# Patient Record
Sex: MALE | Race: BLACK OR AFRICAN AMERICAN | NOT HISPANIC OR LATINO
[De-identification: names, ages, dates, MRNs, and addresses within clinical notes are randomized per-mention and may not be internally consistent; named-entity substitution may affect disease eponyms.]

---

## 2017-03-16 ENCOUNTER — APPOINTMENT (OUTPATIENT)
Dept: ELECTROPHYSIOLOGY | Facility: CLINIC | Age: 67
End: 2017-03-16

## 2017-03-16 ENCOUNTER — NON-APPOINTMENT (OUTPATIENT)
Age: 67
End: 2017-03-16

## 2017-03-16 VITALS
BODY MASS INDEX: 28.93 KG/M2 | WEIGHT: 180 LBS | SYSTOLIC BLOOD PRESSURE: 136 MMHG | HEIGHT: 66 IN | DIASTOLIC BLOOD PRESSURE: 78 MMHG | HEART RATE: 77 BPM | OXYGEN SATURATION: 99 %

## 2017-03-21 ENCOUNTER — APPOINTMENT (OUTPATIENT)
Dept: GASTROENTEROLOGY | Facility: CLINIC | Age: 67
End: 2017-03-21

## 2017-03-21 VITALS
SYSTOLIC BLOOD PRESSURE: 124 MMHG | WEIGHT: 185 LBS | BODY MASS INDEX: 29.73 KG/M2 | RESPIRATION RATE: 16 BRPM | TEMPERATURE: 98.7 F | HEIGHT: 66 IN | HEART RATE: 76 BPM | DIASTOLIC BLOOD PRESSURE: 66 MMHG

## 2017-03-21 DIAGNOSIS — D55.8: ICD-10-CM

## 2017-03-21 RX ORDER — MULTIVITAMIN
TABLET ORAL
Refills: 0 | Status: ACTIVE | COMMUNITY

## 2017-03-24 LAB
ALBUMIN SERPL ELPH-MCNC: 4.3 G/DL
ALP BLD-CCNC: 97 U/L
ALT SERPL-CCNC: 16 U/L
ANION GAP SERPL CALC-SCNC: 16 MMOL/L
AST SERPL-CCNC: 22 U/L
BASOPHILS # BLD AUTO: 0.03 K/UL
BASOPHILS NFR BLD AUTO: 0.6 %
BILIRUB SERPL-MCNC: 0.5 MG/DL
BUN SERPL-MCNC: 11 MG/DL
CALCIUM SERPL-MCNC: 9.6 MG/DL
CHLORIDE SERPL-SCNC: 98 MMOL/L
CO2 SERPL-SCNC: 25 MMOL/L
CREAT SERPL-MCNC: 0.93 MG/DL
EOSINOPHIL # BLD AUTO: 0.1 K/UL
EOSINOPHIL NFR BLD AUTO: 2 %
GLUCOSE SERPL-MCNC: 74 MG/DL
HCT VFR BLD CALC: 42 %
HGB BLD-MCNC: 14.2 G/DL
IMM GRANULOCYTES NFR BLD AUTO: 0.2 %
LYMPHOCYTES # BLD AUTO: 1.86 K/UL
LYMPHOCYTES NFR BLD AUTO: 36.9 %
MAN DIFF?: NORMAL
MCHC RBC-ENTMCNC: 25.8 PG
MCHC RBC-ENTMCNC: 33.8 GM/DL
MCV RBC AUTO: 76.2 FL
MONOCYTES # BLD AUTO: 0.41 K/UL
MONOCYTES NFR BLD AUTO: 8.1 %
NEUTROPHILS # BLD AUTO: 2.63 K/UL
NEUTROPHILS NFR BLD AUTO: 52.2 %
PLATELET # BLD AUTO: 242 K/UL
POTASSIUM SERPL-SCNC: 4.9 MMOL/L
PROT SERPL-MCNC: 7.9 G/DL
RBC # BLD: 5.51 M/UL
RBC # FLD: 15.5 %
SODIUM SERPL-SCNC: 139 MMOL/L
WBC # FLD AUTO: 5.04 K/UL

## 2017-04-23 ENCOUNTER — RESULT REVIEW (OUTPATIENT)
Age: 67
End: 2017-04-23

## 2017-04-24 ENCOUNTER — OUTPATIENT (OUTPATIENT)
Dept: OUTPATIENT SERVICES | Facility: HOSPITAL | Age: 67
LOS: 1 days | Discharge: HOME | End: 2017-04-24
Payer: MEDICARE

## 2017-04-24 DIAGNOSIS — Z12.11 ENCOUNTER FOR SCREENING FOR MALIGNANT NEOPLASM OF COLON: ICD-10-CM

## 2017-04-24 DIAGNOSIS — Z93.0 TRACHEOSTOMY STATUS: Chronic | ICD-10-CM

## 2017-04-24 DIAGNOSIS — Z95.810 PRESENCE OF AUTOMATIC (IMPLANTABLE) CARDIAC DEFIBRILLATOR: Chronic | ICD-10-CM

## 2017-04-24 DIAGNOSIS — D64.9 ANEMIA, UNSPECIFIED: ICD-10-CM

## 2017-04-24 DIAGNOSIS — Z98.89 OTHER SPECIFIED POSTPROCEDURAL STATES: Chronic | ICD-10-CM

## 2017-04-24 DIAGNOSIS — Z85.46 PERSONAL HISTORY OF MALIGNANT NEOPLASM OF PROSTATE: Chronic | ICD-10-CM

## 2017-04-24 PROCEDURE — 45378 DIAGNOSTIC COLONOSCOPY: CPT

## 2017-04-24 PROCEDURE — 88312 SPECIAL STAINS GROUP 1: CPT

## 2017-04-24 PROCEDURE — 43239 EGD BIOPSY SINGLE/MULTIPLE: CPT

## 2017-04-24 PROCEDURE — 88312 SPECIAL STAINS GROUP 1: CPT | Mod: 26

## 2017-04-24 PROCEDURE — 88305 TISSUE EXAM BY PATHOLOGIST: CPT

## 2017-04-24 PROCEDURE — 88305 TISSUE EXAM BY PATHOLOGIST: CPT | Mod: 26

## 2017-04-26 ENCOUNTER — OTHER (OUTPATIENT)
Age: 67
End: 2017-04-26

## 2017-04-26 DIAGNOSIS — K27.9 PEPTIC ULCER, SITE UNSPECIFIED, UNSPECIFIED AS ACUTE OR CHRONIC, W/OUT HEMORRHAGE OR PERFORATION: ICD-10-CM

## 2017-05-02 DIAGNOSIS — G93.40 ENCEPHALOPATHY, UNSPECIFIED: ICD-10-CM

## 2017-05-02 DIAGNOSIS — K29.50 UNSPECIFIED CHRONIC GASTRITIS WITHOUT BLEEDING: ICD-10-CM

## 2017-05-02 DIAGNOSIS — Z95.810 PRESENCE OF AUTOMATIC (IMPLANTABLE) CARDIAC DEFIBRILLATOR: ICD-10-CM

## 2017-05-02 DIAGNOSIS — Z88.0 ALLERGY STATUS TO PENICILLIN: ICD-10-CM

## 2017-05-02 DIAGNOSIS — K57.30 DIVERTICULOSIS OF LARGE INTESTINE WITHOUT PERFORATION OR ABSCESS WITHOUT BLEEDING: ICD-10-CM

## 2017-05-02 DIAGNOSIS — I10 ESSENTIAL (PRIMARY) HYPERTENSION: ICD-10-CM

## 2017-05-02 DIAGNOSIS — I49.01 VENTRICULAR FIBRILLATION: ICD-10-CM

## 2017-05-02 DIAGNOSIS — Z85.46 PERSONAL HISTORY OF MALIGNANT NEOPLASM OF PROSTATE: ICD-10-CM

## 2017-05-02 DIAGNOSIS — Z79.82 LONG TERM (CURRENT) USE OF ASPIRIN: ICD-10-CM

## 2017-05-02 DIAGNOSIS — D64.9 ANEMIA, UNSPECIFIED: ICD-10-CM

## 2017-05-02 DIAGNOSIS — K64.8 OTHER HEMORRHOIDS: ICD-10-CM

## 2017-05-02 DIAGNOSIS — K25.9 GASTRIC ULCER, UNSPECIFIED AS ACUTE OR CHRONIC, WITHOUT HEMORRHAGE OR PERFORATION: ICD-10-CM

## 2017-05-02 DIAGNOSIS — E66.9 OBESITY, UNSPECIFIED: ICD-10-CM

## 2017-07-20 ENCOUNTER — APPOINTMENT (OUTPATIENT)
Dept: ELECTROPHYSIOLOGY | Facility: CLINIC | Age: 67
End: 2017-07-20

## 2017-08-24 ENCOUNTER — APPOINTMENT (OUTPATIENT)
Dept: ELECTROPHYSIOLOGY | Facility: CLINIC | Age: 67
End: 2017-08-24

## 2017-09-15 ENCOUNTER — APPOINTMENT (OUTPATIENT)
Dept: ELECTROPHYSIOLOGY | Facility: CLINIC | Age: 67
End: 2017-09-15
Payer: MEDICARE

## 2017-09-15 ENCOUNTER — NON-APPOINTMENT (OUTPATIENT)
Age: 67
End: 2017-09-15

## 2017-09-15 VITALS
SYSTOLIC BLOOD PRESSURE: 143 MMHG | WEIGHT: 190 LBS | DIASTOLIC BLOOD PRESSURE: 82 MMHG | OXYGEN SATURATION: 99 % | HEIGHT: 66 IN | BODY MASS INDEX: 30.53 KG/M2 | HEART RATE: 71 BPM

## 2017-09-15 PROCEDURE — 93283 PRGRMG EVAL IMPLANTABLE DFB: CPT

## 2018-01-19 ENCOUNTER — NON-APPOINTMENT (OUTPATIENT)
Age: 68
End: 2018-01-19

## 2018-01-19 ENCOUNTER — APPOINTMENT (OUTPATIENT)
Dept: ELECTROPHYSIOLOGY | Facility: CLINIC | Age: 68
End: 2018-01-19
Payer: MEDICARE

## 2018-01-19 VITALS
WEIGHT: 190 LBS | HEIGHT: 66 IN | SYSTOLIC BLOOD PRESSURE: 156 MMHG | HEART RATE: 70 BPM | BODY MASS INDEX: 30.53 KG/M2 | DIASTOLIC BLOOD PRESSURE: 81 MMHG | OXYGEN SATURATION: 99 %

## 2018-01-19 PROCEDURE — 93282 PRGRMG EVAL IMPLANTABLE DFB: CPT

## 2018-05-18 ENCOUNTER — APPOINTMENT (OUTPATIENT)
Dept: ELECTROPHYSIOLOGY | Facility: CLINIC | Age: 68
End: 2018-05-18
Payer: MEDICARE

## 2018-05-18 VITALS — HEART RATE: 67 BPM | DIASTOLIC BLOOD PRESSURE: 81 MMHG | SYSTOLIC BLOOD PRESSURE: 164 MMHG | OXYGEN SATURATION: 99 %

## 2018-05-18 PROCEDURE — 93282 PRGRMG EVAL IMPLANTABLE DFB: CPT

## 2018-06-13 ENCOUNTER — APPOINTMENT (OUTPATIENT)
Dept: UROLOGY | Facility: CLINIC | Age: 68
End: 2018-06-13
Payer: MEDICARE

## 2018-06-13 VITALS
RESPIRATION RATE: 17 BRPM | WEIGHT: 190 LBS | SYSTOLIC BLOOD PRESSURE: 128 MMHG | HEIGHT: 66 IN | TEMPERATURE: 98.2 F | OXYGEN SATURATION: 94 % | BODY MASS INDEX: 30.53 KG/M2 | DIASTOLIC BLOOD PRESSURE: 80 MMHG | HEART RATE: 74 BPM

## 2018-06-13 DIAGNOSIS — Z87.19 PERSONAL HISTORY OF OTHER DISEASES OF THE DIGESTIVE SYSTEM: ICD-10-CM

## 2018-06-13 DIAGNOSIS — Z87.448 PERSONAL HISTORY OF OTHER DISEASES OF URINARY SYSTEM: ICD-10-CM

## 2018-06-13 DIAGNOSIS — Z95.0 PRESENCE OF CARDIAC PACEMAKER: ICD-10-CM

## 2018-06-13 DIAGNOSIS — Z87.898 PERSONAL HISTORY OF OTHER SPECIFIED CONDITIONS: ICD-10-CM

## 2018-06-13 DIAGNOSIS — Z82.49 FAMILY HISTORY OF ISCHEMIC HEART DISEASE AND OTHER DISEASES OF THE CIRCULATORY SYSTEM: ICD-10-CM

## 2018-06-13 DIAGNOSIS — Z87.438 PERSONAL HISTORY OF OTHER DISEASES OF MALE GENITAL ORGANS: ICD-10-CM

## 2018-06-13 PROCEDURE — 99213 OFFICE O/P EST LOW 20 MIN: CPT

## 2018-06-14 LAB — PSA SERPL-MCNC: 19.12 NG/ML

## 2018-06-21 ENCOUNTER — RESULT REVIEW (OUTPATIENT)
Age: 68
End: 2018-06-21

## 2018-06-24 PROBLEM — Z87.898 HISTORY OF URINARY INCONTINENCE: Status: RESOLVED | Noted: 2017-03-21 | Resolved: 2018-06-24

## 2018-06-24 PROBLEM — Z87.438 HISTORY OF PROSTATE DISORDER: Status: RESOLVED | Noted: 2017-03-21 | Resolved: 2018-06-24

## 2018-06-24 PROBLEM — Z82.49 FAMILY HISTORY OF HYPERTENSION: Status: ACTIVE | Noted: 2017-03-21

## 2018-06-24 PROBLEM — Z87.19 HISTORY OF CONSTIPATION: Status: RESOLVED | Noted: 2017-03-21 | Resolved: 2018-06-24

## 2018-06-24 PROBLEM — Z87.448 HISTORY OF KIDNEY DISEASE: Status: RESOLVED | Noted: 2017-03-21 | Resolved: 2018-06-24

## 2018-06-24 PROBLEM — Z95.0 HISTORY OF CARDIAC PACEMAKER: Status: RESOLVED | Noted: 2017-03-21 | Resolved: 2018-06-24

## 2018-10-03 ENCOUNTER — APPOINTMENT (OUTPATIENT)
Dept: UROLOGY | Facility: CLINIC | Age: 68
End: 2018-10-03

## 2018-10-10 ENCOUNTER — APPOINTMENT (OUTPATIENT)
Dept: UROLOGY | Facility: CLINIC | Age: 68
End: 2018-10-10

## 2018-10-17 ENCOUNTER — APPOINTMENT (OUTPATIENT)
Dept: ELECTROPHYSIOLOGY | Facility: CLINIC | Age: 68
End: 2018-10-17
Payer: MEDICARE

## 2018-10-17 PROCEDURE — 93296 REM INTERROG EVL PM/IDS: CPT

## 2018-10-17 PROCEDURE — 93295 DEV INTERROG REMOTE 1/2/MLT: CPT

## 2018-10-24 ENCOUNTER — APPOINTMENT (OUTPATIENT)
Dept: UROLOGY | Facility: CLINIC | Age: 68
End: 2018-10-24
Payer: MEDICARE

## 2018-10-24 DIAGNOSIS — C61 MALIGNANT NEOPLASM OF PROSTATE: ICD-10-CM

## 2018-10-24 DIAGNOSIS — R31.29 OTHER MICROSCOPIC HEMATURIA: ICD-10-CM

## 2018-10-24 PROCEDURE — 99214 OFFICE O/P EST MOD 30 MIN: CPT | Mod: 25

## 2018-10-24 PROCEDURE — 51701 INSERT BLADDER CATHETER: CPT

## 2018-10-25 LAB
APPEARANCE: CLEAR
BACTERIA: NEGATIVE
BILIRUBIN URINE: NEGATIVE
BLOOD URINE: ABNORMAL
COLOR: YELLOW
GLUCOSE QUALITATIVE U: NEGATIVE MG/DL
HYALINE CASTS: 0 /LPF
KETONES URINE: NEGATIVE
LEUKOCYTE ESTERASE URINE: NEGATIVE
MICROSCOPIC-UA: NORMAL
NITRITE URINE: NEGATIVE
PH URINE: 7.5
PROTEIN URINE: NEGATIVE MG/DL
RED BLOOD CELLS URINE: 1 /HPF
SPECIFIC GRAVITY URINE: 1.01
SQUAMOUS EPITHELIAL CELLS: 8 /HPF
UROBILINOGEN URINE: NEGATIVE MG/DL
WHITE BLOOD CELLS URINE: 1 /HPF

## 2018-10-29 LAB — BACTERIA UR CULT: NORMAL

## 2018-11-17 ENCOUNTER — TRANSCRIPTION ENCOUNTER (OUTPATIENT)
Age: 68
End: 2018-11-17

## 2018-11-18 ENCOUNTER — RESULT REVIEW (OUTPATIENT)
Age: 68
End: 2018-11-18

## 2018-11-18 ENCOUNTER — INPATIENT (INPATIENT)
Facility: HOSPITAL | Age: 68
LOS: 11 days | Discharge: NURSING, REHAB OR LONG-TERM CARE | End: 2018-11-30
Attending: SURGERY | Admitting: SURGERY
Payer: MEDICARE

## 2018-11-18 VITALS
TEMPERATURE: 97 F | WEIGHT: 179.9 LBS | DIASTOLIC BLOOD PRESSURE: 87 MMHG | HEIGHT: 65 IN | OXYGEN SATURATION: 96 % | RESPIRATION RATE: 20 BRPM | SYSTOLIC BLOOD PRESSURE: 155 MMHG | HEART RATE: 112 BPM

## 2018-11-18 DIAGNOSIS — Z98.89 OTHER SPECIFIED POSTPROCEDURAL STATES: Chronic | ICD-10-CM

## 2018-11-18 DIAGNOSIS — Z93.0 TRACHEOSTOMY STATUS: Chronic | ICD-10-CM

## 2018-11-18 DIAGNOSIS — Z85.46 PERSONAL HISTORY OF MALIGNANT NEOPLASM OF PROSTATE: Chronic | ICD-10-CM

## 2018-11-18 DIAGNOSIS — Z95.810 PRESENCE OF AUTOMATIC (IMPLANTABLE) CARDIAC DEFIBRILLATOR: ICD-10-CM

## 2018-11-18 DIAGNOSIS — K56.609 UNSPECIFIED INTESTINAL OBSTRUCTION, UNSPECIFIED AS TO PARTIAL VERSUS COMPLETE OBSTRUCTION: ICD-10-CM

## 2018-11-18 DIAGNOSIS — K40.30 UNILATERAL INGUINAL HERNIA, WITH OBSTRUCTION, WITHOUT GANGRENE, NOT SPECIFIED AS RECURRENT: ICD-10-CM

## 2018-11-18 DIAGNOSIS — Z95.810 PRESENCE OF AUTOMATIC (IMPLANTABLE) CARDIAC DEFIBRILLATOR: Chronic | ICD-10-CM

## 2018-11-18 LAB
ALBUMIN SERPL ELPH-MCNC: 3 G/DL — LOW (ref 3.3–5)
ALBUMIN SERPL ELPH-MCNC: 3.1 G/DL — LOW (ref 3.3–5)
ALP SERPL-CCNC: 73 U/L — SIGNIFICANT CHANGE UP (ref 40–120)
ALP SERPL-CCNC: 77 U/L — SIGNIFICANT CHANGE UP (ref 40–120)
ALT FLD-CCNC: 19 U/L — SIGNIFICANT CHANGE UP (ref 12–78)
ALT FLD-CCNC: 22 U/L — SIGNIFICANT CHANGE UP (ref 12–78)
ANION GAP SERPL CALC-SCNC: 12 MMOL/L — SIGNIFICANT CHANGE UP (ref 5–17)
ANION GAP SERPL CALC-SCNC: 9 MMOL/L — SIGNIFICANT CHANGE UP (ref 5–17)
APPEARANCE UR: CLEAR — SIGNIFICANT CHANGE UP
APTT BLD: 24.9 SEC — LOW (ref 28.5–37)
AST SERPL-CCNC: 19 U/L — SIGNIFICANT CHANGE UP (ref 15–37)
AST SERPL-CCNC: 22 U/L — SIGNIFICANT CHANGE UP (ref 15–37)
BACTERIA # UR AUTO: ABNORMAL
BASOPHILS # BLD AUTO: 0 K/UL — SIGNIFICANT CHANGE UP (ref 0–0.2)
BASOPHILS # BLD AUTO: 0.02 K/UL — SIGNIFICANT CHANGE UP (ref 0–0.2)
BASOPHILS # BLD AUTO: 0.02 K/UL — SIGNIFICANT CHANGE UP (ref 0–0.2)
BASOPHILS NFR BLD AUTO: 0 % — SIGNIFICANT CHANGE UP (ref 0–2)
BASOPHILS NFR BLD AUTO: 0.2 % — SIGNIFICANT CHANGE UP (ref 0–2)
BASOPHILS NFR BLD AUTO: 0.2 % — SIGNIFICANT CHANGE UP (ref 0–2)
BILIRUB SERPL-MCNC: 0.4 MG/DL — SIGNIFICANT CHANGE UP (ref 0.2–1.2)
BILIRUB SERPL-MCNC: 0.6 MG/DL — SIGNIFICANT CHANGE UP (ref 0.2–1.2)
BILIRUB UR-MCNC: NEGATIVE — SIGNIFICANT CHANGE UP
BLD GP AB SCN SERPL QL: SIGNIFICANT CHANGE UP
BUN SERPL-MCNC: 22 MG/DL — SIGNIFICANT CHANGE UP (ref 7–23)
BUN SERPL-MCNC: 24 MG/DL — HIGH (ref 7–23)
CALCIUM SERPL-MCNC: 7.6 MG/DL — LOW (ref 8.5–10.1)
CALCIUM SERPL-MCNC: 8.3 MG/DL — LOW (ref 8.5–10.1)
CHLORIDE SERPL-SCNC: 100 MMOL/L — SIGNIFICANT CHANGE UP (ref 96–108)
CHLORIDE SERPL-SCNC: 98 MMOL/L — SIGNIFICANT CHANGE UP (ref 96–108)
CK MB CFR SERPL CALC: 2.7 NG/ML — SIGNIFICANT CHANGE UP (ref 0.5–3.6)
CO2 SERPL-SCNC: 24 MMOL/L — SIGNIFICANT CHANGE UP (ref 22–31)
CO2 SERPL-SCNC: 26 MMOL/L — SIGNIFICANT CHANGE UP (ref 22–31)
COLOR SPEC: YELLOW — SIGNIFICANT CHANGE UP
CREAT SERPL-MCNC: 0.94 MG/DL — SIGNIFICANT CHANGE UP (ref 0.5–1.3)
CREAT SERPL-MCNC: 1.16 MG/DL — SIGNIFICANT CHANGE UP (ref 0.5–1.3)
DIFF PNL FLD: ABNORMAL
EOSINOPHIL # BLD AUTO: 0 K/UL — SIGNIFICANT CHANGE UP (ref 0–0.5)
EOSINOPHIL # BLD AUTO: 0.03 K/UL — SIGNIFICANT CHANGE UP (ref 0–0.5)
EOSINOPHIL # BLD AUTO: 0.04 K/UL — SIGNIFICANT CHANGE UP (ref 0–0.5)
EOSINOPHIL NFR BLD AUTO: 0 % — SIGNIFICANT CHANGE UP (ref 0–6)
EOSINOPHIL NFR BLD AUTO: 0.3 % — SIGNIFICANT CHANGE UP (ref 0–6)
EOSINOPHIL NFR BLD AUTO: 0.5 % — SIGNIFICANT CHANGE UP (ref 0–6)
EPI CELLS # UR: SIGNIFICANT CHANGE UP
GLUCOSE SERPL-MCNC: 143 MG/DL — HIGH (ref 70–99)
GLUCOSE SERPL-MCNC: 144 MG/DL — HIGH (ref 70–99)
GLUCOSE UR QL: NEGATIVE MG/DL — SIGNIFICANT CHANGE UP
HCT VFR BLD CALC: 34.3 % — LOW (ref 39–50)
HCT VFR BLD CALC: 36.6 % — LOW (ref 39–50)
HCT VFR BLD CALC: 37.7 % — LOW (ref 39–50)
HGB BLD-MCNC: 11.7 G/DL — LOW (ref 13–17)
HGB BLD-MCNC: 12.5 G/DL — LOW (ref 13–17)
HGB BLD-MCNC: 13.1 G/DL — SIGNIFICANT CHANGE UP (ref 13–17)
IMM GRANULOCYTES NFR BLD AUTO: 0.2 % — SIGNIFICANT CHANGE UP (ref 0–1.5)
IMM GRANULOCYTES NFR BLD AUTO: 0.2 % — SIGNIFICANT CHANGE UP (ref 0–1.5)
INR BLD: 1.27 RATIO — HIGH (ref 0.88–1.16)
KETONES UR-MCNC: NEGATIVE — SIGNIFICANT CHANGE UP
LACTATE SERPL-SCNC: 0.9 MMOL/L — SIGNIFICANT CHANGE UP (ref 0.7–2)
LACTATE SERPL-SCNC: 1.8 MMOL/L — SIGNIFICANT CHANGE UP (ref 0.7–2)
LACTATE SERPL-SCNC: 3.3 MMOL/L — HIGH (ref 0.7–2)
LEUKOCYTE ESTERASE UR-ACNC: ABNORMAL
LIDOCAIN IGE QN: 132 U/L — SIGNIFICANT CHANGE UP (ref 73–393)
LYMPHOCYTES # BLD AUTO: 0.69 K/UL — LOW (ref 1–3.3)
LYMPHOCYTES # BLD AUTO: 0.94 K/UL — LOW (ref 1–3.3)
LYMPHOCYTES # BLD AUTO: 1.02 K/UL — SIGNIFICANT CHANGE UP (ref 1–3.3)
LYMPHOCYTES # BLD AUTO: 10.5 % — LOW (ref 13–44)
LYMPHOCYTES # BLD AUTO: 12.5 % — LOW (ref 13–44)
LYMPHOCYTES # BLD AUTO: 7 % — LOW (ref 13–44)
MACROCYTES BLD QL: SLIGHT — SIGNIFICANT CHANGE UP
MANUAL SMEAR VERIFICATION: SIGNIFICANT CHANGE UP
MCHC RBC-ENTMCNC: 25.9 PG — LOW (ref 27–34)
MCHC RBC-ENTMCNC: 25.9 PG — LOW (ref 27–34)
MCHC RBC-ENTMCNC: 26.4 PG — LOW (ref 27–34)
MCHC RBC-ENTMCNC: 34.1 GM/DL — SIGNIFICANT CHANGE UP (ref 32–36)
MCHC RBC-ENTMCNC: 34.2 GM/DL — SIGNIFICANT CHANGE UP (ref 32–36)
MCHC RBC-ENTMCNC: 34.7 GM/DL — SIGNIFICANT CHANGE UP (ref 32–36)
MCV RBC AUTO: 75.9 FL — LOW (ref 80–100)
MONOCYTES # BLD AUTO: 0.29 K/UL — SIGNIFICANT CHANGE UP (ref 0–0.9)
MONOCYTES # BLD AUTO: 0.65 K/UL — SIGNIFICANT CHANGE UP (ref 0–0.9)
MONOCYTES # BLD AUTO: 0.69 K/UL — SIGNIFICANT CHANGE UP (ref 0–0.9)
MONOCYTES NFR BLD AUTO: 3 % — SIGNIFICANT CHANGE UP (ref 2–14)
MONOCYTES NFR BLD AUTO: 7.7 % — SIGNIFICANT CHANGE UP (ref 2–14)
MONOCYTES NFR BLD AUTO: 8 % — SIGNIFICANT CHANGE UP (ref 2–14)
NEUTROPHILS # BLD AUTO: 6.38 K/UL — SIGNIFICANT CHANGE UP (ref 1.8–7.4)
NEUTROPHILS # BLD AUTO: 7.25 K/UL — SIGNIFICANT CHANGE UP (ref 1.8–7.4)
NEUTROPHILS # BLD AUTO: 8.83 K/UL — HIGH (ref 1.8–7.4)
NEUTROPHILS NFR BLD AUTO: 78.6 % — HIGH (ref 43–77)
NEUTROPHILS NFR BLD AUTO: 81.1 % — HIGH (ref 43–77)
NEUTROPHILS NFR BLD AUTO: 90 % — HIGH (ref 43–77)
NITRITE UR-MCNC: NEGATIVE — SIGNIFICANT CHANGE UP
NRBC # BLD: 0 /100 WBCS — SIGNIFICANT CHANGE UP (ref 0–0)
NRBC # BLD: 0 /100 WBCS — SIGNIFICANT CHANGE UP (ref 0–0)
NRBC # BLD: 0 /100 — SIGNIFICANT CHANGE UP (ref 0–0)
NRBC # BLD: SIGNIFICANT CHANGE UP /100 WBCS (ref 0–0)
NT-PROBNP SERPL-SCNC: 1425 PG/ML — HIGH (ref 0–125)
PH UR: 6.5 — SIGNIFICANT CHANGE UP (ref 5–8)
PLAT MORPH BLD: NORMAL — SIGNIFICANT CHANGE UP
PLATELET # BLD AUTO: 289 K/UL — SIGNIFICANT CHANGE UP (ref 150–400)
PLATELET # BLD AUTO: 311 K/UL — SIGNIFICANT CHANGE UP (ref 150–400)
PLATELET # BLD AUTO: 314 K/UL — SIGNIFICANT CHANGE UP (ref 150–400)
POTASSIUM SERPL-MCNC: 4.5 MMOL/L — SIGNIFICANT CHANGE UP (ref 3.5–5.3)
POTASSIUM SERPL-MCNC: 4.5 MMOL/L — SIGNIFICANT CHANGE UP (ref 3.5–5.3)
POTASSIUM SERPL-SCNC: 4.5 MMOL/L — SIGNIFICANT CHANGE UP (ref 3.5–5.3)
POTASSIUM SERPL-SCNC: 4.5 MMOL/L — SIGNIFICANT CHANGE UP (ref 3.5–5.3)
PROT SERPL-MCNC: 7.4 GM/DL — SIGNIFICANT CHANGE UP (ref 6–8.3)
PROT SERPL-MCNC: 7.9 GM/DL — SIGNIFICANT CHANGE UP (ref 6–8.3)
PROT UR-MCNC: 30 MG/DL
PROTHROM AB SERPL-ACNC: 14.3 SEC — HIGH (ref 10–12.9)
RBC # BLD: 4.52 M/UL — SIGNIFICANT CHANGE UP (ref 4.2–5.8)
RBC # BLD: 4.82 M/UL — SIGNIFICANT CHANGE UP (ref 4.2–5.8)
RBC # BLD: 4.97 M/UL — SIGNIFICANT CHANGE UP (ref 4.2–5.8)
RBC # FLD: 13.5 % — SIGNIFICANT CHANGE UP (ref 10.3–14.5)
RBC # FLD: 13.6 % — SIGNIFICANT CHANGE UP (ref 10.3–14.5)
RBC # FLD: 13.6 % — SIGNIFICANT CHANGE UP (ref 10.3–14.5)
RBC BLD AUTO: NORMAL — SIGNIFICANT CHANGE UP
RBC CASTS # UR COMP ASSIST: ABNORMAL /HPF (ref 0–4)
SODIUM SERPL-SCNC: 134 MMOL/L — LOW (ref 135–145)
SODIUM SERPL-SCNC: 135 MMOL/L — SIGNIFICANT CHANGE UP (ref 135–145)
SP GR SPEC: 1 — LOW (ref 1.01–1.02)
TROPONIN I SERPL-MCNC: <.015 NG/ML — SIGNIFICANT CHANGE UP (ref 0.01–0.04)
UROBILINOGEN FLD QL: 1 MG/DL
WBC # BLD: 8.13 K/UL — SIGNIFICANT CHANGE UP (ref 3.8–10.5)
WBC # BLD: 8.95 K/UL — SIGNIFICANT CHANGE UP (ref 3.8–10.5)
WBC # BLD: 9.81 K/UL — SIGNIFICANT CHANGE UP (ref 3.8–10.5)
WBC # FLD AUTO: 8.13 K/UL — SIGNIFICANT CHANGE UP (ref 3.8–10.5)
WBC # FLD AUTO: 8.95 K/UL — SIGNIFICANT CHANGE UP (ref 3.8–10.5)
WBC # FLD AUTO: 9.81 K/UL — SIGNIFICANT CHANGE UP (ref 3.8–10.5)
WBC UR QL: SIGNIFICANT CHANGE UP

## 2018-11-18 PROCEDURE — 88302 TISSUE EXAM BY PATHOLOGIST: CPT | Mod: 26

## 2018-11-18 PROCEDURE — 74177 CT ABD & PELVIS W/CONTRAST: CPT | Mod: 26

## 2018-11-18 PROCEDURE — 71045 X-RAY EXAM CHEST 1 VIEW: CPT | Mod: 26

## 2018-11-18 PROCEDURE — 88305 TISSUE EXAM BY PATHOLOGIST: CPT | Mod: 26

## 2018-11-18 PROCEDURE — 74018 RADEX ABDOMEN 1 VIEW: CPT | Mod: 26

## 2018-11-18 PROCEDURE — 93010 ELECTROCARDIOGRAM REPORT: CPT

## 2018-11-18 PROCEDURE — 99285 EMERGENCY DEPT VISIT HI MDM: CPT

## 2018-11-18 RX ORDER — HEPARIN SODIUM 5000 [USP'U]/ML
5000 INJECTION INTRAVENOUS; SUBCUTANEOUS EVERY 12 HOURS
Qty: 0 | Refills: 0 | Status: DISCONTINUED | OUTPATIENT
Start: 2018-11-18 | End: 2018-11-18

## 2018-11-18 RX ORDER — ASPIRIN/CALCIUM CARB/MAGNESIUM 324 MG
81 TABLET ORAL DAILY
Qty: 0 | Refills: 0 | Status: DISCONTINUED | OUTPATIENT
Start: 2018-11-18 | End: 2018-11-18

## 2018-11-18 RX ORDER — MORPHINE SULFATE 50 MG/1
4 CAPSULE, EXTENDED RELEASE ORAL ONCE
Qty: 0 | Refills: 0 | Status: DISCONTINUED | OUTPATIENT
Start: 2018-11-18 | End: 2018-11-18

## 2018-11-18 RX ORDER — METOPROLOL TARTRATE 50 MG
5 TABLET ORAL EVERY 6 HOURS
Qty: 0 | Refills: 0 | Status: DISCONTINUED | OUTPATIENT
Start: 2018-11-18 | End: 2018-11-18

## 2018-11-18 RX ORDER — IOHEXOL 300 MG/ML
30 INJECTION, SOLUTION INTRAVENOUS ONCE
Qty: 0 | Refills: 0 | Status: COMPLETED | OUTPATIENT
Start: 2018-11-18 | End: 2018-11-18

## 2018-11-18 RX ORDER — HYDROMORPHONE HYDROCHLORIDE 2 MG/ML
1 INJECTION INTRAMUSCULAR; INTRAVENOUS; SUBCUTANEOUS ONCE
Qty: 0 | Refills: 0 | Status: DISCONTINUED | OUTPATIENT
Start: 2018-11-18 | End: 2018-11-18

## 2018-11-18 RX ORDER — MORPHINE SULFATE 50 MG/1
2 CAPSULE, EXTENDED RELEASE ORAL EVERY 4 HOURS
Qty: 0 | Refills: 0 | Status: DISCONTINUED | OUTPATIENT
Start: 2018-11-18 | End: 2018-11-18

## 2018-11-18 RX ORDER — MORPHINE SULFATE 50 MG/1
4 CAPSULE, EXTENDED RELEASE ORAL EVERY 4 HOURS
Qty: 0 | Refills: 0 | Status: DISCONTINUED | OUTPATIENT
Start: 2018-11-18 | End: 2018-11-18

## 2018-11-18 RX ORDER — SODIUM CHLORIDE 9 MG/ML
1000 INJECTION INTRAMUSCULAR; INTRAVENOUS; SUBCUTANEOUS ONCE
Qty: 0 | Refills: 0 | Status: COMPLETED | OUTPATIENT
Start: 2018-11-18 | End: 2018-11-18

## 2018-11-18 RX ORDER — SODIUM CHLORIDE 9 MG/ML
1000 INJECTION, SOLUTION INTRAVENOUS
Qty: 0 | Refills: 0 | Status: DISCONTINUED | OUTPATIENT
Start: 2018-11-18 | End: 2018-11-18

## 2018-11-18 RX ORDER — ONDANSETRON 8 MG/1
4 TABLET, FILM COATED ORAL ONCE
Qty: 0 | Refills: 0 | Status: COMPLETED | OUTPATIENT
Start: 2018-11-18 | End: 2018-11-18

## 2018-11-18 RX ADMIN — ONDANSETRON 4 MILLIGRAM(S): 8 TABLET, FILM COATED ORAL at 06:30

## 2018-11-18 RX ADMIN — HYDROMORPHONE HYDROCHLORIDE 1 MILLIGRAM(S): 2 INJECTION INTRAMUSCULAR; INTRAVENOUS; SUBCUTANEOUS at 15:00

## 2018-11-18 RX ADMIN — MORPHINE SULFATE 4 MILLIGRAM(S): 50 CAPSULE, EXTENDED RELEASE ORAL at 09:38

## 2018-11-18 RX ADMIN — SODIUM CHLORIDE 1000 MILLILITER(S): 9 INJECTION INTRAMUSCULAR; INTRAVENOUS; SUBCUTANEOUS at 10:00

## 2018-11-18 RX ADMIN — Medication 100 MILLIGRAM(S): at 07:31

## 2018-11-18 RX ADMIN — HYDROMORPHONE HYDROCHLORIDE 1 MILLIGRAM(S): 2 INJECTION INTRAMUSCULAR; INTRAVENOUS; SUBCUTANEOUS at 13:10

## 2018-11-18 RX ADMIN — IOHEXOL 30 MILLILITER(S): 300 INJECTION, SOLUTION INTRAVENOUS at 06:25

## 2018-11-18 RX ADMIN — MORPHINE SULFATE 4 MILLIGRAM(S): 50 CAPSULE, EXTENDED RELEASE ORAL at 06:30

## 2018-11-18 RX ADMIN — SODIUM CHLORIDE 1000 MILLILITER(S): 9 INJECTION INTRAMUSCULAR; INTRAVENOUS; SUBCUTANEOUS at 07:00

## 2018-11-18 NOTE — CONSULT NOTE ADULT - PROBLEM SELECTOR RECOMMENDATION 9
surgical management as per Dr Tinsley   patient is medically optimized, recommend cardiology evaluation

## 2018-11-18 NOTE — ED PROVIDER NOTE - OBJECTIVE STATEMENT
Pt is a 69 yo gentleman with a pmhx of HTN, prostate ca, AICD sp cardiac arrest who presents to the ED with abdominal pain for 1 week. Has had nausea, no vomiting. Has had dysuria as well. Has had increased distension of his abdomen. Last bowel movement was this morning. No fevers, no chest pain, no sob. Has no hx of SBO in the past.

## 2018-11-18 NOTE — ED ADULT NURSE NOTE - OBJECTIVE STATEMENT
Pt is A&Ox4. Pt points to pain in the pelvic and rates it ten.  Pt has B/L lower extremity non-pitting edema.  Pt is unable to move his legs.    As per wife, pt has been c/o difficulty breathing, easily exerted with activities, abd pain and groin pain x1 week.  Also, wife states that when she changes his diaper, he hasn't been providing a lot of urine x2days.

## 2018-11-18 NOTE — ED PROVIDER NOTE - MEDICAL DECISION MAKING DETAILS
Ddx: ro SBO/ Incarcerated hernia  Plan: Cbc, cmp, lactate, CT abd, pain control, reassesss Ddx: ro SBO/ Incarcerated hernia  Plan: Cbc, cmp, lactate, CT abd, pain control, reassesss    High grade SBO with R inguinal hernia noted - to admit for further care with Dr. Tinsley

## 2018-11-18 NOTE — H&P ADULT - HISTORY OF PRESENT ILLNESS
68M with PMH HTN, prostate ca (s/p resection and radiation), AICD placement s/p cardiac arrest and coma (due to brain injury) about 10yrs ago, and known 2 year history of reducible right inguinal hernia now presents to the ER c/o diffuse abdominal pain and right scrotal pain. Wife at bedside who speaks on behalf of patient. States right groin pain has been persistent x1 week, worsening since 2 days ago associated with increased right scrotal swelling. Associated with nausea and NBNB emesis.  Denies flatus or BM, states his last BM was yesterday. Wife states patient has the "sweats" at night, but denies fever/chills. Denies active chest pain, palpitations, shortness of breath, dizziness or urinary complaints. 68M with PMH HTN, prostate ca (s/p resection and radiation), AICD placement s/p cardiac arrest and coma (due to brain injury) about 10yrs ago, and known 2 year history of reducible right inguinal hernia now presents to the ER c/o diffuse abdominal pain and right scrotal pain. Wife at bedside who speaks on behalf of patient. States right groin pain has been persistent x1 week, worsening since 2 days ago associated with increased right scrotal swelling. Associated with nausea and NBNB emesis.  Denies flatus or BM, states his last BM was yesterday. Wife states patient has the "sweats" at night, but denies fever/chills. Denies active chest pain, palpitations, shortness of breath, dizziness or urinary complaints.   ER and Surgeon tried to reduce Hernia, not reducible.

## 2018-11-18 NOTE — ED ADULT NURSE NOTE - PSH
H/O prostate cancer  resection and radiation therapy  H/O tracheostomy    S/P ICD (internal cardiac defibrillator) procedure  implanted on 1/6/2009  Status post cryoablation  of left renal mass

## 2018-11-18 NOTE — ED ADULT NURSE REASSESSMENT NOTE - NS ED NURSE REASSESS COMMENT FT1
1130 n/g tube attempted unable surg resident Mary Ellen aware 2nd Rn to attempt 2nd line 1st line left hand sluggish Dr Gregorio and Mary Ellen surg resident aware 1200 Mary Ellen in ER family and pt refusing NG tube. 1300 Dr Gaytan in ER shea obtained  by elma scrotal hernia reduced labs drawn and sent 1330 n/g attmpted by Dr Gaytan surg resident at bedside 1335 lab called labs hemolyzed lab called to send tech stat for redraw
Dr Gaytan unable to pass n/g tube lab at bedside to draw hemolyzed labs
abd remains distended no urinary output Dr Gregorio aware unable to pass shea Fluids hung Dr cruz lactate earlier elevated.
pt sent to CT
pt sleeping family at bedside heparin SQ cleared by surg resident to give pre op
surgical resident at bedside aware unable to pass shea and lactate
unable to straight cath resistance met Dr Hernandez aware, [facility] states last time urologist had to pass
pt awaiting transfer to OR as per surgical resident. labs drawn and sent as ordered. 2nd IV placed to R hand.
pt received bed 5 A&C awaiting CT pts family states pt doesn't know when has to void pt to be straight cath.
pt seen by surg resident aware unable  to get 2nd line, and Iv infusing slowly Chris to try 2nd line N/g attempted by 2 RNs surgical resident called and aware came back to ER pt and wife refused, pt not actively vomiting will be done in OR

## 2018-11-18 NOTE — H&P ADULT - ASSESSMENT
68M admitted with SBO 2/2 incarcerated right inguinal hernia, with concern for strangulation. Lactate 3.3.

## 2018-11-18 NOTE — CONSULT NOTE ADULT - SUBJECTIVE AND OBJECTIVE BOX
NO CV CONTRAINDICATION FOR SURGERY WITH ANESTHESIA AS INDICATED. PATIENT'S CARDIAC STATUS APPEARS STABLE/OPTIMAL.     THIS IS A 68 YEAR OLD MALE WITH SBO AND HERNIA; STATUS  POST CARDIAC ARREST 10 YEARS AGO STATUS  POST AICD WITH RECENT NORMAL INTERROGATION; NO ASHD OR CONGESTIVE HEART FAILURE     SEDATE AFTER MORPHINE  UNABLE TO ASSESS JV CONTOURS  SOFT S1 & S2; NO APPRECIABLE M/R/G/H  PROTRUBERANT ABDOMEN, TYMPANITIC  MILD EDEMA PERFUSED    ALL LABS/RADIOLOGY/MEDICATIONS REVIEWED;   ECG PENDING    WOULD PROCEED TO OR IF NECCESSARY  . STABLE CARDIOVASCULAR STATUS;     WILL FOLLOW  FAMILY PRESENT AT BEDSIDE     JEZ LEE MD, FACC

## 2018-11-18 NOTE — H&P ADULT - ATTENDING COMMENTS
I examined patient in Emergency room. R Incarcerated Inguinal Hernia with SBO, Tried to reduce it, no success.  Consent obtain for emergency Hernia repair.  Dx, Plan discussed with patient and family at  bed site.

## 2018-11-18 NOTE — CONSULT NOTE ADULT - PROBLEM SELECTOR RECOMMENDATION 2
has hx of cardaic arrest in remote past, reduced ejection fraction as per notes from 2015.  Recommend Cardiology Evaluation as part of  pre op evaluation. has hx of cardaic arrest in remote past, reduced ejection fraction as per notes from 2015.. Continue  B Blockers  and aspirin .  Recommend Cardiology Evaluation as part of  pre op evaluation.

## 2018-11-18 NOTE — H&P ADULT - PROBLEM SELECTOR PLAN 1
Admit to Dr. Tinsley   IVF resuscitation, repeat lactate  NPO, NGT decompression  Jones   Patient booked for exploratory laparotomy   preop labs, type and screen, coags ordered  2 uPRBC on hold for OR  Discussed with Dr. Tinsley who is coming now to evaluate patient

## 2018-11-18 NOTE — H&P ADULT - REASON FOR ADMISSION
Abdominal pain and scrotal pain Abdominal pain and scrotal pain, Incarcerated R Inguino-scrotal Hernia.

## 2018-11-18 NOTE — H&P ADULT - NSHPPHYSICALEXAM_GEN_ALL_CORE
General: Laying on stretcher, moderate distress, confused and agitated (wife stating hes not confused)  Head: NC/AT  EENT: PERRLA. EOMI. Conjunctiva and sclera clear. Pharynx clear.  Neck: Supple.  Lungs: CTA B/l. Nonlabored Respirations  CV: +S1S2, RRR  Abdomen: Moderate distention, Rigid abdomen, tenderness to palpation   Groin: Right groin tenderness, scrotal tenderness, unable to reduce right inguinal/scrotal hernia, ++Swollen, tense scrotum   Extremities: Warm and well perfused. 2+ peripheral pulses b/l. Calf soft, nontender b/l. No pedal edema.  Neuro: Alert and awake but confused and agitated General: Laying on stretcher, moderate distress, confused and agitated (wife stating hes not confused)  Head: NC/AT  EENT: PERRLA. EOMI. Conjunctiva and sclera clear. Pharynx clear.  Neck: Supple.  Lungs: CTA B/l. Nonlabored Respirations  CV: +S1S2, RRR  Abdomen: Moderate distention, Rigid abdomen, tenderness to palpation   Groin: Right groin tenderness, scrotal tenderness, unable to reduce large right inguinal/scrotal hernia, ++Swollen, tense scrotum   Extremities: Warm and well perfused. 2+ peripheral pulses b/l. Calf soft, nontender b/l. No pedal edema.  Neuro: Alert and awake but confused and agitated

## 2018-11-18 NOTE — CONSULT NOTE ADULT - SUBJECTIVE AND OBJECTIVE BOX
HPI:  68M with PMH HTN, prostate ca (s/p resection and radiation), AICD placement s/p cardiac arrest and coma (due to brain injury) about 10yrs ago, and known 2 year history of reducible right inguinal hernia now presents to the ER c/o diffuse abdominal pain and right scrotal pain. Wife at bedside who speaks on behalf of patient. States right groin pain has been persistent x1 week, worsening since 2 days ago associated with increased right scrotal swelling. Associated with nausea and NBNB emesis.  Denies flatus or BM, states his last BM was yesterday. Wife states patient has the "sweats" at night, but denies fever/chills. Denies active chest pain, palpitations, shortness of breath, dizziness or urinary complaints. (2018 11:32)      PAST MEDICAL & SURGICAL HISTORY:  Sudden cardiac death  Gait abnormality  Leg pain, right  Anoxic encephalopathy  Brain injury with coma: x 2 weeks in   Prostate cancer: radiation therapy  Hypertension  S/P ICD (internal cardiac defibrillator) procedure: implanted on 2009  H/O tracheostomy  H/O prostate cancer: resection and radiation therapy  Status post cryoablation: of left renal mass      REVIEW OF SYSTEMS:    CONSTITUTIONAL: No fever, weight loss, or fatigue  EYES: No eye pain, visual disturbances, or discharge  ENMT:  No difficulty hearing, tinnitus, vertigo; No sinus or throat pain  NECK: No pain or stiffness  BREASTS: No pain, masses, or nipple discharge  RESPIRATORY: No cough, wheezing, chills or hemoptysis; No shortness of breath  CARDIOVASCULAR: No chest pain, palpitations, dizziness, or leg swelling Tacho AICD ., reduced LVEF  GASTROINTESTINAL: No abdominal or epigastric pain. No nausea, vomiting, or hematemesis; No diarrhea or constipation. No melena or hematochezia. Rt inguinal hernia X more than 3 years  GENITOURINARY: No dysuria, frequency, hematuria, or incontinence  NEUROLOGICAL: No headaches,  has memory loss, loss of strength, numbness, or tremors. gait abnormality  SKIN: No itching, burning, rashes, or lesions   LYMPH NODES: No enlarged glands  ENDOCRINE: No heat or cold intolerance; No hair loss  MUSCULOSKELETAL: No joint pain or swelling; No muscle, back, or extremity pain  PSYCHIATRIC: No depression, anxiety, mood swings, or difficulty sleeping  HEME/LYMPH: No easy bruising, or bleeding gums  ALLERY AND IMMUNOLOGIC: No hives or eczema      MEDICATIONS  (STANDING):  aspirin enteric coated 81 milliGRAM(s) Oral daily  heparin  Injectable 5000 Unit(s) SubCutaneous every 12 hours  lactated ringers. 1000 milliLiter(s) (125 mL/Hr) IV Continuous <Continuous>    MEDICATIONS  (PRN):  metoprolol tartrate Injectable 5 milliGRAM(s) IV Push every 6 hours PRN SBP>160 or HR >100  morphine  - Injectable 4 milliGRAM(s) IV Push every 4 hours PRN Severe Pain (7 - 10)  morphine  - Injectable 2 milliGRAM(s) IV Push every 4 hours PRN Moderate Pain (4 - 6)      Allergies    lisinopril (Unknown)  penicillins (Unknown)    Intolerances        SOCIAL HISTORY:    FAMILY HISTORY:  No pertinent family history in first degree relatives      Vital Signs Last 24 Hrs  T(C): 37.1 (2018 10:33), Max: 37.1 (2018 10:33)  T(F): 98.8 (2018 10:33), Max: 98.8 (2018 10:33)  HR: 97 (2018 14:21) (96 - 112)  BP: 161/101 (2018 14:21) (148/89 - 161/101)  BP(mean): --  RR: 20 (2018 11:27) (18 - 20)  SpO2: 95% (2018 14:21) (95% - 97%)    PHYSICAL EXAM:    GENERAL: NAD, well-groomed, well-developed  HEAD:  Atraumatic, Normocephalic  EYES: EOMI, PERRLA, conjunctiva and sclera clear.Poor vision  ENMT: No tonsillar erythema, exudates, or enlargement; Moist mucous membranes, Good dentition, No lesions  NECK: Supple, No JVD, Normal thyroid  NERVOUS SYSTEM:  Alert & Oriented X3, Good concentration; Motor Strength 5/5 B/L upper and lower extremities; DTRs 2+ intact and symmetric. gait abnormality. some cognitive impairmant  CHEST/LUNG: Clear to percussion bilaterally; No rales, rhonchi, wheezing, or rubs  HEART: Regular rate and rhythm; No murmurs, rubs, or gallops  ABDOMEN: Soft, Nontender, Nondistended; Bowel sounds present. Rt inguinaoscrotal hernia , tender.BS+  EXTREMITIES:  2+ Peripheral Pulses, No clubbing, cyanosis, or edema  LYMPH: No lymphadenopathy noted   SKIN: No rashes or lesions      LABS:                        11.7   8.13  )-----------( 289      ( 2018 14:15 )             34.3         135  |  100  |  22  ----------------------------<  144<H>  4.5   |  26  |  0.94    Ca    7.6<L>      2018 14:15    TPro  x   /  Alb  3.0<L>  /  TBili  x   /  DBili  x   /  AST  19  /  ALT  22  /  AlkPhos  x       PT/INR - ( 2018 14:15 )   PT: 14.3 sec;   INR: 1.27 ratio         PTT - ( 2018 14:15 )  PTT:24.9 sec  Urinalysis Basic - ( 2018 14:16 )    Color: Yellow / Appearance: Clear / S.005 / pH: x  Gluc: x / Ketone: Negative  / Bili: Negative / Urobili: 1 mg/dL   Blood: x / Protein: 30 mg/dL / Nitrite: Negative   Leuk Esterase: Trace / RBC: 3-5 /HPF / WBC 3-5   Sq Epi: x / Non Sq Epi: Occasional / Bacteria: Few    EKG Sinus tach 112. NAC    CARDIAC MARKERS ( 2018 05:40 )  <.015 ng/mL / x     / x     / x     / 2.7 ng/mL      RADIOLOGY & ADDITIONAL STUDIES:  < from: CT Abdomen and Pelvis w/ Oral Cont and w/ IV Cont (18 @ 08:40) >  EXAM:  CT ABDOMEN AND PELVIS OC IC                            PROCEDURE DATE:  2018          INTERPRETATION:  CT of the abdomen and pelvis with contrast dated   2018 8:40 AM    INDICATION: abd distension and pain . Personal history of prostate cancer.    TECHNIQUE: CT of the abdomen and pelvis was performed using oral and   intravenous contrast.  Axial and coronal images were produced and   reviewed.    PRIOR STUDIES: None.    FINDINGS: Images of the lower chest demonstrate cardiomegaly. Partially   imaged pacemaker wire with its tip in the right ventricle. Dilated distal   esophagus containing oral contrast with a slight wall thickening.   Bibasilar linear atelectasis, right greater than left. Mamillation of the   right hemidiaphragm.    The liver is normal in appearance. Markedly attenuated in diameter   superior mesenteric vein. 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 The right and left portal veins are   patent. Thrombosed splenic vein at and near the confluence with the   superior mesenteric vein with reconstitution of the splenic vein via   collaterals and the tail of the pancreas level. No radiopaque stones are   seen in the gallbladder.  The pancreas is normal in appearance.  No   splenic abnormalities are seen.    The adrenal glands are unremarkable. 3.0 x 1.6 cm intermediate density   lesion in the left upper pole kidney with an area of irregular   enhancement. Left upper pole renal scar. Bilateral renal cysts and   additional subcentimeter low-density renal lesions too small to   characterize.      No abdominal aortic aneurysm is seen. Flattened IVC suggesting   dehydration. No lymphadenopathy is seen.     Evaluation of the bowel demonstrates dilated loops of small bowel of a   right inguinal hernia that extends into the scrotum. The hernia contains   the distal ileum as well as the cecum and portions of the ascending   colon. Stranding of the fat and vessels and the narrowest point of the   right inguinal hernia is seen. There is fluid within the right hernial   sac at the level of the scrotum. The findings are consistent with an   incarcerated right inguinal hernia. No free intraperitoneal air. No   pneumatosis intestinalis or portal venous gas is seen.Mild diastases   recti and a small fat-containing umbilical hernia. Trace ascites. Colonic   diverticulosis.    Images of the pelvis demonstrate status post prostatectomy. Collapsed   urinary bladder with no filling defects.    Evaluation of the osseous structures demonstrates degenerative changes.    IMPRESSION:  High-grade small bowel obstruction within a large right inguinal/scrotal   hernia.    3 cm left renal cyst containing a focal area of enhancement suspicious   for cystic renal neoplasm.Recommend MRI with and without IV contrast.    Flattened IVC suggesting dehydration.    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 Thrombosed portions of the splenic vein with reconstitution near   the spleen.    Discussed with Dr. Gregorio from the emergency department.    < end of copied text >  < from: Xray Chest 1 View AP/PA. (18 @ 06:20) >    EXAM:  XR CHEST AP OR PA 1V                            PROCEDURE DATE:  2018          INTERPRETATION:  AP supine chest on 2018 at 5:55 AM. Patient   has abdominal pain. Patient has history of prostate cancer.    Heart may be borderline enlarged. Left-sided pacer device again noted.    Linear scarring at the right base medially again noted.    Chest is similar to November 15, 2015.    IMPRESSION: Unchanged chest showing right base scar and left-sided pacer   device.    < end of copied text >

## 2018-11-18 NOTE — ED ADULT NURSE NOTE - PMH
Anoxic encephalopathy    Brain injury with coma  x 2 weeks in 2008  Gait abnormality    Hypertension    Leg pain, right    Prostate cancer  radiation therapy  Sudden cardiac death

## 2018-11-18 NOTE — ED ADULT NURSE NOTE - NSIMPLEMENTINTERV_GEN_ALL_ED
Implemented All Fall with Harm Risk Interventions:  [place] to call system. Call bell, personal items and telephone within reach. Instruct patient to call for assistance. Room bathroom lighting operational. Non-slip footwear when patient is off stretcher. Physically safe environment: no spills, clutter or unnecessary equipment. Stretcher in lowest position, wheels locked, appropriate side rails in place. Provide visual cue, wrist band, yellow gown, etc. Monitor gait and stability. Monitor for mental status changes and reorient to person, place, and time. Review medications for side effects contributing to fall risk. Reinforce activity limits and safety measures with patient and family. Provide visual clues: red socks.

## 2018-11-19 LAB
-  COAGULASE NEGATIVE STAPHYLOCOCCUS: SIGNIFICANT CHANGE UP
ALBUMIN SERPL ELPH-MCNC: 2.2 G/DL — LOW (ref 3.3–5)
ALP SERPL-CCNC: 54 U/L — SIGNIFICANT CHANGE UP (ref 40–120)
ALT FLD-CCNC: 20 U/L — SIGNIFICANT CHANGE UP (ref 12–78)
ANION GAP SERPL CALC-SCNC: 8 MMOL/L — SIGNIFICANT CHANGE UP (ref 5–17)
AST SERPL-CCNC: 44 U/L — HIGH (ref 15–37)
BASE EXCESS BLDA CALC-SCNC: 0 MMOL/L — SIGNIFICANT CHANGE UP (ref -2–2)
BILIRUB SERPL-MCNC: 1.6 MG/DL — HIGH (ref 0.2–1.2)
BLOOD GAS COMMENTS: SIGNIFICANT CHANGE UP
BLOOD GAS COMMENTS: SIGNIFICANT CHANGE UP
BLOOD GAS SOURCE: SIGNIFICANT CHANGE UP
BUN SERPL-MCNC: 24 MG/DL — HIGH (ref 7–23)
CALCIUM SERPL-MCNC: 6.9 MG/DL — LOW (ref 8.5–10.1)
CHLORIDE SERPL-SCNC: 100 MMOL/L — SIGNIFICANT CHANGE UP (ref 96–108)
CO2 SERPL-SCNC: 27 MMOL/L — SIGNIFICANT CHANGE UP (ref 22–31)
CREAT SERPL-MCNC: 1.26 MG/DL — SIGNIFICANT CHANGE UP (ref 0.5–1.3)
GLUCOSE SERPL-MCNC: 182 MG/DL — HIGH (ref 70–99)
GRAM STN FLD: SIGNIFICANT CHANGE UP
HCO3 BLDA-SCNC: 23 MMOL/L — SIGNIFICANT CHANGE UP (ref 21–29)
HCT VFR BLD CALC: 34.3 % — LOW (ref 39–50)
HGB BLD-MCNC: 11.6 G/DL — LOW (ref 13–17)
HOROWITZ INDEX BLDA+IHG-RTO: 60 — SIGNIFICANT CHANGE UP
MAGNESIUM SERPL-MCNC: 2.4 MG/DL — SIGNIFICANT CHANGE UP (ref 1.6–2.6)
MCHC RBC-ENTMCNC: 26.2 PG — LOW (ref 27–34)
MCHC RBC-ENTMCNC: 33.8 GM/DL — SIGNIFICANT CHANGE UP (ref 32–36)
MCV RBC AUTO: 77.4 FL — LOW (ref 80–100)
METHOD TYPE: SIGNIFICANT CHANGE UP
NRBC # BLD: 0 /100 WBCS — SIGNIFICANT CHANGE UP (ref 0–0)
PCO2 BLDA: 33 MMHG — SIGNIFICANT CHANGE UP (ref 32–46)
PH BLD: 7.46 — HIGH (ref 7.35–7.45)
PHOSPHATE SERPL-MCNC: 2.8 MG/DL — SIGNIFICANT CHANGE UP (ref 2.5–4.5)
PLATELET # BLD AUTO: 319 K/UL — SIGNIFICANT CHANGE UP (ref 150–400)
PO2 BLDA: 148 MMHG — HIGH (ref 74–108)
POTASSIUM SERPL-MCNC: 5.2 MMOL/L — SIGNIFICANT CHANGE UP (ref 3.5–5.3)
POTASSIUM SERPL-SCNC: 5.2 MMOL/L — SIGNIFICANT CHANGE UP (ref 3.5–5.3)
PROT SERPL-MCNC: 5.4 GM/DL — LOW (ref 6–8.3)
RBC # BLD: 4.43 M/UL — SIGNIFICANT CHANGE UP (ref 4.2–5.8)
RBC # FLD: 13.6 % — SIGNIFICANT CHANGE UP (ref 10.3–14.5)
SAO2 % BLDA: 98 % — HIGH (ref 92–96)
SODIUM SERPL-SCNC: 135 MMOL/L — SIGNIFICANT CHANGE UP (ref 135–145)
SPECIMEN SOURCE: SIGNIFICANT CHANGE UP
WBC # BLD: 8.75 K/UL — SIGNIFICANT CHANGE UP (ref 3.8–10.5)
WBC # FLD AUTO: 8.75 K/UL — SIGNIFICANT CHANGE UP (ref 3.8–10.5)

## 2018-11-19 PROCEDURE — 44144 PARTIAL REMOVAL OF COLON: CPT | Mod: AS

## 2018-11-19 PROCEDURE — 99291 CRITICAL CARE FIRST HOUR: CPT

## 2018-11-19 PROCEDURE — 71045 X-RAY EXAM CHEST 1 VIEW: CPT | Mod: 26

## 2018-11-19 PROCEDURE — 49525 REPAIR ING HERNIA SLIDING: CPT | Mod: AS

## 2018-11-19 RX ORDER — OXYMETAZOLINE HYDROCHLORIDE 0.5 MG/ML
2 SPRAY NASAL
Qty: 0 | Refills: 0 | Status: COMPLETED | OUTPATIENT
Start: 2018-11-19 | End: 2018-11-21

## 2018-11-19 RX ORDER — HEPARIN SODIUM 5000 [USP'U]/ML
5000 INJECTION INTRAVENOUS; SUBCUTANEOUS EVERY 8 HOURS
Qty: 0 | Refills: 0 | Status: DISCONTINUED | OUTPATIENT
Start: 2018-11-19 | End: 2018-11-21

## 2018-11-19 RX ORDER — FENTANYL CITRATE 50 UG/ML
0.5 INJECTION INTRAVENOUS
Qty: 2500 | Refills: 0 | Status: DISCONTINUED | OUTPATIENT
Start: 2018-11-19 | End: 2018-11-19

## 2018-11-19 RX ORDER — METRONIDAZOLE 500 MG
500 TABLET ORAL EVERY 8 HOURS
Qty: 0 | Refills: 0 | Status: DISCONTINUED | OUTPATIENT
Start: 2018-11-19 | End: 2018-11-23

## 2018-11-19 RX ORDER — SODIUM CHLORIDE 9 MG/ML
1000 INJECTION INTRAMUSCULAR; INTRAVENOUS; SUBCUTANEOUS
Qty: 0 | Refills: 0 | Status: DISCONTINUED | OUTPATIENT
Start: 2018-11-19 | End: 2018-11-22

## 2018-11-19 RX ORDER — IPRATROPIUM/ALBUTEROL SULFATE 18-103MCG
3 AEROSOL WITH ADAPTER (GRAM) INHALATION EVERY 6 HOURS
Qty: 0 | Refills: 0 | Status: DISCONTINUED | OUTPATIENT
Start: 2018-11-19 | End: 2018-11-30

## 2018-11-19 RX ORDER — ACETAMINOPHEN 500 MG
1000 TABLET ORAL ONCE
Qty: 0 | Refills: 0 | Status: COMPLETED | OUTPATIENT
Start: 2018-11-19 | End: 2018-11-19

## 2018-11-19 RX ORDER — SODIUM CHLORIDE 9 MG/ML
1000 INJECTION, SOLUTION INTRAVENOUS
Qty: 0 | Refills: 0 | Status: DISCONTINUED | OUTPATIENT
Start: 2018-11-19 | End: 2018-11-19

## 2018-11-19 RX ORDER — CIPROFLOXACIN LACTATE 400MG/40ML
400 VIAL (ML) INTRAVENOUS EVERY 12 HOURS
Qty: 0 | Refills: 0 | Status: DISCONTINUED | OUTPATIENT
Start: 2018-11-19 | End: 2018-11-23

## 2018-11-19 RX ORDER — ACETAMINOPHEN 500 MG
1000 TABLET ORAL ONCE
Qty: 0 | Refills: 0 | Status: DISCONTINUED | OUTPATIENT
Start: 2018-11-19 | End: 2018-11-19

## 2018-11-19 RX ORDER — SODIUM CHLORIDE 9 MG/ML
1000 INJECTION INTRAMUSCULAR; INTRAVENOUS; SUBCUTANEOUS ONCE
Qty: 0 | Refills: 0 | Status: COMPLETED | OUTPATIENT
Start: 2018-11-19 | End: 2018-11-19

## 2018-11-19 RX ORDER — CHLORHEXIDINE GLUCONATE 213 G/1000ML
15 SOLUTION TOPICAL
Qty: 0 | Refills: 0 | Status: DISCONTINUED | OUTPATIENT
Start: 2018-11-19 | End: 2018-11-20

## 2018-11-19 RX ORDER — SODIUM CHLORIDE 9 MG/ML
1000 INJECTION, SOLUTION INTRAVENOUS ONCE
Qty: 0 | Refills: 0 | Status: COMPLETED | OUTPATIENT
Start: 2018-11-19 | End: 2018-11-19

## 2018-11-19 RX ORDER — PANTOPRAZOLE SODIUM 20 MG/1
40 TABLET, DELAYED RELEASE ORAL DAILY
Qty: 0 | Refills: 0 | Status: DISCONTINUED | OUTPATIENT
Start: 2018-11-19 | End: 2018-11-22

## 2018-11-19 RX ORDER — IPRATROPIUM/ALBUTEROL SULFATE 18-103MCG
3 AEROSOL WITH ADAPTER (GRAM) INHALATION ONCE
Qty: 0 | Refills: 0 | Status: COMPLETED | OUTPATIENT
Start: 2018-11-19 | End: 2018-11-19

## 2018-11-19 RX ADMIN — OXYMETAZOLINE HYDROCHLORIDE 2 SPRAY(S): 0.5 SPRAY NASAL at 17:17

## 2018-11-19 RX ADMIN — Medication 400 MILLIGRAM(S): at 09:38

## 2018-11-19 RX ADMIN — SODIUM CHLORIDE 1000 MILLILITER(S): 9 INJECTION INTRAMUSCULAR; INTRAVENOUS; SUBCUTANEOUS at 06:30

## 2018-11-19 RX ADMIN — Medication 100 MILLIGRAM(S): at 14:54

## 2018-11-19 RX ADMIN — Medication 100 MILLIGRAM(S): at 05:08

## 2018-11-19 RX ADMIN — SODIUM CHLORIDE 1000 MILLILITER(S): 9 INJECTION, SOLUTION INTRAVENOUS at 12:05

## 2018-11-19 RX ADMIN — FENTANYL CITRATE 4.61 MICROGRAM(S)/KG/HR: 50 INJECTION INTRAVENOUS at 03:30

## 2018-11-19 RX ADMIN — Medication 200 MILLIGRAM(S): at 17:16

## 2018-11-19 RX ADMIN — Medication 1000 MILLIGRAM(S): at 10:00

## 2018-11-19 RX ADMIN — HEPARIN SODIUM 5000 UNIT(S): 5000 INJECTION INTRAVENOUS; SUBCUTANEOUS at 22:02

## 2018-11-19 RX ADMIN — CHLORHEXIDINE GLUCONATE 15 MILLILITER(S): 213 SOLUTION TOPICAL at 17:16

## 2018-11-19 RX ADMIN — Medication 3 MILLILITER(S): at 11:45

## 2018-11-19 RX ADMIN — SODIUM CHLORIDE 100 MILLILITER(S): 9 INJECTION, SOLUTION INTRAVENOUS at 04:14

## 2018-11-19 RX ADMIN — SODIUM CHLORIDE 2000 MILLILITER(S): 9 INJECTION, SOLUTION INTRAVENOUS at 03:00

## 2018-11-19 RX ADMIN — SODIUM CHLORIDE 100 MILLILITER(S): 9 INJECTION INTRAMUSCULAR; INTRAVENOUS; SUBCUTANEOUS at 06:14

## 2018-11-19 RX ADMIN — PANTOPRAZOLE SODIUM 40 MILLIGRAM(S): 20 TABLET, DELAYED RELEASE ORAL at 11:33

## 2018-11-19 RX ADMIN — SODIUM CHLORIDE 100 MILLILITER(S): 9 INJECTION INTRAMUSCULAR; INTRAVENOUS; SUBCUTANEOUS at 18:48

## 2018-11-19 RX ADMIN — Medication 200 MILLIGRAM(S): at 06:59

## 2018-11-19 RX ADMIN — Medication 100 MILLIGRAM(S): at 22:02

## 2018-11-19 RX ADMIN — Medication 3 MILLILITER(S): at 09:40

## 2018-11-19 RX ADMIN — Medication 3 MILLILITER(S): at 18:11

## 2018-11-19 RX ADMIN — CHLORHEXIDINE GLUCONATE 15 MILLILITER(S): 213 SOLUTION TOPICAL at 05:08

## 2018-11-19 RX ADMIN — SODIUM CHLORIDE 100 MILLILITER(S): 9 INJECTION INTRAMUSCULAR; INTRAVENOUS; SUBCUTANEOUS at 22:03

## 2018-11-19 RX ADMIN — HEPARIN SODIUM 5000 UNIT(S): 5000 INJECTION INTRAVENOUS; SUBCUTANEOUS at 14:55

## 2018-11-19 NOTE — DIETITIAN INITIAL EVALUATION ADULT. - PERTINENT MEDS FT
MEDICATIONS  (STANDING):  ALBUTerol/ipratropium for Nebulization 3 milliLiter(s) Nebulizer every 6 hours  chlorhexidine 0.12% Liquid 15 milliLiter(s) Oral Mucosa two times a day  ciprofloxacin   IVPB 400 milliGRAM(s) IV Intermittent every 12 hours  heparin  Injectable 5000 Unit(s) SubCutaneous every 8 hours  metroNIDAZOLE  IVPB 500 milliGRAM(s) IV Intermittent every 8 hours  oxymetazoline 0.05% Nasal Spray 2 Spray(s) Both Nostrils two times a day  pantoprazole  Injectable 40 milliGRAM(s) IV Push daily  sodium chloride 0.9%. 1000 milliLiter(s) (100 mL/Hr) IV Continuous <Continuous>    MEDICATIONS  (PRN):

## 2018-11-19 NOTE — DIETITIAN INITIAL EVALUATION ADULT. - OTHER INFO
Pt seen for CCU admit. Pt Pt seen for CCU admit. Wife reports decrease in appetite x 1 week PTA due to abdomin pain. Pt seen for CCU admit. Wife reports decrease in appetite x 1 week PTA due to abdomin pain. S/p exploratory laparotomy, cecectomy, reduction and repair of right inguinal hernia with Vicryl mesh (11/19). Pt lives at home with wife and kids. Wife does the grocery shopping/cooking. Pt NPO x 1 day.

## 2018-11-19 NOTE — CHART NOTE - NSCHARTNOTEFT_GEN_A_CORE
68y old  Male who presents with a chief complaint of Abdominal pain and scrotal pain, transferred to CCU s/p ex lap, SPENCER, cecectomy, repair of incarcerated right inguinal hernia. Pt remained intubated post operatively.     HPI:  68M with PMH HTN, prostate ca (s/p resection and radiation), AICD placement s/p cardiac arrest and coma (due to brain injury) about 10yrs ago, and known 2 year history of reducible right inguinal hernia now presents to the ER c/o diffuse abdominal pain and right scrotal pain. Wife at bedside who speaks on behalf of patient. States right groin pain has been persistent x1 week, worsening since 2 days ago associated with increased right scrotal swelling. Associated with nausea and NBNB emesis.  Denies flatus or BM, states his last BM was yesterday. Wife states patient has the "sweats" at night, but denies fever/chills. Denies active chest pain, palpitations, shortness of breath, dizziness or urinary complaints.   ER and Surgeon tried to reduce Hernia, not reducible. (2018 11:32)      Allergies    lisinopril (Unknown)  penicillins (Unknown)    Intolerances      MEDICATIONS  (STANDING):  chlorhexidine 0.12% Liquid 15 milliLiter(s) Oral Mucosa two times a day  ciprofloxacin   IVPB 400 milliGRAM(s) IV Intermittent every 12 hours  fentaNYL   Infusion. 0.5 MICROgram(s)/kG/Hr (4.61 mL/Hr) IV Continuous <Continuous>  metroNIDAZOLE  IVPB 500 milliGRAM(s) IV Intermittent every 8 hours  pantoprazole  Injectable 40 milliGRAM(s) IV Push daily  sodium chloride 0.9%. 1000 milliLiter(s) (100 mL/Hr) IV Continuous <Continuous>    MEDICATIONS  (PRN):      Daily Height in cm: 165.1 (2018 18:38)    Daily Weight in k.2 (2018 18:16)    Drug Dosing Weight  Height (cm): 165.1 (2018 18:38)  Weight (kg): 91 (2018 01:18)  BMI (kg/m2): 33.4 (2018 01:18)  BSA (m2): 1.98 (2018 01:18)    PAST MEDICAL & SURGICAL HISTORY:  Sudden cardiac death  Gait abnormality  Leg pain, right  Anoxic encephalopathy  Brain injury with coma: x 2 weeks in   Prostate cancer: radiation therapy  Hypertension  S/P ICD (internal cardiac defibrillator) procedure: implanted on 2009  H/O tracheostomy  H/O prostate cancer: resection and radiation therapy  Status post cryoablation: of left renal mass      FAMILY HISTORY:  No pertinent family history in first degree relatives      SOCIAL HISTORY:    ADVANCE DIRECTIVES: [ x] Full Code [ ] DNR    REVIEW OF SYSTEMS:  Pt unable to provide 2/2 intubation and sedation      Mode: AC/ CMV (Assist Control/ Continuous Mandatory Ventilation)  RR (machine): 14  TV (machine): 500  FiO2: 40  PEEP: 5  ITime: 1  MAP: 10  PIP: 29      ICU Vital Signs Last 24 Hrs  T(C): 36.8 (2018 04:05), Max: 37.6 (2018 01:18)  T(F): 98.3 (2018 04:05), Max: 99.6 (2018 01:18)  HR: 103 (2018 06:00) (96 - 108)  BP: 96/69 (2018 06:00) (96/69 - 186/99)  BP(mean): 79 (2018 06:00) (77 - 83)  ABP: --  ABP(mean): --  RR: 14 (2018 06:00) (12 - 20)  SpO2: 100% (2018 06:00) (95% - 100%)      ABG - ( 2018 02:41 )  pH, Arterial: x     pH, Blood: 7.46  /  pCO2: 33    /  pO2: 148   / HCO3: 23    / Base Excess: 0.0   /  SaO2: 98          I&O's Detail    2018 07:01  -  2018 06:47  --------------------------------------------------------  IN:    fentaNYL Infusion.: 72.8 mL    lactated ringers.: 1200 mL  Total IN: 1272.8 mL    OUT:    Indwelling Catheter - Urethral: 30 mL  Total OUT: 30 mL    Total NET: 1242.8 mL      PHYSICAL EXAM:    GENERAL: NAD, well-groomed, well-developed  HEAD:  Atraumatic, Normocephalic  EYES: EOMI, PERRLA, conjunctiva and sclera clear  NECK: Supple, No JVD, Normal thyroid  NERVOUS SYSTEM:  sedated  CHEST/LUNG: +breath sounds bilaterally; No rales, rhonchi, wheezing  HEART: Regular rate and rhythm; No murmurs, rubs  ABDOMEN: firm, distended; Bowel sounds absent, +TENZIN x 1  EXTREMITIES:  2+ Peripheral Pulses, No clubbing, cyanosis, or edema  LYMPH: No lymphadenopathy noted  SKIN: No rashes or lesions    LABS:  CBC Full  -  ( 2018 03:44 )  WBC Count : 8.75 K/uL  Hemoglobin : 11.6 g/dL  Hematocrit : 34.3 %  Platelet Count - Automated : 319 K/uL  Mean Cell Volume : 77.4 fl  Mean Cell Hemoglobin : 26.2 pg  Mean Cell Hemoglobin Concentration : 33.8 gm/dL  Auto Neutrophil # : x  Auto Lymphocyte # : x  Auto Monocyte # : x  Auto Eosinophil # : x  Auto Basophil # : x  Auto Neutrophil % : x  Auto Lymphocyte % : x  Auto Monocyte % : x  Auto Eosinophil % : x  Auto Basophil % : x        135  |  100  |  24<H>  ----------------------------<  182<H>  5.2   |  27  |  1.26    Ca    6.9<L>      2018 03:44  Phos  2.8       Mg     2.4         TPro  5.4<L>  /  Alb  2.2<L>  /  TBili  1.6<H>  /  DBili  x   /  AST  44<H>  /  ALT  20  /  AlkPhos  54      CAPILLARY BLOOD GLUCOSE      PT/INR - ( 2018 14:15 )   PT: 14.3 sec;   INR: 1.27 ratio         PTT - ( 2018 14:15 )  PTT:24.9 sec  Urinalysis Basic - ( 2018 14:16 )    Color: Yellow / Appearance: Clear / S.005 / pH: x  Gluc: x / Ketone: Negative  / Bili: Negative / Urobili: 1 mg/dL   Blood: x / Protein: 30 mg/dL / Nitrite: Negative   Leuk Esterase: Trace / RBC: 3-5 /HPF / WBC 3-5   Sq Epi: x / Non Sq Epi: Occasional / Bacteria: Few      CARDIAC MARKERS ( 2018 05:40 )  <.015 ng/mL / x     / x     / x     / 2.7 ng/mL      CRITICAL CARE TIME SPENT: 60 min          68y old  Male who presents with a chief complaint of Abdominal pain and scrotal pain, transferred to CCU s/p ex lap, SPENCER, cecectomy, repair of incarcerated right inguinal hernia. Pt remained intubated post operatively.     1. IVF replacement as needed  2. Daily sedation vacation, wean to extubate  3. repeat labs, replace electrolytes as needed  4. IV abx  5. Pain control  6. Wound management as per surgery  7. Care as per ICU team

## 2018-11-19 NOTE — PROGRESS NOTE ADULT - ASSESSMENT
improved.  s/p repair of rt Incarcerated inguinoscrotal hernia.   Still intubated.   Hemodynamically stable. sinus rhythm

## 2018-11-19 NOTE — BRIEF OPERATIVE NOTE - POST-OP DX
Incarcerated inguinal hernia, unilateral  11/19/2018  Right incarcerated inguinal hernia with Large bowel obstruction  Active  EnglishBrayden Incarcerated inguinal hernia, unilateral  11/19/2018  Right incarcerated inguinal hernia with Large bowel obstruction  Active  Brayden English  Large bowel obstruction  11/20/2018    Active  Mario Tinsley

## 2018-11-19 NOTE — PROGRESS NOTE ADULT - SUBJECTIVE AND OBJECTIVE BOX
Dr. Tinsley's progress note:  POST_DAY # 1    Patient has been extubated earlier, alert denies any acute pain,   NGT: low amount of fluid.  As per wife patient denies pain     MEDICATIONS  (STANDING):  ALBUTerol/ipratropium for Nebulization 3 milliLiter(s) Nebulizer every 6 hours  chlorhexidine 0.12% Liquid 15 milliLiter(s) Oral Mucosa two times a day  ciprofloxacin   IVPB 400 milliGRAM(s) IV Intermittent every 12 hours  heparin  Injectable 5000 Unit(s) SubCutaneous every 8 hours  metroNIDAZOLE  IVPB 500 milliGRAM(s) IV Intermittent every 8 hours  oxymetazoline 0.05% Nasal Spray 2 Spray(s) Both Nostrils two times a day  pantoprazole  Injectable 40 milliGRAM(s) IV Push daily  sodium chloride 0.9%. 1000 milliLiter(s) (100 mL/Hr) IV Continuous <Continuous>    MEDICATIONS  (PRN):    Vital Signs Last 24 Hrs  T(C): 38 (2018 15:56), Max: 38 (2018 15:56)  T(F): 100.4 (2018 15:56), Max: 100.4 (2018 15:56)  HR: 109 (2018 17:00) (100 - 115)  BP: 140/70 (2018 17:00) (96/69 - 157/79)  BP(mean): 90 (2018 17:00) (76 - 108)  RR: 20 (2018 17:00) (13 - 24)  SpO2: 100% (2018 17:00) (96% - 100%)  I&O's Summary    2018 07:01  -  2018 07:00  --------------------------------------------------------  IN: 1541 mL / OUT: 130 mL / NET: 1411 mL    2018 07:01  -  2018 18:11  --------------------------------------------------------  IN: 2418.2 mL / OUT: 210 mL / NET: 2208.2 mL      PHYSICAL EXAM:  Constitutional: Patient well nourish. well developed.  Eyes:  PERRL, EOMI, Conjunctiva clear.  ENMT:  WNL, NGT   Neck:  Supple.  Respiratory:  Lungs CTA, B/L, no rales , no wheezing, no rhonchi.  Cardiovascular:  S1, S2, RRR  Gastrointestinal: Abdomen mild distended, soft,  ,neg BS, moderated  tenderness, no guarding,                          Wound  dressing intact, clean.  Genitourinary:  Jones catheter in place  Extremities:  No edema, no calf tenderness,  Neurological: AxAxOx3    LABS:                        11.6   8.75  )-----------( 319      ( 2018 03:44 )             34.3         135  |  100  |  24<H>  ----------------------------<  182<H>  5.2   |  27  |  1.26    Ca    6.9<L>      2018 03:44  Phos  2.8       Mg     2.4         TPro  5.4<L>  /  Alb  2.2<L>  /  TBili  1.6<H>  /  DBili  x   /  AST  44<H>  /  ALT  20  /  AlkPhos  54      PT/INR - ( 2018 14:15 )   PT: 14.3 sec;   INR: 1.27 ratio         PTT - ( 2018 14:15 )  PTT:24.9 sec  LIVER FUNCTIONS - ( 2018 03:44 )  Alb: 2.2 g/dL / Pro: 5.4 gm/dL / ALK PHOS: 54 U/L / ALT: 20 U/L / AST: 44 U/L / GGT: x           Urinalysis Basic - ( 2018 14:16 )    Color: Yellow / Appearance: Clear / S.005 / pH: x  Gluc: x / Ketone: Negative  / Bili: Negative / Urobili: 1 mg/dL   Blood: x / Protein: 30 mg/dL / Nitrite: Negative   Leuk Esterase: Trace / RBC: 3-5 /HPF / WBC 3-5   Sq Epi: x / Non Sq Epi: Occasional / Bacteria: Few        RADIOLOGY & ADDITIONAL STUDIES:    HEALTH ISSUES - PROBLEM Dx:  Inguinal-scrotal  hernia, incarcerated.  AICD (automatic cardioverter/defibrillator) present: AICD (automatic cardioverter/defibrillator) present  SBO (small bowel obstruction): SBO (small bowel obstruction)

## 2018-11-19 NOTE — DIETITIAN INITIAL EVALUATION ADULT. - ENERGY NEEDS
Ht: 65"  Wt: 91.2kg (11/18)  IBW: 61.8kg +/-10%;   134% IBW     BMI: 33.4   UBW: 81.8kg;     111%UBW

## 2018-11-19 NOTE — PROGRESS NOTE ADULT - ASSESSMENT
POD #1 Stable, patient extubated.  Incarcerated large Right Inguino-scrotal Hernia with Large bowel incarceration,  S/P Laparotomy, Cecectomy, Repair female Hernia    Plan:  Continue current care.  Continue Antibiotics.  DVT, GI prophylaxis.  NPO, IVF.  PT evaluation  Incentive spirometry.

## 2018-11-19 NOTE — DIETITIAN INITIAL EVALUATION ADULT. - PERTINENT LABORATORY DATA
11-19 Na 135 mmol/L Glu 182 mg/dL<H> K+ 5.2 mmol/L Cr  1.26 mg/dL BUN 24 mg/dL<H> Phos 2.8 mg/dL Alb 2.2 g/dL<L> PAB n/a   Hgb 11.6 g/dL<L> Hct 34.3 %<L>

## 2018-11-19 NOTE — PROGRESS NOTE ADULT - SUBJECTIVE AND OBJECTIVE BOX
Patient seen and examined at bedside, sedated, intubated. Low urine output since surgery. Pt recieved 1L bolus       T(F): 98.3 (11-19-18 @ 04:05), Max: 99.6 (11-19-18 @ 01:18)  HR: 103 (11-19-18 @ 06:00) (96 - 108)  BP: 96/69 (11-19-18 @ 06:00) (96/69 - 186/99)  RR: 14 (11-19-18 @ 06:00) (12 - 20)  SpO2: 100% (11-19-18 @ 06:00) (95% - 100%)  Wt(kg): --  CAPILLARY BLOOD GLUCOSE      PHYSICAL EXAM:  General: Intubated, sedated, responds to stimulus  CV: +S1+S2 regular rate and rhythm  Lung: good air entry, b/l rales  Abdomen: obese, hypoactive BS, NGT in place with minimal output, Aquacel dressing to midline incision c/d/I  R groin: incision with Aquacel dressing c/d/i no signs of hematoma, TENZIN drain in place with serosanguinous output  Extremities: no pedal edema or calf tenderness noted     LABS:                        11.6   8.75  )-----------( 319      ( 19 Nov 2018 03:44 )             34.3     11-19    135  |  100  |  24<H>  ----------------------------<  182<H>  5.2   |  27  |  1.26    Ca    6.9<L>      19 Nov 2018 03:44  Phos  2.8     11-19  Mg     2.4     11-19    TPro  5.4<L>  /  Alb  2.2<L>  /  TBili  1.6<H>  /  DBili  x   /  AST  44<H>  /  ALT  20  /  AlkPhos  54  11-19    PT/INR - ( 18 Nov 2018 14:15 )   PT: 14.3 sec;   INR: 1.27 ratio         PTT - ( 18 Nov 2018 14:15 )  PTT:24.9 sec  I&O's Detail    18 Nov 2018 07:01  -  19 Nov 2018 07:00  --------------------------------------------------------  IN:    fentaNYL Infusion.: 91 mL    lactated ringers.: 1350 mL    sodium chloride 0.9%.: 100 mL  Total IN: 1541 mL    OUT:    Indwelling Catheter - Urethral: 30 mL  Total OUT: 30 mL    Total NET: 1511 mL            Impression: 68y Male s/p exploratory laparotomy, cecectomy, reduction and repair of right inguinal hernia with Vicryl mesh- POD#0    PMH   Sudden cardiac death  Gait abnormality  Leg pain, right  Anoxic encephalopathy  Brain injury with coma  Prostate cancer  Hypertension    - NPO/NGT/IVF  - cont ABX  - cont vent per ICU  - wound care  - discuss with attending Patient seen and examined at bedside, sedated, intubated. Low urine output since surgery. Pt recieved 1L bolus       T(F): 98.3 (11-19-18 @ 04:05), Max: 99.6 (11-19-18 @ 01:18)  HR: 103 (11-19-18 @ 06:00) (96 - 108)  BP: 96/69 (11-19-18 @ 06:00) (96/69 - 186/99)  RR: 14 (11-19-18 @ 06:00) (12 - 20)  SpO2: 100% (11-19-18 @ 06:00) (95% - 100%)  Wt(kg): --  CAPILLARY BLOOD GLUCOSE      PHYSICAL EXAM:  General: Intubated, sedated, responds to stimulus  CV: +S1+S2 regular rate and rhythm  Lung: good air entry, b/l rales  Abdomen: obese, hypoactive BS, NGT in place with minimal output, Aquacel dressing to midline incision c/d/I  R groin: incision with Aquacel dressing c/d/i no signs of hematoma, TENZIN drain in place with serosanguinous output  Extremities: no pedal edema or calf tenderness noted     LABS:                        11.6   8.75  )-----------( 319      ( 19 Nov 2018 03:44 )             34.3     11-19    135  |  100  |  24<H>  ----------------------------<  182<H>  5.2   |  27  |  1.26    Ca    6.9<L>      19 Nov 2018 03:44  Phos  2.8     11-19  Mg     2.4     11-19    TPro  5.4<L>  /  Alb  2.2<L>  /  TBili  1.6<H>  /  DBili  x   /  AST  44<H>  /  ALT  20  /  AlkPhos  54  11-19    PT/INR - ( 18 Nov 2018 14:15 )   PT: 14.3 sec;   INR: 1.27 ratio         PTT - ( 18 Nov 2018 14:15 )  PTT:24.9 sec  I&O's Detail    18 Nov 2018 07:01  -  19 Nov 2018 07:00  --------------------------------------------------------  IN:    fentaNYL Infusion.: 91 mL    lactated ringers.: 1350 mL    sodium chloride 0.9%.: 100 mL  Total IN: 1541 mL    OUT:    Indwelling Catheter - Urethral: 30 mL  Total OUT: 30 mL    Total NET: 1511 mL            Impression: 68y Male s/p exploratory laparotomy, Colon resection (cecectomy), reduction and repair of right inguinal hernia with Vicryl mesh- POD#0, stable.    PMH   Sudden cardiac death  Gait abnormality  Leg pain, right  Anoxic encephalopathy  Brain injury with coma  Prostate cancer  Hypertension    - NPO/NGT/IVF  - cont ABX  - cont vent per ICU  - wound care  - discuss with attending

## 2018-11-19 NOTE — PROGRESS NOTE ADULT - PROBLEM SELECTOR PLAN 1
doing well post op, D/C fent Iv Tylenol for pain  Cipro /flagyl is awake following commands  likely wean today and extubate

## 2018-11-19 NOTE — BRIEF OPERATIVE NOTE - PROCEDURE
<<-----Click on this checkbox to enter Procedure Repair of right inguinal hernia with mesh  11/19/2018  reduction and repair of right incarcerated inguinal hernia with vicryl mesh  Active  [email or web address]  Cecectomy, open  11/19/2018    Active  MICAELAACE Laparotomy  11/19/2018    Active  VTARANTO Repair of right inguinal hernia with mesh  11/19/2018  reduction and repair of right incarcerated inguinal hernia with vicryl mesh  Active  Brayden English  Cecectomy, open  11/19/2018    Active  Brayden English

## 2018-11-19 NOTE — BRIEF OPERATIVE NOTE - PRE-OP DX
Incarcerated inguinal hernia, unilateral  11/19/2018  Right incarcerated inguinal hernia with SBO  Active  English, Brayden CORREA Incarcerated inguinal hernia, unilateral  11/19/2018  Right incarcerated inguinal hernia with SBO  Active  Brayden English  Small bowel obstruction  11/20/2018    Active  Mario Tinsley

## 2018-11-19 NOTE — PROGRESS NOTE ADULT - SUBJECTIVE AND OBJECTIVE BOX
INTERVAL HPI/OVERNIGHT EVENTS:   HPI:  68M with PMH HTN, prostate ca (s/p resection and radiation), AICD placement s/p cardiac arrest and coma (due to brain injury) about 10yrs ago, and known 2 year history of reducible right inguinal hernia now presents to the ER c/o diffuse abdominal pain and right scrotal pain. Wife at bedside who speaks on behalf of patient. States right groin pain has been persistent x1 week, worsening since 2 days ago associated with increased right scrotal swelling. Associated with nausea and NBNB emesis.  Denies flatus or BM, states his last BM was yesterday. Wife states patient has the "sweats" at night, but denies fever/chills. Denies active chest pain, palpitations, shortness of breath, dizziness or urinary complaints.   ER and Surgeon tried to reduce Hernia, not reducible. (2018 11:32)    POD 0 mesh repair with cecctomy for incarcerated inguinal hernia  on Cpap weaning , never needed pressors Awake         PAST MEDICAL & SURGICAL HISTORY:  Sudden cardiac death  Gait abnormality  Leg pain, right  Anoxic encephalopathy  Brain injury with coma: x 2 weeks in   Prostate cancer: radiation therapy  Hypertension  S/P ICD (internal cardiac defibrillator) procedure: implanted on 2009  H/O tracheostomy  H/O prostate cancer: resection and radiation therapy  Status post cryoablation: of left renal mass      REVIEW OF SYSTEMS:    CONSTITUTIONAL: No fever, weight loss, or fatigue  EYES: No eye pain, visual disturbances, or discharge  ENMT:  No difficulty hearing, tinnitus, vertigo; No sinus or throat pain  NECK: No pain or stiffness  BREASTS: No pain, masses, or nipple discharge  RESPIRATORY: No cough, wheezing, chills or hemoptysis; No shortness of breath  CARDIOVASCULAR: No chest pain, palpitations, dizziness, or leg swelling  GASTROINTESTINAL: No abdominal or epigastric pain. No nausea, vomiting, or hematemesis; No diarrhea or constipation. No melena or hematochezia.  GENITOURINARY: No dysuria, frequency, hematuria, or incontinence  NEUROLOGICAL: No headaches, memory loss, loss of strength, numbness, or tremors  SKIN: No itching, burning, rashes, or lesions   LYMPH NODES: No enlarged glands  ENDOCRINE: No heat or cold intolerance; No hair loss  MUSCULOSKELETAL: No joint pain or swelling; No muscle, back, or extremity pain  PSYCHIATRIC: No depression, anxiety, mood swings, or difficulty sleeping  HEME/LYMPH: No easy bruising, or bleeding gums  ALLERGY AND IMMUNOLOGIC: No hives or eczema      ICU Vital Signs Last 24 Hrs  T(C): 37.6 (2018 08:20), Max: 37.6 (2018 01:18)  T(F): 99.7 (2018 08:20), Max: 99.7 (2018 08:20)  HR: 108 (2018 09:00) (96 - 108)  BP: 141/95 (2018 09:00) (96/69 - 186/99)  BP(mean): 108 (:) (76 - 108)  ABP: --  ABP(mean): --  RR: 16 (2018 09:00) (12 - 20)  SpO2: 100% (:00) (95% - 100%)      ABG - ( 2018 02:41 )  pH, Arterial: x     pH, Blood: 7.46  /  pCO2: 33    /  pO2: 148   / HCO3: 23    / Base Excess: 0.0   /  SaO2: 98                  I&O's Detail    2018 07:01  -  2018 07:00  --------------------------------------------------------  IN:    fentaNYL Infusion.: 91 mL    lactated ringers.: 1350 mL    sodium chloride 0.9%.: 100 mL  Total IN: 1541 mL    OUT:    Bulb: 50 mL    Indwelling Catheter - Urethral: 30 mL    Voided: 50 mL  Total OUT: 130 mL    Total NET: 1411 mL      2018 07:01  -  2018 09:15  --------------------------------------------------------  IN:    fentaNYL Infusion.: 9.1 mL    sodium chloride 0.9%.: 100 mL  Total IN: 109.1 mL    OUT:    Indwelling Catheter - Urethral: 30 mL  Total OUT: 30 mL    Total NET: 79.1 mL          Mode: CPAP with PS  FiO2: 40  PEEP: 5  ITime: 0.9  MAP: 7  PIP: 11    CAPILLARY BLOOD GLUCOSE        PHYSICAL EXAM:    GENERAL: NAD, well-groomed, well-developed  HEAD:  Atraumatic, Normocephalic  EYES: EOMI, PERRLA, conjunctiva and sclera clear  ENMT: No tonsillar erythema, exudates, or enlargement; Moist mucous membranes, Good dentition, No lesions  NECK: Supple, No JVD, Normal thyroid  NERVOUS SYSTEM:  Alert & Oriented X3, Good concentration; Motor Strength 5/5 B/L upper and lower extremities; DTRs 2+ intact and symmetric  CHEST/LUNG: Clear to percussion bilaterally; No rales, rhonchi, wheezing, or rubs  HEART: Regular rate and rhythm; No murmurs, rubs, or gallops  ABDOMEN: Soft, Nontender, Nondistended; Bowel sounds present  EXTREMITIES:  2+ Peripheral Pulses, No clubbing, cyanosis, or edema  LYMPH: No lymphadenopathy noted  SKIN: No rashes or lesions      LABS:                        11.6   8.75  )-----------( 319      ( 2018 03:44 )             34.3          135  |  100  |  24<H>  ----------------------------<  182<H>  5.2   |  27  |  1.26    Ca    6.9<L>      2018 03:44  Phos  2.8       Mg     2.4         TPro  5.4<L>  /  Alb  2.2<L>  /  TBili  1.6<H>  /  DBili  x   /  AST  44<H>  /  ALT  20  /  AlkPhos  54      PT/INR - ( 2018 14:15 )   PT: 14.3 sec;   INR: 1.27 ratio         PTT - ( 2018 14:15 )  PTT:24.9 sec  Urinalysis Basic - ( 2018 14:16 )    Color: Yellow / Appearance: Clear / S.005 / pH: x  Gluc: x / Ketone: Negative  / Bili: Negative / Urobili: 1 mg/dL   Blood: x / Protein: 30 mg/dL / Nitrite: Negative   Leuk Esterase: Trace / RBC: 3-5 /HPF / WBC 3-5   Sq Epi: x / Non Sq Epi: Occasional / Bacteria: Few          RADIOLOGY & ADDITIONAL STUDIES:      Assessment and Plan:    CRITICAL CARE TIME SPENT: INTERVAL HPI/OVERNIGHT EVENTS:   HPI:  68M with PMH HTN, prostate ca (s/p resection and radiation), AICD placement s/p cardiac arrest and coma (due to brain injury) about 10yrs ago, and known 2 year history of reducible right inguinal hernia now presents to the ER c/o diffuse abdominal pain and right scrotal pain. Wife at bedside who speaks on behalf of patient. States right groin pain has been persistent x1 week, worsening since 2 days ago associated with increased right scrotal swelling. Associated with nausea and NBNB emesis.  Denies flatus or BM, states his last BM was yesterday. Wife states patient has the "sweats" at night, but denies fever/chills. Denies active chest pain, palpitations, shortness of breath, dizziness or urinary complaints.   ER and Surgeon tried to reduce Hernia, not reducible. (2018 11:32)    POD 0 mesh repair with cecctomy for incarcerated inguinal hernia  on Cpap weaning , never needed pressors Awake         PAST MEDICAL & SURGICAL HISTORY:  Sudden cardiac death  Gait abnormality  Leg pain, right  Anoxic encephalopathy  Brain injury with coma: x 2 weeks in   Prostate cancer: radiation therapy  Hypertension  S/P ICD (internal cardiac defibrillator) procedure: implanted on 2009  H/O tracheostomy  H/O prostate cancer: resection and radiation therapy  Status post cryoablation: of left renal mass           ICU Vital Signs Last 24 Hrs  T(C): 37.6 (2018 08:20), Max: 37.6 (2018 01:18)  T(F): 99.7 (2018 08:20), Max: 99.7 (2018 08:20)  HR: 108 (2018 09:00) (96 - 108)  BP: 141/95 (2018 09:00) (96/69 - 186/99)  BP(mean): 108 (2018 09:00) (76 - 108)  ABP: --  ABP(mean): --  RR: 16 (2018 09:00) (12 - 20)  SpO2: 100% (2018 09:00) (95% - 100%)      ABG - ( 2018 02:41 )  pH, Arterial: x     pH, Blood: 7.46  /  pCO2: 33    /  pO2: 148   / HCO3: 23    / Base Excess: 0.0   /  SaO2: 98                  I&O's Detail    2018 07:01  -  2018 07:00  --------------------------------------------------------  IN:    fentaNYL Infusion.: 91 mL    lactated ringers.: 1350 mL    sodium chloride 0.9%.: 100 mL  Total IN: 1541 mL    OUT:    Bulb: 50 mL    Indwelling Catheter - Urethral: 30 mL    Voided: 50 mL  Total OUT: 130 mL    Total NET: 1411 mL      2018 07:01  -  2018 09:15  --------------------------------------------------------  IN:    fentaNYL Infusion.: 9.1 mL    sodium chloride 0.9%.: 100 mL  Total IN: 109.1 mL    OUT:    Indwelling Catheter - Urethral: 30 mL  Total OUT: 30 mL    Total NET: 79.1 mL          Mode: CPAP with PS  FiO2: 40  PEEP: 5  ITime: 0.9  MAP: 7  PIP: 11    CAPILLARY BLOOD GLUCOSE        PHYSICAL EXAM:    GENERAL:  intuabted on fent gtt  HEENT No JVD  Lungs B/L air entry  CVS S1 S2 RRR  Abd NT Nd  Ext No edema      LABS:                        11.6   8.75  )-----------( 319      ( 2018 03:44 )             34.3          135  |  100  |  24<H>  ----------------------------<  182<H>  5.2   |  27  |  1.26    Ca    6.9<L>      2018 03:44  Phos  2.8       Mg     2.4         TPro  5.4<L>  /  Alb  2.2<L>  /  TBili  1.6<H>  /  DBili  x   /  AST  44<H>  /  ALT  20  /  AlkPhos  54      PT/INR - ( 2018 14:15 )   PT: 14.3 sec;   INR: 1.27 ratio         PTT - ( 2018 14:15 )  PTT:24.9 sec  Urinalysis Basic - ( 2018 14:16 )    Color: Yellow / Appearance: Clear / S.005 / pH: x  Gluc: x / Ketone: Negative  / Bili: Negative / Urobili: 1 mg/dL   Blood: x / Protein: 30 mg/dL / Nitrite: Negative   Leuk Esterase: Trace / RBC: 3-5 /HPF / WBC 3-5   Sq Epi: x / Non Sq Epi: Occasional / Bacteria: Few          RADIOLOGY & ADDITIONAL STUDIES:      Assessment and Plan:    CRITICAL CARE TIME SPENT:

## 2018-11-19 NOTE — DIETITIAN INITIAL EVALUATION ADULT. - ADHERENCE
low sodium diet; pt/fair Regular; pt with good compliance with Low Na diet; Wife reports no cooking with salt; Breakfast- oatmeal; Lunch- traditional African food, chicken, fish, corn meal; Dinner-  or tuna fish & salad/good

## 2018-11-19 NOTE — PROGRESS NOTE ADULT - SUBJECTIVE AND OBJECTIVE BOX
POD  # 1 INCARCERATED HERNIA REPAIR/ SMALL BOWEL OBSTRUCTION    HEMODYNAMICALLY STABLE  ON VENT  AROUSABLE  COARSE BREATH SOUNDS  DISTANT HEART SOUNDS  PROTRUBERANT ABDOMEN; TYMPANITIC WOUNDS: DRESSED  NO CLUBBING CYANOSIS OR EDEMA PERFUSED    STABLE CARDIOVASCULAR STATUS; CONTINUING WITH PRESENT MANAGEMENT.   WEAN AS TOLERATED   FAMILY PRESENT AT BEDSIDE

## 2018-11-20 LAB
ALBUMIN SERPL ELPH-MCNC: 2 G/DL — LOW (ref 3.3–5)
ALP SERPL-CCNC: 53 U/L — SIGNIFICANT CHANGE UP (ref 40–120)
ALT FLD-CCNC: 19 U/L — SIGNIFICANT CHANGE UP (ref 12–78)
ANION GAP SERPL CALC-SCNC: 10 MMOL/L — SIGNIFICANT CHANGE UP (ref 5–17)
AST SERPL-CCNC: 41 U/L — HIGH (ref 15–37)
BASOPHILS # BLD AUTO: 0.01 K/UL — SIGNIFICANT CHANGE UP (ref 0–0.2)
BASOPHILS NFR BLD AUTO: 0.1 % — SIGNIFICANT CHANGE UP (ref 0–2)
BILIRUB SERPL-MCNC: 0.8 MG/DL — SIGNIFICANT CHANGE UP (ref 0.2–1.2)
BUN SERPL-MCNC: 23 MG/DL — SIGNIFICANT CHANGE UP (ref 7–23)
CALCIUM SERPL-MCNC: 6.2 MG/DL — CRITICAL LOW (ref 8.5–10.1)
CHLORIDE SERPL-SCNC: 104 MMOL/L — SIGNIFICANT CHANGE UP (ref 96–108)
CO2 SERPL-SCNC: 23 MMOL/L — SIGNIFICANT CHANGE UP (ref 22–31)
CREAT SERPL-MCNC: 1.15 MG/DL — SIGNIFICANT CHANGE UP (ref 0.5–1.3)
CULTURE RESULTS: SIGNIFICANT CHANGE UP
EOSINOPHIL # BLD AUTO: 0 K/UL — SIGNIFICANT CHANGE UP (ref 0–0.5)
EOSINOPHIL NFR BLD AUTO: 0 % — SIGNIFICANT CHANGE UP (ref 0–6)
GLUCOSE SERPL-MCNC: 156 MG/DL — HIGH (ref 70–99)
GRAM STN FLD: SIGNIFICANT CHANGE UP
HCT VFR BLD CALC: 23.6 % — LOW (ref 39–50)
HCT VFR BLD CALC: 24.5 % — LOW (ref 39–50)
HGB BLD-MCNC: 8.1 G/DL — LOW (ref 13–17)
HGB BLD-MCNC: 8.5 G/DL — LOW (ref 13–17)
IMM GRANULOCYTES NFR BLD AUTO: 0.5 % — SIGNIFICANT CHANGE UP (ref 0–1.5)
LYMPHOCYTES # BLD AUTO: 0.97 K/UL — LOW (ref 1–3.3)
LYMPHOCYTES # BLD AUTO: 8.7 % — LOW (ref 13–44)
MAGNESIUM SERPL-MCNC: 2.3 MG/DL — SIGNIFICANT CHANGE UP (ref 1.6–2.6)
MANUAL SMEAR VERIFICATION: SIGNIFICANT CHANGE UP
MCHC RBC-ENTMCNC: 26.2 PG — LOW (ref 27–34)
MCHC RBC-ENTMCNC: 26.7 PG — LOW (ref 27–34)
MCHC RBC-ENTMCNC: 34.3 GM/DL — SIGNIFICANT CHANGE UP (ref 32–36)
MCHC RBC-ENTMCNC: 34.7 GM/DL — SIGNIFICANT CHANGE UP (ref 32–36)
MCV RBC AUTO: 75.4 FL — LOW (ref 80–100)
MCV RBC AUTO: 77.9 FL — LOW (ref 80–100)
MONOCYTES # BLD AUTO: 0.73 K/UL — SIGNIFICANT CHANGE UP (ref 0–0.9)
MONOCYTES NFR BLD AUTO: 6.5 % — SIGNIFICANT CHANGE UP (ref 2–14)
NEUTROPHILS # BLD AUTO: 9.4 K/UL — HIGH (ref 1.8–7.4)
NEUTROPHILS NFR BLD AUTO: 84.2 % — HIGH (ref 43–77)
NRBC # BLD: 0 /100 WBCS — SIGNIFICANT CHANGE UP (ref 0–0)
NRBC # BLD: 0 /100 WBCS — SIGNIFICANT CHANGE UP (ref 0–0)
ORGANISM # SPEC MICROSCOPIC CNT: SIGNIFICANT CHANGE UP
ORGANISM # SPEC MICROSCOPIC CNT: SIGNIFICANT CHANGE UP
PHOSPHATE SERPL-MCNC: 2.1 MG/DL — LOW (ref 2.5–4.5)
PLAT MORPH BLD: SIGNIFICANT CHANGE UP
PLATELET # BLD AUTO: 166 K/UL — SIGNIFICANT CHANGE UP (ref 150–400)
PLATELET # BLD AUTO: 243 K/UL — SIGNIFICANT CHANGE UP (ref 150–400)
POTASSIUM SERPL-MCNC: 4.5 MMOL/L — SIGNIFICANT CHANGE UP (ref 3.5–5.3)
POTASSIUM SERPL-SCNC: 4.5 MMOL/L — SIGNIFICANT CHANGE UP (ref 3.5–5.3)
PROT SERPL-MCNC: 5.2 GM/DL — LOW (ref 6–8.3)
RBC # BLD: 3.03 M/UL — LOW (ref 4.2–5.8)
RBC # BLD: 3.25 M/UL — LOW (ref 4.2–5.8)
RBC # FLD: 13.3 % — SIGNIFICANT CHANGE UP (ref 10.3–14.5)
RBC # FLD: 13.8 % — SIGNIFICANT CHANGE UP (ref 10.3–14.5)
RBC BLD AUTO: SIGNIFICANT CHANGE UP
SODIUM SERPL-SCNC: 137 MMOL/L — SIGNIFICANT CHANGE UP (ref 135–145)
SPECIMEN SOURCE: SIGNIFICANT CHANGE UP
SURGICAL PATHOLOGY STUDY: SIGNIFICANT CHANGE UP
WBC # BLD: 10.61 K/UL — HIGH (ref 3.8–10.5)
WBC # BLD: 11.17 K/UL — HIGH (ref 3.8–10.5)
WBC # FLD AUTO: 10.61 K/UL — HIGH (ref 3.8–10.5)
WBC # FLD AUTO: 11.17 K/UL — HIGH (ref 3.8–10.5)

## 2018-11-20 PROCEDURE — 99291 CRITICAL CARE FIRST HOUR: CPT

## 2018-11-20 PROCEDURE — 74176 CT ABD & PELVIS W/O CONTRAST: CPT | Mod: 26

## 2018-11-20 RX ORDER — FUROSEMIDE 40 MG
40 TABLET ORAL ONCE
Qty: 0 | Refills: 0 | Status: COMPLETED | OUTPATIENT
Start: 2018-11-20 | End: 2018-11-20

## 2018-11-20 RX ORDER — HYDROMORPHONE HYDROCHLORIDE 2 MG/ML
0.5 INJECTION INTRAMUSCULAR; INTRAVENOUS; SUBCUTANEOUS EVERY 6 HOURS
Qty: 0 | Refills: 0 | Status: DISCONTINUED | OUTPATIENT
Start: 2018-11-20 | End: 2018-11-21

## 2018-11-20 RX ORDER — ACETAMINOPHEN 500 MG
1000 TABLET ORAL ONCE
Qty: 0 | Refills: 0 | Status: COMPLETED | OUTPATIENT
Start: 2018-11-20 | End: 2018-11-20

## 2018-11-20 RX ORDER — CALCIUM GLUCONATE 100 MG/ML
1 VIAL (ML) INTRAVENOUS ONCE
Qty: 0 | Refills: 0 | Status: COMPLETED | OUTPATIENT
Start: 2018-11-20 | End: 2018-11-20

## 2018-11-20 RX ORDER — METOPROLOL TARTRATE 50 MG
5 TABLET ORAL EVERY 6 HOURS
Qty: 0 | Refills: 0 | Status: DISCONTINUED | OUTPATIENT
Start: 2018-11-20 | End: 2018-11-22

## 2018-11-20 RX ORDER — POTASSIUM PHOSPHATE, MONOBASIC POTASSIUM PHOSPHATE, DIBASIC 236; 224 MG/ML; MG/ML
15 INJECTION, SOLUTION INTRAVENOUS ONCE
Qty: 0 | Refills: 0 | Status: DISCONTINUED | OUTPATIENT
Start: 2018-11-20 | End: 2018-11-20

## 2018-11-20 RX ADMIN — Medication 3 MILLILITER(S): at 00:00

## 2018-11-20 RX ADMIN — HYDROMORPHONE HYDROCHLORIDE 0.5 MILLIGRAM(S): 2 INJECTION INTRAMUSCULAR; INTRAVENOUS; SUBCUTANEOUS at 16:05

## 2018-11-20 RX ADMIN — Medication 40 MILLIGRAM(S): at 20:21

## 2018-11-20 RX ADMIN — HEPARIN SODIUM 5000 UNIT(S): 5000 INJECTION INTRAVENOUS; SUBCUTANEOUS at 22:49

## 2018-11-20 RX ADMIN — Medication 5 MILLIGRAM(S): at 12:42

## 2018-11-20 RX ADMIN — Medication 100 MILLIGRAM(S): at 22:49

## 2018-11-20 RX ADMIN — HEPARIN SODIUM 5000 UNIT(S): 5000 INJECTION INTRAVENOUS; SUBCUTANEOUS at 05:53

## 2018-11-20 RX ADMIN — PANTOPRAZOLE SODIUM 40 MILLIGRAM(S): 20 TABLET, DELAYED RELEASE ORAL at 12:09

## 2018-11-20 RX ADMIN — SODIUM CHLORIDE 100 MILLILITER(S): 9 INJECTION INTRAMUSCULAR; INTRAVENOUS; SUBCUTANEOUS at 23:00

## 2018-11-20 RX ADMIN — OXYMETAZOLINE HYDROCHLORIDE 2 SPRAY(S): 0.5 SPRAY NASAL at 17:32

## 2018-11-20 RX ADMIN — Medication 3 MILLILITER(S): at 13:01

## 2018-11-20 RX ADMIN — HYDROMORPHONE HYDROCHLORIDE 0.5 MILLIGRAM(S): 2 INJECTION INTRAMUSCULAR; INTRAVENOUS; SUBCUTANEOUS at 16:17

## 2018-11-20 RX ADMIN — Medication 62.5 MILLIMOLE(S): at 14:15

## 2018-11-20 RX ADMIN — Medication 100 MILLIGRAM(S): at 14:15

## 2018-11-20 RX ADMIN — Medication 200 MILLIGRAM(S): at 06:07

## 2018-11-20 RX ADMIN — Medication 1000 MILLIGRAM(S): at 14:45

## 2018-11-20 RX ADMIN — Medication 200 GRAM(S): at 08:14

## 2018-11-20 RX ADMIN — Medication 400 MILLIGRAM(S): at 14:30

## 2018-11-20 RX ADMIN — Medication 3 MILLILITER(S): at 05:16

## 2018-11-20 RX ADMIN — Medication 5 MILLIGRAM(S): at 17:32

## 2018-11-20 RX ADMIN — Medication 3 MILLILITER(S): at 17:18

## 2018-11-20 RX ADMIN — Medication 40 MILLIGRAM(S): at 12:30

## 2018-11-20 RX ADMIN — SODIUM CHLORIDE 100 MILLILITER(S): 9 INJECTION INTRAMUSCULAR; INTRAVENOUS; SUBCUTANEOUS at 12:48

## 2018-11-20 RX ADMIN — Medication 200 MILLIGRAM(S): at 17:32

## 2018-11-20 RX ADMIN — Medication 5 MILLIGRAM(S): at 23:00

## 2018-11-20 RX ADMIN — Medication 100 MILLIGRAM(S): at 05:53

## 2018-11-20 NOTE — PROGRESS NOTE ADULT - REASON FOR ADMISSION
Abdominal pain and scrotal pain, Incarcerated R Inguino-scrotal Hernia. Abdominal pain and scrotal pain, Incarcerated large  R Inguino-scrotal Hernia.

## 2018-11-20 NOTE — PROGRESS NOTE ADULT - PROBLEM SELECTOR PLAN 1
doing well extubated  off pressors  C/W cipro flagyl, repeat CBC now. Likely dilutional component to anemia. can c/w hep SQ Q 8 H for DVT PPX  IF H/H stable can likely go to floors later today

## 2018-11-20 NOTE — PROGRESS NOTE ADULT - SUBJECTIVE AND OBJECTIVE BOX
POD 2  EXTUBATED  SINUS TACHYCARDIA  HEMODYNAMICALLY STABLE  SAT > 95%  LAYS FLAT  NGT  COARSE BREATH SOUNDS  DISTANT HEART SOUNDS  WOUNDS SURGICAL WOUND/SCAR: CLEAN DRY & INTACT  NO CLUBBING CYANOSIS OR EDEMA    ? REACTIVE SINUS TACHYCARDIA  CONTINUE TO MONITORY TELEMETRY  FAMILY PRESENT AT BEDSIDE

## 2018-11-20 NOTE — PROGRESS NOTE ADULT - SUBJECTIVE AND OBJECTIVE BOX
INTERVAL HPI/OVERNIGHT EVENTS:   HPI:  68M with PMH HTN, prostate ca (s/p resection and radiation), AICD placement s/p cardiac arrest and coma (due to brain injury) about 10yrs ago, and known 2 year history of reducible right inguinal hernia now presents to the ER c/o diffuse abdominal pain and right scrotal pain. Wife at bedside who speaks on behalf of patient. States right groin pain has been persistent x1 week, worsening since 2 days ago associated with increased right scrotal swelling. Associated with nausea and NBNB emesis.  Denies flatus or BM, states his last BM was yesterday. Wife states patient has the "sweats" at night, but denies fever/chills. Denies active chest pain, palpitations, shortness of breath, dizziness or urinary complaints.   ER and Surgeon tried to reduce Hernia, not reducible. (2018 11:32)    Did well overnight no major event   Hgb 8.5 did get 4 L IVF  yesterday mildly tachy to 122 is on beta blocker at home         PAST MEDICAL & SURGICAL HISTORY:  Sudden cardiac death  Gait abnormality  Leg pain, right  Anoxic encephalopathy  Brain injury with coma: x 2 weeks in   Prostate cancer: radiation therapy  Hypertension  S/P ICD (internal cardiac defibrillator) procedure: implanted on 2009  H/O tracheostomy  H/O prostate cancer: resection and radiation therapy  Status post cryoablation: of left renal mass        ICU Vital Signs Last 24 Hrs  T(C): 38 (2018 08:00), Max: 38 (2018 15:56)  T(F): 100.4 (2018 08:00), Max: 100.4 (2018 15:56)  HR: 121 (2018 10:00) (105 - 124)  BP: 139/72 (2018 10:00) (124/75 - 157/79)  BP(mean): 88 (2018 10:00) (84 - 101)  ABP: --  ABP(mean): --  RR: 25 (2018 10:00) (20 - 26)  SpO2: 98% (2018 10:00) (94% - 100%)      ABG - ( 2018 02:41 )  pH, Arterial: x     pH, Blood: 7.46  /  pCO2: 33    /  pO2: 148   / HCO3: 23    / Base Excess: 0.0   /  SaO2: 98                  I&O's Detail    2018 07:01  -  2018 07:00  --------------------------------------------------------  IN:    fentaNYL Infusion.: 18.2 mL    IV PiggyBack: 800 mL    lactated ringers.: 1000 mL    sodium chloride 0.9%.: 2300 mL  Total IN: 4118.2 mL    OUT:    Bulb: 75 mL    Indwelling Catheter - Urethral: 720 mL    Nasoenteral Tube: 100 mL  Total OUT: 895 mL    Total NET: 3223.2 mL      2018 07:  -  2018 11:40  --------------------------------------------------------  IN:    IV PiggyBack: 100 mL    sodium chloride 0.9%.: 300 mL  Total IN: 400 mL    OUT:    Indwelling Catheter - Urethral: 150 mL  Total OUT: 150 mL    Total NET: 250 mL            CAPILLARY BLOOD GLUCOSE        PHYSICAL EXAM:    GENERAL:  AAO 3 NAD NGT in place  HEENt NO JVD  Lungs CTA B/L  Abd distended no BS    Ext no edema    LABS:                        8.5    11.17 )-----------( 243      ( 2018 03:27 )             24.5      -20    137  |  104  |  23  ----------------------------<  156<H>  4.5   |  23  |  1.15    Ca    6.2<LL>      2018 03:27  Phos  2.1       Mg     2.3         TPro  5.2<L>  /  Alb  2.0<L>  /  TBili  0.8  /  DBili  x   /  AST  41<H>  /  ALT  19  /  AlkPhos  53  -20    PT/INR - ( 2018 14:15 )   PT: 14.3 sec;   INR: 1.27 ratio         PTT - ( 2018 14:15 )  PTT:24.9 sec  Urinalysis Basic - ( 2018 14:16 )    Color: Yellow / Appearance: Clear / S.005 / pH: x  Gluc: x / Ketone: Negative  / Bili: Negative / Urobili: 1 mg/dL   Blood: x / Protein: 30 mg/dL / Nitrite: Negative   Leuk Esterase: Trace / RBC: 3-5 /HPF / WBC 3-5   Sq Epi: x / Non Sq Epi: Occasional / Bacteria: Few      Culture Results:   No growth ( @ 18:13)  Culture Results:   Growth in aerobic bottle: Coag Negative Staphylococcus  Single set isolate, possible contaminant. Contact  Microbiology if susceptibility testing clinically  indicated.  "Due to technical problems, Proteus sp. will Not be reported as part of  the BCID panel until further notice"  ***Blood Panel PCR results on this specimen are available  approximately 3 hours after the Gram stain result.***  Gram stain, PCR, and/or culture results may not always  correspond due to difference in methodologies.  ************************************************************  This PCR assay was performed using [employer].  The following targets are tested for: Enterococcus,  vancomycin resistant enterococci, Listeria monocytogenes,  coagulase negative staphylococci, S. aureus,  methicillin resistant S. aureus, Streptococcus agalactiae  (Group B), S. pneumoniae, S. pyogenes (Group A),  Acinetobacter baumannii, Enterobacter cloacae, E. coli,  Klebsiella oxytoca, K. pneumoniae, Proteus sp.,  Serratia marcescens, Haemophilus influenzae,  Neisseria meningitidis, Pseudomonas aeruginosa, Candida  albicans, C. glabrata, C krusei, C parapsilosis,  C. tropicalis and the KPC resistance gene. ( @ 11:41)  Culture Results:   No growth to date. ( @ 11:20)      RADIOLOGY & ADDITIONAL STUDIES:      Assessment and Plan:    CRITICAL CARE TIME SPENT:

## 2018-11-20 NOTE — PROGRESS NOTE ADULT - SUBJECTIVE AND OBJECTIVE BOX
Postoperative Day #: 1  Patient seen and examined bedside resting comfortably. Extubated yesterday, UOP much improved, off pressors  No complaints offered. No flatus/BM.   Abdominal pain is well controlled.  Denies nausea and vomiting.   Denies chest pain, dyspnea, cough.    T(F): 98.6 (11-20-18 @ 04:19), Max: 100.4 (11-19-18 @ 15:56)  HR: 120 (11-20-18 @ 06:00) (101 - 122)  BP: 146/67 (11-20-18 @ 06:00) (111/74 - 157/79)  RR: 24 (11-20-18 @ 06:00) (14 - 26)  SpO2: 97% (11-20-18 @ 06:00) (94% - 100%)  Wt(kg): --  CAPILLARY BLOOD GLUCOSE          PHYSICAL EXAM:  General: NAD, WDWN  Neuro:  Alert & oriented x 3  HEENT: NCAT, EOMI, conjunctiva clear, NGT in place with   CV: +S1+S2 regular rate and rhythm  Lung: clear to ausculation bilaterally, respirations nonlabored, good inspiratory effort  Abdomen: obese, hypoactive BS, NGT in place with minimal output, Aquacel dressing to midline incision c/d/I  R groin: incision with Aquacel dressing c/d/i no signs of hematoma, TENZIN drain in place with serosanguinous output  : +scrotal swelling, shea in place 500cc/12hrs  Extremities: no pedal edema or calf tenderness noted     LABS:                        8.5    11.17 )-----------( 243      ( 20 Nov 2018 03:27 )             24.5     11-20    137  |  104  |  23  ----------------------------<  156<H>  4.5   |  23  |  1.15    Ca    6.2<LL>      20 Nov 2018 03:27  Phos  2.1     11-20  Mg     2.3     11-20    TPro  5.2<L>  /  Alb  2.0<L>  /  TBili  0.8  /  DBili  x   /  AST  41<H>  /  ALT  19  /  AlkPhos  53  11-20    PT/INR - ( 18 Nov 2018 14:15 )   PT: 14.3 sec;   INR: 1.27 ratio         PTT - ( 18 Nov 2018 14:15 )  PTT:24.9 sec  I&O's Detail    19 Nov 2018 07:01  -  20 Nov 2018 07:00  --------------------------------------------------------  IN:    fentaNYL Infusion.: 18.2 mL    IV PiggyBack: 800 mL    lactated ringers.: 1000 mL    sodium chloride 0.9%.: 2300 mL  Total IN: 4118.2 mL    OUT:    Bulb: 75 mL    Indwelling Catheter - Urethral: 720 mL    Nasoenteral Tube: 100 mL  Total OUT: 895 mL    Total NET: 3223.2 mL        Culture Results:   No growth (11-18 @ 18:13)  Culture Results:   Growth in aerobic bottle: Gram Positive Cocci in Clusters  "Due to technical problems, Proteus sp. will Not be reported as part of  the BCID panel until further notice"  ***Blood Panel PCR results on this specimen are available  approximately 3 hours after the Gram stain result.***  Gram stain, PCR, and/or culture results may not always  correspond due to difference in methodologies.  ************************************************************  This PCR assay was performed using [employer].  The following targets are tested for: Enterococcus,  vancomycin resistant enterococci, Listeria monocytogenes,  coagulase negative staphylococci, S. aureus,  methicillin resistant S. aureus, Streptococcus agalactiae  (Group B), S. pneumoniae, S.pyogenes (Group A),  Acinetobacter baumannii, Enterobacter cloacae, E. coli,  Klebsiella oxytoca, K. pneumoniae, Proteus sp.,  Serratia marcescens, Haemophilus influenzae,  Neisseria meningitidis, Pseudomonas aeruginosa, Candida  albicans, C. glabrata, C krusei, C parapsilosis,  C. tropicalis and the KPC resistance gene. (11-18 @ 11:41)  Culture Results:   No growth to date. (11-18 @ 11:20)      Impression: 68y Male s/p exploratory laparotomy, Colon resection (cecectomy), reduction and repair of right inguinal hernia with Vicryl mesh- POD#1, stable.    PMH   Sudden cardiac death  Gait abnormality  Leg pain, right  Anoxic encephalopathy  Brain injury with coma  Prostate cancer  Hypertension    - NPO/NGT/IVF  - cont ABX  - cont care per ICU  - continue local wound care  - f/u AM labs  - will discuss with surgical attending Postoperative Day #: 1  Patient seen and examined bedside resting comfortably. Extubated yesterday, UOP much improved, off pressors  No complaints offered. No flatus/BM.   Abdominal pain is well controlled.  Denies nausea and vomiting.   Denies chest pain, dyspnea, cough.    T(F): 98.6 (11-20-18 @ 04:19), Max: 100.4 (11-19-18 @ 15:56)  HR: 120 (11-20-18 @ 06:00) (101 - 122)  BP: 146/67 (11-20-18 @ 06:00) (111/74 - 157/79)  RR: 24 (11-20-18 @ 06:00) (14 - 26)  SpO2: 97% (11-20-18 @ 06:00) (94% - 100%)  Wt(kg): --  CAPILLARY BLOOD GLUCOSE          PHYSICAL EXAM:  General: NAD, WDWN  Neuro:  Alert & oriented x 3  HEENT: NCAT, EOMI, conjunctiva clear, NGT in place with   CV: +S1+S2 regular rate and rhythm  Lung: clear to ausculation bilaterally, respirations nonlabored, good inspiratory effort  Abdomen: obese, hypoactive BS, NGT in place with minimal output, Aquacel dressing to midline incision c/d/I  R groin: incision with Aquacel dressing c/d/i no signs of hematoma, TENZIN drain in place with serosanguinous output  : +scrotal swelling, shea in place 500cc/12hrs  Extremities: no pedal edema or calf tenderness noted     LABS:                        8.5    11.17 )-----------( 243      ( 20 Nov 2018 03:27 )             24.5     11-20    137  |  104  |  23  ----------------------------<  156<H>  4.5   |  23  |  1.15    Ca    6.2<LL>      20 Nov 2018 03:27  Phos  2.1     11-20  Mg     2.3     11-20    TPro  5.2<L>  /  Alb  2.0<L>  /  TBili  0.8  /  DBili  x   /  AST  41<H>  /  ALT  19  /  AlkPhos  53  11-20    PT/INR - ( 18 Nov 2018 14:15 )   PT: 14.3 sec;   INR: 1.27 ratio         PTT - ( 18 Nov 2018 14:15 )  PTT:24.9 sec  I&O's Detail    19 Nov 2018 07:01  -  20 Nov 2018 07:00  --------------------------------------------------------  IN:    fentaNYL Infusion.: 18.2 mL    IV PiggyBack: 800 mL    lactated ringers.: 1000 mL    sodium chloride 0.9%.: 2300 mL  Total IN: 4118.2 mL    OUT:    Bulb: 75 mL    Indwelling Catheter - Urethral: 720 mL    Nasoenteral Tube: 100 mL  Total OUT: 895 mL    Total NET: 3223.2 mL        Culture Results:   No growth (11-18 @ 18:13)  Culture Results:   Growth in aerobic bottle: Gram Positive Cocci in Clusters  "Due to technical problems, Proteus sp. will Not be reported as part of  the BCID panel until further notice"  ***Blood Panel PCR results on this specimen are available  approximately 3 hours after the Gram stain result.***  Gram stain, PCR, and/or culture results may not always  correspond due to difference in methodologies.  ************************************************************  This PCR assay was performed using [employer].  The following targets are tested for: Enterococcus,  vancomycin resistant enterococci, Listeria monocytogenes,  coagulase negative staphylococci, S. aureus,  methicillin resistant S. aureus, Streptococcus agalactiae  (Group B), S. pneumoniae, S.pyogenes (Group A),  Acinetobacter baumannii, Enterobacter cloacae, E. coli,  Klebsiella oxytoca, K. pneumoniae, Proteus sp.,  Serratia marcescens, Haemophilus influenzae,  Neisseria meningitidis, Pseudomonas aeruginosa, Candida  albicans, C. glabrata, C krusei, C parapsilosis,  C. tropicalis and the KPC resistance gene. (11-18 @ 11:41)  Culture Results:   No growth to date. (11-18 @ 11:20)      Impression:     68y Male s/p exploratory laparotomy, Colon resection (cecectomy), reduction and repair of Incarcerated right inguinal hernia with Vicryl mesh- POD#1, stable.    PMH   Cardiac arrest  Gait abnormality  Leg pain, right  Anoxic encephalopathy  Brain injury with coma  Prostate cancer  Hypertension     PLAN:  - NPO/NGT/IVF  - cont ABX  - cont care per ICU  - continue local wound care  - f/u AM labs  - will discuss with surgical attending

## 2018-11-20 NOTE — PROGRESS NOTE ADULT - SUBJECTIVE AND OBJECTIVE BOX
Patient is a 68y old  Male who presents with a chief complaint of Abdominal pain and scrotal pain, Incarcerated R Inguino-scrotal Hernia. (2018 08:39)    s/p rt inginal hernioraphy, cecotomy.  INTERVAL HPI/OVERNIGHT EVENTS:has some bllod in NG tube drainage  PAST MEDICAL & SURGICAL HISTORY:  Sudden cardiac death  Gait abnormality  Leg pain, right  Anoxic encephalopathy  Brain injury with coma: x 2 weeks in   Prostate cancer: radiation therapy  Hypertension  S/P ICD (internal cardiac defibrillator) procedure: implanted on 2009  H/O tracheostomy  H/O prostate cancer: resection and radiation therapy  Status post cryoablation: of left renal mass      MEDICATIONS  (STANDING):  ALBUTerol/ipratropium for Nebulization 3 milliLiter(s) Nebulizer every 6 hours  chlorhexidine 0.12% Liquid 15 milliLiter(s) Oral Mucosa two times a day  ciprofloxacin   IVPB 400 milliGRAM(s) IV Intermittent every 12 hours  heparin  Injectable 5000 Unit(s) SubCutaneous every 8 hours  metroNIDAZOLE  IVPB 500 milliGRAM(s) IV Intermittent every 8 hours  oxymetazoline 0.05% Nasal Spray 2 Spray(s) Both Nostrils two times a day  pantoprazole  Injectable 40 milliGRAM(s) IV Push daily  sodium chloride 0.9%. 1000 milliLiter(s) (100 mL/Hr) IV Continuous <Continuous>    MEDICATIONS  (PRN):      Allergies    lisinopril (Unknown)  penicillins (Unknown)    Intolerances        REVIEW OF SYSTEMS:  CONSTITUTIONAL: No fever, weight loss, or fatigue  EYES: No eye pain, visual disturbances, or discharge  ENMT:  No difficulty hearing, tinnitus, vertigo; No sinus or throat pain  NECK: No pain or stiffness  BREASTS: No pain, masses, or nipple discharge  RESPIRATORY: No cough, wheezing, chills or hemoptysis; No shortness of breath  CARDIOVASCULAR: No chest pain, palpitations, dizziness, or leg swelling  GASTROINTESTINAL: No abdominal or epigastric pain. No nausea, vomiting, or hematemesis; No diarrhea or constipation. No melena or hematochezia.  GENITOURINARY: No dysuria, frequency, hematuria, or incontinence  NEUROLOGICAL: No headaches, memory loss, loss of strength, numbness, or tremors  SKIN: No itching, burning, rashes, or lesions   LYMPH NODES: No enlarged glands  ENDOCRINE: No heat or cold intolerance; No hair loss  MUSCULOSKELETAL: No joint pain or swelling; No muscle, back, or extremity pain  PSYCHIATRIC: No depression, anxiety, mood swings, or difficulty sleeping  HEME/LYMPH: No easy bruising, or bleeding gums  ALLERY AND IMMUNOLOGIC: No hives or eczema    Vital Signs Last 24 Hrs  T(C): 38 (2018 08:00), Max: 38 (2018 15:56)  T(F): 100.4 (2018 08:00), Max: 100.4 (2018 15:56)  HR: 121 (2018 10:00) (105 - 124)  BP: 139/72 (2018 10:00) (124/75 - 157/79)  BP(mean): 88 (2018 10:00) (84 - 101)  RR: 25 (2018 10:00) (20 - 26)  SpO2: 98% (2018 10:00) (94% - 100%)    PHYSICAL EXAM:  GENERAL: NAD, well-groomed, well-developed  HEAD:  Atraumatic, Normocephalic  EYES: EOMI, PERRLA, conjunctiva and sclera clear  ENMT: No tonsillar erythema, exudates, or enlargement; Moist mucous membranes, Good dentition, No lesions  NECK: Supple, No JVD, Normal thyroid  NERVOUS SYSTEM:  Alert & Oriented X3, Good concentration; Motor Strength 5/5 B/L upper and lower extremities; DTRs 2+ intact and symmetric  CHEST/LUNG: Clear to percussion bilaterally; No rales, rhonchi, wheezing, or rubs  HEART: Regular rate and rhythm; No murmurs, rubs, or gallops  ABDOMEN: Soft, Nontender, Nondistended; Bowel sounds present  EXTREMITIES:  2+ Peripheral Pulses, No clubbing, cyanosis, or edema  LYMPH: No lymphadenopathy noted  SKIN: No rashes or lesions    LABS:                        8.5    11.17 )-----------( 243      ( 2018 03:27 )             24.5     11-20    137  |  104  |  23  ----------------------------<  156<H>  4.5   |  23  |  1.15    Ca    6.2<LL>      2018 03:27  Phos  2.1     11-20  Mg     2.3     -20    TPro  5.2<L>  /  Alb  2.0<L>  /  TBili  0.8  /  DBili  x   /  AST  41<H>  /  ALT  19  /  AlkPhos  53  11-20      PT/INR - ( 2018 14:15 )   PT: 14.3 sec;   INR: 1.27 ratio         PTT - ( 2018 14:15 )  PTT:24.9 sec  Urinalysis Basic - ( 2018 14:16 )    Color: Yellow / Appearance: Clear / S.005 / pH: x  Gluc: x / Ketone: Negative  / Bili: Negative / Urobili: 1 mg/dL   Blood: x / Protein: 30 mg/dL / Nitrite: Negative   Leuk Esterase: Trace / RBC: 3-5 /HPF / WBC 3-5   Sq Epi: x / Non Sq Epi: Occasional / Bacteria: Few      CAPILLARY BLOOD GLUCOSE        ABG - ( 2018 02:41 )  pH, Arterial: x     pH, Blood: 7.46  /  pCO2: 33    /  pO2: 148   / HCO3: 23    / Base Excess: 0.0   /  SaO2: 98                          RADIOLOGY & ADDITIONAL TESTS:    Imaging Personally Reviewed:  [ ] YES  [ ] NO    Consultant(s) Notes Reviewed:  [ ] YES  [ ] NO    Care Discussed with Consultants/Other Providers [ ] YES  [ ] NO    Care discussed with family,         [  ]   yes  [  ]  No    imp:    stable[ ]    unstable[  ]     improving [  x ]       unchanged  [  ]                Plans:  Continue present plans  [x  ] as per critical care/ surgery               New consult [  ]   specialty  .......               order test[  ]    test name.                  Discharge Planning  [  ]

## 2018-11-20 NOTE — PROGRESS NOTE ADULT - ATTENDING COMMENTS
I examined patient at bed site, tachycardic, complaints of Surgical wounds, pain.  Abdomen soft, no distended, no guarding,+ Incisional tenderness, dressings clean, intact. TENZIN: sanguinous fluid 75 ml/24h.  Labs:  Low H/H  8.1/23.6.  New Abdomen CT ordered, no sign of Intraabdominal bleeding.    Assessment:    POD  #1  Tachycardic, possible related to surgical wounds pain,  Post-op anemia,   Abdomen soft, no sign of Internal bleeding.    Plan:    Discussed with Dr. Park about plan, pain medication will be adjust, B-Blockers will be adjust.  Will monitor H/H , if continue low, PRBC will be indicated.  Continue ICU care.   Local Wound care.

## 2018-11-21 LAB
ALBUMIN SERPL ELPH-MCNC: 1.8 G/DL — LOW (ref 3.3–5)
ALP SERPL-CCNC: 55 U/L — SIGNIFICANT CHANGE UP (ref 40–120)
ALT FLD-CCNC: 20 U/L — SIGNIFICANT CHANGE UP (ref 12–78)
ANION GAP SERPL CALC-SCNC: 10 MMOL/L — SIGNIFICANT CHANGE UP (ref 5–17)
ANION GAP SERPL CALC-SCNC: 12 MMOL/L — SIGNIFICANT CHANGE UP (ref 5–17)
AST SERPL-CCNC: 44 U/L — HIGH (ref 15–37)
BASOPHILS # BLD AUTO: 0.01 K/UL — SIGNIFICANT CHANGE UP (ref 0–0.2)
BASOPHILS NFR BLD AUTO: 0.1 % — SIGNIFICANT CHANGE UP (ref 0–2)
BILIRUB SERPL-MCNC: 0.4 MG/DL — SIGNIFICANT CHANGE UP (ref 0.2–1.2)
BUN SERPL-MCNC: 14 MG/DL — SIGNIFICANT CHANGE UP (ref 7–23)
BUN SERPL-MCNC: 17 MG/DL — SIGNIFICANT CHANGE UP (ref 7–23)
CALCIUM SERPL-MCNC: 5.7 MG/DL — CRITICAL LOW (ref 8.5–10.1)
CALCIUM SERPL-MCNC: 6.2 MG/DL — CRITICAL LOW (ref 8.5–10.1)
CHLORIDE SERPL-SCNC: 104 MMOL/L — SIGNIFICANT CHANGE UP (ref 96–108)
CHLORIDE SERPL-SCNC: 107 MMOL/L — SIGNIFICANT CHANGE UP (ref 96–108)
CO2 SERPL-SCNC: 22 MMOL/L — SIGNIFICANT CHANGE UP (ref 22–31)
CO2 SERPL-SCNC: 26 MMOL/L — SIGNIFICANT CHANGE UP (ref 22–31)
CREAT SERPL-MCNC: 0.88 MG/DL — SIGNIFICANT CHANGE UP (ref 0.5–1.3)
CREAT SERPL-MCNC: 1.08 MG/DL — SIGNIFICANT CHANGE UP (ref 0.5–1.3)
CULTURE RESULTS: NO GROWTH — SIGNIFICANT CHANGE UP
EOSINOPHIL # BLD AUTO: 0.02 K/UL — SIGNIFICANT CHANGE UP (ref 0–0.5)
EOSINOPHIL NFR BLD AUTO: 0.2 % — SIGNIFICANT CHANGE UP (ref 0–6)
GLUCOSE SERPL-MCNC: 110 MG/DL — HIGH (ref 70–99)
GLUCOSE SERPL-MCNC: 91 MG/DL — SIGNIFICANT CHANGE UP (ref 70–99)
HCT VFR BLD CALC: 19.7 % — CRITICAL LOW (ref 39–50)
HCT VFR BLD CALC: 25.6 % — LOW (ref 39–50)
HGB BLD-MCNC: 6.8 G/DL — CRITICAL LOW (ref 13–17)
HGB BLD-MCNC: 8.9 G/DL — LOW (ref 13–17)
IMM GRANULOCYTES NFR BLD AUTO: 0.4 % — SIGNIFICANT CHANGE UP (ref 0–1.5)
INR BLD: 1.42 RATIO — HIGH (ref 0.88–1.16)
LYMPHOCYTES # BLD AUTO: 0.86 K/UL — LOW (ref 1–3.3)
LYMPHOCYTES # BLD AUTO: 9.1 % — LOW (ref 13–44)
MAGNESIUM SERPL-MCNC: 2.4 MG/DL — SIGNIFICANT CHANGE UP (ref 1.6–2.6)
MCHC RBC-ENTMCNC: 26.1 PG — LOW (ref 27–34)
MCHC RBC-ENTMCNC: 26.4 PG — LOW (ref 27–34)
MCHC RBC-ENTMCNC: 34.5 GM/DL — SIGNIFICANT CHANGE UP (ref 32–36)
MCHC RBC-ENTMCNC: 34.8 GM/DL — SIGNIFICANT CHANGE UP (ref 32–36)
MCV RBC AUTO: 75.5 FL — LOW (ref 80–100)
MCV RBC AUTO: 76 FL — LOW (ref 80–100)
MONOCYTES # BLD AUTO: 0.48 K/UL — SIGNIFICANT CHANGE UP (ref 0–0.9)
MONOCYTES NFR BLD AUTO: 5.1 % — SIGNIFICANT CHANGE UP (ref 2–14)
NEUTROPHILS # BLD AUTO: 8.09 K/UL — HIGH (ref 1.8–7.4)
NEUTROPHILS NFR BLD AUTO: 85.1 % — HIGH (ref 43–77)
NRBC # BLD: 0 /100 WBCS — SIGNIFICANT CHANGE UP (ref 0–0)
NRBC # BLD: 0 /100 WBCS — SIGNIFICANT CHANGE UP (ref 0–0)
PHOSPHATE SERPL-MCNC: 2.3 MG/DL — LOW (ref 2.5–4.5)
PLATELET # BLD AUTO: 191 K/UL — SIGNIFICANT CHANGE UP (ref 150–400)
PLATELET # BLD AUTO: 200 K/UL — SIGNIFICANT CHANGE UP (ref 150–400)
POTASSIUM SERPL-MCNC: 3.9 MMOL/L — SIGNIFICANT CHANGE UP (ref 3.5–5.3)
POTASSIUM SERPL-MCNC: 4.2 MMOL/L — SIGNIFICANT CHANGE UP (ref 3.5–5.3)
POTASSIUM SERPL-SCNC: 3.9 MMOL/L — SIGNIFICANT CHANGE UP (ref 3.5–5.3)
POTASSIUM SERPL-SCNC: 4.2 MMOL/L — SIGNIFICANT CHANGE UP (ref 3.5–5.3)
PROT SERPL-MCNC: 5.4 GM/DL — LOW (ref 6–8.3)
PROTHROM AB SERPL-ACNC: 16.1 SEC — HIGH (ref 10–12.9)
RBC # BLD: 2.61 M/UL — LOW (ref 4.2–5.8)
RBC # BLD: 3.37 M/UL — LOW (ref 4.2–5.8)
RBC # FLD: 13.4 % — SIGNIFICANT CHANGE UP (ref 10.3–14.5)
RBC # FLD: 14.2 % — SIGNIFICANT CHANGE UP (ref 10.3–14.5)
SODIUM SERPL-SCNC: 140 MMOL/L — SIGNIFICANT CHANGE UP (ref 135–145)
SODIUM SERPL-SCNC: 141 MMOL/L — SIGNIFICANT CHANGE UP (ref 135–145)
SPECIMEN SOURCE: SIGNIFICANT CHANGE UP
WBC # BLD: 9.5 K/UL — SIGNIFICANT CHANGE UP (ref 3.8–10.5)
WBC # BLD: 9.7 K/UL — SIGNIFICANT CHANGE UP (ref 3.8–10.5)
WBC # FLD AUTO: 9.5 K/UL — SIGNIFICANT CHANGE UP (ref 3.8–10.5)
WBC # FLD AUTO: 9.7 K/UL — SIGNIFICANT CHANGE UP (ref 3.8–10.5)

## 2018-11-21 PROCEDURE — 71045 X-RAY EXAM CHEST 1 VIEW: CPT | Mod: 26

## 2018-11-21 PROCEDURE — 74176 CT ABD & PELVIS W/O CONTRAST: CPT | Mod: 26

## 2018-11-21 PROCEDURE — 99291 CRITICAL CARE FIRST HOUR: CPT

## 2018-11-21 RX ORDER — ACETAMINOPHEN 500 MG
650 TABLET ORAL EVERY 6 HOURS
Qty: 0 | Refills: 0 | Status: DISCONTINUED | OUTPATIENT
Start: 2018-11-21 | End: 2018-11-30

## 2018-11-21 RX ORDER — ALBUMIN HUMAN 25 %
100 VIAL (ML) INTRAVENOUS ONCE
Qty: 0 | Refills: 0 | Status: COMPLETED | OUTPATIENT
Start: 2018-11-21 | End: 2018-11-21

## 2018-11-21 RX ORDER — CALCIUM GLUCONATE 100 MG/ML
2 VIAL (ML) INTRAVENOUS ONCE
Qty: 0 | Refills: 0 | Status: COMPLETED | OUTPATIENT
Start: 2018-11-21 | End: 2018-11-21

## 2018-11-21 RX ORDER — POTASSIUM PHOSPHATE, MONOBASIC POTASSIUM PHOSPHATE, DIBASIC 236; 224 MG/ML; MG/ML
15 INJECTION, SOLUTION INTRAVENOUS ONCE
Qty: 0 | Refills: 0 | Status: COMPLETED | OUTPATIENT
Start: 2018-11-21 | End: 2018-11-21

## 2018-11-21 RX ADMIN — HYDROMORPHONE HYDROCHLORIDE 0.5 MILLIGRAM(S): 2 INJECTION INTRAMUSCULAR; INTRAVENOUS; SUBCUTANEOUS at 01:20

## 2018-11-21 RX ADMIN — Medication 5 MILLIGRAM(S): at 23:43

## 2018-11-21 RX ADMIN — Medication 3 MILLILITER(S): at 05:56

## 2018-11-21 RX ADMIN — Medication 5 MILLIGRAM(S): at 05:27

## 2018-11-21 RX ADMIN — Medication 3 MILLILITER(S): at 00:02

## 2018-11-21 RX ADMIN — HYDROMORPHONE HYDROCHLORIDE 0.5 MILLIGRAM(S): 2 INJECTION INTRAMUSCULAR; INTRAVENOUS; SUBCUTANEOUS at 06:36

## 2018-11-21 RX ADMIN — POTASSIUM PHOSPHATE, MONOBASIC POTASSIUM PHOSPHATE, DIBASIC 63.75 MILLIMOLE(S): 236; 224 INJECTION, SOLUTION INTRAVENOUS at 06:36

## 2018-11-21 RX ADMIN — Medication 200 MILLIGRAM(S): at 05:27

## 2018-11-21 RX ADMIN — HYDROMORPHONE HYDROCHLORIDE 0.5 MILLIGRAM(S): 2 INJECTION INTRAMUSCULAR; INTRAVENOUS; SUBCUTANEOUS at 00:51

## 2018-11-21 RX ADMIN — Medication 100 GRAM(S): at 19:32

## 2018-11-21 RX ADMIN — Medication 100 MILLIGRAM(S): at 05:26

## 2018-11-21 RX ADMIN — Medication 3 MILLILITER(S): at 23:33

## 2018-11-21 RX ADMIN — Medication 100 MILLIGRAM(S): at 21:59

## 2018-11-21 RX ADMIN — HYDROMORPHONE HYDROCHLORIDE 0.5 MILLIGRAM(S): 2 INJECTION INTRAMUSCULAR; INTRAVENOUS; SUBCUTANEOUS at 06:41

## 2018-11-21 RX ADMIN — Medication 200 MILLIGRAM(S): at 17:07

## 2018-11-21 RX ADMIN — HYDROMORPHONE HYDROCHLORIDE 0.5 MILLIGRAM(S): 2 INJECTION INTRAMUSCULAR; INTRAVENOUS; SUBCUTANEOUS at 23:44

## 2018-11-21 RX ADMIN — OXYMETAZOLINE HYDROCHLORIDE 2 SPRAY(S): 0.5 SPRAY NASAL at 05:26

## 2018-11-21 RX ADMIN — Medication 650 MILLIGRAM(S): at 17:08

## 2018-11-21 RX ADMIN — Medication 3 MILLILITER(S): at 11:35

## 2018-11-21 RX ADMIN — Medication 100 MILLIGRAM(S): at 13:11

## 2018-11-21 RX ADMIN — HEPARIN SODIUM 5000 UNIT(S): 5000 INJECTION INTRAVENOUS; SUBCUTANEOUS at 13:12

## 2018-11-21 RX ADMIN — PANTOPRAZOLE SODIUM 40 MILLIGRAM(S): 20 TABLET, DELAYED RELEASE ORAL at 13:11

## 2018-11-21 RX ADMIN — SODIUM CHLORIDE 100 MILLILITER(S): 9 INJECTION INTRAMUSCULAR; INTRAVENOUS; SUBCUTANEOUS at 19:32

## 2018-11-21 RX ADMIN — Medication 5 MILLIGRAM(S): at 17:09

## 2018-11-21 RX ADMIN — Medication 50 MILLILITER(S): at 06:20

## 2018-11-21 RX ADMIN — SODIUM CHLORIDE 100 MILLILITER(S): 9 INJECTION INTRAMUSCULAR; INTRAVENOUS; SUBCUTANEOUS at 13:12

## 2018-11-21 RX ADMIN — Medication 650 MILLIGRAM(S): at 17:30

## 2018-11-21 RX ADMIN — Medication 5 MILLIGRAM(S): at 13:12

## 2018-11-21 RX ADMIN — Medication 3 MILLILITER(S): at 17:03

## 2018-11-21 NOTE — PROGRESS NOTE ADULT - SUBJECTIVE AND OBJECTIVE BOX
Patient is a 68y old  Male who presents with a chief complaint of Abdominal pain and scrotal pain, Incarcerated R Inguino-scrotal Hernia. (21 Nov 2018 06:31)  HB 6.8- some blood in NG tube- but not significant.   No fever.  one set of blood culture  grew  coag neg staph- possibly contaminant    INTERVAL HPI/OVERNIGHT EVENTS: no fever. no distress  PAST MEDICAL & SURGICAL HISTORY:  Sudden cardiac death  Gait abnormality  Leg pain, right  Anoxic encephalopathy  Brain injury with coma: x 2 weeks in 2008  Prostate cancer: radiation therapy  Hypertension  S/P ICD (internal cardiac defibrillator) procedure: implanted on 1/6/2009  H/O tracheostomy  H/O prostate cancer: resection and radiation therapy  Status post cryoablation: of left renal mass      MEDICATIONS  (STANDING):  ALBUTerol/ipratropium for Nebulization 3 milliLiter(s) Nebulizer every 6 hours  ciprofloxacin   IVPB 400 milliGRAM(s) IV Intermittent every 12 hours  heparin  Injectable 5000 Unit(s) SubCutaneous every 8 hours  metoprolol tartrate Injectable 5 milliGRAM(s) IV Push every 6 hours  metroNIDAZOLE  IVPB 500 milliGRAM(s) IV Intermittent every 8 hours  pantoprazole  Injectable 40 milliGRAM(s) IV Push daily  sodium chloride 0.9%. 1000 milliLiter(s) (100 mL/Hr) IV Continuous <Continuous>    MEDICATIONS  (PRN):  HYDROmorphone  Injectable 0.5 milliGRAM(s) IV Push every 6 hours PRN Severe Pain (7 - 10)      Allergies    lisinopril (Unknown)  penicillins (Unknown)    Intolerances        REVIEW OF SYSTEMS:  CONSTITUTIONAL: No fever, weight loss, or fatigue  EYES: No eye pain, visual disturbances, or discharge  ENMT:  No difficulty hearing, tinnitus, vertigo; No sinus or throat pain  NECK: No pain or stiffness  BREASTS: No pain, masses, or nipple discharge  RESPIRATORY: No cough, wheezing, chills or hemoptysis; No shortness of breath  CARDIOVASCULAR: No chest pain, palpitations, dizziness, or leg swelling  GASTROINTESTINAL: No abdominal or epigastric pain. No nausea, vomiting, or hematemesis; No diarrhea or constipation. No melena or hematochezia.  GENITOURINARY: No dysuria, frequency, hematuria, or incontinence  NEUROLOGICAL: No headaches, memory loss, loss of strength, numbness, or tremors  SKIN: No itching, burning, rashes, or lesions   LYMPH NODES: No enlarged glands  ENDOCRINE: No heat or cold intolerance; No hair loss  MUSCULOSKELETAL: No joint pain or swelling; No muscle, back, or extremity pain  PSYCHIATRIC: No depression, anxiety, mood swings, or difficulty sleeping  HEME/LYMPH: No easy bruising, or bleeding gums  ALLERY AND IMMUNOLOGIC: No hives or eczema    Vital Signs Last 24 Hrs  T(C): 36.7 (21 Nov 2018 08:49), Max: 38.4 (20 Nov 2018 15:50)  T(F): 98 (21 Nov 2018 08:49), Max: 101.2 (20 Nov 2018 15:50)  HR: 116 (21 Nov 2018 10:00) (96 - 125)  BP: 148/60 (21 Nov 2018 10:00) (98/62 - 148/60)  BP(mean): 84 (21 Nov 2018 10:00) (75 - 101)  RR: 18 (21 Nov 2018 10:00) (13 - 28)  SpO2: 93% (21 Nov 2018 10:00) (93% - 100%)    PHYSICAL EXAM:  GENERAL: NAD, well-groomed, well-developed. cognitively impaired  HEAD:  Atraumatic, Normocephalic  EYES: EOMI, PERRLA, conjunctiva and sclera clear  ENMT: No tonsillar erythema, exudates, or enlargement; Moist mucous membranes, Good dentition, No lesions  NECK: Supple, No JVD, Normal thyroid  NERVOUS SYSTEM:  Alert & Oriented X3, Good concentration; Motor Strength 5/5 B/L upper and lower extremities; DTRs 2+ intact and symmetric  CHEST/LUNG: Clear to percussion bilaterally; No rales, rhonchi, wheezing, or rubs  HEART: Regular rate and rhythm; No murmurs, rubs, or gallops  ABDOMEN: Soft, Nontender, Nondistended; Bowel sounds present. Fuentes drain from RLQ  EXTREMITIES:  2+ Peripheral Pulses, No clubbing, cyanosis, or edema  LYMPH: No lymphadenopathy noted  SKIN: No rashes or lesions    LABS:                        6.8    9.50  )-----------( 200      ( 21 Nov 2018 03:17 )             19.7     11-21    140  |  104  |  17  ----------------------------<  110<H>  3.9   |  26  |  1.08    Ca    6.2<LL>      21 Nov 2018 03:17  Phos  2.3     11-21  Mg     2.4     11-21    TPro  5.4<L>  /  Alb  1.8<L>  /  TBili  0.4  /  DBili  x   /  AST  44<H>  /  ALT  20  /  AlkPhos  55  11-21        Imaging Personally Reviewed:  [ ] YES  [ ] NO    Consultant(s) Notes Reviewed:  [ ] YES  [ ] NO    Care Discussed with Consultants/Other Providers [ ] YES  [ ] NO    Care discussed with family,         [  ]   yes  [  ]  No    imp:    stable[ ]    unstable[  ]     improving [  x ]       unchanged  [  ]                Plans:  Continue present plans  [x  ] PRBC for anemia, management by critical care team               New consult [  ]   specialty                 order test[  ]    test name.                  Discharge Planning  [  ]

## 2018-11-21 NOTE — PROGRESS NOTE ADULT - PROBLEM SELECTOR PLAN 1
still requiring blood transfusions. repeat CBC stat. As still persistently tachy, will repeat non-con CT to eval the area previously seen on CT yesterday, Coags are ok.  C/W Cipro/flagyl.  NPO per surgery.  Hep SQ for DVT PPX. still requiring blood transfusions. repeat CBC stat. As still persistently tachy, will repeat non-con CT to eval the area previously seen on CT yesterday, Coags are ok.  C/W Cipro/flagyl.  NPO per surgery.  D/C Hep SQ given ongoing need for transfusion

## 2018-11-21 NOTE — PROGRESS NOTE ADULT - ASSESSMENT
s/t inguinoscrotal henia repair for incarcerated bowel. s/p cecotomy  s/p anoxic encephalopathy with cognitive defects.  HTN

## 2018-11-21 NOTE — PROGRESS NOTE ADULT - ASSESSMENT
Impression:     68y Male s/p exploratory laparotomy, Colon resection (cecectomy), reduction and repair of Incarcerated right inguinal hernia with Vicryl mesh- POD#2, stable.    PMH   Cardiac arrest  Gait abnormality  Leg pain, right  Anoxic encephalopathy  Brain injury with coma  Prostate cancer  Hypertension     PLAN:  - NPO/NGT/IVF  - cont ABX  - cont care per ICU  - continue local wound care  - trend h/h, transfuse PRN  - will discuss with surgical attending Impression:     68y Male s/p exploratory laparotomy, Colon resection (cecectomy), reduction and repair of Incarcerated right inguinal hernia with Vicryl mesh- POD#2, stable.  Anemia, etiology ?      PMH   Cardiac arrest  Gait abnormality  Leg pain, right  Anoxic encephalopathy  Brain injury with coma  Prostate cancer  Hypertension     PLAN:  - NPO/NGT/IVF  - cont ABX  - cont care per ICU  - continue local wound care  - trend h/h, transfuse PRN  - will discuss with surgical attending

## 2018-11-21 NOTE — PROGRESS NOTE ADULT - SUBJECTIVE AND OBJECTIVE BOX
Postoperative Day #:2  Patient seen and examined bedside resting comfortably, c/o pain at surgical site. No flatus/BM.   Denies nausea and vomiting.   Denies chest pain, dyspnea, cough.    T(F): 99.5 (11-21-18 @ 05:02), Max: 101.2 (11-20-18 @ 15:50)  HR: 102 (11-21-18 @ 07:00) (96 - 125)  BP: 122/65 (11-21-18 @ 07:00) (102/70 - 145/69)  RR: 15 (11-21-18 @ 07:00) (13 - 28)  SpO2: 95% (11-21-18 @ 07:00) (93% - 100%)  Wt(kg): --  CAPILLARY BLOOD GLUCOSE    General: NAD, WDWN  Neuro:  Alert & oriented x 3  HEENT: NCAT, EOMI, conjunctiva clear  CV: +S1+S2 regular rate and rhythm  Lung: clear to ausculation bilaterally, respirations nonlabored, good inspiratory effort  Abdomen: obese, hypoactive BS, NGT in place with minimal output, Aquacel dressing removed, staples intact, no drainage from incision site  R groin: incision with Aquacel dressing c/d/i no signs of hematoma, TENZIN drain in place with serosanguinous output 110cc/24hrs  : +scrotal swelling, shea in place with clear urine output  Extremities: no pedal edema or calf tenderness noted     LABS:                        6.8    9.50  )-----------( 200      ( 21 Nov 2018 03:17 )             19.7     11-21    140  |  104  |  17  ----------------------------<  110<H>  3.9   |  26  |  1.08    Ca    6.2<LL>      21 Nov 2018 03:17  Phos  2.3     11-21  Mg     2.4     11-21    TPro  5.4<L>  /  Alb  1.8<L>  /  TBili  0.4  /  DBili  x   /  AST  44<H>  /  ALT  20  /  AlkPhos  55  11-21      I&O's Detail    20 Nov 2018 07:01  -  21 Nov 2018 07:00  --------------------------------------------------------  IN:    IV PiggyBack: 1555 mL    sodium chloride 0.9%.: 2300 mL  Total IN: 3855 mL    OUT:    Bulb: 110 mL    Indwelling Catheter - Urethral: 3200 mL    Nasoenteral Tube: 50 mL  Total OUT: 3360 mL    Total NET: 495 mL        Culture Results:   No growth (11-18 @ 18:13)  Culture Results:   Growth in aerobic bottle: Coag Negative Staphylococcus  Single set isolate, possible contaminant. Contact  Microbiology if susceptibility testing clinically  indicated.  "Due to technical problems, Proteus sp. will Not be reported as part of  the BCID panel until further notice"  ***Blood Panel PCR results on this specimen are available  approximately 3 hours after the Gram stain result.***  Gram stain, PCR, and/or culture results may not always  correspond due to difference in methodologies.  ************************************************************  This PCR assay was performed using [employer].  The following targets are tested for: Enterococcus,  vancomycin resistant enterococci, Listeria monocytogenes,  coagulase negative staphylococci, S. aureus,  methicillin resistant S. aureus, Streptococcus agalactiae  (Group B), S. pneumoniae, S. pyogenes (Group A),  Acinetobacter baumannii, Enterobacter cloacae, E. coli,  Klebsiella oxytoca, K. pneumoniae, Proteus sp.,  Serratia marcescens, Haemophilus influenzae,  Neisseria meningitidis, Pseudomonas aeruginosa, Candida  albicans, C. glabrata, C krusei, C parapsilosis,  C. tropicalis and the KPC resistance gene. (11-18 @ 11:41)  Culture Results:   No growth to date. (11-18 @ 11:20) Postoperative Day #:2  Patient seen and examined bedside resting comfortably, c/o pain at surgical site. No flatus/BM.   Denies nausea and vomiting.   Denies chest pain, dyspnea, cough.    T(F): 99.5 (11-21-18 @ 05:02), Max: 101.2 (11-20-18 @ 15:50)  HR: 102 (11-21-18 @ 07:00) (96 - 125)  BP: 122/65 (11-21-18 @ 07:00) (102/70 - 145/69)  RR: 15 (11-21-18 @ 07:00) (13 - 28)  SpO2: 95% (11-21-18 @ 07:00) (93% - 100%)  Wt(kg): --  CAPILLARY BLOOD GLUCOSE    General: NAD, WDWN  Neuro:  Alert & oriented x 3  HEENT: NCAT, EOMI, conjunctiva clear  CV: +S1+S2 regular rate and rhythm  Lung: clear to ausculation bilaterally, respirations nonlabored, good inspiratory effort  Abdomen: obese, hypoactive BS, NGT in place with minimal output, Aquacel dressing removed, staples intact, no drainage from incision site  Right  groin: incision with Aquacel dressing c/d/i no signs of hematoma, TENZIN drain in place with serosanguinous output 110cc/24hrs  : +scrotal edema , shea in place with clear urine output  Extremities: no pedal edema or calf tenderness noted     LABS:                        6.8    9.50  )-----------( 200      ( 21 Nov 2018 03:17 )             19.7     11-21    140  |  104  |  17  ----------------------------<  110<H>  3.9   |  26  |  1.08    Ca    6.2<LL>      21 Nov 2018 03:17  Phos  2.3     11-21  Mg     2.4     11-21    TPro  5.4<L>  /  Alb  1.8<L>  /  TBili  0.4  /  DBili  x   /  AST  44<H>  /  ALT  20  /  AlkPhos  55  11-21      I&O's Detail    20 Nov 2018 07:01  -  21 Nov 2018 07:00  --------------------------------------------------------  IN:    IV PiggyBack: 1555 mL    sodium chloride 0.9%.: 2300 mL  Total IN: 3855 mL    OUT:    Bulb: 110 mL  Sero sanguinous     Indwelling Catheter - Urethral: 3200 mL    Nasoenteral Tube: 50 mL  Total OUT: 3360 mL    Total NET: 495 mL

## 2018-11-21 NOTE — PROGRESS NOTE ADULT - ATTENDING COMMENTS
I examined patient in ICU, comfortable, denies pain, no n/v.  Patient H/H dropped last night, PRBC given, new CT scan of abdomen performed, same results as yesterday, no signs of intra-abdominal bleeding, stable small fluid collection in the scrotum.  New H/H :  8.9/25.6.   Abdomen soft, + hypoactive BS, no guarding, dressing clean.  Scrotum: + Edema,  TENZIN with serosanguinous fluid.    Assessment :    POD #2,    Anemia, etiology ?PRBC transfused.  Abdomen soft, new CT scan negative for intra abdomen bleeding.  Scrotal edema.  New findings of Right Pleural effusion, atelectasis. R/O Pneumonia.    Plan:    Continue ICU care.  Pulmonary toilet, Incentive spirometry.  Daily CXR,   Continue IV antibiotics.  OOB, PT consult.  GI, DVT prophylaxis.  May have Ice chips, trial of clear liquids.  Continue monitoring H/H.  Case discussed with ICU NP Mine, and Dr. Park.

## 2018-11-21 NOTE — PROGRESS NOTE ADULT - SUBJECTIVE AND OBJECTIVE BOX
INTERVAL HPI/OVERNIGHT EVENTS:   HPI:  68M with PMH HTN, prostate ca (s/p resection and radiation), AICD placement s/p cardiac arrest and coma (due to brain injury) about 10yrs ago, and known 2 year history of reducible right inguinal hernia now presents to the ER c/o diffuse abdominal pain and right scrotal pain. Wife at bedside who speaks on behalf of patient. States right groin pain has been persistent x1 week, worsening since 2 days ago associated with increased right scrotal swelling. Associated with nausea and NBNB emesis.  Denies flatus or BM, states his last BM was yesterday. Wife states patient has the "sweats" at night, but denies fever/chills. Denies active chest pain, palpitations, shortness of breath, dizziness or urinary complaints.   ER and Surgeon tried to reduce Hernia, not reducible. (18 Nov 2018 11:32)  I discussed CT findings yesterday with Dr Tinsley he feels that "area of hemorrhage"  that radiology commented on around anastamotic site is a patch of peritoneum that he tied around the anastamosis.  However, did require 2 U PRBC overnight for HGB 6.8. Did not require pressors overnight thou is persistently tachycardic.         PAST MEDICAL & SURGICAL HISTORY:  Sudden cardiac death  Gait abnormality  Leg pain, right  Anoxic encephalopathy  Brain injury with coma: x 2 weeks in 2008  Prostate cancer: radiation therapy  Hypertension  S/P ICD (internal cardiac defibrillator) procedure: implanted on 1/6/2009  H/O tracheostomy  H/O prostate cancer: resection and radiation therapy  Status post cryoablation: of left renal mass             ICU Vital Signs Last 24 Hrs  T(C): 37.6 (21 Nov 2018 11:15), Max: 38.4 (20 Nov 2018 15:50)  T(F): 99.6 (21 Nov 2018 11:15), Max: 101.2 (20 Nov 2018 15:50)  HR: 116 (21 Nov 2018 12:00) (96 - 125)  BP: 142/85 (21 Nov 2018 12:00) (98/62 - 148/60)  BP(mean): 100 (21 Nov 2018 12:00) (75 - 101)  ABP: --  ABP(mean): --  RR: 22 (21 Nov 2018 12:00) (13 - 28)  SpO2: 86% (21 Nov 2018 12:00) (86% - 100%)          I&O's Detail    20 Nov 2018 07:01  -  21 Nov 2018 07:00  --------------------------------------------------------  IN:    IV PiggyBack: 1555 mL    sodium chloride 0.9%.: 2300 mL  Total IN: 3855 mL    OUT:    Bulb: 110 mL    Indwelling Catheter - Urethral: 3200 mL    Nasoenteral Tube: 50 mL  Total OUT: 3360 mL    Total NET: 495 mL      21 Nov 2018 07:01  -  21 Nov 2018 12:36  --------------------------------------------------------  IN:    Packed Red Blood Cells: 663 mL    sodium chloride 0.9%.: 500 mL  Total IN: 1163 mL    OUT:    Indwelling Catheter - Urethral: 380 mL  Total OUT: 380 mL    Total NET: 783 mL            CAPILLARY BLOOD GLUCOSE        PHYSICAL EXAM:    GENERAL:  awake alert NAD  HEENT: NO JVD  Lungs CTA B/L  CVS S1 S2 RRR  ABD: distended decreased Bs.  ext No edema    LABS:                        6.8    9.50  )-----------( 200      ( 21 Nov 2018 03:17 )             19.7      11-21    140  |  104  |  17  ----------------------------<  110<H>  3.9   |  26  |  1.08    Ca    6.2<LL>      21 Nov 2018 03:17  Phos  2.3     11-21  Mg     2.4     11-21    TPro  5.4<L>  /  Alb  1.8<L>  /  TBili  0.4  /  DBili  x   /  AST  44<H>  /  ALT  20  /  AlkPhos  55  11-21        Culture Results:   No growth (11-18 @ 18:13)      RADIOLOGY & ADDITIONAL STUDIES:< from: CT Abdomen and Pelvis No Cont (11.20.18 @ 15:31) >  IMPRESSION:     Bibasilar lung opacities, which may represent pneumonia and/or   atelectasis.  Status post ileocolic anastomosis.  Focal wall thickening of colon/small bowel and small amount of fluid and   hemorrhage adjacent to anastomotic site.  Otherwise, no retroperitoneal hemorrhage.    < end of copied text >        Assessment and Plan:    CRITICAL CARE TIME SPENT:

## 2018-11-22 LAB
ALBUMIN SERPL ELPH-MCNC: 2.1 G/DL — LOW (ref 3.3–5)
ALP SERPL-CCNC: 62 U/L — SIGNIFICANT CHANGE UP (ref 40–120)
ALT FLD-CCNC: 23 U/L — SIGNIFICANT CHANGE UP (ref 12–78)
ANION GAP SERPL CALC-SCNC: 10 MMOL/L — SIGNIFICANT CHANGE UP (ref 5–17)
AST SERPL-CCNC: 43 U/L — HIGH (ref 15–37)
BILIRUB SERPL-MCNC: 0.9 MG/DL — SIGNIFICANT CHANGE UP (ref 0.2–1.2)
BUN SERPL-MCNC: 15 MG/DL — SIGNIFICANT CHANGE UP (ref 7–23)
CALCIUM SERPL-MCNC: 6.4 MG/DL — CRITICAL LOW (ref 8.5–10.1)
CHLORIDE SERPL-SCNC: 106 MMOL/L — SIGNIFICANT CHANGE UP (ref 96–108)
CO2 SERPL-SCNC: 24 MMOL/L — SIGNIFICANT CHANGE UP (ref 22–31)
CREAT SERPL-MCNC: 0.86 MG/DL — SIGNIFICANT CHANGE UP (ref 0.5–1.3)
GLUCOSE SERPL-MCNC: 98 MG/DL — SIGNIFICANT CHANGE UP (ref 70–99)
HCT VFR BLD CALC: 25.3 % — LOW (ref 39–50)
HGB BLD-MCNC: 8.8 G/DL — LOW (ref 13–17)
MAGNESIUM SERPL-MCNC: 2.5 MG/DL — SIGNIFICANT CHANGE UP (ref 1.6–2.6)
MCHC RBC-ENTMCNC: 26.6 PG — LOW (ref 27–34)
MCHC RBC-ENTMCNC: 34.8 GM/DL — SIGNIFICANT CHANGE UP (ref 32–36)
MCV RBC AUTO: 76.4 FL — LOW (ref 80–100)
NRBC # BLD: 0 /100 WBCS — SIGNIFICANT CHANGE UP (ref 0–0)
PHOSPHATE SERPL-MCNC: 1.9 MG/DL — LOW (ref 2.5–4.5)
PLATELET # BLD AUTO: 209 K/UL — SIGNIFICANT CHANGE UP (ref 150–400)
POTASSIUM SERPL-MCNC: 3.8 MMOL/L — SIGNIFICANT CHANGE UP (ref 3.5–5.3)
POTASSIUM SERPL-SCNC: 3.8 MMOL/L — SIGNIFICANT CHANGE UP (ref 3.5–5.3)
PROT SERPL-MCNC: 5.9 GM/DL — LOW (ref 6–8.3)
RBC # BLD: 3.31 M/UL — LOW (ref 4.2–5.8)
RBC # FLD: 14.1 % — SIGNIFICANT CHANGE UP (ref 10.3–14.5)
SODIUM SERPL-SCNC: 140 MMOL/L — SIGNIFICANT CHANGE UP (ref 135–145)
WBC # BLD: 8.83 K/UL — SIGNIFICANT CHANGE UP (ref 3.8–10.5)
WBC # FLD AUTO: 8.83 K/UL — SIGNIFICANT CHANGE UP (ref 3.8–10.5)

## 2018-11-22 PROCEDURE — 99233 SBSQ HOSP IP/OBS HIGH 50: CPT

## 2018-11-22 PROCEDURE — 71045 X-RAY EXAM CHEST 1 VIEW: CPT | Mod: 26

## 2018-11-22 RX ORDER — POTASSIUM PHOSPHATE, MONOBASIC POTASSIUM PHOSPHATE, DIBASIC 236; 224 MG/ML; MG/ML
15 INJECTION, SOLUTION INTRAVENOUS ONCE
Qty: 0 | Refills: 0 | Status: COMPLETED | OUTPATIENT
Start: 2018-11-22 | End: 2018-11-22

## 2018-11-22 RX ORDER — CARVEDILOL PHOSPHATE 80 MG/1
3.12 CAPSULE, EXTENDED RELEASE ORAL EVERY 12 HOURS
Qty: 0 | Refills: 0 | Status: DISCONTINUED | OUTPATIENT
Start: 2018-11-22 | End: 2018-11-30

## 2018-11-22 RX ORDER — METOCLOPRAMIDE HCL 10 MG
10 TABLET ORAL EVERY 8 HOURS
Qty: 0 | Refills: 0 | Status: COMPLETED | OUTPATIENT
Start: 2018-11-22 | End: 2018-11-23

## 2018-11-22 RX ORDER — CALCIUM GLUCONATE 100 MG/ML
1 VIAL (ML) INTRAVENOUS ONCE
Qty: 0 | Refills: 0 | Status: COMPLETED | OUTPATIENT
Start: 2018-11-22 | End: 2018-11-22

## 2018-11-22 RX ADMIN — Medication 3 MILLILITER(S): at 12:03

## 2018-11-22 RX ADMIN — Medication 3 MILLILITER(S): at 17:13

## 2018-11-22 RX ADMIN — Medication 650 MILLIGRAM(S): at 17:30

## 2018-11-22 RX ADMIN — Medication 200 GRAM(S): at 06:23

## 2018-11-22 RX ADMIN — Medication 100 MILLIGRAM(S): at 22:29

## 2018-11-22 RX ADMIN — Medication 100 MILLIGRAM(S): at 05:35

## 2018-11-22 RX ADMIN — POTASSIUM PHOSPHATE, MONOBASIC POTASSIUM PHOSPHATE, DIBASIC 63.75 MILLIMOLE(S): 236; 224 INJECTION, SOLUTION INTRAVENOUS at 06:22

## 2018-11-22 RX ADMIN — Medication 5 MILLIGRAM(S): at 05:35

## 2018-11-22 RX ADMIN — Medication 650 MILLIGRAM(S): at 10:00

## 2018-11-22 RX ADMIN — Medication 650 MILLIGRAM(S): at 11:00

## 2018-11-22 RX ADMIN — SODIUM CHLORIDE 100 MILLILITER(S): 9 INJECTION INTRAMUSCULAR; INTRAVENOUS; SUBCUTANEOUS at 05:47

## 2018-11-22 RX ADMIN — Medication 3 MILLILITER(S): at 23:56

## 2018-11-22 RX ADMIN — Medication 200 MILLIGRAM(S): at 18:25

## 2018-11-22 RX ADMIN — PANTOPRAZOLE SODIUM 40 MILLIGRAM(S): 20 TABLET, DELAYED RELEASE ORAL at 12:35

## 2018-11-22 RX ADMIN — HYDROMORPHONE HYDROCHLORIDE 0.5 MILLIGRAM(S): 2 INJECTION INTRAMUSCULAR; INTRAVENOUS; SUBCUTANEOUS at 00:00

## 2018-11-22 RX ADMIN — Medication 100 MILLIGRAM(S): at 15:01

## 2018-11-22 RX ADMIN — Medication 10 MILLIGRAM(S): at 22:28

## 2018-11-22 RX ADMIN — Medication 5 MILLIGRAM(S): at 12:36

## 2018-11-22 RX ADMIN — CARVEDILOL PHOSPHATE 3.12 MILLIGRAM(S): 80 CAPSULE, EXTENDED RELEASE ORAL at 18:26

## 2018-11-22 RX ADMIN — Medication 3 MILLILITER(S): at 05:54

## 2018-11-22 RX ADMIN — Medication 200 MILLIGRAM(S): at 05:35

## 2018-11-22 NOTE — PROGRESS NOTE ADULT - SUBJECTIVE AND OBJECTIVE BOX
Patient is a 68y old  Male who presents with a chief complaint of Abdominal pain and scrotal pain, Incarcerated R Inguino-scrotal Hernia. (22 Nov 2018 09:51)  hypophspatemic,   chest x-ray - congestive changes   ct scan - abd - consolidation in RLL,  small hematoma in rt groin, trace ascites. ileocolic anastamosis    INTERVAL HPI/OVERNIGHT EVENTS: received 1 unit of PRBC yesterday. has mild cough, fever.  PAST MEDICAL & SURGICAL HISTORY:  Sudden cardiac death  Gait abnormality  Leg pain, right  Anoxic encephalopathy  Brain injury with coma: x 2 weeks in 2008  Prostate cancer: radiation therapy  Hypertension  S/P ICD (internal cardiac defibrillator) procedure: implanted on 1/6/2009  H/O tracheostomy  H/O prostate cancer: resection and radiation therapy  Status post cryoablation: of left renal mass      MEDICATIONS  (STANDING):  ALBUTerol/ipratropium for Nebulization 3 milliLiter(s) Nebulizer every 6 hours  ciprofloxacin   IVPB 400 milliGRAM(s) IV Intermittent every 12 hours  metoprolol tartrate Injectable 5 milliGRAM(s) IV Push every 6 hours  metroNIDAZOLE  IVPB 500 milliGRAM(s) IV Intermittent every 8 hours  pantoprazole  Injectable 40 milliGRAM(s) IV Push daily  sodium chloride 0.9%. 1000 milliLiter(s) (100 mL/Hr) IV Continuous <Continuous>    MEDICATIONS  (PRN):  acetaminophen  Suppository .. 650 milliGRAM(s) Rectal every 6 hours PRN Temp greater or equal to 38C (100.4F), Mild Pain (1 - 3)  HYDROmorphone  Injectable 0.5 milliGRAM(s) IV Push every 6 hours PRN Severe Pain (7 - 10)      Allergies    lisinopril (Unknown)  penicillins (Unknown)    Intolerances        REVIEW OF SYSTEMS:  CONSTITUTIONAL: No fever, weight loss, or fatigue  EYES: No eye pain, visual disturbances, or discharge  ENMT:  No difficulty hearing, tinnitus, vertigo; No sinus or throat pain  NECK: No pain or stiffness  BREASTS: No pain, masses, or nipple discharge  RESPIRATORY: No cough, wheezing, chills or hemoptysis; No shortness of breath  CARDIOVASCULAR: No chest pain, palpitations, dizziness, or leg swelling  GASTROINTESTINAL: No abdominal or epigastric pain. No nausea, vomiting, or hematemesis; No diarrhea or constipation. No melena or hematochezia.  GENITOURINARY: No dysuria, frequency, hematuria, or incontinence  NEUROLOGICAL: No headaches, memory loss, loss of strength, numbness, or tremors  SKIN: No itching, burning, rashes, or lesions   LYMPH NODES: No enlarged glands  ENDOCRINE: No heat or cold intolerance; No hair loss  MUSCULOSKELETAL: No joint pain or swelling; No muscle, back, or extremity pain  PSYCHIATRIC: No depression, anxiety, mood swings, or difficulty sleeping  HEME/LYMPH: No easy bruising, or bleeding gums  ALLERY AND IMMUNOLOGIC: No hives or eczema    Vital Signs Last 24 Hrs  T(C): 38.3 (22 Nov 2018 08:00), Max: 38.4 (21 Nov 2018 15:29)  T(F): 101 (22 Nov 2018 08:00), Max: 101.2 (21 Nov 2018 15:29)  HR: 108 (22 Nov 2018 08:00) (94 - 118)  BP: 148/71 (22 Nov 2018 08:00) (111/60 - 154/67)  BP(mean): 89 (22 Nov 2018 08:00) (77 - 107)  RR: 24 (22 Nov 2018 08:00) (15 - 29)  SpO2: 99% (22 Nov 2018 08:00) (83% - 100%)    PHYSICAL EXAM:  GENERAL: NAD, well-groomed, well-developed  HEAD:  Atraumatic, Normocephalic  EYES: EOMI, PERRLA, conjunctiva and sclera clear  ENMT: No tonsillar erythema, exudates, or enlargement; Moist mucous membranes, Good dentition, No lesions  NECK: Supple, No JVD, Normal thyroid  NERVOUS SYSTEM:  Alert & Oriented X3, Good concentration; functional weakness  CHEST/LUNG: Clear to percussion bilaterally; No rales, rhonchi, wheezing, or rubs  HEART: Regular rate and rhythm; No murmurs, rubs, or gallops  ABDOMEN: Soft, Nontender, Nondistended; Bowel sounds present. RT TENZIN drain present  EXTREMITIES:  2+ Peripheral Pulses, No clubbing, cyanosis, or edema  LYMPH: No lymphadenopathy noted  SKIN: No rashes or lesions    LABS:                        8.8    8.83  )-----------( 209      ( 22 Nov 2018 03:35 )             25.3     11-22    140  |  106  |  15  ----------------------------<  98  3.8   |  24  |  0.86    Ca    6.4<LL>      22 Nov 2018 03:35  Phos  1.9     11-22  Mg     2.5     11-22    TPro  5.9<L>  /  Alb  2.1<L>  /  TBili  0.9  /  DBili  x   /  AST  43<H>  /  ALT  23  /  AlkPhos  62  11-22      PT/INR - ( 21 Nov 2018 14:07 )   PT: 16.1 sec;   INR: 1.42 ratio             CAPILLARY BLOOD GLUCOSE                    RADIOLOGY & ADDITIONAL TESTS:    Imaging Personally Reviewed:  [ ] YES  [ ] NO    Consultant(s) Notes Reviewed:  [ ] YES  [ ] NO    Care Discussed with Consultants/Other Providers [ ] YES  [ ] NO    Care discussed with family,         [  ]   yes  [  ]  No    imp:    stable[ ]    unstable[  ]     improving [   ]       unchanged  [ x ]                Plans:  Continue present plans  [ x ] abx for PNA? replete Phosphate. . management as per Critical care team.               New consult [  ]   specialty  .......               order test[  ]    test name.                  Discharge Planning  [  ]

## 2018-11-22 NOTE — PROGRESS NOTE ADULT - SUBJECTIVE AND OBJECTIVE BOX
HEMODYNAMICALLY STABLE  FEVER  ALERT  NO COMPLAINTS  NGT  NO JVD  COARSE BREATH SOUNDS  DISTANT HEART SOUNDS  WOUNDS SURGICAL WOUND/SCAR: CLEAN DRY & INTACT  NO CLUBBING CYANOSIS OR EDEMA    STABLE CARDIOVASCULAR STATUS; CONTINUING WITH PRESENT MANAGEMENT.   FAMILY PRESENT AT BEDSIDE

## 2018-11-22 NOTE — CHART NOTE - NSCHARTNOTEFT_GEN_A_CORE
Pt medically stable for transfer to medical floor.     68M with PMH HTN, prostate ca (s/p resection and radiation), AICD placement s/p cardiac arrest and coma (due to brain injury) about 10yrs ago, and known 2 year history of reducible right inguinal hernia now presents to the ER c/o diffuse abdominal pain and right scrotal pain. Wife at bedside who speaks on behalf of patient. States right groin pain has been persistent x1 week, worsening since 2 days ago associated with increased right scrotal swelling. Associated with nausea and NBNB emesis.  Pt noted to have incarcerated right inguinal hernia which was not reducible.  Pt underwent x-lap for colon resection (cecectomy) and right inguinal hernia reduction/repair and transferred to ICU for post op management.  Pt noted to be acutely anemic post op and transfused 4s units pRBCs.  H/H now stable.  CT scans noted a stable soft tissue hematoma in groin but unlikely to account for such acute drop in Hgb.   Pt doing well overall and will start on clear liquid diet today.  Surgery to follow on medical floor. Pt medically stable for transfer to medical floor.  Call out made to Dr. Tinsley.  Surgical PA aware    68M with PMH HTN, prostate ca (s/p resection and radiation), AICD placement s/p cardiac arrest and coma (due to brain injury) about 10yrs ago, and known 2 year history of reducible right inguinal hernia now presents to the ER c/o diffuse abdominal pain and right scrotal pain. Wife at bedside who speaks on behalf of patient. States right groin pain has been persistent x1 week, worsening since 2 days ago associated with increased right scrotal swelling. Associated with nausea and NBNB emesis.  Pt noted to have incarcerated right inguinal hernia which was not reducible.  Pt underwent x-lap for colon resection (cecectomy) and right inguinal hernia reduction/repair and transferred to ICU for post op management.  Pt noted to be acutely anemic post op and transfused 4s units pRBCs.  H/H now stable.  CT scans noted a stable soft tissue hematoma in groin but unlikely to account for such acute drop in Hgb.   Pt doing well overall and will start on clear liquid diet today.  Surgery to follow on medical floor.

## 2018-11-22 NOTE — PHYSICAL THERAPY INITIAL EVALUATION ADULT - PLANNED THERAPY INTERVENTIONS, PT EVAL
strengthening/balance training/bed mobility training/motor coordination training/gait training/transfer training

## 2018-11-22 NOTE — PROGRESS NOTE ADULT - ASSESSMENT
/ RLL Pneumonia ( Ct scan)   s/p rt inguinal scrotal herniography/ s/p cecotomy  s/p anoxic encephalopathy with cognitive impairmant

## 2018-11-22 NOTE — PROGRESS NOTE ADULT - SUBJECTIVE AND OBJECTIVE BOX
INTERVAL HPI/OVERNIGHT EVENTS:   HPI:  68M with PMH HTN, prostate ca (s/p resection and radiation), AICD placement s/p cardiac arrest and coma (due to brain injury) about 10yrs ago, and known 2 year history of reducible right inguinal hernia now presents to the ER c/o diffuse abdominal pain and right scrotal pain. Wife at bedside who speaks on behalf of patient. States right groin pain has been persistent x1 week, worsening since 2 days ago associated with increased right scrotal swelling. Associated with nausea and NBNB emesis.  Denies flatus or BM, states his last BM was yesterday. Wife states patient has the "sweats" at night, but denies fever/chills. Denies active chest pain, palpitations, shortness of breath, dizziness or urinary complaints.   ER and Surgeon tried to reduce Hernia, not reducible. (18 Nov 2018 11:32)  H/H stable O/N Ct neg x 2 or bleeding ater req'd 4 units PRBCs post op.          PAST MEDICAL & SURGICAL HISTORY:  Sudden cardiac death  Gait abnormality  Leg pain, right  Anoxic encephalopathy  Brain injury with coma: x 2 weeks in 2008  Prostate cancer: radiation therapy  Hypertension  S/P ICD (internal cardiac defibrillator) procedure: implanted on 1/6/2009  H/O tracheostomy  H/O prostate cancer: resection and radiation therapy  Status post cryoablation: of left renal mass           ICU Vital Signs Last 24 Hrs  T(C): 37.9 (22 Nov 2018 12:00), Max: 38.4 (21 Nov 2018 15:29)  T(F): 100.2 (22 Nov 2018 12:00), Max: 101.2 (21 Nov 2018 15:29)  HR: 112 (22 Nov 2018 12:00) (94 - 116)  BP: 141/69 (22 Nov 2018 12:00) (111/60 - 154/67)  BP(mean): 91 (22 Nov 2018 12:00) (77 - 107)  ABP: --  ABP(mean): --  RR: 29 (22 Nov 2018 12:00) (15 - 29)  SpO2: 100% (22 Nov 2018 12:00) (83% - 100%)          I&O's Detail    21 Nov 2018 07:01  -  22 Nov 2018 07:00  --------------------------------------------------------  IN:    IV PiggyBack: 950 mL    Packed Red Blood Cells: 663 mL    sodium chloride 0.9%.: 2400 mL  Total IN: 4013 mL    OUT:    Bulb: 310 mL    Indwelling Catheter - Urethral: 1785 mL    Nasoenteral Tube: 100 mL  Total OUT: 2195 mL    Total NET: 1818 mL      22 Nov 2018 07:01  -  22 Nov 2018 12:47  --------------------------------------------------------  IN:    sodium chloride 0.9%.: 400 mL  Total IN: 400 mL    OUT:    Bulb: 50 mL    Indwelling Catheter - Urethral: 525 mL  Total OUT: 575 mL    Total NET: -175 mL            CAPILLARY BLOOD GLUCOSE        PHYSICAL EXAM:    GENERAL:  Awake alert NGT  HEENT No JVD  Lungs CTA B/L  CVS S1 S2 RRR  ABD Nt/ND  Ext No edema    LABS:                        8.8    8.83  )-----------( 209      ( 22 Nov 2018 03:35 )             25.3      11-22    140  |  106  |  15  ----------------------------<  98  3.8   |  24  |  0.86    Ca    6.4<LL>      22 Nov 2018 03:35  Phos  1.9     11-22  Mg     2.5     11-22    TPro  5.9<L>  /  Alb  2.1<L>  /  TBili  0.9  /  DBili  x   /  AST  43<H>  /  ALT  23  /  AlkPhos  62  11-22    PT/INR - ( 21 Nov 2018 14:07 )   PT: 16.1 sec;   INR: 1.42 ratio                 RADIOLOGY & ADDITIONAL STUDIES:< from: CT Abdomen and Pelvis No Cont (11.21.18 @ 15:07) >  Impression: Mild right pleural effusion.    Combination of atelectasis and pulmonary consolidation in the right lower   lobe. New small nodular densities in the right middle lobe, likely   representing infectious or inflammatory process.    Ileocolic anastomosis is again noted with stable mural thickening at the   anastomosis, probably due to postoperative edema. Grossly stable small   amount of fluid the in the right lower quadrant abdomen adjacent to the   anastomosis.     In the right groin, again noted is a grossly stable 4.1 x 2.3 cm   collection with a Hounsfield unit of 74. This may represent a focal soft   tissue hematoma.    Trace pelvic free fluid.    < end of copied text >        Assessment and Plan:    CRITICAL CARE TIME SPENT:

## 2018-11-22 NOTE — PHYSICAL THERAPY INITIAL EVALUATION ADULT - PERTINENT HX OF CURRENT PROBLEM, REHAB EVAL
Per chart: 68M with PMH HTN, prostate ca (s/p resection and radiation), AICD placement s/p cardiac arrest and coma (due to brain injury) about 10yrs ago, and known 2 year history of reducible right inguinal hernia now presents to the ER c/o diffuse abdominal pain and right scrotal pain. s/p exploratory laparotomy, Colon resection (cecectomy), reduction and repair of Incarcerated right inguinal hernia with Vicryl mesh- POD#2,

## 2018-11-22 NOTE — PROGRESS NOTE ADULT - SUBJECTIVE AND OBJECTIVE BOX
Patient seen and examined in chair in no distress.   Wife bedside.  Reports mild lower abdominal pain, well controlled with medication.  Febrile.  Denies chills, chest pain, sob.  Voiding without difficulty.     Vital Signs Last 24 Hrs  T(F): 101 (11-22-18 @ 15:18), Max: 101 (11-22-18 @ 08:00)  HR: 112 (11-22-18 @ 15:00)  BP: 157/69 (11-22-18 @ 15:00)  RR: 20 (11-22-18 @ 15:00)  SpO2: 98% (11-22-18 @ 15:00)    GENERAL: Alert, oriented, NAD  HEENT: NGT coiled in mouth. Removed. Pt tolerated well   CHEST/LUNG: Clear to auscultation bilaterally, respirations nonlabored  HEART: S1S2, tachycardic  ABDOMEN: + Bowel sounds, midline staples c/d/i, right groin staples c/d/i. TENZIN drain intact with serous output  : Scrotum edematous, nontender  EXTREMITIES: No calf tenderness b/l, mild pitting edema all 4 extremities. +radial pulses, +DP pulses      LABS:                        8.8    8.83  )-----------( 209      ( 22 Nov 2018 03:35 )             25.3     11-22    140  |  106  |  15  ----------------------------<  98  3.8   |  24  |  0.86    Ca    6.4<LL>      22 Nov 2018 03:35  Phos  1.9     11-22  Mg     2.5     11-22    TPro  5.9<L>  /  Alb  2.1<L>  /  TBili  0.9  /  DBili  x   /  AST  43<H>  /  ALT  23  /  AlkPhos  62  11-22    PT/INR - ( 21 Nov 2018 14:07 )   PT: 16.1 sec;   INR: 1.42 ratio      A/P: 68 year old male POD#3 s/p ex lap, colon resection, repair of incarcerated right inguinal hernia with mesh, with post op anemia s/p 2uPRBC 11/21, now febrile  Discussed with Dr. Tinsley.   STAT blood/urine/sputum cultures, dopplers, CXR  OK to start clears slowly, as tolerated  Reglan x 3 doses  Antipyretics PRN   Incentive spirometer  Pulmonary toilet, resp treatments PRN  Increase activity with PT  Monitor H/H, transfuse PRN  Pulmonary consult called; Dr. Wilkins   Continue critical care management Patient seen and examined in chair in no distress.   Wife bedside.  Reports mild lower abdominal pain, well controlled with medication.  Febrile.  Denies chills, chest pain, sob.  Voiding without difficulty.     Vital Signs Last 24 Hrs  T(F): 101 (11-22-18 @ 15:18), Max: 101 (11-22-18 @ 08:00)  HR: 112 (11-22-18 @ 15:00)  BP: 157/69 (11-22-18 @ 15:00)  RR: 20 (11-22-18 @ 15:00)  SpO2: 98% (11-22-18 @ 15:00)    GENERAL: Alert, oriented, NAD  HEENT: NGT coiled in mouth. Removed. Pt tolerated well   CHEST/LUNG: Clear to auscultation bilaterally, respirations nonlabored  HEART: S1S2, tachycardic  ABDOMEN: + Bowel sounds, midline staples c/d/i, right groin staples c/d/i. TENZIN drain intact with serous output  : Scrotum edematous, nontender  EXTREMITIES: No calf tenderness b/l, mild pitting edema all 4 extremities. +radial pulses, +DP pulses      LABS:                        8.8    8.83  )-----------( 209      ( 22 Nov 2018 03:35 )             25.3     11-22    140  |  106  |  15  ----------------------------<  98  3.8   |  24  |  0.86    Ca    6.4<LL>      22 Nov 2018 03:35  Phos  1.9     11-22  Mg     2.5     11-22    TPro  5.9<L>  /  Alb  2.1<L>  /  TBili  0.9  /  DBili  x   /  AST  43<H>  /  ALT  23  /  AlkPhos  62  11-22    PT/INR - ( 21 Nov 2018 14:07 )   PT: 16.1 sec;   INR: 1.42 ratio      A/P: 68 year old male POD#3 s/p ex lap, colon resection, repair of incarcerated right inguinal hernia with mesh, with post op anemia s/p 2uPRBC 11/21, now febrile. Hypocalcemia, hypophosphatemia  Discussed with Dr. Tinsley.   STAT blood/urine/sputum cultures, dopplers, CXR  OK to start clears slowly, as tolerated  Reglan x 3 doses  Antipyretics PRN   Incentive spirometer  Pulmonary toilet, resp treatments PRN  Increase activity with PT  Monitor H/H, transfuse PRN  Replete electrolytes, f/u labs  Pulmonary consult called; Dr. Wilkins   Continue critical care management

## 2018-11-22 NOTE — PHYSICAL THERAPY INITIAL EVALUATION ADULT - SENSORY TESTS
Barthel Index: Feeding Score _0__, Bathing Score _0__, Grooming Score _0__, Dressing Score __0_, Bowels Score _0__, Bladder Score _0__, Toilet Score _0__, Transfers Score ___0, Mobility Score 0___, Stairs Score ___0,     Total Score ___ 0

## 2018-11-22 NOTE — PROGRESS NOTE ADULT - PROBLEM SELECTOR PLAN 1
Doing well post op, Cipro/Flagyl  H/H stable. Appreciate surgical F/u;  will start clears If passes bedside dysphagia.D/C sulma, is OOB, SCDS for DVT PPX  Would restart heparin SQ in AM if H/H remains stable.

## 2018-11-22 NOTE — CONSULT NOTE ADULT - SUBJECTIVE AND OBJECTIVE BOX
Patient is a 68y old  Male who presents with a chief complaint of Abdominal pain and scrotal pain, Incarcerated R Inguino-scrotal Hernia. (22 Nov 2018 15:46)    HPI:  68M with HTN, Prostate Ca (s/p resection and radiation), AICD placement s/p cardiac arrest and coma (due to brain injury) about 10yrs ago, and known 2 year history of reducible right inguinal hernia.   Presented  to the ER c/o diffuse abdominal pain and right scrotal pain for 1 week which got worst over the course of 2 days.. This was also associated with nausea and non bilious emesis.  Found to have incarcerated Right Inguinal hernia, requiring Ex Lap, Cecal resection and repair of hernia with mesh. Post op observed in ICU,  with significant drop in H & H requiring PRBC transfusion.   11/22/18: transferred to medical floow.  Reported to have significant upper airway secretion which ar difficult to clear at times. Non smoker per wife.    PAST MEDICAL & SURGICAL HISTORY:  Sudden cardiac death  Gait abnormality  Leg pain, right  Anoxic encephalopathy  Brain injury with coma: x 2 weeks in 2008  Prostate cancer: radiation therapy  Hypertension  S/P ICD (internal cardiac defibrillator) procedure: implanted on 1/6/2009  H/O tracheostomy  H/O prostate cancer: resection and radiation therapy  Status post cryoablation: of left renal mass    FAMILY HISTORY:  No pertinent family history in first degree relatives    SOCIAL HISTORY: non smoker.    Allergies  lisinopril (Unknown)  penicillins (Unknown)    MEDICATIONS  (STANDING):  ALBUTerol/ipratropium for Nebulization 3 milliLiter(s) Nebulizer every 6 hours  carvedilol 3.125 milliGRAM(s) Oral every 12 hours  ciprofloxacin   IVPB 400 milliGRAM(s) IV Intermittent every 12 hours  metoclopramide Injectable 10 milliGRAM(s) IV Push every 8 hours  metroNIDAZOLE  IVPB 500 milliGRAM(s) IV Intermittent every 8 hours    MEDICATIONS  (PRN):  acetaminophen  Suppository .. 650 milliGRAM(s) Rectal every 6 hours PRN Temp greater or equal to 38C (100.4F), Mild Pain (1 - 3)  HYDROmorphone  Injectable 0.5 milliGRAM(s) IV Push every 6 hours PRN Severe Pain (7 - 10)    REVIEW OF SYSTEMS:   not able to provide.    MACRA & MIPS : per wife.  Vaccines - Influenza: yes  and Pneumovax: no  Tobacco: no  Blood Pressure Screening / Control of: 157/69  Current Medications Reviewed:  yes    Vital Signs Last 24 Hrs  T(C): 36.9 (22 Nov 2018 18:00), Max: 38.3 (22 Nov 2018 08:00)  T(F): 98.5 (22 Nov 2018 18:00), Max: 101 (22 Nov 2018 08:00)  HR: 82 (22 Nov 2018 18:03) (82 - 115)  BP: 155/70 (22 Nov 2018 18:00) (111/60 - 157/69)  BP(mean): 95 (22 Nov 2018 16:00) (77 - 107)  RR: 20 (22 Nov 2018 18:00) (15 - 29)  SpO2: 98% (22 Nov 2018 18:03) (83% - 100%)    PHYSICAL EXAM:  GEN:         Awake, responsive , observed with upper airway congestion.  HEENT:    Normal.    RESP:        crackles.  CVS:          Regular rate and rhythm.   ABD:         post op dressing.  SKIN:         Warm and dry.  EXTR:         No clubbing, cyanosis or edema  CNS:         not able to cooperate.  PSYCH:      not able to cooperate.    LABS:                        8.8    8.83  )-----------( 209      ( 22 Nov 2018 03:35 )             25.3     11-22    140  |  106  |  15  ----------------------------<  98  3.8   |  24  |  0.86    Ca    6.4<LL>      22 Nov 2018 03:35  Phos  1.9     11-22  Mg     2.5     11-22    TPro  5.9<L>  /  Alb  2.1<L>  /  TBili  0.9  /  DBili  x   /  AST  43<H>  /  ALT  23  /  AlkPhos  62  11-22    PT/INR - ( 21 Nov 2018 14:07 )   PT: 16.1 sec;   INR: 1.42 ratio      11-19 @ 02:41  pH: 7.46  pCO2: 33  pO2: 148  SaO2: 98    Culture - Urine (collected 11-18-18 @ 18:13)  Source: .Urine Clean Catch (Midstream)  Final Report (11-21-18 @ 22:59):    No growth    Culture - Blood (collected 11-18-18 @ 11:41)  Source: .Blood Blood  Gram Stain (11-20-18 @ 11:35):    Growth in aerobic bottle: Gram Positive Cocci in Clusters  Final Report (11-20-18 @ 11:35):    Growth in aerobic bottle: Coag Negative Staphylococcus    Single set isolate, possible contaminant. Contact    Microbiology if susceptibility testing clinically    indicated.    "Due to technical problems, Proteus sp. will Not be reported as part of    the BCID panel until further notice"    ***Blood Panel PCR results on this specimen are available    approximately 3 hours after the Gram stain result.***    Gram stain, PCR, and/or culture results may not always    correspond due to difference in methodologies.    ************************************************************    This PCR assay was performed using [employer].    The following targets are tested for: Enterococcus,    vancomycin resistant enterococci, Listeria monocytogenes,    coagulase negative staphylococci, S. aureus,    methicillin resistant S. aureus, Streptococcus agalactiae    (Group B), S. pneumoniae, S. pyogenes (Group A),    Acinetobacter baumannii, Enterobacter cloacae, E. coli,    Klebsiella oxytoca, K. pneumoniae, Proteus sp.,    Serratia marcescens, Haemophilus influenzae,    Neisseria meningitidis, Pseudomonas aeruginosa, Candida    albicans, C. glabrata, C krusei, C parapsilosis,    C. tropicalis and the KPC resistance gene.  Organism: Blood Culture PCR (11-20-18 @ 11:35)  Organism: Blood Culture PCR (11-20-18 @ 11:35)      -  Coagulase negative Staphylococcus: Detec      Method Type: PCR    Culture - Blood (collected 11-18-18 @ 11:20)  Source: .Blood Blood  Preliminary Report (11-19-18 @ 12:02):    No growth to date.    EKG: sinus rhythm    RADIOLOGY & ADDITIONAL STUDIES:  < from: Xray Chest 1 View-PORTABLE IMMEDIATE (11.21.18 @ 17:44) >    EXAM:  XR CHEST PORTABLE IMMED 1V                          PROCEDURE DATE:  11/21/2018      INTERPRETATION:  History: Dyspnea. NG tube placement    Technique:  Semierect AP view    Comparisons:  Chest x-ray dated 11/19/2018    Findings:     Increased alveolar infiltrates and congestion or in the mid   and lower lung fields bilaterally. Bilateral pleural effusions.   Nasogastric tube in stomach. No change in pacemaker wire position in   right ventricle.  .    The pulmonary vasculature and aorta are normal for   age. Heart size is unremarkable.     The thorax is normal for age.    Impression: Nasogastric tube in stomach. Pacemaker.    Increased bilateral alveolar infiltrates and pulmonic congestion.   Increased pleural effusions at lungbases obscuring the diaphragms.    JOSÉ MIGUEL PETERS M.D., ATTENDING RADIOLOGIST  This document has been electronically signed. Nov 21 2018  5:48PM      ASSESSMENT AND PLAN:  ·	Bilateral infiltrates, Atelectasis vs Pneumonia.  ·	Bilateral pleural effusion.  ·	S/P Ex laparotomy with cecal resection.  ·	Right Inguinal hernia repair.  ·	Anemia.  ·	HTN.  ·	S/P Hypoxic Encephalopathy.    Continue supplemental O2, suction PRN(spoke with RN).  Encourage Incentive Spirometry.  Continue antibiotics.  Discussed with wife and daughter at bed side.

## 2018-11-22 NOTE — PHYSICAL THERAPY INITIAL EVALUATION ADULT - GENERAL OBSERVATIONS, REHAB EVAL
Patients was seen rest in recliner, NAD, +IV, +NG tube, +shea, +NC O2, wife is at bedside. RN cleared for physical therapy.

## 2018-11-23 ENCOUNTER — APPOINTMENT (OUTPATIENT)
Dept: ELECTROPHYSIOLOGY | Facility: CLINIC | Age: 68
End: 2018-11-23

## 2018-11-23 DIAGNOSIS — R10.84 GENERALIZED ABDOMINAL PAIN: ICD-10-CM

## 2018-11-23 LAB
ANION GAP SERPL CALC-SCNC: 11 MMOL/L — SIGNIFICANT CHANGE UP (ref 5–17)
ANION GAP SERPL CALC-SCNC: 8 MMOL/L — SIGNIFICANT CHANGE UP (ref 5–17)
BUN SERPL-MCNC: 13 MG/DL — SIGNIFICANT CHANGE UP (ref 7–23)
BUN SERPL-MCNC: 14 MG/DL — SIGNIFICANT CHANGE UP (ref 7–23)
CALCIUM SERPL-MCNC: 6.5 MG/DL — CRITICAL LOW (ref 8.5–10.1)
CALCIUM SERPL-MCNC: 6.6 MG/DL — LOW (ref 8.5–10.1)
CHLORIDE SERPL-SCNC: 105 MMOL/L — SIGNIFICANT CHANGE UP (ref 96–108)
CHLORIDE SERPL-SCNC: 108 MMOL/L — SIGNIFICANT CHANGE UP (ref 96–108)
CO2 SERPL-SCNC: 22 MMOL/L — SIGNIFICANT CHANGE UP (ref 22–31)
CO2 SERPL-SCNC: 29 MMOL/L — SIGNIFICANT CHANGE UP (ref 22–31)
CREAT SERPL-MCNC: 0.73 MG/DL — SIGNIFICANT CHANGE UP (ref 0.5–1.3)
CREAT SERPL-MCNC: 0.74 MG/DL — SIGNIFICANT CHANGE UP (ref 0.5–1.3)
CULTURE RESULTS: SIGNIFICANT CHANGE UP
GLUCOSE SERPL-MCNC: 131 MG/DL — HIGH (ref 70–99)
GLUCOSE SERPL-MCNC: 99 MG/DL — SIGNIFICANT CHANGE UP (ref 70–99)
GRAM STN FLD: SIGNIFICANT CHANGE UP
HCT VFR BLD CALC: 26.5 % — LOW (ref 39–50)
HGB BLD-MCNC: 9.2 G/DL — LOW (ref 13–17)
MAGNESIUM SERPL-MCNC: 2.6 MG/DL — SIGNIFICANT CHANGE UP (ref 1.6–2.6)
MCHC RBC-ENTMCNC: 26.4 PG — LOW (ref 27–34)
MCHC RBC-ENTMCNC: 34.7 GM/DL — SIGNIFICANT CHANGE UP (ref 32–36)
MCV RBC AUTO: 76.1 FL — LOW (ref 80–100)
NRBC # BLD: 0 /100 WBCS — SIGNIFICANT CHANGE UP (ref 0–0)
PHOSPHATE SERPL-MCNC: 1.4 MG/DL — LOW (ref 2.5–4.5)
PLATELET # BLD AUTO: 264 K/UL — SIGNIFICANT CHANGE UP (ref 150–400)
POTASSIUM SERPL-MCNC: 3.5 MMOL/L — SIGNIFICANT CHANGE UP (ref 3.5–5.3)
POTASSIUM SERPL-MCNC: 3.6 MMOL/L — SIGNIFICANT CHANGE UP (ref 3.5–5.3)
POTASSIUM SERPL-SCNC: 3.5 MMOL/L — SIGNIFICANT CHANGE UP (ref 3.5–5.3)
POTASSIUM SERPL-SCNC: 3.6 MMOL/L — SIGNIFICANT CHANGE UP (ref 3.5–5.3)
RBC # BLD: 3.48 M/UL — LOW (ref 4.2–5.8)
RBC # FLD: 14.5 % — SIGNIFICANT CHANGE UP (ref 10.3–14.5)
SODIUM SERPL-SCNC: 141 MMOL/L — SIGNIFICANT CHANGE UP (ref 135–145)
SODIUM SERPL-SCNC: 142 MMOL/L — SIGNIFICANT CHANGE UP (ref 135–145)
SPECIMEN SOURCE: SIGNIFICANT CHANGE UP
SPECIMEN SOURCE: SIGNIFICANT CHANGE UP
WBC # BLD: 9.42 K/UL — SIGNIFICANT CHANGE UP (ref 3.8–10.5)
WBC # FLD AUTO: 9.42 K/UL — SIGNIFICANT CHANGE UP (ref 3.8–10.5)

## 2018-11-23 PROCEDURE — 93970 EXTREMITY STUDY: CPT | Mod: 26

## 2018-11-23 RX ORDER — VANCOMYCIN HCL 1 G
1000 VIAL (EA) INTRAVENOUS EVERY 12 HOURS
Qty: 0 | Refills: 0 | Status: DISCONTINUED | OUTPATIENT
Start: 2018-11-24 | End: 2018-11-28

## 2018-11-23 RX ORDER — VANCOMYCIN HCL 1 G
1000 VIAL (EA) INTRAVENOUS ONCE
Qty: 0 | Refills: 0 | Status: COMPLETED | OUTPATIENT
Start: 2018-11-23 | End: 2018-11-23

## 2018-11-23 RX ORDER — HEPARIN SODIUM 5000 [USP'U]/ML
5000 INJECTION INTRAVENOUS; SUBCUTANEOUS EVERY 8 HOURS
Qty: 0 | Refills: 0 | Status: DISCONTINUED | OUTPATIENT
Start: 2018-11-23 | End: 2018-11-30

## 2018-11-23 RX ORDER — FUROSEMIDE 40 MG
20 TABLET ORAL ONCE
Qty: 0 | Refills: 0 | Status: COMPLETED | OUTPATIENT
Start: 2018-11-23 | End: 2018-11-23

## 2018-11-23 RX ORDER — POTASSIUM PHOSPHATE, MONOBASIC POTASSIUM PHOSPHATE, DIBASIC 236; 224 MG/ML; MG/ML
15 INJECTION, SOLUTION INTRAVENOUS ONCE
Qty: 0 | Refills: 0 | Status: COMPLETED | OUTPATIENT
Start: 2018-11-23 | End: 2018-11-23

## 2018-11-23 RX ORDER — MEROPENEM 1 G/30ML
1000 INJECTION INTRAVENOUS EVERY 8 HOURS
Qty: 0 | Refills: 0 | Status: DISCONTINUED | OUTPATIENT
Start: 2018-11-23 | End: 2018-11-29

## 2018-11-23 RX ADMIN — Medication 20 MILLIGRAM(S): at 18:47

## 2018-11-23 RX ADMIN — HEPARIN SODIUM 5000 UNIT(S): 5000 INJECTION INTRAVENOUS; SUBCUTANEOUS at 16:04

## 2018-11-23 RX ADMIN — CARVEDILOL PHOSPHATE 3.12 MILLIGRAM(S): 80 CAPSULE, EXTENDED RELEASE ORAL at 05:48

## 2018-11-23 RX ADMIN — Medication 200 MILLIGRAM(S): at 05:48

## 2018-11-23 RX ADMIN — Medication 10 MILLIGRAM(S): at 05:48

## 2018-11-23 RX ADMIN — Medication 250 MILLIGRAM(S): at 22:58

## 2018-11-23 RX ADMIN — Medication 200 MILLIGRAM(S): at 18:47

## 2018-11-23 RX ADMIN — HEPARIN SODIUM 5000 UNIT(S): 5000 INJECTION INTRAVENOUS; SUBCUTANEOUS at 21:51

## 2018-11-23 RX ADMIN — Medication 3 MILLILITER(S): at 11:10

## 2018-11-23 RX ADMIN — Medication 20 MILLIGRAM(S): at 12:30

## 2018-11-23 RX ADMIN — CARVEDILOL PHOSPHATE 3.12 MILLIGRAM(S): 80 CAPSULE, EXTENDED RELEASE ORAL at 18:47

## 2018-11-23 RX ADMIN — Medication 100 MILLIGRAM(S): at 21:50

## 2018-11-23 RX ADMIN — Medication 100 MILLIGRAM(S): at 05:49

## 2018-11-23 RX ADMIN — Medication 3 MILLILITER(S): at 05:22

## 2018-11-23 RX ADMIN — Medication 3 MILLILITER(S): at 17:29

## 2018-11-23 RX ADMIN — POTASSIUM PHOSPHATE, MONOBASIC POTASSIUM PHOSPHATE, DIBASIC 63.75 MILLIMOLE(S): 236; 224 INJECTION, SOLUTION INTRAVENOUS at 12:13

## 2018-11-23 NOTE — PROGRESS NOTE ADULT - SUBJECTIVE AND OBJECTIVE BOX
Patient is a 68y old  Male who presents with a chief complaint of Abdominal pain and scrotal pain, Incarcerated R Inguino-scrotal Hernia. (23 Nov 2018 11:10)  hemodynamically stable    INTERVAL HPI/OVERNIGHT EVENTS: un evenful.  has [place] catheter in place. has clear urine  PAST MEDICAL & SURGICAL HISTORY:  Sudden cardiac death  Gait abnormality  Leg pain, right  Anoxic encephalopathy  Brain injury with coma: x 2 weeks in 2008  Prostate cancer: radiation therapy  Hypertension  S/P ICD (internal cardiac defibrillator) procedure: implanted on 1/6/2009  H/O tracheostomy  H/O prostate cancer: resection and radiation therapy  Status post cryoablation: of left renal mass      MEDICATIONS  (STANDING):  ALBUTerol/ipratropium for Nebulization 3 milliLiter(s) Nebulizer every 6 hours  carvedilol 3.125 milliGRAM(s) Oral every 12 hours  ciprofloxacin   IVPB 400 milliGRAM(s) IV Intermittent every 12 hours  heparin  Injectable 5000 Unit(s) SubCutaneous every 8 hours  metroNIDAZOLE  IVPB 500 milliGRAM(s) IV Intermittent every 8 hours    MEDICATIONS  (PRN):  acetaminophen  Suppository .. 650 milliGRAM(s) Rectal every 6 hours PRN Temp greater or equal to 38C (100.4F), Mild Pain (1 - 3)  HYDROmorphone  Injectable 0.5 milliGRAM(s) IV Push every 6 hours PRN Severe Pain (7 - 10)      Allergies    lisinopril (Unknown)  penicillins (Unknown)    Intolerances        REVIEW OF SYSTEMS:  CONSTITUTIONAL: No fever, weight loss, or fatigue  EYES: No eye pain, visual disturbances, or discharge  ENMT:  No difficulty hearing, tinnitus, vertigo; No sinus or throat pain  NECK: No pain or stiffness  BREASTS: No pain, masses, or nipple discharge  RESPIRATORY: No cough, wheezing, chills or hemoptysis; No shortness of breath  CARDIOVASCULAR: No chest pain, palpitations, dizziness, or leg swelling  GASTROINTESTINAL: No abdominal or epigastric pain. No nausea, vomiting, or hematemesis; No diarrhea or constipation. No melena or hematochezia.  GENITOURINARY: No dysuria, frequency, hematuria, or incontinence  NEUROLOGICAL: No headaches, memory loss, loss of strength, numbness, or tremors  SKIN: No itching, burning, rashes, or lesions   LYMPH NODES: No enlarged glands  ENDOCRINE: No heat or cold intolerance; No hair loss  MUSCULOSKELETAL: No joint pain or swelling; No muscle, back, or extremity pain  PSYCHIATRIC: No depression, anxiety, mood swings, or difficulty sleeping  HEME/LYMPH: No easy bruising, or bleeding gums  ALLERY AND IMMUNOLOGIC: No hives or eczema    Vital Signs Last 24 Hrs  T(C): 37.6 (23 Nov 2018 11:48), Max: 37.6 (23 Nov 2018 11:48)  T(F): 99.6 (23 Nov 2018 11:48), Max: 99.6 (23 Nov 2018 11:48)  HR: 119 (23 Nov 2018 14:10) (82 - 119)  BP: 148/80 (23 Nov 2018 14:10) (136/72 - 155/70)  BP(mean): --  RR: 18 (23 Nov 2018 14:10) (17 - 20)  SpO2: 98% (23 Nov 2018 14:10) (94% - 99%)    PHYSICAL EXAM:  GENERAL: NAD, well-groomed, well-developed  HEAD:  Atraumatic, Normocephalic  EYES: EOMI, PERRLA, conjunctiva and sclera clear  ENMT: No tonsillar erythema, exudates, or enlargement; Moist mucous membranes, Good dentition, No lesions  NECK: Supple, No JVD, Normal thyroid  NERVOUS SYSTEM:  Alert & Oriented X3, Good concentration; Motor Strength 5/5 B/L upper and lower extremities; DTRs 2+ intact and symmetric  CHEST/LUNG: Clear to percussion bilaterally; No rales, rhonchi, wheezing, or rubs  HEART: Regular rate and rhythm; No murmurs, rubs, or gallops  ABDOMEN: Soft, Nontender, Nondistended; Bowel sounds present. has scrotal edema  EXTREMITIES:  2+ Peripheral Pulses, No clubbing, cyanosis, or edema  LYMPH: No lymphadenopathy noted  SKIN: No rashes or lesions    LABS:                        9.2    9.42  )-----------( 264      ( 23 Nov 2018 04:46 )             26.5     11-23    141  |  108  |  13  ----------------------------<  99  3.5   |  22  |  0.74    Ca    6.5<LL>      23 Nov 2018 04:45  Phos  1.4     11-23  Mg     2.6     11-23    TPro  5.9<L>  /  Alb  2.1<L>  /  TBili  0.9  /  DBili  x   /  AST  43<H>  /  ALT  23  /  AlkPhos  62  11-22          CAPILLARY BLOOD GLUCOSE                    RADIOLOGY & ADDITIONAL TESTS:    Imaging Personally Reviewed:  [ ] YES  [ ] NO    Consultant(s) Notes Reviewed:  [ ] YES  [ ] NO    Care Discussed with Consultants/Other Providers [ ] YES  [ ] NO    Care discussed with family,         [  ]   yes  [  ]  No    imp:    stable[ ]    unstable[  ]     improving [  x ]       unchanged  [  ]                Plans:  Continue present plans  [ x ] as per surgery               New consult [  ]   specialty  .......               order test[  ]    test name.                  Discharge Planning  [  ]

## 2018-11-23 NOTE — CONSULT NOTE ADULT - ASSESSMENT
68 year old male POD#4 s/p ex lap, colon resection, repair of incarcerated right inguinal hernia with mesh, with post op anemia s/p 2uPRBC 11/21  i am called as yesterday febrile   sepsis work up done   ? post op pneumonia  pulm eval noted  adm blood culture pos for cns  usu contaminant   but patient has a defibrillaor  will repeat more  antibiotics extended   will follow with you thanks 68 year old male POD#4 s/p ex lap, colon resection, repair of incarcerated right inguinal hernia with mesh, with post op anemia s/p 2uPRBC 11/21  i am called as yesterday febrile ;; but had got 2 units of bllood less than 24 hours before   sepsis work up done   ? post op pneumonia  is congested  pulm eval noted  adm blood culture pos for cns  usu contaminant   but patient has a defibrillaor  will repeat more blood cultures  antibiotics extended   has very poor access and has gen edema   try oral antibiotics asap or DC   procalcitonin am   will follow with you thanks

## 2018-11-23 NOTE — PROGRESS NOTE ADULT - SUBJECTIVE AND OBJECTIVE BOX
INTERVAL HPI:  68M with HTN, Prostate Ca (s/p resection and radiation), AICD placement s/p cardiac arrest and coma (due to brain injury) about 10yrs ago, and known 2 year history of reducible right inguinal hernia.   Presented  to the ER c/o diffuse abdominal pain and right scrotal pain for 1 week which got worst over the course of 2 days.. This was also associated with nausea and non bilious emesis.  Found to have incarcerated Right Inguinal hernia, requiring Ex Lap, Cecal resection and repair of hernia with mesh. Post op observed in ICU,  with significant drop in H & H requiring PRBC transfusion.   11/22/18: transferred to medical floow.  Reported to have significant upper airway secretion which ar difficult to clear at times. Non smoker per wife.    OVERNIGHT EVENTS:  Fever spike to 101 noted. Tachycardic and tachypneic.    Vital Signs Last 24 Hrs  T(C): 37.6 (23 Nov 2018 11:48), Max: 37.6 (23 Nov 2018 11:48)  T(F): 99.6 (23 Nov 2018 11:48), Max: 99.6 (23 Nov 2018 11:48)  HR: 119 (23 Nov 2018 14:10) (82 - 119)  BP: 148/80 (23 Nov 2018 14:10) (136/72 - 155/70)  BP(mean): --  RR: 18 (23 Nov 2018 14:10) (17 - 20)  SpO2: 98% (23 Nov 2018 14:10) (94% - 99%)    PHYSICAL EXAM:  GEN:         Awake, dysarthric. .  HEENT:    Normal.    RESP:        crackles.   CVS:         Regular rate and rhythm.   ABD:         Soft, non-tender, non-distended;     MEDICATIONS  (STANDING):  ALBUTerol/ipratropium for Nebulization 3 milliLiter(s) Nebulizer every 6 hours  carvedilol 3.125 milliGRAM(s) Oral every 12 hours  ciprofloxacin   IVPB 400 milliGRAM(s) IV Intermittent every 12 hours  heparin  Injectable 5000 Unit(s) SubCutaneous every 8 hours  metroNIDAZOLE  IVPB 500 milliGRAM(s) IV Intermittent every 8 hours    MEDICATIONS  (PRN):  acetaminophen  Suppository .. 650 milliGRAM(s) Rectal every 6 hours PRN Temp greater or equal to 38C (100.4F), Mild Pain (1 - 3)  HYDROmorphone  Injectable 0.5 milliGRAM(s) IV Push every 6 hours PRN Severe Pain (7 - 10)    LABS:                        9.2    9.42  )-----------( 264      ( 23 Nov 2018 04:46 )             26.5     11-23    141  |  108  |  13  ----------------------------<  99  3.5   |  22  |  0.74    Ca    6.5<LL>      23 Nov 2018 04:45  Phos  1.4     11-23  Mg     2.6     11-23    TPro  5.9<L>  /  Alb  2.1<L>  /  TBili  0.9  /  DBili  x   /  AST  43<H>  /  ALT  23  /  AlkPhos  62  11-22 11-19 @ 02:41  pH: 7.46  pCO2: 33  pO2: 148  SaO2: 98    ASSESSMENT AND PLAN:  ·	Bilateral infiltrates, Atelectasis vs Pneumonia.  ·	Bilateral pleural effusion.  ·	S/P Ex laparotomy with cecal resection.  ·	Right Inguinal hernia repair.  ·	Anemia.  ·	HTN.  ·	S/P Hypoxic Encephalopathy.    Will add Vancomycin as has spiked fever and has gram +ve cocci in sputum  Continue Cipro and Metronidazole, consider ID evaluation.  Continue nebulizer and suction PRN.  Being transferred to monitor bed.  Discussed with wife at bedside.

## 2018-11-23 NOTE — CHART NOTE - NSCHARTNOTEFT_GEN_A_CORE
wound evaluated with Dr. Tinsley -- 1 staples removed on the midline wound -- seromucus discharge noted.  culture sent -- Patient tolerated the procedure well.

## 2018-11-23 NOTE — PROGRESS NOTE ADULT - ATTENDING COMMENTS
I examined patient at bed site, agree with note which has been edited accordingly.  Patient with erythema periumbilical,  one staple removed small amount of purulent fluid collected, sample for C&S.  Patient Has been transferred from ICU to floor last night, no authorization given, pulmonary issues still present, Possible pneumonia.  Dr. Wilkins, Pulmonologist consulted requested.  ID consult requested, to change Antibiotics [place].  PT evaluation, Pulmonary toilet, bronchodilators, OOB.  Local wound care.

## 2018-11-23 NOTE — CONSULT NOTE ADULT - PROBLEM SELECTOR PROBLEM 2
AICD (automatic cardioverter/defibrillator) present
AICD (automatic cardioverter/defibrillator) present

## 2018-11-23 NOTE — CONSULT NOTE ADULT - CONSULT REASON
Altelectasis
antibiotics
medical evaluation of patient admitted for Incarcerated rt inguino-scrotal hernia. hx of anoxic encephalopathy following cardiac arrest 10 years ago., AICD, Prostrate cancer, gait abnormality, cognitive impairment.
PREOPERATIVE CARDIOVASCULAR RISK ASSESSMENT

## 2018-11-23 NOTE — CONSULT NOTE ADULT - SUBJECTIVE AND OBJECTIVE BOX
68M with PMH HTN, prostate ca (s/p resection and radiation), AICD placement s/p cardiac arrest and coma (due to brain injury) about 10yrs ago, and known 2 year history of reducible right inguinal hernia now presents to the ER c/o diffuse abdominal pain and right scrotal pain. Wife at bedside who speaks on behalf of patient. States right groin pain has been persistent x1 week, worsening since 2 days ago associated with increased right scrotal swelling. Associated with nausea and NBNB emesis.  Denies flatus or BM, states his last BM was yesterday. Wife states patient has the "sweats" at night, but denies fever/chills. Denies active chest pain, palpitations, shortness of breath, dizziness or urinary complaints.   ER and Surgeon tried to reduce Hernia, not reducible. (18 Nov 2018 11:32)      PAST MEDICAL & SURGICAL HISTORY:  Sudden cardiac death  Gait abnormality  Leg pain, right  Anoxic encephalopathy  Brain injury with coma: x 2 weeks in 2008  Prostate cancer: radiation therapy  Hypertension  S/P ICD (internal cardiac defibrillator) procedure: implanted on 1/6/2009  H/O tracheostomy  H/O prostate cancer: resection and radiation therapy  Status post cryoablation: of left renal mass      SOCHX:  no tobacco,  -no  alcohol    FMHX: FA/MO  -non contributory       Recent Travel:    Immunizations:    Allergies    lisinopril (Unknown)  penicillins (Unknown)    Intolerances        MEDICATIONS  (STANDING):  ALBUTerol/ipratropium for Nebulization 3 milliLiter(s) Nebulizer every 6 hours  carvedilol 3.125 milliGRAM(s) Oral every 12 hours  ciprofloxacin   IVPB 400 milliGRAM(s) IV Intermittent every 12 hours  heparin  Injectable 5000 Unit(s) SubCutaneous every 8 hours  metroNIDAZOLE  IVPB 500 milliGRAM(s) IV Intermittent every 8 hours  vancomycin  IVPB 1000 milliGRAM(s) IV Intermittent once  vancomycin  IVPB 1000 milliGRAM(s) IV Intermittent every 12 hours    MEDICATIONS  (PRN):  acetaminophen  Suppository .. 650 milliGRAM(s) Rectal every 6 hours PRN Temp greater or equal to 38C (100.4F), Mild Pain (1 - 3)  HYDROmorphone  Injectable 0.5 milliGRAM(s) IV Push every 6 hours PRN Severe Pain (7 - 10)      REVIEW OF SYSTEMS:  CONSTITUTIONAL: No fever, weight loss, or fatigue  EYES: No eye pain, visual disturbances, or discharge  ENMT:  No difficulty hearing, tinnitus, vertigo; No sinus or throat pain  NECK: No pain or stiffness  BREASTS: No pain, masses, or nipple discharge  RESPIRATORY: No cough, wheezing, chills or hemoptysis; No shortness of breath  CARDIOVASCULAR: No chest pain, palpitations, dizziness, or leg swelling  GASTROINTESTINAL: No abdominal or epigastric pain. No nausea, vomiting, or hematemesis; No diarrhea or constipation. No melena or hematochezia.  GENITOURINARY: No dysuria, frequency, hematuria, or incontinence  NEUROLOGICAL: No headaches, memory loss, loss of strength, numbness, or tremors  SKIN: No itching, burning, rashes, or lesions   LYMPH NODES: No enlarged glands  ENDOCRINE: No heat or cold intolerance; No hair loss  MUSCULOSKELETAL: No joint pain or swelling; No muscle, back, or extremity pain  PSYCHIATRIC: No depression, anxiety, mood swings, or difficulty sleeping  HEME/LYMPH: No easy bruising, or bleeding gums  ALLERGY AND IMMUNOLOGIC: No hives or eczema    VITAL SIGNS:    T(C): 37.8 (11-23-18 @ 17:26), Max: 37.8 (11-23-18 @ 17:26)  T(F): 100.1 (11-23-18 @ 17:26), Max: 100.1 (11-23-18 @ 17:26)  HR: 103 (11-23-18 @ 17:30) (99 - 119)  BP: 147/72 (11-23-18 @ 17:26) (136/72 - 150/78)  RR: 18 (11-23-18 @ 17:26) (17 - 18)  SpO2: 98% (11-23-18 @ 17:30) (94% - 100%)    PHYSICAL EXAM:    GENERAL: NAD, well-groomed, well-developed  HEAD:  Atraumatic, Normocephalic  EYES: EOMI, PERRLA, conjunctiva and sclera clear  ENMT: No tonsillar erythema, exudates, or enlargement; Moist mucous membranes, Good dentition, No lesions  NECK: Supple, No JVD, Normal thyroid  NERVOUS SYSTEM:  Alert & Oriented X3, Good concentration; Motor Strength 5/5 B/L upper and lower extremities; DTRs 2+ intact and symmetric  CHEST/LUNG: Clear to auscultation bilaterally; No rales, rhonchi, wheezing bilaterally  HEART: Regular rate and rhythm; No murmurs, rubs, or gallops  ABDOMEN: Soft, Nontender, Nondistended; Bowel sounds present  EXTREMITIES:  2+ Peripheral Pulses, No clubbing, cyanosis, or edema  LYMPH: No lymphadenopathy noted  SKIN: No rashes or lesions  BACK: no pressor sore     LABS:                         9.2    9.42  )-----------( 264      ( 23 Nov 2018 04:46 )             26.5     11-23    141  |  108  |  13  ----------------------------<  99  3.5   |  22  |  0.74    Ca    6.5<LL>      23 Nov 2018 04:45  Phos  1.4     11-23  Mg     2.6     11-23    TPro  5.9<L>  /  Alb  2.1<L>  /  TBili  0.9  /  DBili  x   /  AST  43<H>  /  ALT  23  /  AlkPhos  62  11-22    LIVER FUNCTIONS - ( 22 Nov 2018 03:35 )  Alb: 2.1 g/dL / Pro: 5.9 gm/dL / ALK PHOS: 62 U/L / ALT: 23 U/L / AST: 43 U/L / GGT: x                                   Culture Results:   Normal Respiratory Ana present (11-23 @ 01:28)  Culture Results:   No growth (11-18 @ 18:13)  Culture Results:   Growth in aerobic bottle: Coag Negative Staphylococcus  Single set isolate, possible contaminant. Contact  Microbiology if susceptibility testing clinically  indicated.  "Due to technical problems, Proteus sp. will Not be reported as part of  the BCID panel until further notice"  ***Blood Panel PCR results on this specimen are available  approximately 3 hours after the Gram stain result.***  Gram stain, PCR, and/or culture results may not always  correspond due to difference in methodologies.  ************************************************************  This PCR assay was performed using [employer].  The following targets are tested for: Enterococcus,  vancomycin resistant enterococci, Listeria monocytogenes,  coagulase negative staphylococci, S. aureus,  methicillin resistant S. aureus, Streptococcus agalactiae  (Group B), S. pneumoniae, S. pyogenes (Group A),  Acinetobacter baumannii, Enterobacter cloacae, E. coli,  Klebsiella oxytoca, K. pneumoniae, Proteus sp.,  Serratia marcescens, Haemophilus influenzae,  Neisseria meningitidis, Pseudomonas aeruginosa, Candida  albicans, C. glabrata, C krusei, C parapsilosis,  C. tropicalis and the KPC resistance gene. (11-18 @ 11:41)  Culture Results:   No growth at 5 days. (11-18 @ 11:20)                Radiology:      < from: CT Abdomen and Pelvis No Cont (11.21.18 @ 15:07) >    Combination of atelectasis and pulmonary consolidation in the right lower   lobe. New small nodular densities in the right middle lobe, likely   representing infectious or inflammatory process.    Ileocolic anastomosis is again noted with stable mural thickening at the   anastomosis, probably due to postoperative edema. Grossly stable small   amount of fluid the in the right lower quadrant abdomen adjacent to the   anastomosis.     In the right groin, again noted is a grossly stable 4.1 x 2.3 cm   collection with a Hounsfield unit of 74. This may represent a focal soft   tissue hematoma.    Trace pelvic free fluid.    Other findings as above.                REZA SOLITARIO M.D., ATTENDING RADIOLOGIST  This document has been electronically signed. Nov 21 2018  3:42PM                < end of copied text > 68M with PMH HTN, prostate ca (s/p resection and radiation), AICD placement s/p cardiac arrest and coma (due to brain injury) about 10yrs ago, and known 2 year history of reducible right inguinal hernia now presents to the ER c/o diffuse abdominal pain and right scrotal pain. Wife at bedside who speaks on behalf of patient. States right groin pain has been persistent x1 week, worsening since 2 days ago associated with increased right scrotal swelling. Associated with nausea and NBNB emesis.  Denies flatus or BM, states his last BM was yesterday. Wife states patient has the "sweats" at night, but denies fever/chills. Denies active chest pain, palpitations, shortness of breath, dizziness or urinary complaints.   ER and Surgeon tried to reduce Hernia, not reducible. (18 Nov 2018 11:32)      PAST MEDICAL & SURGICAL HISTORY:  Sudden cardiac death  Gait abnormality  Leg pain, right  Anoxic encephalopathy  Brain injury with coma: x 2 weeks in 2008  Prostate cancer: radiation therapy  Hypertension  S/P ICD (internal cardiac defibrillator) procedure: implanted on 1/6/2009  H/O tracheostomy  H/O prostate cancer: resection and radiation therapy  Status post cryoablation: of left renal mass      SOCHX:  no tobacco,  -no  alcohol    FMHX: FA/MO  -non contributory       Recent Travel:    Immunizations:    Allergies    lisinopril (Unknown)  penicillins (Unknown)    Intolerances        MEDICATIONS  (STANDING):  ALBUTerol/ipratropium for Nebulization 3 milliLiter(s) Nebulizer every 6 hours  carvedilol 3.125 milliGRAM(s) Oral every 12 hours  ciprofloxacin   IVPB 400 milliGRAM(s) IV Intermittent every 12 hours  heparin  Injectable 5000 Unit(s) SubCutaneous every 8 hours  metroNIDAZOLE  IVPB 500 milliGRAM(s) IV Intermittent every 8 hours  vancomycin  IVPB 1000 milliGRAM(s) IV Intermittent once  vancomycin  IVPB 1000 milliGRAM(s) IV Intermittent every 12 hours    MEDICATIONS  (PRN):  acetaminophen  Suppository .. 650 milliGRAM(s) Rectal every 6 hours PRN Temp greater or equal to 38C (100.4F), Mild Pain (1 - 3)  HYDROmorphone  Injectable 0.5 milliGRAM(s) IV Push every 6 hours PRN Severe Pain (7 - 10)      REVIEW OF SYSTEMS:  unable to obtain from patient   wife by bedside   has some myuscle stiffness   eating   scrotal swelling is better than admission   but gen body swelling all over is much worse   NEUROLOGICAL: No headaches, memory loss, loss of strength, numbness, or tremors  SKIN: No itching, burning, rashes, or lesions   LYMPH NODES: No enlarged glands  ENDOCRINE: No heat or cold intolerance; No hair loss  MUSCULOSKELETAL: has been walking at home   stiff   contracture mainley left hand   PSYCHIATRIC: No depression, anxiety, mood swings, or difficulty sleeping  HEME/LYMPH: No easy bruising, or bleeding gums  ALLERGY AND IMMUNOLOGIC: No hives or eczema    VITAL SIGNS:    T(C): 37.8 (11-23-18 @ 17:26), Max: 37.8 (11-23-18 @ 17:26)  T(F): 100.1 (11-23-18 @ 17:26), Max: 100.1 (11-23-18 @ 17:26)  HR: 103 (11-23-18 @ 17:30) (99 - 119)  BP: 147/72 (11-23-18 @ 17:26) (136/72 - 150/78)  RR: 18 (11-23-18 @ 17:26) (17 - 18)  SpO2: 98% (11-23-18 @ 17:30) (94% - 100%)    PHYSICAL EXAM:    GENERAL: NAD, well-groomed, well-developed  HEAD:  Atraumatic, Normocephalic  EYES: EOMI, PERRLA, conjunctiva and sclera clear  ENMT:   gurgles throat  Moist mucous membranes,   NECK: Supple, No JVD, Normal thyroid  NERVOUS SYSTEM:  Alert & Oriented X2;; left hand contracture  CHEST/LUNG: has , rhonchi, wheezing bilaterally  HEART: Regular rate and rhythm; No murmurs, rubs, or gallops  ABDOMEN: Soft, Nontender, Nondistended; Bowel sounds present  dressing clostridium difficille infection   [place] is leaking all over   EXTREMITIES:  2+ Peripheral Pulses, No clubbing, cyanosis, or edema  left hand contractures   gen edema   LYMPH: No lymphadenopathy noted  SKIN: No rashes or lesions  BACK: no pressor sore   iv in right lower extremity   no infection  LABS:                         9.2    9.42  )-----------( 264      ( 23 Nov 2018 04:46 )             26.5     11-23    141  |  108  |  13  ----------------------------<  99  3.5   |  22  |  0.74    Ca    6.5<LL>      23 Nov 2018 04:45  Phos  1.4     11-23  Mg     2.6     11-23    TPro  5.9<L>  /  Alb  2.1<L>  /  TBili  0.9  /  DBili  x   /  AST  43<H>  /  ALT  23  /  AlkPhos  62  11-22    LIVER FUNCTIONS - ( 22 Nov 2018 03:35 )  Alb: 2.1 g/dL / Pro: 5.9 gm/dL / ALK PHOS: 62 U/L / ALT: 23 U/L / AST: 43 U/L / GGT: x                                   Culture Results:   Normal Respiratory Ana present (11-23 @ 01:28)  Culture Results:   No growth (11-18 @ 18:13)  Culture Results:   Growth in aerobic bottle: Coag Negative Staphylococcus  Single set isolate, possible contaminant. Contact  Microbiology if susceptibility testing clinically  indicated.  "Due to technical problems, Proteus sp. will Not be reported as part of  the BCID panel until further notice"  ***Blood Panel PCR results on this specimen are available  approximately 3 hours after the Gram stain result.***  Gram stain, PCR, and/or culture results may not always  correspond due to difference in methodologies.  ************************************************************  This PCR assay was performed using [employer].  The following targets are tested for: Enterococcus,  vancomycin resistant enterococci, Listeria monocytogenes,  coagulase negative staphylococci, S. aureus,  methicillin resistant S. aureus, Streptococcus agalactiae  (Group B), S. pneumoniae, S. pyogenes (Group A),  Acinetobacter baumannii, Enterobacter cloacae, E. coli,  Klebsiella oxytoca, K. pneumoniae, Proteus sp.,  Serratia marcescens, Haemophilus influenzae,  Neisseria meningitidis, Pseudomonas aeruginosa, Candida  albicans, C. glabrata, C krusei, C parapsilosis,  C. tropicalis and the KPC resistance gene. (11-18 @ 11:41)  Culture Results:   No growth at 5 days. (11-18 @ 11:20)                Radiology:      < from: CT Abdomen and Pelvis No Cont (11.21.18 @ 15:07) >    Combination of atelectasis and pulmonary consolidation in the right lower   lobe. New small nodular densities in the right middle lobe, likely   representing infectious or inflammatory process.    Ileocolic anastomosis is again noted with stable mural thickening at the   anastomosis, probably due to postoperative edema. Grossly stable small   amount of fluid the in the right lower quadrant abdomen adjacent to the   anastomosis.     In the right groin, again noted is a grossly stable 4.1 x 2.3 cm   collection with a Hounsfield unit of 74. This may represent a focal soft   tissue hematoma.    Trace pelvic free fluid.    Other findings as above.                REZA SOLITARIO M.D., ATTENDING RADIOLOGIST  This document has been electronically signed. Nov 21 2018  3:42PM                < end of copied text >

## 2018-11-23 NOTE — CONSULT NOTE ADULT - REASON FOR ADMISSION
Abdominal pain and scrotal pain
Abdominal pain and scrotal pain, Incarcerated R Inguino-scrotal Hernia.
Abdominal pain and scrotal pain
Abdominal pain and scrotal pain, Incarcerated R Inguino-scrotal Hernia.

## 2018-11-23 NOTE — PROGRESS NOTE ADULT - SUBJECTIVE AND OBJECTIVE BOX
INTERVAL HPI/OVERNIGHT EVENTS:    Patient lying comfortably.  Noisy breathing requiring suctioning.  Patient tolerating clear liquid diet with no nausea/vomiting.  No flatus/BM per daughter.   Tmax 101    Vital Signs Last 24 Hrs  T(C): 36.9 (23 Nov 2018 04:00), Max: 38.3 (22 Nov 2018 15:18)  T(F): 98.4 (23 Nov 2018 04:00), Max: 101 (22 Nov 2018 15:18)  HR: 112 (23 Nov 2018 04:00) (82 - 115)  BP: 136/72 (23 Nov 2018 04:00) (136/72 - 157/69)  BP(mean): 95 (22 Nov 2018 16:00) (91 - 97)  RR: 17 (23 Nov 2018 04:00) (17 - 29)  SpO2: 99% (23 Nov 2018 04:00) (94% - 100%)    MEDICATIONS  (STANDING):  ALBUTerol/ipratropium for Nebulization 3 milliLiter(s) Nebulizer every 6 hours  carvedilol 3.125 milliGRAM(s) Oral every 12 hours  ciprofloxacin   IVPB 400 milliGRAM(s) IV Intermittent every 12 hours  furosemide   Injectable 20 milliGRAM(s) IV Push once  heparin  Injectable 5000 Unit(s) SubCutaneous every 8 hours  metoclopramide Injectable 10 milliGRAM(s) IV Push every 8 hours  metroNIDAZOLE  IVPB 500 milliGRAM(s) IV Intermittent every 8 hours  potassium phosphate IVPB 15 milliMole(s) IV Intermittent once    MEDICATIONS  (PRN):  acetaminophen  Suppository .. 650 milliGRAM(s) Rectal every 6 hours PRN Temp greater or equal to 38C (100.4F), Mild Pain (1 - 3)  HYDROmorphone  Injectable 0.5 milliGRAM(s) IV Push every 6 hours PRN Severe Pain (7 - 10)      PHYSICAL EXAM:  GENERAL: Alert, oriented, NAD  HEENT: NGT coiled in mouth. Removed. Pt tolerated well   CHEST/LUNG: Clear to auscultation bilaterally, respirations nonlabored  HEART: S1S2, tachycardic  ABDOMEN: + Bowel sounds, midline staples c/d/i, right groin staples c/d/i. TENZIN drain intact with serous output  : Scrotum edematous, nontender  EXTREMITIES: No calf tenderness b/l, mild pitting edema all 4 extremities. +radial pulses, +DP pulses    I&O's Detail    22 Nov 2018 07:01  -  23 Nov 2018 07:00  --------------------------------------------------------  IN:    sodium chloride 0.9%: 500 mL    Solution: 200 mL    Solution: 300 mL  Total IN: 1000 mL    OUT:    Bulb: 280 mL    Indwelling Catheter - Urethral: 525 mL    Voided: 100 mL  Total OUT: 905 mL    Total NET: 95 mL      23 Nov 2018 07:01  -  23 Nov 2018 10:55  --------------------------------------------------------  IN:    Oral Fluid: 220 mL  Total IN: 220 mL    OUT:  Total OUT: 0 mL    Total NET: 220 mL          LABS:                        9.2    9.42  )-----------( 264      ( 23 Nov 2018 04:46 )             26.5     11-23    141  |  108  |  13  ----------------------------<  99  3.5   |  22  |  0.74    Ca    6.5<LL>      23 Nov 2018 04:45  Phos  1.4     11-23  Mg     2.6     11-23    TPro  5.9<L>  /  Alb  2.1<L>  /  TBili  0.9  /  DBili  x   /  AST  43<H>  /  ALT  23  /  AlkPhos  62  11-22    PT/INR - ( 21 Nov 2018 14:07 )   PT: 16.1 sec;   INR: 1.42 ratio         RADIOLOGY & ADDITIONAL STUDIES:    < from: US Duplex Venous Lower Ext Complete, Bilateral (11.23.18 @ 08:39) >    There is normal compressibility of the bilateral common femoral, femoral   and popliteal veins. No calf vein thrombosis is detected.    Doppler examination shows normal spontaneous and phasic flow.    IMPRESSION:     No evidence of bilateral lower extremity deep venous thrombosis.    < end of copied text >    < from: Xray Chest 1 View- PORTABLE-Urgent (11.22.18 @ 20:06) >    IMPRESSION: There is rounded patchy opacity within theright midlung.   There are trace bilateral pleural effusions. Evaluation is limited by   suboptimal inspiration. There is a single lead left-sided cardiac pacing   device in place. Heart size cannot be accurately evaluated in this   projection.      < end of copied text >    < from: Xray Chest 1 View-PORTABLE IMMEDIATE (11.21.18 @ 17:44) >  ncreased alveolar infiltrates and congestion or in the mid   and lower lung fields bilaterally. Bilateral pleural effusions.   Nasogastric tube in stomach. No change in pacemaker wire position in   right ventricle.  .    The pulmonary vasculature and aorta are normal for   age. Heart size is unremarkable.     < end of copied text >          Impression:    68 year old male POD#3 s/p ex lap, colon resection, repair of incarcerated right inguinal hernia with mesh, with post op anemia s/p 2uPRBC 11/21, Febrile,  Hypocalcemia, hypophosphatemia    will keep npo for now except meds   discussed case with RN/Respiratoy -- patient requiring suctioning prn, pulmonary toilet, OOB to chair   Discuss case with Dr. Jacome -- will call ID for antibiotic coverage -- called Dr. Baldwin, will give lasix prn, recheck lytes prn   continue antibiotic   Pulmo follow up  - appreciate consult   continue medical management/supportive care   restart medical DVT prophylaxis -- cont to trend h/h   discuss with daughter -- encourage IS   Discuss with Dr. Tinsley as above. INTERVAL HPI/OVERNIGHT EVENTS:    Patient lying comfortably.  Noisy breathing requiring suctioning.  Patient tolerating clear liquid diet with no nausea/vomiting.  No flatus/BM per daughter.   Tmax 101    Vital Signs Last 24 Hrs  T(C): 36.9 (23 Nov 2018 04:00), Max: 38.3 (22 Nov 2018 15:18)  T(F): 98.4 (23 Nov 2018 04:00), Max: 101 (22 Nov 2018 15:18)  HR: 112 (23 Nov 2018 04:00) (82 - 115)  BP: 136/72 (23 Nov 2018 04:00) (136/72 - 157/69)  BP(mean): 95 (22 Nov 2018 16:00) (91 - 97)  RR: 17 (23 Nov 2018 04:00) (17 - 29)  SpO2: 99% (23 Nov 2018 04:00) (94% - 100%)    MEDICATIONS  (STANDING):  ALBUTerol/ipratropium for Nebulization 3 milliLiter(s) Nebulizer every 6 hours  carvedilol 3.125 milliGRAM(s) Oral every 12 hours  ciprofloxacin   IVPB 400 milliGRAM(s) IV Intermittent every 12 hours  furosemide   Injectable 20 milliGRAM(s) IV Push once  heparin  Injectable 5000 Unit(s) SubCutaneous every 8 hours  metoclopramide Injectable 10 milliGRAM(s) IV Push every 8 hours  metroNIDAZOLE  IVPB 500 milliGRAM(s) IV Intermittent every 8 hours  potassium phosphate IVPB 15 milliMole(s) IV Intermittent once    MEDICATIONS  (PRN):  acetaminophen  Suppository .. 650 milliGRAM(s) Rectal every 6 hours PRN Temp greater or equal to 38C (100.4F), Mild Pain (1 - 3)  HYDROmorphone  Injectable 0.5 milliGRAM(s) IV Push every 6 hours PRN Severe Pain (7 - 10)      PHYSICAL EXAM:  GENERAL: Alert, oriented, NAD  HEENT: NGT coiled in mouth. Removed. Pt tolerated well   CHEST/LUNG: Clear to auscultation bilaterally, respirations nonlabored  HEART: S1S2, tachycardic  ABDOMEN: + Bowel sounds, midline wound -- +erythema, no gross signs of induration,  staples c/d/i, right groin staples c/d/i. TENZIN drain intact with serous output  : Scrotum edematous, nontender  EXTREMITIES: No calf tenderness b/l, mild pitting edema all 4 extremities. +radial pulses, +DP pulses    I&O's Detail    22 Nov 2018 07:01  -  23 Nov 2018 07:00  --------------------------------------------------------  IN:    sodium chloride 0.9%: 500 mL    Solution: 200 mL    Solution: 300 mL  Total IN: 1000 mL    OUT:    Bulb: 280 mL    Indwelling Catheter - Urethral: 525 mL    Voided: 100 mL  Total OUT: 905 mL    Total NET: 95 mL      23 Nov 2018 07:01  -  23 Nov 2018 10:55  --------------------------------------------------------  IN:    Oral Fluid: 220 mL  Total IN: 220 mL    OUT:  Total OUT: 0 mL    Total NET: 220 mL          LABS:                        9.2    9.42  )-----------( 264      ( 23 Nov 2018 04:46 )             26.5     11-23    141  |  108  |  13  ----------------------------<  99  3.5   |  22  |  0.74    Ca    6.5<LL>      23 Nov 2018 04:45  Phos  1.4     11-23  Mg     2.6     11-23    TPro  5.9<L>  /  Alb  2.1<L>  /  TBili  0.9  /  DBili  x   /  AST  43<H>  /  ALT  23  /  AlkPhos  62  11-22    PT/INR - ( 21 Nov 2018 14:07 )   PT: 16.1 sec;   INR: 1.42 ratio         RADIOLOGY & ADDITIONAL STUDIES:    < from: US Duplex Venous Lower Ext Complete, Bilateral (11.23.18 @ 08:39) >    There is normal compressibility of the bilateral common femoral, femoral   and popliteal veins. No calf vein thrombosis is detected.    Doppler examination shows normal spontaneous and phasic flow.    IMPRESSION:     No evidence of bilateral lower extremity deep venous thrombosis.    < end of copied text >    < from: Xray Chest 1 View- PORTABLE-Urgent (11.22.18 @ 20:06) >    IMPRESSION: There is rounded patchy opacity within theright midlung.   There are trace bilateral pleural effusions. Evaluation is limited by   suboptimal inspiration. There is a single lead left-sided cardiac pacing   device in place. Heart size cannot be accurately evaluated in this   projection.      < end of copied text >    < from: Xray Chest 1 View-PORTABLE IMMEDIATE (11.21.18 @ 17:44) >  ncreased alveolar infiltrates and congestion or in the mid   and lower lung fields bilaterally. Bilateral pleural effusions.   Nasogastric tube in stomach. No change in pacemaker wire position in   right ventricle.  .    The pulmonary vasculature and aorta are normal for   age. Heart size is unremarkable.     < end of copied text >          Impression:    68 year old male POD#3 s/p ex lap, colon resection, repair of incarcerated right inguinal hernia with mesh, with post op anemia s/p 2uPRBC 11/21, Febrile,  Hypocalcemia, hypophosphatemia    will keep npo for now except meds   discussed case with RN/Respiratoy -- patient requiring suctioning prn, pulmonary toilet, OOB to chair   Discuss case with Dr. Jacome -- will call ID for antibiotic coverage -- called Dr. Baldwin, will give lasix prn, recheck lytes prn   continue antibiotic -- better coverage with gm positive await Dr. Baldwin input   Wound care -- will monitor wound -- may need to open midline wound for drainage  Pulmo follow up  - appreciate consult   continue medical management/supportive care   restart medical DVT prophylaxis -- cont to trend h/h   discuss with daughter -- encourage IS   Discuss with Dr. Tinsley as above. INTERVAL HPI/OVERNIGHT EVENTS:    Patient lying comfortably.  Noisy breathing requiring suctioning.  Patient tolerating clear liquid diet with no nausea/vomiting.  No flatus/BM per daughter.   Tmax 101    Vital Signs Last 24 Hrs  T(C): 36.9 (23 Nov 2018 04:00), Max: 38.3 (22 Nov 2018 15:18)  T(F): 98.4 (23 Nov 2018 04:00), Max: 101 (22 Nov 2018 15:18)  HR: 112 (23 Nov 2018 04:00) (82 - 115)  BP: 136/72 (23 Nov 2018 04:00) (136/72 - 157/69)  BP(mean): 95 (22 Nov 2018 16:00) (91 - 97)  RR: 17 (23 Nov 2018 04:00) (17 - 29)  SpO2: 99% (23 Nov 2018 04:00) (94% - 100%)    MEDICATIONS  (STANDING):  ALBUTerol/ipratropium for Nebulization 3 milliLiter(s) Nebulizer every 6 hours  carvedilol 3.125 milliGRAM(s) Oral every 12 hours  ciprofloxacin   IVPB 400 milliGRAM(s) IV Intermittent every 12 hours  furosemide   Injectable 20 milliGRAM(s) IV Push once  heparin  Injectable 5000 Unit(s) SubCutaneous every 8 hours  metoclopramide Injectable 10 milliGRAM(s) IV Push every 8 hours  metroNIDAZOLE  IVPB 500 milliGRAM(s) IV Intermittent every 8 hours  potassium phosphate IVPB 15 milliMole(s) IV Intermittent once    MEDICATIONS  (PRN):  acetaminophen  Suppository .. 650 milliGRAM(s) Rectal every 6 hours PRN Temp greater or equal to 38C (100.4F), Mild Pain (1 - 3)  HYDROmorphone  Injectable 0.5 milliGRAM(s) IV Push every 6 hours PRN Severe Pain (7 - 10)      PHYSICAL EXAM:  GENERAL: Alert, oriented, NAD  HEENT: NGT coiled in mouth. Removed. Pt tolerated well   CHEST/LUNG: Clear to auscultation bilaterally, respirations nonlabored  HEART: S1S2, tachycardic  ABDOMEN: + Bowel sounds, midline wound -- +erythema, no gross signs of induration,  staples c/d/i, right groin staples c/d/i. TENZIN drain intact with serous output  : Scrotum edematous, nontender  EXTREMITIES: No calf tenderness b/l, mild pitting edema all 4 extremities. +radial pulses, +DP pulses    I&O's Detail    22 Nov 2018 07:01  -  23 Nov 2018 07:00  --------------------------------------------------------  IN:    sodium chloride 0.9%: 500 mL    Solution: 200 mL    Solution: 300 mL  Total IN: 1000 mL    OUT:    Bulb: 280 mL    Indwelling Catheter - Urethral: 525 mL    Voided: 100 mL  Total OUT: 905 mL    Total NET: 95 mL      23 Nov 2018 07:01  -  23 Nov 2018 10:55  --------------------------------------------------------  IN:    Oral Fluid: 220 mL  Total IN: 220 mL    OUT:  Total OUT: 0 mL    Total NET: 220 mL          LABS:                        9.2    9.42  )-----------( 264      ( 23 Nov 2018 04:46 )             26.5     11-23    141  |  108  |  13  ----------------------------<  99  3.5   |  22  |  0.74    Ca    6.5<LL>      23 Nov 2018 04:45  Phos  1.4     11-23  Mg     2.6     11-23    TPro  5.9<L>  /  Alb  2.1<L>  /  TBili  0.9  /  DBili  x   /  AST  43<H>  /  ALT  23  /  AlkPhos  62  11-22    PT/INR - ( 21 Nov 2018 14:07 )   PT: 16.1 sec;   INR: 1.42 ratio         RADIOLOGY & ADDITIONAL STUDIES:    < from: US Duplex Venous Lower Ext Complete, Bilateral (11.23.18 @ 08:39) >    There is normal compressibility of the bilateral common femoral, femoral   and popliteal veins. No calf vein thrombosis is detected.    Doppler examination shows normal spontaneous and phasic flow.    IMPRESSION:     No evidence of bilateral lower extremity deep venous thrombosis.    < end of copied text >    < from: Xray Chest 1 View- PORTABLE-Urgent (11.22.18 @ 20:06) >    IMPRESSION: There is rounded patchy opacity within theright midlung.   There are trace bilateral pleural effusions. Evaluation is limited by   suboptimal inspiration. There is a single lead left-sided cardiac pacing   device in place. Heart size cannot be accurately evaluated in this   projection.      < end of copied text >    < from: Xray Chest 1 View-PORTABLE IMMEDIATE (11.21.18 @ 17:44) >  ncreased alveolar infiltrates and congestion or in the mid   and lower lung fields bilaterally. Bilateral pleural effusions.   Nasogastric tube in stomach. No change in pacemaker wire position in   right ventricle.  .    The pulmonary vasculature and aorta are normal for   age. Heart size is unremarkable.     < end of copied text >          Impression:    68 year old male POD#3 s/p ex lap, colon resection, repair of incarcerated right inguinal hernia with mesh, with post op anemia s/p 2uPRBC 11/21, Febrile,  Hypocalcemia, hypophosphatemia.  Pleural effusion , Atelectasis, possible Pneumonia    will keep npo for now except meds   discussed case with RN/Respiratoy -- patient requiring suctioning prn, pulmonary toilet, OOB to chair   Discuss case with Dr. Jacome -- will call ID for antibiotic coverage -- called Dr. Baldwin, will give lasix prn, recheck lytes prn   continue antibiotic -- better coverage with gm positive await Dr. Baldwin input   Wound care -- will monitor wound -- may need to open midline wound for drainage  Pulmo follow up  - appreciate consult   continue medical management/supportive care   restart medical DVT prophylaxis -- cont to trend h/h   discuss with daughter -- encourage IS   Discuss with Dr. Tinsley as above.

## 2018-11-24 LAB
ANION GAP SERPL CALC-SCNC: 8 MMOL/L — SIGNIFICANT CHANGE UP (ref 5–17)
BASOPHILS # BLD AUTO: 0.03 K/UL — SIGNIFICANT CHANGE UP (ref 0–0.2)
BASOPHILS NFR BLD AUTO: 0.3 % — SIGNIFICANT CHANGE UP (ref 0–2)
BUN SERPL-MCNC: 14 MG/DL — SIGNIFICANT CHANGE UP (ref 7–23)
CALCIUM SERPL-MCNC: 6.9 MG/DL — LOW (ref 8.5–10.1)
CHLORIDE SERPL-SCNC: 105 MMOL/L — SIGNIFICANT CHANGE UP (ref 96–108)
CO2 SERPL-SCNC: 30 MMOL/L — SIGNIFICANT CHANGE UP (ref 22–31)
CREAT SERPL-MCNC: 0.93 MG/DL — SIGNIFICANT CHANGE UP (ref 0.5–1.3)
CULTURE RESULTS: SIGNIFICANT CHANGE UP
EOSINOPHIL # BLD AUTO: 0.19 K/UL — SIGNIFICANT CHANGE UP (ref 0–0.5)
EOSINOPHIL NFR BLD AUTO: 1.8 % — SIGNIFICANT CHANGE UP (ref 0–6)
GLUCOSE SERPL-MCNC: 114 MG/DL — HIGH (ref 70–99)
GRAM STN FLD: SIGNIFICANT CHANGE UP
HCT VFR BLD CALC: 28.3 % — LOW (ref 39–50)
HGB BLD-MCNC: 9.7 G/DL — LOW (ref 13–17)
IMM GRANULOCYTES NFR BLD AUTO: 1.2 % — SIGNIFICANT CHANGE UP (ref 0–1.5)
LYMPHOCYTES # BLD AUTO: 1.3 K/UL — SIGNIFICANT CHANGE UP (ref 1–3.3)
LYMPHOCYTES # BLD AUTO: 12.4 % — LOW (ref 13–44)
MAGNESIUM SERPL-MCNC: 2.5 MG/DL — SIGNIFICANT CHANGE UP (ref 1.6–2.6)
MCHC RBC-ENTMCNC: 26.3 PG — LOW (ref 27–34)
MCHC RBC-ENTMCNC: 34.3 GM/DL — SIGNIFICANT CHANGE UP (ref 32–36)
MCV RBC AUTO: 76.7 FL — LOW (ref 80–100)
MONOCYTES # BLD AUTO: 0.58 K/UL — SIGNIFICANT CHANGE UP (ref 0–0.9)
MONOCYTES NFR BLD AUTO: 5.5 % — SIGNIFICANT CHANGE UP (ref 2–14)
NEUTROPHILS # BLD AUTO: 8.25 K/UL — HIGH (ref 1.8–7.4)
NEUTROPHILS NFR BLD AUTO: 78.8 % — HIGH (ref 43–77)
NRBC # BLD: 0 /100 WBCS — SIGNIFICANT CHANGE UP (ref 0–0)
PHOSPHATE SERPL-MCNC: 1.3 MG/DL — LOW (ref 2.5–4.5)
PLATELET # BLD AUTO: 301 K/UL — SIGNIFICANT CHANGE UP (ref 150–400)
POTASSIUM SERPL-MCNC: 3.5 MMOL/L — SIGNIFICANT CHANGE UP (ref 3.5–5.3)
POTASSIUM SERPL-SCNC: 3.5 MMOL/L — SIGNIFICANT CHANGE UP (ref 3.5–5.3)
PROCALCITONIN SERPL-MCNC: 0.75 NG/ML — HIGH (ref 0.02–0.1)
RBC # BLD: 3.69 M/UL — LOW (ref 4.2–5.8)
RBC # FLD: 14.4 % — SIGNIFICANT CHANGE UP (ref 10.3–14.5)
SODIUM SERPL-SCNC: 143 MMOL/L — SIGNIFICANT CHANGE UP (ref 135–145)
SPECIMEN SOURCE: SIGNIFICANT CHANGE UP
WBC # BLD: 10.48 K/UL — SIGNIFICANT CHANGE UP (ref 3.8–10.5)
WBC # FLD AUTO: 10.48 K/UL — SIGNIFICANT CHANGE UP (ref 3.8–10.5)

## 2018-11-24 RX ORDER — PANTOPRAZOLE SODIUM 20 MG/1
40 TABLET, DELAYED RELEASE ORAL DAILY
Qty: 0 | Refills: 0 | Status: DISCONTINUED | OUTPATIENT
Start: 2018-11-24 | End: 2018-11-29

## 2018-11-24 RX ORDER — POTASSIUM PHOSPHATE, MONOBASIC POTASSIUM PHOSPHATE, DIBASIC 236; 224 MG/ML; MG/ML
15 INJECTION, SOLUTION INTRAVENOUS ONCE
Qty: 0 | Refills: 0 | Status: COMPLETED | OUTPATIENT
Start: 2018-11-24 | End: 2018-11-24

## 2018-11-24 RX ORDER — SODIUM CHLORIDE 9 MG/ML
1000 INJECTION, SOLUTION INTRAVENOUS
Qty: 0 | Refills: 0 | Status: DISCONTINUED | OUTPATIENT
Start: 2018-11-24 | End: 2018-11-26

## 2018-11-24 RX ADMIN — CARVEDILOL PHOSPHATE 3.12 MILLIGRAM(S): 80 CAPSULE, EXTENDED RELEASE ORAL at 06:25

## 2018-11-24 RX ADMIN — Medication 250 MILLIGRAM(S): at 12:06

## 2018-11-24 RX ADMIN — MEROPENEM 100 MILLIGRAM(S): 1 INJECTION INTRAVENOUS at 13:33

## 2018-11-24 RX ADMIN — Medication 250 MILLIGRAM(S): at 18:07

## 2018-11-24 RX ADMIN — MEROPENEM 100 MILLIGRAM(S): 1 INJECTION INTRAVENOUS at 09:16

## 2018-11-24 RX ADMIN — POTASSIUM PHOSPHATE, MONOBASIC POTASSIUM PHOSPHATE, DIBASIC 63.75 MILLIMOLE(S): 236; 224 INJECTION, SOLUTION INTRAVENOUS at 14:15

## 2018-11-24 RX ADMIN — MEROPENEM 100 MILLIGRAM(S): 1 INJECTION INTRAVENOUS at 00:21

## 2018-11-24 RX ADMIN — HEPARIN SODIUM 5000 UNIT(S): 5000 INJECTION INTRAVENOUS; SUBCUTANEOUS at 06:25

## 2018-11-24 RX ADMIN — HEPARIN SODIUM 5000 UNIT(S): 5000 INJECTION INTRAVENOUS; SUBCUTANEOUS at 13:33

## 2018-11-24 RX ADMIN — Medication 3 MILLILITER(S): at 05:28

## 2018-11-24 RX ADMIN — Medication 3 MILLILITER(S): at 11:22

## 2018-11-24 RX ADMIN — Medication 3 MILLILITER(S): at 17:05

## 2018-11-24 RX ADMIN — CARVEDILOL PHOSPHATE 3.12 MILLIGRAM(S): 80 CAPSULE, EXTENDED RELEASE ORAL at 18:07

## 2018-11-24 RX ADMIN — Medication 3 MILLILITER(S): at 23:27

## 2018-11-24 RX ADMIN — MEROPENEM 100 MILLIGRAM(S): 1 INJECTION INTRAVENOUS at 22:24

## 2018-11-24 RX ADMIN — SODIUM CHLORIDE 50 MILLILITER(S): 9 INJECTION, SOLUTION INTRAVENOUS at 22:24

## 2018-11-24 RX ADMIN — HEPARIN SODIUM 5000 UNIT(S): 5000 INJECTION INTRAVENOUS; SUBCUTANEOUS at 22:24

## 2018-11-24 RX ADMIN — Medication 3 MILLILITER(S): at 00:13

## 2018-11-24 NOTE — PROGRESS NOTE ADULT - SUBJECTIVE AND OBJECTIVE BOX
INTERVAL HPI:    68M with HTN, Prostate Ca (s/p resection and radiation), AICD placement s/p cardiac arrest and coma (due to brain injury) about 10yrs ago, and known 2 year history of reducible right inguinal hernia.   Presented  to the ER c/o diffuse abdominal pain and right scrotal pain for 1 week which got worst over the course of 2 days.. This was also associated with nausea and non bilious emesis.  Found to have incarcerated Right Inguinal hernia, requiring Ex Lap, Cecal resection and repair of hernia with mesh. Post op observed in ICU,  with significant drop in H & H requiring PRBC transfusion.   11/22/18: transferred to medical flo.  Reported to have significant upper airway secretion which ar difficult to clear at times. Non smoker per wife.    OVERNIGHT EVENTS:  Now on monitor bed. seen by ID, antibiotic coverage broadened.    Vital Signs Last 24 Hrs  T(C): 36.9 (24 Nov 2018 16:02), Max: 37.9 (23 Nov 2018 21:30)  T(F): 98.4 (24 Nov 2018 16:02), Max: 100.2 (23 Nov 2018 21:30)  HR: 106 (24 Nov 2018 16:02) (99 - 110)  BP: 127/71 (24 Nov 2018 16:02) (120/62 - 147/72)  BP(mean): --  RR: 18 (24 Nov 2018 16:02) (18 - 18)  SpO2: 99% (24 Nov 2018 16:02) (98% - 100%)    PHYSICAL EXAM:  GEN:         Awake, responsive and comfortable.  HEENT:    Normal.    RESP:        no wheezing.  CVS:          Regular rate and rhythm.   ABD:         Soft, non-tender, non-distended;     MEDICATIONS  (STANDING):  ALBUTerol/ipratropium for Nebulization 3 milliLiter(s) Nebulizer every 6 hours  carvedilol 3.125 milliGRAM(s) Oral every 12 hours  dextrose 5% + sodium chloride 0.45%. 1000 milliLiter(s) (50 mL/Hr) IV Continuous <Continuous>  heparin  Injectable 5000 Unit(s) SubCutaneous every 8 hours  meropenem  IVPB 1000 milliGRAM(s) IV Intermittent every 8 hours  vancomycin  IVPB 1000 milliGRAM(s) IV Intermittent every 12 hours    MEDICATIONS  (PRN):  acetaminophen  Suppository .. 650 milliGRAM(s) Rectal every 6 hours PRN Temp greater or equal to 38C (100.4F), Mild Pain (1 - 3)  HYDROmorphone  Injectable 0.5 milliGRAM(s) IV Push every 6 hours PRN Severe Pain (7 - 10)    LABS:                        9.7    10.48 )-----------( 301      ( 24 Nov 2018 07:48 )             28.3     11-24    143  |  105  |  14  ----------------------------<  114<H>  3.5   |  30  |  0.93    Ca    6.9<L>      24 Nov 2018 07:48  Phos  1.3     11-24  Mg     2.5     11-24 11-19 @ 02:41  pH: 7.46  pCO2: 33  pO2: 148  SaO2: 98    ASSESSMENT AND PLAN:  ·	Bilateral infiltrates, Atelectasis vs Pneumonia.  ·	Bilateral pleural effusion.  ·	S/P Ex laparotomy with cecal resection.  ·	Right Inguinal hernia repair.  ·	Anemia.  ·	HTN.  ·	S/P Hypoxic Encephalopathy.    Continue antibiotics and nebulizer.  Discussed with wife at bedside.

## 2018-11-24 NOTE — PROGRESS NOTE ADULT - SUBJECTIVE AND OBJECTIVE BOX
Postoperative Day #: 6  Patient seen and examined bedside with daughter, Marcia, present.  Pt denies n/v and chest pain. States he is "breathing correctly".  No acute events.     T(F): 99.1 (11-24-18 @ 11:49), Max: 100.2 (11-23-18 @ 21:30)  HR: 105 (11-24-18 @ 11:49) (99 - 119)  BP: 121/62 (11-24-18 @ 11:49) (120/62 - 148/80)  RR: 18 (11-24-18 @ 11:49) (18 - 18)  SpO2: 99% (11-24-18 @ 11:49) (98% - 100%)    PHYSICAL EXAM:  General: NAD, alert awake and responsive  HEENT: NCAT, EOMI, conjunctiva clear  CV: +S1+S2 regular rate and rhythm  Lung: clear to auscultation bilaterally, respirations nonlabored, good inspiratory effort  Abdomen: soft, NTND. Normoactive BS  Extremities: no pedal edema or calf tenderness noted     LABS:                        9.7    10.48 )-----------( 301      ( 24 Nov 2018 07:48 )             28.3     11-24    143  |  105  |  14  ----------------------------<  114<H>  3.5   |  30  |  0.93    Ca    6.9<L>      24 Nov 2018 07:48  Phos  1.3     11-24  Mg     2.5     11-24      Culture Results:   No growth to date. (11-23 @ 08:32)  Culture Results:   No growth to date. (11-23 @ 08:32)  Culture Results:   Normal Respiratory Ana present (11-23 @ 01:28)  Culture Results:   No growth (11-18 @ 18:13)  Culture Results:   Growth in aerobic bottle: Coag Negative Staphylococcus  Single set isolate, possible contaminant. Contact  Microbiology if susceptibility testing clinically  indicated.  "Due to technical problems, Proteus sp. will Not be reported as part of  the BCID panel until further notice"  ***Blood Panel PCR results on this specimen are available  approximately 3 hours after the Gram stain result.***  Gram stain, PCR, and/or culture results may not always  correspond due to difference in methodologies.  ************************************************************  This PCR assay was performed using [employer].  The following targets are tested for: Enterococcus,  vancomycin resistant enterococci, Listeria monocytogenes,  coagulase negative staphylococci, S. aureus,  methicillin resistant S. aureus, Streptococcus agalactiae  (Group B), S. pneumoniae, S. pyogenes (Group A),  Acinetobacter baumannii, Enterobacter cloacae, E. coli,  Klebsiella oxytoca, K. pneumoniae, Proteus sp.,  Serratia marcescens, Haemophilus influenzae,  Neisseria meningitidis, Pseudomonas aeruginosa, Candida  albicans, C. glabrata, C krusei, C parapsilosis,  C. tropicalis and the KPC resistance gene. (11-18 @ 11:41)  Culture Results:   No growth at 5 days. (11-18 @ 11:20)    PMH   Sudden cardiac death  Gait abnormality  Leg pain, right  Anoxic encephalopathy  Brain injury with coma  Prostate cancer  Hypertension Postoperative Day #: 6  Patient seen and examined bedside with daughter, Marcia, present.  Pt denies n/v and chest pain. States he is "breathing correctly".  No acute events.   Pt noted to have productive cough: sputum is green colored    T(F): 99.1 (11-24-18 @ 11:49), Max: 100.2 (11-23-18 @ 21:30)  HR: 105 (11-24-18 @ 11:49) (99 - 119)  BP: 121/62 (11-24-18 @ 11:49) (120/62 - 148/80)  RR: 18 (11-24-18 @ 11:49) (18 - 18)  SpO2: 99% (11-24-18 @ 11:49) (98% - 100%)    PHYSICAL EXAM:  General: NAD, alert awake and responsive  HEENT: NCAT, EOMI, conjunctiva clear  CV: +S1+S2  Lung: nonlabored respirations, CTAB. upper airway secretions appreciated  Abdomen:   Extremities:   : +scrotal swelling, dependent edema. condom catheter in situ, draining clear yellow urine    LABS:                        9.7    10.48 )-----------( 301      ( 24 Nov 2018 07:48 )             28.3     11-24    143  |  105  |  14  ----------------------------<  114<H>  3.5   |  30  |  0.93    Ca    6.9<L>      24 Nov 2018 07:48  Phos  1.3     11-24  Mg     2.5     11-24      Culture Results:   No growth to date. (11-23 @ 08:32)  Culture Results:   No growth to date. (11-23 @ 08:32)  Culture Results:   Normal Respiratory Ana present (11-23 @ 01:28)  Culture Results:   No growth (11-18 @ 18:13)  Culture Results:   Growth in aerobic bottle: Coag Negative Staphylococcus  Single set isolate, possible contaminant. Contact  Microbiology if susceptibility testing clinically  indicated.  "Due to technical problems, Proteus sp. will Not be reported as part of  the BCID panel until further notice"  ***Blood Panel PCR results on this specimen are available  approximately 3 hours after the Gram stain result.***  Gram stain, PCR, and/or culture results may not always  correspond due to difference in methodologies.  ************************************************************  This PCR assay was performed using [employer].  The following targets are tested for: Enterococcus,  vancomycin resistant enterococci, Listeria monocytogenes,  coagulase negative staphylococci, S. aureus,  methicillin resistant S. aureus, Streptococcus agalactiae  (Group B), S. pneumoniae, S. pyogenes (Group A),  Acinetobacter baumannii, Enterobacter cloacae, E. coli,  Klebsiella oxytoca, K. pneumoniae, Proteus sp.,  Serratia marcescens, Haemophilus influenzae,  Neisseria meningitidis, Pseudomonas aeruginosa, Candida  albicans, C. glabrata, C krusei, C parapsilosis,  C. tropicalis and the KPC resistance gene. (11-18 @ 11:41)  Culture Results:   No growth at 5 days. (11-18 @ 11:20)    PMH   Sudden cardiac death  Gait abnormality  Leg pain, right  Anoxic encephalopathy  Brain injury with coma  Prostate cancer  Hypertension Postoperative Day #: 6  Patient seen and examined bedside with daughter, Marcia, present.  Pt denies n/v and chest pain. States he is "breathing correctly".  No acute events overnight.   Pt noted to have productive cough: sputum is green colored  +Small BM this am per RN.    T(F): 99.1 (11-24-18 @ 11:49), Max: 100.2 (11-23-18 @ 21:30)  HR: 105 (11-24-18 @ 11:49) (99 - 119)  BP: 121/62 (11-24-18 @ 11:49) (120/62 - 148/80)  RR: 18 (11-24-18 @ 11:49) (18 - 18)  SpO2: 99% (11-24-18 @ 11:49) (98% - 100%)    PHYSICAL EXAM:  General: NAD, alert awake and responsive  HEENT: NCAT, EOMI, conjunctiva clear  CV: +S1+S2  Lung: nonlabored respirations, CTAB. audible upper airway secretions appreciated  Abdomen: softly distended. Hypoactive bowel sounds. Right groin incision with staples, well approximated with no surrounding erythema or induration. Midline incision with staples; iodoform packing removed from opening, noted with purulent/serous staining, irrigated with NS and new 1/4" packing placed, patient tolerated well. Dry dressing applied to midline incision and right groin incision. Right groin TENZIN drain with serous output, 30cc/24hrs  Extremities: trace edema x 4. PIV right lower leg.  : +scrotal swelling, dependent edema. Condom catheter in situ, draining clear yellow urine    LABS:                        9.7    10.48 )-----------( 301      ( 24 Nov 2018 07:48 )             28.3     11-24    143  |  105  |  14  ----------------------------<  114<H>  3.5   |  30  |  0.93    Ca    6.9<L>      24 Nov 2018 07:48  Phos  1.3     11-24  Mg     2.5     11-24      Culture Results:   No growth to date. (11-23 @ 08:32)  Culture Results:   No growth to date. (11-23 @ 08:32)  Culture Results:   Normal Respiratory Ana present (11-23 @ 01:28)  Culture Results:   No growth (11-18 @ 18:13)  Culture Results:   Growth in aerobic bottle: Coag Negative Staphylococcus  Single set isolate, possible contaminant. Contact  Microbiology if susceptibility testing clinically  indicated.  "Due to technical problems, Proteus sp. will Not be reported as part of  the BCID panel until further notice"  ***Blood Panel PCR results on this specimen are available  approximately 3 hours after the Gram stain result.***  Gram stain, PCR, and/or culture results may not always  correspond due to difference in methodologies.  ************************************************************  This PCR assay was performed using [employer].  The following targets are tested for: Enterococcus,  vancomycin resistant enterococci, Listeria monocytogenes,  coagulase negative staphylococci, S. aureus,  methicillin resistant S. aureus, Streptococcus agalactiae  (Group B), S. pneumoniae, S. pyogenes (Group A),  Acinetobacter baumannii, Enterobacter cloacae, E. coli,  Klebsiella oxytoca, K. pneumoniae, Proteus sp.,  Serratia marcescens, Haemophilus influenzae,  Neisseria meningitidis, Pseudomonas aeruginosa, Candida  albicans, C. glabrata, C krusei, C parapsilosis,  C. tropicalis and the KPC resistance gene. (11-18 @ 11:41)  Culture Results:   No growth at 5 days. (11-18 @ 11:20)    PMH   Sudden cardiac death  Gait abnormality  Leg pain, right  Anoxic encephalopathy  Brain injury with coma  Prostate cancer  Hypertension    BCx 11/23 NGTD  Culture - Sputum . (11.23.18 @ 01:28)    Gram Stain:   Moderate polymorphonuclear leukocytes per low power field  Moderate Squamous epithelial cells per low power field  Numerous Gram Negative Rods per oil power field  Moderate Gram Positive Cocci in Clusters per oil power field  Few Gram Positive Rodsper oil power field    Specimen Source: .Sputum Sputum    Culture Results:   Normal Respiratory Ana present    < from: Xray Chest 1 View- PORTABLE-Urgent (11.22.18 @ 20:06) >  IMPRESSION: There is rounded patchy opacity within theright midlung.   There are trace bilateral pleural effusions. Evaluation is limited by   suboptimal inspiration. There is a single lead left-sided cardiac pacing   device in place. Heart size cannot be accurately evaluated in this   projection.      < end of copied text >      Impression: 68 year old male POD#6 s/p ex lap, colon resection, repair of incarcerated right inguinal hernia with mesh,   course c/b post op anemia s/p 2uPRBC 11/21 (h/h now stable), with productive cough and fevers, b/l pleural effusions, likely pneumonia  with hypophosphatemia     - electrolyte repletion IV  - continue NPO except meds. IVF maintenance. Will get speech and swallow evaluation  - as discussed with Dr Tinsley, pulmonary toilet/chest PT, OOB to chair, increase activity, PT consult ordered. Continue aspiration precautions and duoneb respiratory treatments   - ID Follow up appreciated, continue abx Vanco/merrem. Procalcitonin result pending.   - medical, cardio and pulm Follow up appreciated. Continue tele monitoring  - continue local wound care by surgical team, iodoform packing Postoperative Day #: 6  Patient seen and examined bedside with daughter, Marcia, present.  Pt denies n/v and chest pain. States he is "breathing correctly".  No acute events overnight.   Pt noted to have productive cough: sputum is green colored  +Small BM this am per RN.    T(F): 99.1 (11-24-18 @ 11:49), Max: 100.2 (11-23-18 @ 21:30)  HR: 105 (11-24-18 @ 11:49) (99 - 119)  BP: 121/62 (11-24-18 @ 11:49) (120/62 - 148/80)  RR: 18 (11-24-18 @ 11:49) (18 - 18)  SpO2: 99% (11-24-18 @ 11:49) (98% - 100%)    PHYSICAL EXAM:  General: NAD, alert awake and responsive  HEENT: NCAT, EOMI, conjunctiva clear  CV: +S1+S2  Lung: nonlabored respirations, CTAB. audible upper airway secretions appreciated  Abdomen: softly distended. Hypoactive bowel sounds. Right groin incision with staples, well approximated with no surrounding erythema or induration. Midline incision with staples; iodoform packing removed from opening, noted with purulent/serous staining, irrigated with NS and new 1/4" packing placed, patient tolerated well. Dry dressing applied to midline incision and right groin incision. Right groin TENZIN drain with serous output, 30cc/24hrs  Extremities: trace edema x 4. PIV right lower leg.  : +scrotal swelling, dependent edema. Condom catheter in situ, draining clear yellow urine    LABS:                        9.7    10.48 )-----------( 301      ( 24 Nov 2018 07:48 )             28.3     11-24    143  |  105  |  14  ----------------------------<  114<H>  3.5   |  30  |  0.93    Ca    6.9<L>      24 Nov 2018 07:48  Phos  1.3     11-24  Mg     2.5     11-24      I/Os 220/1280cc    Culture Results:   No growth to date. (11-23 @ 08:32)  Culture Results:   No growth to date. (11-23 @ 08:32)  Culture Results:   Normal Respiratory Ana present (11-23 @ 01:28)  Culture Results:   No growth (11-18 @ 18:13)  Culture Results:   Growth in aerobic bottle: Coag Negative Staphylococcus  Single set isolate, possible contaminant. Contact  Microbiology if susceptibility testing clinically  indicated.  "Due to technical problems, Proteus sp. will Not be reported as part of  the BCID panel until further notice"  ***Blood Panel PCR results on this specimen are available  approximately 3 hours after the Gram stain result.***  Gram stain, PCR, and/or culture results may not always  correspond due to difference in methodologies.  ************************************************************  This PCR assay was performed using [employer].  The following targets are tested for: Enterococcus,  vancomycin resistant enterococci, Listeria monocytogenes,  coagulase negative staphylococci, S. aureus,  methicillin resistant S. aureus, Streptococcus agalactiae  (Group B), S. pneumoniae, S. pyogenes (Group A),  Acinetobacter baumannii, Enterobacter cloacae, E. coli,  Klebsiella oxytoca, K. pneumoniae, Proteus sp.,  Serratia marcescens, Haemophilus influenzae,  Neisseria meningitidis, Pseudomonas aeruginosa, Candida  albicans, C. glabrata, C krusei, C parapsilosis,  C. tropicalis and the KPC resistance gene. (11-18 @ 11:41)  Culture Results:   No growth at 5 days. (11-18 @ 11:20)    PMH   Sudden cardiac death  Gait abnormality  Leg pain, right  Anoxic encephalopathy  Brain injury with coma  Prostate cancer  Hypertension    BCx 11/23 NGTD  Culture - Sputum . (11.23.18 @ 01:28)    Gram Stain:   Moderate polymorphonuclear leukocytes per low power field  Moderate Squamous epithelial cells per low power field  Numerous Gram Negative Rods per oil power field  Moderate Gram Positive Cocci in Clusters per oil power field  Few Gram Positive Rodsper oil power field    Specimen Source: .Sputum Sputum    Culture Results:   Normal Respiratory Ana present    < from: Xray Chest 1 View- PORTABLE-Urgent (11.22.18 @ 20:06) >  IMPRESSION: There is rounded patchy opacity within theright midlung.   There are trace bilateral pleural effusions. Evaluation is limited by   suboptimal inspiration. There is a single lead left-sided cardiac pacing   device in place. Heart size cannot be accurately evaluated in this   projection.      < end of copied text >      Impression: 68 year old male POD#6 s/p ex lap, colon resection, repair of incarcerated right inguinal hernia with mesh,   course c/b post op anemia s/p 2uPRBC 11/21 (h/h now stable), with productive cough and fevers, b/l pleural effusions, likely pneumonia  with hypophosphatemia     - electrolyte repletion IV  - continue NPO except meds. IVF maintenance. Will get speech and swallow evaluation  - as discussed with Dr Tinsley, pulmonary toilet/chest PT, OOB to chair, increase activity, PT consult ordered. Continue aspiration precautions and duoneb respiratory treatments   - ID Follow up appreciated, continue abx Vanco/merrem. Procalcitonin result pending. F/u wound culture results  - medical, cardio and pulm Follow up appreciated. Continue tele monitoring  - continue local wound care by surgical team, iodoform packing Postoperative Day #: 6  Patient seen and examined bedside with daughter, Marcia, present.  Pt denies n/v and chest pain. States he is "breathing correctly".  No acute events overnight.   Pt noted to have productive cough: sputum is green colored  +Small BM this am per RN.    T(F): 99.1 (11-24-18 @ 11:49), Max: 100.2 (11-23-18 @ 21:30)  HR: 105 (11-24-18 @ 11:49) (99 - 119)  BP: 121/62 (11-24-18 @ 11:49) (120/62 - 148/80)  RR: 18 (11-24-18 @ 11:49) (18 - 18)  SpO2: 99% (11-24-18 @ 11:49) (98% - 100%)    PHYSICAL EXAM:  General: NAD, alert awake and responsive  HEENT: NCAT, EOMI, conjunctiva clear  CV: +S1+S2  Lung: nonlabored respirations, CTAB. audible upper airway secretions appreciated  Abdomen: softly distended. Hypoactive bowel sounds. Right groin incision with staples, well approximated with no surrounding erythema or induration. Midline incision with staples; iodoform packing removed from opening, noted with purulent/serous staining, irrigated with NS and new 1/4" packing placed, patient tolerated well. Dry dressing applied to midline incision and right groin incision. Right groin TENZIN drain with serous output, 30cc/24hrs  Extremities: trace edema x 4. PIV right lower leg.  : +scrotal swelling, dependent edema. Condom catheter in situ, draining clear yellow urine    LABS:                        9.7    10.48 )-----------( 301      ( 24 Nov 2018 07:48 )             28.3     11-24    143  |  105  |  14  ----------------------------<  114<H>  3.5   |  30  |  0.93    Ca    6.9<L>      24 Nov 2018 07:48  Phos  1.3     11-24  Mg     2.5     11-24      I/Os 220/1280cc    Culture Results:   No growth to date. (11-23 @ 08:32)  Culture Results:   No growth to date. (11-23 @ 08:32)  Culture Results:   Normal Respiratory Ana present (11-23 @ 01:28)  Culture Results:   No growth (11-18 @ 18:13)  Culture Results:   Growth in aerobic bottle: Coag Negative Staphylococcus  Single set isolate, possible contaminant. Contact  Microbiology if susceptibility testing clinically  indicated.  "Due to technical problems, Proteus sp. will Not be reported as part of  the BCID panel until further notice"  ***Blood Panel PCR results on this specimen are available  approximately 3 hours after the Gram stain result.***  Gram stain, PCR, and/or culture results may not always  correspond due to difference in methodologies.  ************************************************************  This PCR assay was performed using [employer].  The following targets are tested for: Enterococcus,  vancomycin resistant enterococci, Listeria monocytogenes,  coagulase negative staphylococci, S. aureus,  methicillin resistant S. aureus, Streptococcus agalactiae  (Group B), S. pneumoniae, S. pyogenes (Group A),  Acinetobacter baumannii, Enterobacter cloacae, E. coli,  Klebsiella oxytoca, K. pneumoniae, Proteus sp.,  Serratia marcescens, Haemophilus influenzae,  Neisseria meningitidis, Pseudomonas aeruginosa, Candida  albicans, C. glabrata, C krusei, C parapsilosis,  C. tropicalis and the KPC resistance gene. (11-18 @ 11:41)  Culture Results:   No growth at 5 days. (11-18 @ 11:20)    PMH   Sudden cardiac death  Gait abnormality  Leg pain, right  Anoxic encephalopathy  Brain injury with coma  Prostate cancer  Hypertension    BCx 11/23 NGTD  Culture - Sputum . (11.23.18 @ 01:28)    Gram Stain:   Moderate polymorphonuclear leukocytes per low power field  Moderate Squamous epithelial cells per low power field  Numerous Gram Negative Rods per oil power field  Moderate Gram Positive Cocci in Clusters per oil power field  Few Gram Positive Rodsper oil power field    Specimen Source: .Sputum Sputum    Culture Results:   Normal Respiratory Ana present    < from: Xray Chest 1 View- PORTABLE-Urgent (11.22.18 @ 20:06) >  IMPRESSION: There is rounded patchy opacity within theright midlung.   There are trace bilateral pleural effusions. Evaluation is limited by   suboptimal inspiration. There is a single lead left-sided cardiac pacing   device in place. Heart size cannot be accurately evaluated in this   projection.      < end of copied text >      Impression: 68 year old male POD#6 s/p ex lap, colon resection, repair of incarcerated right inguinal hernia with mesh,   course c/b post op anemia s/p 2uPRBC 11/21 (h/h now stable), with productive cough and fevers, b/l pleural effusions, likely pneumonia  with hypophosphatemia     - electrolyte repletion IV  - continue NPO except meds. IVF maintenance. Will get speech and swallow evaluation  - as discussed with Dr Tinsley, pulmonary toilet/chest PT, OOB to chair, increase activity, PT consult ordered. Continue aspiration precautions and duoneb respiratory treatments   - ID Follow up appreciated, continue abx Vanco/merrem. Procalcitonin result pending. F/u wound culture results  - medical, cardio and pulm Follow up appreciated. Continue tele monitoring  - continue local wound care by surgical team, iodoform packing

## 2018-11-24 NOTE — PROGRESS NOTE ADULT - SUBJECTIVE AND OBJECTIVE BOX
BREATHING MORE COMFORTABLY  HEMODYNAMICALLY STABLE  TELEMETRY: NORMAL SINUS RHYTHM VPC  NO JVD  COARSE BREATH SOUNDS  DISTANT HEART SOUNDS  SURGICAL WOUND/SCAR: CLEAN DRY & INTACT PROTRUBERANT HYPOACTIVE BOWEL SOUNDS  NO CLUBBING CYANOSIS OR EDEMA    STATUS  POST REPAIR OF INCARCERATED INGUINAL , SCROTAL HERNIA  REMOTE HYPOXIC ENCEPHALOPATHY  STATUS  POST AICD    HEMODYNAMICALLY STABLE   ON ANTIBIOTICS  STABLE CARDIOVASCULAR STATUS; CONTINUING WITH PRESENT MANAGEMENT.     FAMILY PRESENT AT BEDSIDE     JEZ LEE MD, FACC

## 2018-11-24 NOTE — PROGRESS NOTE ADULT - ATTENDING COMMENTS
I examined patient with surgical PA, agree with note.  Pulmonary and ID consult appreciated.  IV antibiotics, Vanco/Meropenem . IVF  Replete electrolytes   Local wound care provided.  Needs Swallow test evaluation

## 2018-11-24 NOTE — PROGRESS NOTE ADULT - SUBJECTIVE AND OBJECTIVE BOX
Patient is a 68y old  Male who presents with a chief complaint of Abdominal pain and scrotal pain, Incarcerated R Inguino-scrotal Hernia. (23 Nov 2018 20:05)  improving. Hb stable    INTERVAL HPI/OVERNIGHT EVENTS:un eventful  PAST MEDICAL & SURGICAL HISTORY:  Sudden cardiac death  Gait abnormality  Leg pain, right  Anoxic encephalopathy  Brain injury with coma: x 2 weeks in 2008  Prostate cancer: radiation therapy  Hypertension  S/P ICD (internal cardiac defibrillator) procedure: implanted on 1/6/2009  H/O tracheostomy  H/O prostate cancer: resection and radiation therapy  Status post cryoablation: of left renal mass      MEDICATIONS  (STANDING):  ALBUTerol/ipratropium for Nebulization 3 milliLiter(s) Nebulizer every 6 hours  carvedilol 3.125 milliGRAM(s) Oral every 12 hours  heparin  Injectable 5000 Unit(s) SubCutaneous every 8 hours  meropenem  IVPB 1000 milliGRAM(s) IV Intermittent every 8 hours  potassium phosphate IVPB 15 milliMole(s) IV Intermittent once  vancomycin  IVPB 1000 milliGRAM(s) IV Intermittent every 12 hours    MEDICATIONS  (PRN):  acetaminophen  Suppository .. 650 milliGRAM(s) Rectal every 6 hours PRN Temp greater or equal to 38C (100.4F), Mild Pain (1 - 3)  HYDROmorphone  Injectable 0.5 milliGRAM(s) IV Push every 6 hours PRN Severe Pain (7 - 10)      Allergies    lisinopril (Unknown)  penicillins (Unknown)    Intolerances        REVIEW OF SYSTEMS:  CONSTITUTIONAL: No fever, weight loss, or fatigue  EYES: No eye pain, visual disturbances, or discharge  ENMT:  No difficulty hearing, tinnitus, vertigo; No sinus or throat pain  NECK: No pain or stiffness  BREASTS: No pain, masses, or nipple discharge  RESPIRATORY: No cough, wheezing, chills or hemoptysis; No shortness of breath  CARDIOVASCULAR: No chest pain, palpitations, dizziness, or leg swelling  GASTROINTESTINAL: No abdominal or epigastric pain. No nausea, vomiting, or hematemesis; No diarrhea or constipation. No melena or hematochezia.  GENITOURINARY: No dysuria, frequency, hematuria, or incontinence  NEUROLOGICAL: No headaches, memory loss, loss of strength, numbness, or tremors  SKIN: No itching, burning, rashes, or lesions   LYMPH NODES: No enlarged glands  ENDOCRINE: No heat or cold intolerance; No hair loss  MUSCULOSKELETAL: No joint pain or swelling; No muscle, back, or extremity pain  PSYCHIATRIC: No depression, anxiety, mood swings, or difficulty sleeping  HEME/LYMPH: No easy bruising, or bleeding gums  ALLERY AND IMMUNOLOGIC: No hives or eczema    Vital Signs Last 24 Hrs  T(C): 37.1 (24 Nov 2018 05:24), Max: 37.9 (23 Nov 2018 21:30)  T(F): 98.7 (24 Nov 2018 05:24), Max: 100.2 (23 Nov 2018 21:30)  HR: 105 (24 Nov 2018 05:29) (99 - 119)  BP: 136/72 (24 Nov 2018 05:24) (120/62 - 148/80)  BP(mean): --  RR: 18 (24 Nov 2018 05:24) (18 - 18)  SpO2: 99% (24 Nov 2018 05:29) (97% - 100%)    PHYSICAL EXAM:  GENERAL: NAD, well-groomed, well-developed  HEAD:  Atraumatic, Normocephalic  EYES: EOMI, PERRLA, conjunctiva and sclera clear  ENMT: No tonsillar erythema, exudates, or enlargement; Moist mucous membranes, Good dentition, No lesions  NECK: Supple, No JVD, Normal thyroid  NERVOUS SYSTEM:  Alert & Oriented X3, Good concentration; Motor Strength 5/5 B/L upper and lower extremities; DTRs 2+ intact and symmetric  CHEST/LUNG: Clear to percussion bilaterally; No rales, rhonchi, wheezing, or rubs  HEART: Regular rate and rhythm; No murmurs, rubs, or gallops  ABDOMEN: Soft, Nontender, Nondistended; Bowel sounds present. has scrotal swelling.  EXTREMITIES:  2+ Peripheral Pulses, No clubbing, cyanosis, or edema  LYMPH: No lymphadenopathy noted  SKIN: No rashes or lesions    LABS:                        9.7    10.48 )-----------( 301      ( 24 Nov 2018 07:48 )             28.3     11-24    143  |  105  |  14  ----------------------------<  114<H>  3.5   |  30  |  0.93    Ca    6.9<L>      24 Nov 2018 07:48  Phos  1.3     11-24  Mg     2.5     11-24        Imaging Personally Reviewed:  [ ] YES  [ ] NO    Consultant(s) Notes Reviewed:  [ ] YES  [ ] NO    Care Discussed with Consultants/Other Providers [ ] YES  [ ] NO    Care discussed with family,         [  ]   yes  [  ]  No    imp:    stable[ ]    unstable[  ]     improving [  x ]       unchanged  [  ]                Plans:  Continue present plans  [x ] as per surgery.               New consult [  ]   specialty  .......               order test[  ]    test name.                  Discharge Planning  [  ]

## 2018-11-25 LAB
ANION GAP SERPL CALC-SCNC: 9 MMOL/L — SIGNIFICANT CHANGE UP (ref 5–17)
BASOPHILS # BLD AUTO: 0.02 K/UL — SIGNIFICANT CHANGE UP (ref 0–0.2)
BASOPHILS NFR BLD AUTO: 0.2 % — SIGNIFICANT CHANGE UP (ref 0–2)
BUN SERPL-MCNC: 15 MG/DL — SIGNIFICANT CHANGE UP (ref 7–23)
CALCIUM SERPL-MCNC: 6.7 MG/DL — LOW (ref 8.5–10.1)
CHLORIDE SERPL-SCNC: 105 MMOL/L — SIGNIFICANT CHANGE UP (ref 96–108)
CO2 SERPL-SCNC: 28 MMOL/L — SIGNIFICANT CHANGE UP (ref 22–31)
CREAT SERPL-MCNC: 0.78 MG/DL — SIGNIFICANT CHANGE UP (ref 0.5–1.3)
EOSINOPHIL # BLD AUTO: 0.03 K/UL — SIGNIFICANT CHANGE UP (ref 0–0.5)
EOSINOPHIL NFR BLD AUTO: 0.3 % — SIGNIFICANT CHANGE UP (ref 0–6)
GLUCOSE SERPL-MCNC: 154 MG/DL — HIGH (ref 70–99)
HCT VFR BLD CALC: 28.5 % — LOW (ref 39–50)
HGB BLD-MCNC: 9.7 G/DL — LOW (ref 13–17)
IMM GRANULOCYTES NFR BLD AUTO: 1.2 % — SIGNIFICANT CHANGE UP (ref 0–1.5)
LYMPHOCYTES # BLD AUTO: 1.22 K/UL — SIGNIFICANT CHANGE UP (ref 1–3.3)
LYMPHOCYTES # BLD AUTO: 10.4 % — LOW (ref 13–44)
MAGNESIUM SERPL-MCNC: 2.6 MG/DL — SIGNIFICANT CHANGE UP (ref 1.6–2.6)
MCHC RBC-ENTMCNC: 26.1 PG — LOW (ref 27–34)
MCHC RBC-ENTMCNC: 34 GM/DL — SIGNIFICANT CHANGE UP (ref 32–36)
MCV RBC AUTO: 76.8 FL — LOW (ref 80–100)
MONOCYTES # BLD AUTO: 0.65 K/UL — SIGNIFICANT CHANGE UP (ref 0–0.9)
MONOCYTES NFR BLD AUTO: 5.5 % — SIGNIFICANT CHANGE UP (ref 2–14)
NEUTROPHILS # BLD AUTO: 9.72 K/UL — HIGH (ref 1.8–7.4)
NEUTROPHILS NFR BLD AUTO: 82.4 % — HIGH (ref 43–77)
NRBC # BLD: 0 /100 WBCS — SIGNIFICANT CHANGE UP (ref 0–0)
PHOSPHATE SERPL-MCNC: 1.8 MG/DL — LOW (ref 2.5–4.5)
PLATELET # BLD AUTO: 378 K/UL — SIGNIFICANT CHANGE UP (ref 150–400)
POTASSIUM SERPL-MCNC: 3.6 MMOL/L — SIGNIFICANT CHANGE UP (ref 3.5–5.3)
POTASSIUM SERPL-SCNC: 3.6 MMOL/L — SIGNIFICANT CHANGE UP (ref 3.5–5.3)
RBC # BLD: 3.71 M/UL — LOW (ref 4.2–5.8)
RBC # FLD: 14.7 % — HIGH (ref 10.3–14.5)
SODIUM SERPL-SCNC: 142 MMOL/L — SIGNIFICANT CHANGE UP (ref 135–145)
WBC # BLD: 11.78 K/UL — HIGH (ref 3.8–10.5)
WBC # FLD AUTO: 11.78 K/UL — HIGH (ref 3.8–10.5)

## 2018-11-25 RX ADMIN — MEROPENEM 100 MILLIGRAM(S): 1 INJECTION INTRAVENOUS at 22:08

## 2018-11-25 RX ADMIN — HEPARIN SODIUM 5000 UNIT(S): 5000 INJECTION INTRAVENOUS; SUBCUTANEOUS at 22:08

## 2018-11-25 RX ADMIN — Medication 3 MILLILITER(S): at 05:10

## 2018-11-25 RX ADMIN — Medication 250 MILLIGRAM(S): at 06:19

## 2018-11-25 RX ADMIN — PANTOPRAZOLE SODIUM 40 MILLIGRAM(S): 20 TABLET, DELAYED RELEASE ORAL at 12:29

## 2018-11-25 RX ADMIN — Medication 3 MILLILITER(S): at 17:27

## 2018-11-25 RX ADMIN — MEROPENEM 100 MILLIGRAM(S): 1 INJECTION INTRAVENOUS at 06:19

## 2018-11-25 RX ADMIN — HEPARIN SODIUM 5000 UNIT(S): 5000 INJECTION INTRAVENOUS; SUBCUTANEOUS at 06:19

## 2018-11-25 RX ADMIN — MEROPENEM 100 MILLIGRAM(S): 1 INJECTION INTRAVENOUS at 14:50

## 2018-11-25 RX ADMIN — HEPARIN SODIUM 5000 UNIT(S): 5000 INJECTION INTRAVENOUS; SUBCUTANEOUS at 14:50

## 2018-11-25 RX ADMIN — Medication 250 MILLIGRAM(S): at 17:27

## 2018-11-25 RX ADMIN — CARVEDILOL PHOSPHATE 3.12 MILLIGRAM(S): 80 CAPSULE, EXTENDED RELEASE ORAL at 17:27

## 2018-11-25 RX ADMIN — Medication 3 MILLILITER(S): at 11:35

## 2018-11-25 NOTE — PROGRESS NOTE ADULT - SUBJECTIVE AND OBJECTIVE BOX
Patient is a 68y old  Male who presents with a chief complaint of Abdominal pain and scrotal pain, Incarcerated R Inguino-scrotal Hernia. (24 Nov 2018 17:16)  resuperating after hernia surgery and cecotomy. still NPO.    On IV abx as per Id. Merem, flagyl and diflucan  Pt able to swallow      INTERVAL HPI/OVERNIGHT EVENTS:No fever. no distress. some edema of the extremities.  PAST MEDICAL & SURGICAL HISTORY:  Sudden cardiac death  Gait abnormality  Leg pain, right  Anoxic encephalopathy  Brain injury with coma: x 2 weeks in 2008  Prostate cancer: radiation therapy  Hypertension  S/P ICD (internal cardiac defibrillator) procedure: implanted on 1/6/2009  H/O tracheostomy  H/O prostate cancer: resection and radiation therapy  Status post cryoablation: of left renal mass      MEDICATIONS  (STANDING):  ALBUTerol/ipratropium for Nebulization 3 milliLiter(s) Nebulizer every 6 hours  carvedilol 3.125 milliGRAM(s) Oral every 12 hours  dextrose 5% + sodium chloride 0.45%. 1000 milliLiter(s) (50 mL/Hr) IV Continuous <Continuous>  heparin  Injectable 5000 Unit(s) SubCutaneous every 8 hours  meropenem  IVPB 1000 milliGRAM(s) IV Intermittent every 8 hours  pantoprazole  Injectable 40 milliGRAM(s) IV Push daily  vancomycin  IVPB 1000 milliGRAM(s) IV Intermittent every 12 hours    MEDICATIONS  (PRN):  acetaminophen  Suppository .. 650 milliGRAM(s) Rectal every 6 hours PRN Temp greater or equal to 38C (100.4F), Mild Pain (1 - 3)  HYDROmorphone  Injectable 0.5 milliGRAM(s) IV Push every 6 hours PRN Severe Pain (7 - 10)      Allergies    lisinopril (Unknown)  penicillins (Unknown)    Intolerances        REVIEW OF SYSTEMS:  CONSTITUTIONAL: No fever, weight loss, or fatigue  EYES: No eye pain, visual disturbances, or discharge  ENMT:  No difficulty hearing, tinnitus, vertigo; No sinus or throat pain  NECK: No pain or stiffness  BREASTS: No pain, masses, or nipple discharge  RESPIRATORY: No cough, wheezing, chills or hemoptysis; No shortness of breath  CARDIOVASCULAR: No chest pain, palpitations, dizziness, or leg swelling  GASTROINTESTINAL: No abdominal or epigastric pain. No nausea, vomiting, or hematemesis; No diarrhea or constipation. No melena or hematochezia.  GENITOURINARY: No dysuria, frequency, hematuria, or incontinence  NEUROLOGICAL: No headaches, memory loss, loss of strength, numbness, or tremors  SKIN: No itching, burning, rashes, or lesions   LYMPH NODES: No enlarged glands  ENDOCRINE: No heat or cold intolerance; No hair loss  MUSCULOSKELETAL: No joint pain or swelling; No muscle, back, or extremity pain  PSYCHIATRIC: No depression, anxiety, mood swings, or difficulty sleeping  HEME/LYMPH: No easy bruising, or bleeding gums  ALLERY AND IMMUNOLOGIC: No hives or eczema    Vital Signs Last 24 Hrs  T(C): 36.8 (25 Nov 2018 05:24), Max: 37.6 (25 Nov 2018 00:25)  T(F): 98.3 (25 Nov 2018 05:24), Max: 99.7 (25 Nov 2018 00:25)  HR: 96 (25 Nov 2018 06:50) (96 - 106)  BP: 143/77 (25 Nov 2018 05:24) (121/62 - 143/77)  BP(mean): --  RR: 18 (25 Nov 2018 05:24) (17 - 18)  SpO2: 97% (25 Nov 2018 06:50) (97% - 99%)    PHYSICAL EXAM:  GENERAL: NAD, well-groomed, well-developed  HEAD:  Atraumatic, Normocephalic  EYES: EOMI, PERRLA, conjunctiva and sclera clear  ENMT: No tonsillar erythema, exudates, or enlargement; Moist mucous membranes, Good dentition, No lesions  NECK: Supple, No JVD, Normal thyroid  NERVOUS SYSTEM:  Alert & Oriented X3, Good concentration; Motor Strength 5/5 B/L upper and lower extremities; DTRs 2+ intact and symmetric  CHEST/LUNG: Clear to percussion bilaterally; No rales, rhonchi, wheezing, or rubs  HEART: Regular rate and rhythm; No murmurs, rubs, or gallops  ABDOMEN: Soft, Nontender, Nondistended; Bowel sounds present  EXTREMITIES:  2+ Peripheral Pulses, No clubbing, cyanosis, or edema  LYMPH: No lymphadenopathy noted  SKIN: No rashes or lesions    LABS:                        9.7    11.78 )-----------( 378      ( 25 Nov 2018 09:16 )             28.5     11-25    142  |  105  |  15  ----------------------------<  154<H>  3.6   |  28  |  0.78    Ca    6.7<L>      25 Nov 2018 09:16  Phos  1.8     11-25  Mg     2.6     11-25            CAPILLARY BLOOD GLUCOSE                    RADIOLOGY & ADDITIONAL TESTS:    Imaging Personally Reviewed:  [ ] YES  [ ] NO    Consultant(s) Notes Reviewed:  [x ] YES  [ ] NO    Care Discussed with Consultants/Other Providers [x ] YES  [ ] NO    Care discussed with family,         [  ]   yes  [  ]  No    imp:    stable[ ]    unstable[  ]     improving [  x ]       unchanged  [  ]                Plans:  Continue present plans  [ x ] Id, cardio surgery follow up.                New consult [  ]   specialty  .......               order test[  ]    test name.                  Discharge Planning  [  ]

## 2018-11-25 NOTE — PROGRESS NOTE ADULT - SUBJECTIVE AND OBJECTIVE BOX
INTERVAL HPI:     68M with HTN, Prostate Ca (s/p resection and radiation), AICD placement s/p cardiac arrest and coma (due to brain injury) about 10yrs ago, and known 2 year history of reducible right inguinal hernia.   Presented  to the ER c/o diffuse abdominal pain and right scrotal pain for 1 week which got worst over the course of 2 days.. This was also associated with nausea and non bilious emesis.  Found to have incarcerated Right Inguinal hernia, requiring Ex Lap, Cecal resection and repair of hernia with mesh. Post op observed in ICU,  with significant drop in H & H requiring PRBC transfusion.   11/22/18: transferred to medical floow.  Reported to have significant upper airway secretion which ar difficult to clear at times. Non smoker per wife.    OVERNIGHT EVENTS:  Awake, comfortable.    Vital Signs Last 24 Hrs  T(C): 37.8 (25 Nov 2018 16:49), Max: 37.8 (25 Nov 2018 16:49)  T(F): 100 (25 Nov 2018 16:49), Max: 100 (25 Nov 2018 16:49)  HR: 100 (25 Nov 2018 17:27) (80 - 111)  BP: 130/64 (25 Nov 2018 16:49) (130/64 - 145/70)  BP(mean): --  RR: 18 (25 Nov 2018 16:49) (17 - 18)  SpO2: 96% (25 Nov 2018 17:27) (96% - 98%)    PHYSICAL EXAM:  GEN:         Awake, responsive and comfortable.  HEENT:    Normal.    RESP:        no wheezing.  CVS:             Regular rate and rhythm.   ABD:         Soft, non-tender, non-distended;     MEDICATIONS  (STANDING):  ALBUTerol/ipratropium for Nebulization 3 milliLiter(s) Nebulizer every 6 hours  carvedilol 3.125 milliGRAM(s) Oral every 12 hours  dextrose 5% + sodium chloride 0.45%. 1000 milliLiter(s) (50 mL/Hr) IV Continuous <Continuous>  heparin  Injectable 5000 Unit(s) SubCutaneous every 8 hours  meropenem  IVPB 1000 milliGRAM(s) IV Intermittent every 8 hours  pantoprazole  Injectable 40 milliGRAM(s) IV Push daily  vancomycin  IVPB 1000 milliGRAM(s) IV Intermittent every 12 hours    MEDICATIONS  (PRN):  acetaminophen  Suppository .. 650 milliGRAM(s) Rectal every 6 hours PRN Temp greater or equal to 38C (100.4F), Mild Pain (1 - 3)  HYDROmorphone  Injectable 0.5 milliGRAM(s) IV Push every 6 hours PRN Severe Pain (7 - 10)    LABS:                        9.7    11.78 )-----------( 378      ( 25 Nov 2018 09:16 )             28.5     11-25    142  |  105  |  15  ----------------------------<  154<H>  3.6   |  28  |  0.78    Ca    6.7<L>      25 Nov 2018 09:16  Phos  1.8     11-25  Mg     2.6     11-25 11-19 @ 02:41  pH: 7.46  pCO2: 33  pO2: 148  SaO2: 98    ASSESSMENT AND PLAN:  ·	Bilateral infiltrates, Atelectasis vs Pneumonia.  ·	Bilateral pleural effusion.  ·	S/P Ex laparotomy with cecal resection.  ·	Right Inguinal hernia repair.  ·	Anemia.  ·	HTN.  ·	S/P Hypoxic Encephalopathy.    Clinically improving.  continue antibiotics and nebulizer.  Discussed with wife.

## 2018-11-25 NOTE — PROGRESS NOTE ADULT - SUBJECTIVE AND OBJECTIVE BOX
Surgery NP note  Patient seen and examined bedside   Confused   Abdominal pain is well controlled.  Denies nausea and vomiting. NPO  No BM     T(F): 100 (11-25-18 @ 16:49), Max: 100 (11-25-18 @ 16:49)  HR: 111 (11-25-18 @ 16:49) (80 - 111)  BP: 130/64 (11-25-18 @ 16:49) (130/64 - 145/70)  RR: 18 (11-25-18 @ 16:49) (17 - 18)  SpO2: 97% (11-25-18 @ 16:49) (96% - 98%)  Wt(kg): --  CAPILLARY BLOOD GLUCOSE          PHYSICAL EXAM:  General: NAD, WDWN.   Neuro:  Alert & responsive  HEENT: NCAT, EOMI, conjunctiva clear  CV: +S1+S2 regular rate and rhythm  Lung: clear to ausculation bilaterally, upper airway secretions noted  Abdomen: softly distended. Hypoactive bowel sounds. Right groin incision with staple intact, no surrounding erythema or induration. Midline incision with staples intact, open portion of mid wound repacked with1/4" packing placed,  Extremities: no pedal edema or calf tenderness noted   : +scrotal swelling, dependent edema. Condom catheter in situ, draining clear yellow urine    LABS:                        9.7    11.78 )-----------( 378      ( 25 Nov 2018 09:16 )             28.5     11-25    142  |  105  |  15  ----------------------------<  154<H>  3.6   |  28  |  0.78    Ca    6.7<L>      25 Nov 2018 09:16  Phos  1.8     11-25  Mg     2.6     11-25          I&O's Detail    24 Nov 2018 07:01  -  25 Nov 2018 07:00  --------------------------------------------------------  IN:    dextrose 5% + sodium chloride 0.45%.: 600 mL    Solution: 100 mL    Solution: 250 mL  Total IN: 950 mL    OUT:    Voided: 550 mL  Total OUT: 550 mL    Total NET: 400 mL      Impression: 68 year old male POD#7 s/p ex lap, colon resection, repair of incarcerated right inguinal hernia with mesh,   course c/b post op anemia s/p 2uPRBC 11/21 (h/h now stable), with productive cough and fevers, b/l pleural effusions,  WBC increasing    - continue NPO except meds. IVF maintenance.   - speech and swallow evaluation pending  - CXR and abdominal xray for increasing WBC  - continue local wound care by surgical team with iodoform packing  - ID Follow up, continue abx Vanco/merrem.  F/u wound culture results  - medical, cardio and pulm Follow up appreciated. Continue tele monitoring  - pulmonary toilet/chest PT, OOB to chair with PT, increase activity  - Continue aspiration precautions and duoneb respiratory treatments   - Discussed with Dr Tinsley, , Surgery NP note  Patient seen and examined bedside   Confused   Abdominal pain is well controlled.  Denies nausea and vomiting. NPO  No BM     T(F): 100 (11-25-18 @ 16:49), Max: 100 (11-25-18 @ 16:49)  HR: 111 (11-25-18 @ 16:49) (80 - 111)  BP: 130/64 (11-25-18 @ 16:49) (130/64 - 145/70)  RR: 18 (11-25-18 @ 16:49) (17 - 18)  SpO2: 97% (11-25-18 @ 16:49) (96% - 98%)  Wt(kg): --  CAPILLARY BLOOD GLUCOSE          PHYSICAL EXAM:  General: NAD, WDWN.   Neuro:  Alert & responsive  HEENT: NCAT, EOMI, conjunctiva clear  CV: +S1+S2 regular rate and rhythm  Lung: clear to ausculation bilaterally, upper airway secretions noted  Abdomen: softly distended. Hypoactive bowel sounds. Right groin incision with staple intact, no surrounding erythema or induration. Midline incision with staples intact, open portion of mid wound repacked with1/4" packing placed,  Extremities: no pedal edema or calf tenderness noted   : +scrotal swelling, dependent edema. Condom catheter in situ, draining clear yellow urine    LABS:                        9.7    11.78 )-----------( 378      ( 25 Nov 2018 09:16 )             28.5     11-25    142  |  105  |  15  ----------------------------<  154<H>  3.6   |  28  |  0.78    Ca    6.7<L>      25 Nov 2018 09:16  Phos  1.8     11-25  Mg     2.6     11-25          I&O's Detail    24 Nov 2018 07:01  -  25 Nov 2018 07:00  --------------------------------------------------------  IN:    dextrose 5% + sodium chloride 0.45%.: 600 mL    Solution: 100 mL    Solution: 250 mL  Total IN: 950 mL    OUT:    Voided: 550 mL  Total OUT: 550 mL    Total NET: 400 mL      Impression: 68 year old male POD#7 s/p ex lap, colon resection, repair of incarcerated right inguinal hernia with Vicryl mesh,   course c/b post op anemia s/p 2uPRBC 11/21 (h/h now stable), with productive cough and fevers, b/l pleural effusions, Possible Pneumonia  WBC increasing    - continue NPO except meds. IVF maintenance.   - speech and swallow evaluation pending.  - PT, OOB   - CXR and abdominal xray for increasing WBC  - continue local wound care by surgical team with iodoform packing  - ID Follow up, continue abx Vanco/merrem.  F/u wound culture results  - medical, cardio and pulm Follow up appreciated. Continue tele monitoring  - pulmonary toilet/chest PT, OOB to chair with PT, increase activity  - Continue aspiration precautions and duoneb respiratory treatments   - Discussed with Dr Tinsley, ,

## 2018-11-26 LAB
ANION GAP SERPL CALC-SCNC: 8 MMOL/L — SIGNIFICANT CHANGE UP (ref 5–17)
BUN SERPL-MCNC: 13 MG/DL — SIGNIFICANT CHANGE UP (ref 7–23)
CALCIUM SERPL-MCNC: 6.6 MG/DL — LOW (ref 8.5–10.1)
CHLORIDE SERPL-SCNC: 106 MMOL/L — SIGNIFICANT CHANGE UP (ref 96–108)
CO2 SERPL-SCNC: 28 MMOL/L — SIGNIFICANT CHANGE UP (ref 22–31)
CREAT SERPL-MCNC: 0.71 MG/DL — SIGNIFICANT CHANGE UP (ref 0.5–1.3)
GLUCOSE SERPL-MCNC: 125 MG/DL — HIGH (ref 70–99)
HCT VFR BLD CALC: 27 % — LOW (ref 39–50)
HGB BLD-MCNC: 9.3 G/DL — LOW (ref 13–17)
MAGNESIUM SERPL-MCNC: 2.3 MG/DL — SIGNIFICANT CHANGE UP (ref 1.6–2.6)
MCHC RBC-ENTMCNC: 26.4 PG — LOW (ref 27–34)
MCHC RBC-ENTMCNC: 34.4 GM/DL — SIGNIFICANT CHANGE UP (ref 32–36)
MCV RBC AUTO: 76.7 FL — LOW (ref 80–100)
NRBC # BLD: 0 /100 WBCS — SIGNIFICANT CHANGE UP (ref 0–0)
PHOSPHATE SERPL-MCNC: 1.4 MG/DL — LOW (ref 2.5–4.5)
PLATELET # BLD AUTO: 397 K/UL — SIGNIFICANT CHANGE UP (ref 150–400)
POTASSIUM SERPL-MCNC: 3.5 MMOL/L — SIGNIFICANT CHANGE UP (ref 3.5–5.3)
POTASSIUM SERPL-SCNC: 3.5 MMOL/L — SIGNIFICANT CHANGE UP (ref 3.5–5.3)
RBC # BLD: 3.52 M/UL — LOW (ref 4.2–5.8)
RBC # FLD: 14.8 % — HIGH (ref 10.3–14.5)
SODIUM SERPL-SCNC: 142 MMOL/L — SIGNIFICANT CHANGE UP (ref 135–145)
VANCOMYCIN TROUGH SERPL-MCNC: 9.2 UG/ML — LOW (ref 10–20)
WBC # BLD: 10.57 K/UL — HIGH (ref 3.8–10.5)
WBC # FLD AUTO: 10.57 K/UL — HIGH (ref 3.8–10.5)

## 2018-11-26 PROCEDURE — 74019 RADEX ABDOMEN 2 VIEWS: CPT | Mod: 26

## 2018-11-26 PROCEDURE — 71045 X-RAY EXAM CHEST 1 VIEW: CPT | Mod: 26

## 2018-11-26 PROCEDURE — 36410 VNPNXR 3YR/> PHY/QHP DX/THER: CPT

## 2018-11-26 PROCEDURE — 76937 US GUIDE VASCULAR ACCESS: CPT | Mod: 26

## 2018-11-26 RX ORDER — ONDANSETRON 8 MG/1
4 TABLET, FILM COATED ORAL EVERY 6 HOURS
Qty: 0 | Refills: 0 | Status: DISCONTINUED | OUTPATIENT
Start: 2018-11-26 | End: 2018-11-30

## 2018-11-26 RX ORDER — POTASSIUM PHOSPHATE, MONOBASIC POTASSIUM PHOSPHATE, DIBASIC 236; 224 MG/ML; MG/ML
15 INJECTION, SOLUTION INTRAVENOUS ONCE
Qty: 0 | Refills: 0 | Status: COMPLETED | OUTPATIENT
Start: 2018-11-26 | End: 2018-11-26

## 2018-11-26 RX ORDER — METOCLOPRAMIDE HCL 10 MG
10 TABLET ORAL EVERY 8 HOURS
Qty: 0 | Refills: 0 | Status: DISCONTINUED | OUTPATIENT
Start: 2018-11-26 | End: 2018-11-30

## 2018-11-26 RX ORDER — METOCLOPRAMIDE HCL 10 MG
10 TABLET ORAL EVERY 8 HOURS
Qty: 0 | Refills: 0 | Status: COMPLETED | OUTPATIENT
Start: 2018-11-26 | End: 2018-11-28

## 2018-11-26 RX ORDER — DEXTROSE MONOHYDRATE, SODIUM CHLORIDE, AND POTASSIUM CHLORIDE 50; .745; 4.5 G/1000ML; G/1000ML; G/1000ML
1000 INJECTION, SOLUTION INTRAVENOUS
Qty: 0 | Refills: 0 | Status: DISCONTINUED | OUTPATIENT
Start: 2018-11-26 | End: 2018-11-29

## 2018-11-26 RX ADMIN — HEPARIN SODIUM 5000 UNIT(S): 5000 INJECTION INTRAVENOUS; SUBCUTANEOUS at 05:04

## 2018-11-26 RX ADMIN — Medication 3 MILLILITER(S): at 00:15

## 2018-11-26 RX ADMIN — SODIUM CHLORIDE 50 MILLILITER(S): 9 INJECTION, SOLUTION INTRAVENOUS at 13:12

## 2018-11-26 RX ADMIN — HEPARIN SODIUM 5000 UNIT(S): 5000 INJECTION INTRAVENOUS; SUBCUTANEOUS at 21:18

## 2018-11-26 RX ADMIN — Medication 10 MILLIGRAM(S): at 12:53

## 2018-11-26 RX ADMIN — Medication 250 MILLIGRAM(S): at 07:55

## 2018-11-26 RX ADMIN — Medication 250 MILLIGRAM(S): at 17:46

## 2018-11-26 RX ADMIN — POTASSIUM PHOSPHATE, MONOBASIC POTASSIUM PHOSPHATE, DIBASIC 63.75 MILLIMOLE(S): 236; 224 INJECTION, SOLUTION INTRAVENOUS at 09:18

## 2018-11-26 RX ADMIN — CARVEDILOL PHOSPHATE 3.12 MILLIGRAM(S): 80 CAPSULE, EXTENDED RELEASE ORAL at 17:46

## 2018-11-26 RX ADMIN — Medication 3 MILLILITER(S): at 17:45

## 2018-11-26 RX ADMIN — POTASSIUM PHOSPHATE, MONOBASIC POTASSIUM PHOSPHATE, DIBASIC 63.75 MILLIMOLE(S): 236; 224 INJECTION, SOLUTION INTRAVENOUS at 14:45

## 2018-11-26 RX ADMIN — DEXTROSE MONOHYDRATE, SODIUM CHLORIDE, AND POTASSIUM CHLORIDE 50 MILLILITER(S): 50; .745; 4.5 INJECTION, SOLUTION INTRAVENOUS at 22:28

## 2018-11-26 RX ADMIN — MEROPENEM 100 MILLIGRAM(S): 1 INJECTION INTRAVENOUS at 22:08

## 2018-11-26 RX ADMIN — Medication 3 MILLILITER(S): at 11:57

## 2018-11-26 RX ADMIN — MEROPENEM 100 MILLIGRAM(S): 1 INJECTION INTRAVENOUS at 05:04

## 2018-11-26 RX ADMIN — POTASSIUM PHOSPHATE, MONOBASIC POTASSIUM PHOSPHATE, DIBASIC 63.75 MILLIMOLE(S): 236; 224 INJECTION, SOLUTION INTRAVENOUS at 22:10

## 2018-11-26 RX ADMIN — MEROPENEM 100 MILLIGRAM(S): 1 INJECTION INTRAVENOUS at 13:12

## 2018-11-26 RX ADMIN — PANTOPRAZOLE SODIUM 40 MILLIGRAM(S): 20 TABLET, DELAYED RELEASE ORAL at 12:48

## 2018-11-26 RX ADMIN — HEPARIN SODIUM 5000 UNIT(S): 5000 INJECTION INTRAVENOUS; SUBCUTANEOUS at 13:12

## 2018-11-26 RX ADMIN — Medication 3 MILLILITER(S): at 23:30

## 2018-11-26 RX ADMIN — Medication 10 MILLIGRAM(S): at 21:18

## 2018-11-26 RX ADMIN — Medication 3 MILLILITER(S): at 06:28

## 2018-11-26 NOTE — PROCEDURE NOTE - ADDITIONAL PROCEDURE DETAILS
pt s/p midline placement inserted via left cephalic vein.  20g x 10cm.  Pt tolerated procedure well  -midline can be accessed

## 2018-11-26 NOTE — PROGRESS NOTE ADULT - ASSESSMENT
POD#4 s/p ex lap, colon resection, repair of incarcerated right inguinal hernia with mesh, with post op anemia s/p 2uPRBC 11/21  i am called as yesterday febrile ;; but had got 2 units of bllood less than 24 hours before   sepsis work up done   ? post op pneumonia  CXR with new opacity yesterday   on antibiotics extended   is congested  pulm eval noted  adm blood culture pos for cns  usu contaminant   but patient has a defibrillaor  fu repeat blood cultures  has very poor access and has gen edema   try oral antibiotics asap or DC   procalcitonin am   NOTE TO CONTINUE   EVALUATION IN Progress  PT TO BE SEEN POD#4 s/p ex lap, colon resection, repair of incarcerated right inguinal hernia with mesh, with post op anemia s/p 2uPRBC 11/21  i am called as yesterday febrile ;; but had got 2 units of bllood less than 24 hours before   sepsis work up done   ? post op pneumonia  CXR with new opacity yesterday   on antibiotics extended   is congested  pulm eval noted  adm blood culture pos for cns  usu contaminant   but patient has a defibrillaor  fu repeat blood cultures  has very poor access and has gen edema   try oral antibiotics asap or DC   procalcitonin am   jorge a kate  thanks

## 2018-11-26 NOTE — PROGRESS NOTE ADULT - ASSESSMENT
hemodynamically stable.   s/p repair of rt Inguinal hernia and cecotomy   AICD   anoxic encephalopathy   hx of prostrate cancer.

## 2018-11-26 NOTE — CHART NOTE - NSCHARTNOTEFT_GEN_A_CORE
Upon Nutritional Assessment by the Registered Dietitian your patient was determined to meet criteria / has evidence of the following diagnosis/diagnoses:          [ ]  Mild Protein Calorie Malnutrition        [ ]  Moderate Protein Calorie Malnutrition        [x] Severe, Acute Protein Calorie Malnutrition        [ ] Unspecified Protein Calorie Malnutrition        [ ] Underweight / BMI <19        [ ] Morbid Obesity / BMI > 40      Findings as based on:  •  Comprehensive nutrition assessment and consultation  •  Calorie counts (nutrient intake analysis)  •  Food acceptance and intake status from observations by staff  •  Follow up  •  Patient education  •  Intervention secondary to interdisciplinary rounds  •   concerns      Treatment:    The following diet has been recommended:  Recommend advance diet as tolerated to Clear Liquids to Full Liquids to Low Sodium, Low Fiber/Residue diet + food consistency as per SLP's recommendations + nutrition supplement in accordance with fluid consistency as per SLP's recommendations, when medically feasible    PROVIDER Section:     By signing this assessment you are acknowledging and agree with the diagnosis/diagnoses assigned by the Registered Dietitian    Comments:

## 2018-11-26 NOTE — PROGRESS NOTE ADULT - SUBJECTIVE AND OBJECTIVE BOX
Dr. Tinsley's progress note:    STATUS POST:    POST OPERATIVE DAY #: 8  Patient on NAD, coming from midline placement, Wife states yesterday had a very small hard BM, minor flatus.  Patient with nausea, after pain meds.  Fever improved, pulmonary congestion improving.  Awaiting swallow study.      MEDICATIONS  (STANDING):  ALBUTerol/ipratropium for Nebulization 3 milliLiter(s) Nebulizer every 6 hours  carvedilol 3.125 milliGRAM(s) Oral every 12 hours  dextrose 5% + sodium chloride 0.45%. 1000 milliLiter(s) (50 mL/Hr) IV Continuous <Continuous>  heparin  Injectable 5000 Unit(s) SubCutaneous every 8 hours  meropenem  IVPB 1000 milliGRAM(s) IV Intermittent every 8 hours  metoclopramide Injectable 10 milliGRAM(s) IV Push every 8 hours  pantoprazole  Injectable 40 milliGRAM(s) IV Push daily  potassium phosphate IVPB 15 milliMole(s) IV Intermittent once  vancomycin  IVPB 1000 milliGRAM(s) IV Intermittent every 12 hours    MEDICATIONS  (PRN):  acetaminophen  Suppository .. 650 milliGRAM(s) Rectal every 6 hours PRN Temp greater or equal to 38C (100.4F), Mild Pain (1 - 3)  HYDROmorphone  Injectable 0.5 milliGRAM(s) IV Push every 6 hours PRN Severe Pain (7 - 10)    Vital Signs Last 24 Hrs  T(C): 37.3 (26 Nov 2018 11:59), Max: 37.8 (25 Nov 2018 16:49)  T(F): 99.2 (26 Nov 2018 11:59), Max: 100 (25 Nov 2018 16:49)  HR: 103 (26 Nov 2018 12:01) (97 - 111)  BP: 124/69 (26 Nov 2018 11:59) (122/66 - 136/73)  BP(mean): --  RR: 18 (26 Nov 2018 11:59) (18 - 18)  SpO2: 100% (26 Nov 2018 12:01) (96% - 100%)  I&O's Summary    25 Nov 2018 07:01  -  26 Nov 2018 07:00  --------------------------------------------------------  IN: 700 mL / OUT: 400 mL / NET: 300 mL    26 Nov 2018 07:01  -  26 Nov 2018 13:59  --------------------------------------------------------  IN: 500 mL / OUT: 0 mL / NET: 500 mL      PHYSICAL EXAM:  Constitutional: Patient obese . well developed.  Eyes:  PERRL, EOMI, Conjunctiva clear, no jaundice.  ENMT:  WNL  Neck:  Supple.  Respiratory:  Lungs CTA, B/L, mild base rales , no wheezing, no rhonchi.  Cardiovascular:  S1, S2, RRR  Gastrointestinal: Abdomen soft, moderate distended, + hypoactive  BS, non tenderness, wound clean, care provided.                          R Groin wound clean , TENZIN with serous fluid  Genitourinary:  [place] catheter has been removed.  Extremities:  No edema, no calf tenderrness,  Neurological: AxAxOx2    LABS:                        9.3    10.57 )-----------( 397      ( 26 Nov 2018 08:09 )             27.0     11-26    142  |  106  |  13  ----------------------------<  125<H>  3.5   |  28  |  0.71    Ca    6.6<L>      26 Nov 2018 08:09  Phos  1.4     11-26  Mg     2.3     11-26              RADIOLOGY & ADDITIONAL STUDIES:  < from: Xray Abdomen 2 Views (11.26.18 @ 10:21) >  XAM:  XR ABDOMEN 2V                            PROCEDURE DATE:  11/26/2018          INTERPRETATION:  Abdominal x-rays    Indication: Small bowel obstruction.    Supine and erect AP views of the abdomen are compared to a previous   examination dated 11/18/2018.    Impression: No evidence for free air.    New surgical clips projecting over the lower abdomen/pelvis and the right   inguinal region.    Gaseous dilatation of the stomach.     Oral contrast is noted in the colon and rectum.    Dilatation of the small bowel is again noted. Finding may represent   postoperative ileus or partial small bowel obstruction. Clinical   correlation is recommended.    Mild left pleural effusion.      REZA SOLITARIO M.D., ATTENDING RADIOLOGIST  This document has been electronically signed. Nov 26 2018 11:05AM        < end of copied text >      HEALTH ISSUES - PROBLEM Dx:    Inguinal hernia, incarcerated: Inguinal hernia, incarcerated  AICD (automatic cardioverter/defibrillator) present: AICD (automatic cardioverter/defibrillator) present  SBO (small bowel obstruction): SBO (small bowel obstruction)  S/P Cecectomy, Hernia repair

## 2018-11-26 NOTE — PROGRESS NOTE ADULT - SUBJECTIVE AND OBJECTIVE BOX
HPI:  68M with PMH HTN, prostate ca (s/p resection and radiation), AICD placement s/p cardiac arrest and coma (due to brain injury) about 10yrs ago, and known 2 year history of reducible right inguinal hernia now presents to the ER c/o diffuse abdominal pain and right scrotal pain. Wife at bedside who speaks on behalf of patient. States right groin pain has been persistent x1 week, worsening since 2 days ago associated with increased right scrotal swelling. Associated with nausea and NBNB emesis.  Denies flatus or BM, states his last BM was yesterday. Wife states patient has the "sweats" at night, but denies fever/chills. Denies active chest pain, palpitations, shortness of breath, dizziness or urinary complaints.   ER and Surgeon tried to reduce Hernia, not reducible. (18 Nov 2018 11:32)      Allergies    lisinopril (Unknown)  penicillins (Unknown)    Intolerances        MEDICATIONS  (STANDING):  ALBUTerol/ipratropium for Nebulization 3 milliLiter(s) Nebulizer every 6 hours  carvedilol 3.125 milliGRAM(s) Oral every 12 hours  dextrose 5% + sodium chloride 0.45%. 1000 milliLiter(s) (50 mL/Hr) IV Continuous <Continuous>  heparin  Injectable 5000 Unit(s) SubCutaneous every 8 hours  meropenem  IVPB 1000 milliGRAM(s) IV Intermittent every 8 hours  pantoprazole  Injectable 40 milliGRAM(s) IV Push daily  potassium phosphate IVPB 15 milliMole(s) IV Intermittent once  vancomycin  IVPB 1000 milliGRAM(s) IV Intermittent every 12 hours    MEDICATIONS  (PRN):  acetaminophen  Suppository .. 650 milliGRAM(s) Rectal every 6 hours PRN Temp greater or equal to 38C (100.4F), Mild Pain (1 - 3)  HYDROmorphone  Injectable 0.5 milliGRAM(s) IV Push every 6 hours PRN Severe Pain (7 - 10)      REVIEW OF SYSTEMS:    CONSTITUTIONAL: No fever, chills, weight loss, or fatigue  HEENT: No sore throat, runny nose, ear ache  RESPIRATORY: No cough, wheezing, No shortness of breath  CARDIOVASCULAR: No chest pain, palpitations, dizziness  GASTROINTESTINAL: No abdominal pain. No nausea, vomiting, diarrhea  GENITOURINARY: No dysuria, increase frequency, hematuria, or incontinence  NEUROLOGICAL: No headaches, memory loss, loss of strength, numbness, or tremors, no weakness  EXTREMITY: No pedal edema BLE  SKIN: No itching, burning, rashes, or lesions     VITAL SIGNS:  T(C): 37.6 (11-26-18 @ 05:07), Max: 37.8 (11-25-18 @ 16:49)  T(F): 99.6 (11-26-18 @ 05:07), Max: 100 (11-25-18 @ 16:49)  HR: 101 (11-26-18 @ 05:07) (80 - 111)  BP: 136/73 (11-26-18 @ 05:07) (122/66 - 145/70)  RR: 18 (11-26-18 @ 05:07) (18 - 18)  SpO2: 97% (11-26-18 @ 05:07) (96% - 99%)  Wt(kg): --    PHYSICAL EXAM:    GENERAL: not in any distress  HEENT: Neck is supple, normocephalic, atraumatic   CHEST/LUNG: Clear to percussion bilaterally; No rales, rhonchi, wheezing  HEART: Regular rate and rhythm; No murmurs, rubs, or gallops  ABDOMEN: Soft, Nontender, Nondistended; Bowel sounds present, no rebound   EXTREMITIES:  2+ Peripheral Pulses, No clubbing, cyanosis, or edema  GENITOURINARY:   SKIN: No rashes or lesions  BACK: no pressor sore   NERVOUS SYSTEM:  Alert & Oriented X3, Good concentration  PSYCH: normal affect     LABS:                         9.3    10.57 )-----------( 397      ( 26 Nov 2018 08:09 )             27.0     11-26    142  |  106  |  13  ----------------------------<  125<H>  3.5   |  28  |  0.71    Ca    6.6<L>      26 Nov 2018 08:09  Phos  1.4     11-26  Mg     2.3     11-26                          Vancomycin Level, Trough: 9.2 ug/mL (11-26 @ 08:09)        Culture Results:   No growth to date. (11-23 @ 08:32)  Culture Results:   No growth to date. (11-23 @ 08:32)  Culture Results:   Normal Respiratory Ana present (11-23 @ 01:28)                Radiology: 68M with PMH HTN, prostate ca (s/p resection and radiation), AICD placement s/p cardiac arrest and coma (due to brain injury) about 10yrs ago, and known 2 year history of reducible right inguinal hernia now presents to the ER c/o diffuse abdominal pain and right scrotal pain. Wife at bedside who speaks on behalf of patient. States right groin pain has been persistent x1 week, worsening since 2 days ago associated with increased right scrotal swelling. Associated with nausea and NBNB emesis.  Denies flatus or BM, states his last BM was yesterday. Wife states patient has the "sweats" at night, but denies fever/chills. Denies active chest pain, palpitations, shortness of breath, dizziness or urinary complaints.   ER and Surgeon tried to reduce Hernia, not reducible. (18 Nov 2018 11:32)      Allergies    lisinopril (Unknown)  penicillins (Unknown)    Intolerances        MEDICATIONS  (STANDING):  ALBUTerol/ipratropium for Nebulization 3 milliLiter(s) Nebulizer every 6 hours  carvedilol 3.125 milliGRAM(s) Oral every 12 hours  dextrose 5% + sodium chloride 0.45%. 1000 milliLiter(s) (50 mL/Hr) IV Continuous <Continuous>  heparin  Injectable 5000 Unit(s) SubCutaneous every 8 hours  meropenem  IVPB 1000 milliGRAM(s) IV Intermittent every 8 hours  pantoprazole  Injectable 40 milliGRAM(s) IV Push daily  potassium phosphate IVPB 15 milliMole(s) IV Intermittent once  vancomycin  IVPB 1000 milliGRAM(s) IV Intermittent every 12 hours      REVIEW OF SYSTEMS:    CONSTITUTIONAL: No fever, chills, weight loss, or fatigue  HEENT: No sore throat, runny nose, ear ache  RESPIRATORY: No cough, wheezing, No shortness of breath  CARDIOVASCULAR: No chest pain, palpitations, dizziness  GASTROINTESTINAL: No abdominal pain. No nausea, vomiting, diarrhea  GENITOURINARY: No dysuria, increase frequency, hematuria, or incontinence  NEUROLOGICAL: No headaches, memory loss, loss of strength, numbness, or tremors, no weakness  EXTREMITY: No pedal edema BLE  SKIN: No itching, burning, rashes, or lesions     VITAL SIGNS:  T(C): 37.6 (11-26-18 @ 05:07), Max: 37.8 (11-25-18 @ 16:49)  T(F): 99.6 (11-26-18 @ 05:07), Max: 100 (11-25-18 @ 16:49)  HR: 101 (11-26-18 @ 05:07) (80 - 111)  BP: 136/73 (11-26-18 @ 05:07) (122/66 - 145/70)  RR: 18 (11-26-18 @ 05:07) (18 - 18)  SpO2: 97% (11-26-18 @ 05:07) (96% - 99%)  Wt(kg): --    PHYSICAL EXAM:    GENERAL: not in any distress  HEENT: Neck is supple, normocephalic, atraumatic   CHEST/LUNG: Clear to percussion bilaterally; No rales, rhonchi, wheezing  HEART: Regular rate and rhythm; No murmurs, rubs, or gallops  ABDOMEN: Soft, Nontender, Nondistended; Bowel sounds present, no rebound   EXTREMITIES:  2+ Peripheral Pulses, No clubbing, cyanosis, or edema  GENITOURINARY:   SKIN: No rashes or lesions  BACK: no pressor sore   NERVOUS SYSTEM:  Alert & Oriented X3    LABS:                         9.3    10.57 )-----------( 397      ( 26 Nov 2018 08:09 )             27.0     11-26    142  |  106  |  13  ----------------------------<  125<H>  3.5   |  28  |  0.71    Ca    6.6<L>      26 Nov 2018 08:09  Phos  1.4     11-26  Mg     2.3     11-26                          Vancomycin Level, Trough: 9.2 ug/mL (11-26 @ 08:09)        Culture Results:   No growth to date. (11-23 @ 08:32)  Culture Results:   No growth to date. (11-23 @ 08:32)  Culture Results:   Normal Respiratory Ana present (11-23 @ 01:28)                Radiology:

## 2018-11-26 NOTE — PROGRESS NOTE ADULT - SUBJECTIVE AND OBJECTIVE BOX
INTERVAL HPI:     68M with HTN, Prostate Ca (s/p resection and radiation), AICD placement s/p cardiac arrest and coma (due to brain injury) about 10yrs ago, and known 2 year history of reducible right inguinal hernia.   Presented  to the ER c/o diffuse abdominal pain and right scrotal pain for 1 week which got worst over the course of 2 days.. This was also associated with nausea and non bilious emesis.  Found to have incarcerated Right Inguinal hernia, requiring Ex Lap, Cecal resection and repair of hernia with mesh. Post op observed in ICU,  with significant drop in H & H requiring PRBC transfusion.   11/22/18: transferred to medical floow.  Reported to have significant upper airway secretion which ar difficult to clear at times. Non smoker per wife.  11/26/18:  Midline placement.    OVERNIGHT EVENTS:  Feels better, low grade fever noted.    Vital Signs Last 24 Hrs  T(C): 36.7 (26 Nov 2018 16:18), Max: 37.7 (25 Nov 2018 23:55)  T(F): 98 (26 Nov 2018 16:18), Max: 99.8 (25 Nov 2018 23:55)  HR: 96 (26 Nov 2018 18:06) (96 - 109)  BP: 121/62 (26 Nov 2018 16:18) (121/62 - 136/73)  BP(mean): --  RR: 19 (26 Nov 2018 16:18) (18 - 19)  SpO2: 99% (26 Nov 2018 18:06) (96% - 100%)    PHYSICAL EXAM:  GEN:         Awake, responsive and comfortable.  HEENT:    Normal.    RESP:        no wheezing.  CVS:          Regular rate and rhythm.   ABD:         Soft, non-tender, non-distended;     MEDICATIONS  (STANDING):  ALBUTerol/ipratropium for Nebulization 3 milliLiter(s) Nebulizer every 6 hours  carvedilol 3.125 milliGRAM(s) Oral every 12 hours  dextrose 5% + sodium chloride 0.45% with potassium chloride 20 mEq/L 1000 milliLiter(s) (50 mL/Hr) IV Continuous <Continuous>  heparin  Injectable 5000 Unit(s) SubCutaneous every 8 hours  meropenem  IVPB 1000 milliGRAM(s) IV Intermittent every 8 hours  metoclopramide 10 milliGRAM(s) Oral every 8 hours  metoclopramide Injectable 10 milliGRAM(s) IV Push every 8 hours  pantoprazole  Injectable 40 milliGRAM(s) IV Push daily  potassium phosphate IVPB 15 milliMole(s) IV Intermittent once  vancomycin  IVPB 1000 milliGRAM(s) IV Intermittent every 12 hours    MEDICATIONS  (PRN):  acetaminophen  Suppository .. 650 milliGRAM(s) Rectal every 6 hours PRN Temp greater or equal to 38C (100.4F), Mild Pain (1 - 3)  HYDROmorphone  Injectable 0.5 milliGRAM(s) IV Push every 6 hours PRN Severe Pain (7 - 10)  ondansetron    Tablet 4 milliGRAM(s) Oral every 6 hours PRN Nausea and/or Vomiting    LABS:                        9.3    10.57 )-----------( 397      ( 26 Nov 2018 08:09 )             27.0     11-26    142  |  106  |  13  ----------------------------<  125<H>  3.5   |  28  |  0.71    Ca    6.6<L>      26 Nov 2018 08:09  Phos  1.4     11-26  Mg     2.3     11-26    ASSESSMENT AND PLAN:  ·	Bilateral infiltrates, Atelectasis vs Pneumonia.  ·	Bilateral pleural effusion.  ·	S/P Ex laparotomy with cecal resection.  ·	Right Inguinal hernia repair.  ·	Anemia.  ·	HTN.  ·	S/P Hypoxic Encephalopathy.    Continue treatment with antibiotics.  Discussed with wife at bedside.

## 2018-11-26 NOTE — PHARMACY COMMUNICATION NOTE - COMMENTS
s/w DAMIAN morales - aware pt received x2 doses of kphos ivpb and ok to give additional third dose and will do a repeat level

## 2018-11-26 NOTE — PROGRESS NOTE ADULT - SUBJECTIVE AND OBJECTIVE BOX
POD # 8  TELEMETRY: NORMAL SINUS RHYTHM SINUS TACHYCARDIA, VPC , COUPLETS  HEMODYNAMICALLY STABLE  ALERT   NO JVD  SOFT S1 & S2; NO APPRECIABLE M/R/G/H  CLEAR LUNGS  HYPOACTIVE BOWEL SOUNDS / DISTENSION/ NON TENDER  NO CLUBBING CYANOSIS OR EDEMA    STABLE CARDIOVASCULAR STATUS; CONTINUING WITH PRESENT MANAGEMENT.  REACTIVE SINUS TACHYCARDIA SUSPECTED  FAMILY PRESENT AT BEDSIDE

## 2018-11-26 NOTE — PROGRESS NOTE ADULT - SUBJECTIVE AND OBJECTIVE BOX
Patient is a 68y old  Male who presents with a chief complaint of Abdominal pain and scrotal pain, Incarcerated R Inguino-scrotal Hernia. (25 Nov 2018 18:51)  improving. Phosphate repletion in progress  Still NPO  INTERVAL HPI/OVERNIGHT EVENTS: un eventful. no distress  PAST MEDICAL & SURGICAL HISTORY:  Sudden cardiac death  Gait abnormality  Leg pain, right  Anoxic encephalopathy  Brain injury with coma: x 2 weeks in 2008  Prostate cancer: radiation therapy  Hypertension  S/P ICD (internal cardiac defibrillator) procedure: implanted on 1/6/2009  H/O tracheostomy  H/O prostate cancer: resection and radiation therapy  Status post cryoablation: of left renal mass      MEDICATIONS  (STANDING):  ALBUTerol/ipratropium for Nebulization 3 milliLiter(s) Nebulizer every 6 hours  carvedilol 3.125 milliGRAM(s) Oral every 12 hours  dextrose 5% + sodium chloride 0.45%. 1000 milliLiter(s) (50 mL/Hr) IV Continuous <Continuous>  heparin  Injectable 5000 Unit(s) SubCutaneous every 8 hours  meropenem  IVPB 1000 milliGRAM(s) IV Intermittent every 8 hours  pantoprazole  Injectable 40 milliGRAM(s) IV Push daily  potassium phosphate IVPB 15 milliMole(s) IV Intermittent once  vancomycin  IVPB 1000 milliGRAM(s) IV Intermittent every 12 hours    MEDICATIONS  (PRN):  acetaminophen  Suppository .. 650 milliGRAM(s) Rectal every 6 hours PRN Temp greater or equal to 38C (100.4F), Mild Pain (1 - 3)  HYDROmorphone  Injectable 0.5 milliGRAM(s) IV Push every 6 hours PRN Severe Pain (7 - 10)      Allergies    lisinopril (Unknown)  penicillins (Unknown)    Intolerances        REVIEW OF SYSTEMS:  CONSTITUTIONAL: No fever, weight loss, or fatigue  EYES: No eye pain, visual disturbances, or discharge  ENMT:  No difficulty hearing, tinnitus, vertigo; No sinus or throat pain  NECK: No pain or stiffness  BREASTS: No pain, masses, or nipple discharge  RESPIRATORY: No cough, wheezing, chills or hemoptysis; No shortness of breath  CARDIOVASCULAR: No chest pain, palpitations, dizziness, or leg swelling  GASTROINTESTINAL: No abdominal or epigastric pain. No nausea, vomiting, or hematemesis; No diarrhea or constipation. No melena or hematochezia.  GENITOURINARY: No dysuria, frequency, hematuria, or incontinence  NEUROLOGICAL: No headaches, memory loss, loss of strength, numbness, or tremors  SKIN: No itching, burning, rashes, or lesions   LYMPH NODES: No enlarged glands  ENDOCRINE: No heat or cold intolerance; No hair loss  MUSCULOSKELETAL: No joint pain or swelling; No muscle, back, or extremity pain  PSYCHIATRIC: No depression, anxiety, mood swings, or difficulty sleeping  HEME/LYMPH: No easy bruising, or bleeding gums  ALLERY AND IMMUNOLOGIC: No hives or eczema    Vital Signs Last 24 Hrs  T(C): 37.6 (26 Nov 2018 05:07), Max: 37.8 (25 Nov 2018 16:49)  T(F): 99.6 (26 Nov 2018 05:07), Max: 100 (25 Nov 2018 16:49)  HR: 101 (26 Nov 2018 05:07) (80 - 111)  BP: 136/73 (26 Nov 2018 05:07) (122/66 - 145/70)  BP(mean): --  RR: 18 (26 Nov 2018 05:07) (18 - 18)  SpO2: 97% (26 Nov 2018 05:07) (96% - 99%)    PHYSICAL EXAM:  GENERAL: NAD, well-groomed, well-developed  HEAD:  Atraumatic, Normocephalic  EYES: EOMI, PERRLA, conjunctiva and sclera clear  ENMT: No tonsillar erythema, exudates, or enlargement; Moist mucous membranes, Good dentition, No lesions  NECK: Supple, No JVD, Normal thyroid  NERVOUS SYSTEM:  Alert & Oriented X3, Good concentration; Motor Strength 5/5 B/L upper and lower extremities; DTRs 2+ intact and symmetric  CHEST/LUNG: Clear to percussion bilaterally; No rales, rhonchi, wheezing, or rubs  HEART: Regular rate and rhythm; No murmurs, rubs, or gallops  ABDOMEN: Soft, Nontender, Nondistended; Bowel sounds present. peg tube in place.   EXTREMITIES:  2+ Peripheral Pulses, No clubbing, cyanosis, or edema  LYMPH: No lymphadenopathy noted  SKIN: No rashes or lesions    LABS:                        9.3    10.57 )-----------( 397      ( 26 Nov 2018 08:09 )             27.0     11-26    142  |  106  |  13  ----------------------------<  125<H>  3.5   |  28  |  0.71    Ca    6.6<L>      26 Nov 2018 08:09  Phos  1.4     11-26  Mg     2.3     11-26            CAPILLARY BLOOD GLUCOSE                    RADIOLOGY & ADDITIONAL TESTS:    Imaging Personally Reviewed:  [ ] YES  [ ] NO    Consultant(s) Notes Reviewed:  [ ] YES  [ ] NO    Care Discussed with Consultants/Other Providers [ ] YES  [ ] NO    Care discussed with family,         [  ]   yes  [  ]  No    imp:    stable[ ]    unstable[  ]     improving [  x ]       unchanged  [  ]                Plans:  Continue present plans  [x  ] as per surgery and ID.               New consult [  ]   specialty  .......               order test[  ]    test name.                  Discharge Planning  [  ]

## 2018-11-26 NOTE — PROGRESS NOTE ADULT - ASSESSMENT
POD # 8 POD # 8    Patient with prolonged Post-op Ileus.  Abdomen soft, wound clean.  Right Groin wound intact, no sign of infection.  Electrolytes imbalance.    Plan:    Replete electrolytes, K+, Mg++, Ca++  Reglan IV.  Continue DVT, GI prophylaxis.  OOB, PT Follow up.  Awaiting Swallow test report.

## 2018-11-26 NOTE — CHART NOTE - NSCHARTNOTEFT_GEN_A_CORE
Assessment: S/p repair of R inguinal hernia and cecotomy, AICD, anoxic encephalopathy, hx of prostate cancer. Swallow evaluation pending.    Factors impacting intake: [ ] none [ ] nausea  [ ] vomiting [ ] diarrhea [x] constipation [ ] chewing problems [ ] swallowing issues  [x] other: Pt NPO/Clears x 8 days; Abdominal pain and scrotal pain (well controlled)  Diet Prescription: Diet, NPO:   Except Medications (18 @ 10:41)    Intake: 0% as pt is NPO except medications    Current Weight: 94.6 kg (), 91.2 kg ()  % Weight Change: 3.7% (3.4 kg) wt gain x 8 days during hospitalization; 2+ edema generalized & 4+ edema scrotum    Nutrition focused physical exam conducted; Subcutaneous fat loss: [moderate] Orbital fat pads region, [mild]Buccal fat region, [mild]Triceps region, [unable]Ribs region.  Muscle wasting: [mild]Temples region, [mild]Clavicle region, [WNL]Shoulder region, [WNL]Scapula region, [unable-edema]Interosseous region, [WDL]thigh region, [unable-compression devices]Calf region    Pertinent Medications: MEDICATIONS  (STANDING):  ALBUTerol/ipratropium for Nebulization 3 milliLiter(s) Nebulizer every 6 hours  carvedilol 3.125 milliGRAM(s) Oral every 12 hours  dextrose 5% + sodium chloride 0.45%. 1000 milliLiter(s) (50 mL/Hr) IV Continuous <Continuous>  heparin  Injectable 5000 Unit(s) SubCutaneous every 8 hours  meropenem  IVPB 1000 milliGRAM(s) IV Intermittent every 8 hours  pantoprazole  Injectable 40 milliGRAM(s) IV Push daily  potassium phosphate IVPB 15 milliMole(s) IV Intermittent once  vancomycin  IVPB 1000 milliGRAM(s) IV Intermittent every 12 hours    MEDICATIONS  (PRN):  acetaminophen  Suppository .. 650 milliGRAM(s) Rectal every 6 hours PRN Temp greater or equal to 38C (100.4F), Mild Pain (1 - 3)  HYDROmorphone  Injectable 0.5 milliGRAM(s) IV Push every 6 hours PRN Severe Pain (7 - 10)    Pertinent Labs:  Na142 mmol/L Glu 125 mg/dL<H> K+ 3.5 mmol/L Cr  0.71 mg/dL BUN 13 mg/dL 11-26 Phos 1.4 mg/dL<L> 11-22 Alb 2.1 g/dL<L>    CAPILLARY BLOOD GLUCOSE    Skin: Pt is without pressure ulcers    Estimated Needs:   [ ] no change since previous assessment  [x] recalculated energy and fluid needs (using IBW 61.8 kg)  25-30 kcal/k5303-3594 kcals per day  30-35 ml/k6729-9635 ml fluid per day    Previous Nutrition Diagnosis:   [x] Inadequate Energy Intake [ ]Inadequate Oral Intake [ ] Excessive Energy Intake   [ ] Underweight [ ] Increased Nutrient Needs [ ] Overweight/Obesity   [ ] Altered GI Function [ ] Unintended Weight Loss [ ] Food & Nutrition Related Knowledge Deficit [ ] Malnutrition     Nutrition Diagnosis is [x] ongoing  [ ] resolved [ ] not applicable     New Nutrition Diagnosis: [ ] not applicable [ ] Inadequate Energy Intake [ ]Inadequate Oral Intake [ ] Excessive Energy Intake   [ ] Underweight [ ] Increased Nutrient Needs [ ] Overweight/Obesity   [ ] Altered GI Function [ ] Unintended Weight Loss [ ] Food & Nutrition Related Knowledge Deficit [x] Severe malnutrition in context of acute illness    Related to: Inadequate energy/protein intake related to incarcerated R inguino-scotal hernia    As evidenced by: < 50% of nutrition needs > 5 days & Moderate (2+) generalized edema & physical findings of mild/moderate muscle wasting and fat loss as noted above      Interventions:   Recommend  [ ] Change Diet To: Recommend advance diet as tolerated to Clear Liquids to Full Liquids to Low Sodium, Low Fiber/Residue diet + food consistency as per SLP's recommendations + nutrition supplement in accordance with fluid consistency as per SLP's recommendations, when medically feasible  [ ] Nutrition Supplement  [ ] Nutrition Support  [ ] Other:     Monitoring and Evaluation:   [ ] PO intake [ x ] Tolerance to diet prescription [ x ] weights [ x ] labs[ x ] follow up per protocol  [ ] other: Assessment: S/p repair of R inguinal hernia and cecotomy, AICD, anoxic encephalopathy, hx of prostate cancer. Swallow evaluation pending.    Factors impacting intake: [ ] none [ ] nausea  [ ] vomiting [ ] diarrhea [x] constipation [ ] chewing problems [ ] swallowing issues  [x] other: Pt NPO/Clears x 8 days; Abdominal pain and scrotal pain (well controlled)  Diet Prescription: Diet, NPO:   Except Medications (18 @ 10:41)    Intake: 0% as pt is NPO except medications    Current Weight: 94.6 kg (), 91.2 kg ()  % Weight Change: 3.7% (3.4 kg) wt gain x 8 days during hospitalization; 2+ edema generalized & 4+ edema scrotum    Nutrition focused physical exam conducted; Subcutaneous fat loss: [moderate] Orbital fat pads region, [mild]Buccal fat region, [mild]Triceps region, [unable]Ribs region.  Muscle wasting: [mild]Temples region, [mild]Clavicle region, [WNL]Shoulder region, [WNL]Scapula region, [unable-edema]Interosseous region, [WDL]thigh region, [unable-compression devices]Calf region    Pertinent Medications: MEDICATIONS  (STANDING):  ALBUTerol/ipratropium for Nebulization 3 milliLiter(s) Nebulizer every 6 hours  carvedilol 3.125 milliGRAM(s) Oral every 12 hours  dextrose 5% + sodium chloride 0.45%. 1000 milliLiter(s) (50 mL/Hr) IV Continuous <Continuous>  heparin  Injectable 5000 Unit(s) SubCutaneous every 8 hours  meropenem  IVPB 1000 milliGRAM(s) IV Intermittent every 8 hours  pantoprazole  Injectable 40 milliGRAM(s) IV Push daily  potassium phosphate IVPB 15 milliMole(s) IV Intermittent once  vancomycin  IVPB 1000 milliGRAM(s) IV Intermittent every 12 hours    MEDICATIONS  (PRN):  acetaminophen  Suppository .. 650 milliGRAM(s) Rectal every 6 hours PRN Temp greater or equal to 38C (100.4F), Mild Pain (1 - 3)  HYDROmorphone  Injectable 0.5 milliGRAM(s) IV Push every 6 hours PRN Severe Pain (7 - 10)    Pertinent Labs:  Na142 mmol/L Glu 125 mg/dL<H> K+ 3.5 mmol/L Cr  0.71 mg/dL BUN 13 mg/dL 11-26 Phos 1.4 mg/dL<L> 11-22 Alb 2.1 g/dL<L>    CAPILLARY BLOOD GLUCOSE    Skin: Pt is without pressure ulcers    Estimated Needs:   [ ] no change since previous assessment  [x] recalculated energy and fluid needs (using IBW 61.8 kg)  25-30 kcal/k3799-3236 kcals per day  30-35 ml/k6106-0035 ml fluid per day    Previous Nutrition Diagnosis:   [x] Inadequate Energy Intake [ ]Inadequate Oral Intake [ ] Excessive Energy Intake   [ ] Underweight [ ] Increased Nutrient Needs [ ] Overweight/Obesity   [ ] Altered GI Function [ ] Unintended Weight Loss [ ] Food & Nutrition Related Knowledge Deficit [ ] Malnutrition     Nutrition Diagnosis is [x] ongoing  [ ] resolved [ ] not applicable     New Nutrition Diagnosis: [ ] not applicable [ ] Inadequate Energy Intake [ ]Inadequate Oral Intake [ ] Excessive Energy Intake   [ ] Underweight [ ] Increased Nutrient Needs [ ] Overweight/Obesity   [ ] Altered GI Function [ ] Unintended Weight Loss [ ] Food & Nutrition Related Knowledge Deficit [x] Severe malnutrition in context of acute illness    Related to: Inadequate energy/protein intake related to incarcerated R inguino-scotal hernia    As evidenced by: < 50% of nutrition needs > 5 days & Moderate (2+) generalized edema & physical findings of mild/moderate muscle wasting and fat loss as noted above      Interventions:   Recommend  [x] Change Diet To: Recommend advance diet as tolerated to Clear Liquids to Full Liquids to Low Sodium, Low Fiber/Residue diet + food consistency as per SLP's recommendations + nutrition supplement in accordance with fluid consistency as per SLP's recommendations, when medically feasible  [ ] Nutrition Supplement  [ ] Nutrition Support  [ ] Other:     Monitoring and Evaluation:   [ ] PO intake [ x ] Tolerance to diet prescription [ x ] weights [ x ] labs[ x ] follow up per protocol  [ ] other: Assessment: Pt seen for follow-up & NPO/Clears x 8 days. S/p repair of R inguinal hernia and cecotomy, AICD, anoxic encephalopathy, hx of prostate cancer. Swallow evaluation pending.    Factors impacting intake: [ ] none [ ] nausea  [ ] vomiting [ ] diarrhea [x] constipation [ ] chewing problems [ ] swallowing issues  [x] other: Pt NPO/Clears x 8 days; Abdominal pain and scrotal pain (well controlled)  Diet Prescription: Diet, NPO:   Except Medications (18 @ 10:41)    Intake: 0% as pt is NPO except medications    Current Weight: 94.6 kg (), 91.2 kg ()  % Weight Change: 3.7% (3.4 kg) wt gain x 8 days during hospitalization; 2+ edema generalized & 4+ edema scrotum    Nutrition focused physical exam conducted; Subcutaneous fat loss: [moderate] Orbital fat pads region, [mild]Buccal fat region, [mild]Triceps region, [unable]Ribs region.  Muscle wasting: [mild]Temples region, [mild]Clavicle region, [WNL]Shoulder region, [WNL]Scapula region, [unable-edema]Interosseous region, [WDL]thigh region, [unable-compression devices]Calf region    Pertinent Medications: MEDICATIONS  (STANDING):  ALBUTerol/ipratropium for Nebulization 3 milliLiter(s) Nebulizer every 6 hours  carvedilol 3.125 milliGRAM(s) Oral every 12 hours  dextrose 5% + sodium chloride 0.45%. 1000 milliLiter(s) (50 mL/Hr) IV Continuous <Continuous>  heparin  Injectable 5000 Unit(s) SubCutaneous every 8 hours  meropenem  IVPB 1000 milliGRAM(s) IV Intermittent every 8 hours  pantoprazole  Injectable 40 milliGRAM(s) IV Push daily  potassium phosphate IVPB 15 milliMole(s) IV Intermittent once  vancomycin  IVPB 1000 milliGRAM(s) IV Intermittent every 12 hours    MEDICATIONS  (PRN):  acetaminophen  Suppository .. 650 milliGRAM(s) Rectal every 6 hours PRN Temp greater or equal to 38C (100.4F), Mild Pain (1 - 3)  HYDROmorphone  Injectable 0.5 milliGRAM(s) IV Push every 6 hours PRN Severe Pain (7 - 10)    Pertinent Labs:  Na142 mmol/L Glu 125 mg/dL<H> K+ 3.5 mmol/L Cr  0.71 mg/dL BUN 13 mg/dL  Phos 1.4 mg/dL<L>  Alb 2.1 g/dL<L>    CAPILLARY BLOOD GLUCOSE    Skin: Pt is without pressure ulcers    Estimated Needs:   [ ] no change since previous assessment  [x] recalculated energy and fluid needs (using IBW 61.8 kg)  25-30 kcal/k5173-0174 kcals per day  30-35 ml/k0825-3947 ml fluid per day    Previous Nutrition Diagnosis:   [x] Inadequate Energy Intake [ ]Inadequate Oral Intake [ ] Excessive Energy Intake   [ ] Underweight [ ] Increased Nutrient Needs [ ] Overweight/Obesity   [ ] Altered GI Function [ ] Unintended Weight Loss [ ] Food & Nutrition Related Knowledge Deficit [ ] Malnutrition     Nutrition Diagnosis is [x] ongoing  [ ] resolved [ ] not applicable     New Nutrition Diagnosis: [ ] not applicable [ ] Inadequate Energy Intake [ ]Inadequate Oral Intake [ ] Excessive Energy Intake   [ ] Underweight [ ] Increased Nutrient Needs [ ] Overweight/Obesity   [ ] Altered GI Function [ ] Unintended Weight Loss [ ] Food & Nutrition Related Knowledge Deficit [x] Severe malnutrition in context of acute illness    Related to: Inadequate energy/protein intake related to incarcerated R inguino-scotal hernia    As evidenced by: < 50% of nutrition needs > 5 days & Moderate (2+) generalized edema & physical findings of mild/moderate muscle wasting and fat loss as noted above      Interventions:   Recommend  [x] Change Diet To: Recommend advance diet as tolerated to Clear Liquids to Full Liquids to Low Sodium, Low Fiber/Residue diet + food consistency as per SLP's recommendations + nutrition supplement in accordance with fluid consistency as per SLP's recommendations, when medically feasible  [ ] Nutrition Supplement  [ ] Nutrition Support  [ ] Other:     Monitoring and Evaluation:   [ ] PO intake [ x ] Tolerance to diet prescription [ x ] weights [ x ] labs[ x ] follow up per protocol  [ ] other: Assessment: Pt seen for follow-up & NPO/Clears x 8 days. S/p repair of R inguinal hernia and cecotomy, AICD, anoxic encephalopathy, hx of prostate cancer. Swallow evaluation pending.    Factors impacting intake: [ ] none [ ] nausea  [ ] vomiting [ ] diarrhea [x] constipation [ ] chewing problems [ ] swallowing issues  [x] other: Pt NPO/Clears x 8 days; Abdominal pain and scrotal pain (well controlled)  Diet Prescription: Diet, NPO:   Except Medications (18 @ 10:41)    Intake: 0% as pt is NPO except medications    Current Weight: 94.6 kg (), 91.2 kg ()  % Weight Change: 3.7% (3.4 kg) wt gain x 8 days during hospitalization; 2+ edema generalized & 4+ edema scrotum    Nutrition focused physical exam conducted; Subcutaneous fat loss: [moderate] Orbital fat pads region, [mild]Buccal fat region, [mild]Triceps region, [unable]Ribs region.  Muscle wasting: [mild]Temples region, [mild]Clavicle region, [WNL]Shoulder region, [WNL]Scapula region, [unable-edema]Interosseous region, [WDL]thigh region, [unable-compression devices]Calf region    Pertinent Medications: MEDICATIONS  (STANDING):  ALBUTerol/ipratropium for Nebulization 3 milliLiter(s) Nebulizer every 6 hours  carvedilol 3.125 milliGRAM(s) Oral every 12 hours  dextrose 5% + sodium chloride 0.45%. 1000 milliLiter(s) (50 mL/Hr) IV Continuous <Continuous>  heparin  Injectable 5000 Unit(s) SubCutaneous every 8 hours  meropenem  IVPB 1000 milliGRAM(s) IV Intermittent every 8 hours  pantoprazole  Injectable 40 milliGRAM(s) IV Push daily  potassium phosphate IVPB 15 milliMole(s) IV Intermittent once  vancomycin  IVPB 1000 milliGRAM(s) IV Intermittent every 12 hours    MEDICATIONS  (PRN):  acetaminophen  Suppository .. 650 milliGRAM(s) Rectal every 6 hours PRN Temp greater or equal to 38C (100.4F), Mild Pain (1 - 3)  HYDROmorphone  Injectable 0.5 milliGRAM(s) IV Push every 6 hours PRN Severe Pain (7 - 10)    Pertinent Labs:  Na142 mmol/L Glu 125 mg/dL<H> K+ 3.5 mmol/L Cr  0.71 mg/dL BUN 13 mg/dL  Phos 1.4 mg/dL<L>  Alb 2.1 g/dL<L>    CAPILLARY BLOOD GLUCOSE    Skin: Pt is without pressure ulcers    Estimated Needs:   [ ] no change since previous assessment  [x] recalculated energy and fluid needs (using IBW 61.8 kg)  25-30 kcal/k3622-4303 kcals per day  30-35 ml/k0782-2713 ml fluid per day    Previous Nutrition Diagnosis:   [x] Inadequate Energy Intake [ ]Inadequate Oral Intake [ ] Excessive Energy Intake   [ ] Underweight [ ] Increased Nutrient Needs [ ] Overweight/Obesity   [ ] Altered GI Function [ ] Unintended Weight Loss [ ] Food & Nutrition Related Knowledge Deficit [ ] Malnutrition     Nutrition Diagnosis is [x] ongoing  [ ] resolved [ ] not applicable    Previous Goal: Pt to consume > 75% of meals (goal not met); Deter unintentional wt loss during hospitalization (likely not met - pt's wt gain likely not true wt gain, likely fluid related)    New Nutrition Diagnosis: [ ] not applicable [ ] Inadequate Energy Intake [ ]Inadequate Oral Intake [ ] Excessive Energy Intake   [ ] Underweight [ ] Increased Nutrient Needs [ ] Overweight/Obesity   [ ] Altered GI Function [ ] Unintended Weight Loss [ ] Food & Nutrition Related Knowledge Deficit [x] Severe malnutrition in context of acute illness    Related to: Inadequate energy/protein intake related to incarcerated R inguino-scotal hernia    As evidenced by: < 50% of nutrition needs > 5 days & Moderate (2+) generalized edema & physical findings of mild/moderate muscle wasting and fat loss as noted above      Interventions:   Recommend  [x] Change Diet To: Recommend advance diet as tolerated to Clear Liquids to Full Liquids to Low Sodium, Low Fiber/Residue diet + food consistency as per SLP's recommendations + nutrition supplement in accordance with fluid consistency as per SLP's recommendations, when medically feasible  [ ] Nutrition Supplement  [ ] Nutrition Support  [ ] Other:     Monitoring and Evaluation:   [ ] PO intake [ x ] Tolerance to diet prescription [ x ] weights [ x ] labs[ x ] follow up per protocol  [ ] other:

## 2018-11-27 LAB
ANION GAP SERPL CALC-SCNC: 8 MMOL/L — SIGNIFICANT CHANGE UP (ref 5–17)
BUN SERPL-MCNC: 9 MG/DL — SIGNIFICANT CHANGE UP (ref 7–23)
CALCIUM SERPL-MCNC: 6.1 MG/DL — CRITICAL LOW (ref 8.5–10.1)
CHLORIDE SERPL-SCNC: 107 MMOL/L — SIGNIFICANT CHANGE UP (ref 96–108)
CO2 SERPL-SCNC: 26 MMOL/L — SIGNIFICANT CHANGE UP (ref 22–31)
CREAT SERPL-MCNC: 0.72 MG/DL — SIGNIFICANT CHANGE UP (ref 0.5–1.3)
GLUCOSE SERPL-MCNC: 108 MG/DL — HIGH (ref 70–99)
HCT VFR BLD CALC: 26.9 % — LOW (ref 39–50)
HGB BLD-MCNC: 9.2 G/DL — LOW (ref 13–17)
MAGNESIUM SERPL-MCNC: 2.3 MG/DL — SIGNIFICANT CHANGE UP (ref 1.6–2.6)
MCHC RBC-ENTMCNC: 26.2 PG — LOW (ref 27–34)
MCHC RBC-ENTMCNC: 34.2 GM/DL — SIGNIFICANT CHANGE UP (ref 32–36)
MCV RBC AUTO: 76.6 FL — LOW (ref 80–100)
NRBC # BLD: 0 /100 WBCS — SIGNIFICANT CHANGE UP (ref 0–0)
PHOSPHATE SERPL-MCNC: 1.9 MG/DL — LOW (ref 2.5–4.5)
PLATELET # BLD AUTO: 423 K/UL — HIGH (ref 150–400)
POTASSIUM SERPL-MCNC: 3.9 MMOL/L — SIGNIFICANT CHANGE UP (ref 3.5–5.3)
POTASSIUM SERPL-SCNC: 3.9 MMOL/L — SIGNIFICANT CHANGE UP (ref 3.5–5.3)
RBC # BLD: 3.51 M/UL — LOW (ref 4.2–5.8)
RBC # FLD: 14.9 % — HIGH (ref 10.3–14.5)
SODIUM SERPL-SCNC: 141 MMOL/L — SIGNIFICANT CHANGE UP (ref 135–145)
WBC # BLD: 10.6 K/UL — HIGH (ref 3.8–10.5)
WBC # FLD AUTO: 10.6 K/UL — HIGH (ref 3.8–10.5)

## 2018-11-27 RX ORDER — POTASSIUM PHOSPHATE, MONOBASIC POTASSIUM PHOSPHATE, DIBASIC 236; 224 MG/ML; MG/ML
15 INJECTION, SOLUTION INTRAVENOUS EVERY 6 HOURS
Qty: 0 | Refills: 0 | Status: DISCONTINUED | OUTPATIENT
Start: 2018-11-27 | End: 2018-11-27

## 2018-11-27 RX ORDER — CALCIUM GLUCONATE 100 MG/ML
1 VIAL (ML) INTRAVENOUS ONCE
Qty: 0 | Refills: 0 | Status: COMPLETED | OUTPATIENT
Start: 2018-11-27 | End: 2018-11-27

## 2018-11-27 RX ADMIN — Medication 10 MILLIGRAM(S): at 13:03

## 2018-11-27 RX ADMIN — Medication 3 MILLILITER(S): at 05:57

## 2018-11-27 RX ADMIN — HEPARIN SODIUM 5000 UNIT(S): 5000 INJECTION INTRAVENOUS; SUBCUTANEOUS at 21:03

## 2018-11-27 RX ADMIN — Medication 3 MILLILITER(S): at 11:05

## 2018-11-27 RX ADMIN — Medication 250 MILLIGRAM(S): at 05:42

## 2018-11-27 RX ADMIN — PANTOPRAZOLE SODIUM 40 MILLIGRAM(S): 20 TABLET, DELAYED RELEASE ORAL at 11:09

## 2018-11-27 RX ADMIN — MEROPENEM 100 MILLIGRAM(S): 1 INJECTION INTRAVENOUS at 13:02

## 2018-11-27 RX ADMIN — CARVEDILOL PHOSPHATE 3.12 MILLIGRAM(S): 80 CAPSULE, EXTENDED RELEASE ORAL at 17:03

## 2018-11-27 RX ADMIN — Medication 10 MILLIGRAM(S): at 21:03

## 2018-11-27 RX ADMIN — HEPARIN SODIUM 5000 UNIT(S): 5000 INJECTION INTRAVENOUS; SUBCUTANEOUS at 05:39

## 2018-11-27 RX ADMIN — ONDANSETRON 4 MILLIGRAM(S): 8 TABLET, FILM COATED ORAL at 13:03

## 2018-11-27 RX ADMIN — Medication 100 GRAM(S): at 19:53

## 2018-11-27 RX ADMIN — HEPARIN SODIUM 5000 UNIT(S): 5000 INJECTION INTRAVENOUS; SUBCUTANEOUS at 13:02

## 2018-11-27 RX ADMIN — Medication 3 MILLILITER(S): at 23:39

## 2018-11-27 RX ADMIN — Medication 3 MILLILITER(S): at 17:13

## 2018-11-27 RX ADMIN — Medication 10 MILLIGRAM(S): at 05:40

## 2018-11-27 RX ADMIN — CARVEDILOL PHOSPHATE 3.12 MILLIGRAM(S): 80 CAPSULE, EXTENDED RELEASE ORAL at 07:19

## 2018-11-27 RX ADMIN — MEROPENEM 100 MILLIGRAM(S): 1 INJECTION INTRAVENOUS at 05:40

## 2018-11-27 RX ADMIN — Medication 85 MILLIMOLE(S): at 13:09

## 2018-11-27 RX ADMIN — Medication 250 MILLIGRAM(S): at 17:04

## 2018-11-27 RX ADMIN — MEROPENEM 100 MILLIGRAM(S): 1 INJECTION INTRAVENOUS at 21:03

## 2018-11-27 NOTE — SWALLOW BEDSIDE ASSESSMENT ADULT - ORAL PREPARATORY PHASE
Bolus falls into right lateral sulci/Reduced oral grading/Bolus falls into anterior sulcus/Anterior loss of bolus/Bolus falls into left lateral sulci Reduced oral grading

## 2018-11-27 NOTE — PROGRESS NOTE ADULT - SUBJECTIVE AND OBJECTIVE BOX
Patient is a 68y old  Male who presents with a chief complaint of Abdominal pain and scrotal pain, Incarcerated R Inguino-scrotal Hernia. (26 Nov 2018 19:19)  serum calcium is 6.6 , but corrected calcium is 8.12.  medically stable    INTERVAL HPI/OVERNIGHT EVENTS: no complaint  PAST MEDICAL & SURGICAL HISTORY:  Sudden cardiac death  Gait abnormality  Leg pain, right  Anoxic encephalopathy  Brain injury with coma: x 2 weeks in 2008  Prostate cancer: radiation therapy  Hypertension  S/P ICD (internal cardiac defibrillator) procedure: implanted on 1/6/2009  H/O tracheostomy  H/O prostate cancer: resection and radiation therapy  Status post cryoablation: of left renal mass      MEDICATIONS  (STANDING):  ALBUTerol/ipratropium for Nebulization 3 milliLiter(s) Nebulizer every 6 hours  carvedilol 3.125 milliGRAM(s) Oral every 12 hours  dextrose 5% + sodium chloride 0.45% with potassium chloride 20 mEq/L 1000 milliLiter(s) (50 mL/Hr) IV Continuous <Continuous>  heparin  Injectable 5000 Unit(s) SubCutaneous every 8 hours  meropenem  IVPB 1000 milliGRAM(s) IV Intermittent every 8 hours  metoclopramide 10 milliGRAM(s) Oral every 8 hours  metoclopramide Injectable 10 milliGRAM(s) IV Push every 8 hours  pantoprazole  Injectable 40 milliGRAM(s) IV Push daily  vancomycin  IVPB 1000 milliGRAM(s) IV Intermittent every 12 hours    MEDICATIONS  (PRN):  acetaminophen  Suppository .. 650 milliGRAM(s) Rectal every 6 hours PRN Temp greater or equal to 38C (100.4F), Mild Pain (1 - 3)  HYDROmorphone  Injectable 0.5 milliGRAM(s) IV Push every 6 hours PRN Severe Pain (7 - 10)  ondansetron    Tablet 4 milliGRAM(s) Oral every 6 hours PRN Nausea and/or Vomiting      Allergies    lisinopril (Unknown)  penicillins (Unknown)    Intolerances        REVIEW OF SYSTEMS:  CONSTITUTIONAL: No fever, weight loss, or fatigue  EYES: No eye pain, visual disturbances, or discharge  ENMT:  No difficulty hearing, tinnitus, vertigo; No sinus or throat pain  NECK: No pain or stiffness  BREASTS: No pain, masses, or nipple discharge  RESPIRATORY: No cough, wheezing, chills or hemoptysis; No shortness of breath  CARDIOVASCULAR: No chest pain, palpitations, dizziness, or leg swelling  GASTROINTESTINAL: No abdominal or epigastric pain. No nausea, vomiting, or hematemesis; No diarrhea or constipation. No melena or hematochezia.  GENITOURINARY: No dysuria, frequency, hematuria, or incontinence  NEUROLOGICAL: No headaches, memory loss, loss of strength, numbness, or tremors  SKIN: No itching, burning, rashes, or lesions   LYMPH NODES: No enlarged glands  ENDOCRINE: No heat or cold intolerance; No hair loss  MUSCULOSKELETAL: No joint pain or swelling; No muscle, back, or extremity pain  PSYCHIATRIC: No depression, anxiety, mood swings, or difficulty sleeping  HEME/LYMPH: No easy bruising, or bleeding gums  ALLERY AND IMMUNOLOGIC: No hives or eczema    Vital Signs Last 24 Hrs  T(C): 37.7 (27 Nov 2018 05:26), Max: 37.7 (27 Nov 2018 05:26)  T(F): 99.9 (27 Nov 2018 05:26), Max: 99.9 (27 Nov 2018 05:26)  HR: 101 (27 Nov 2018 06:56) (96 - 109)  BP: 147/74 (27 Nov 2018 05:26) (121/62 - 148/72)  BP(mean): --  RR: 18 (27 Nov 2018 05:26) (18 - 19)  SpO2: 98% (27 Nov 2018 06:56) (96% - 100%)    PHYSICAL EXAM:  GENERAL: NAD, well-groomed, well-developed  HEAD:  Atraumatic, Normocephalic  EYES: EOMI, PERRLA, conjunctiva and sclera clear  ENMT: No tonsillar erythema, exudates, or enlargement; Moist mucous membranes, Good dentition, No lesions  NECK: Supple, No JVD, Normal thyroid  NERVOUS SYSTEM:  Alert & Oriented X3, Good concentration; Motor Strength 5/5 B/L upper and lower extremities; DTRs 2+ intact and symmetric  CHEST/LUNG: Clear to percussion bilaterally; No rales, rhonchi, wheezing, or rubs  HEART: Regular rate and rhythm; No murmurs, rubs, or gallops  ABDOMEN: Soft, Nontender, Nondistended; Bowel sounds present  EXTREMITIES:  2+ Peripheral Pulses, No clubbing, cyanosis, or edema  LYMPH: No lymphadenopathy noted  SKIN: No rashes or lesions    LABS:                        9.3    10.57 )-----------( 397      ( 26 Nov 2018 08:09 )             27.0     11-26    142  |  106  |  13  ----------------------------<  125<H>  3.5   |  28  |  0.71    Ca    6.6<L>      26 Nov 2018 08:09  Phos  1.4     11-26  Mg     2.3     11-26            CAPILLARY BLOOD GLUCOSE                    RADIOLOGY & ADDITIONAL TESTS:    Imaging Personally Reviewed:  [ ] YES  [ ] NO    Consultant(s) Notes Reviewed:  [ ] YES  [ ] NO    Care Discussed with Consultants/Other Providers [ ] YES  [ ] NO    Care discussed with family,         [  ]   yes  [  ]  No    imp:    stable[ ]    unstable[  ]     improving [ x  ]       unchanged  [  ]                Plans:  Continue present plans  [ x ] as per surgery. will give one dose of Iv calcium  ( corrected calcium 8.12)               New consult [  ]   specialty  .......               order test[  ]    test name.                  Discharge Planning  [  ]

## 2018-11-27 NOTE — SWALLOW BEDSIDE ASSESSMENT ADULT - SLP GENERAL OBSERVATIONS
Pt was seen at bedside alert, verbal and with behaviors consistent with cognitive deficits; Pt was cooperative for exam and oriented to name; responses to questions and following directions was inconsistent and required max assist from his wife. Pt was seen at bedside alert, verbal and with behaviors consistent with cognitive deficits; Pt was cooperative for exam and oriented to name; Pt responses to questions and following directions was inconsistent and required prompting from his wife to complete.

## 2018-11-27 NOTE — SWALLOW BEDSIDE ASSESSMENT ADULT - COMMENTS
Per Pulmonology note: bilateral infiltrates Atelectasis vs Pna; Bilateral pleural effusion; Anemia; S/P Hypoxic Encephalopathy;   Per internist note: cognitively impaired from brain injury 10 yrs ago. Pt at times with reduced mouth opening behaviors to accept bolus Pt with prolonged chewing but able to clear effectively

## 2018-11-27 NOTE — SWALLOW BEDSIDE ASSESSMENT ADULT - PHARYNGEAL PHASE
Cough post oral intake/Wet vocal quality post oral intake/Throat clear post oral intake/Multiple swallows Multiple swallows Within functional limits

## 2018-11-27 NOTE — PROGRESS NOTE ADULT - SUBJECTIVE AND OBJECTIVE BOX
HPI: 68Male who presented with a chief complaint of Abdominal pain and scrotal pain, Incarcerated R Inguino-scrotal Hernia. and was admitted with GENERALIZED ABDOMINAL PAIN    Interval History: Patient seen and examined at bedside. Remains afebrile with mild tachycardia. Yesterday patient complained about abdominal distention, nausea and 1 episode of small amount of green bilious vomit. Patient remains NPO, had 1 loose BM this morning. RLQ TENZIN secure. AXR from yesterday significant for Ileus but with contrast passing into colon.     Vital Signs Last 24 Hrs  T(F): 99.9 (11-27-18 @ 05:26), Max: 99.9 (11-27-18 @ 05:26)  HR: 101 (11-27-18 @ 06:56)  BP: 147/74 (11-27-18 @ 05:26)  RR: 18 (11-27-18 @ 05:26)  SpO2: 98% (11-27-18 @ 06:56)    PHYSICAL EXAM:  GENERAL: Alert, NAD  CHEST/LUNG: Clear to auscultation bilaterally, respirations nonlabored  HEART: Regular rhythm, tachycardic to 100's  ABDOMEN: Distended abdomen, soft to palpation without TTP. Midline incision site with serous saturated dressing, changed.  RLQ TENZIN with 20mL serous output/24H.   EXTREMITIES:  no calf tenderness, No edema    I&O's Detail  26 Nov 2018 07:01  -  27 Nov 2018 07:00  --------------------------------------------------------  IN:    dextrose 5% + sodium chloride 0.45% with potassium chloride 20 mEq/L: 600 mL    dextrose 5% + sodium chloride 0.45%.: 500 mL    Solution: 150 mL    Solution: 750 mL    Solution: 750 mL  Total IN: 2750 mL  OUT:    Bulb: 20 mL    Incontinent per Condom Catheter: 300 mL  Total OUT: 320 mL  Total NET: 2430 mL    LABS:             9.2    10.60 )-----------( 423      ( 27 Nov 2018 10:17 )             26.9   141  |  107  |  9   ----------------------------<  108<H>  3.9   |  26  |  0.72    Ca    6.1<LL>      27 Nov 2018 10:17  Phos  1.9     11-27  Mg     2.3     11-27      RADIOLOGY & ADDITIONAL STUDIES:  11/26/2018 KUB:   Impression: No evidence for free air.   New surgical clips projecting over the lower abdomen/pelvis and the right   inguinal region.   Gaseous dilatation of the stomach.   Oral contrast is noted in the colon and rectum.   Dilatation of the small bowel is again noted. Finding may represent   postoperative ileus or partial small bowel obstruction. Clinical correlation   is recommended.   Mild left pleural effusion.     ASSESSMENT & PLAN:    68M who presented with SBO 2/2 incarcerated Left inguinal hernia now POD 9 s/p Ex Lap, SPENCER, cecectomy and repair of RIH. Post op course complicated by pulmonary congestion and post op ileus.     - Remain NPO  - Serial abdominal exams  - Nausea/vomiting managed with standing Reglan and Zofran PRN  - continue local wound care with Iodoform packing and dry dressing  - Continue antibiotics (Merrem and Vanco- trough 9.5)  - f/u AM labs leukocytosis of 10.6)  - Severe hypophosphatemia and hypocalcemia replaced  - DVT prophylaxis, Incentive Spirometer, OOBTC with PT, pain control  - appreciate continued recs & management per medicine  - Monitor Midline site  - will discuss with surgical attending, Dr. Tinsley HPI: 68Male who presented with a chief complaint of Abdominal pain and scrotal pain, Incarcerated R Inguino-scrotal Hernia, Large bowel obstruction.    Interval History: Patient seen and examined at bedside. Remains afebrile with mild tachycardia. Yesterday patient complained about abdominal distention, nausea and 1 episode of small amount of green bilious vomit. Patient remains NPO, had large loose BM this morning. RLQ TENZIN secure. AXR from yesterday significant for Ileus but with contrast passing into colon.     Vital Signs Last 24 Hrs  T(F): 99.9 (11-27-18 @ 05:26), Max: 99.9 (11-27-18 @ 05:26)  HR: 101 (11-27-18 @ 06:56)  BP: 147/74 (11-27-18 @ 05:26)  RR: 18 (11-27-18 @ 05:26)  SpO2: 98% (11-27-18 @ 06:56)    PHYSICAL EXAM:  GENERAL: Alert, NAD  CHEST/LUNG: Clear to auscultation bilaterally, respirations nonlabored  HEART: Regular rhythm, tachycardic to 100's  ABDOMEN: Mild Distended abdomen, soft to palpation without TTP. Midline incision site with serous fluid, dressing changed.  RLQ TENZIN with 20mL serous output/24H.   EXTREMITIES:  no calf tenderness, No edema    I&O's Detail  26 Nov 2018 07:01  -  27 Nov 2018 07:00  --------------------------------------------------------  IN:    dextrose 5% + sodium chloride 0.45% with potassium chloride 20 mEq/L: 600 mL    dextrose 5% + sodium chloride 0.45%.: 500 mL    Solution: 150 mL    Solution: 750 mL    Solution: 750 mL  Total IN: 2750 mL  OUT:    Bulb: 20 mL    Incontinent per Condom Catheter: 300 mL  Total OUT: 320 mL  Total NET: 2430 mL    LABS:             9.2    10.60 )-----------( 423      ( 27 Nov 2018 10:17 )             26.9   141  |  107  |  9   ----------------------------<  108<H>  3.9   |  26  |  0.72    Ca    6.1<LL>      27 Nov 2018 10:17  Phos  1.9     11-27  Mg     2.3     11-27      RADIOLOGY & ADDITIONAL STUDIES:  11/26/2018 KUB:   Impression: No evidence for free air.   New surgical clips projecting over the lower abdomen/pelvis and the right   inguinal region.   Gaseous dilatation of the stomach.   Oral contrast is noted in the colon and rectum.   Dilatation of the small bowel is again noted. Finding may represent   postoperative ileus or partial small bowel obstruction. Clinical correlation   is recommended.   Mild left pleural effusion.     ASSESSMENT & PLAN:    68M who presented with SBO 2/2 incarcerated Left inguinal hernia now POD 9 s/p Ex Lap, SPENCER, cecectomy and repair of RIH. Post op course complicated by pulmonary congestion.  Prolonged Post-op Ileus improving. Swallow evaluation report appreciated.    - Start pure type diet.  - Serial abdominal exams  - Nausea/vomiting managed with standing Reglan and Zofran PRN  - continue local wound care with Iodoform packing and dry dressing  - Continue antibiotics (Merrem and Vanco- trough 9.5)  - f/u AM labs leukocytosis of 10.6)  - Severe hypophosphatemia and hypocalcemia replaced  - DVT prophylaxis, Incentive Spirometer, OOBTC with PT, pain control  - appreciate continued recs & management per medicine  - Monitor Midline site  - will discuss with surgical attending, Dr. Tinsley

## 2018-11-27 NOTE — PROGRESS NOTE ADULT - SUBJECTIVE AND OBJECTIVE BOX
INTERVAL HPI:   68M with HTN, Prostate Ca (s/p resection and radiation), AICD placement s/p cardiac arrest and coma (due to brain injury) about 10yrs ago, and known 2 year history of reducible right inguinal hernia.   Presented  to the ER c/o diffuse abdominal pain and right scrotal pain for 1 week which got worst over the course of 2 days.. This was also associated with nausea and non bilious emesis.  Found to have incarcerated Right Inguinal hernia, requiring Ex Lap, Cecal resection and repair of hernia with mesh. Post op observed in ICU,  with significant drop in H & H requiring PRBC transfusion.   11/22/18: transferred to medical flo.  Reported to have significant upper airway secretion which ar difficult to clear at times. Non smoker per wife.  11/26/18:  Midline placement.    OVERNIGHT EVENTS:  Awake, comfortable, no distress.    Vital Signs Last 24 Hrs  T(C): 37.1 (27 Nov 2018 14:41), Max: 37.7 (27 Nov 2018 05:26)  T(F): 98.7 (27 Nov 2018 14:41), Max: 99.9 (27 Nov 2018 05:26)  HR: 105 (27 Nov 2018 14:41) (96 - 109)  BP: 135/68 (27 Nov 2018 14:41) (121/62 - 148/72)  BP(mean): --  RR: 16 (27 Nov 2018 14:41) (16 - 19)  SpO2: 100% (27 Nov 2018 14:41) (96% - 100%)    PHYSICAL EXAM:  GEN:         Awake, responsive and comfortable.  HEENT:    Normal.    RESP:        no wheezing.  CVS:          Regular rate and rhythm.   ABD:         Soft, non-tender, non-distended;     MEDICATIONS  (STANDING):  ALBUTerol/ipratropium for Nebulization 3 milliLiter(s) Nebulizer every 6 hours  calcium gluconate IVPB 1 Gram(s) IV Intermittent once  carvedilol 3.125 milliGRAM(s) Oral every 12 hours  dextrose 5% + sodium chloride 0.45% with potassium chloride 20 mEq/L 1000 milliLiter(s) (50 mL/Hr) IV Continuous <Continuous>  heparin  Injectable 5000 Unit(s) SubCutaneous every 8 hours  meropenem  IVPB 1000 milliGRAM(s) IV Intermittent every 8 hours  metoclopramide 10 milliGRAM(s) Oral every 8 hours  metoclopramide Injectable 10 milliGRAM(s) IV Push every 8 hours  pantoprazole  Injectable 40 milliGRAM(s) IV Push daily  vancomycin  IVPB 1000 milliGRAM(s) IV Intermittent every 12 hours    MEDICATIONS  (PRN):  acetaminophen  Suppository .. 650 milliGRAM(s) Rectal every 6 hours PRN Temp greater or equal to 38C (100.4F), Mild Pain (1 - 3)  HYDROmorphone  Injectable 0.5 milliGRAM(s) IV Push every 6 hours PRN Severe Pain (7 - 10)  ondansetron    Tablet 4 milliGRAM(s) Oral every 6 hours PRN Nausea and/or Vomiting    LABS:                        9.2    10.60 )-----------( 423      ( 27 Nov 2018 10:17 )             26.9     11-27    141  |  107  |  9   ----------------------------<  108<H>  3.9   |  26  |  0.72    Ca    6.1<LL>      27 Nov 2018 10:17  Phos  1.9     11-27  Mg     2.3     11-27  ASSESSMENT AND PLAN:  ·	Bilateral infiltrates, Atelectasis vs Pneumonia.  ·	Bilateral pleural effusion.  ·	S/P Ex laparotomy with cecal resection.  ·	Right Inguinal hernia repair.  ·	Anemia.  ·	HTN.  ·	S/P Hypoxic Encephalopathy.    Clinically improving.  Continue antibiotics and nebulizer.  Discussed with wife.

## 2018-11-27 NOTE — PROGRESS NOTE ADULT - PROBLEM SELECTOR PROBLEM 1
Inguinal hernia, incarcerated
Generalized abdominal pain

## 2018-11-27 NOTE — PROGRESS NOTE ADULT - ASSESSMENT
POD#4 s/p ex lap, colon resection, repair of incarcerated right inguinal hernia with mesh, with post op anemia s/p 2uPRBC 11/21  i am called as yesterday febrile ;; but had got 2 units of bllood less than 24 hours before   sepsis work up done   ? post op pneumonia  CXR with new opacity yesterday   on antibiotics extended   is congested  pulm eval noted  adm blood culture pos for cns  usu contaminant   but patient has a defibrillaor  fu repeat blood cultures  has very poor access and has gen edema   try oral antibiotics asap or DC   procalcitonin am   jorge a kate  thanks POD#4 s/p ex lap, colon resection, repair of incarcerated right inguinal hernia with mesh, with post op anemia s/p 2uPRBC 11/21  i am called as yesterday febrile ;; but had got 2 units of bllood less than 24 hours before   sepsis work up done   ? post op pneumonia  CXR with new opacity yesterday   all  antibiotics extended merrem day 6 today vanco day 5  adm blood culture pos for cns  usu contaminant   but patient has a defibrillaor  fu repeat blood cultures so far NEGATIVE   has very poor access and has gen edema   discussed with dr torres   likely another 3-5 days of antibiotics

## 2018-11-27 NOTE — SWALLOW BEDSIDE ASSESSMENT ADULT - SLP PERTINENT HISTORY OF CURRENT PROBLEM
admitted with gen abdominal pain; Pt presented with SBO 2/2 incarcerated Left inguinal hernia; now POD 9 s/p Ex Lap, SPENCER, cecectomy and repair of RIH. Post op course complicated by pulmonary congestion and post op ileus.

## 2018-11-27 NOTE — PROGRESS NOTE ADULT - SUBJECTIVE AND OBJECTIVE BOX
HPI:  68M with PMH HTN, prostate ca (s/p resection and radiation), AICD placement s/p cardiac arrest and coma (due to brain injury) about 10yrs ago, and known 2 year history of reducible right inguinal hernia now presents to the ER c/o diffuse abdominal pain and right scrotal pain. Wife at bedside who speaks on behalf of patient. States right groin pain has been persistent x1 week, worsening since 2 days ago associated with increased right scrotal swelling. Associated with nausea and NBNB emesis.  Denies flatus or BM, states his last BM was yesterday. Wife states patient has the "sweats" at night, but denies fever/chills. Denies active chest pain, palpitations, shortness of breath, dizziness or urinary complaints.   ER and Surgeon tried to reduce Hernia, not reducible. (18 Nov 2018 11:32)      Allergies    lisinopril (Unknown)  penicillins (Unknown)    Intolerances        MEDICATIONS  (STANDING):  ALBUTerol/ipratropium for Nebulization 3 milliLiter(s) Nebulizer every 6 hours  calcium gluconate IVPB 1 Gram(s) IV Intermittent once  carvedilol 3.125 milliGRAM(s) Oral every 12 hours  dextrose 5% + sodium chloride 0.45% with potassium chloride 20 mEq/L 1000 milliLiter(s) (50 mL/Hr) IV Continuous <Continuous>  heparin  Injectable 5000 Unit(s) SubCutaneous every 8 hours  meropenem  IVPB 1000 milliGRAM(s) IV Intermittent every 8 hours  metoclopramide 10 milliGRAM(s) Oral every 8 hours  metoclopramide Injectable 10 milliGRAM(s) IV Push every 8 hours  pantoprazole  Injectable 40 milliGRAM(s) IV Push daily  vancomycin  IVPB 1000 milliGRAM(s) IV Intermittent every 12 hours    MEDICATIONS  (PRN):  acetaminophen  Suppository .. 650 milliGRAM(s) Rectal every 6 hours PRN Temp greater or equal to 38C (100.4F), Mild Pain (1 - 3)  HYDROmorphone  Injectable 0.5 milliGRAM(s) IV Push every 6 hours PRN Severe Pain (7 - 10)  ondansetron    Tablet 4 milliGRAM(s) Oral every 6 hours PRN Nausea and/or Vomiting      REVIEW OF SYSTEMS:    CONSTITUTIONAL: No fever, chills, weight loss, or fatigue  HEENT: No sore throat, runny nose, ear ache  RESPIRATORY: No cough, wheezing, No shortness of breath  CARDIOVASCULAR: No chest pain, palpitations, dizziness  GASTROINTESTINAL: No abdominal pain. No nausea, vomiting, diarrhea  GENITOURINARY: No dysuria, increase frequency, hematuria, or incontinence  NEUROLOGICAL: No headaches, memory loss, loss of strength, numbness, or tremors, no weakness  EXTREMITY: No pedal edema BLE  SKIN: No itching, burning, rashes, or lesions     VITAL SIGNS:  T(C): 37.5 (11-27-18 @ 12:57), Max: 37.7 (11-27-18 @ 05:26)  T(F): 99.5 (11-27-18 @ 12:57), Max: 99.9 (11-27-18 @ 05:26)  HR: 103 (11-27-18 @ 12:57) (96 - 109)  BP: 147/91 (11-27-18 @ 12:57) (121/62 - 148/72)  RR: 17 (11-27-18 @ 12:57) (17 - 19)  SpO2: 98% (11-27-18 @ 12:57) (96% - 99%)  Wt(kg): --    PHYSICAL EXAM:    GENERAL: not in any distress  HEENT: Neck is supple, normocephalic, atraumatic   CHEST/LUNG: Clear to auscultation bilaterally; No rales, rhonchi, wheezing  HEART: Regular rate and rhythm; No murmurs, rubs, or gallops  ABDOMEN: Soft, Nontender, Nondistended; Bowel sounds present, no rebound   EXTREMITIES:  2+ Peripheral Pulses, No clubbing, cyanosis, or edema  GENITOURINARY:   SKIN: No rashes or lesions  BACK: no pressor sore   NERVOUS SYSTEM:  Alert & Oriented X3, Good concentration  PSYCH: normal affect     LABS:                         9.2    10.60 )-----------( 423      ( 27 Nov 2018 10:17 )             26.9     11-27    141  |  107  |  9   ----------------------------<  108<H>  3.9   |  26  |  0.72    Ca    6.1<LL>      27 Nov 2018 10:17  Phos  1.9     11-27  Mg     2.3     11-27                          Vancomycin Level, Trough: 9.2 ug/mL (11-26 @ 08:09)        Culture Results:   No growth to date. (11-23 @ 08:32)  Culture Results:   No growth to date. (11-23 @ 08:32)  Culture Results:   Normal Respiratory Ana present (11-23 @ 01:28)                Radiology: HPI:  68M with PMH HTN, prostate ca (s/p resection and radiation), AICD placement s/p cardiac arrest and coma (due to brain injury) about 10yrs ago, and known 2 year history of reducible right inguinal hernia now presents to the ER c/o diffuse abdominal pain and right scrotal pain.   ER and Surgeon tried to reduce Hernia, not reducible. (18 Nov 2018 11:32)  underwent sx   post sx blood culture pos cns has a defibrillator ; repeat NEGATIVE   also superficial wound drainage infection to umbilicus   no signs and symptoms of infection at meshj   discussed with sx dr torres today     Allergies    lisinopril (Unknown)  penicillins (Unknown)    Intolerances        MEDICATIONS  (STANDING):  ALBUTerol/ipratropium for Nebulization 3 milliLiter(s) Nebulizer every 6 hours  calcium gluconate IVPB 1 Gram(s) IV Intermittent once  carvedilol 3.125 milliGRAM(s) Oral every 12 hours  dextrose 5% + sodium chloride 0.45% with potassium chloride 20 mEq/L 1000 milliLiter(s) (50 mL/Hr) IV Continuous <Continuous>  heparin  Injectable 5000 Unit(s) SubCutaneous every 8 hours  meropenem  IVPB 1000 milliGRAM(s) IV Intermittent every 8 hours  metoclopramide 10 milliGRAM(s) Oral every 8 hours  metoclopramide Injectable 10 milliGRAM(s) IV Push every 8 hours  pantoprazole  Injectable 40 milliGRAM(s) IV Push daily  vancomycin  IVPB 1000 milliGRAM(s) IV Intermittent every 12 hours    MEDICATIONS  (PRN):  acetaminophen  Suppository .. 650 milliGRAM(s) Rectal every 6 hours PRN Temp greater or equal to 38C (100.4F), Mild Pain (1 - 3)  HYDROmorphone  Injectable 0.5 milliGRAM(s) IV Push every 6 hours PRN Severe Pain (7 - 10)  ondansetron    Tablet 4 milliGRAM(s) Oral every 6 hours PRN Nausea and/or Vomiting      REVIEW OF SYSTEMS:    tolerating diet   feels better   no pain     VITAL SIGNS:  T(C): 37.5 (11-27-18 @ 12:57), Max: 37.7 (11-27-18 @ 05:26)  T(F): 99.5 (11-27-18 @ 12:57), Max: 99.9 (11-27-18 @ 05:26)  HR: 103 (11-27-18 @ 12:57) (96 - 109)  BP: 147/91 (11-27-18 @ 12:57) (121/62 - 148/72)  RR: 17 (11-27-18 @ 12:57) (17 - 19)  SpO2: 98% (11-27-18 @ 12:57) (96% - 99%)  Wt(kg): --    PHYSICAL EXAM:    GENERAL: not in any distress  HEENT: Neck is supple, normocephalic, atraumatic   CHEST/LUNG: Clear to auscultation bilaterally; No rales, rhonchi, wheezing  HEART: Regular rate and rhythm; No murmurs, rubs, or gallops  ABDOMEN: Soft, Nontender, Nondistended; Bowel sounds present, no rebound   bloody drainage infection to umb   re plus   EXTREMITIES:  2+ Peripheral Pulses, No clubbing, cyanosis,   gen edema is much better   GENITOURINARY:   SKIN: No rashes or lesions  BACK: no pressor sore   NERVOUS SYSTEM:  Alert & responsive   LABS:                         9.2    10.60 )-----------( 423      ( 27 Nov 2018 10:17 )             26.9     11-27    141  |  107  |  9   ----------------------------<  108<H>  3.9   |  26  |  0.72    Ca    6.1<LL>      27 Nov 2018 10:17  Phos  1.9     11-27  Mg     2.3     11-27                          Vancomycin Level, Trough: 9.2 ug/mL (11-26 @ 08:09)        Culture Results:   No growth to date. (11-23 @ 08:32)  Culture Results:   No growth to date. (11-23 @ 08:32)  Culture Results:   Normal Respiratory Ana present (11-23 @ 01:28)                Radiology:    < from: CT Abdomen and Pelvis No Cont (11.21.18 @ 15:07) >  Impression: Mild right pleural effusion.    Combination of atelectasis and pulmonary consolidation in the right lower   lobe. New small nodular densities in the right middle lobe, likely   representing infectious or inflammatory process.    Ileocolic anastomosis is again noted with stable mural thickening at the   anastomosis, probably due to postoperative edema. Grossly stable small   amount of fluid the in the right lower quadrant abdomen adjacent to the   anastomosis.     In the right groin, again noted is a grossly stable 4.1 x 2.3 cm   collection with a Hounsfield unit of 74. This may represent a focal soft   tissue hematoma.    Trace pelvic free fluid.    Other findings as above.    < end of copied text >

## 2018-11-27 NOTE — SWALLOW BEDSIDE ASSESSMENT ADULT - SWALLOW EVAL: RECOMMENDED FEEDING/EATING TECHNIQUES
allow for swallow between intakes/check mouth frequently for oral residue/pocketing/no straws/position upright (90 degrees)/small sips/bites/maintain upright posture during/after eating for 30 mins/oral hygiene/alternate food with liquid

## 2018-11-27 NOTE — PROGRESS NOTE ADULT - SUBJECTIVE AND OBJECTIVE BOX
HEMODYNAMICALLY STABLE   TELEMETRY: NORMAL SINUS RHYTHM   NO COMPLAINTS;NO JVD;CLEAR LUNGS; REGULAR RATE & RHYTHM, NORMAL S1& S2, NO SIGNIFICANT MURMURS; ABDOMEN: BOUND; NO EDEMA     POSTOP INCARCERATED RIGHT ING MARI  HO ANOXIC ENCEPOLOPOTHY  STATUS  POST AICD  STABLE CARDIOVASCULAR STATUS; CONTINUING WITH PRESENT MANAGEMENT.   FAMILY PRESENT AT BEDSIDE

## 2018-11-27 NOTE — PROGRESS NOTE ADULT - ASSESSMENT
s/pcecotomy and inguinal hernia repair.hypoalbuminimia   anoxic encephalopathy. . cognitively impaired .   medically stable.. On Iv abx  for sepsis.    stage 4 decubitus in sacral area.

## 2018-11-27 NOTE — SWALLOW BEDSIDE ASSESSMENT ADULT - SWALLOW EVAL: DIAGNOSIS
Pt is a 68 yr old male with long hx cognitive impairment and complicated surgical course; new atelectasis vs pna; on this exam Pt presents with intermittent airway protection deficits and reduced awareness of swallow safety; however swallow mechanism does appear functional for modified consistencies..

## 2018-11-28 ENCOUNTER — TRANSCRIPTION ENCOUNTER (OUTPATIENT)
Age: 68
End: 2018-11-28

## 2018-11-28 LAB
ANION GAP SERPL CALC-SCNC: 9 MMOL/L — SIGNIFICANT CHANGE UP (ref 5–17)
BASOPHILS # BLD AUTO: 0.03 K/UL — SIGNIFICANT CHANGE UP (ref 0–0.2)
BASOPHILS NFR BLD AUTO: 0.3 % — SIGNIFICANT CHANGE UP (ref 0–2)
BUN SERPL-MCNC: 8 MG/DL — SIGNIFICANT CHANGE UP (ref 7–23)
CALCIUM SERPL-MCNC: 5.8 MG/DL — CRITICAL LOW (ref 8.5–10.1)
CHLORIDE SERPL-SCNC: 105 MMOL/L — SIGNIFICANT CHANGE UP (ref 96–108)
CO2 SERPL-SCNC: 26 MMOL/L — SIGNIFICANT CHANGE UP (ref 22–31)
CREAT SERPL-MCNC: 0.82 MG/DL — SIGNIFICANT CHANGE UP (ref 0.5–1.3)
CULTURE RESULTS: SIGNIFICANT CHANGE UP
CULTURE RESULTS: SIGNIFICANT CHANGE UP
EOSINOPHIL # BLD AUTO: 0.16 K/UL — SIGNIFICANT CHANGE UP (ref 0–0.5)
EOSINOPHIL NFR BLD AUTO: 1.6 % — SIGNIFICANT CHANGE UP (ref 0–6)
GLUCOSE SERPL-MCNC: 123 MG/DL — HIGH (ref 70–99)
HCT VFR BLD CALC: 28 % — LOW (ref 39–50)
HGB BLD-MCNC: 9.6 G/DL — LOW (ref 13–17)
IMM GRANULOCYTES NFR BLD AUTO: 0.8 % — SIGNIFICANT CHANGE UP (ref 0–1.5)
LYMPHOCYTES # BLD AUTO: 1.16 K/UL — SIGNIFICANT CHANGE UP (ref 1–3.3)
LYMPHOCYTES # BLD AUTO: 11.9 % — LOW (ref 13–44)
MAGNESIUM SERPL-MCNC: 2.2 MG/DL — SIGNIFICANT CHANGE UP (ref 1.6–2.6)
MCHC RBC-ENTMCNC: 26.4 PG — LOW (ref 27–34)
MCHC RBC-ENTMCNC: 34.3 GM/DL — SIGNIFICANT CHANGE UP (ref 32–36)
MCV RBC AUTO: 76.9 FL — LOW (ref 80–100)
MONOCYTES # BLD AUTO: 0.39 K/UL — SIGNIFICANT CHANGE UP (ref 0–0.9)
MONOCYTES NFR BLD AUTO: 4 % — SIGNIFICANT CHANGE UP (ref 2–14)
NEUTROPHILS # BLD AUTO: 7.92 K/UL — HIGH (ref 1.8–7.4)
NEUTROPHILS NFR BLD AUTO: 81.4 % — HIGH (ref 43–77)
PHOSPHATE SERPL-MCNC: 1.5 MG/DL — LOW (ref 2.5–4.5)
PLATELET # BLD AUTO: 495 K/UL — HIGH (ref 150–400)
POTASSIUM SERPL-MCNC: 4.1 MMOL/L — SIGNIFICANT CHANGE UP (ref 3.5–5.3)
POTASSIUM SERPL-SCNC: 4.1 MMOL/L — SIGNIFICANT CHANGE UP (ref 3.5–5.3)
PROCALCITONIN SERPL-MCNC: 0.12 NG/ML — HIGH (ref 0.02–0.1)
RBC # BLD: 3.64 M/UL — LOW (ref 4.2–5.8)
RBC # FLD: 15.1 % — HIGH (ref 10.3–14.5)
SODIUM SERPL-SCNC: 140 MMOL/L — SIGNIFICANT CHANGE UP (ref 135–145)
SPECIMEN SOURCE: SIGNIFICANT CHANGE UP
SPECIMEN SOURCE: SIGNIFICANT CHANGE UP
WBC # BLD: 9.74 K/UL — SIGNIFICANT CHANGE UP (ref 3.8–10.5)
WBC # FLD AUTO: 9.74 K/UL — SIGNIFICANT CHANGE UP (ref 3.8–10.5)

## 2018-11-28 PROCEDURE — 71046 X-RAY EXAM CHEST 2 VIEWS: CPT | Mod: 26

## 2018-11-28 RX ORDER — MAGNESIUM SULFATE 500 MG/ML
1 VIAL (ML) INJECTION ONCE
Qty: 0 | Refills: 0 | Status: DISCONTINUED | OUTPATIENT
Start: 2018-11-28 | End: 2018-11-28

## 2018-11-28 RX ORDER — CALCIUM GLUCONATE 100 MG/ML
1 VIAL (ML) INTRAVENOUS ONCE
Qty: 0 | Refills: 0 | Status: COMPLETED | OUTPATIENT
Start: 2018-11-28 | End: 2018-11-28

## 2018-11-28 RX ORDER — POTASSIUM PHOSPHATE, MONOBASIC POTASSIUM PHOSPHATE, DIBASIC 236; 224 MG/ML; MG/ML
15 INJECTION, SOLUTION INTRAVENOUS ONCE
Qty: 0 | Refills: 0 | Status: COMPLETED | OUTPATIENT
Start: 2018-11-28 | End: 2018-11-28

## 2018-11-28 RX ORDER — VANCOMYCIN HCL 1 G
1250 VIAL (EA) INTRAVENOUS EVERY 12 HOURS
Qty: 0 | Refills: 0 | Status: DISCONTINUED | OUTPATIENT
Start: 2018-11-28 | End: 2018-11-29

## 2018-11-28 RX ADMIN — Medication 166.67 MILLIGRAM(S): at 18:01

## 2018-11-28 RX ADMIN — Medication 10 MILLIGRAM(S): at 21:19

## 2018-11-28 RX ADMIN — MEROPENEM 100 MILLIGRAM(S): 1 INJECTION INTRAVENOUS at 21:18

## 2018-11-28 RX ADMIN — Medication 200 GRAM(S): at 17:20

## 2018-11-28 RX ADMIN — Medication 10 MILLIGRAM(S): at 05:03

## 2018-11-28 RX ADMIN — HEPARIN SODIUM 5000 UNIT(S): 5000 INJECTION INTRAVENOUS; SUBCUTANEOUS at 14:13

## 2018-11-28 RX ADMIN — Medication 10 MILLIGRAM(S): at 14:13

## 2018-11-28 RX ADMIN — Medication 10 MILLIGRAM(S): at 05:04

## 2018-11-28 RX ADMIN — MEROPENEM 100 MILLIGRAM(S): 1 INJECTION INTRAVENOUS at 14:13

## 2018-11-28 RX ADMIN — DEXTROSE MONOHYDRATE, SODIUM CHLORIDE, AND POTASSIUM CHLORIDE 50 MILLILITER(S): 50; .745; 4.5 INJECTION, SOLUTION INTRAVENOUS at 05:09

## 2018-11-28 RX ADMIN — CARVEDILOL PHOSPHATE 3.12 MILLIGRAM(S): 80 CAPSULE, EXTENDED RELEASE ORAL at 18:00

## 2018-11-28 RX ADMIN — POTASSIUM PHOSPHATE, MONOBASIC POTASSIUM PHOSPHATE, DIBASIC 63.75 MILLIMOLE(S): 236; 224 INJECTION, SOLUTION INTRAVENOUS at 23:07

## 2018-11-28 RX ADMIN — PANTOPRAZOLE SODIUM 40 MILLIGRAM(S): 20 TABLET, DELAYED RELEASE ORAL at 14:12

## 2018-11-28 RX ADMIN — CARVEDILOL PHOSPHATE 3.12 MILLIGRAM(S): 80 CAPSULE, EXTENDED RELEASE ORAL at 05:03

## 2018-11-28 RX ADMIN — Medication 250 MILLIGRAM(S): at 05:04

## 2018-11-28 RX ADMIN — Medication 3 MILLILITER(S): at 05:41

## 2018-11-28 RX ADMIN — MEROPENEM 100 MILLIGRAM(S): 1 INJECTION INTRAVENOUS at 05:04

## 2018-11-28 RX ADMIN — DEXTROSE MONOHYDRATE, SODIUM CHLORIDE, AND POTASSIUM CHLORIDE 50 MILLILITER(S): 50; .745; 4.5 INJECTION, SOLUTION INTRAVENOUS at 21:22

## 2018-11-28 RX ADMIN — HEPARIN SODIUM 5000 UNIT(S): 5000 INJECTION INTRAVENOUS; SUBCUTANEOUS at 21:19

## 2018-11-28 RX ADMIN — Medication 3 MILLILITER(S): at 17:19

## 2018-11-28 RX ADMIN — HEPARIN SODIUM 5000 UNIT(S): 5000 INJECTION INTRAVENOUS; SUBCUTANEOUS at 05:04

## 2018-11-28 RX ADMIN — Medication 3 MILLILITER(S): at 11:35

## 2018-11-28 NOTE — PROGRESS NOTE ADULT - ASSESSMENT
s/p ex lap, colon resection, repair of incarcerated right inguinal hernia with mesh, with post op anemia s/p 2uPRBC 11/21  i am called as yesterday febrile ;; but had got 2 units of bllood less than 24 hours before   sepsis work up done , bed to chair today , no cough , looking better   ? post op pneumonia  CXR with new opacity yesterday   all  antibiotics extended merrem day 6 today vanco day 5  adm blood culture pos for cns  usu contaminant   but patient has a defibrillaor  fu repeat blood cultures so far NEGATIVE   has very poor access and has gen edema   Dr Baldwin discussed with dr torres   likely another 3-5 days of antibiotics

## 2018-11-28 NOTE — PROGRESS NOTE ADULT - SUBJECTIVE AND OBJECTIVE BOX
INTERVAL HPI/OVERNIGHT EVENTS:    Patient sitting comfortably with wife bedside.  No complaint of abdominal pain.  Tolerating diet with no complaint of nausea/vomiting.  +Flatus/BM.        Vital Signs Last 24 Hrs  T(C): 36.7 (28 Nov 2018 11:27), Max: 37.2 (27 Nov 2018 23:53)  T(F): 98 (28 Nov 2018 11:27), Max: 98.9 (27 Nov 2018 23:53)  HR: 104 (28 Nov 2018 11:27) (91 - 111)  BP: 123/73 (28 Nov 2018 11:27) (115/62 - 143/68)  BP(mean): --  RR: 18 (28 Nov 2018 11:27) (16 - 18)  SpO2: 97% (28 Nov 2018 11:27) (92% - 100%)    MEDICATIONS  (STANDING):  ALBUTerol/ipratropium for Nebulization 3 milliLiter(s) Nebulizer every 6 hours  calcium gluconate IVPB 1 Gram(s) IV Intermittent once  carvedilol 3.125 milliGRAM(s) Oral every 12 hours  dextrose 5% + sodium chloride 0.45% with potassium chloride 20 mEq/L 1000 milliLiter(s) (50 mL/Hr) IV Continuous <Continuous>  heparin  Injectable 5000 Unit(s) SubCutaneous every 8 hours  meropenem  IVPB 1000 milliGRAM(s) IV Intermittent every 8 hours  metoclopramide 10 milliGRAM(s) Oral every 8 hours  pantoprazole  Injectable 40 milliGRAM(s) IV Push daily  potassium phosphate IVPB 15 milliMole(s) IV Intermittent once  vancomycin  IVPB 1250 milliGRAM(s) IV Intermittent every 12 hours    MEDICATIONS  (PRN):  acetaminophen  Suppository .. 650 milliGRAM(s) Rectal every 6 hours PRN Temp greater or equal to 38C (100.4F), Mild Pain (1 - 3)  ondansetron    Tablet 4 milliGRAM(s) Oral every 6 hours PRN Nausea and/or Vomiting      PHYSICAL EXAM:    GENERAL: Alert, NAD  CHEST/LUNG: Clear to auscultation bilaterally, respirations nonlabored  HEART: Regular rhythm, tachycardic to 100's  ABDOMEN: Mild Distended abdomen, soft to palpation without TTP. Midline incision site with serous fluid, dressing changed.    EXTREMITIES:  no calf tenderness, No edema    I&O's Detail    27 Nov 2018 07:01  -  28 Nov 2018 07:00  --------------------------------------------------------  IN:    dextrose 5% + sodium chloride 0.45% with potassium chloride 20 mEq/L: 1100 mL    Oral Fluid: 250 mL    Solution: 500 mL    Solution: 100 mL    Solution: 150 mL    Solution: 500 mL  Total IN: 2600 mL    OUT:    Incontinent per Condom Catheter: 800 mL  Total OUT: 800 mL    Total NET: 1800 mL          LABS:                        9.6    9.74  )-----------( 495      ( 28 Nov 2018 11:44 )             28.0     11-28    140  |  105  |  8   ----------------------------<  123<H>  4.1   |  26  |  0.82    Ca    5.8<LL>      28 Nov 2018 11:44  Phos  1.5     11-28  Mg     2.2     11-28          Impression:    68M who presented with SBO 2/2 incarcerated Left inguinal hernia now POD 10 s/p Ex Lap, SPENCER, cecectomy and repair of RIH. Post op course complicated by pulmonary congestion.  Prolonged Post-op Ileus improving.     continue diet as tolerated  - Serial abdominal exams  - Nausea/vomiting managed with standing Reglan and Zofran PRN  - continue local wound care with Iodoform packing and dry dressing  - Continue antibiotics (Merrem and Vanco- trough 9.5)  - Severe hypophosphatemia and hypocalcemia replaced  - DVT prophylaxis, Incentive Spirometer, OOBTC with PT, pain control  - appreciate continued recs & management per medicine  - will discuss with surgical attending, Dr. Tinsley INTERVAL HPI/OVERNIGHT EVENTS:    Patient sitting comfortably with wife bedside.  No complaint of abdominal pain.  Tolerating diet with no complaint of nausea/vomiting.  +Flatus/+ BM.        Vital Signs Last 24 Hrs  T(C): 36.7 (28 Nov 2018 11:27), Max: 37.2 (27 Nov 2018 23:53)  T(F): 98 (28 Nov 2018 11:27), Max: 98.9 (27 Nov 2018 23:53)  HR: 104 (28 Nov 2018 11:27) (91 - 111)  BP: 123/73 (28 Nov 2018 11:27) (115/62 - 143/68)  BP(mean): --  RR: 18 (28 Nov 2018 11:27) (16 - 18)  SpO2: 97% (28 Nov 2018 11:27) (92% - 100%)    MEDICATIONS  (STANDING):  ALBUTerol/ipratropium for Nebulization 3 milliLiter(s) Nebulizer every 6 hours  calcium gluconate IVPB 1 Gram(s) IV Intermittent once  carvedilol 3.125 milliGRAM(s) Oral every 12 hours  dextrose 5% + sodium chloride 0.45% with potassium chloride 20 mEq/L 1000 milliLiter(s) (50 mL/Hr) IV Continuous <Continuous>  heparin  Injectable 5000 Unit(s) SubCutaneous every 8 hours  meropenem  IVPB 1000 milliGRAM(s) IV Intermittent every 8 hours  metoclopramide 10 milliGRAM(s) Oral every 8 hours  pantoprazole  Injectable 40 milliGRAM(s) IV Push daily  potassium phosphate IVPB 15 milliMole(s) IV Intermittent once  vancomycin  IVPB 1250 milliGRAM(s) IV Intermittent every 12 hours    MEDICATIONS  (PRN):  acetaminophen  Suppository .. 650 milliGRAM(s) Rectal every 6 hours PRN Temp greater or equal to 38C (100.4F), Mild Pain (1 - 3)  ondansetron    Tablet 4 milliGRAM(s) Oral every 6 hours PRN Nausea and/or Vomiting      PHYSICAL EXAM:    GENERAL: Alert, NAD  CHEST/LUNG: Clear to auscultation bilaterally, respirations nonlabored  HEART: Regular rhythm, tachycardic to 100's  ABDOMEN: Mild Distended abdomen, soft to palpation without TTP. Midline incision site with serous fluid, dressing changed.                      R Inguinal wound clean, no infection.   EXTREMITIES:  no calf tenderness, No edema    I&O's Detail    27 Nov 2018 07:01  -  28 Nov 2018 07:00  --------------------------------------------------------  IN:    dextrose 5% + sodium chloride 0.45% with potassium chloride 20 mEq/L: 1100 mL    Oral Fluid: 250 mL    Solution: 500 mL    Solution: 100 mL    Solution: 150 mL    Solution: 500 mL  Total IN: 2600 mL    OUT:    Incontinent per Condom Catheter: 800 mL  Total OUT: 800 mL    Total NET: 1800 mL          LABS:                        9.6    9.74  )-----------( 495      ( 28 Nov 2018 11:44 )             28.0     11-28    140  |  105  |  8   ----------------------------<  123<H>  4.1   |  26  |  0.82    Ca    5.8<LL>      28 Nov 2018 11:44  Phos  1.5     11-28  Mg     2.2     11-28          Impression:    68M who presented with Sanford Bowel Obstruction  2/2 incarcerated Right  inguinal hernia now POD 10 s/p Ex Lap, SPENCER, cecectomy and repair of RIH. Post op course complicated by pulmonary congestion, Pneumonia.  Prolonged Post-op Ileus improving.     continue diet as tolerated  - Continue  Reglan and Zofran PRN  - continue local wound care with Iodoform packing and dry dressing  - Continue antibiotics (Merrem and Vanco- trough 9.5) as per ID and Pulmonary.  - Severe hypophosphatemia and hypocalcemia replaced  - DVT prophylaxis, Incentive Spirometer, OOBTC with PT, pain control  - appreciate continued recs & management per medicine  - will discuss with surgical attending, Dr. Tinsley

## 2018-11-28 NOTE — DISCHARGE NOTE ADULT - CARE PROVIDER_API CALL
Mario Tinsley (DO), Surgery  [address] [place] [address]  Phone: [phone]  Fax: [phone] Mario Tinsley (DO), Surgery  [address] [place] [address]  Phone: [phone]  Fax: [phone]    Bhanu Wilkins), Medicine  [address] [facility] [address]  [place] [address]  Phone: [phone]  Fax: [phone]    Kavita Baldwin), Infectious Disease; Internal Medicine  [address] [place] [address]  Phone: [phone]  Fax: [phone]    Malik Jacome), Emergency Medicine; Internal Medicine  [address] [place] [address]  Phone: [phone]  Fax: [phone]

## 2018-11-28 NOTE — DISCHARGE NOTE ADULT - CARE PLAN
Principal Discharge DX:	SBO (small bowel obstruction)  Goal:	Post-op care  Assessment and plan of treatment:	Follow up in 7-10 days. Please call the office to make an appointment. Activity as tolerated. Rest as needed. Please continue a dysphagia 2 mechanical soft, nectar consistency fluid diet. Do not lift anything heavier than 10 pounds. You may take motrin or advil for mild pain as needed, in addition to prescribed narcotic medication. Do not drive while taking narcotic pain medication. You may take over the counter stool softeners as needed. Call for any fever over 101, nausea, vomiting, severe pain, no passing of gas or bowel movement. You may shower and pat dry abdomen.  Secondary Diagnosis:	Essential hypertension  Assessment and plan of treatment:	Follow up with your primary care physician.  Secondary Diagnosis:	S/P ICD (internal cardiac defibrillator) procedure  Assessment and plan of treatment:	Follow up with your primary care physician.  Secondary Diagnosis:	H/O prostate cancer  Assessment and plan of treatment:	Follow up with your primary care physician. Principal Discharge DX:	SBO (small bowel obstruction)  Goal:	Post-op care  Assessment and plan of treatment:	Follow up in 7-10 days. Please call the office to make an appointment. Activity as tolerated. Rest as needed. Please continue a dysphagia 2 mechanical soft, nectar consistency fluid diet. Do not lift anything heavier than 10 pounds. You may take motrin or advil for mild pain as needed, in addition to prescribed narcotic medication. Do not drive while taking narcotic pain medication. You may take over the counter stool softeners as needed. Call for any fever over 101, nausea, vomiting, severe pain, no passing of gas or bowel movement. You may shower and pat dry abdomen.  Secondary Diagnosis:	Essential hypertension  Assessment and plan of treatment:	Follow up with your primary care physician.  Secondary Diagnosis:	S/P ICD (internal cardiac defibrillator) procedure  Assessment and plan of treatment:	Follow up with your primary care physician.  Secondary Diagnosis:	H/O prostate cancer  Assessment and plan of treatment:	Follow up with your primary care physician.  Secondary Diagnosis:	Pneumonia  Goal:	Complete antibiotics, F/U with ALEJANDRINA AVILA

## 2018-11-28 NOTE — PROGRESS NOTE ADULT - ATTENDING COMMENTS
I examined patient at bed site and plan discussed with him and  wife.  Patient denies any pain and is tolerating solid diet, Has flatus and a new large BM as per wife.  Lungs CTA, decreased BS at bases.  Abdomen mild distended, soft, no tenderness, midline wound clean, minor serous drainage.  R Inguinal wound clean, no sign of infection.  Extremities, mild LE edema, no calf tenderness.    Continue Antibiotics as per ID and Pulmonary.  Continue Rehab, OOB, DVT and GI prophylaxis.  D/C planning   Local wound care I examined patient at bed site and plan discussed with him and  wife.  Patient denies any pain and is tolerating solid diet, Has flatus and a new large BM as per wife.  Lungs CTA, decreased BS at bases.  Abdomen mild distended, soft, no tenderness, midline wound clean, minor serous drainage.  R Inguinal wound clean, no sign of infection.  Extremities, mild LE edema, no calf tenderness.    Assessment:    Prolonged post-op Ileus improved.  Pneumonia. Improving.    Plan:    Continue Antibiotics as per ID and Pulmonary.  Continue Rehab, OOB, DVT and GI prophylaxis.  D/C planning   Local wound care

## 2018-11-28 NOTE — PROGRESS NOTE ADULT - SUBJECTIVE AND OBJECTIVE BOX
Patient is a 68y old  Male who presents with a chief complaint of Abdominal pain and scrotal pain, Incarcerated R Inguino-scrotal Hernia. (27 Nov 2018 15:35)  improved. has Midline left arm.     INTERVAL HPI/OVERNIGHT EVENTS: uneventful  PAST MEDICAL & SURGICAL HISTORY:  Sudden cardiac death  Gait abnormality  Leg pain, right  Anoxic encephalopathy  Brain injury with coma: x 2 weeks in 2008  Prostate cancer: radiation therapy  Hypertension  S/P ICD (internal cardiac defibrillator) procedure: implanted on 1/6/2009  H/O tracheostomy  H/O prostate cancer: resection and radiation therapy  Status post cryoablation: of left renal mass      MEDICATIONS  (STANDING):  ALBUTerol/ipratropium for Nebulization 3 milliLiter(s) Nebulizer every 6 hours  carvedilol 3.125 milliGRAM(s) Oral every 12 hours  dextrose 5% + sodium chloride 0.45% with potassium chloride 20 mEq/L 1000 milliLiter(s) (50 mL/Hr) IV Continuous <Continuous>  heparin  Injectable 5000 Unit(s) SubCutaneous every 8 hours  meropenem  IVPB 1000 milliGRAM(s) IV Intermittent every 8 hours  metoclopramide 10 milliGRAM(s) Oral every 8 hours  pantoprazole  Injectable 40 milliGRAM(s) IV Push daily  vancomycin  IVPB 1000 milliGRAM(s) IV Intermittent every 12 hours    MEDICATIONS  (PRN):  acetaminophen  Suppository .. 650 milliGRAM(s) Rectal every 6 hours PRN Temp greater or equal to 38C (100.4F), Mild Pain (1 - 3)  ondansetron    Tablet 4 milliGRAM(s) Oral every 6 hours PRN Nausea and/or Vomiting      Allergies    lisinopril (Unknown)  penicillins (Unknown)    Intolerances        REVIEW OF SYSTEMS:  CONSTITUTIONAL: No fever, weight loss, or fatigue  EYES: No eye pain, visual disturbances, or discharge  ENMT:  No difficulty hearing, tinnitus, vertigo; No sinus or throat pain  NECK: No pain or stiffness  BREASTS: No pain, masses, or nipple discharge  RESPIRATORY: No cough, wheezing, chills or hemoptysis; No shortness of breath  CARDIOVASCULAR: No chest pain, palpitations, dizziness, or leg swelling  GASTROINTESTINAL: No abdominal or epigastric pain. No nausea, vomiting, or hematemesis; No diarrhea or constipation. No melena or hematochezia.  GENITOURINARY: No dysuria, frequency, hematuria, or incontinence  NEUROLOGICAL: No headaches, memory loss, loss of strength, numbness, or tremors  SKIN: No itching, burning, rashes, or lesions   LYMPH NODES: No enlarged glands  ENDOCRINE: No heat or cold intolerance; No hair loss  MUSCULOSKELETAL: No joint pain or swelling; No muscle, back, or extremity pain  PSYCHIATRIC: No depression, anxiety, mood swings, or difficulty sleeping  HEME/LYMPH: No easy bruising, or bleeding gums  ALLERY AND IMMUNOLOGIC: No hives or eczema    Vital Signs Last 24 Hrs  T(C): 36.7 (28 Nov 2018 05:00), Max: 37.2 (27 Nov 2018 23:53)  T(F): 98.1 (28 Nov 2018 05:00), Max: 98.9 (27 Nov 2018 23:53)  HR: 102 (28 Nov 2018 07:07) (100 - 111)  BP: 124/63 (28 Nov 2018 05:00) (115/62 - 143/68)  BP(mean): --  RR: 18 (28 Nov 2018 05:00) (16 - 18)  SpO2: 96% (28 Nov 2018 05:43) (92% - 100%)    PHYSICAL EXAM:  GENERAL: NAD, well-groomed, well-developed  HEAD:  Atraumatic, Normocephalic  EYES: EOMI, PERRLA, conjunctiva and sclera clear  ENMT: No tonsillar erythema, exudates, or enlargement; Moist mucous membranes, Good dentition, No lesions  NECK: Supple, No JVD, Normal thyroid  NERVOUS SYSTEM:  Alert & Oriented X3, Good concentration; Motor Strength 5/5 B/L upper and lower extremities; DTRs 2+ intact and symmetric  CHEST/LUNG: Clear to percussion bilaterally; No rales, rhonchi, wheezing, or rubs  HEART: Regular rate and rhythm; No murmurs, rubs, or gallops  ABDOMEN: Soft, Nontender, Nondistended; Bowel sounds present. colostomy present.  EXTREMITIES:  2+ Peripheral Pulses, No clubbing, cyanosis, or edema  LYMPH: No lymphadenopathy noted  SKIN: No rashes or lesions    LABS:                        9.2    10.60 )-----------( 423      ( 27 Nov 2018 10:17 )             26.9     11-27    141  |  107  |  9   ----------------------------<  108<H>  3.9   |  26  |  0.72    Ca    6.1<LL>      27 Nov 2018 10:17  Phos  1.9     11-27  Mg     2.3     11-27          Imaging Personally Reviewed:  [ ] YES  [ ] NO    Consultant(s) Notes Reviewed:  [ ] YES  [ ] NO    Care Discussed with Consultants/Other Providers [ ] YES  [ ] NO    Care discussed with family,         [  ]   yes  [  ]  No    imp:    stable[ x]    unstable[  ]     improving [   ]       unchanged  [  ]                Plans:  Continue present plans  [ x ] as per ID and surgery               New consult [  ]   specialty  .......               order test[  ]    test name.                  Discharge Planning  [  ]

## 2018-11-28 NOTE — DISCHARGE NOTE ADULT - HOSPITAL COURSE
68M who presented with [facility] Bowel Obstruction  2/2 incarcerated Right  inguinal hernia now s/p Ex Lap, SPENCER, cecectomy and repair of RIH. Post op course complicated by pulmonary congestion, Pneumonia, and prolonged Post-op Ileus which improved. Patient was seen by medicine, cardiology, pulmonology, and ID. Patient tolerated advancement of diet and had a BM.    At the time of discharge, the patient was hemodynamically stable, was tolerating PO diet, was voiding urine, was ambulating, and was comfortable with adequate pain control. No nausea, vomiting, fever, chills. Patient instructed to follow up and to call the office to make an appointment. Patient instructed to call for any fever over 101, nausea, vomiting, severe pain, no passing of gas or bowel movement. Patient understood.

## 2018-11-28 NOTE — PROVIDER CONTACT NOTE (CRITICAL VALUE NOTIFICATION) - ACTION/TREATMENT ORDERED:
Calcium replacement ordered
PA notified and aware. Will continue to monitor.
Provider notified and aware, will continue to monitor.
Calcium 2gm ivpb ordered.
will follow up

## 2018-11-28 NOTE — DISCHARGE NOTE ADULT - PATIENT PORTAL LINK FT
You can access the [facility] Portal, offered by [facility], by registering with the following website: [email or web address]

## 2018-11-28 NOTE — PROGRESS NOTE ADULT - SUBJECTIVE AND OBJECTIVE BOX
INTERVAL HPI:   68M with HTN, Prostate Ca (s/p resection and radiation), AICD placement s/p cardiac arrest and coma (due to brain injury) about 10yrs ago, and known 2 year history of reducible right inguinal hernia.   Presented  to the ER c/o diffuse abdominal pain and right scrotal pain for 1 week which got worst over the course of 2 days.. This was also associated with nausea and non bilious emesis.  Found to have incarcerated Right Inguinal hernia, requiring Ex Lap, Cecal resection and repair of hernia with mesh. Post op observed in ICU,  with significant drop in H & H requiring PRBC transfusion.   11/22/18: transferred to medical floow.  Reported to have significant upper airway secretion which ar difficult to clear at times. Non smoker per wife.  11/26/18:  Midline placement.    OVERNIGHT EVENTS:  Out of bed to chair, no distress.    Vital Signs Last 24 Hrs  T(C): 36.7 (28 Nov 2018 11:27), Max: 37.2 (27 Nov 2018 23:53)  T(F): 98 (28 Nov 2018 11:27), Max: 98.9 (27 Nov 2018 23:53)  HR: 104 (28 Nov 2018 11:27) (91 - 111)  BP: 123/73 (28 Nov 2018 11:27) (115/62 - 143/68)  BP(mean): --  RR: 18 (28 Nov 2018 11:27) (16 - 18)  SpO2: 97% (28 Nov 2018 11:27) (92% - 100%)    PHYSICAL EXAM:  GEN:         Awake, responsive and comfortable.  HEENT:    Normal.    RESP:        no wheezing.  CVS:          Regular rate and rhythm.   ABD:         Soft, non-tender, non-distended;     MEDICATIONS  (STANDING):  ALBUTerol/ipratropium for Nebulization 3 milliLiter(s) Nebulizer every 6 hours  calcium gluconate IVPB 1 Gram(s) IV Intermittent once  carvedilol 3.125 milliGRAM(s) Oral every 12 hours  dextrose 5% + sodium chloride 0.45% with potassium chloride 20 mEq/L 1000 milliLiter(s) (50 mL/Hr) IV Continuous <Continuous>  heparin  Injectable 5000 Unit(s) SubCutaneous every 8 hours  meropenem  IVPB 1000 milliGRAM(s) IV Intermittent every 8 hours  metoclopramide 10 milliGRAM(s) Oral every 8 hours  pantoprazole  Injectable 40 milliGRAM(s) IV Push daily  potassium phosphate IVPB 15 milliMole(s) IV Intermittent once  vancomycin  IVPB 1250 milliGRAM(s) IV Intermittent every 12 hours    MEDICATIONS  (PRN):  acetaminophen  Suppository .. 650 milliGRAM(s) Rectal every 6 hours PRN Temp greater or equal to 38C (100.4F), Mild Pain (1 - 3)  ondansetron    Tablet 4 milliGRAM(s) Oral every 6 hours PRN Nausea and/or Vomiting    LABS:                        9.6    9.74  )-----------( 495      ( 28 Nov 2018 11:44 )             28.0     11-28    140  |  105  |  8   ----------------------------<  123<H>  4.1   |  26  |  0.82    Ca    5.8<LL>      28 Nov 2018 11:44  Phos  1.5     11-28  Mg     2.2     11-28    ASSESSMENT AND PLAN:  ·	Bilateral infiltrates, Atelectasis vs Pneumonia.  ·	Bilateral pleural effusion.  ·	S/P Ex laparotomy with cecal resection.  ·	Right Inguinal hernia repair.  ·	Anemia.  ·	HTN.  ·	S/P Hypoxic Encephalopathy.  ·	Coagulase Staph Bacteremia, likely contaminant.    Continue antibiotics and nebulizer.

## 2018-11-28 NOTE — DISCHARGE NOTE ADULT - PROVIDER TOKENS
TOKEN:'[id number]' TOKEN:'[id number]:'[id number]:'[id number]:'[id number]'

## 2018-11-28 NOTE — DISCHARGE NOTE ADULT - MEDICATION SUMMARY - MEDICATIONS TO TAKE
I will START or STAY ON the medications listed below when I get home from the hospital:    Aspirin Enteric Coated 81 mg oral delayed release tablet  -- 1 tab(s) by mouth once a day  -- Indication: For prophylaxis    heparin  -- 5000 unit(s) subcutaneous every 8 hours  -- Indication: For prophylaxis    metoclopramide 10 mg oral tablet  -- 1 tab(s) by mouth every 8 hours  -- Indication: For bowel motility    carvedilol 3.125 mg oral tablet  -- 1 tab(s) by mouth once a day  -- Indication: For Essential hypertension    ipratropium-albuterol 0.5 mg-2.5 mg/3 mLinhalation solution  -- 3 milliliter(s) inhaled every 6 hours  -- Indication: For Sob    cefpodoxime 200 mg oral tablet  -- 1 tab(s) by mouth every 12 hours for 7 days  -- Indication: For pneumonia    potassium phosphate-sodium phosphate 305 mg-700 mg oral tablet  -- 1 tab(s) by mouth 3 times a day (with meals)  -- Indication: For Supplement    pantoprazole 40 mg oral delayed release tablet  -- 1 tab(s) by mouth once a day (before a meal)  -- Indication: For GI prophylaxis    multivitamin  -- 1 tab(s) by mouth once a day  -- Indication: For Supplement    Vitamin D3 2000 intl units oral capsule  -- 1 cap(s) by mouth once a day  -- Indication: For Supplement

## 2018-11-28 NOTE — PROGRESS NOTE ADULT - SUBJECTIVE AND OBJECTIVE BOX
HPI:  68M with PMH HTN, prostate ca (s/p resection and radiation), AICD placement s/p cardiac arrest and coma (due to brain injury) about 10yrs ago, and known 2 year history of reducible right inguinal hernia now presents to the ER c/o diffuse abdominal pain and right scrotal pain. Wife at bedside who speaks on behalf of patient. States right groin pain has been persistent x1 week, worsening since 2 days ago associated with increased right scrotal swelling. Associated with nausea and NBNB emesis.  Denies flatus or BM, states his last BM was yesterday. Wife states patient has the "sweats" at night, but denies fever/chills. Denies active chest pain, palpitations, shortness of breath, dizziness or urinary complaints.   ER and Surgeon tried to reduce Hernia, not reducible. (18 Nov 2018 11:32)      Allergies    lisinopril (Unknown)  penicillins (Unknown)    Intolerances        MEDICATIONS  (STANDING):  ALBUTerol/ipratropium for Nebulization 3 milliLiter(s) Nebulizer every 6 hours  carvedilol 3.125 milliGRAM(s) Oral every 12 hours  dextrose 5% + sodium chloride 0.45% with potassium chloride 20 mEq/L 1000 milliLiter(s) (50 mL/Hr) IV Continuous <Continuous>  heparin  Injectable 5000 Unit(s) SubCutaneous every 8 hours  meropenem  IVPB 1000 milliGRAM(s) IV Intermittent every 8 hours  metoclopramide 10 milliGRAM(s) Oral every 8 hours  pantoprazole  Injectable 40 milliGRAM(s) IV Push daily  vancomycin  IVPB 1250 milliGRAM(s) IV Intermittent every 12 hours    MEDICATIONS  (PRN):  acetaminophen  Suppository .. 650 milliGRAM(s) Rectal every 6 hours PRN Temp greater or equal to 38C (100.4F), Mild Pain (1 - 3)  ondansetron    Tablet 4 milliGRAM(s) Oral every 6 hours PRN Nausea and/or Vomiting      REVIEW OF SYSTEMS:    CONSTITUTIONAL: No fever, chills, weight loss, or fatigue  HEENT: No sore throat, runny nose, ear ache  RESPIRATORY: No cough, wheezing, No shortness of breath  CARDIOVASCULAR: No chest pain, palpitations, dizziness  GASTROINTESTINAL: No abdominal pain. No nausea, vomiting, diarrhea  GENITOURINARY: No dysuria, increase frequency, hematuria, or incontinence  NEUROLOGICAL: No headaches, memory loss, loss of strength, numbness, or tremors, no weakness  EXTREMITY: No pedal edema BLE  SKIN: No itching, burning, rashes, or lesions     VITAL SIGNS:  T(C): 36.7 (11-28-18 @ 11:27), Max: 37.2 (11-27-18 @ 23:53)  T(F): 98 (11-28-18 @ 11:27), Max: 98.9 (11-27-18 @ 23:53)  HR: 104 (11-28-18 @ 11:27) (101 - 111)  BP: 123/73 (11-28-18 @ 11:27) (115/62 - 143/68)  RR: 18 (11-28-18 @ 11:27) (16 - 18)  SpO2: 97% (11-28-18 @ 11:27) (92% - 100%)  Wt(kg): --    PHYSICAL EXAM:    GENERAL: not in any distress  HEENT: Neck is supple, normocephalic, atraumatic   CHEST/LUNG: Clear to percussion bilaterally; No rales, rhonchi, wheezing  HEART: Regular rate and rhythm; No murmurs, rubs, or gallops  ABDOMEN: Soft, Nontender, Nondistended; Bowel sounds present, no rebound   EXTREMITIES:  2+ Peripheral Pulses, No clubbing, cyanosis, or edema  GENITOURINARY:   SKIN: No rashes or lesions  BACK: no pressor sore   NERVOUS SYSTEM:  Alert & Oriented X3      LABS:                         9.6    9.74  )-----------( 495      ( 28 Nov 2018 11:44 )             28.0     11-28    140  |  105  |  8   ----------------------------<  123<H>  4.1   |  26  |  0.82    Ca    5.8<LL>      28 Nov 2018 11:44  Phos  1.5     11-28  Mg     2.2     11-28          Vancomycin Level, Trough: 9.2 ug/mL (11-26 @ 08:09)        Culture Results:   No growth at 5 days. (11-23 @ 08:32)  Culture Results:   No growth at 5 days. (11-23 @ 08:32)  Culture Results:   Normal Respiratory Ana present (11-23 @ 01:28)                Radiology:

## 2018-11-28 NOTE — DISCHARGE NOTE ADULT - PLAN OF CARE
Post-op care Follow up in 7-10 days. Please call the office to make an appointment. Activity as tolerated. Rest as needed. Please continue a dysphagia 2 mechanical soft, nectar consistency fluid diet. Do not lift anything heavier than 10 pounds. You may take motrin or advil for mild pain as needed, in addition to prescribed narcotic medication. Do not drive while taking narcotic pain medication. You may take over the counter stool softeners as needed. Call for any fever over 101, nausea, vomiting, severe pain, no passing of gas or bowel movement. You may shower and pat dry abdomen. Follow up with your primary care physician. Complete antibiotics, F/U with ID MD

## 2018-11-28 NOTE — DISCHARGE NOTE ADULT - SECONDARY DIAGNOSIS.
Essential hypertension S/P ICD (internal cardiac defibrillator) procedure H/O prostate cancer Pneumonia

## 2018-11-29 ENCOUNTER — APPOINTMENT (OUTPATIENT)
Dept: GASTROENTEROLOGY | Facility: CLINIC | Age: 68
End: 2018-11-29

## 2018-11-29 LAB
ALBUMIN SERPL ELPH-MCNC: 1.9 G/DL — LOW (ref 3.3–5)
ALP SERPL-CCNC: 90 U/L — SIGNIFICANT CHANGE UP (ref 40–120)
ALT FLD-CCNC: 37 U/L — SIGNIFICANT CHANGE UP (ref 12–78)
ANION GAP SERPL CALC-SCNC: 10 MMOL/L — SIGNIFICANT CHANGE UP (ref 5–17)
AST SERPL-CCNC: 44 U/L — HIGH (ref 15–37)
BILIRUB SERPL-MCNC: 0.4 MG/DL — SIGNIFICANT CHANGE UP (ref 0.2–1.2)
BUN SERPL-MCNC: 8 MG/DL — SIGNIFICANT CHANGE UP (ref 7–23)
CA-I BLD-SCNC: 0.97 MMOL/L — LOW (ref 1.12–1.3)
CALCIUM SERPL-MCNC: 6.4 MG/DL — CRITICAL LOW (ref 8.5–10.1)
CHLORIDE SERPL-SCNC: 106 MMOL/L — SIGNIFICANT CHANGE UP (ref 96–108)
CO2 SERPL-SCNC: 25 MMOL/L — SIGNIFICANT CHANGE UP (ref 22–31)
CREAT SERPL-MCNC: 0.7 MG/DL — SIGNIFICANT CHANGE UP (ref 0.5–1.3)
GLUCOSE SERPL-MCNC: 114 MG/DL — HIGH (ref 70–99)
HCT VFR BLD CALC: 27.6 % — LOW (ref 39–50)
HGB BLD-MCNC: 9.5 G/DL — LOW (ref 13–17)
MAGNESIUM SERPL-MCNC: 2.2 MG/DL — SIGNIFICANT CHANGE UP (ref 1.6–2.6)
MCHC RBC-ENTMCNC: 26.5 PG — LOW (ref 27–34)
MCHC RBC-ENTMCNC: 34.4 GM/DL — SIGNIFICANT CHANGE UP (ref 32–36)
MCV RBC AUTO: 76.9 FL — LOW (ref 80–100)
NRBC # BLD: 0 /100 WBCS — SIGNIFICANT CHANGE UP (ref 0–0)
PHOSPHATE SERPL-MCNC: 2.1 MG/DL — LOW (ref 2.5–4.5)
PLATELET # BLD AUTO: 519 K/UL — HIGH (ref 150–400)
POTASSIUM SERPL-MCNC: 4.2 MMOL/L — SIGNIFICANT CHANGE UP (ref 3.5–5.3)
POTASSIUM SERPL-SCNC: 4.2 MMOL/L — SIGNIFICANT CHANGE UP (ref 3.5–5.3)
PROT SERPL-MCNC: 6.2 GM/DL — SIGNIFICANT CHANGE UP (ref 6–8.3)
RBC # BLD: 3.59 M/UL — LOW (ref 4.2–5.8)
RBC # FLD: 15.1 % — HIGH (ref 10.3–14.5)
SODIUM SERPL-SCNC: 141 MMOL/L — SIGNIFICANT CHANGE UP (ref 135–145)
VANCOMYCIN TROUGH SERPL-MCNC: 13.2 UG/ML — SIGNIFICANT CHANGE UP (ref 10–20)
WBC # BLD: 9.34 K/UL — SIGNIFICANT CHANGE UP (ref 3.8–10.5)
WBC # FLD AUTO: 9.34 K/UL — SIGNIFICANT CHANGE UP (ref 3.8–10.5)

## 2018-11-29 RX ORDER — PANTOPRAZOLE SODIUM 20 MG/1
40 TABLET, DELAYED RELEASE ORAL
Qty: 0 | Refills: 0 | Status: DISCONTINUED | OUTPATIENT
Start: 2018-11-29 | End: 2018-11-30

## 2018-11-29 RX ORDER — SODIUM,POTASSIUM PHOSPHATES 278-250MG
1 POWDER IN PACKET (EA) ORAL
Qty: 0 | Refills: 0 | Status: DISCONTINUED | OUTPATIENT
Start: 2018-11-29 | End: 2018-11-30

## 2018-11-29 RX ORDER — CEFPODOXIME PROXETIL 100 MG
200 TABLET ORAL EVERY 12 HOURS
Qty: 0 | Refills: 0 | Status: DISCONTINUED | OUTPATIENT
Start: 2018-11-29 | End: 2018-11-30

## 2018-11-29 RX ADMIN — HEPARIN SODIUM 5000 UNIT(S): 5000 INJECTION INTRAVENOUS; SUBCUTANEOUS at 21:19

## 2018-11-29 RX ADMIN — PANTOPRAZOLE SODIUM 40 MILLIGRAM(S): 20 TABLET, DELAYED RELEASE ORAL at 12:22

## 2018-11-29 RX ADMIN — Medication 10 MILLIGRAM(S): at 21:20

## 2018-11-29 RX ADMIN — HEPARIN SODIUM 5000 UNIT(S): 5000 INJECTION INTRAVENOUS; SUBCUTANEOUS at 05:28

## 2018-11-29 RX ADMIN — Medication 3 MILLILITER(S): at 23:32

## 2018-11-29 RX ADMIN — Medication 1 TABLET(S): at 13:03

## 2018-11-29 RX ADMIN — HEPARIN SODIUM 5000 UNIT(S): 5000 INJECTION INTRAVENOUS; SUBCUTANEOUS at 13:05

## 2018-11-29 RX ADMIN — Medication 166.67 MILLIGRAM(S): at 10:11

## 2018-11-29 RX ADMIN — Medication 200 MILLIGRAM(S): at 17:24

## 2018-11-29 RX ADMIN — Medication 10 MILLIGRAM(S): at 13:05

## 2018-11-29 RX ADMIN — MEROPENEM 100 MILLIGRAM(S): 1 INJECTION INTRAVENOUS at 05:28

## 2018-11-29 RX ADMIN — Medication 3 MILLILITER(S): at 17:01

## 2018-11-29 RX ADMIN — Medication 10 MILLIGRAM(S): at 05:27

## 2018-11-29 RX ADMIN — CARVEDILOL PHOSPHATE 3.12 MILLIGRAM(S): 80 CAPSULE, EXTENDED RELEASE ORAL at 17:24

## 2018-11-29 RX ADMIN — CARVEDILOL PHOSPHATE 3.12 MILLIGRAM(S): 80 CAPSULE, EXTENDED RELEASE ORAL at 05:28

## 2018-11-29 RX ADMIN — Medication 1 TABLET(S): at 17:24

## 2018-11-29 NOTE — PROGRESS NOTE ADULT - SUBJECTIVE AND OBJECTIVE BOX
INTERVAL HPI/OVERNIGHT EVENTS  Patient sitting comfortably with wife bedside.  No complaint of abdominal pain. No acute events overnight. Tolerating diet with no complaint of nausea/vomiting.  +Flatus/+ BM.      Vital Signs Last 24 Hrs  T(C): 36.9 (29 Nov 2018 04:43), Max: 37.1 (28 Nov 2018 18:06)  T(F): 98.4 (29 Nov 2018 04:43), Max: 98.8 (28 Nov 2018 18:06)  HR: 108 (29 Nov 2018 04:43) (91 - 111)  BP: 125/62 (29 Nov 2018 04:43) (122/69 - 139/73)  RR: 16 (29 Nov 2018 04:43) (16 - 18)  SpO2: 98% (29 Nov 2018 04:43) (94% - 98%)    MEDICATIONS  (STANDING):  ALBUTerol/ipratropium for Nebulization 3 milliLiter(s) Nebulizer every 6 hours  carvedilol 3.125 milliGRAM(s) Oral every 12 hours  dextrose 5% + sodium chloride 0.45% with potassium chloride 20 mEq/L 1000 milliLiter(s) (50 mL/Hr) IV Continuous <Continuous>  heparin  Injectable 5000 Unit(s) SubCutaneous every 8 hours  meropenem  IVPB 1000 milliGRAM(s) IV Intermittent every 8 hours  metoclopramide 10 milliGRAM(s) Oral every 8 hours  pantoprazole  Injectable 40 milliGRAM(s) IV Push daily  potassium acid phosphate/sodium acid phosphate tablet (K-PHOS No. 2) 1 Tablet(s) Oral three times a day with meals  vancomycin  IVPB 1250 milliGRAM(s) IV Intermittent every 12 hours    MEDICATIONS  (PRN):  acetaminophen  Suppository .. 650 milliGRAM(s) Rectal every 6 hours PRN Temp greater or equal to 38C (100.4F), Mild Pain (1 - 3)  ondansetron    Tablet 4 milliGRAM(s) Oral every 6 hours PRN Nausea and/or Vomiting      PHYSICAL EXAM:    GENERAL: Alert, NAD  CHEST/LUNG: Clear to ausculation, bilaterally   HEART: Tachycardia with regular rhythm  ABDOMEN: Mild Distended abdomen, soft to palpation without TTP. Midline incision site with serous fluid, dressing changed.  EXTREMITIES:  calf soft, non tender b/l    I&O's Detail    28 Nov 2018 07:01  -  29 Nov 2018 07:00  --------------------------------------------------------  IN:  Total IN: 0 mL    OUT:    Incontinent per Condom Catheter: 1200 mL  Total OUT: 1200 mL    Total NET: -1200 mL          LABS:                        9.5    9.34  )-----------( 519      ( 29 Nov 2018 08:27 )             27.6     11-29    141  |  106  |  8   ----------------------------<  114<H>  4.2   |  25  |  0.70    Ca    6.4<LL>      29 Nov 2018 08:27  Phos  2.1     11-29  Mg     2.2     11-29    TPro  6.2  /  Alb  1.9<L>  /  TBili  0.4  /  DBili  x   /  AST  44<H>  /  ALT  37  /  AlkPhos  90  11-29        Impression:  69 YO M presented to ED with Large Bowel Obstruction 2/2 incarcerated Right inguinal hernia now POD 11 s/p Ex Lap, SPENCER, cecectomy and repair of RIH. Post op course complicated by pulmonary congestion, Pneumonia, and prolonged Ileus.    - continue diet as tolerated  - Continue  Reglan and Zofran PRN  - continue local wound care with Iodoform packing and dry dressing  - Continue antibiotics (Merrem and Vanco- trough 13.2 on 11/29), Vancomycin increased on 11/28 to 1250mg BID. Pending ID clearance.  - Severe hypophosphatemia and hypocalcemia replaced and decreased albumin, pending serum Ionized calcium  - DVT prophylaxis, Incentive Spirometer, OOBTC with PT, pain control  - Family requesting discharge to rehab for continued therapy  - appreciate continued recs & management per medicine  - will discuss with surgical attending, Dr. Tinsley

## 2018-11-29 NOTE — PROGRESS NOTE ADULT - SUBJECTIVE AND OBJECTIVE BOX
INTERVAL HPI:  68M with HTN, Prostate Ca (s/p resection and radiation), AICD placement s/p cardiac arrest and coma (due to brain injury) about 10yrs ago, and known 2 year history of reducible right inguinal hernia.   Presented  to the ER c/o diffuse abdominal pain and right scrotal pain for 1 week which got worst over the course of 2 days.. This was also associated with nausea and non bilious emesis.  Found to have incarcerated Right Inguinal hernia, requiring Ex Lap, Cecal resection and repair of hernia with mesh. Post op observed in ICU,  with significant drop in H & H requiring PRBC transfusion.   11/22/18: transferred to medical floow.  Reported to have significant upper airway secretion which ar difficult to clear at times. Non smoker per wife.  11/26/18:  Midline placement.    OVERNIGHT EVENTS:  Comfortable but looks edematous.    Vital Signs Last 24 Hrs  T(C): 36.9 (29 Nov 2018 04:43), Max: 37.1 (28 Nov 2018 18:06)  T(F): 98.4 (29 Nov 2018 04:43), Max: 98.8 (28 Nov 2018 18:06)  HR: 108 (29 Nov 2018 04:43) (101 - 111)  BP: 125/62 (29 Nov 2018 04:43) (122/69 - 139/73)  BP(mean): --  RR: 16 (29 Nov 2018 04:43) (16 - 18)  SpO2: 98% (29 Nov 2018 04:43) (94% - 98%)    PHYSICAL EXAM:  GEN:         Awake, responsive and comfortable.  HEENT:    Normal.    RESP:       no wheezing.  CVS:          Regular rate and rhythm.   EXTR:          edema    MEDICATIONS  (STANDING):  ALBUTerol/ipratropium for Nebulization 3 milliLiter(s) Nebulizer every 6 hours  carvedilol 3.125 milliGRAM(s) Oral every 12 hours  cefpodoxime 200 milliGRAM(s) Oral every 12 hours  dextrose 5% + sodium chloride 0.45% with potassium chloride 20 mEq/L 1000 milliLiter(s) (50 mL/Hr) IV Continuous <Continuous>  heparin  Injectable 5000 Unit(s) SubCutaneous every 8 hours  metoclopramide 10 milliGRAM(s) Oral every 8 hours  pantoprazole  Injectable 40 milliGRAM(s) IV Push daily  potassium acid phosphate/sodium acid phosphate tablet (K-PHOS No. 2) 1 Tablet(s) Oral three times a day with meals    MEDICATIONS  (PRN):  acetaminophen  Suppository .. 650 milliGRAM(s) Rectal every 6 hours PRN Temp greater or equal to 38C (100.4F), Mild Pain (1 - 3)  ondansetron    Tablet 4 milliGRAM(s) Oral every 6 hours PRN Nausea and/or Vomiting    LABS:                        9.5    9.34  )-----------( 519      ( 29 Nov 2018 08:27 )             27.6     11-29    141  |  106  |  8   ----------------------------<  114<H>  4.2   |  25  |  0.70    Ca    6.4<LL>      29 Nov 2018 08:27  Phos  2.1     11-29  Mg     2.2     11-29    TPro  6.2  /  Alb  1.9<L>  /  TBili  0.4  /  DBili  x   /  AST  44<H>  /  ALT  37  /  AlkPhos  90  11-29    ASSESSMENT AND PLAN:  ·	Bilateral infiltrates, Atelectasis vs Pneumonia.  ·	Bilateral pleural effusion.  ·	S/P Ex laparotomy with cecal resection.  ·	Right Inguinal hernia repair.  ·	Anemia.  ·	HTN.  ·	S/P Hypoxic Encephalopathy.  ·	Coagulase Staph Bacteremia, likely contaminant.    DC IV fluids as appears edematous.  Being changed to PO antibiotics.

## 2018-11-29 NOTE — CHART NOTE - NSCHARTNOTEFT_GEN_A_CORE
Called by RN because midline is not working. Patient seen at bedside. L midline assessed. Area at and around midline site is warm. Unable to flush midline or receive blood return. Dressing removed, area cleaned with alcohol swab. Entire length of catheter removed, tip intact. Pressure applied. Hemostasis achieved. New dry, sterile dressing applied. Patient tolerated well. RN aware.

## 2018-11-29 NOTE — PROGRESS NOTE ADULT - ASSESSMENT
s/p ex lap, colon resection, repair of incarcerated right inguinal hernia with mesh, with post op anemia s/p 2uPRBC 11/21  i am called as yesterday febrile ;; but had got 2 units of bllood less than 24 hours before   sepsis work up done , bed to chair today , no cough , looking better   ? post op pneumonia , clinically looking better   repeated CXR with no sig change, hwoever he is clincially and labwise ok   on extended merrem and vanco almost 7 days   i will switch to PO vantin to continue 7 more days .   pt is refusing nebs , no cough, no SOB   adm blood culture pos for cns likely contaminant   fu repeat blood cultures so far NEGATIVE

## 2018-11-29 NOTE — PROGRESS NOTE ADULT - SUBJECTIVE AND OBJECTIVE BOX
Patient is a 68y old  Male who presents with a chief complaint of Abdominal pain and scrotal pain, Incarcerated R Inguino-scrotal Hernia. (28 Nov 2018 21:35)  improved.   Phosphatemia  Hemodynamically stable   On IV vanco and merem    INTERVAL HPI/OVERNIGHT EVENTS:  PAST MEDICAL & SURGICAL HISTORY:  Sudden cardiac death  Gait abnormality  Leg pain, right  Anoxic encephalopathy  Brain injury with coma: x 2 weeks in 2008  Prostate cancer: radiation therapy  Hypertension  S/P ICD (internal cardiac defibrillator) procedure: implanted on 1/6/2009  H/O tracheostomy  H/O prostate cancer: resection and radiation therapy  Status post cryoablation: of left renal mass      MEDICATIONS  (STANDING):  ALBUTerol/ipratropium for Nebulization 3 milliLiter(s) Nebulizer every 6 hours  carvedilol 3.125 milliGRAM(s) Oral every 12 hours  dextrose 5% + sodium chloride 0.45% with potassium chloride 20 mEq/L 1000 milliLiter(s) (50 mL/Hr) IV Continuous <Continuous>  heparin  Injectable 5000 Unit(s) SubCutaneous every 8 hours  meropenem  IVPB 1000 milliGRAM(s) IV Intermittent every 8 hours  metoclopramide 10 milliGRAM(s) Oral every 8 hours  pantoprazole  Injectable 40 milliGRAM(s) IV Push daily  potassium acid phosphate/sodium acid phosphate tablet (K-PHOS No. 2) 1 Tablet(s) Oral three times a day with meals  vancomycin  IVPB 1250 milliGRAM(s) IV Intermittent every 12 hours    MEDICATIONS  (PRN):  acetaminophen  Suppository .. 650 milliGRAM(s) Rectal every 6 hours PRN Temp greater or equal to 38C (100.4F), Mild Pain (1 - 3)  ondansetron    Tablet 4 milliGRAM(s) Oral every 6 hours PRN Nausea and/or Vomiting      Allergies    lisinopril (Unknown)  penicillins (Unknown)    Intolerances        REVIEW OF SYSTEMS:  CONSTITUTIONAL: No fever, weight loss, or fatigue  EYES: No eye pain, visual disturbances, or discharge  ENMT:  No difficulty hearing, tinnitus, vertigo; No sinus or throat pain  NECK: No pain or stiffness  BREASTS: No pain, masses, or nipple discharge  RESPIRATORY: No cough, wheezing, chills or hemoptysis; No shortness of breath  CARDIOVASCULAR: No chest pain, palpitations, dizziness, or leg swelling  GASTROINTESTINAL: No abdominal or epigastric pain. No nausea, vomiting, or hematemesis; No diarrhea or constipation. No melena or hematochezia.  GENITOURINARY: No dysuria, frequency, hematuria, or incontinence  NEUROLOGICAL: No headaches, memory loss, loss of strength, numbness, or tremors  SKIN: No itching, burning, rashes, or lesions   LYMPH NODES: No enlarged glands  ENDOCRINE: No heat or cold intolerance; No hair loss  MUSCULOSKELETAL: No joint pain or swelling; No muscle, back, or extremity pain  PSYCHIATRIC: No depression, anxiety, mood swings, or difficulty sleeping  HEME/LYMPH: No easy bruising, or bleeding gums  ALLERY AND IMMUNOLOGIC: No hives or eczema    Vital Signs Last 24 Hrs  T(C): 36.9 (29 Nov 2018 04:43), Max: 37.1 (28 Nov 2018 18:06)  T(F): 98.4 (29 Nov 2018 04:43), Max: 98.8 (28 Nov 2018 18:06)  HR: 108 (29 Nov 2018 04:43) (91 - 111)  BP: 125/62 (29 Nov 2018 04:43) (122/69 - 139/73)  BP(mean): --  RR: 16 (29 Nov 2018 04:43) (16 - 18)  SpO2: 98% (29 Nov 2018 04:43) (94% - 98%)    PHYSICAL EXAM:  GENERAL: NAD, well-groomed, well-developed  HEAD:  Atraumatic, Normocephalic  EYES: EOMI, PERRLA, conjunctiva and sclera clear  ENMT: No tonsillar erythema, exudates, or enlargement; Moist mucous membranes, Good dentition, No lesions  NECK: Supple, No JVD, Normal thyroid  NERVOUS SYSTEM:  Alert & Oriented X3, Good concentration; Motor Strength 5/5 B/L upper and lower extremities; DTRs 2+ intact and symmetric  CHEST/LUNG: Clear to percussion bilaterally; No rales, rhonchi, wheezing, or rubs  HEART: Regular rate and rhythm; No murmurs, rubs, or gallops  ABDOMEN: Soft, Nontender, Nondistended; Bowel sounds present. peg tube and colostomy present.  EXTREMITIES:  2+ Peripheral Pulses, No clubbing, cyanosis, or edema  LYMPH: No lymphadenopathy noted  SKIN: No rashes or lesions    LABS:                        9.5    9.34  )-----------( 519      ( 29 Nov 2018 08:27 )             27.6     11-29    141  |  106  |  8   ----------------------------<  114<H>  4.2   |  25  |  0.70    Ca    6.4<LL>      29 Nov 2018 08:27  Phos  2.1     11-29  Mg     2.2     11-29    TPro  6.2  /  Alb  1.9<L>  /  TBili  0.4  /  DBili  x   /  AST  44<H>  /  ALT  37  /  AlkPhos  90  11-29          CAPILLARY BLOOD GLUCOSE                    RADIOLOGY & ADDITIONAL TESTS:    Imaging Personally Reviewed:  [ ] YES  [ ] NO    Consultant(s) Notes Reviewed:  [ ] YES  [ ] NO    Care Discussed with Consultants/Other Providers [ ] YES  [ ] NO    Care discussed with family,         [  ]   yes  [  ]  No    imp:    stable[x ]    unstable[  ]     improving [   ]       unchanged  [  ]                Plans:  Continue present plans  [x  ] dischrge on phosphate supplement for 2 days and Abx as per Id               New consult [  ]   specialty  .......               order test[  ]    test name.                  Discharge Planning  [  ]

## 2018-11-29 NOTE — PROGRESS NOTE ADULT - SUBJECTIVE AND OBJECTIVE BOX
HPI:  68M with PMH HTN, prostate ca (s/p resection and radiation), AICD placement s/p cardiac arrest and coma (due to brain injury) about 10yrs ago, and known 2 year history of reducible right inguinal hernia now presents to the ER c/o diffuse abdominal pain and right scrotal pain. Wife at bedside who speaks on behalf of patient. States right groin pain has been persistent x1 week, worsening since 2 days ago associated with increased right scrotal swelling. Associated with nausea and NBNB emesis.  Denies flatus or BM, states his last BM was yesterday. Wife states patient has the "sweats" at night, but denies fever/chills. Denies active chest pain, palpitations, shortness of breath, dizziness or urinary complaints.   ER and Surgeon tried to reduce Hernia, not reducible. (18 Nov 2018 11:32)      Allergies    lisinopril (Unknown)  penicillins (Unknown)    Intolerances        MEDICATIONS  (STANDING):  ALBUTerol/ipratropium for Nebulization 3 milliLiter(s) Nebulizer every 6 hours  carvedilol 3.125 milliGRAM(s) Oral every 12 hours  dextrose 5% + sodium chloride 0.45% with potassium chloride 20 mEq/L 1000 milliLiter(s) (50 mL/Hr) IV Continuous <Continuous>  heparin  Injectable 5000 Unit(s) SubCutaneous every 8 hours  meropenem  IVPB 1000 milliGRAM(s) IV Intermittent every 8 hours  metoclopramide 10 milliGRAM(s) Oral every 8 hours  pantoprazole  Injectable 40 milliGRAM(s) IV Push daily  potassium acid phosphate/sodium acid phosphate tablet (K-PHOS No. 2) 1 Tablet(s) Oral three times a day with meals  vancomycin  IVPB 1250 milliGRAM(s) IV Intermittent every 12 hours    MEDICATIONS  (PRN):  acetaminophen  Suppository .. 650 milliGRAM(s) Rectal every 6 hours PRN Temp greater or equal to 38C (100.4F), Mild Pain (1 - 3)  ondansetron    Tablet 4 milliGRAM(s) Oral every 6 hours PRN Nausea and/or Vomiting      REVIEW OF SYSTEMS:    CONSTITUTIONAL: No fever, chills, weight loss, or fatigue  HEENT: No sore throat, runny nose, ear ache  RESPIRATORY: No cough, wheezing, No shortness of breath  CARDIOVASCULAR: No chest pain, palpitations, dizziness  GASTROINTESTINAL: No abdominal pain. No nausea, vomiting, diarrhea  GENITOURINARY: No dysuria, increase frequency, hematuria, or incontinence  NEUROLOGICAL: No headaches, memory loss, loss of strength, numbness, or tremors, no weakness  EXTREMITY: No pedal edema BLE  SKIN: No itching, burning, rashes, or lesions     VITAL SIGNS:  T(C): 36.9 (11-29-18 @ 04:43), Max: 37.1 (11-28-18 @ 18:06)  T(F): 98.4 (11-29-18 @ 04:43), Max: 98.8 (11-28-18 @ 18:06)  HR: 108 (11-29-18 @ 04:43) (101 - 111)  BP: 125/62 (11-29-18 @ 04:43) (122/69 - 139/73)  RR: 16 (11-29-18 @ 04:43) (16 - 18)  SpO2: 98% (11-29-18 @ 04:43) (94% - 98%)  Wt(kg): --    PHYSICAL EXAM:    GENERAL: not in any distress  HEENT: Neck is supple, normocephalic, atraumatic   CHEST/LUNG: Clear to percussion bilaterally; No rales, rhonchi, wheezing  HEART: Regular rate and rhythm; No murmurs, rubs, or gallops  ABDOMEN: Soft, Nontender, Nondistended; Bowel sounds present, no rebound   EXTREMITIES:  2+ Peripheral Pulses, No clubbing, cyanosis, or edema  GENITOURINARY:   SKIN: No rashes or lesions  BACK: no pressor sore   NERVOUS SYSTEM:  Alert & Oriented X3, Good concentration  PSYCH: normal affect     LABS:                         9.5    9.34  )-----------( 519      ( 29 Nov 2018 08:27 )             27.6     11-29    141  |  106  |  8   ----------------------------<  114<H>  4.2   |  25  |  0.70    Ca    6.4<LL>      29 Nov 2018 08:27  Phos  2.1     11-29  Mg     2.2     11-29    TPro  6.2  /  Alb  1.9<L>  /  TBili  0.4  /  DBili  x   /  AST  44<H>  /  ALT  37  /  AlkPhos  90  11-29    LIVER FUNCTIONS - ( 29 Nov 2018 08:27 )  Alb: 1.9 g/dL / Pro: 6.2 gm/dL / ALK PHOS: 90 U/L / ALT: 37 U/L / AST: 44 U/L / GGT: x                             Vancomycin Level, Trough: 13.2 ug/mL (11-29 @ 08:27)        Culture Results:   No growth at 5 days. (11-23 @ 08:32)  Culture Results:   No growth at 5 days. (11-23 @ 08:32)  Culture Results:   Normal Respiratory Ana present (11-23 @ 01:28)                Radiology:

## 2018-11-30 VITALS
DIASTOLIC BLOOD PRESSURE: 65 MMHG | TEMPERATURE: 99 F | SYSTOLIC BLOOD PRESSURE: 129 MMHG | OXYGEN SATURATION: 95 % | HEART RATE: 103 BPM | RESPIRATION RATE: 20 BRPM

## 2018-11-30 LAB
ANION GAP SERPL CALC-SCNC: 7 MMOL/L — SIGNIFICANT CHANGE UP (ref 5–17)
BUN SERPL-MCNC: 8 MG/DL — SIGNIFICANT CHANGE UP (ref 7–23)
CALCIUM SERPL-MCNC: 6.6 MG/DL — LOW (ref 8.5–10.1)
CHLORIDE SERPL-SCNC: 108 MMOL/L — SIGNIFICANT CHANGE UP (ref 96–108)
CO2 SERPL-SCNC: 28 MMOL/L — SIGNIFICANT CHANGE UP (ref 22–31)
CREAT SERPL-MCNC: 0.62 MG/DL — SIGNIFICANT CHANGE UP (ref 0.5–1.3)
GLUCOSE SERPL-MCNC: 102 MG/DL — HIGH (ref 70–99)
HCT VFR BLD CALC: 26.1 % — LOW (ref 39–50)
HGB BLD-MCNC: 8.7 G/DL — LOW (ref 13–17)
MAGNESIUM SERPL-MCNC: 2.1 MG/DL — SIGNIFICANT CHANGE UP (ref 1.6–2.6)
MCHC RBC-ENTMCNC: 25.6 PG — LOW (ref 27–34)
MCHC RBC-ENTMCNC: 33.3 GM/DL — SIGNIFICANT CHANGE UP (ref 32–36)
MCV RBC AUTO: 76.8 FL — LOW (ref 80–100)
NRBC # BLD: 0 /100 WBCS — SIGNIFICANT CHANGE UP (ref 0–0)
PHOSPHATE SERPL-MCNC: 2.2 MG/DL — LOW (ref 2.5–4.5)
PLATELET # BLD AUTO: 515 K/UL — HIGH (ref 150–400)
POTASSIUM SERPL-MCNC: 4.5 MMOL/L — SIGNIFICANT CHANGE UP (ref 3.5–5.3)
POTASSIUM SERPL-SCNC: 4.5 MMOL/L — SIGNIFICANT CHANGE UP (ref 3.5–5.3)
RBC # BLD: 3.4 M/UL — LOW (ref 4.2–5.8)
RBC # FLD: 15.4 % — HIGH (ref 10.3–14.5)
SODIUM SERPL-SCNC: 143 MMOL/L — SIGNIFICANT CHANGE UP (ref 135–145)
WBC # BLD: 8.23 K/UL — SIGNIFICANT CHANGE UP (ref 3.8–10.5)
WBC # FLD AUTO: 8.23 K/UL — SIGNIFICANT CHANGE UP (ref 3.8–10.5)

## 2018-11-30 RX ORDER — CEFPODOXIME PROXETIL 100 MG
1 TABLET ORAL
Qty: 0 | Refills: 0 | COMMUNITY
Start: 2018-11-30

## 2018-11-30 RX ORDER — HEPARIN SODIUM 5000 [USP'U]/ML
5000 INJECTION INTRAVENOUS; SUBCUTANEOUS
Qty: 0 | Refills: 0 | COMMUNITY
Start: 2018-11-30

## 2018-11-30 RX ORDER — METOCLOPRAMIDE HCL 10 MG
1 TABLET ORAL
Qty: 0 | Refills: 0 | COMMUNITY
Start: 2018-11-30

## 2018-11-30 RX ORDER — SODIUM,POTASSIUM PHOSPHATES 278-250MG
1 POWDER IN PACKET (EA) ORAL
Qty: 0 | Refills: 0 | COMMUNITY
Start: 2018-11-30

## 2018-11-30 RX ORDER — PANTOPRAZOLE SODIUM 20 MG/1
1 TABLET, DELAYED RELEASE ORAL
Qty: 0 | Refills: 0 | COMMUNITY
Start: 2018-11-30

## 2018-11-30 RX ORDER — IPRATROPIUM/ALBUTEROL SULFATE 18-103MCG
3 AEROSOL WITH ADAPTER (GRAM) INHALATION
Qty: 0 | Refills: 0 | DISCHARGE
Start: 2018-11-30

## 2018-11-30 RX ADMIN — Medication 1 TABLET(S): at 11:56

## 2018-11-30 RX ADMIN — Medication 3 MILLILITER(S): at 11:13

## 2018-11-30 RX ADMIN — Medication 10 MILLIGRAM(S): at 06:27

## 2018-11-30 RX ADMIN — Medication 1 TABLET(S): at 09:06

## 2018-11-30 RX ADMIN — CARVEDILOL PHOSPHATE 3.12 MILLIGRAM(S): 80 CAPSULE, EXTENDED RELEASE ORAL at 06:27

## 2018-11-30 RX ADMIN — PANTOPRAZOLE SODIUM 40 MILLIGRAM(S): 20 TABLET, DELAYED RELEASE ORAL at 06:27

## 2018-11-30 RX ADMIN — HEPARIN SODIUM 5000 UNIT(S): 5000 INJECTION INTRAVENOUS; SUBCUTANEOUS at 06:27

## 2018-11-30 RX ADMIN — Medication 10 MILLIGRAM(S): at 13:51

## 2018-11-30 RX ADMIN — Medication 200 MILLIGRAM(S): at 06:27

## 2018-11-30 RX ADMIN — Medication 200 MILLIGRAM(S): at 17:20

## 2018-11-30 RX ADMIN — Medication 1 TABLET(S): at 17:20

## 2018-11-30 RX ADMIN — HEPARIN SODIUM 5000 UNIT(S): 5000 INJECTION INTRAVENOUS; SUBCUTANEOUS at 13:50

## 2018-11-30 RX ADMIN — CARVEDILOL PHOSPHATE 3.12 MILLIGRAM(S): 80 CAPSULE, EXTENDED RELEASE ORAL at 17:20

## 2018-11-30 RX ADMIN — Medication 3 MILLILITER(S): at 05:25

## 2018-11-30 NOTE — PROGRESS NOTE ADULT - PROVIDER SPECIALTY LIST ADULT
Cardiology
Critical Care
Infectious Disease
Internal Medicine
Pulmonology
Surgery
Pulmonology
Critical Care
Internal Medicine

## 2018-11-30 NOTE — PROGRESS NOTE ADULT - SUBJECTIVE AND OBJECTIVE BOX
Patient is a 68y old  Male who presents with a chief complaint of Abdominal pain and scrotal pain, Incarcerated R Inguino-scrotal Hernia. (29 Nov 2018 12:34)  stable for discharge    INTERVAL HPI/OVERNIGHT EVENTS:  PAST MEDICAL & SURGICAL HISTORY:  Sudden cardiac death  Gait abnormality  Leg pain, right  Anoxic encephalopathy  Brain injury with coma: x 2 weeks in 2008  Prostate cancer: radiation therapy  Hypertension  S/P ICD (internal cardiac defibrillator) procedure: implanted on 1/6/2009  H/O tracheostomy  H/O prostate cancer: resection and radiation therapy  Status post cryoablation: of left renal mass      MEDICATIONS  (STANDING):  ALBUTerol/ipratropium for Nebulization 3 milliLiter(s) Nebulizer every 6 hours  carvedilol 3.125 milliGRAM(s) Oral every 12 hours  cefpodoxime 200 milliGRAM(s) Oral every 12 hours  heparin  Injectable 5000 Unit(s) SubCutaneous every 8 hours  metoclopramide 10 milliGRAM(s) Oral every 8 hours  pantoprazole    Tablet 40 milliGRAM(s) Oral before breakfast  potassium acid phosphate/sodium acid phosphate tablet (K-PHOS No. 2) 1 Tablet(s) Oral three times a day with meals    MEDICATIONS  (PRN):  acetaminophen  Suppository .. 650 milliGRAM(s) Rectal every 6 hours PRN Temp greater or equal to 38C (100.4F), Mild Pain (1 - 3)  ondansetron    Tablet 4 milliGRAM(s) Oral every 6 hours PRN Nausea and/or Vomiting      Allergies    lisinopril (Unknown)  penicillins (Unknown)    Intolerances        REVIEW OF SYSTEMS:  CONSTITUTIONAL: No fever, weight loss, or fatigue  EYES: No eye pain, visual disturbances, or discharge  ENMT:  No difficulty hearing, tinnitus, vertigo; No sinus or throat pain  NECK: No pain or stiffness  BREASTS: No pain, masses, or nipple discharge  RESPIRATORY: No cough, wheezing, chills or hemoptysis; No shortness of breath  CARDIOVASCULAR: No chest pain, palpitations, dizziness, or leg swelling  GASTROINTESTINAL: No abdominal or epigastric pain. No nausea, vomiting, or hematemesis; No diarrhea or constipation. No melena or hematochezia.  GENITOURINARY: No dysuria, frequency, hematuria, or incontinence  NEUROLOGICAL: No headaches, memory loss, loss of strength, numbness, or tremors  SKIN: No itching, burning, rashes, or lesions   LYMPH NODES: No enlarged glands  ENDOCRINE: No heat or cold intolerance; No hair loss  MUSCULOSKELETAL: No joint pain or swelling; No muscle, back, or extremity pain  PSYCHIATRIC: No depression, anxiety, mood swings, or difficulty sleeping  HEME/LYMPH: No easy bruising, or bleeding gums  ALLERY AND IMMUNOLOGIC: No hives or eczema    Vital Signs Last 24 Hrs  T(C): 36.9 (30 Nov 2018 10:55), Max: 37.6 (29 Nov 2018 23:46)  T(F): 98.4 (30 Nov 2018 10:55), Max: 99.6 (29 Nov 2018 23:46)  HR: 91 (30 Nov 2018 12:03) (91 - 106)  BP: 143/77 (30 Nov 2018 10:55) (125/59 - 160/72)  BP(mean): --  RR: 18 (30 Nov 2018 10:55) (18 - 18)  SpO2: 98% (30 Nov 2018 12:03) (97% - 100%)    PHYSICAL EXAM:  GENERAL: NAD, well-groomed, well-developed  HEAD:  Atraumatic, Normocephalic  EYES: EOMI, PERRLA, conjunctiva and sclera clear  ENMT: No tonsillar erythema, exudates, or enlargement; Moist mucous membranes, Good dentition, No lesions  NECK: Supple, No JVD, Normal thyroid  NERVOUS SYSTEM:  Alert & Oriented X3, Good concentration; Motor Strength 5/5 B/L upper and lower extremities; DTRs 2+ intact and symmetric  CHEST/LUNG: Clear to percussion bilaterally; No rales, rhonchi, wheezing, or rubs  HEART: Regular rate and rhythm; No murmurs, rubs, or gallops  ABDOMEN: Soft, Nontender, Nondistended; Bowel sounds present  EXTREMITIES:  2+ Peripheral Pulses, No clubbing, cyanosis, or edema  LYMPH: No lymphadenopathy noted  SKIN: No rashes or lesions    LABS:                        8.7    8.23  )-----------( 515      ( 30 Nov 2018 08:13 )             26.1     11-30    143  |  108  |  8   ----------------------------<  102<H>  4.5   |  28  |  0.62    Ca    6.6<L>      30 Nov 2018 08:13  Phos  2.2     11-30  Mg     2.1     11-30    TPro  6.2  /  Alb  1.9<L>  /  TBili  0.4  /  DBili  x   /  AST  44<H>  /  ALT  37  /  AlkPhos  90  11-29          CAPILLARY BLOOD GLUCOSE                    RADIOLOGY & ADDITIONAL TESTS:    Imaging Personally Reviewed:  [ ] YES  [ ] NO    Consultant(s) Notes Reviewed:  [ ] YES  [ ] NO    Care Discussed with Consultants/Other Providers [ ] YES  [ ] NO    Care discussed with family,         [  ]   yes  [  ]  No    imp:    stable[x ]    unstable[  ]     improving [   ]       unchanged  [  ]                Plans:  Continue present plans  [x  ] dischrge on oral abx as per ID recommendation               New consult [  ]   specialty  .......               order test[  ]    test name.                  Discharge Planning  [  ]

## 2018-11-30 NOTE — PROGRESS NOTE ADULT - SUBJECTIVE AND OBJECTIVE BOX
INTERVAL HPI:  68M with HTN, Prostate Ca (s/p resection and radiation), AICD placement s/p cardiac arrest and coma (due to brain injury) about 10yrs ago, and known 2 year history of reducible right inguinal hernia.   Presented  to the ER c/o diffuse abdominal pain and right scrotal pain for 1 week which got worst over the course of 2 days.. This was also associated with nausea and non bilious emesis.  Found to have incarcerated Right Inguinal hernia, requiring Ex Lap, Cecal resection and repair of hernia with mesh. Post op observed in ICU,  with significant drop in H & H requiring PRBC transfusion.   11/22/18: transferred to medical floow.  Reported to have significant upper airway secretion which ar difficult to clear at times. Non smoker per wife.  11/26/18:  Midline placement.    OVERNIGHT EVENTS:  Awake and comfortable    Vital Signs Last 24 Hrs  T(C): 36.9 (30 Nov 2018 10:55), Max: 37.6 (29 Nov 2018 23:46)  T(F): 98.4 (30 Nov 2018 10:55), Max: 99.6 (29 Nov 2018 23:46)  HR: 91 (30 Nov 2018 12:03) (91 - 106)  BP: 143/77 (30 Nov 2018 10:55) (125/59 - 160/72)  BP(mean): --  RR: 18 (30 Nov 2018 10:55) (18 - 18)  SpO2: 98% (30 Nov 2018 12:03) (97% - 100%)    PHYSICAL EXAM:  GEN:         Awake, responsive and comfortable.  HEENT:    Normal.    RESP:       no wheezing.  CVS:          Regular rate and rhythm.   ABD:         Soft, non-tender, non-distended;     MEDICATIONS  (STANDING):  ALBUTerol/ipratropium for Nebulization 3 milliLiter(s) Nebulizer every 6 hours  carvedilol 3.125 milliGRAM(s) Oral every 12 hours  cefpodoxime 200 milliGRAM(s) Oral every 12 hours  heparin  Injectable 5000 Unit(s) SubCutaneous every 8 hours  metoclopramide 10 milliGRAM(s) Oral every 8 hours  pantoprazole    Tablet 40 milliGRAM(s) Oral before breakfast  potassium acid phosphate/sodium acid phosphate tablet (K-PHOS No. 2) 1 Tablet(s) Oral three times a day with meals    MEDICATIONS  (PRN):  acetaminophen  Suppository .. 650 milliGRAM(s) Rectal every 6 hours PRN Temp greater or equal to 38C (100.4F), Mild Pain (1 - 3)  ondansetron    Tablet 4 milliGRAM(s) Oral every 6 hours PRN Nausea and/or Vomiting    LABS:                        8.7    8.23  )-----------( 515      ( 30 Nov 2018 08:13 )             26.1     11-30    143  |  108  |  8   ----------------------------<  102<H>  4.5   |  28  |  0.62    Ca    6.6<L>      30 Nov 2018 08:13  Phos  2.2     11-30  Mg     2.1     11-30    TPro  6.2  /  Alb  1.9<L>  /  TBili  0.4  /  DBili  x   /  AST  44<H>  /  ALT  37  /  AlkPhos  90  11-29    ASSESSMENT AND PLAN:  ·	Bilateral infiltrates, Atelectasis vs Pneumonia.  ·	Bilateral pleural effusion.  ·	S/P Ex laparotomy with cecal resection.  ·	Right Inguinal hernia repair.  ·	Anemia.  ·	HTN.  ·	S/P Hypoxic Encephalopathy.  ·	Coagulase Staph Bacteremia, likely contaminant.    Pulmonary status has improved and stable.  Continue nebulizer.  DC planning.

## 2018-11-30 NOTE — CHART NOTE - NSCHARTNOTEFT_GEN_A_CORE
Assessment: POD #12 s/p exploratory laparotomy, cecectomy reduction & repair of Rt inguinal hernia c mesh. S/p swallow eval SLP recommended dysphagia 2 mechanical soft nectar thick liquids (11/27). Pt's wife denies N/V/D/C or chewing/swallowing issues. Last BM x 2 yesterday (11/29).     Factors impacting intake: [x ] none [ ] nausea  [ ] vomiting [ ] diarrhea [ ] constipation  [ ]chewing problems [ ] swallowing issues  [ ] other:     Diet Prescription: Diet, Dysphagia 2 Mechanical Soft-Nectar Consistency Fluid (11-27-18 @ 12:30)    Intake: Observed pt consume 100% of breakfast today; consumed % of most meals x 2 days     Current Weight: 97.4kg (11/30); 96kg (11/20); 1.4kg wt gain  1.5% Weight Change x 10 days   Edema: +3 R/L arm & leg     Pertinent Medications: MEDICATIONS  (STANDING):  ALBUTerol/ipratropium for Nebulization 3 milliLiter(s) Nebulizer every 6 hours  carvedilol 3.125 milliGRAM(s) Oral every 12 hours  cefpodoxime 200 milliGRAM(s) Oral every 12 hours  heparin  Injectable 5000 Unit(s) SubCutaneous every 8 hours  metoclopramide 10 milliGRAM(s) Oral every 8 hours  pantoprazole    Tablet 40 milliGRAM(s) Oral before breakfast  potassium acid phosphate/sodium acid phosphate tablet (K-PHOS No. 2) 1 Tablet(s) Oral three times a day with meals    MEDICATIONS  (PRN):  acetaminophen  Suppository .. 650 milliGRAM(s) Rectal every 6 hours PRN Temp greater or equal to 38C (100.4F), Mild Pain (1 - 3)  ondansetron    Tablet 4 milliGRAM(s) Oral every 6 hours PRN Nausea and/or Vomiting    Pertinent Labs: 11-30 Na143 mmol/L Glu 102 mg/dL<H> K+ 4.5 mmol/L Cr  0.62 mg/dL BUN 8 mg/dL 11-30 Phos 2.2 mg/dL<L> 11-29 Alb 1.9 g/dL<L>     CAPILLARY BLOOD GLUCOSE        Skin: WDL except surgical incision     Estimated Needs:   [x ] no change since previous assessment (11/26)  [ ] recalculated:     Previous Nutrition Diagnosis:   [ ] Inadequate Energy Intake [ ]Inadequate Oral Intake [ ] Excessive Energy Intake   [ ] Underweight [ ] Increased Nutrient Needs [ ] Overweight/Obesity   [ ] Altered GI Function [ ] Unintended Weight Loss [ ] Food & Nutrition Related Knowledge Deficit [x] acute severe Malnutrition [ ] moderate malnutrition    Nutrition Diagnosis is [x ] ongoing  [ ] resolved  [ ] improved  [ ] not applicable   Previous Goal: pt to consume >75% of meals (met)    New Nutrition Diagnosis: [x ] not applicable       Interventions:   Recommend  [ x] Continue: current diet regimen   [ ] Change Diet To:  [ ] Nutrition Supplement:  [ ] Nutrition Support:  [ ] Other:     Monitoring and Evaluation:   [ x] PO intake [ x ] Tolerance to diet prescription [ x ] weights [ x ] labs[ x ] follow up per protocol  [ ] other: Assessment: POD #12 s/p exploratory laparotomy, cecectomy reduction & repair of Rt inguinal hernia c mesh. S/p swallow eval SLP recommended dysphagia 2 mechanical soft nectar thick liquids (11/27). Pt's wife denies N/V/D/C or chewing/swallowing issues. Last BM x 2 yesterday (11/29). Provided pt's wife c alternative menu.     Factors impacting intake: [x ] none [ ] nausea  [ ] vomiting [ ] diarrhea [ ] constipation  [ ]chewing problems [ ] swallowing issues  [ ] other:     Diet Prescription: Diet, Dysphagia 2 Mechanical Soft-Nectar Consistency Fluid (11-27-18 @ 12:30)    Intake: full feed; Observed pt consume 100% of breakfast today; consumed % of most meals x 2 days     Current Weight: 97.4kg (11/30); 96kg (11/20); 1.4kg wt gain  1.5% Weight Change x 10 days   Edema: +3 R/L arm & leg     Pertinent Medications: MEDICATIONS  (STANDING):  ALBUTerol/ipratropium for Nebulization 3 milliLiter(s) Nebulizer every 6 hours  carvedilol 3.125 milliGRAM(s) Oral every 12 hours  cefpodoxime 200 milliGRAM(s) Oral every 12 hours  heparin  Injectable 5000 Unit(s) SubCutaneous every 8 hours  metoclopramide 10 milliGRAM(s) Oral every 8 hours  pantoprazole    Tablet 40 milliGRAM(s) Oral before breakfast  potassium acid phosphate/sodium acid phosphate tablet (K-PHOS No. 2) 1 Tablet(s) Oral three times a day with meals    MEDICATIONS  (PRN):  acetaminophen  Suppository .. 650 milliGRAM(s) Rectal every 6 hours PRN Temp greater or equal to 38C (100.4F), Mild Pain (1 - 3)  ondansetron    Tablet 4 milliGRAM(s) Oral every 6 hours PRN Nausea and/or Vomiting    Pertinent Labs: 11-30 Na143 mmol/L Glu 102 mg/dL<H> K+ 4.5 mmol/L Cr  0.62 mg/dL BUN 8 mg/dL 11-30 Phos 2.2 mg/dL<L> 11-29 Alb 1.9 g/dL<L>     CAPILLARY BLOOD GLUCOSE        Skin: WDL except surgical incision     Estimated Needs:   [x ] no change since previous assessment (11/26)  [ ] recalculated:     Previous Nutrition Diagnosis:   [ ] Inadequate Energy Intake [ ]Inadequate Oral Intake [ ] Excessive Energy Intake   [ ] Underweight [ ] Increased Nutrient Needs [ ] Overweight/Obesity   [ ] Altered GI Function [ ] Unintended Weight Loss [ ] Food & Nutrition Related Knowledge Deficit [x] acute severe Malnutrition [ ] moderate malnutrition    Nutrition Diagnosis is [x ] ongoing  [ ] resolved  [ ] improved  [ ] not applicable   Previous Goal: pt to consume >75% of meals (met)    New Nutrition Diagnosis: [x ] not applicable       Interventions:   Recommend  [ x] Continue: current diet regimen   [ ] Change Diet To:  [ ] Nutrition Supplement:  [ ] Nutrition Support:  [ ] Other:     Monitoring and Evaluation:   [ x] PO intake [ x ] Tolerance to diet prescription [ x ] weights [ x ] labs[ x ] follow up per protocol  [ ] other: Assessment: POD #12 s/p exploratory laparotomy, cecectomy reduction & repair of Rt inguinal hernia c mesh. Note possible post op PNA. S/p swallow eval SLP recommended dysphagia 2 mechanical soft nectar thick liquids (11/27). Pt's wife denies N/V/D/C or chewing/swallowing issues. Last BM x 2 yesterday (11/29). Provided pt's wife c alternative menu.     Factors impacting intake: [x ] none [ ] nausea  [ ] vomiting [ ] diarrhea [ ] constipation  [ ]chewing problems [ ] swallowing issues  [ ] other:     Diet Prescription: Diet, Dysphagia 2 Mechanical Soft-Nectar Consistency Fluid (11-27-18 @ 12:30)    Intake: full feed; Observed pt consume 100% of breakfast today; consumed % of most meals x 2 days     Current Weight: 97.4kg (11/30); 96kg (11/20); 1.4kg wt gain  1.5% Weight Change x 10 days   Edema: +3 R/L arm & leg     Pertinent Medications: MEDICATIONS  (STANDING):  ALBUTerol/ipratropium for Nebulization 3 milliLiter(s) Nebulizer every 6 hours  carvedilol 3.125 milliGRAM(s) Oral every 12 hours  cefpodoxime 200 milliGRAM(s) Oral every 12 hours  heparin  Injectable 5000 Unit(s) SubCutaneous every 8 hours  metoclopramide 10 milliGRAM(s) Oral every 8 hours  pantoprazole    Tablet 40 milliGRAM(s) Oral before breakfast  potassium acid phosphate/sodium acid phosphate tablet (K-PHOS No. 2) 1 Tablet(s) Oral three times a day with meals    MEDICATIONS  (PRN):  acetaminophen  Suppository .. 650 milliGRAM(s) Rectal every 6 hours PRN Temp greater or equal to 38C (100.4F), Mild Pain (1 - 3)  ondansetron    Tablet 4 milliGRAM(s) Oral every 6 hours PRN Nausea and/or Vomiting    Pertinent Labs: 11-30 Na143 mmol/L Glu 102 mg/dL<H> K+ 4.5 mmol/L Cr  0.62 mg/dL BUN 8 mg/dL 11-30 Phos 2.2 mg/dL<L> 11-29 Alb 1.9 g/dL<L>     CAPILLARY BLOOD GLUCOSE        Skin: WDL except surgical incision     Estimated Needs:   [x ] no change since previous assessment (11/26)  [ ] recalculated:     Previous Nutrition Diagnosis:   [ ] Inadequate Energy Intake [ ]Inadequate Oral Intake [ ] Excessive Energy Intake   [ ] Underweight [ ] Increased Nutrient Needs [ ] Overweight/Obesity   [ ] Altered GI Function [ ] Unintended Weight Loss [ ] Food & Nutrition Related Knowledge Deficit [x] acute severe Malnutrition [ ] moderate malnutrition    Nutrition Diagnosis is [x ] ongoing  [ ] resolved  [ ] improved  [ ] not applicable   Previous Goal: pt to consume >75% of meals (met)    New Nutrition Diagnosis: [x ] not applicable       Interventions:   Recommend  [ x] Continue: current diet regimen   [ ] Change Diet To:  [ ] Nutrition Supplement:  [x ] Nutrition Support: recommend Ensure Enlive 8 oz Daily (350 calories, 20 gm protein)   [ ] Other:     Monitoring and Evaluation:   [ x] PO intake [ x ] Tolerance to diet prescription [ x ] weights [ x ] labs[ x ] follow up per protocol  [ ] other: Assessment: POD #12 s/p exploratory laparotomy, cecectomy reduction & repair of Rt inguinal hernia c mesh. Note possible post op PNA. S/p swallow eval SLP recommended dysphagia 2 mechanical soft nectar thick liquids (11/27). Pt's wife denies N/V/D/C or chewing/swallowing issues. Last BM x 2 yesterday (11/29). Provided pt's wife c alternative menu.     Factors impacting intake: [x ] none [ ] nausea  [ ] vomiting [ ] diarrhea [ ] constipation  [ ]chewing problems [ ] swallowing issues  [ ] other:     Diet Prescription: Diet, Dysphagia 2 Mechanical Soft-Nectar Consistency Fluid (11-27-18 @ 12:30)    Intake: full feed; Observed pt consume 100% of breakfast today; consumed % of most meals x 2 days     Current Weight: 97.4kg (11/30); 96kg (11/20); 1.4kg wt gain  1.5% Weight Change x 10 days   Edema: +3 R/L arm & leg     Pertinent Medications: MEDICATIONS  (STANDING):  ALBUTerol/ipratropium for Nebulization 3 milliLiter(s) Nebulizer every 6 hours  carvedilol 3.125 milliGRAM(s) Oral every 12 hours  cefpodoxime 200 milliGRAM(s) Oral every 12 hours  heparin  Injectable 5000 Unit(s) SubCutaneous every 8 hours  metoclopramide 10 milliGRAM(s) Oral every 8 hours  pantoprazole    Tablet 40 milliGRAM(s) Oral before breakfast  potassium acid phosphate/sodium acid phosphate tablet (K-PHOS No. 2) 1 Tablet(s) Oral three times a day with meals    MEDICATIONS  (PRN):  acetaminophen  Suppository .. 650 milliGRAM(s) Rectal every 6 hours PRN Temp greater or equal to 38C (100.4F), Mild Pain (1 - 3)  ondansetron    Tablet 4 milliGRAM(s) Oral every 6 hours PRN Nausea and/or Vomiting    Pertinent Labs: 11-30 Na143 mmol/L Glu 102 mg/dL<H> K+ 4.5 mmol/L Cr  0.62 mg/dL BUN 8 mg/dL 11-30 Phos 2.2 mg/dL<L> 11-29 Alb 1.9 g/dL<L>     CAPILLARY BLOOD GLUCOSE        Skin: WDL except surgical incision     Estimated Needs:   [x ] no change since previous assessment (11/26)  [ ] recalculated:     Previous Nutrition Diagnosis:   [ ] Inadequate Energy Intake [ ]Inadequate Oral Intake [ ] Excessive Energy Intake   [ ] Underweight [ ] Increased Nutrient Needs [ ] Overweight/Obesity   [ ] Altered GI Function [ ] Unintended Weight Loss [ ] Food & Nutrition Related Knowledge Deficit [x] acute severe Malnutrition [ ] moderate malnutrition    Nutrition Diagnosis is [x ] ongoing  [ ] resolved  [ ] improved  [ ] not applicable   Previous Goal: pt to consume >75% of meals (met)    New Nutrition Diagnosis: [x ] not applicable       Interventions:   Recommend  [ ] Continue:   [x ] Change Diet To: low sodium dysphagia 2 mechanical soft nectar thick liquids   [ ] Nutrition Supplement:  [x ] Nutrition Support: recommend Ensure Enlive 8 oz Daily (350 calories, 20 gm protein)   [ ] Other:     Monitoring and Evaluation:   [ x] PO intake [ x ] Tolerance to diet prescription [ x ] weights [ x ] labs[ x ] follow up per protocol  [ ] other: Assessment: POD #12 s/p exploratory laparotomy, cecectomy reduction & repair of Rt inguinal hernia c mesh. Note possible post op PNA. S/p swallow eval SLP recommended dysphagia 2 mechanical soft nectar thick liquids (11/27). Pt's wife denies N/V/D/C or chewing/swallowing issues. Last BM x 2 yesterday (11/29). Provided pt's wife c alternative menu.     Factors impacting intake: [x ] none [ ] nausea  [ ] vomiting [ ] diarrhea [ ] constipation  [ ]chewing problems [ ] swallowing issues  [ ] other:     Diet Prescription: Diet, Dysphagia 2 Mechanical Soft-Nectar Consistency Fluid (11-27-18 @ 12:30)    Intake: full feed; Observed pt consume 100% of breakfast today; consumed % of most meals x 2 days     Current Weight: 97.4kg (11/30); 96kg (11/20); 1.4kg wt gain  1.5% Weight Change x 10 days   Edema: +3 R/L arm & leg     Pertinent Medications: MEDICATIONS  (STANDING):  ALBUTerol/ipratropium for Nebulization 3 milliLiter(s) Nebulizer every 6 hours  carvedilol 3.125 milliGRAM(s) Oral every 12 hours  cefpodoxime 200 milliGRAM(s) Oral every 12 hours  heparin  Injectable 5000 Unit(s) SubCutaneous every 8 hours  metoclopramide 10 milliGRAM(s) Oral every 8 hours  pantoprazole    Tablet 40 milliGRAM(s) Oral before breakfast  potassium acid phosphate/sodium acid phosphate tablet (K-PHOS No. 2) 1 Tablet(s) Oral three times a day with meals    MEDICATIONS  (PRN):  acetaminophen  Suppository .. 650 milliGRAM(s) Rectal every 6 hours PRN Temp greater or equal to 38C (100.4F), Mild Pain (1 - 3)  ondansetron    Tablet 4 milliGRAM(s) Oral every 6 hours PRN Nausea and/or Vomiting    Pertinent Labs: 11-30 Na143 mmol/L Glu 102 mg/dL<H> K+ 4.5 mmol/L Cr  0.62 mg/dL BUN 8 mg/dL 11-30 Phos 2.2 mg/dL<L> 11-29 Alb 1.9 g/dL<L>     CAPILLARY BLOOD GLUCOSE        Skin: WDL except surgical incision     Estimated Needs:   [x ] no change since previous assessment (11/26)  [ ] recalculated:     Previous Nutrition Diagnosis:   [ ] Inadequate Energy Intake [ ]Inadequate Oral Intake [ ] Excessive Energy Intake   [ ] Underweight [ ] Increased Nutrient Needs [ ] Overweight/Obesity   [ ] Altered GI Function [ ] Unintended Weight Loss [ ] Food & Nutrition Related Knowledge Deficit [x] acute severe Malnutrition [ ] moderate malnutrition    Nutrition Diagnosis is [x ] ongoing  [ ] resolved  [ ] improved  [ ] not applicable   Previous Goal: pt to consume >75% of meals (met)    New Nutrition Diagnosis: [x ] not applicable       Interventions:   Recommend  [ ] Continue:   [x ] Change Diet To: low sodium dysphagia 2 mechanical soft nectar thick liquids   [ ] Nutrition Supplement:  [x ] Nutrition Support: recommend Mighty shake 1x daily (300kcals + 8gm protein)  [ ] Other:     Monitoring and Evaluation:   [ x] PO intake [ x ] Tolerance to diet prescription [ x ] weights [ x ] labs[ x ] follow up per protocol  [ ] other: Assessment: POD #12 s/p exploratory laparotomy, cecectomy reduction & repair of Rt inguinal hernia c mesh. Note possible post op PNA. S/p swallow eval SLP recommended dysphagia 2 mechanical soft nectar thick liquids (11/27). Pt's wife denies N/V/D/C or chewing/swallowing issues. Last BM x 2 yesterday (11/29). Provided pt's wife c alternative menu.     Factors impacting intake: [] none [ ] nausea  [ ] vomiting [ ] diarrhea [ ] constipation  [ ]chewing problems [ x] swallowing issues  [ ] other:     Diet Prescription: Diet, Dysphagia 2 Mechanical Soft-Nectar Consistency Fluid (11-27-18 @ 12:30)    Intake: full feed; Observed pt consume 100% of breakfast today; consumed % of most meals x 2 days     Current Weight: 97.4kg (11/30); 96kg (11/20); 1.4kg wt gain  1.5% Weight Change x 10 days   Edema: +3 R/L arm & leg     Pertinent Medications: MEDICATIONS  (STANDING):  ALBUTerol/ipratropium for Nebulization 3 milliLiter(s) Nebulizer every 6 hours  carvedilol 3.125 milliGRAM(s) Oral every 12 hours  cefpodoxime 200 milliGRAM(s) Oral every 12 hours  heparin  Injectable 5000 Unit(s) SubCutaneous every 8 hours  metoclopramide 10 milliGRAM(s) Oral every 8 hours  pantoprazole    Tablet 40 milliGRAM(s) Oral before breakfast  potassium acid phosphate/sodium acid phosphate tablet (K-PHOS No. 2) 1 Tablet(s) Oral three times a day with meals    MEDICATIONS  (PRN):  acetaminophen  Suppository .. 650 milliGRAM(s) Rectal every 6 hours PRN Temp greater or equal to 38C (100.4F), Mild Pain (1 - 3)  ondansetron    Tablet 4 milliGRAM(s) Oral every 6 hours PRN Nausea and/or Vomiting    Pertinent Labs: 11-30 Na143 mmol/L Glu 102 mg/dL<H> K+ 4.5 mmol/L Cr  0.62 mg/dL BUN 8 mg/dL 11-30 Phos 2.2 mg/dL<L> 11-29 Alb 1.9 g/dL<L>     CAPILLARY BLOOD GLUCOSE        Skin: WDL except surgical incision     Estimated Needs:   [x ] no change since previous assessment (11/26)  [ ] recalculated:     Previous Nutrition Diagnosis:   [ ] Inadequate Energy Intake [ ]Inadequate Oral Intake [ ] Excessive Energy Intake   [ ] Underweight [ ] Increased Nutrient Needs [ ] Overweight/Obesity   [ ] Altered GI Function [ ] Unintended Weight Loss [ ] Food & Nutrition Related Knowledge Deficit [x] acute severe Malnutrition [ ] moderate malnutrition    Nutrition Diagnosis is [x ] ongoing  [ ] resolved  [ ] improved  [ ] not applicable   Previous Goal: pt to consume >75% of meals (met)    New Nutrition Diagnosis: [x ] not applicable       Interventions:   Recommend  [ ] Continue:   [x ] Change Diet To: low sodium dysphagia 2 mechanical soft nectar thick liquids   [ ] Nutrition Supplement:  [x ] Nutrition Support: recommend Mighty shake 1x daily (300kcals + 8gm protein)  [ ] Other:     Monitoring and Evaluation:   [ x] PO intake [ x ] Tolerance to diet prescription [ x ] weights [ x ] labs[ x ] follow up per protocol  [ ] other:

## 2018-11-30 NOTE — PROGRESS NOTE ADULT - NSHPATTENDINGPLANDISCUSS_GEN_ALL_CORE
team
family at bed side.
george.
patient
patient
patient.
patient/ wife
patient/ wife.
team/ Dr torres
wife at bed side./ Rn
wife at bedside.
wife.
Patient, wife, PA, ICU attending
Patient, wife, PA, RN
Patient's wife, PA
Patient's wife, PA. FORTINO
Patient, Wife, PA, RN
Patient, wife, ICU attending, Surgical PA.
PA, Patient's family
Patient, family, PA, RN
Patient, wife, PA, RN, PMD.
Karla, wife, PA, NP, ICU attending
Patient, PA RN

## 2018-12-01 ENCOUNTER — EMERGENCY (EMERGENCY)
Facility: HOSPITAL | Age: 68
LOS: 0 days | Discharge: HOME | End: 2018-12-01
Attending: EMERGENCY MEDICINE
Payer: MEDICARE

## 2018-12-01 VITALS
RESPIRATION RATE: 20 BRPM | HEART RATE: 98 BPM | HEIGHT: 65 IN | WEIGHT: 179.9 LBS | OXYGEN SATURATION: 100 % | DIASTOLIC BLOOD PRESSURE: 74 MMHG | SYSTOLIC BLOOD PRESSURE: 132 MMHG

## 2018-12-01 VITALS
RESPIRATION RATE: 19 BRPM | HEART RATE: 98 BPM | OXYGEN SATURATION: 96 % | DIASTOLIC BLOOD PRESSURE: 80 MMHG | TEMPERATURE: 97 F | SYSTOLIC BLOOD PRESSURE: 145 MMHG

## 2018-12-01 DIAGNOSIS — I10 ESSENTIAL (PRIMARY) HYPERTENSION: ICD-10-CM

## 2018-12-01 DIAGNOSIS — Z98.89 OTHER SPECIFIED POSTPROCEDURAL STATES: Chronic | ICD-10-CM

## 2018-12-01 DIAGNOSIS — Z88.0 ALLERGY STATUS TO PENICILLIN: ICD-10-CM

## 2018-12-01 DIAGNOSIS — K62.5 HEMORRHAGE OF ANUS AND RECTUM: ICD-10-CM

## 2018-12-01 DIAGNOSIS — Z79.899 OTHER LONG TERM (CURRENT) DRUG THERAPY: ICD-10-CM

## 2018-12-01 DIAGNOSIS — Z79.82 LONG TERM (CURRENT) USE OF ASPIRIN: ICD-10-CM

## 2018-12-01 DIAGNOSIS — Z95.810 PRESENCE OF AUTOMATIC (IMPLANTABLE) CARDIAC DEFIBRILLATOR: ICD-10-CM

## 2018-12-01 DIAGNOSIS — Z95.810 PRESENCE OF AUTOMATIC (IMPLANTABLE) CARDIAC DEFIBRILLATOR: Chronic | ICD-10-CM

## 2018-12-01 DIAGNOSIS — Z85.46 PERSONAL HISTORY OF MALIGNANT NEOPLASM OF PROSTATE: ICD-10-CM

## 2018-12-01 DIAGNOSIS — Z93.0 TRACHEOSTOMY STATUS: Chronic | ICD-10-CM

## 2018-12-01 DIAGNOSIS — Z85.46 PERSONAL HISTORY OF MALIGNANT NEOPLASM OF PROSTATE: Chronic | ICD-10-CM

## 2018-12-01 LAB
ALBUMIN SERPL ELPH-MCNC: 1.9 G/DL — LOW (ref 3.3–5)
ALP SERPL-CCNC: 90 U/L — SIGNIFICANT CHANGE UP (ref 40–120)
ALT FLD-CCNC: 43 U/L — SIGNIFICANT CHANGE UP (ref 12–78)
ANION GAP SERPL CALC-SCNC: 7 MMOL/L — SIGNIFICANT CHANGE UP (ref 5–17)
APPEARANCE UR: CLEAR — SIGNIFICANT CHANGE UP
APTT BLD: 28.3 SEC — SIGNIFICANT CHANGE UP (ref 27.5–36.3)
AST SERPL-CCNC: 64 U/L — HIGH (ref 15–37)
BASOPHILS # BLD AUTO: 0.03 K/UL — SIGNIFICANT CHANGE UP (ref 0–0.2)
BASOPHILS NFR BLD AUTO: 0.4 % — SIGNIFICANT CHANGE UP (ref 0–2)
BILIRUB SERPL-MCNC: 0.4 MG/DL — SIGNIFICANT CHANGE UP (ref 0.2–1.2)
BILIRUB UR-MCNC: NEGATIVE — SIGNIFICANT CHANGE UP
BLD GP AB SCN SERPL QL: SIGNIFICANT CHANGE UP
BUN SERPL-MCNC: 10 MG/DL — SIGNIFICANT CHANGE UP (ref 7–23)
CALCIUM SERPL-MCNC: 6.8 MG/DL — LOW (ref 8.5–10.1)
CHLORIDE SERPL-SCNC: 107 MMOL/L — SIGNIFICANT CHANGE UP (ref 96–108)
CO2 SERPL-SCNC: 26 MMOL/L — SIGNIFICANT CHANGE UP (ref 22–31)
COLOR SPEC: YELLOW — SIGNIFICANT CHANGE UP
CREAT SERPL-MCNC: 0.66 MG/DL — SIGNIFICANT CHANGE UP (ref 0.5–1.3)
DIFF PNL FLD: NEGATIVE — SIGNIFICANT CHANGE UP
EOSINOPHIL # BLD AUTO: 0.17 K/UL — SIGNIFICANT CHANGE UP (ref 0–0.5)
EOSINOPHIL NFR BLD AUTO: 2.2 % — SIGNIFICANT CHANGE UP (ref 0–6)
GLUCOSE SERPL-MCNC: 112 MG/DL — HIGH (ref 70–99)
GLUCOSE UR QL: NEGATIVE MG/DL — SIGNIFICANT CHANGE UP
HCT VFR BLD CALC: 25 % — LOW (ref 39–50)
HCT VFR BLD CALC: 26.4 % — LOW (ref 39–50)
HGB BLD-MCNC: 8.5 G/DL — LOW (ref 13–17)
HGB BLD-MCNC: 8.9 G/DL — LOW (ref 13–17)
IMM GRANULOCYTES NFR BLD AUTO: 0.9 % — SIGNIFICANT CHANGE UP (ref 0–1.5)
INR BLD: 1.26 RATIO — HIGH (ref 0.88–1.16)
KETONES UR-MCNC: NEGATIVE — SIGNIFICANT CHANGE UP
LEUKOCYTE ESTERASE UR-ACNC: NEGATIVE — SIGNIFICANT CHANGE UP
LIDOCAIN IGE QN: 1420 U/L — HIGH (ref 73–393)
LYMPHOCYTES # BLD AUTO: 1.45 K/UL — SIGNIFICANT CHANGE UP (ref 1–3.3)
LYMPHOCYTES # BLD AUTO: 19.1 % — SIGNIFICANT CHANGE UP (ref 13–44)
MCHC RBC-ENTMCNC: 26.2 PG — LOW (ref 27–34)
MCHC RBC-ENTMCNC: 26.3 PG — LOW (ref 27–34)
MCHC RBC-ENTMCNC: 33.7 GM/DL — SIGNIFICANT CHANGE UP (ref 32–36)
MCHC RBC-ENTMCNC: 34 GM/DL — SIGNIFICANT CHANGE UP (ref 32–36)
MCV RBC AUTO: 76.9 FL — LOW (ref 80–100)
MCV RBC AUTO: 77.9 FL — LOW (ref 80–100)
MONOCYTES # BLD AUTO: 0.5 K/UL — SIGNIFICANT CHANGE UP (ref 0–0.9)
MONOCYTES NFR BLD AUTO: 6.6 % — SIGNIFICANT CHANGE UP (ref 2–14)
NEUTROPHILS # BLD AUTO: 5.36 K/UL — SIGNIFICANT CHANGE UP (ref 1.8–7.4)
NEUTROPHILS NFR BLD AUTO: 70.8 % — SIGNIFICANT CHANGE UP (ref 43–77)
NITRITE UR-MCNC: NEGATIVE — SIGNIFICANT CHANGE UP
NRBC # BLD: 0 /100 WBCS — SIGNIFICANT CHANGE UP (ref 0–0)
NRBC # BLD: 0 /100 WBCS — SIGNIFICANT CHANGE UP (ref 0–0)
NT-PROBNP SERPL-SCNC: 1874 PG/ML — HIGH (ref 0–125)
OB PNL STL: POSITIVE
PH UR: 8 — SIGNIFICANT CHANGE UP (ref 5–8)
PLATELET # BLD AUTO: 544 K/UL — HIGH (ref 150–400)
PLATELET # BLD AUTO: 573 K/UL — HIGH (ref 150–400)
POTASSIUM SERPL-MCNC: 4.7 MMOL/L — SIGNIFICANT CHANGE UP (ref 3.5–5.3)
POTASSIUM SERPL-SCNC: 4.7 MMOL/L — SIGNIFICANT CHANGE UP (ref 3.5–5.3)
PROT SERPL-MCNC: 6 GM/DL — SIGNIFICANT CHANGE UP (ref 6–8.3)
PROT UR-MCNC: NEGATIVE MG/DL — SIGNIFICANT CHANGE UP
PROTHROM AB SERPL-ACNC: 14.2 SEC — HIGH (ref 10–12.9)
RBC # BLD: 3.25 M/UL — LOW (ref 4.2–5.8)
RBC # BLD: 3.39 M/UL — LOW (ref 4.2–5.8)
RBC # FLD: 15.6 % — HIGH (ref 10.3–14.5)
RBC # FLD: 15.6 % — HIGH (ref 10.3–14.5)
SODIUM SERPL-SCNC: 140 MMOL/L — SIGNIFICANT CHANGE UP (ref 135–145)
SP GR SPEC: 1.01 — SIGNIFICANT CHANGE UP (ref 1.01–1.02)
UROBILINOGEN FLD QL: NEGATIVE MG/DL — SIGNIFICANT CHANGE UP
WBC # BLD: 7.42 K/UL — SIGNIFICANT CHANGE UP (ref 3.8–10.5)
WBC # BLD: 7.58 K/UL — SIGNIFICANT CHANGE UP (ref 3.8–10.5)
WBC # FLD AUTO: 7.42 K/UL — SIGNIFICANT CHANGE UP (ref 3.8–10.5)
WBC # FLD AUTO: 7.58 K/UL — SIGNIFICANT CHANGE UP (ref 3.8–10.5)

## 2018-12-01 PROCEDURE — 99284 EMERGENCY DEPT VISIT MOD MDM: CPT

## 2018-12-01 PROCEDURE — 74177 CT ABD & PELVIS W/CONTRAST: CPT | Mod: 26

## 2018-12-01 RX ORDER — IOHEXOL 300 MG/ML
30 INJECTION, SOLUTION INTRAVENOUS ONCE
Qty: 0 | Refills: 0 | Status: COMPLETED | OUTPATIENT
Start: 2018-12-01 | End: 2018-12-01

## 2018-12-01 RX ADMIN — IOHEXOL 30 MILLILITER(S): 300 INJECTION, SOLUTION INTRAVENOUS at 09:02

## 2018-12-01 NOTE — ED PROVIDER NOTE - ABDOMINAL EXAM
DIstended abd, surgical incision along R. groin, staples intact, wound clean, with no discharge. Brown stool, ivette blood seen. No hemorrhoids noted.

## 2018-12-01 NOTE — ED PROVIDER NOTE - OBJECTIVE STATEMENT
Pt is a 69 yo gentleman with a pmhx of HTN, prostate ca sp resection, AICD sp cardiac arrest, anoxic brain injury, recent SBO sp surgery who presents to the ED with blood in stool. Just discharged from hospital yesterday, at rehab noted to have blood in stool. Has not been having before. Had colonoscopy 2 yrs ago, normal, no hx of GI Bleed. No abdominal pain now, no dysuria, no fevers, no chest pain, no n/v/d. No lightheadedness.

## 2018-12-01 NOTE — ED PROVIDER NOTE - MEDICAL DECISION MAKING DETAILS
Ddx: GI bleed/ Surgical complication  Plan: Cbc, cmp, lipase, type and screen, CT abd, surgery consult, reassess

## 2018-12-01 NOTE — ED PROVIDER NOTE - PROGRESS NOTE DETAILS
Pt asymptomatic during ED, brown stool, no further bleeding. Pt surgeon Dr. Tinsley consulted, feels this is due to the recent anastomosis, pt has stable hgb, recommends d/c to rehab and outpatient f/u. Pt feeling well, ok for d/c.

## 2018-12-02 LAB
CULTURE RESULTS: NO GROWTH — SIGNIFICANT CHANGE UP
SPECIMEN SOURCE: SIGNIFICANT CHANGE UP

## 2018-12-03 DIAGNOSIS — Z86.74 PERSONAL HISTORY OF SUDDEN CARDIAC ARREST: ICD-10-CM

## 2018-12-03 DIAGNOSIS — K40.30 UNILATERAL INGUINAL HERNIA, WITH OBSTRUCTION, WITHOUT GANGRENE, NOT SPECIFIED AS RECURRENT: ICD-10-CM

## 2018-12-03 DIAGNOSIS — Z88.0 ALLERGY STATUS TO PENICILLIN: ICD-10-CM

## 2018-12-03 DIAGNOSIS — D72.829 ELEVATED WHITE BLOOD CELL COUNT, UNSPECIFIED: ICD-10-CM

## 2018-12-03 DIAGNOSIS — K56.7 ILEUS, UNSPECIFIED: ICD-10-CM

## 2018-12-03 DIAGNOSIS — Z92.3 PERSONAL HISTORY OF IRRADIATION: ICD-10-CM

## 2018-12-03 DIAGNOSIS — Z79.82 LONG TERM (CURRENT) USE OF ASPIRIN: ICD-10-CM

## 2018-12-03 DIAGNOSIS — J18.9 PNEUMONIA, UNSPECIFIED ORGANISM: ICD-10-CM

## 2018-12-03 DIAGNOSIS — G31.84 MILD COGNITIVE IMPAIRMENT OF UNCERTAIN OR UNKNOWN ETIOLOGY: ICD-10-CM

## 2018-12-03 DIAGNOSIS — Z95.810 PRESENCE OF AUTOMATIC (IMPLANTABLE) CARDIAC DEFIBRILLATOR: ICD-10-CM

## 2018-12-03 DIAGNOSIS — E66.9 OBESITY, UNSPECIFIED: ICD-10-CM

## 2018-12-03 DIAGNOSIS — Z92.21 PERSONAL HISTORY OF ANTINEOPLASTIC CHEMOTHERAPY: ICD-10-CM

## 2018-12-03 DIAGNOSIS — I10 ESSENTIAL (PRIMARY) HYPERTENSION: ICD-10-CM

## 2018-12-03 DIAGNOSIS — E83.39 OTHER DISORDERS OF PHOSPHORUS METABOLISM: ICD-10-CM

## 2018-12-03 DIAGNOSIS — Z85.46 PERSONAL HISTORY OF MALIGNANT NEOPLASM OF PROSTATE: ICD-10-CM

## 2018-12-03 DIAGNOSIS — R26.9 UNSPECIFIED ABNORMALITIES OF GAIT AND MOBILITY: ICD-10-CM

## 2018-12-03 DIAGNOSIS — R00.0 TACHYCARDIA, UNSPECIFIED: ICD-10-CM

## 2018-12-03 DIAGNOSIS — D62 ACUTE POSTHEMORRHAGIC ANEMIA: ICD-10-CM

## 2018-12-03 DIAGNOSIS — E83.51 HYPOCALCEMIA: ICD-10-CM

## 2018-12-09 ENCOUNTER — INPATIENT (INPATIENT)
Facility: HOSPITAL | Age: 68
LOS: 5 days | Discharge: NURSING, REHAB OR LONG-TERM CARE | End: 2018-12-15
Attending: INTERNAL MEDICINE | Admitting: INTERNAL MEDICINE
Payer: MEDICARE

## 2018-12-09 VITALS
DIASTOLIC BLOOD PRESSURE: 66 MMHG | WEIGHT: 196.21 LBS | SYSTOLIC BLOOD PRESSURE: 127 MMHG | HEART RATE: 108 BPM | HEIGHT: 65 IN | OXYGEN SATURATION: 96 % | RESPIRATION RATE: 19 BRPM

## 2018-12-09 DIAGNOSIS — Z95.810 PRESENCE OF AUTOMATIC (IMPLANTABLE) CARDIAC DEFIBRILLATOR: Chronic | ICD-10-CM

## 2018-12-09 DIAGNOSIS — I10 ESSENTIAL (PRIMARY) HYPERTENSION: ICD-10-CM

## 2018-12-09 DIAGNOSIS — C61 MALIGNANT NEOPLASM OF PROSTATE: ICD-10-CM

## 2018-12-09 DIAGNOSIS — Z98.890 OTHER SPECIFIED POSTPROCEDURAL STATES: ICD-10-CM

## 2018-12-09 DIAGNOSIS — Z95.810 PRESENCE OF AUTOMATIC (IMPLANTABLE) CARDIAC DEFIBRILLATOR: ICD-10-CM

## 2018-12-09 DIAGNOSIS — Z98.89 OTHER SPECIFIED POSTPROCEDURAL STATES: Chronic | ICD-10-CM

## 2018-12-09 DIAGNOSIS — K92.2 GASTROINTESTINAL HEMORRHAGE, UNSPECIFIED: ICD-10-CM

## 2018-12-09 DIAGNOSIS — Z93.0 TRACHEOSTOMY STATUS: Chronic | ICD-10-CM

## 2018-12-09 DIAGNOSIS — G93.1 ANOXIC BRAIN DAMAGE, NOT ELSEWHERE CLASSIFIED: ICD-10-CM

## 2018-12-09 DIAGNOSIS — Z85.46 PERSONAL HISTORY OF MALIGNANT NEOPLASM OF PROSTATE: Chronic | ICD-10-CM

## 2018-12-09 LAB
ALBUMIN SERPL ELPH-MCNC: 2.2 G/DL — LOW (ref 3.3–5)
ALP SERPL-CCNC: 71 U/L — SIGNIFICANT CHANGE UP (ref 40–120)
ALT FLD-CCNC: 26 U/L — SIGNIFICANT CHANGE UP (ref 12–78)
ANION GAP SERPL CALC-SCNC: 8 MMOL/L — SIGNIFICANT CHANGE UP (ref 5–17)
ANISOCYTOSIS BLD QL: SLIGHT — SIGNIFICANT CHANGE UP
APTT BLD: 21 SEC — LOW (ref 27.5–36.3)
AST SERPL-CCNC: 23 U/L — SIGNIFICANT CHANGE UP (ref 15–37)
BASOPHILS # BLD AUTO: 0 K/UL — SIGNIFICANT CHANGE UP (ref 0–0.2)
BASOPHILS NFR BLD AUTO: 0 % — SIGNIFICANT CHANGE UP (ref 0–2)
BILIRUB SERPL-MCNC: 0.2 MG/DL — SIGNIFICANT CHANGE UP (ref 0.2–1.2)
BLD GP AB SCN SERPL QL: SIGNIFICANT CHANGE UP
BUN SERPL-MCNC: 17 MG/DL — SIGNIFICANT CHANGE UP (ref 7–23)
CALCIUM SERPL-MCNC: 7.4 MG/DL — LOW (ref 8.5–10.1)
CHLORIDE SERPL-SCNC: 106 MMOL/L — SIGNIFICANT CHANGE UP (ref 96–108)
CO2 SERPL-SCNC: 24 MMOL/L — SIGNIFICANT CHANGE UP (ref 22–31)
CREAT SERPL-MCNC: 0.63 MG/DL — SIGNIFICANT CHANGE UP (ref 0.5–1.3)
EOSINOPHIL # BLD AUTO: 0.15 K/UL — SIGNIFICANT CHANGE UP (ref 0–0.5)
EOSINOPHIL NFR BLD AUTO: 2 % — SIGNIFICANT CHANGE UP (ref 0–6)
GLUCOSE SERPL-MCNC: 103 MG/DL — HIGH (ref 70–99)
HCT VFR BLD CALC: 17.5 % — CRITICAL LOW (ref 39–50)
HGB BLD-MCNC: 5.8 G/DL — CRITICAL LOW (ref 13–17)
HYPOCHROMIA BLD QL: SLIGHT — SIGNIFICANT CHANGE UP
INR BLD: 1.2 RATIO — HIGH (ref 0.88–1.16)
LYMPHOCYTES # BLD AUTO: 1.62 K/UL — SIGNIFICANT CHANGE UP (ref 1–3.3)
LYMPHOCYTES # BLD AUTO: 22 % — SIGNIFICANT CHANGE UP (ref 13–44)
MACROCYTES BLD QL: SLIGHT — SIGNIFICANT CHANGE UP
MANUAL SMEAR VERIFICATION: SIGNIFICANT CHANGE UP
MCHC RBC-ENTMCNC: 25.4 PG — LOW (ref 27–34)
MCHC RBC-ENTMCNC: 33.1 GM/DL — SIGNIFICANT CHANGE UP (ref 32–36)
MCV RBC AUTO: 76.8 FL — LOW (ref 80–100)
MICROCYTES BLD QL: SIGNIFICANT CHANGE UP
MONOCYTES # BLD AUTO: 0.44 K/UL — SIGNIFICANT CHANGE UP (ref 0–0.9)
MONOCYTES NFR BLD AUTO: 6 % — SIGNIFICANT CHANGE UP (ref 2–14)
NEUTROPHILS # BLD AUTO: 5.15 K/UL — SIGNIFICANT CHANGE UP (ref 1.8–7.4)
NEUTROPHILS NFR BLD AUTO: 70 % — SIGNIFICANT CHANGE UP (ref 43–77)
NRBC # BLD: 0 /100 — SIGNIFICANT CHANGE UP (ref 0–0)
NRBC # BLD: SIGNIFICANT CHANGE UP /100 WBCS (ref 0–0)
OVALOCYTES BLD QL SMEAR: SLIGHT — SIGNIFICANT CHANGE UP
PLAT MORPH BLD: NORMAL — SIGNIFICANT CHANGE UP
PLATELET # BLD AUTO: 333 K/UL — SIGNIFICANT CHANGE UP (ref 150–400)
PLATELET COUNT - ESTIMATE: NORMAL — SIGNIFICANT CHANGE UP
POIKILOCYTOSIS BLD QL AUTO: SLIGHT — SIGNIFICANT CHANGE UP
POLYCHROMASIA BLD QL SMEAR: SLIGHT — SIGNIFICANT CHANGE UP
POTASSIUM SERPL-MCNC: 4.5 MMOL/L — SIGNIFICANT CHANGE UP (ref 3.5–5.3)
POTASSIUM SERPL-SCNC: 4.5 MMOL/L — SIGNIFICANT CHANGE UP (ref 3.5–5.3)
PROT SERPL-MCNC: 5.8 GM/DL — LOW (ref 6–8.3)
PROTHROM AB SERPL-ACNC: 13.5 SEC — HIGH (ref 10–12.9)
RBC # BLD: 2.28 M/UL — LOW (ref 4.2–5.8)
RBC # FLD: 16.2 % — HIGH (ref 10.3–14.5)
RBC BLD AUTO: ABNORMAL
SCHISTOCYTES BLD QL AUTO: SLIGHT — SIGNIFICANT CHANGE UP
SODIUM SERPL-SCNC: 138 MMOL/L — SIGNIFICANT CHANGE UP (ref 135–145)
SPHEROCYTES BLD QL SMEAR: SLIGHT — SIGNIFICANT CHANGE UP
TARGETS BLD QL SMEAR: SLIGHT — SIGNIFICANT CHANGE UP
WBC # BLD: 7.35 K/UL — SIGNIFICANT CHANGE UP (ref 3.8–10.5)
WBC # FLD AUTO: 7.35 K/UL — SIGNIFICANT CHANGE UP (ref 3.8–10.5)

## 2018-12-09 PROCEDURE — 74177 CT ABD & PELVIS W/CONTRAST: CPT | Mod: 26

## 2018-12-09 PROCEDURE — 71045 X-RAY EXAM CHEST 1 VIEW: CPT | Mod: 26

## 2018-12-09 PROCEDURE — 99291 CRITICAL CARE FIRST HOUR: CPT

## 2018-12-09 PROCEDURE — 93010 ELECTROCARDIOGRAM REPORT: CPT

## 2018-12-09 RX ORDER — METOCLOPRAMIDE HCL 10 MG
10 TABLET ORAL EVERY 8 HOURS
Qty: 0 | Refills: 0 | Status: DISCONTINUED | OUTPATIENT
Start: 2018-12-09 | End: 2018-12-13

## 2018-12-09 RX ORDER — PANTOPRAZOLE SODIUM 20 MG/1
40 TABLET, DELAYED RELEASE ORAL
Qty: 0 | Refills: 0 | Status: DISCONTINUED | OUTPATIENT
Start: 2018-12-09 | End: 2018-12-10

## 2018-12-09 RX ORDER — SODIUM CHLORIDE 9 MG/ML
1000 INJECTION INTRAMUSCULAR; INTRAVENOUS; SUBCUTANEOUS ONCE
Qty: 0 | Refills: 0 | Status: COMPLETED | OUTPATIENT
Start: 2018-12-09 | End: 2018-12-09

## 2018-12-09 RX ORDER — CARVEDILOL PHOSPHATE 80 MG/1
3.12 CAPSULE, EXTENDED RELEASE ORAL EVERY 12 HOURS
Qty: 0 | Refills: 0 | Status: DISCONTINUED | OUTPATIENT
Start: 2018-12-09 | End: 2018-12-15

## 2018-12-09 RX ORDER — IPRATROPIUM/ALBUTEROL SULFATE 18-103MCG
3 AEROSOL WITH ADAPTER (GRAM) INHALATION ONCE
Qty: 0 | Refills: 0 | Status: DISCONTINUED | OUTPATIENT
Start: 2018-12-09 | End: 2018-12-15

## 2018-12-09 RX ADMIN — SODIUM CHLORIDE 1000 MILLILITER(S): 9 INJECTION INTRAMUSCULAR; INTRAVENOUS; SUBCUTANEOUS at 14:44

## 2018-12-09 RX ADMIN — Medication 10 MILLIGRAM(S): at 21:27

## 2018-12-09 NOTE — CONSULT NOTE ADULT - ASSESSMENT
68 year old male with recent right inguinal hernia repair and cecectomy 11/18/2018 due to [facility] incarceration, now a/w acute blood loss anemia due to LGIB, likely diverticular 68 year old male with recent Incarcerated  right inguinal hernia repair and cecectomy 11/18/2018 due to Large Bowel incarceration, now a/w acute blood loss anemia due to GI bleeding,  likely diverticular bleeding.

## 2018-12-09 NOTE — ED PROVIDER NOTE - MEDICAL DECISION MAKING DETAILS
pt with Hgb 6 then 5.8 in ED - otherwise vital stable - will admit for further care - 2 units PRBCs to be given in ED - will admit for further care with Dr. Murrell as Dr. Artis states may admit to oncall physician, Dr. Clarke/Ammon consulted for GI. Dr Tinsley aware of pt in ED as well.

## 2018-12-09 NOTE — CONSULT NOTE ADULT - PROBLEM SELECTOR RECOMMENDATION 9
Admit to medical team  Management/colonoscopy as per GI  Recommend blood transfusions, monitor H/H, monitor for clinical signs and symptoms of bleeding  No acute surgical intervention at this time, as discussed with Dr. Tinsley, will follow patient  Hold chemical DVT ppx for now, continue with SCDs   Likely remove surgical staples on this admission

## 2018-12-09 NOTE — ED ADULT TRIAGE NOTE - CHIEF COMPLAINT QUOTE
patient here from [facility] as per nurse Voss patient has rectal bleeding and Hg was 6.3 , denied chest pain no abdominal pain denied difficulty breathing , patient on Heparin

## 2018-12-09 NOTE — CONSULT NOTE ADULT - SUBJECTIVE AND OBJECTIVE BOX
HPI:      PAST MEDICAL & SURGICAL HISTORY:  Sudden cardiac death  Gait abnormality  Leg pain, right  Anoxic encephalopathy  Brain injury with coma: x 2 weeks in 2008  Prostate cancer: radiation therapy  Hypertension  S/P ICD (internal cardiac defibrillator) procedure: implanted on 1/6/2009  H/O tracheostomy  H/O prostate cancer: resection and radiation therapy  Status post cryoablation: of left renal mass      MEDICATIONS  (STANDING):    MEDICATIONS  (PRN):      Allergies    lisinopril (Unknown)  penicillins (Unknown)    Intolerances        FAMILY HISTORY:  No pertinent family history in first degree relatives      REVIEW OF SYSTEMS:    CONSTITUTIONAL: No fever, weight loss, or fatigue  EYES: No eye pain, visual disturbances, or discharge  ENMT:  No difficulty hearing, tinnitus, vertigo; No sinus or throat pain  NECK: No pain or stiffness  BREASTS: No pain, masses, or nipple discharge  RESPIRATORY: No cough, wheezing, chills or hemoptysis; No shortness of breath  CARDIOVASCULAR: No chest pain, palpitations, dizziness, or leg swelling  GASTROINTESTINAL: No abdominal or epigastric pain. No nausea, vomiting, or hematemesis; No diarrhea or constipation. No melena or hematochezia.  GENITOURINARY: No dysuria, frequency, hematuria, or incontinence  NEUROLOGICAL: No headaches, memory loss, loss of strength, numbness, or tremors  SKIN: No itching, burning, rashes, or lesions   LYMPH NODES: No enlarged glands  ENDOCRINE: No heat or cold intolerance; No hair loss  MUSCULOSKELETAL: No joint pain or swelling; No muscle, back, or extremity pain  PSYCHIATRIC: No depression, anxiety, mood swings, or difficulty sleeping  HEME/LYMPH: No easy bruising, or bleeding gums  ALLERGY AND IMMUNOLOGIC: No hives or eczema          SOCIAL HISTORY:    FAMILY HISTORY:  No pertinent family history in first degree relatives      Vital Signs Last 24 Hrs  T(C): 36.8 (09 Dec 2018 16:35), Max: 36.8 (09 Dec 2018 16:35)  T(F): 98.3 (09 Dec 2018 16:35), Max: 98.3 (09 Dec 2018 16:35)  HR: 110 (09 Dec 2018 16:35) (102 - 110)  BP: 132/69 (09 Dec 2018 16:35) (127/66 - 132/69)  BP(mean): --  RR: 15 (09 Dec 2018 16:35) (15 - 19)  SpO2: 100% (09 Dec 2018 16:35) (96% - 100%)    PHYSICAL EXAM:    GENERAL: NAD, well-groomed, well-developed  HEAD:  Atraumatic, Normocephalic  EYES: EOMI, PERRLA, conjunctiva and sclera clear  ENMT: No tonsillar erythema, exudates, or enlargement; Moist mucous membranes, Good dentition, No lesions  NECK: Supple, No JVD, Normal thyroid  NERVOUS SYSTEM:  Alert & Oriented X3, Good concentration; Motor Strength 5/5 B/L upper and lower extremities; DTRs 2+ intact and symmetric  CHEST/LUNG: Clear to percussion bilaterally; No rales, rhonchi, wheezing, or rubs  HEART: Regular rate and rhythm; No murmurs, rubs, or gallops  ABDOMEN: Soft, Nontender, Nondistended; Bowel sounds present  EXTREMITIES:  2+ Peripheral Pulses, No clubbing, cyanosis, or edema  LYMPH: No lymphadenopathy noted   RECTAL: No masses, prostate normal size and smooth, Guaiaci negative   BREAST: No palpable masses, skin no lesions no retractions, no discharges. adnexal no palpable masses noted   GYN: uterus normal size, adnexal, no palpable masses, no CMT, no uterine discharge   SKIN: No rashes or lesions    LABS:                        5.8    7.35  )-----------( 333      ( 09 Dec 2018 13:35 )             17.5       CBC:  12-09 @ 13:35  WBC  7.35  HGB 5.8  HCT 17.5 Plate 333  MCV 76.8           09 Dec 2018 13:35    138    |  106    |  17     ----------------------------<  103    4.5     |  24     |  0.63     Ca    7.4        09 Dec 2018 13:35    TPro  5.8    /  Alb  2.2    /  TBili  0.2    /  DBili  x      /  AST  23     /  ALT  26     /  AlkPhos  71     09 Dec 2018 13:35    PT/INR - ( 09 Dec 2018 13:35 )   PT: 13.5 sec;   INR: 1.20 ratio         PTT - ( 09 Dec 2018 13:35 )  PTT:21.0 sec        RADIOLOGY & ADDITIONAL STUDIES: HPI:  · Chief Complaint: The patient is a 68y Male complaining of rectal bleeding.	  · HPI Objective Statement: 68 year old male with PMH of HTN, Anoxic brain injury from 2008, recent admission for bowel obstruction and resection and anastamosis 3 weeks prior, currently on Heparin  for DVT prophylaxis presenting to ED due to bright red blood per rectum and Hgb noted to be 6 at [facility], pt otherwise denies any new abd pain.	  ------------------ As Above ---------------------------------------------  Patient can not give any history. History obtained from wife. Patient has been experiencing bloody BMs for at least once a day. Patient recently had large bowel obstruction s/p resection. ( staples still intact ) Painless. No signs of active bleeding by CT scan. ( Diverticulum)   Wife states that patient had a normal colonoscopy ~ 3 years ago. HGB fell from 8.5 to 5.8 . Last BM was in ER  No NSAIDs, anti coagulants    PAST MEDICAL & SURGICAL HISTORY:  Sudden cardiac death  Gait abnormality  Leg pain, right  Anoxic encephalopathy  Brain injury with coma: x 2 weeks in 2008  Prostate cancer: radiation therapy  Hypertension  S/P ICD (internal cardiac defibrillator) procedure: implanted on 1/6/2009  H/O tracheostomy  H/O prostate cancer: resection and radiation therapy  Status post cryoablation: of left renal mass      MEDICATIONS  (STANDING):    MEDICATIONS  (PRN):      Allergies    lisinopril (Unknown)  penicillins (Unknown)    Intolerances        FAMILY HISTORY:  No pertinent family history in first degree relatives      REVIEW OF SYSTEMS:    CONSTITUTIONAL: No fever, weight loss, or fatigue  EYES: No eye pain, visual disturbances, or discharge  ENMT:  No difficulty hearing, tinnitus, vertigo; No sinus or throat pain  NECK: No pain or stiffness  BREASTS: No pain, masses, or nipple discharge  RESPIRATORY: No cough, wheezing, chills or hemoptysis; No shortness of breath  CARDIOVASCULAR: No chest pain, palpitations, dizziness, or leg swelling  GASTROINTESTINAL: See above  GENITOURINARY: No dysuria, frequency, hematuria, or incontinence  NEUROLOGICAL: No headaches,  numbness, or tremors  SKIN: No itching, burning, rashes, or lesions   LYMPH NODES: No enlarged glands  ENDOCRINE: No heat or cold intolerance; No hair loss  MUSCULOSKELETAL: No joint pain or swelling; No muscle, back, or extremity pain  PSYCHIATRIC: No depression, anxiety, mood swings, or difficulty sleeping  HEME/LYMPH: No easy bruising, or bleeding gums  ALLERGY AND IMMUNOLOGIC: No hives or eczema          SOCIAL HISTORY:    FAMILY HISTORY:  No pertinent family history in first degree relatives      Vital Signs Last 24 Hrs  T(C): 36.8 (09 Dec 2018 16:35), Max: 36.8 (09 Dec 2018 16:35)  T(F): 98.3 (09 Dec 2018 16:35), Max: 98.3 (09 Dec 2018 16:35)  HR: 110 (09 Dec 2018 16:35) (102 - 110)  BP: 132/69 (09 Dec 2018 16:35) (127/66 - 132/69)  BP(mean): --  RR: 15 (09 Dec 2018 16:35) (15 - 19)  SpO2: 100% (09 Dec 2018 16:35) (96% - 100%)    PHYSICAL EXAM:    GENERAL: NAD, well-groomed, well-developed  HEAD:  Atraumatic, Normocephalic  EYES: EOMI, PERRLA, conjunctiva and sclera clear  NECK: Supple, No JVD, Normal thyroid  NERVOUS SYSTEM:  Alert but confused  CHEST/LUNG: Clear to percussion bilaterally; No rales, rhonchi, wheezing, or rubs  HEART: Regular rate and rhythm; No murmurs, rubs, or gallops  ABDOMEN: Soft, Nontender, Nondistended; Bowel sounds present  EXTREMITIES:  2+ Peripheral Pulses, No clubbing, cyanosis, or edema  LYMPH: No lymphadenopathy noted   RECTAL: Bloody clots ( dark red )   SKIN: No rashes or lesions    LABS:                        5.8    7.35  )-----------( 333      ( 09 Dec 2018 13:35 )             17.5       CBC:  12-09 @ 13:35  WBC  7.35  HGB 5.8  HCT 17.5 Plate 333  MCV 76.8           09 Dec 2018 13:35    138    |  106    |  17     ----------------------------<  103    4.5     |  24     |  0.63     Ca    7.4        09 Dec 2018 13:35    TPro  5.8    /  Alb  2.2    /  TBili  0.2    /  DBili  x      /  AST  23     /  ALT  26     /  AlkPhos  71     09 Dec 2018 13:35    PT/INR - ( 09 Dec 2018 13:35 )   PT: 13.5 sec;   INR: 1.20 ratio         PTT - ( 09 Dec 2018 13:35 )  PTT:21.0 sec        RADIOLOGY & ADDITIONAL STUDIES:

## 2018-12-09 NOTE — PATIENT PROFILE ADULT - FUNCTIONAL SCREEN CURRENT LEVEL: COMMUNICATION, MLM
blood type/group B strep/rubella/HBsAG/HIV 2 = difficulty speaking (not related to language barrier)

## 2018-12-09 NOTE — H&P ADULT - HISTORY OF PRESENT ILLNESS
patient  reported  with  BRBPR  worsening  anemia  sent  to ER  admitted  for  further  w/u  and  treatment

## 2018-12-09 NOTE — H&P ADULT - NSHPPHYSICALEXAM_GEN_ALL_CORE
PHYSICAL EXAM:    GENERAL: NAD, well-groomed, well-developed  HEAD:  Atraumatic, Normocephalic  EYES: EOMI, PERRLA, conjunctiva and sclera clear  ENMT: No tonsillar erythema, exudates, or enlargement; Moist mucous membranes, , No lesions  NECK: Supple, No JVD, Normal thyroid  NERVOUS SYSTEM:  Alert & Oriented x1, ; no  focal  deficits  CHEST/LUNG: Clear  bilaterally; No rales, rhonchi, wheezing, or rubs  HEART: Regular rate and rhythm; No murmurs, rubs, or gallops  ABDOMEN: Soft, Nontender, Nondistended; no  masses Bowel sounds present  EXTREMITIES:  + Peripheral Pulses, No clubbing, cyanosis, or edema  LYMPH: No lymphadenopathy noted   RECTAL: deferred  SKIN: No rashes or lesions

## 2018-12-09 NOTE — CONSULT NOTE ADULT - ASSESSMENT
HPI:  · Chief Complaint: The patient is a 68y Male complaining of rectal bleeding.	  · HPI Objective Statement: 68 year old male with PMH of HTN, Anoxic brain injury from 2008, recent admission for bowel obstruction and resection and anastamosis 3 weeks prior, currently on Heparin  for DVT prophylaxis presenting to ED due to bright red blood per rectum and Hgb noted to be 6 at [facility], pt otherwise denies any new abd pain.	  ------------------ As Above ---------------------------------------------  Patient can not give any history. History obtained from wife. Patient has been experiencing bloody BMs for at least once a day. Patient recently had large bowel obstruction s/p resection. ( staples still intact ) Painless. No signs of active bleeding by CT scan. ( Diverticulum)   Wife states that patient had a normal colonoscopy ~ 3 years ago. HGB fell from 8.5 to 5.8 . Last BM was in ER  No NSAIDs, anti coagulants   GI Bleed - probably lower 1) NPO 2) blood transfusions 3)f/u labs 4) EGD / colonoscopy 5) will speak with Dr nelson

## 2018-12-09 NOTE — H&P ADULT - NSHPLABSRESULTS_GEN_ALL_CORE
LABS:                        5.8    7.35  )-----------( 333      ( 09 Dec 2018 13:35 )             17.5     12-09    138  |  106  |  17  ----------------------------<  103<H>  4.5   |  24  |  0.63    Ca    7.4<L>      09 Dec 2018 13:35    TPro  5.8<L>  /  Alb  2.2<L>  /  TBili  0.2  /  DBili  x   /  AST  23  /  ALT  26  /  AlkPhos  71  12-09    PT/INR - ( 09 Dec 2018 13:35 )   PT: 13.5 sec;   INR: 1.20 ratio         PTT - ( 09 Dec 2018 13:35 )  PTT:21.0 sec

## 2018-12-09 NOTE — CONSULT NOTE ADULT - SUBJECTIVE AND OBJECTIVE BOX
Called by Dr. Tinsley to evaluate patient.  Patient seen and examined at bedside in ER, wife bedside.  Per wife, patient has been having bloody BMs for one week. Wife states that today the BMs were more frequent (about 3 episodes today). States that patient has been getting subcutaneous heparin BID at rehab.   Patient denies any abdominal pain. Has been tolerating a regular diet. Denies nausea/vomiting.  Denies fever, chills, chest pain, sob, lightheadedness, dizziness, palpitations.     PAST MEDICAL & SURGICAL HISTORY:  Anoxic encephalopathy  Brain injury with coma: x 2 weeks in 2008  Prostate cancer: radiation therapy  Hypertension  S/P ICD (internal cardiac defibrillator) procedure: implanted on 1/6/2009  H/O tracheostomy  H/O prostate cancer: resection and radiation therapy  Status post cryoablation: of left renal mass    Review of Systems:  Contained within HPI    Allergies  lisinopril (Unknown)  penicillins (Unknown)    FAMILY HISTORY:  No pertinent family history in first degree relatives      Vital Signs Last 24 Hrs  T(C): 36.8 (09 Dec 2018 16:35), Max: 36.8 (09 Dec 2018 16:35)  T(F): 98.3 (09 Dec 2018 16:35), Max: 98.3 (09 Dec 2018 16:35)  HR: 110 (09 Dec 2018 16:35) (102 - 110)  BP: 132/69 (09 Dec 2018 16:35) (127/66 - 132/69)  RR: 15 (09 Dec 2018 16:35) (15 - 19)  SpO2: 100% (09 Dec 2018 16:35) (96% - 100%)    Physical Exam:  General:  Appears stated age, well-groomed, well-nourished, no distress  Eyes : LA  HENT:  WNL, no JVD  Chest: CTA b/l, no w/r/r, respirations nonlabored  Cardiovascular: S1S2 tachycardic to 110 on bedside monitor  Abdomen: + Normoactive bowel sounds throughout. Midline surgical site with intermittent staples, clean and dry. Right inguinal staple line clean and dry. Softly distended. Per patient, "this is the normal size of my stomach". Nontender throughout.  Extremities:  Gait & station: No pedal edema, no calf tenderness b/l   Skin: No rash    LABS:                        5.8    7.35  )-----------( 333      ( 09 Dec 2018 13:35 )             17.5     12-09    138  |  106  |  17  ----------------------------<  103<H>  4.5   |  24  |  0.63    Ca    7.4<L>      09 Dec 2018 13:35    TPro  5.8<L>  /  Alb  2.2<L>  /  TBili  0.2  /  DBili  x   /  AST  23  /  ALT  26  /  AlkPhos  71  12-09    PT/INR - ( 09 Dec 2018 13:35 )   PT: 13.5 sec;   INR: 1.20 ratio      PTT - ( 09 Dec 2018 13:35 )  PTT:21.0 sec      RADIOLOGY & ADDITIONAL STUDIES:  CT Abdomen and Pelvis w/ IV Cont (12.09.18 @ 15:23)   FINDINGS:  LOWER CHEST: Trace right pleural effusion. Basilar atelectasis. Partially   imaged cardiac conduction device lead.  LIVER: Within normal limits. Cavernous transformation of the dave   hepatis.  BILE DUCTS: Normal caliber.  GALLBLADDER: Within normal limits.  SPLEEN: Within normal limits.  PANCREAS: Mild atrophic.  ADRENALS: Mild thickening.  KIDNEYS/URETERS: Renal cysts. Additional subcentimeter hypodense lesions   are too small to further characterize. No hydronephrosis.  BLADDER: Within normal limits.  REPRODUCTIVE ORGANS: Status post prostatectomy.  BOWEL: Ileocolic anastomosis. Further improvement of inflammatory changes   in the region of the anastomosis. No bowel obstruction or   intraperitoneally fluid collection. Colonic diverticulosis.  PERITONEUM: Trace ascites and mesenteric edema.  VESSELS:  Atherosclerotic changes.  RETROPERITONEUM: No lymphadenopathy.    ABDOMINAL WALL: Anterior abdominal wall skin staples. Right groin skin   staples. Well-circumscribed right groin hyperdense structure measuring   4.1 x 2.3 cm, stable. Complex fluid and fat in the right inguinal region,   similar to previous study.  BONES: Hip and spine degenerative changes.  IMPRESSION:   No bowel obstruction.  Further improvement of the inflammatory changes in the region of the   ileocolic anastomosis.  No intraperitoneal fluid collection or evidence of anastomotic leak.  Colonic diverticulosis without CT evidence of acute diverticulitis. Called by Dr. Tinsley to evaluate patient.  Patient seen and examined at bedside in ER, wife bedside.  Per wife, patient has been having bloody BMs for one week. Wife states that today the BMs were more frequent (about 3 episodes today). States that patient has been getting subcutaneous heparin BID at rehab.   Patient denies any abdominal pain. Has been tolerating a regular diet. Denies nausea/vomiting.  Denies fever, chills, chest pain, sob, lightheadedness, dizziness, palpitations.     PAST MEDICAL & SURGICAL HISTORY:  Anoxic encephalopathy  Brain injury with coma: x 2 weeks in 2008  Prostate cancer: radiation therapy  Hypertension  S/P ICD (internal cardiac defibrillator) procedure: implanted on 1/6/2009  H/O tracheostomy  H/O prostate cancer: resection and radiation therapy  Status post cryoablation: of left renal mass.  Recent Laparotomy, Cecectomy, Repair of Incarcerated RIH.  11/18/18    Review of Systems:  Contained within HPI    Allergies  lisinopril (Unknown)  penicillins (Unknown)    FAMILY HISTORY:  No pertinent family history in first degree relatives      Vital Signs Last 24 Hrs  T(C): 36.8 (09 Dec 2018 16:35), Max: 36.8 (09 Dec 2018 16:35)  T(F): 98.3 (09 Dec 2018 16:35), Max: 98.3 (09 Dec 2018 16:35)  HR: 110 (09 Dec 2018 16:35) (102 - 110)  BP: 132/69 (09 Dec 2018 16:35) (127/66 - 132/69)  RR: 15 (09 Dec 2018 16:35) (15 - 19)  SpO2: 100% (09 Dec 2018 16:35) (96% - 100%)    Physical Exam:  General:  Appears stated age, well-groomed, well-nourished, no distress  Eyes : LA  HENT:  WNL, no JVD  Chest: CTA b/l, no w/r/r, respirations nonlabored  Cardiovascular: S1S2 tachycardic to 110 on bedside monitor  Abdomen: + Normoactive bowel sounds throughout. Midline surgical site with intermittent staples, clean and dry. Right inguinal staple line clean and dry. Softly distended. Per patient, "this is the normal size of my stomach". Nontender throughout.  Extremities:  Gait & station: No pedal edema, no calf tenderness b/l   Skin: No rash    LABS:                        5.8    7.35  )-----------( 333      ( 09 Dec 2018 13:35 )             17.5     12-09    138  |  106  |  17  ----------------------------<  103<H>  4.5   |  24  |  0.63    Ca    7.4<L>      09 Dec 2018 13:35    TPro  5.8<L>  /  Alb  2.2<L>  /  TBili  0.2  /  DBili  x   /  AST  23  /  ALT  26  /  AlkPhos  71  12-09    PT/INR - ( 09 Dec 2018 13:35 )   PT: 13.5 sec;   INR: 1.20 ratio      PTT - ( 09 Dec 2018 13:35 )  PTT:21.0 sec      RADIOLOGY & ADDITIONAL STUDIES:  CT Abdomen and Pelvis w/ IV Cont (12.09.18 @ 15:23)   FINDINGS:  LOWER CHEST: Trace right pleural effusion. Basilar atelectasis. Partially   imaged cardiac conduction device lead.  LIVER: Within normal limits. Cavernous transformation of the dave   hepatis.  BILE DUCTS: Normal caliber.  GALLBLADDER: Within normal limits.  SPLEEN: Within normal limits.  PANCREAS: Mild atrophic.  ADRENALS: Mild thickening.  KIDNEYS/URETERS: Renal cysts. Additional subcentimeter hypodense lesions   are too small to further characterize. No hydronephrosis.  BLADDER: Within normal limits.  REPRODUCTIVE ORGANS: Status post prostatectomy.  BOWEL: Ileocolic anastomosis. Further improvement of inflammatory changes   in the region of the anastomosis. No bowel obstruction or   intraperitoneally fluid collection. Colonic diverticulosis.  PERITONEUM: Trace ascites and mesenteric edema.  VESSELS:  Atherosclerotic changes.  RETROPERITONEUM: No lymphadenopathy.    ABDOMINAL WALL: Anterior abdominal wall skin staples. Right groin skin   staples. Well-circumscribed right groin hyperdense structure measuring   4.1 x 2.3 cm, stable. Complex fluid and fat in the right inguinal region,   similar to previous study.  BONES: Hip and spine degenerative changes.  IMPRESSION:   No bowel obstruction.  Further improvement of the inflammatory changes in the region of the   ileocolic anastomosis.  No intraperitoneal fluid collection or evidence of anastomotic leak.  Colonic diverticulosis without CT evidence of acute diverticulitis.

## 2018-12-09 NOTE — H&P ADULT - ASSESSMENT
IMPROVE VTE Individual Risk Assessment    RISK                                                                Points    [  ] Previous VTE                                                  3    [  ] Thrombophilia                                               2    [  ] Lower limb paralysis                                      2        (unable to hold up >15 seconds)      [  ] Current Cancer                                              2         (within 6 months)    [  ] Immobilization > 24 hrs                                1    [  ] ICU/CCU stay > 24 hours                              1    [  ] Age > 60                                                      1    IMPROVE VTE Score _____not  given  secondary  to  gi  bleed____

## 2018-12-10 DIAGNOSIS — D50.0 IRON DEFICIENCY ANEMIA SECONDARY TO BLOOD LOSS (CHRONIC): ICD-10-CM

## 2018-12-10 LAB
ALBUMIN SERPL ELPH-MCNC: 2.1 G/DL — LOW (ref 3.3–5)
ALP SERPL-CCNC: 61 U/L — SIGNIFICANT CHANGE UP (ref 40–120)
ALT FLD-CCNC: 23 U/L — SIGNIFICANT CHANGE UP (ref 12–78)
ANION GAP SERPL CALC-SCNC: 8 MMOL/L — SIGNIFICANT CHANGE UP (ref 5–17)
AST SERPL-CCNC: 19 U/L — SIGNIFICANT CHANGE UP (ref 15–37)
BILIRUB SERPL-MCNC: 1.1 MG/DL — SIGNIFICANT CHANGE UP (ref 0.2–1.2)
BUN SERPL-MCNC: 20 MG/DL — SIGNIFICANT CHANGE UP (ref 7–23)
CALCIUM SERPL-MCNC: 7.1 MG/DL — LOW (ref 8.5–10.1)
CHLORIDE SERPL-SCNC: 106 MMOL/L — SIGNIFICANT CHANGE UP (ref 96–108)
CO2 SERPL-SCNC: 24 MMOL/L — SIGNIFICANT CHANGE UP (ref 22–31)
CREAT SERPL-MCNC: 0.68 MG/DL — SIGNIFICANT CHANGE UP (ref 0.5–1.3)
GLUCOSE SERPL-MCNC: 109 MG/DL — HIGH (ref 70–99)
HCT VFR BLD CALC: 20.8 % — CRITICAL LOW (ref 39–50)
HCT VFR BLD CALC: 25.2 % — LOW (ref 39–50)
HCT VFR BLD CALC: 26.5 % — LOW (ref 39–50)
HGB BLD-MCNC: 7.3 G/DL — LOW (ref 13–17)
HGB BLD-MCNC: 8.6 G/DL — LOW (ref 13–17)
HGB BLD-MCNC: 8.9 G/DL — LOW (ref 13–17)
MCHC RBC-ENTMCNC: 26 PG — LOW (ref 27–34)
MCHC RBC-ENTMCNC: 26.3 PG — LOW (ref 27–34)
MCHC RBC-ENTMCNC: 26.4 PG — LOW (ref 27–34)
MCHC RBC-ENTMCNC: 33.6 GM/DL — SIGNIFICANT CHANGE UP (ref 32–36)
MCHC RBC-ENTMCNC: 34.1 GM/DL — SIGNIFICANT CHANGE UP (ref 32–36)
MCHC RBC-ENTMCNC: 35.1 GM/DL — SIGNIFICANT CHANGE UP (ref 32–36)
MCV RBC AUTO: 75.4 FL — LOW (ref 80–100)
MCV RBC AUTO: 77.1 FL — LOW (ref 80–100)
MCV RBC AUTO: 77.5 FL — LOW (ref 80–100)
NRBC # BLD: 0 /100 WBCS — SIGNIFICANT CHANGE UP (ref 0–0)
PLATELET # BLD AUTO: 254 K/UL — SIGNIFICANT CHANGE UP (ref 150–400)
PLATELET # BLD AUTO: 262 K/UL — SIGNIFICANT CHANGE UP (ref 150–400)
PLATELET # BLD AUTO: 267 K/UL — SIGNIFICANT CHANGE UP (ref 150–400)
POTASSIUM SERPL-MCNC: 4.4 MMOL/L — SIGNIFICANT CHANGE UP (ref 3.5–5.3)
POTASSIUM SERPL-SCNC: 4.4 MMOL/L — SIGNIFICANT CHANGE UP (ref 3.5–5.3)
PROT SERPL-MCNC: 5.2 GM/DL — LOW (ref 6–8.3)
RBC # BLD: 2.76 M/UL — LOW (ref 4.2–5.8)
RBC # BLD: 3.27 M/UL — LOW (ref 4.2–5.8)
RBC # BLD: 3.42 M/UL — LOW (ref 4.2–5.8)
RBC # FLD: 17.7 % — HIGH (ref 10.3–14.5)
RBC # FLD: 18.1 % — HIGH (ref 10.3–14.5)
RBC # FLD: 18.8 % — HIGH (ref 10.3–14.5)
SODIUM SERPL-SCNC: 138 MMOL/L — SIGNIFICANT CHANGE UP (ref 135–145)
WBC # BLD: 10.59 K/UL — HIGH (ref 3.8–10.5)
WBC # BLD: 10.85 K/UL — HIGH (ref 3.8–10.5)
WBC # BLD: 9.36 K/UL — SIGNIFICANT CHANGE UP (ref 3.8–10.5)
WBC # FLD AUTO: 10.59 K/UL — HIGH (ref 3.8–10.5)
WBC # FLD AUTO: 10.85 K/UL — HIGH (ref 3.8–10.5)
WBC # FLD AUTO: 9.36 K/UL — SIGNIFICANT CHANGE UP (ref 3.8–10.5)

## 2018-12-10 RX ORDER — SODIUM CHLORIDE 9 MG/ML
1000 INJECTION, SOLUTION INTRAVENOUS
Qty: 0 | Refills: 0 | Status: DISCONTINUED | OUTPATIENT
Start: 2018-12-10 | End: 2018-12-13

## 2018-12-10 RX ORDER — POLYETHYLENE GLYCOL 3350 17 G/17G
17 POWDER, FOR SOLUTION ORAL DAILY
Qty: 0 | Refills: 0 | Status: DISCONTINUED | OUTPATIENT
Start: 2018-12-10 | End: 2018-12-15

## 2018-12-10 RX ORDER — DOCUSATE SODIUM 100 MG
100 CAPSULE ORAL
Qty: 0 | Refills: 0 | Status: DISCONTINUED | OUTPATIENT
Start: 2018-12-10 | End: 2018-12-15

## 2018-12-10 RX ORDER — SOD SULF/SODIUM/NAHCO3/KCL/PEG
1000 SOLUTION, RECONSTITUTED, ORAL ORAL ONCE
Qty: 0 | Refills: 0 | Status: COMPLETED | OUTPATIENT
Start: 2018-12-10 | End: 2018-12-10

## 2018-12-10 RX ORDER — PANTOPRAZOLE SODIUM 20 MG/1
8 TABLET, DELAYED RELEASE ORAL
Qty: 80 | Refills: 0 | Status: DISCONTINUED | OUTPATIENT
Start: 2018-12-10 | End: 2018-12-11

## 2018-12-10 RX ADMIN — PANTOPRAZOLE SODIUM 10 MG/HR: 20 TABLET, DELAYED RELEASE ORAL at 17:08

## 2018-12-10 RX ADMIN — SODIUM CHLORIDE 80 MILLILITER(S): 9 INJECTION, SOLUTION INTRAVENOUS at 15:41

## 2018-12-10 RX ADMIN — Medication 10 MILLIGRAM(S): at 06:35

## 2018-12-10 RX ADMIN — CARVEDILOL PHOSPHATE 3.12 MILLIGRAM(S): 80 CAPSULE, EXTENDED RELEASE ORAL at 06:35

## 2018-12-10 RX ADMIN — Medication 10 MILLIGRAM(S): at 21:59

## 2018-12-10 RX ADMIN — PANTOPRAZOLE SODIUM 10 MG/HR: 20 TABLET, DELAYED RELEASE ORAL at 15:41

## 2018-12-10 RX ADMIN — PANTOPRAZOLE SODIUM 10 MG/HR: 20 TABLET, DELAYED RELEASE ORAL at 08:23

## 2018-12-10 RX ADMIN — CARVEDILOL PHOSPHATE 3.12 MILLIGRAM(S): 80 CAPSULE, EXTENDED RELEASE ORAL at 17:08

## 2018-12-10 RX ADMIN — SODIUM CHLORIDE 80 MILLILITER(S): 9 INJECTION, SOLUTION INTRAVENOUS at 08:23

## 2018-12-10 RX ADMIN — Medication 100 MILLIGRAM(S): at 17:08

## 2018-12-10 RX ADMIN — Medication 1000 MILLILITER(S): at 09:30

## 2018-12-10 RX ADMIN — SODIUM CHLORIDE 80 MILLILITER(S): 9 INJECTION, SOLUTION INTRAVENOUS at 22:00

## 2018-12-10 NOTE — BRIEF OPERATIVE NOTE - PROCEDURE
<<-----Click on this checkbox to enter Procedure Colonoscopy  12/10/2018    Active  CHARMAINE  EGD  12/10/2018    Active  CHARMAINE

## 2018-12-10 NOTE — PROGRESS NOTE ADULT - SUBJECTIVE AND OBJECTIVE BOX
INTERVAL HPI/OVERNIGHT EVENTS:        REVIEW OF SYSTEMS:  CONSTITUTIONAL: comfortable      MEDICATION:  ALBUTerol/ipratropium for Nebulization. 3 milliLiter(s) Nebulizer once  carvedilol 3.125 milliGRAM(s) Oral every 12 hours  metoclopramide 10 milliGRAM(s) Oral every 8 hours  pantoprazole Infusion 8 mG/Hr IV Continuous <Continuous>  sodium chloride 0.45%. 1000 milliLiter(s) IV Continuous <Continuous>    Vital Signs Last 24 Hrs  T(C): 37.2 (10 Dec 2018 05:00), Max: 37.2 (10 Dec 2018 05:00)  T(F): 99 (10 Dec 2018 05:00), Max: 99 (10 Dec 2018 05:00)  HR: 105 (10 Dec 2018 05:00) (101 - 110)  BP: 137/69 (10 Dec 2018 05:00) (125/58 - 140/65)  BP(mean): --  RR: 18 (10 Dec 2018 05:00) (15 - 25)  SpO2: 100% (10 Dec 2018 05:00) (96% - 100%)    PHYSICAL EXAM:  GENERAL: NAD, well-groomed, well-developed  EYES:  conjunctiva and sclera clear  ENMT:  Moist mucous membranes,   NECK: Supple, No JVD, Normal thyroid  NERVOUS SYSTEM:  Alert confused  moves  all  extr  CHEST/LUNG: Clear    HEART: Regular rate and rhythm; No murmurs, rubs, or gallops  ABDOMEN: Soft, Nontender, Nondistended; Bowel sounds present  EXTREMITIES:  no  edema no  tenderness  SKIN: No rashes   LABS:                        5.8    7.35  )-----------( 333      ( 09 Dec 2018 13:35 )             17.5     12-09    138  |  106  |  17  ----------------------------<  103<H>  4.5   |  24  |  0.63    Ca    7.4<L>      09 Dec 2018 13:35    TPro  5.8<L>  /  Alb  2.2<L>  /  TBili  0.2  /  DBili  x   /  AST  23  /  ALT  26  /  AlkPhos  71  12-09    PT/INR - ( 09 Dec 2018 13:35 )   PT: 13.5 sec;   INR: 1.20 ratio         PTT - ( 09 Dec 2018 13:35 )  PTT:21.0 sec    CAPILLARY BLOOD GLUCOSE          RADIOLOGY & ADDITIONAL TESTS:    Imaging reports  Personally Reviewed:  [ x] YES  [ ] NO    Consultant(s) Notes Reviewed:  [ ] YES  [ ] NO    Care Discussed with Consultants/Other Providers [ x YES  [ ] NO  Problem/Plan - 1:  ·  Problem: GI bleed.  Plan: IVF  transfuse  PRBC  GI  surgery  evalaution  d/c heparin. monitor  labs  GI  Surgical  evals    Problem/Plan - 2:  ·  Problem: Hypertension.  Plan: carvediol.     Problem/Plan - 3:  ·  Problem: Anoxic encephalopathy.  Plan: observation.     Problem/Plan - 4:  ·  Problem: ICD (implantable cardioverter-defibrillator) in place.  Plan: observation.     Problem/Plan - 5:  ·  Problem: Prostate cancer.  Plan: observation.

## 2018-12-10 NOTE — CHART NOTE - NSCHARTNOTEFT_GEN_A_CORE
Patient had one BM after I left last night and one this AM. Painless  VSS, afebrile  Lungs - clear to ausultation, no RRW  Heart - S1S@ (+), wnl  Abd - BS (+), WNL    See H/H,  for blood transfusion today  EGD / colonoscopy 1:30 PM today  consent obtained. Movi prep x 1 now

## 2018-12-10 NOTE — BRIEF OPERATIVE NOTE - PRE-OP DX
Gastrointestinal hemorrhage, unspecified gastrointestinal hemorrhage type  12/10/2018    Active  Juanito Pang

## 2018-12-10 NOTE — PROGRESS NOTE ADULT - SUBJECTIVE AND OBJECTIVE BOX
Patient seen and examined bedside resting comfortably, as per nursing staff had episode GI bleed this Am. As per GI pt will have colonoscopy today.  Pt transfused 2prbc overnight,1 additional unit to be given prior to procedure.    Vital Signs Last 24 Hrs  T(C): 36.8 (10 Dec 2018 16:30), Max: 37.6 (10 Dec 2018 10:02)  T(F): 98.2 (10 Dec 2018 16:30), Max: 99.6 (10 Dec 2018 10:02)  HR: 96 (10 Dec 2018 16:30) (93 - 105)  BP: 138/66 (10 Dec 2018 16:30) (115/54 - 147/68)  BP(mean): --  RR: 17 (10 Dec 2018 16:30) (17 - 25)  SpO2: 98% (10 Dec 2018 16:30) (96% - 100%)  I&O's Summary    09 Dec 2018 07:01  -  10 Dec 2018 07:00  --------------------------------------------------------  IN: 317 mL / OUT: 0 mL / NET: 317 mL    PHYSICAL EXAM:  General:  Appears stated age, well-groomed, well-nourished, no distress  Eyes : LA  HENT:  WNL, no JVD  Chest: CTA b/l, no w/r/r, respirations nonlabored  Cardiovascular: S1S2 tachycardic to 110 on bedside monitor  Abdomen: + Normoactive bowel sounds throughout. Midline surgical site with intermittent staples, clean and dry. Right inguinal staple line clean and dry. Softly distended. Nontender throughout.  Extremities:  Gait & station: No pedal edema, no calf tenderness b/l   Skin: No rash      LABS:                        8.6    10.59 )-----------( 262      ( 10 Dec 2018 12:48 )             25.2     12-10    138  |  106  |  20  ----------------------------<  109<H>  4.4   |  24  |  0.68    Ca    7.1<L>      10 Dec 2018 06:41    TPro  5.2<L>  /  Alb  2.1<L>  /  TBili  1.1  /  DBili  x   /  AST  19  /  ALT  23  /  AlkPhos  61  12-10    PT/INR - ( 09 Dec 2018 13:35 )   PT: 13.5 sec;   INR: 1.20 ratio         PTT - ( 09 Dec 2018 13:35 )  PTT:21.0 sec          RADIOLOGY & ADDITIONAL STUDIES:    Assessment: 68y Male with LGIB, possibly diverticular bleed    Plan:  -monitor h/h  -f/u results of colonoscopy   -f/u labs  -cont medical management  -discussed pt. with surgical attending Patient seen and examined bedside resting comfortably, as per nursing staff had episode GI bleed this Am. As per GI pt will have colonoscopy today.  Pt transfused 2prbc overnight,1 additional unit to be given prior to procedure.    Vital Signs Last 24 Hrs  T(C): 36.8 (10 Dec 2018 16:30), Max: 37.6 (10 Dec 2018 10:02)  T(F): 98.2 (10 Dec 2018 16:30), Max: 99.6 (10 Dec 2018 10:02)  HR: 96 (10 Dec 2018 16:30) (93 - 105)  BP: 138/66 (10 Dec 2018 16:30) (115/54 - 147/68)  BP(mean): --  RR: 17 (10 Dec 2018 16:30) (17 - 25)  SpO2: 98% (10 Dec 2018 16:30) (96% - 100%)  I&O's Summary    09 Dec 2018 07:01  -  10 Dec 2018 07:00  --------------------------------------------------------  IN: 317 mL / OUT: 0 mL / NET: 317 mL    PHYSICAL EXAM:  General:  Appears stated age, well-groomed, well-nourished, no distress  Eyes : LA  HENT:  WNL, no JVD  Chest: CTA b/l, no w/r/r, respirations nonlabored  Cardiovascular: S1S2 tachycardic to 110 on bedside monitor  Abdomen: + Normoactive bowel sounds throughout. Midline surgical site with intermittent staples, clean and dry. Right inguinal staple line clean and dry. Softly distended. Nontender throughout.  Extremities:  Gait & station: No pedal edema, no calf tenderness b/l   Skin: No rash      LABS:                        8.6    10.59 )-----------( 262      ( 10 Dec 2018 12:48 )             25.2     12-10    138  |  106  |  20  ----------------------------<  109<H>  4.4   |  24  |  0.68    Ca    7.1<L>      10 Dec 2018 06:41    TPro  5.2<L>  /  Alb  2.1<L>  /  TBili  1.1  /  DBili  x   /  AST  19  /  ALT  23  /  AlkPhos  61  12-10    PT/INR - ( 09 Dec 2018 13:35 )   PT: 13.5 sec;   INR: 1.20 ratio         PTT - ( 09 Dec 2018 13:35 )  PTT:21.0 sec          RADIOLOGY & ADDITIONAL STUDIES:    Assessment:     68y Male with LGIB, diverticular bleed    Plan:  -monitor h/h  -f/u results of colonoscopy   -f/u labs  -cont medical management  -discussed pt. with surgical attending

## 2018-12-10 NOTE — PROGRESS NOTE ADULT - SUBJECTIVE AND OBJECTIVE BOX
Procedure:           Colonoscopy    Indications:           Monitored Anesthesia Care provided by :    ____________________________________________________________________________________________________  Procedure:           Pre-Anesthesia Assessment:                       - Prior to the procedure, a History and Physical was performed, and patient                        medications and allergies were reviewed. The patient is not  competent. The risks                        and benefits of the procedure and the sedation options and risks were                        discussed with the patient's wife. All questions were answered and informed consent                        was obtained. Patient identification and proposed procedure were verified by                        the physician, the nurse and the anesthesiologist in the procedure room.                        Mental Status Examination: alert and oriented. Airway Examination: normal                        oropharyngeal airway and neck mobility. Respiratory Examination: clear to                        auscultation. CV Examination: normal. Prophylactic Antibiotics: The patient                        does not require prophylactic antibiotics.                        Grade Assessment: . After                        reviewing the risks and benefits, the patient was deemed in satisfactory                        condition to undergo the procedure. The anesthesia plan was to use monitored                        anesthesia care (MAC). Immediately prior to administration of medications,                        the patient was re-assessed for adequacy to receive sedatives. The heart                        rate, respiratory rate, oxygen saturations, blood pressure, adequacy of                        pulmonary ventilation, and response to care were monitored throughout the                        procedure. The physical status of the patient was re-assessed after the   	 	     procedure.                       After I obtained informed consent, the scope was passed under direct vision.                        Throughout the procedure, the patient's blood pressure, pulse, and oxygen                        saturations were monitored continuously. The Colonoscope was introduced                        through the anus and advanced to the terminal ileum, with identification of                        the appendiceal orifice and IC valve. The colonoscopy was performed without                        difficulty. The patient tolerated the procedure well. The quality of the                        bowel preparation was bloody.          RECTUM:    SIGMOID:   8 MM polyp @ 35 cm   DESCENDING COLON:   TRANSVERSE COLON:   ASCENDING COLON:  Blood through out colon. No signs of active bleeding. Anastomotic area looked nml. No blood in SB    The patient tolerated the procedure well.

## 2018-12-10 NOTE — BRIEF OPERATIVE NOTE - POST-OP DX
Diverticulum of large intestine  12/10/2018    Juanito Hernández  Gastritis without bleeding, unspecified chronicity, unspecified gastritis type  12/10/2018    Juanito Hernández  Hiatal hernia  12/10/2018    Juanito Hernández  Pseudopolyp of sigmoid colon, unspecified complication status  12/10/2018    Juanito Hernández

## 2018-12-10 NOTE — PROGRESS NOTE ADULT - SUBJECTIVE AND OBJECTIVE BOX
Procedure:           Upper GI endoscopy 12-10-18 @ 15:15    Indications:                     Monitored Anesthesia Care Provided by :     ____________________________________________________________________________________________________  Procedure:           Pre-Anesthesia Assessment:                       - Prior to the procedure, a History and Physical was performed, and patient                        medications and allergies were reviewed. The patient is competent. The risks                        and benefits of the procedure and the sedation options and risks were                        discussed with the patient's wife. All questions were answered and informed consent                        was obtained. Patient identification and proposed procedure were verified by                        the physician, the nurse and the anesthesiologist in the procedure room.                        Mental Status Examination:           alert and confused Airway Examination: normal                        oropharyngeal airway and neck mobility. Respiratory Examination: clear to                        auscultation. CV Examination: normal. Prophylactic Antibiotics: The patient                        does not require prophylactic antibiotics.                        Grade Assessment: . After                        reviewing the risks and benefits, the patient was deemed in satisfactory                        condition to undergo the procedure. The anesthesia plan was to use monitored                        anesthesia care (MAC). Immediately prior to administration of medications,                        the patient was re-assessed for adequacy to receive sedatives. The heart        		     rate, respiratory rate, oxygen saturations, blood pressure, adequacy of                        pulmonary ventilation, and response to care were monitored throughout the                        procedure. The physical status of the patient was re-assessed after the                        procedure.                       After obtaining informed consent, the endoscope was passed under direct                        vision. Throughout the procedure, the patient's blood pressure, pulse, and                        oxygen saturations were monitored continuously. The Endoscope was introduced                        through the mouth, and advanced to the second part of duodenum. retroflexion was done in the stomachThe upper GI                        endoscopy was accomplished with ease. The patient tolerated the procedure                        well.    ESOPHAGUS: WNL                       Small HH    STOMACH:  mild antral gastritis    DUODENUM:   wnl      Assessment :   Negative exam    PLAN :  colonoscopy

## 2018-12-11 DIAGNOSIS — K63.5 POLYP OF COLON: ICD-10-CM

## 2018-12-11 DIAGNOSIS — K92.2 GASTROINTESTINAL HEMORRHAGE, UNSPECIFIED: ICD-10-CM

## 2018-12-11 LAB
ALBUMIN SERPL ELPH-MCNC: 2.2 G/DL — LOW (ref 3.3–5)
ALP SERPL-CCNC: 65 U/L — SIGNIFICANT CHANGE UP (ref 40–120)
ALT FLD-CCNC: 23 U/L — SIGNIFICANT CHANGE UP (ref 12–78)
ANION GAP SERPL CALC-SCNC: 8 MMOL/L — SIGNIFICANT CHANGE UP (ref 5–17)
AST SERPL-CCNC: 19 U/L — SIGNIFICANT CHANGE UP (ref 15–37)
BILIRUB SERPL-MCNC: 0.7 MG/DL — SIGNIFICANT CHANGE UP (ref 0.2–1.2)
BLD GP AB SCN SERPL QL: SIGNIFICANT CHANGE UP
BUN SERPL-MCNC: 11 MG/DL — SIGNIFICANT CHANGE UP (ref 7–23)
CALCIUM SERPL-MCNC: 6.6 MG/DL — LOW (ref 8.5–10.1)
CHLORIDE SERPL-SCNC: 106 MMOL/L — SIGNIFICANT CHANGE UP (ref 96–108)
CO2 SERPL-SCNC: 25 MMOL/L — SIGNIFICANT CHANGE UP (ref 22–31)
CREAT SERPL-MCNC: 0.77 MG/DL — SIGNIFICANT CHANGE UP (ref 0.5–1.3)
GLUCOSE SERPL-MCNC: 100 MG/DL — HIGH (ref 70–99)
HCT VFR BLD CALC: 22.5 % — LOW (ref 39–50)
HGB BLD-MCNC: 7.5 G/DL — LOW (ref 13–17)
MCHC RBC-ENTMCNC: 26.4 PG — LOW (ref 27–34)
MCHC RBC-ENTMCNC: 33.3 GM/DL — SIGNIFICANT CHANGE UP (ref 32–36)
MCV RBC AUTO: 79.2 FL — LOW (ref 80–100)
NRBC # BLD: 0 /100 WBCS — SIGNIFICANT CHANGE UP (ref 0–0)
PLATELET # BLD AUTO: 226 K/UL — SIGNIFICANT CHANGE UP (ref 150–400)
POTASSIUM SERPL-MCNC: 4.1 MMOL/L — SIGNIFICANT CHANGE UP (ref 3.5–5.3)
POTASSIUM SERPL-SCNC: 4.1 MMOL/L — SIGNIFICANT CHANGE UP (ref 3.5–5.3)
PROT SERPL-MCNC: 5.5 GM/DL — LOW (ref 6–8.3)
RBC # BLD: 2.84 M/UL — LOW (ref 4.2–5.8)
RBC # FLD: 17.7 % — HIGH (ref 10.3–14.5)
SODIUM SERPL-SCNC: 139 MMOL/L — SIGNIFICANT CHANGE UP (ref 135–145)
WBC # BLD: 9.97 K/UL — SIGNIFICANT CHANGE UP (ref 3.8–10.5)
WBC # FLD AUTO: 9.97 K/UL — SIGNIFICANT CHANGE UP (ref 3.8–10.5)

## 2018-12-11 RX ORDER — PANTOPRAZOLE SODIUM 20 MG/1
40 TABLET, DELAYED RELEASE ORAL
Qty: 0 | Refills: 0 | Status: DISCONTINUED | OUTPATIENT
Start: 2018-12-11 | End: 2018-12-15

## 2018-12-11 RX ADMIN — CARVEDILOL PHOSPHATE 3.12 MILLIGRAM(S): 80 CAPSULE, EXTENDED RELEASE ORAL at 17:00

## 2018-12-11 RX ADMIN — SODIUM CHLORIDE 80 MILLILITER(S): 9 INJECTION, SOLUTION INTRAVENOUS at 22:49

## 2018-12-11 RX ADMIN — PANTOPRAZOLE SODIUM 10 MG/HR: 20 TABLET, DELAYED RELEASE ORAL at 05:46

## 2018-12-11 RX ADMIN — SODIUM CHLORIDE 80 MILLILITER(S): 9 INJECTION, SOLUTION INTRAVENOUS at 14:34

## 2018-12-11 RX ADMIN — CARVEDILOL PHOSPHATE 3.12 MILLIGRAM(S): 80 CAPSULE, EXTENDED RELEASE ORAL at 05:46

## 2018-12-11 RX ADMIN — Medication 10 MILLIGRAM(S): at 22:02

## 2018-12-11 RX ADMIN — Medication 10 MILLIGRAM(S): at 05:46

## 2018-12-11 RX ADMIN — Medication 100 MILLIGRAM(S): at 17:00

## 2018-12-11 RX ADMIN — PANTOPRAZOLE SODIUM 10 MG/HR: 20 TABLET, DELAYED RELEASE ORAL at 14:34

## 2018-12-11 RX ADMIN — Medication 10 MILLIGRAM(S): at 14:34

## 2018-12-11 RX ADMIN — Medication 100 MILLIGRAM(S): at 05:46

## 2018-12-11 NOTE — PHYSICAL THERAPY INITIAL EVALUATION ADULT - GENERAL OBSERVATIONS, REHAB EVAL
Pt encountered supine in bed, alert and oriented x1, iv in place, dressing and staples to abdominal area c/d/i, NAD, spouse present.

## 2018-12-11 NOTE — PHYSICAL THERAPY INITIAL EVALUATION ADULT - ADDITIONAL COMMENTS
lives in house with 2 steps to enter and 12 to 2nd floor, walk with walker , HA 12 hrs x 6 days, has wheelchair and rolling walker.   Pt currently from [facility] sub acute rehab where spouse reports that pt is able to tolerate sit to stand with tilt table, ambulating has been attempted on parallel bars, pt with difficulty taking a step. Dependent lift is used for OOB to chair.

## 2018-12-11 NOTE — PHYSICAL THERAPY INITIAL EVALUATION ADULT - IMPAIRMENTS FOUND, PT EVAL
gait, locomotion, and balance/ROM/muscle strength/arousal, attention, and cognition/cognitive impairment

## 2018-12-11 NOTE — PROGRESS NOTE ADULT - SUBJECTIVE AND OBJECTIVE BOX
INTERVAL HPI/OVERNIGHT EVENTS:  s/p  colonoscopy  s/p  endoscopy      REVIEW OF SYSTEMS:  CONSTITUTIONAL:  no  complaints      MEDICATION:  ALBUTerol/ipratropium for Nebulization. 3 milliLiter(s) Nebulizer once  carvedilol 3.125 milliGRAM(s) Oral every 12 hours  docusate sodium 100 milliGRAM(s) Oral two times a day  metoclopramide 10 milliGRAM(s) Oral every 8 hours  pantoprazole Infusion 8 mG/Hr IV Continuous <Continuous>  polyethylene glycol 3350 17 Gram(s) Oral daily PRN  sodium chloride 0.45%. 1000 milliLiter(s) IV Continuous <Continuous>    Vital Signs Last 24 Hrs  T(C): 37.3 (11 Dec 2018 07:30), Max: 37.3 (11 Dec 2018 07:30)  T(F): 99.2 (11 Dec 2018 07:30), Max: 99.2 (11 Dec 2018 07:30)  HR: 97 (11 Dec 2018 07:30) (68 - 105)  BP: 129/62 (11 Dec 2018 07:30) (115/54 - 156/89)  BP(mean): --  RR: 18 (11 Dec 2018 07:30) (17 - 23)  SpO2: 100% (11 Dec 2018 07:30) (96% - 100%)    PHYSICAL EXAM:  GENERAL: NAD, well-groomed, well-developed  EYES:  conjunctiva and sclera clear  ENMT:  Moist mucous membranes,   NECK: Supple, No JVD, Normal thyroid  NERVOUS SYSTEM:  Alert confused  no  focal  deficits;   CHEST/LUNG: Clear    HEART: Regular rate and rhythm; No murmurs, rubs, or gallops  ABDOMEN: Soft, Nontender, Nondistended; Bowel sounds present  EXTREMITIES:  no  edema no  tenderness  SKIN: No rashes   LABS:                        8.9    10.85 )-----------( 267      ( 10 Dec 2018 17:51 )             26.5     12-10    138  |  106  |  20  ----------------------------<  109<H>  4.4   |  24  |  0.68    Ca    7.1<L>      10 Dec 2018 06:41    TPro  5.2<L>  /  Alb  2.1<L>  /  TBili  1.1  /  DBili  x   /  AST  19  /  ALT  23  /  AlkPhos  61  12-10    PT/INR - ( 09 Dec 2018 13:35 )   PT: 13.5 sec;   INR: 1.20 ratio         PTT - ( 09 Dec 2018 13:35 )  PTT:21.0 sec    CAPILLARY BLOOD GLUCOSE          RADIOLOGY & ADDITIONAL TESTS:    Imaging reports  Personally Reviewed:  [x ] YES  [ ] NO    Consultant(s) Notes Reviewed:  [x ] YES  [ ] NO    Care Discussed with Consultants/Other Providers [ x] YES  [ ] NO  Problem/Plan - 1:  ·  Problem: GI bleed.  Plan: IVF  observation  might  require  furhter  transfusions  etiology  of  GI  bleed  unclear    Problem/Plan - 2:  ·  Problem: Hypertension.  Plan: carvediol.     Problem/Plan - 3:  ·  Problem: Anoxic encephalopathy.  Plan: observation.     Problem/Plan - 4:  ·  Problem: ICD (implantable cardioverter-defibrillator) in place.  Plan: observation.     Problem/Plan - 5:  ·  Problem: Prostate cancer.  Plan: observation.

## 2018-12-11 NOTE — PROGRESS NOTE ADULT - SUBJECTIVE AND OBJECTIVE BOX
Patient seen and examined at bedside in no distress.   Wife bedside, states patient has not had any BMs since yesterday.    Vital Signs Last 24 Hrs  T(F): 99.2 (12-11-18 @ 07:30), Max: 99.2 (12-11-18 @ 07:30)  HR: 97 (12-11-18 @ 07:30)  BP: 129/62 (12-11-18 @ 07:30)  RR: 18 (12-11-18 @ 07:30)  SpO2: 100% (12-11-18 @ 07:30)    GENERAL: Alert, NAD  CHEST/LUNG: Clear to auscultation bilaterally, respirations nonlabored  HEART: S1S2, Regular rate and rhythm  ABDOMEN: + BS, midline staples c/d/i, right inguinal staples c/d/i. Soft, obese, nondistended, nontender  EXTREMITIES: No calf tenderness, no pedal edema b/l     LABS:                        7.5    9.97  )-----------( 226      ( 11 Dec 2018 10:04 )             22.5     12-11    139  |  106  |  11  ----------------------------<  100<H>  4.1   |  25  |  0.77    Ca    6.6<L>      11 Dec 2018 10:04    TPro  5.5<L>  /  Alb  2.2<L>  /  TBili  0.7  /  DBili  x   /  AST  19  /  ALT  23  /  AlkPhos  65  12-11    PT/INR - ( 09 Dec 2018 13:35 )   PT: 13.5 sec;   INR: 1.20 ratio      PTT - ( 09 Dec 2018 13:35 )  PTT:21.0 sec    A/P: 68M recently admitted for RIH and cecectomy 11/18/18 due to large bowel incarceration, now a/w acute blood loss anemia due to GI bleeding, likely diverticular, s/p 3uPRBC, s/p EGD/colonoscopy 12/10 with evidence diverticulum, no further episodes of rectal bleeding   - monitor H/H, transfuse PRN  - monitor for clinical signs and symptoms bleeding  - continue medical and GI management  - will d/w surgical attending

## 2018-12-11 NOTE — PROGRESS NOTE ADULT - SUBJECTIVE AND OBJECTIVE BOX
Patient is a 68y old  Male who presents with a chief complaint of gi  bleed  anemia (11 Dec 2018 14:08)      HPI:  patient  reported  with  BRBPR  worsening  anemia  sent  to ER  admitted  for  further  w/u  and  treatment (09 Dec 2018 18:17)      INTERVAL HPI/OVERNIGHT EVENTS:  No BM since colonoscopy yesterday. The patient ( wife ) denies melena, hematochezia, hematemesis, nausea, vomiting, abdominal pain, constipation, diarrhea, or change in bowel movements H/H decreased    MEDICATIONS  (STANDING):  ALBUTerol/ipratropium for Nebulization. 3 milliLiter(s) Nebulizer once  carvedilol 3.125 milliGRAM(s) Oral every 12 hours  docusate sodium 100 milliGRAM(s) Oral two times a day  metoclopramide 10 milliGRAM(s) Oral every 8 hours  pantoprazole Infusion 8 mG/Hr (10 mL/Hr) IV Continuous <Continuous>  sodium chloride 0.45%. 1000 milliLiter(s) (80 mL/Hr) IV Continuous <Continuous>    MEDICATIONS  (PRN):  polyethylene glycol 3350 17 Gram(s) Oral daily PRN Constipation      FAMILY HISTORY:  No pertinent family history in first degree relatives      Allergies    lisinopril (Unknown)  penicillins (Unknown)    Intolerances        PMH/PSH:  Sudden cardiac death  Gait abnormality  Leg pain, right  Anoxic encephalopathy  Brain injury with coma  Prostate cancer  Hypertension  S/P ICD (internal cardiac defibrillator) procedure  H/O tracheostomy  H/O prostate cancer  Status post cryoablation        REVIEW OF SYSTEMS:  CONSTITUTIONAL: No fever, weight loss, or fatigue  EYES: No eye pain, visual disturbances, or discharge  ENMT:  No difficulty hearing, tinnitus, vertigo; No sinus or throat pain  NECK: No pain or stiffness  BREASTS: No pain, masses, or nipple discharge  RESPIRATORY: No cough, wheezing, chills or hemoptysis; No shortness of breath  CARDIOVASCULAR: No chest pain, palpitations, dizziness, or leg swelling  GASTROINTESTINAL: See above  GENITOURINARY: No dysuria, frequency, hematuria, or incontinence  NEUROLOGICAL: No headaches, memory loss, loss of strength, numbness, or tremors  SKIN: No itching, burning, rashes, or lesions   LYMPH NODES: No enlarged glands  ENDOCRINE: No heat or cold intolerance; No hair loss  MUSCULOSKELETAL: No joint pain or swelling; No muscle, back, or extremity pain  PSYCHIATRIC: No depression, anxiety, mood swings, or difficulty sleeping  HEME/LYMPH: No easy bruising, or bleeding gums  ALLERGY AND IMMUNOLOGIC: No hives or eczema    Vital Signs Last 24 Hrs  T(C): 37.3 (11 Dec 2018 10:30), Max: 37.3 (11 Dec 2018 07:30)  T(F): 99.2 (11 Dec 2018 10:30), Max: 99.2 (11 Dec 2018 07:30)  HR: 102 (11 Dec 2018 11:25) (68 - 105)  BP: 154/70 (11 Dec 2018 11:25) (115/54 - 156/89)  BP(mean): --  RR: 18 (11 Dec 2018 11:25) (17 - 23)  SpO2: 99% (11 Dec 2018 11:25) (96% - 100%)    PHYSICAL EXAM:  GENERAL: NAD, well-groomed, well-developed  HEAD:  Atraumatic, Normocephalic  EYES: EOMI, PERRLA, conjunctiva and sclera clear  NECK: Supple, No JVD, Normal thyroid  NERVOUS SYSTEM:  Alert & at baseline  CHEST/LUNG: Clear to percussion bilaterally; No rales, rhonchi, wheezing, or rubs  HEART: Regular rate and rhythm; No murmurs, rubs, or gallops  ABDOMEN: Soft, Nontender, Nondistended; Bowel sounds present  EXTREMITIES:  2+ Peripheral Pulses, No clubbing, cyanosis, or edema  LYMPH: No lymphadenopathy noted  SKIN: No rashes or lesions    LAB                          7.5    9.97  )-----------( 226      ( 11 Dec 2018 10:04 )             22.5       CBC:  12-11 @ 10:04  WBC 9.97   Hgb 7.5   Hct 22.5   Plts 226  MCV 79.2  12-10 @ 17:51  WBC 10.85   Hgb 8.9   Hct 26.5   Plts 267  MCV 77.5  12-10 @ 12:48  WBC 10.59   Hgb 8.6   Hct 25.2   Plts 262  MCV 77.1  12-10 @ 06:41  WBC 9.36   Hgb 7.3   Hct 20.8   Plts 254  MCV 75.4  12-09 @ 13:35  WBC 7.35   Hgb 5.8   Hct 17.5   Plts 333  MCV 76.8      Chemistry:  12-11 @ 10:04  Na+ 139  K+ 4.1  Cl- 106  CO2 25  BUN 11  Cr 0.77     12-10 @ 06:41  Na+ 138  K+ 4.4  Cl- 106  CO2 24  BUN 20  Cr 0.68     12-09 @ 13:35  Na+ 138  K+ 4.5  Cl- 106  CO2 24  BUN 17  Cr 0.63         Glucose, Serum: 100 mg/dL (12-11 @ 10:04)  Glucose, Serum: 109 mg/dL (12-10 @ 06:41)  Glucose, Serum: 103 mg/dL (12-09 @ 13:35)      11 Dec 2018 10:04    139    |  106    |  11     ----------------------------<  100    4.1     |  25     |  0.77   10 Dec 2018 06:41    138    |  106    |  20     ----------------------------<  109    4.4     |  24     |  0.68   09 Dec 2018 13:35    138    |  106    |  17     ----------------------------<  103    4.5     |  24     |  0.63     Ca    6.6        11 Dec 2018 10:04  Ca    7.1        10 Dec 2018 06:41  Ca    7.4        09 Dec 2018 13:35    TPro  5.5    /  Alb  2.2    /  TBili  0.7    /  DBili  x      /  AST  19     /  ALT  23     /  AlkPhos  65     11 Dec 2018 10:04  TPro  5.2    /  Alb  2.1    /  TBili  1.1    /  DBili  x      /  AST  19     /  ALT  23     /  AlkPhos  61     10 Dec 2018 06:41  TPro  5.8    /  Alb  2.2    /  TBili  0.2    /  DBili  x      /  AST  23     /  ALT  26     /  AlkPhos  71     09 Dec 2018 13:35              CAPILLARY BLOOD GLUCOSE              RADIOLOGY & ADDITIONAL TESTS:    Imaging Personally Reviewed:  [ ] YES  [ ] NO    Consultant(s) Notes Reviewed:  [ ] YES  [ ] NO    Care Discussed with Consultants/Other Providers [ ] YES  [ ] NO

## 2018-12-11 NOTE — PHYSICAL THERAPY INITIAL EVALUATION ADULT - PERTINENT HX OF CURRENT PROBLEM, REHAB EVAL
68M recently admitted for RIH and cecectomy 11/18/18 due to large bowel incarceration, now a/w acute blood loss anemia due to GI bleeding, likely diverticular, s/p 3uPRBC, s/p EGD/colonoscopy 12/10 with evidence diverticulum, no further episodes of rectal bleeding. recent H & H 7.5/22.5, monitoring ongoing.

## 2018-12-11 NOTE — CONSULT NOTE ADULT - SUBJECTIVE AND OBJECTIVE BOX
Patient is a 68y old  Male who presents with a chief complaint of gi  bleed  anemia (11 Dec 2018 10:07)      HPI: patient  reported  with  BRBPR  worsening  anemia  sent  to ER  admitted  for  further  w/u  and  treatment (09 Dec 2018 18:17)      REVIEW OF SYSTEMS as above  Constitutional - No fever, No weight loss, No fatigue  HEENT - No neck pain  Respiratory - No cough, No shortness of breath  Cardiovascular - No chest pain, No palpitations  Gastrointestinal - No abdominal pain, No nausea, No vomiting  Genitourinary - No dysuria, No frequency  Neurological - No headaches, No loss of strength, No numbness, No tremors  Skin - No lesions   Endocrine - No temperature intolerance  Musculoskeletal - No joint pain  Psychiatric - No depression, No anxiety    PAST MEDICAL & SURGICAL HISTORY  High grade bowel obstruction - s/p Exp lap, SPENCER, Cecectomy, R inguinal hernia repair 11/18  Sudden cardiac death  Gait abnormality  Leg pain, right  Anoxic encephalopathy  Brain injury with coma  Prostate cancer  Hypertension  S/P ICD (internal cardiac defibrillator) procedure  H/O tracheostomy  H/O prostate cancer  Status post cryoablation      SOCHX: Lives with wife in house, several steps to enter, bedroom upstairs.  No tobacco,  no alcohol    FMHX: FA/MO  non- contributory     ALLERGIES  lisinopril (Unknown)  penicillins (Unknown)      MEDICATIONS reviewed  MEDICATIONS  (STANDING):  ALBUTerol/ipratropium for Nebulization. 3 milliLiter(s) Nebulizer once  carvedilol 3.125 milliGRAM(s) Oral every 12 hours  docusate sodium 100 milliGRAM(s) Oral two times a day  metoclopramide 10 milliGRAM(s) Oral every 8 hours  pantoprazole Infusion 8 mG/Hr (10 mL/Hr) IV Continuous <Continuous>  sodium chloride 0.45%. 1000 milliLiter(s) (80 mL/Hr) IV Continuous <Continuous>    MEDICATIONS  (PRN):  polyethylene glycol 3350 17 Gram(s) Oral daily PRN Constipation      VITALS  T(C): 37.3 (12-11-18 @ 10:30), Max: 37.3 (12-11-18 @ 07:30)  HR: 102 (12-11-18 @ 11:25) (68 - 105)  BP: 154/70 (12-11-18 @ 11:25) (115/54 - 156/89)  RR: 18 (12-11-18 @ 11:25) (17 - 23)  SpO2: 99% (12-11-18 @ 11:25) (96% - 100%)  Wt(kg): --    PHYSICAL EXAM  BMI - 31.4  Constitutional - NAD, Comfortable  HEENT - NCAT, EOMI  Neck - Supple, functional ROM  Cardiovascular - RRR  Abdomen - Soft, Not tender  Extremities - Functional range of motion   Neurologic -                    Cognitive - Awake, Alert, Oriented     Speech Intact     Language  Intact     Motor - No focal deficits     Sensory - Intact to LT     Reflexes - DTR Intact     Psychiatric - Mood       RECENT LABS reviewed  CBC Full  -  ( 11 Dec 2018 10:04 )  WBC Count : 9.97 K/uL  Hemoglobin : 7.5 g/dL  Hematocrit : 22.5 %  Platelet Count - Automated : 226 K/uL  Mean Cell Volume : 79.2 fl  Mean Cell Hemoglobin : 26.4 pg  Mean Cell Hemoglobin Concentration : 33.3 gm/dL  Auto Neutrophil # : x  12-11    139  |  106  |  11  ----------------------------<  100<H>  4.1   |  25  |  0.77    Ca    6.6<L>      11 Dec 2018 10:04    TPro  5.5<L>  /  Alb  2.2<L>  /  TBili  0.7  /  DBili  x   /  AST  19  /  ALT  23  /  AlkPhos  65  12-11    IMAGING reviewed:     FUNCTIONAL HISTORY Was ambulatory    CURRENT FUNCTIONAL STATUS max A for bed mobility    IMPRESSION: 68M recently admitted for RIH and cecectomy 11/18/18 due to large bowel incarceration, now a/w acute blood loss anemia due to GI bleeding, likely diverticular, s/p 3uPRBC, s/p EGD/colonoscopy 12/10 with evidence diverticulum, no further episodes of rectal bleeding - unable to ambulate    PLAN: Will follow - recommendations will depend upon clinical and functional course but will likely require rehab prior to returning home. Patient is a 68y old  Male who presents with a chief complaint of gi  bleed  anemia (11 Dec 2018 10:07)      HPI: patient  reported  with  BRBPR  worsening  anemia  sent  to ER  admitted  for  further  w/u  and  treatment (09 Dec 2018 18:17)      REVIEW OF SYSTEMS as above - unable to provide more ROS    PAST MEDICAL & SURGICAL HISTORY  High grade bowel obstruction - s/p Exp lap, SPENCER, Cecectomy, R inguinal hernia repair 11/18  Closed head injury 10 years ago  Sudden cardiac death -> Anoxic encephalopathy  Gait abnormality  Leg pain, right  Anoxic encephalopathy  Brain injury with coma  Prostate cancer  Hypertension  S/P ICD (internal cardiac defibrillator) procedure  H/O tracheostomy  H/O prostate cancer  Status post cryoablation    SOCHX: Lives with wife in house, several steps to enter, bedroom upstairs. Amb with walker, sup at home  No tobacco,  no alcohol    FMHX: FA/MO  non- contributory     ALLERGIES  lisinopril (Unknown)  penicillins (Unknown)      MEDICATIONS reviewed  MEDICATIONS  (STANDING):  ALBUTerol/ipratropium for Nebulization. 3 milliLiter(s) Nebulizer once  carvedilol 3.125 milliGRAM(s) Oral every 12 hours  docusate sodium 100 milliGRAM(s) Oral two times a day  metoclopramide 10 milliGRAM(s) Oral every 8 hours  pantoprazole Infusion 8 mG/Hr (10 mL/Hr) IV Continuous <Continuous>  sodium chloride 0.45%. 1000 milliLiter(s) (80 mL/Hr) IV Continuous <Continuous>    MEDICATIONS  (PRN):  polyethylene glycol 3350 17 Gram(s) Oral daily PRN Constipation      VITALS  T(C): 37.3 (12-11-18 @ 10:30), Max: 37.3 (12-11-18 @ 07:30)  HR: 102 (12-11-18 @ 11:25) (68 - 105)  BP: 154/70 (12-11-18 @ 11:25) (115/54 - 156/89)  RR: 18 (12-11-18 @ 11:25) (17 - 23)  SpO2: 99% (12-11-18 @ 11:25) (96% - 100%)  Wt(kg): -- 85.5 kg    PHYSICAL EXAM  BMI - 31.4  Constitutional - NAD, Comfortable in bed - O2 NC  HEENT - NCAT, EOMI  Neck - Supple, functional ROM  Cardiovascular - RRR  Abdomen - Soft, Not tender - Incision stapled, C/D/I  Extremities - Grossly functional range of motion but increased tone radha RUE, resists full exam.  Neurologic -                    Cognitive - Awake, Alert, NAD     Speech Intact     Language  Intact but very limited     Motor - No focal deficits     Sensory - Unreliable     Psychiatric - Mood much calmer now      RECENT LABS reviewed  CBC Full  -  ( 11 Dec 2018 10:04 )  WBC Count : 9.97 K/uL  Hemoglobin : 7.5 g/dL  Hematocrit : 22.5 %  Platelet Count - Automated : 226 K/uL  Mean Cell Volume : 79.2 fl  Mean Cell Hemoglobin : 26.4 pg  Mean Cell Hemoglobin Concentration : 33.3 gm/dL  Auto Neutrophil # : x  12-11    139  |  106  |  11  ----------------------------<  100<H>  4.1   |  25  |  0.77    Ca    6.6<L>      11 Dec 2018 10:04    TPro  5.5<L>  /  Alb  2.2<L>  /  TBili  0.7  /  DBili  x   /  AST  19  /  ALT  23  /  AlkPhos  65  12-11    IMAGING reviewed:     FUNCTIONAL HISTORY Was ambulatory with RW, sup    CURRENT FUNCTIONAL STATUS max A for bed mobility    IMPRESSION: 68M recently admitted for RIH and cecectomy 11/18/18 due to large bowel incarceration, now a/w acute blood loss anemia due to GI bleeding, likely diverticular, s/p 3uPRBC, s/p EGD/colonoscopy 12/10 with evidence diverticulum, no further episodes of rectal bleeding - unable to ambulate    PLAN: Will follow - recommendations will depend upon clinical and functional course but will likely require rehab prior to returning home.

## 2018-12-12 LAB
ALBUMIN SERPL ELPH-MCNC: 2.2 G/DL — LOW (ref 3.3–5)
ALP SERPL-CCNC: 64 U/L — SIGNIFICANT CHANGE UP (ref 40–120)
ALT FLD-CCNC: 20 U/L — SIGNIFICANT CHANGE UP (ref 12–78)
ANION GAP SERPL CALC-SCNC: 9 MMOL/L — SIGNIFICANT CHANGE UP (ref 5–17)
AST SERPL-CCNC: 21 U/L — SIGNIFICANT CHANGE UP (ref 15–37)
BILIRUB SERPL-MCNC: 0.7 MG/DL — SIGNIFICANT CHANGE UP (ref 0.2–1.2)
BUN SERPL-MCNC: 6 MG/DL — LOW (ref 7–23)
CALCIUM SERPL-MCNC: 6.4 MG/DL — CRITICAL LOW (ref 8.5–10.1)
CHLORIDE SERPL-SCNC: 107 MMOL/L — SIGNIFICANT CHANGE UP (ref 96–108)
CO2 SERPL-SCNC: 24 MMOL/L — SIGNIFICANT CHANGE UP (ref 22–31)
CREAT SERPL-MCNC: 0.62 MG/DL — SIGNIFICANT CHANGE UP (ref 0.5–1.3)
GLUCOSE SERPL-MCNC: 104 MG/DL — HIGH (ref 70–99)
HCT VFR BLD CALC: 21.7 % — LOW (ref 39–50)
HGB BLD-MCNC: 7.3 G/DL — LOW (ref 13–17)
MCHC RBC-ENTMCNC: 27.1 PG — SIGNIFICANT CHANGE UP (ref 27–34)
MCHC RBC-ENTMCNC: 33.6 GM/DL — SIGNIFICANT CHANGE UP (ref 32–36)
MCV RBC AUTO: 80.7 FL — SIGNIFICANT CHANGE UP (ref 80–100)
NRBC # BLD: 0 /100 WBCS — SIGNIFICANT CHANGE UP (ref 0–0)
PLATELET # BLD AUTO: 210 K/UL — SIGNIFICANT CHANGE UP (ref 150–400)
POTASSIUM SERPL-MCNC: 3.8 MMOL/L — SIGNIFICANT CHANGE UP (ref 3.5–5.3)
POTASSIUM SERPL-SCNC: 3.8 MMOL/L — SIGNIFICANT CHANGE UP (ref 3.5–5.3)
PROT SERPL-MCNC: 5.4 GM/DL — LOW (ref 6–8.3)
RBC # BLD: 2.69 M/UL — LOW (ref 4.2–5.8)
RBC # FLD: 18.7 % — HIGH (ref 10.3–14.5)
SODIUM SERPL-SCNC: 140 MMOL/L — SIGNIFICANT CHANGE UP (ref 135–145)
WBC # BLD: 7.53 K/UL — SIGNIFICANT CHANGE UP (ref 3.8–10.5)
WBC # FLD AUTO: 7.53 K/UL — SIGNIFICANT CHANGE UP (ref 3.8–10.5)

## 2018-12-12 RX ORDER — CALCIUM GLUCONATE 100 MG/ML
2 VIAL (ML) INTRAVENOUS ONCE
Qty: 0 | Refills: 0 | Status: COMPLETED | OUTPATIENT
Start: 2018-12-12 | End: 2018-12-12

## 2018-12-12 RX ADMIN — PANTOPRAZOLE SODIUM 40 MILLIGRAM(S): 20 TABLET, DELAYED RELEASE ORAL at 08:17

## 2018-12-12 RX ADMIN — Medication 200 GRAM(S): at 13:51

## 2018-12-12 RX ADMIN — Medication 10 MILLIGRAM(S): at 06:02

## 2018-12-12 RX ADMIN — Medication 100 MILLIGRAM(S): at 06:02

## 2018-12-12 RX ADMIN — Medication 100 MILLIGRAM(S): at 17:22

## 2018-12-12 RX ADMIN — Medication 10 MILLIGRAM(S): at 22:02

## 2018-12-12 RX ADMIN — Medication 10 MILLIGRAM(S): at 13:50

## 2018-12-12 RX ADMIN — CARVEDILOL PHOSPHATE 3.12 MILLIGRAM(S): 80 CAPSULE, EXTENDED RELEASE ORAL at 06:02

## 2018-12-12 RX ADMIN — CARVEDILOL PHOSPHATE 3.12 MILLIGRAM(S): 80 CAPSULE, EXTENDED RELEASE ORAL at 17:22

## 2018-12-12 NOTE — DIETITIAN INITIAL EVALUATION ADULT. - ADHERENCE
n/a/Regular; Breakfast; Orange, oatmeal, water, yogurt Lunch; Plantain, Cabbage stew with chicken or salmon, spinach Dinner; Salad, Chicken or Tuna

## 2018-12-12 NOTE — DIETITIAN INITIAL EVALUATION ADULT. - OTHER INFO
Pt seen for PA consult for assessment & education. Pt lives with wife; wife does cooking & food shopping. Pt with GI bleed s/p EGD/Colonoscopy 12/10.  Pt starting po diet lunch today 12/12 Pt seen for PA consult for assessment & education. Pt lives with wife; wife does cooking & food shopping. Pt with GI bleed s/p EGD/Colonoscopy 12/10.  Pt NPO/Clear liquids x 2 days hospitalization & started po diet lunch today 12/12 & presents with increased difficulty chewing soft foods. Wife requesting mechanical soft consistency; prefers salmon, mashed potatoes, cooked carrots. Pt seen for PA consult for assessment & education. Pt lives with wife; wife does cooking & food shopping. Pt with GI bleed s/p EGD/Colonoscopy 12/10.  Pt NPO/Clear liquids x 2 days hospitalization & started po diet lunch today 12/12. Wife currently feeding pt & presents with increased difficulty chewing soft foods. Wife requesting mechanical soft consistency; prefers salmon, mashed potatoes, cooked carrots.

## 2018-12-12 NOTE — DIETITIAN INITIAL EVALUATION ADULT. - PERTINENT LABORATORY DATA
12-12 Na 140 mmol/L Glu 104 mg/dL<H> K+ 3.8 mmol/L Cr 0.62 mg/dL BUN 6 mg/dL<L> Phos n/a   Alb 2.2 g/dL<L> PAB n/a   Hgb 7.3 g/dL<L> Hct 21.7 %<L> HgA1C n/a    Glucose, Serum: 104 mg/dL <H>

## 2018-12-12 NOTE — DIETITIAN INITIAL EVALUATION ADULT. - DIET TYPE
soft/fiber/residue restricted/DASH/TLC (sodium and cholesterol restricted diet)/12/11/18/Lactose restricted

## 2018-12-12 NOTE — DIETITIAN INITIAL EVALUATION ADULT. - SOURCE
Spoke to pt's wife & medical chart review/other (specify)/patient/family/significant other family/significant other/other (specify)/Spoke to pt's wife & medical chart review

## 2018-12-12 NOTE — PROGRESS NOTE ADULT - SUBJECTIVE AND OBJECTIVE BOX
Patient seen and examined at bedside in no distress. Wife bedside.  Per RN, no BMs overnight.  No complaints offered.    Vital Signs Last 24 Hrs  T(F): 97.7 (12-12-18 @ 05:34), Max: 99.2 (12-11-18 @ 10:30)  HR: 95 (12-12-18 @ 05:34)  BP: 129/65 (12-12-18 @ 05:34)  RR: 18 (12-12-18 @ 05:34)  SpO2: 97% (12-12-18 @ 05:34)    GENERAL: Alert, oriented, NAD  CHEST/LUNG: Clear to auscultation bilaterally, respirations nonlabored  HEART: S1S2, RRR  ABDOMEN: + Normoactive bowel sounds throughout, soft, nondistended, nontender. Midline staples c/d/i, right inguinal staples c/d/i  EXTREMITIES: No calf tenderness, no pedal edema b/l     LABS:                        7.3    7.53  )-----------( 210      ( 12 Dec 2018 07:57 )             21.7     12-12    140  |  107  |  6<L>  ----------------------------<  104<H>  3.8   |  24  |  0.62    Ca    6.4<LL>      12 Dec 2018 07:57    TPro  5.4<L>  /  Alb  2.2<L>  /  TBili  0.7  /  DBili  x   /  AST  21  /  ALT  20  /  AlkPhos  64  12-12    A/P: 68M recently admitted for RIH and cecectomy 11/18/18 due to large bowel incarceration, now a/w acute blood loss anemia due to GI bleeding, likely diverticular, s/p 3uPRBC, s/p EGD/colonoscopy 12/10 with evidence diverticulum, no further episodes of rectal bleeding, H/H low  - monitor H/H, transfuse PRN  - monitor for clinical signs and symptoms bleeding  - continue medical and GI management  - will d/w surgical attending, likely staple removal prior to d/c home Patient seen and examined at bedside in no distress. Wife bedside.  Per RN, no BMs overnight.  No complaints offered.    Vital Signs Last 24 Hrs  T(F): 97.7 (12-12-18 @ 05:34), Max: 99.2 (12-11-18 @ 10:30)  HR: 95 (12-12-18 @ 05:34)  BP: 129/65 (12-12-18 @ 05:34)  RR: 18 (12-12-18 @ 05:34)  SpO2: 97% (12-12-18 @ 05:34)    GENERAL: Alert, oriented, NAD  CHEST/LUNG: Clear to auscultation bilaterally, respirations nonlabored  HEART: S1S2, RRR  ABDOMEN: + Normoactive bowel sounds throughout, soft, nondistended, nontender. Midline staples c/d/i, right inguinal staples c/d/i  EXTREMITIES: No calf tenderness, no pedal edema b/l     LABS:                        7.3    7.53  )-----------( 210      ( 12 Dec 2018 07:57 )             21.7     12-12    140  |  107  |  6<L>  ----------------------------<  104<H>  3.8   |  24  |  0.62    Ca    6.4<LL>      12 Dec 2018 07:57    TPro  5.4<L>  /  Alb  2.2<L>  /  TBili  0.7  /  DBili  x   /  AST  21  /  ALT  20  /  AlkPhos  64  12-12    A/P: 68M recently admitted for RIH and cecectomy 11/18/18 due to large bowel incarceration, now a/w acute blood loss anemia due to GI bleeding, likely diverticular, s/p 3uPRBC, s/p EGD/colonoscopy 12/10 with evidence diverticulum, no further episodes of rectal bleeding, H/H low, hypocalcemia  - monitor H/H, transfuse PRN  - replete electrolytes per med  - monitor for clinical signs and symptoms bleeding  - continue medical and GI management  - will d/w surgical attending, likely staple removal prior to d/c home Patient seen and examined at bedside in no distress. Wife bedside.  Per RN, no BMs overnight.  No complaints offered.    Vital Signs Last 24 Hrs  T(F): 97.7 (12-12-18 @ 05:34), Max: 99.2 (12-11-18 @ 10:30)  HR: 95 (12-12-18 @ 05:34)  BP: 129/65 (12-12-18 @ 05:34)  RR: 18 (12-12-18 @ 05:34)  SpO2: 97% (12-12-18 @ 05:34)    GENERAL: Alert, oriented, NAD  CHEST/LUNG: Clear to auscultation bilaterally, respirations nonlabored  HEART: S1S2, RRR  ABDOMEN: + Normoactive bowel sounds throughout, soft, nondistended, nontender. Midline staples c/d/i, right inguinal staples c/d/i  EXTREMITIES: No calf tenderness, no pedal edema b/l     LABS:                        7.3    7.53  )-----------( 210      ( 12 Dec 2018 07:57 )             21.7     12-12    140  |  107  |  6<L>  ----------------------------<  104<H>  3.8   |  24  |  0.62    Ca    6.4<LL>      12 Dec 2018 07:57    TPro  5.4<L>  /  Alb  2.2<L>  /  TBili  0.7  /  DBili  x   /  AST  21  /  ALT  20  /  AlkPhos  64  12-12    A/P: 68M recently admitted for RIH and cecectomy 11/18/18 due to large bowel incarceration, now a/w acute blood loss anemia due to GI bleeding, likely diverticular, s/p 3uPRBC, s/p EGD/colonoscopy 12/10 with evidence diverticulum, no further episodes of rectal bleeding, H/H low, hypocalcemia  - monitor H/H, transfuse PRN  - replete electrolytes per med  - monitor for clinical signs and symptoms bleeding  - continue medical and GI management.  - Hematology consult  - will d/w surgical attending, likely staple removal prior to d/c home

## 2018-12-12 NOTE — DIETITIAN INITIAL EVALUATION ADULT. - PHYSICAL APPEARANCE
obese/BMI=31.2; +1 edema Lt arm Lt hand & Garret ankle BMI=31.2; +1 edema Lt arm Lt hand & Garret ankle; Nutrition focused physical exam conducted; Subcutaneous fat loss: [WNL ] Orbital fat pads region, [ WNL]Buccal fat region, [WNL ]Triceps region,  [WNL ]Ribs region.  Muscle wasting: [WNL ]Temples region, [WNL ]Clavicle region, [WNL ]Shoulder region, [unable ]Scapula region, [WNL ]Interosseous region,  [WNL]thigh region, [unable ]Calf region/obese

## 2018-12-12 NOTE — PROGRESS NOTE ADULT - SUBJECTIVE AND OBJECTIVE BOX
INTERVAL HPI/OVERNIGHT EVENTS:        REVIEW OF SYSTEMS:  CONSTITUTIONAL: feels  well no  complaints appetite  good    NECK: No pain or stiffnes  RESPIRATORY: No SOB   CARDIOVASCULAR: No chest pain, palpitations, dizziness,   GASTROINTESTINAL: No abdominal pain. No nausea, vomiting,   NEUROLOGICAL: No headaches, no  blurry  vision no  dizziness  SKIN: No itching,   MUSCULOSKELETAL: No pain    MEDICATION:  ALBUTerol/ipratropium for Nebulization. 3 milliLiter(s) Nebulizer once  carvedilol 3.125 milliGRAM(s) Oral every 12 hours  docusate sodium 100 milliGRAM(s) Oral two times a day  metoclopramide 10 milliGRAM(s) Oral every 8 hours  pantoprazole    Tablet 40 milliGRAM(s) Oral before breakfast  polyethylene glycol 3350 17 Gram(s) Oral daily PRN  sodium chloride 0.45%. 1000 milliLiter(s) IV Continuous <Continuous>    Vital Signs Last 24 Hrs  T(C): 36.9 (12 Dec 2018 17:09), Max: 36.9 (11 Dec 2018 23:44)  T(F): 98.4 (12 Dec 2018 17:09), Max: 98.4 (11 Dec 2018 23:44)  HR: 102 (12 Dec 2018 17:09) (92 - 102)  BP: 149/60 (12 Dec 2018 17:09) (97/60 - 149/60)  BP(mean): --  RR: 18 (12 Dec 2018 17:09) (18 - 18)  SpO2: 99% (12 Dec 2018 17:09) (97% - 99%)    PHYSICAL EXAM:  GENERAL: NAD, well-groomed, well-developed  EYES:  conjunctiva and sclera clear  ENMT:  Moist mucous membranes,   NECK: Supple, No JVD, Normal thyroid  NERVOUS SYSTEM:  Alert confused    no  focal  deficits;   CHEST/LUNG: Clear    HEART: Regular rate and rhythm; No murmurs, rubs, or gallops  ABDOMEN: Soft, Nontender, Nondistended; Bowel sounds present  EXTREMITIES:  no  edema no  tenderness  SKIN: No rashes   LABS:                        7.3    7.53  )-----------( 210      ( 12 Dec 2018 07:57 )             21.7     12-12    140  |  107  |  6<L>  ----------------------------<  104<H>  3.8   |  24  |  0.62    Ca    6.4<LL>      12 Dec 2018 07:57    TPro  5.4<L>  /  Alb  2.2<L>  /  TBili  0.7  /  DBili  x   /  AST  21  /  ALT  20  /  AlkPhos  64  12-12        CAPILLARY BLOOD GLUCOSE          RADIOLOGY & ADDITIONAL TESTS:    Imaging reports  Personally Reviewed:  [ x] YES  [ ] NO    Consultant(s) Notes Reviewed:  [ x] YES  [ ] NO    Care Discussed with Consultants/Other Providers [ x] YES  [ ] NO  Problem/Plan - 1:  ·  Problem: GI bleed.  Plan: IVF  observation  might  require  furhter  transfusions  etiology  of  GI  bleed  unclear might  need  further  transfusion continue  to  monitor  lbs  check reticulocyte  count    Problem/Plan - 2:  ·  Problem: Hypertension.  Plan: carvediol.     Problem/Plan - 3:  ·  Problem: Anoxic encephalopathy.  Plan: observation.     Problem/Plan - 4:  ·  Problem: ICD (implantable cardioverter-defibrillator) in place.  Plan: observation.     Problem/Plan - 5:  ·  Problem: Prostate cancer.  Plan: observation.

## 2018-12-12 NOTE — DIETITIAN INITIAL EVALUATION ADULT. - ETIOLOGY
Inadequate energy & protein intake related to s/p SBO due to incarcerated inguino-scrotal hernia & Gastrointestinal hernia

## 2018-12-12 NOTE — PROGRESS NOTE ADULT - SUBJECTIVE AND OBJECTIVE BOX
Patient is a 68y old  Male who presents with a chief complaint of gi  bleed  anemia (11 Dec 2018 10:07)      HPI: patient  reported  with  BRBPR  worsening  anemia  sent  to ER  admitted  for  further  w/u  and  treatment (09 Dec 2018 18:17)  Comfortable in bed - Wife at bedside, states he is doing better.    REVIEW OF SYSTEMS as above - unable to provide more ROS    PAST MEDICAL & SURGICAL HISTORY  High grade bowel obstruction - s/p Exp lap, SPENCER, Cecectomy, R inguinal hernia repair 11/18  Closed head injury 10 years ago  Sudden cardiac death -> Anoxic encephalopathy  Gait abnormality  Leg pain, right  Anoxic encephalopathy  Brain injury with coma  Prostate cancer  Hypertension  S/P ICD (internal cardiac defibrillator) procedure  H/O tracheostomy  H/O prostate cancer  Status post cryoablation    SOCHX: Lives with wife in house, several steps to enter, bedroom upstairs. Amb with walker, sup at home  No tobacco,  no alcohol    FMHX: FA/MO  non- contributory     ALLERGIES  lisinopril (Unknown)  penicillins (Unknown)      MEDICATIONS reviewed    T(C): 37.3 (12-11-18 @ 10:30), Max: 37.3 (12-11-18 @ 07:30)  HR: 102 (12-11-18 @ 11:25) (68 - 105)  BP: 154/70 (12-11-18 @ 11:25) (115/54 - 156/89)  RR: 18 (12-11-18 @ 11:25) (17 - 23)  SpO2: 99% (12-11-18 @ 11:25) (96% - 100%)  Wt(kg): -- 85.5 kg    PHYSICAL EXAM  BMI - 31.4  Constitutional - NAD, Comfortable in bed - O2 NCExtremities - Grossly functional range of motion but increased tone radha RUE, resists full exam.  Neurologic -                    Cognitive - Awake, Alert, NAD     Language  Intact but very limited     Motor - No focal deficits     Psychiatric - Mood a little more agitated today      IMAGING reviewed:     FUNCTIONAL HISTORY Was ambulatory with RW, sup    CURRENT FUNCTIONAL STATUS max A for bed mobility    IMPRESSION: 68M recently admitted for RIH and cecectomy 11/18/18 due to large bowel incarceration, now a/w acute blood loss anemia due to GI bleeding, likely diverticular, s/p 3uPRBC, s/p EGD/colonoscopy 12/10 with evidence diverticulum, no further episodes of rectal bleeding - unable to ambulate    PLAN: Will follow - recommendations will depend upon clinical and functional course but will likely require rehab prior to returning home. Discussed with PTx.

## 2018-12-12 NOTE — DIETITIAN INITIAL EVALUATION ADULT. - ORAL INTAKE PTA
good Wife states good appetite x 2 weeks PTA. However, noted pt hospitalized last month & poor po intake & unable to eat x 2 weeks November 2018 for s/p SBO due to incarcerated inguino-scrotal hernia/good

## 2018-12-12 NOTE — PROGRESS NOTE ADULT - SUBJECTIVE AND OBJECTIVE BOX
Patient is a 68y old  Male who presents with a chief complaint of gi  bleed  anemia (12 Dec 2018 08:52)      HPI:  patient  reported  with  BRBPR  worsening  anemia  sent  to ER  admitted  for  further  w/u  and  treatment (09 Dec 2018 18:17)      INTERVAL HPI/OVERNIGHT EVENTS:  The patient ( and wife ) denies melena, hematochezia, hematemesis, nausea, vomiting, abdominal pain, constipation, diarrhea, or change in bowel movements No BM since patient was last seen. Tolerating clear liquids. HGB 7.3 stable    MEDICATIONS  (STANDING):  ALBUTerol/ipratropium for Nebulization. 3 milliLiter(s) Nebulizer once  carvedilol 3.125 milliGRAM(s) Oral every 12 hours  docusate sodium 100 milliGRAM(s) Oral two times a day  metoclopramide 10 milliGRAM(s) Oral every 8 hours  pantoprazole    Tablet 40 milliGRAM(s) Oral before breakfast  sodium chloride 0.45%. 1000 milliLiter(s) (80 mL/Hr) IV Continuous <Continuous>    MEDICATIONS  (PRN):  polyethylene glycol 3350 17 Gram(s) Oral daily PRN Constipation      FAMILY HISTORY:  No pertinent family history in first degree relatives      Allergies    lisinopril (Unknown)  penicillins (Unknown)    Intolerances        PMH/PSH:  Sudden cardiac death  Gait abnormality  Leg pain, right  Anoxic encephalopathy  Brain injury with coma  Prostate cancer  Hypertension  S/P ICD (internal cardiac defibrillator) procedure  H/O tracheostomy  H/O prostate cancer  Status post cryoablation        REVIEW OF SYSTEMS:  CONSTITUTIONAL: No fever, weight loss, or fatigue  EYES: No eye pain, visual disturbances, or discharge  ENMT:  No difficulty hearing, tinnitus, vertigo; No sinus or throat pain  NECK: No pain or stiffness  BREASTS: No pain, masses, or nipple discharge  RESPIRATORY: No cough, wheezing, chills or hemoptysis; No shortness of breath  CARDIOVASCULAR: No chest pain, palpitations, dizziness, or leg swelling  GASTROINTESTINAL: See above  GENITOURINARY: No dysuria, frequency, hematuria, or incontinence  NEUROLOGICAL: No headaches,  numbness, or tremors  SKIN: No itching, burning, rashes, or lesions   LYMPH NODES: No enlarged glands  ENDOCRINE: No heat or cold intolerance; No hair loss  MUSCULOSKELETAL: No joint pain or swelling; No muscle, back, or extremity pain  PSYCHIATRIC: No depression, anxiety, mood swings, or difficulty sleeping  HEME/LYMPH: No easy bruising, or bleeding gums  ALLERGY AND IMMUNOLOGIC: No hives or eczema    Vital Signs Last 24 Hrs  T(C): 36.5 (12 Dec 2018 05:34), Max: 37.3 (11 Dec 2018 10:30)  T(F): 97.7 (12 Dec 2018 05:34), Max: 99.2 (11 Dec 2018 10:30)  HR: 95 (12 Dec 2018 05:34) (95 - 102)  BP: 129/65 (12 Dec 2018 05:34) (129/65 - 154/70)  BP(mean): --  RR: 18 (12 Dec 2018 05:34) (18 - 18)  SpO2: 97% (12 Dec 2018 05:34) (97% - 100%)    PHYSICAL EXAM:  GENERAL: NAD, well-groomed, well-developed  HEAD:  Atraumatic, Normocephalic  EYES: EOMI, PERRLA, conjunctiva and sclera clear  NECK: Supple, No JVD, Normal thyroid  NERVOUS SYSTEM:  Alert & Oriented X3, Good concentration; Motor Strength 5/5 B/L upper and lower extremities; DTRs 2+ intact and symmetric  CHEST/LUNG: Clear to percussion bilaterally; No rales, rhonchi, wheezing, or rubs  HEART: Regular rate and rhythm; No murmurs, rubs, or gallops  ABDOMEN: Soft, Nontender, Nondistended; Bowel sounds present  EXTREMITIES:  2+ Peripheral Pulses, No clubbing, cyanosis, or edema  LYMPH: No lymphadenopathy noted  SKIN: No rashes or lesions    LAB                          7.3    7.53  )-----------( 210      ( 12 Dec 2018 07:57 )             21.7       CBC:  12-12 @ 07:57  WBC 7.53   Hgb 7.3   Hct 21.7   Plts 210  MCV 80.7  12-11 @ 10:04  WBC 9.97   Hgb 7.5   Hct 22.5   Plts 226  MCV 79.2  12-10 @ 17:51  WBC 10.85   Hgb 8.9   Hct 26.5   Plts 267  MCV 77.5  12-10 @ 12:48  WBC 10.59   Hgb 8.6   Hct 25.2   Plts 262  MCV 77.1  12-10 @ 06:41  WBC 9.36   Hgb 7.3   Hct 20.8   Plts 254  MCV 75.4  12-09 @ 13:35  WBC 7.35   Hgb 5.8   Hct 17.5   Plts 333  MCV 76.8      Chemistry:  12-12 @ 07:57  Na+ 140  K+ 3.8  Cl- 107  CO2 24  BUN 6  Cr 0.62     12-11 @ 10:04  Na+ 139  K+ 4.1  Cl- 106  CO2 25  BUN 11  Cr 0.77     12-10 @ 06:41  Na+ 138  K+ 4.4  Cl- 106  CO2 24  BUN 20  Cr 0.68     12-09 @ 13:35  Na+ 138  K+ 4.5  Cl- 106  CO2 24  BUN 17  Cr 0.63         Glucose, Serum: 104 mg/dL (12-12 @ 07:57)  Glucose, Serum: 100 mg/dL (12-11 @ 10:04)  Glucose, Serum: 109 mg/dL (12-10 @ 06:41)  Glucose, Serum: 103 mg/dL (12-09 @ 13:35)      12 Dec 2018 07:57    140    |  107    |  6      ----------------------------<  104    3.8     |  24     |  0.62   11 Dec 2018 10:04    139    |  106    |  11     ----------------------------<  100    4.1     |  25     |  0.77   10 Dec 2018 06:41    138    |  106    |  20     ----------------------------<  109    4.4     |  24     |  0.68   09 Dec 2018 13:35    138    |  106    |  17     ----------------------------<  103    4.5     |  24     |  0.63     Ca    6.4        12 Dec 2018 07:57  Ca    6.6        11 Dec 2018 10:04  Ca    7.1        10 Dec 2018 06:41  Ca    7.4        09 Dec 2018 13:35    TPro  5.4    /  Alb  2.2    /  TBili  0.7    /  DBili  x      /  AST  21     /  ALT  20     /  AlkPhos  64     12 Dec 2018 07:57  TPro  5.5    /  Alb  2.2    /  TBili  0.7    /  DBili  x      /  AST  19     /  ALT  23     /  AlkPhos  65     11 Dec 2018 10:04  TPro  5.2    /  Alb  2.1    /  TBili  1.1    /  DBili  x      /  AST  19     /  ALT  23     /  AlkPhos  61     10 Dec 2018 06:41  TPro  5.8    /  Alb  2.2    /  TBili  0.2    /  DBili  x      /  AST  23     /  ALT  26     /  AlkPhos  71     09 Dec 2018 13:35              CAPILLARY BLOOD GLUCOSE              RADIOLOGY & ADDITIONAL TESTS:    Imaging Personally Reviewed:  [ ] YES  [ ] NO    Consultant(s) Notes Reviewed:  [ ] YES  [ ] NO    Care Discussed with Consultants/Other Providers [ ] YES  [ ] NO Patient is a 68y old  Male who presents with a chief complaint of gi  bleed  anemia (12 Dec 2018 08:52)      HPI:  patient  reported  with  BRBPR  worsening  anemia  sent  to ER  admitted  for  further  w/u  and  treatment (09 Dec 2018 18:17)      INTERVAL HPI/OVERNIGHT EVENTS:  The patient ( and wife ) denies melena, hematochezia, hematemesis, nausea, vomiting, abdominal pain, constipation, diarrhea, or change in bowel movements No BM since patient was last seen. Tolerating clear liquids. HGB 7.3 stable    MEDICATIONS  (STANDING):  ALBUTerol/ipratropium for Nebulization. 3 milliLiter(s) Nebulizer once  carvedilol 3.125 milliGRAM(s) Oral every 12 hours  docusate sodium 100 milliGRAM(s) Oral two times a day  metoclopramide 10 milliGRAM(s) Oral every 8 hours  pantoprazole    Tablet 40 milliGRAM(s) Oral before breakfast  sodium chloride 0.45%. 1000 milliLiter(s) (80 mL/Hr) IV Continuous <Continuous>    MEDICATIONS  (PRN):  polyethylene glycol 3350 17 Gram(s) Oral daily PRN Constipation      FAMILY HISTORY:  No pertinent family history in first degree relatives      Allergies    lisinopril (Unknown)  penicillins (Unknown)    Intolerances        PMH/PSH:  Sudden cardiac death  Gait abnormality  Leg pain, right  Anoxic encephalopathy  Brain injury with coma  Prostate cancer  Hypertension  S/P ICD (internal cardiac defibrillator) procedure  H/O tracheostomy  H/O prostate cancer  Status post cryoablation        REVIEW OF SYSTEMS:  CONSTITUTIONAL: No fever, weight loss, or fatigue  EYES: No eye pain, visual disturbances, or discharge  ENMT:  No difficulty hearing, tinnitus, vertigo; No sinus or throat pain  NECK: No pain or stiffness  BREASTS: No pain, masses, or nipple discharge  RESPIRATORY: No cough, wheezing, chills or hemoptysis; No shortness of breath  CARDIOVASCULAR: No chest pain, palpitations, dizziness, or leg swelling  GASTROINTESTINAL: See above  GENITOURINARY: No dysuria, frequency, hematuria, or incontinence  NEUROLOGICAL: No headaches,  numbness, or tremors  SKIN: No itching, burning, rashes, or lesions   LYMPH NODES: No enlarged glands  ENDOCRINE: No heat or cold intolerance; No hair loss  MUSCULOSKELETAL: No joint pain or swelling; No muscle, back, or extremity pain  PSYCHIATRIC: No depression, anxiety, mood swings, or difficulty sleeping  HEME/LYMPH: No easy bruising, or bleeding gums  ALLERGY AND IMMUNOLOGIC: No hives or eczema    Vital Signs Last 24 Hrs  T(C): 36.5 (12 Dec 2018 05:34), Max: 37.3 (11 Dec 2018 10:30)  T(F): 97.7 (12 Dec 2018 05:34), Max: 99.2 (11 Dec 2018 10:30)  HR: 95 (12 Dec 2018 05:34) (95 - 102)  BP: 129/65 (12 Dec 2018 05:34) (129/65 - 154/70)  BP(mean): --  RR: 18 (12 Dec 2018 05:34) (18 - 18)  SpO2: 97% (12 Dec 2018 05:34) (97% - 100%)    PHYSICAL EXAM:  GENERAL: NAD, well-groomed, well-developed  HEAD:  Atraumatic, Normocephalic  EYES: EOMI, PERRLA, conjunctiva and sclera clear  NECK: Supple, No JVD, Normal thyroid  NERVOUS SYSTEM:  Alert but confused  CHEST/LUNG: Clear to percussion bilaterally; No rales, rhonchi, wheezing, or rubs  HEART: Regular rate and rhythm; No murmurs, rubs, or gallops  ABDOMEN: Soft, Nontender, Nondistended; Bowel sounds present  EXTREMITIES:  2+ Peripheral Pulses, No clubbing, cyanosis, or edema  LYMPH: No lymphadenopathy noted  SKIN: No rashes or lesions    LAB                          7.3    7.53  )-----------( 210      ( 12 Dec 2018 07:57 )             21.7       CBC:  12-12 @ 07:57  WBC 7.53   Hgb 7.3   Hct 21.7   Plts 210  MCV 80.7  12-11 @ 10:04  WBC 9.97   Hgb 7.5   Hct 22.5   Plts 226  MCV 79.2  12-10 @ 17:51  WBC 10.85   Hgb 8.9   Hct 26.5   Plts 267  MCV 77.5  12-10 @ 12:48  WBC 10.59   Hgb 8.6   Hct 25.2   Plts 262  MCV 77.1  12-10 @ 06:41  WBC 9.36   Hgb 7.3   Hct 20.8   Plts 254  MCV 75.4  12-09 @ 13:35  WBC 7.35   Hgb 5.8   Hct 17.5   Plts 333  MCV 76.8      Chemistry:  12-12 @ 07:57  Na+ 140  K+ 3.8  Cl- 107  CO2 24  BUN 6  Cr 0.62     12-11 @ 10:04  Na+ 139  K+ 4.1  Cl- 106  CO2 25  BUN 11  Cr 0.77     12-10 @ 06:41  Na+ 138  K+ 4.4  Cl- 106  CO2 24  BUN 20  Cr 0.68     12-09 @ 13:35  Na+ 138  K+ 4.5  Cl- 106  CO2 24  BUN 17  Cr 0.63         Glucose, Serum: 104 mg/dL (12-12 @ 07:57)  Glucose, Serum: 100 mg/dL (12-11 @ 10:04)  Glucose, Serum: 109 mg/dL (12-10 @ 06:41)  Glucose, Serum: 103 mg/dL (12-09 @ 13:35)      12 Dec 2018 07:57    140    |  107    |  6      ----------------------------<  104    3.8     |  24     |  0.62   11 Dec 2018 10:04    139    |  106    |  11     ----------------------------<  100    4.1     |  25     |  0.77   10 Dec 2018 06:41    138    |  106    |  20     ----------------------------<  109    4.4     |  24     |  0.68   09 Dec 2018 13:35    138    |  106    |  17     ----------------------------<  103    4.5     |  24     |  0.63     Ca    6.4        12 Dec 2018 07:57  Ca    6.6        11 Dec 2018 10:04  Ca    7.1        10 Dec 2018 06:41  Ca    7.4        09 Dec 2018 13:35    TPro  5.4    /  Alb  2.2    /  TBili  0.7    /  DBili  x      /  AST  21     /  ALT  20     /  AlkPhos  64     12 Dec 2018 07:57  TPro  5.5    /  Alb  2.2    /  TBili  0.7    /  DBili  x      /  AST  19     /  ALT  23     /  AlkPhos  65     11 Dec 2018 10:04  TPro  5.2    /  Alb  2.1    /  TBili  1.1    /  DBili  x      /  AST  19     /  ALT  23     /  AlkPhos  61     10 Dec 2018 06:41  TPro  5.8    /  Alb  2.2    /  TBili  0.2    /  DBili  x      /  AST  23     /  ALT  26     /  AlkPhos  71     09 Dec 2018 13:35              CAPILLARY BLOOD GLUCOSE              RADIOLOGY & ADDITIONAL TESTS:    Imaging Personally Reviewed:  [ ] YES  [ ] NO    Consultant(s) Notes Reviewed:  [ ] YES  [ ] NO    Care Discussed with Consultants/Other Providers [ ] YES  [ ] NO

## 2018-12-12 NOTE — DIETITIAN INITIAL EVALUATION ADULT. - FACTORS AFF FOOD INTAKE
other (specify)/Gastrointestinal hemorrhage other (specify)/Gastrointestinal hemorrhage/difficulty chewing difficulty feeding self/Gastrointestinal hemorrhage/difficulty chewing/other (specify) pain/difficulty chewing/difficulty feeding self/other (specify)/Gastrointestinal hemorrhage & +abdominal discomfort

## 2018-12-12 NOTE — DIETITIAN INITIAL EVALUATION ADULT. - PERTINENT MEDS FT
ALBUTerol/ipratropium for Nebulization.  calcium gluconate IVPB  carvedilol  docusate sodium  metoclopramide  pantoprazole    Tablet  polyethylene glycol 3350 PRN  sodium chloride 0.45%.

## 2018-12-12 NOTE — DIETITIAN INITIAL EVALUATION ADULT. - NS AS NUTRI INTERV MEALS SNACK
Texture-modified diet/Fiber - modified diet/Recommend Low Fiber/Low Na/Mechanical soft/Lactose restricted

## 2018-12-12 NOTE — CHART NOTE - NSCHARTNOTEFT_GEN_A_CORE
Upon Nutritional Assessment by the Registered Dietitian your patient was determined to meet criteria / has evidence of the following diagnosis/diagnoses:          [ ]  Mild Protein Calorie Malnutrition        [ ]  Moderate Protein Calorie Malnutrition        [ x] Severe Protein Calorie Malnutrition        [ ] Unspecified Protein Calorie Malnutrition        [ ] Underweight / BMI <19        [ ] Morbid Obesity / BMI > 40      Findings as based on:  •  Comprehensive nutrition assessment and consultation  •  Calorie counts (nutrient intake analysis)  •  Food acceptance and intake status from observations by staff  •  Follow up  •  Patient education  •  Intervention secondary to interdisciplinary rounds  •   concerns      Treatment:    The following diet has been recommended:  Recommend Low Fiber/Low Na/Mechanical soft/Lactose restricted/Ensure Enlive 2 x day(350kcal & 20gm protein)    PROVIDER Section:     By signing this assessment you are acknowledging and agree with the diagnosis/diagnoses assigned by the Registered Dietitian    Comments:

## 2018-12-13 LAB
ALBUMIN SERPL ELPH-MCNC: 2.2 G/DL — LOW (ref 3.3–5)
ALP SERPL-CCNC: 71 U/L — SIGNIFICANT CHANGE UP (ref 40–120)
ALT FLD-CCNC: 19 U/L — SIGNIFICANT CHANGE UP (ref 12–78)
ANION GAP SERPL CALC-SCNC: 7 MMOL/L — SIGNIFICANT CHANGE UP (ref 5–17)
AST SERPL-CCNC: 23 U/L — SIGNIFICANT CHANGE UP (ref 15–37)
BILIRUB SERPL-MCNC: 0.4 MG/DL — SIGNIFICANT CHANGE UP (ref 0.2–1.2)
BUN SERPL-MCNC: 8 MG/DL — SIGNIFICANT CHANGE UP (ref 7–23)
CALCIUM SERPL-MCNC: 7.5 MG/DL — LOW (ref 8.5–10.1)
CHLORIDE SERPL-SCNC: 110 MMOL/L — HIGH (ref 96–108)
CO2 SERPL-SCNC: 26 MMOL/L — SIGNIFICANT CHANGE UP (ref 22–31)
CREAT SERPL-MCNC: 0.76 MG/DL — SIGNIFICANT CHANGE UP (ref 0.5–1.3)
GLUCOSE SERPL-MCNC: 113 MG/DL — HIGH (ref 70–99)
HCT VFR BLD CALC: 21.8 % — LOW (ref 39–50)
HGB BLD-MCNC: 7.2 G/DL — LOW (ref 13–17)
INR BLD: 1.26 RATIO — HIGH (ref 0.88–1.16)
IRON SATN MFR SERPL: 15 UG/DL — LOW (ref 45–165)
IRON SATN MFR SERPL: 7 % — LOW (ref 16–55)
MCHC RBC-ENTMCNC: 27.4 PG — SIGNIFICANT CHANGE UP (ref 27–34)
MCHC RBC-ENTMCNC: 33 GM/DL — SIGNIFICANT CHANGE UP (ref 32–36)
MCV RBC AUTO: 82.9 FL — SIGNIFICANT CHANGE UP (ref 80–100)
NRBC # BLD: 0 /100 WBCS — SIGNIFICANT CHANGE UP (ref 0–0)
PLATELET # BLD AUTO: 221 K/UL — SIGNIFICANT CHANGE UP (ref 150–400)
POTASSIUM SERPL-MCNC: 4.5 MMOL/L — SIGNIFICANT CHANGE UP (ref 3.5–5.3)
POTASSIUM SERPL-SCNC: 4.5 MMOL/L — SIGNIFICANT CHANGE UP (ref 3.5–5.3)
PROT SERPL-MCNC: 5.7 GM/DL — LOW (ref 6–8.3)
PROTHROM AB SERPL-ACNC: 14.2 SEC — HIGH (ref 10–12.9)
PSA FLD-MCNC: 0.43 NG/ML — SIGNIFICANT CHANGE UP (ref 0–4)
RBC # BLD: 2.63 M/UL — LOW (ref 4.2–5.8)
RBC # BLD: 2.63 M/UL — LOW (ref 4.2–5.8)
RBC # FLD: 19.5 % — HIGH (ref 10.3–14.5)
RETICS #: 180.2 K/UL — HIGH (ref 25–125)
RETICS/RBC NFR: 6.9 % — HIGH (ref 0.5–2.5)
SODIUM SERPL-SCNC: 143 MMOL/L — SIGNIFICANT CHANGE UP (ref 135–145)
TIBC SERPL-MCNC: 224 UG/DL — SIGNIFICANT CHANGE UP (ref 220–430)
UIBC SERPL-MCNC: 209 UG/DL — SIGNIFICANT CHANGE UP (ref 110–370)
WBC # BLD: 6.46 K/UL — SIGNIFICANT CHANGE UP (ref 3.8–10.5)
WBC # FLD AUTO: 6.46 K/UL — SIGNIFICANT CHANGE UP (ref 3.8–10.5)

## 2018-12-13 RX ORDER — METOCLOPRAMIDE HCL 10 MG
10 TABLET ORAL
Qty: 0 | Refills: 0 | Status: COMPLETED | OUTPATIENT
Start: 2018-12-13 | End: 2018-12-14

## 2018-12-13 RX ORDER — METOCLOPRAMIDE HCL 10 MG
10 TABLET ORAL DAILY
Qty: 0 | Refills: 0 | Status: COMPLETED | OUTPATIENT
Start: 2018-12-14 | End: 2018-12-14

## 2018-12-13 RX ADMIN — CARVEDILOL PHOSPHATE 3.12 MILLIGRAM(S): 80 CAPSULE, EXTENDED RELEASE ORAL at 05:35

## 2018-12-13 RX ADMIN — Medication 100 MILLIGRAM(S): at 17:50

## 2018-12-13 RX ADMIN — Medication 10 MILLIGRAM(S): at 17:50

## 2018-12-13 RX ADMIN — Medication 100 MILLIGRAM(S): at 05:35

## 2018-12-13 RX ADMIN — POLYETHYLENE GLYCOL 3350 17 GRAM(S): 17 POWDER, FOR SOLUTION ORAL at 08:06

## 2018-12-13 RX ADMIN — PANTOPRAZOLE SODIUM 40 MILLIGRAM(S): 20 TABLET, DELAYED RELEASE ORAL at 08:05

## 2018-12-13 RX ADMIN — Medication 10 MILLIGRAM(S): at 05:35

## 2018-12-13 RX ADMIN — CARVEDILOL PHOSPHATE 3.12 MILLIGRAM(S): 80 CAPSULE, EXTENDED RELEASE ORAL at 17:50

## 2018-12-13 NOTE — PROGRESS NOTE ADULT - SUBJECTIVE AND OBJECTIVE BOX
Patient seen and examined bedside resting comfortably.  Pt & wife c/o no bm in 2days.  Denies nausea and vomiting. Tolerating diet.  Denies chest pain, dyspnea, cough.    T(F): 97.5 (12-13-18 @ 05:27), Max: 98.4 (12-12-18 @ 17:09)  HR: 96 (12-13-18 @ 05:27) (92 - 102)  BP: 135/66 (12-13-18 @ 05:27) (97/60 - 149/60)  RR: 18 (12-13-18 @ 05:27) (18 - 18)  SpO2: 97% (12-13-18 @ 05:27) (97% - 99%)    PHYSICAL EXAM:  General: NAD  Neuro:  Alert & oriented x 3. difficulty. Difficulty with speech as a sequelae from anoxic encephalopathy  Abdomen: , NTND. Normactive BS. Staple  in place - suture line clean & dry [surgery was 11/19.    LABS:                        7.2    6.46  )-----------( 221      ( 13 Dec 2018 06:20 )             21.8     12-13    143  |  110<H>  |  8   ----------------------------<  113<H>  4.5   |  26  |  0.76    Ca    7.5<L>      13 Dec 2018 06:20    TPro  5.7<L>  /  Alb  2.2<L>  /  TBili  0.4  /  DBili  x   /  AST  23  /  ALT  19  /  AlkPhos  71  12-13      I&O's Detail    12 Dec 2018 07:01  -  13 Dec 2018 07:00  --------------------------------------------------------  IN:    Oral Fluid: 120 mL    Solution: 100 mL  Total IN: 220 mL    OUT:  Total OUT: 0 mL    Total NET: 220 mL    medical & GI note appreciated.      Impression  no active bleeding noted despite decreasing H/H      Plan:  Pt was discussed with Dr. Tinsley, via the phone:  -he suggest hematology evaluation  -he wants staples to be d/c'd - to be done.  - no surgical intervention planned at this time. Patient seen and examined bedside resting comfortably.  Pt & wife c/o no bm in 2days.   Denies nausea and vomiting. Tolerating diet.  Denies chest pain, dyspnea, cough.    T(F): 97.5 (12-13-18 @ 05:27), Max: 98.4 (12-12-18 @ 17:09)  HR: 96 (12-13-18 @ 05:27) (92 - 102)  BP: 135/66 (12-13-18 @ 05:27) (97/60 - 149/60)  RR: 18 (12-13-18 @ 05:27) (18 - 18)  SpO2: 97% (12-13-18 @ 05:27) (97% - 99%)    PHYSICAL EXAM:  General: NAD  Neuro:  Alert & oriented x 3. difficulty. Difficulty with speech as a sequelae from anoxic encephalopathy  Abdomen: , NTND. Normactive BS. Staple  in place - suture line clean & dry [surgery was 11/19.    LABS:                        7.2    6.46  )-----------( 221      ( 13 Dec 2018 06:20 )             21.8     12-13    143  |  110<H>  |  8   ----------------------------<  113<H>  4.5   |  26  |  0.76    Ca    7.5<L>      13 Dec 2018 06:20    TPro  5.7<L>  /  Alb  2.2<L>  /  TBili  0.4  /  DBili  x   /  AST  23  /  ALT  19  /  AlkPhos  71  12-13      I&O's Detail    12 Dec 2018 07:01  -  13 Dec 2018 07:00  --------------------------------------------------------  IN:    Oral Fluid: 120 mL    Solution: 100 mL  Total IN: 220 mL    OUT:  Total OUT: 0 mL    Total NET: 220 mL    medical & GI note appreciated.      Impression  no active bleeding noted despite decreasing H/H  Anemia, etiology ?  Possible diverticular bleeding.    Plan:  Pt was discussed with Dr. Tinsley, via the phone:  -he suggest hematology evaluation  -he wants staples to be d/c'd - to be done.  - no surgical intervention planned at this time.

## 2018-12-13 NOTE — CHART NOTE - NSCHARTNOTEFT_GEN_A_CORE
Per telephone conversation with Dr. Tinsley, all staples removed from abdomen & right groin.  Wound is well healed except for a 0.5 cm opening in the lower abdominal wall. This is the area where staples were removed right after surgery to allow seroma drainage. This area should close spontaneously. Will cover with a DSD. Pt may shower. Per telephone conversation with Dr. Tinsley, all staples removed from abdomen & right groin.  Wound is well healed except for a 0.5 cm opening in the lower abdominal wall. This is the area where staples were removed right after surgery to allow seroma drainage. This area should close spontaneously. Will cover with a DSD. Pt may shower.  Have left a message on Dr. Murrell's cell phone advising him that Dr. Tinsley suggests a hemotologic might be warranted.

## 2018-12-13 NOTE — PROGRESS NOTE ADULT - SUBJECTIVE AND OBJECTIVE BOX
Patient is a 68y old  Male who presents with a chief complaint of gi  bleed  anemia (13 Dec 2018 10:06)      HPI:  patient  reported  with  BRBPR  worsening  anemia  sent  to ER  admitted  for  further  w/u  and  treatment (09 Dec 2018 18:17)      INTERVAL HPI/OVERNIGHT EVENTS:  The patient denies melena, hematochezia, hematemesis, nausea, vomiting, abdominal pain, constipation, diarrhea, or change in bowel movements NO Bm since procedures    MEDICATIONS  (STANDING):  ALBUTerol/ipratropium for Nebulization. 3 milliLiter(s) Nebulizer once  carvedilol 3.125 milliGRAM(s) Oral every 12 hours  docusate sodium 100 milliGRAM(s) Oral two times a day  metoclopramide 10 milliGRAM(s) Oral every 8 hours  pantoprazole    Tablet 40 milliGRAM(s) Oral before breakfast    MEDICATIONS  (PRN):  polyethylene glycol 3350 17 Gram(s) Oral daily PRN Constipation      FAMILY HISTORY:  No pertinent family history in first degree relatives      Allergies    lisinopril (Unknown)  penicillins (Unknown)    Intolerances        PMH/PSH:  Sudden cardiac death  Gait abnormality  Leg pain, right  Anoxic encephalopathy  Brain injury with coma  Prostate cancer  Hypertension  S/P ICD (internal cardiac defibrillator) procedure  H/O tracheostomy  H/O prostate cancer  Status post cryoablation        REVIEW OF SYSTEMS:  CONSTITUTIONAL: No fever, weight loss, or fatigue  EYES: No eye pain, visual disturbances, or discharge  ENMT:  No difficulty hearing, tinnitus, vertigo; No sinus or throat pain  NECK: No pain or stiffness  BREASTS: No pain, masses, or nipple discharge  RESPIRATORY: No cough, wheezing, chills or hemoptysis; No shortness of breath  CARDIOVASCULAR: No chest pain, palpitations, dizziness, or leg swelling  GASTROINTESTINAL:  See above.  GENITOURINARY: No dysuria, frequency, hematuria, or incontinence  NEUROLOGICAL: No headaches, memory loss, loss of strength, numbness, or tremors  SKIN: No itching, burning, rashes, or lesions   LYMPH NODES: No enlarged glands  ENDOCRINE: No heat or cold intolerance; No hair loss  MUSCULOSKELETAL: No joint pain or swelling; No muscle, back, or extremity pain  PSYCHIATRIC: No depression, anxiety, mood swings, or difficulty sleeping  HEME/LYMPH: No easy bruising, or bleeding gums  ALLERGY AND IMMUNOLOGIC: No hives or eczema    Vital Signs Last 24 Hrs  T(C): 36.4 (13 Dec 2018 05:27), Max: 36.9 (12 Dec 2018 17:09)  T(F): 97.5 (13 Dec 2018 05:27), Max: 98.4 (12 Dec 2018 17:09)  HR: 96 (13 Dec 2018 05:27) (92 - 102)  BP: 135/66 (13 Dec 2018 05:27) (97/60 - 149/60)  BP(mean): --  RR: 18 (13 Dec 2018 05:27) (18 - 18)  SpO2: 97% (13 Dec 2018 05:27) (97% - 99%)    PHYSICAL EXAM:  GENERAL: NAD, well-groomed, well-developed  HEAD:  Atraumatic, Normocephalic  EYES: EOMI, PERRLA, conjunctiva and sclera clear  NECK: Supple, No JVD, Normal thyroid  NERVOUS SYSTEM:  Alert & Oriented , Good concentration;   CHEST/LUNG: Clear to percussion bilaterally; No rales, rhonchi, wheezing, or rubs  HEART: Regular rate and rhythm; No murmurs, rubs, or gallops  ABDOMEN: Soft, Nontender, Nondistended; Bowel sounds present  EXTREMITIES:  2+ Peripheral Pulses, No clubbing, cyanosis, or edema  LYMPH: No lymphadenopathy noted  SKIN: No rashes or lesions    LAB                          7.2    6.46  )-----------( 221      ( 13 Dec 2018 06:20 )             21.8       CBC:  12-13 @ 06:20  WBC 6.46   Hgb 7.2   Hct 21.8   Plts 221  MCV 82.9  12-12 @ 07:57  WBC 7.53   Hgb 7.3   Hct 21.7   Plts 210  MCV 80.7  12-11 @ 10:04  WBC 9.97   Hgb 7.5   Hct 22.5   Plts 226  MCV 79.2  12-10 @ 17:51  WBC 10.85   Hgb 8.9   Hct 26.5   Plts 267  MCV 77.5  12-10 @ 12:48  WBC 10.59   Hgb 8.6   Hct 25.2   Plts 262  MCV 77.1  12-10 @ 06:41  WBC 9.36   Hgb 7.3   Hct 20.8   Plts 254  MCV 75.4  12-09 @ 13:35  WBC 7.35   Hgb 5.8   Hct 17.5   Plts 333  MCV 76.8      Chemistry:  12-13 @ 06:20  Na+ 143  K+ 4.5  Cl- 110  CO2 26  BUN 8  Cr 0.76     12-12 @ 07:57  Na+ 140  K+ 3.8  Cl- 107  CO2 24  BUN 6  Cr 0.62     12-11 @ 10:04  Na+ 139  K+ 4.1  Cl- 106  CO2 25  BUN 11  Cr 0.77     12-10 @ 06:41  Na+ 138  K+ 4.4  Cl- 106  CO2 24  BUN 20  Cr 0.68     12-09 @ 13:35  Na+ 138  K+ 4.5  Cl- 106  CO2 24  BUN 17  Cr 0.63         Glucose, Serum: 113 mg/dL (12-13 @ 06:20)  Glucose, Serum: 104 mg/dL (12-12 @ 07:57)  Glucose, Serum: 100 mg/dL (12-11 @ 10:04)  Glucose, Serum: 109 mg/dL (12-10 @ 06:41)  Glucose, Serum: 103 mg/dL (12-09 @ 13:35)      13 Dec 2018 06:20    143    |  110    |  8      ----------------------------<  113    4.5     |  26     |  0.76   12 Dec 2018 07:57    140    |  107    |  6      ----------------------------<  104    3.8     |  24     |  0.62   11 Dec 2018 10:04    139    |  106    |  11     ----------------------------<  100    4.1     |  25     |  0.77   10 Dec 2018 06:41    138    |  106    |  20     ----------------------------<  109    4.4     |  24     |  0.68   09 Dec 2018 13:35    138    |  106    |  17     ----------------------------<  103    4.5     |  24     |  0.63     Ca    7.5        13 Dec 2018 06:20  Ca    6.4        12 Dec 2018 07:57  Ca    6.6        11 Dec 2018 10:04  Ca    7.1        10 Dec 2018 06:41  Ca    7.4        09 Dec 2018 13:35    TPro  5.7    /  Alb  2.2    /  TBili  0.4    /  DBili  x      /  AST  23     /  ALT  19     /  AlkPhos  71     13 Dec 2018 06:20  TPro  5.4    /  Alb  2.2    /  TBili  0.7    /  DBili  x      /  AST  21     /  ALT  20     /  AlkPhos  64     12 Dec 2018 07:57  TPro  5.5    /  Alb  2.2    /  TBili  0.7    /  DBili  x      /  AST  19     /  ALT  23     /  AlkPhos  65     11 Dec 2018 10:04  TPro  5.2    /  Alb  2.1    /  TBili  1.1    /  DBili  x      /  AST  19     /  ALT  23     /  AlkPhos  61     10 Dec 2018 06:41  TPro  5.8    /  Alb  2.2    /  TBili  0.2    /  DBili  x      /  AST  23     /  ALT  26     /  AlkPhos  71     09 Dec 2018 13:35              CAPILLARY BLOOD GLUCOSE              RADIOLOGY & ADDITIONAL TESTS:    Imaging Personally Reviewed:  [ ] YES  [ ] NO    Consultant(s) Notes Reviewed:  [ ] YES  [ ] NO    Care Discussed with Consultants/Other Providers [ ] YES  [ ] NO

## 2018-12-13 NOTE — PROGRESS NOTE ADULT - SUBJECTIVE AND OBJECTIVE BOX
Patient is a 68y old  Male who presents with a chief complaint of gi  bleed  anemia (11 Dec 2018 10:07)      HPI: patient  reported  with  BRBPR  worsening  anemia  sent  to ER  admitted  for  further  w/u  and  treatment (09 Dec 2018 18:17)  Comfortable in bed - Wife at bedside, states he is doing better. His wife notes he is doing better.    REVIEW OF SYSTEMS as above - unable to provide more ROS    PAST MEDICAL & SURGICAL HISTORY  High grade bowel obstruction - s/p Exp lap, SPENCER, Cecectomy, R inguinal hernia repair 11/18  Closed head injury 10 years ago  Sudden cardiac death -> Anoxic encephalopathy  Gait abnormality  Leg pain, right  Anoxic encephalopathy  Brain injury with coma  Prostate cancer  Hypertension  S/P ICD (internal cardiac defibrillator) procedure  H/O tracheostomy  H/O prostate cancer  Status post cryoablation    SOCHX: Lives with wife in house, several steps to enter, bedroom upstairs. Amb with walker, sup at home  No tobacco,  no alcohol    FMHX: FA/MO  non- contributory     ALLERGIES  lisinopril (Unknown)  penicillins (Unknown)      MEDICATIONS reviewed    T(C): 37.3 (12-11-18 @ 10:30), Max: 37.3 (12-11-18 @ 07:30)  HR: 102 (12-11-18 @ 11:25) (68 - 105)  BP: 154/70 (12-11-18 @ 11:25) (115/54 - 156/89)  RR: 18 (12-11-18 @ 11:25) (17 - 23)  SpO2: 99% (12-11-18 @ 11:25) (96% - 100%)  Wt(kg): -- 85.5 kg    PHYSICAL EXAM  BMI - 31.4  Constitutional - NAD, Comfortable in bed - O2 NC  Extremities - Grossly functional range of motion but increased tone radha RUE, resists full exam.  The abdomen is soft and nontender. Staples removed. Incision healing well.  Neurologic -                    Cognitive - Awake, Alert, NAD     Language  Intact but very limited     Motor - No focal deficits     Psychiatric - Mood a little Less agitated today      IMAGING reviewed:     FUNCTIONAL HISTORY Was ambulatory with RW, sup    CURRENT FUNCTIONAL STATUS max A for bed mobility    IMPRESSION: 68M recently admitted for RIH and cecectomy 11/18/18 due to large bowel incarceration, now a/w acute blood loss anemia due to GI bleeding, likely diverticular, s/p 3uPRBC, s/p EGD/colonoscopy 12/10 with evidence diverticulum, no further episodes of rectal bleeding - unable to ambulate    PLAN: Will follow - recommendations will depend upon clinical and functional course but will likely require rehab prior to returning home. Discussed with PTx.

## 2018-12-13 NOTE — PROGRESS NOTE ADULT - ATTENDING COMMENTS
I examined patient and agree with note.
I agree with note
I examined patient and plan discussed.    No evidence of another rectal bleeding, Abdomen soft, no tender, Low H/H.    Assessment:  Diverticular bleeding.  No sign of active bleeding at5 this moment   Anemia, etiology ?    Plan:    Continue monitoring H/H.  Blood transfusion as needed.  Hematology consult.  No acute surgical intervention at this time.
I examined patient and plan discussed.    No sign of another rectal bleeding, but H/H low.  Continue monitoring H/H, Iron pills oral  Hematology consult recommended.

## 2018-12-13 NOTE — CHART NOTE - NSCHARTNOTEFT_GEN_A_CORE
I have spoken with Dr. Murrell, via phone. He agrees to a hematological consult. Dr. Motley's office called.

## 2018-12-14 LAB
ALBUMIN SERPL ELPH-MCNC: 2.2 G/DL — LOW (ref 3.3–5)
ALP SERPL-CCNC: 76 U/L — SIGNIFICANT CHANGE UP (ref 40–120)
ALT FLD-CCNC: 18 U/L — SIGNIFICANT CHANGE UP (ref 12–78)
ANION GAP SERPL CALC-SCNC: 8 MMOL/L — SIGNIFICANT CHANGE UP (ref 5–17)
AST SERPL-CCNC: 21 U/L — SIGNIFICANT CHANGE UP (ref 15–37)
BILIRUB SERPL-MCNC: 0.8 MG/DL — SIGNIFICANT CHANGE UP (ref 0.2–1.2)
BUN SERPL-MCNC: 7 MG/DL — SIGNIFICANT CHANGE UP (ref 7–23)
CALCIUM SERPL-MCNC: 7.1 MG/DL — LOW (ref 8.5–10.1)
CHLORIDE SERPL-SCNC: 108 MMOL/L — SIGNIFICANT CHANGE UP (ref 96–108)
CO2 SERPL-SCNC: 25 MMOL/L — SIGNIFICANT CHANGE UP (ref 22–31)
CREAT SERPL-MCNC: 0.57 MG/DL — SIGNIFICANT CHANGE UP (ref 0.5–1.3)
GLUCOSE SERPL-MCNC: 97 MG/DL — SIGNIFICANT CHANGE UP (ref 70–99)
HCT VFR BLD CALC: 27 % — LOW (ref 39–50)
HGB BLD-MCNC: 9.5 G/DL — LOW (ref 13–17)
MCHC RBC-ENTMCNC: 28.6 PG — SIGNIFICANT CHANGE UP (ref 27–34)
MCHC RBC-ENTMCNC: 35.2 GM/DL — SIGNIFICANT CHANGE UP (ref 32–36)
MCV RBC AUTO: 81.3 FL — SIGNIFICANT CHANGE UP (ref 80–100)
NRBC # BLD: 0 /100 WBCS — SIGNIFICANT CHANGE UP (ref 0–0)
PLATELET # BLD AUTO: 199 K/UL — SIGNIFICANT CHANGE UP (ref 150–400)
POTASSIUM SERPL-MCNC: 3.8 MMOL/L — SIGNIFICANT CHANGE UP (ref 3.5–5.3)
POTASSIUM SERPL-SCNC: 3.8 MMOL/L — SIGNIFICANT CHANGE UP (ref 3.5–5.3)
PROT SERPL-MCNC: 5.7 GM/DL — LOW (ref 6–8.3)
RBC # BLD: 3.32 M/UL — LOW (ref 4.2–5.8)
RBC # FLD: 17.4 % — HIGH (ref 10.3–14.5)
SODIUM SERPL-SCNC: 141 MMOL/L — SIGNIFICANT CHANGE UP (ref 135–145)
WBC # BLD: 6.37 K/UL — SIGNIFICANT CHANGE UP (ref 3.8–10.5)
WBC # FLD AUTO: 6.37 K/UL — SIGNIFICANT CHANGE UP (ref 3.8–10.5)

## 2018-12-14 RX ORDER — IRON SUCROSE 20 MG/ML
100 INJECTION, SOLUTION INTRAVENOUS ONCE
Qty: 0 | Refills: 0 | Status: COMPLETED | OUTPATIENT
Start: 2018-12-14 | End: 2018-12-14

## 2018-12-14 RX ORDER — FERROUS SULFATE 325(65) MG
325 TABLET ORAL THREE TIMES A DAY
Qty: 0 | Refills: 0 | Status: DISCONTINUED | OUTPATIENT
Start: 2018-12-14 | End: 2018-12-14

## 2018-12-14 RX ADMIN — CARVEDILOL PHOSPHATE 3.12 MILLIGRAM(S): 80 CAPSULE, EXTENDED RELEASE ORAL at 17:33

## 2018-12-14 RX ADMIN — CARVEDILOL PHOSPHATE 3.12 MILLIGRAM(S): 80 CAPSULE, EXTENDED RELEASE ORAL at 05:46

## 2018-12-14 RX ADMIN — PANTOPRAZOLE SODIUM 40 MILLIGRAM(S): 20 TABLET, DELAYED RELEASE ORAL at 08:32

## 2018-12-14 RX ADMIN — Medication 10 MILLIGRAM(S): at 13:31

## 2018-12-14 RX ADMIN — Medication 10 MILLIGRAM(S): at 05:46

## 2018-12-14 RX ADMIN — Medication 100 MILLIGRAM(S): at 05:46

## 2018-12-14 RX ADMIN — IRON SUCROSE 210 MILLIGRAM(S): 20 INJECTION, SOLUTION INTRAVENOUS at 17:37

## 2018-12-14 RX ADMIN — Medication 100 MILLIGRAM(S): at 17:33

## 2018-12-14 NOTE — PROGRESS NOTE ADULT - PROBLEM SELECTOR PROBLEM 2
Polyp of colon, unspecified part of colon, unspecified type

## 2018-12-14 NOTE — CONSULT NOTE ADULT - SUBJECTIVE AND OBJECTIVE BOX
Reason for Consultation: anemia    HPI: Patient is a 68y Male seen on consultatioin for the evaluation and management of anemia    Patient seen and examined at bedside in room, wife bedside.  Per wife, patient has been having bloody BMs for one week. Wife states that today the BMs were more frequent (about 3 episodes today). States that patient has been getting subcutaneous heparin BID at rehab.   Patient denies any abdominal pain. Has been tolerating a regular diet. Denies nausea/vomiting.  Denies fever, chills, chest pain, sob, lightheadedness, dizziness, palpitations.     No History of bleeding in past, no family history of bleeding,       PAST MEDICAL & SURGICAL HISTORY:  Sudden cardiac death  Gait abnormality  Leg pain, right  Anoxic encephalopathy  Brain injury with coma: x 2 weeks in 2008  Prostate cancer: radiation therapy  Hypertension  S/P ICD (internal cardiac defibrillator) procedure: implanted on 1/6/2009  H/O tracheostomy  H/O prostate cancer: resection and radiation therapy  Status post cryoablation: of left renal mass        MEDICATIONS  (STANDING):  ALBUTerol/ipratropium for Nebulization. 3 milliLiter(s) Nebulizer once  carvedilol 3.125 milliGRAM(s) Oral every 12 hours  docusate sodium 100 milliGRAM(s) Oral two times a day  ferrous    sulfate 325 milliGRAM(s) Oral three times a day  metoclopramide 10 milliGRAM(s) Oral daily  pantoprazole    Tablet 40 milliGRAM(s) Oral before breakfast    MEDICATIONS  (PRN):  polyethylene glycol 3350 17 Gram(s) Oral daily PRN Constipation      Allergies    lisinopril (Unknown)  penicillins (Unknown)    Intolerances        SOCIAL HISTORY:    Smoking Status: cannot obtain  Alcohol:  cannot obtain  Marital Status: yes  Occupation: cannot obtain    FAMILY HISTORY:  No pertinent family history in first degree relatives          Vital Signs Last 24 Hrs  T(C): 36.7 (14 Dec 2018 05:25), Max: 37.2 (13 Dec 2018 13:25)  T(F): 98 (14 Dec 2018 05:25), Max: 99 (13 Dec 2018 13:25)  HR: 95 (14 Dec 2018 05:25) (91 - 98)  BP: 127/72 (14 Dec 2018 05:25) (120/56 - 146/73)  BP(mean): --  RR: 18 (14 Dec 2018 05:25) (17 - 18)  SpO2: 98% (14 Dec 2018 05:25) (98% - 100%)    PHYSICAL EXAM:    general - non responsive  HEENT - No Icterus  CVS - RRR  RS - AE B/L  Abd - soft, NT  Ext - Pulses +        LABS:                        9.5    6.37  )-----------( 199      ( 14 Dec 2018 06:17 )             27.0     12-14    141  |  108  |  7   ----------------------------<  97  3.8   |  25  |  0.57    Ca    7.1<L>      14 Dec 2018 06:17    TPro  5.7<L>  /  Alb  2.2<L>  /  TBili  0.8  /  DBili  x   /  AST  21  /  ALT  18  /  AlkPhos  76  12-14    PT/INR - ( 13 Dec 2018 11:59 )   PT: 14.2 sec;   INR: 1.26 ratio                 RADIOLOGY & ADDITIONAL STUDIES:

## 2018-12-14 NOTE — PROGRESS NOTE ADULT - SUBJECTIVE AND OBJECTIVE BOX
Patient is a 68y old  Male who presents with a chief complaint of gi  bleed  anemia (11 Dec 2018 10:07)    HPI: patient  reported  with  BRBPR  worsening  anemia  sent  to ER  admitted  for  further  w/u  and  treatment (09 Dec 2018 18:17)  Comfortable in bed - Wife at bedside, states he is doing better. His wife notes he is doing better.    REVIEW OF SYSTEMS as above - unable to provide more ROS    PAST MEDICAL & SURGICAL HISTORY  High grade bowel obstruction - s/p Exp lap, SPENCER, Cecectomy, R inguinal hernia repair 11/18  Closed head injury 10 years ago  Sudden cardiac death -> Anoxic encephalopathy  Gait abnormality  Leg pain, right  Anoxic encephalopathy  Brain injury with coma  Prostate cancer  Hypertension  S/P ICD (internal cardiac defibrillator) procedure  H/O tracheostomy  H/O prostate cancer  Status post cryoablation    SOCHX: Lives with wife in house, several steps to enter, bedroom upstairs. Amb with walker, sup at home  No tobacco,  no alcohol    FMHX: FA/MO  non- contributory     ALLERGIES  lisinopril (Unknown)  penicillins (Unknown)    MEDICATIONS reviewed    Wt(kg): -- 85.5 kg    PHYSICAL EXAM  BMI - 31.4  Constitutional - NAD, Comfortable in bedside chair - sleepy but easily arousable (wife almost always at bedside)  The abdomen is soft and nontender. Staples removed. Incision healing well.  Neurologic -                    Cognitive - Awake, Alert, NAD     Language  Intact but very limited     Motor - No focal deficits     Psychiatric - Mood a little Less agitated today    IMAGING reviewed:     FUNCTIONAL HISTORY Was ambulatory with RW, sup    CURRENT FUNCTIONAL STATUS Amb bed to chair with PTx.    H/O, GI notes appreciated - GI bleed, PO & IV iron    IMPRESSION: 68M recently admitted for RIH and cecectomy 11/18/18 due to large bowel incarceration, now a/w acute blood loss anemia due to GI bleeding, likely diverticular, s/p 3uPRBC, s/p EGD/colonoscopy 12/10 with evidence diverticulum, no further episodes of rectal bleeding - unable to ambulate.      PLAN: Will benefit from rehab prior to returning home.

## 2018-12-14 NOTE — PROGRESS NOTE ADULT - PROBLEM SELECTOR PLAN 1
probably diverticular. No signs of active bleeding. HGB decreased probably from rehydration. 1) clear liquid diet  2) change IV PPI to PO
probably diverticular. blood today probably hemorrhoidal vs old blood.  HGB 9.5, appropriate after 2 units transfused.  Tolerated regular diet. Stable from GI point of view for discharge.
probably diverticular. No signs of active bleeding. HGB stable. Tolerated regular diet. Stable from GI point of view for discharge. Unsure why patient is on Reglan. will taper and d/c
probably diverticular. No signs of active bleeding. HGB stable. Tolerated clear liquids. 1) advance to soft diet  2)  PPI PO 3) f/u CBC

## 2018-12-14 NOTE — PROGRESS NOTE ADULT - SUBJECTIVE AND OBJECTIVE BOX
INTERVAL HPI/OVERNIGHT EVENTS:    s/p  transfusion  PRBCs     REVIEW OF SYSTEMS:  CONSTITUTIONAL: feels no  complaints    NECK: No pain or stiffnes  RESPIRATORY: No SOB   CARDIOVASCULAR: No chest pain, palpitations, dizziness,   GASTROINTESTINAL: No abdominal pain. No nausea, vomiting,   NEUROLOGICAL: No headaches, no  blurry  vision no  dizziness  SKIN: No itching,   MUSCULOSKELETAL: No pain    MEDICATION:  ALBUTerol/ipratropium for Nebulization. 3 milliLiter(s) Nebulizer once  carvedilol 3.125 milliGRAM(s) Oral every 12 hours  docusate sodium 100 milliGRAM(s) Oral two times a day  metoclopramide 10 milliGRAM(s) Oral daily  pantoprazole    Tablet 40 milliGRAM(s) Oral before breakfast  polyethylene glycol 3350 17 Gram(s) Oral daily PRN    Vital Signs Last 24 Hrs  T(C): 36.7 (14 Dec 2018 05:25), Max: 37.2 (13 Dec 2018 13:25)  T(F): 98 (14 Dec 2018 05:25), Max: 99 (13 Dec 2018 13:25)  HR: 95 (14 Dec 2018 05:25) (91 - 98)  BP: 127/72 (14 Dec 2018 05:25) (120/56 - 146/73)  BP(mean): --  RR: 18 (14 Dec 2018 05:25) (17 - 18)  SpO2: 98% (14 Dec 2018 05:25) (98% - 100%)    PHYSICAL EXAM:  GENERAL: NAD, well-groomed, well-developed  EYES:  conjunctiva and sclera clear  ENMT:  Moist mucous membranes,   NECK: Supple, No JVD, Normal thyroid  NERVOUS SYSTEM:  Alert oriented   no  focal  deficits;   CHEST/LUNG: Clear    HEART: Regular rate and rhythm; No murmurs, rubs, or gallops  ABDOMEN: Soft, Nontender, Nondistended; Bowel sounds present  EXTREMITIES:  no  edema no  tenderness  SKIN: No rashes   LABS:                        9.5    6.37  )-----------( 199      ( 14 Dec 2018 06:17 )             27.0     12-14    141  |  108  |  7   ----------------------------<  97  3.8   |  25  |  0.57    Ca    7.1<L>      14 Dec 2018 06:17    TPro  5.7<L>  /  Alb  2.2<L>  /  TBili  0.8  /  DBili  x   /  AST  21  /  ALT  18  /  AlkPhos  76  12-14    PT/INR - ( 13 Dec 2018 11:59 )   PT: 14.2 sec;   INR: 1.26 ratio             CAPILLARY BLOOD GLUCOSE          RADIOLOGY & ADDITIONAL TESTS:    Imaging reports  Personally Reviewed:  [ x] YES  [ ] NO    Consultant(s) Notes Reviewed:  [x ] YES  [ ] NO    Care Discussed with Consultants/Other Providers [ x] YES  [ ] NO  Problem/Plan - 1:  ·  Problem: GI bleed.  Plan: IVF  observation      Problem/Plan - 2:  ·  Problem: Hypertension.  Plan: carvediol.     Problem/Plan - 3:  ·  Problem: Anoxic encephalopathy.  Plan: observation.     Problem/Plan - 4:  ·  Problem: ICD (implantable cardioverter-defibrillator) in place.  Plan: observation.   anemia unclear  if  totally  due  to  GI  bleed  for  hematology  evaluation

## 2018-12-14 NOTE — PROGRESS NOTE ADULT - SUBJECTIVE AND OBJECTIVE BOX
Patient is a 68y old  Male who presents with a chief complaint of gi  bleed  anemia (14 Dec 2018 09:49)      HPI:  patient  reported  with  BRBPR  worsening  anemia  sent  to ER  admitted  for  further  w/u  and  treatment (09 Dec 2018 18:17)      INTERVAL HPI/OVERNIGHT EVENTS:  Patient had no BMs until today. Today, he had one BM with a small amount of red blood but a lot of normal colored stool. HGB stable, was transfused two units last night Tolerating regular diet    MEDICATIONS  (STANDING):  ALBUTerol/ipratropium for Nebulization. 3 milliLiter(s) Nebulizer once  carvedilol 3.125 milliGRAM(s) Oral every 12 hours  docusate sodium 100 milliGRAM(s) Oral two times a day  iron sucrose IVPB 100 milliGRAM(s) IV Intermittent once  pantoprazole    Tablet 40 milliGRAM(s) Oral before breakfast    MEDICATIONS  (PRN):  polyethylene glycol 3350 17 Gram(s) Oral daily PRN Constipation      FAMILY HISTORY:  No pertinent family history in first degree relatives      Allergies    lisinopril (Unknown)  penicillins (Unknown)    Intolerances        PMH/PSH:  Sudden cardiac death  Gait abnormality  Leg pain, right  Anoxic encephalopathy  Brain injury with coma  Prostate cancer  Hypertension  S/P ICD (internal cardiac defibrillator) procedure  H/O tracheostomy  H/O prostate cancer  Status post cryoablation        REVIEW OF SYSTEMS:  CONSTITUTIONAL: No fever, weight loss, or fatigue  EYES: No eye pain, visual disturbances, or discharge  ENMT:  No difficulty hearing, tinnitus, vertigo; No sinus or throat pain  NECK: No pain or stiffness  BREASTS: No pain, masses, or nipple discharge  RESPIRATORY: No cough, wheezing, chills or hemoptysis; No shortness of breath  CARDIOVASCULAR: No chest pain, palpitations, dizziness, or leg swelling  GASTROINTESTINAL: See above  GENITOURINARY: No dysuria, frequency, hematuria, or incontinence  NEUROLOGICAL: No headaches, memory loss, loss of strength, numbness, or tremors  SKIN: No itching, burning, rashes, or lesions   LYMPH NODES: No enlarged glands  ENDOCRINE: No heat or cold intolerance; No hair loss  MUSCULOSKELETAL: No joint pain or swelling; No muscle, back, or extremity pain  PSYCHIATRIC: No depression, anxiety, mood swings, or difficulty sleeping  HEME/LYMPH: No easy bruising, or bleeding gums  ALLERGY AND IMMUNOLOGIC: No hives or eczema    Vital Signs Last 24 Hrs  T(C): 36.8 (14 Dec 2018 11:06), Max: 36.9 (13 Dec 2018 17:15)  T(F): 98.3 (14 Dec 2018 11:06), Max: 98.4 (13 Dec 2018 17:15)  HR: 93 (14 Dec 2018 11:06) (91 - 98)  BP: 145/71 (14 Dec 2018 11:06) (125/69 - 146/73)  BP(mean): --  RR: 18 (14 Dec 2018 11:06) (17 - 18)  SpO2: 100% (14 Dec 2018 11:06) (98% - 100%)    PHYSICAL EXAM:  GENERAL: NAD, well-groomed, well-developed  HEAD:  Atraumatic, Normocephalic  EYES: EOMI, PERRLA, conjunctiva and sclera clear  ENMT: No tonsillar erythema, exudates, or enlargement; Moist mucous membranes, Good dentition, No lesions  NECK: Supple, No JVD, Normal thyroid  NERVOUS SYSTEM:  Alert & Oriented X3, Good concentration; Motor Strength 5/5 B/L upper and lower extremities; DTRs 2+ intact and symmetric  CHEST/LUNG: Clear to percussion bilaterally; No rales, rhonchi, wheezing, or rubs  HEART: Regular rate and rhythm; No murmurs, rubs, or gallops  ABDOMEN: Soft, Nontender, Nondistended; Bowel sounds present  EXTREMITIES:  2+ Peripheral Pulses, No clubbing, cyanosis, or edema  LYMPH: No lymphadenopathy noted  SKIN: No rashes or lesions    LAB                          9.5    6.37  )-----------( 199      ( 14 Dec 2018 06:17 )             27.0       CBC:  12-14 @ 06:17  WBC 6.37   Hgb 9.5   Hct 27.0   Plts 199  MCV 81.3  12-13 @ 06:20  WBC 6.46   Hgb 7.2   Hct 21.8   Plts 221  MCV 82.9  12-12 @ 07:57  WBC 7.53   Hgb 7.3   Hct 21.7   Plts 210  MCV 80.7  12-11 @ 10:04  WBC 9.97   Hgb 7.5   Hct 22.5   Plts 226  MCV 79.2  12-10 @ 17:51  WBC 10.85   Hgb 8.9   Hct 26.5   Plts 267  MCV 77.5  12-10 @ 12:48  WBC 10.59   Hgb 8.6   Hct 25.2   Plts 262  MCV 77.1  12-10 @ 06:41  WBC 9.36   Hgb 7.3   Hct 20.8   Plts 254  MCV 75.4  12-09 @ 13:35  WBC 7.35   Hgb 5.8   Hct 17.5   Plts 333  MCV 76.8      Chemistry:  12-14 @ 06:17  Na+ 141  K+ 3.8  Cl- 108  CO2 25  BUN 7  Cr 0.57     12-13 @ 06:20  Na+ 143  K+ 4.5  Cl- 110  CO2 26  BUN 8  Cr 0.76     12-12 @ 07:57  Na+ 140  K+ 3.8  Cl- 107  CO2 24  BUN 6  Cr 0.62     12-11 @ 10:04  Na+ 139  K+ 4.1  Cl- 106  CO2 25  BUN 11  Cr 0.77     12-10 @ 06:41  Na+ 138  K+ 4.4  Cl- 106  CO2 24  BUN 20  Cr 0.68     12-09 @ 13:35  Na+ 138  K+ 4.5  Cl- 106  CO2 24  BUN 17  Cr 0.63         Glucose, Serum: 97 mg/dL (12-14 @ 06:17)  Glucose, Serum: 113 mg/dL (12-13 @ 06:20)  Glucose, Serum: 104 mg/dL (12-12 @ 07:57)  Glucose, Serum: 100 mg/dL (12-11 @ 10:04)  Glucose, Serum: 109 mg/dL (12-10 @ 06:41)  Glucose, Serum: 103 mg/dL (12-09 @ 13:35)      14 Dec 2018 06:17    141    |  108    |  7      ----------------------------<  97     3.8     |  25     |  0.57   13 Dec 2018 06:20    143    |  110    |  8      ----------------------------<  113    4.5     |  26     |  0.76   12 Dec 2018 07:57    140    |  107    |  6      ----------------------------<  104    3.8     |  24     |  0.62   11 Dec 2018 10:04    139    |  106    |  11     ----------------------------<  100    4.1     |  25     |  0.77   10 Dec 2018 06:41    138    |  106    |  20     ----------------------------<  109    4.4     |  24     |  0.68   09 Dec 2018 13:35    138    |  106    |  17     ----------------------------<  103    4.5     |  24     |  0.63     Ca    7.1        14 Dec 2018 06:17  Ca    7.5        13 Dec 2018 06:20  Ca    6.4        12 Dec 2018 07:57  Ca    6.6        11 Dec 2018 10:04  Ca    7.1        10 Dec 2018 06:41  Ca    7.4        09 Dec 2018 13:35    TPro  5.7    /  Alb  2.2    /  TBili  0.8    /  DBili  x      /  AST  21     /  ALT  18     /  AlkPhos  76     14 Dec 2018 06:17  TPro  5.7    /  Alb  2.2    /  TBili  0.4    /  DBili  x      /  AST  23     /  ALT  19     /  AlkPhos  71     13 Dec 2018 06:20  TPro  5.4    /  Alb  2.2    /  TBili  0.7    /  DBili  x      /  AST  21     /  ALT  20     /  AlkPhos  64     12 Dec 2018 07:57  TPro  5.5    /  Alb  2.2    /  TBili  0.7    /  DBili  x      /  AST  19     /  ALT  23     /  AlkPhos  65     11 Dec 2018 10:04  TPro  5.2    /  Alb  2.1    /  TBili  1.1    /  DBili  x      /  AST  19     /  ALT  23     /  AlkPhos  61     10 Dec 2018 06:41  TPro  5.8    /  Alb  2.2    /  TBili  0.2    /  DBili  x      /  AST  23     /  ALT  26     /  AlkPhos  71     09 Dec 2018 13:35      PT/INR - ( 13 Dec 2018 11:59 )   PT: 14.2 sec;   INR: 1.26 ratio                 CAPILLARY BLOOD GLUCOSE              RADIOLOGY & ADDITIONAL TESTS:    Imaging Personally Reviewed:  [ ] YES  [ ] NO    Consultant(s) Notes Reviewed:  [ ] YES  [ ] NO    Care Discussed with Consultants/Other Providers [ ] YES  [ ] NO

## 2018-12-14 NOTE — PROGRESS NOTE ADULT - PROBLEM SELECTOR PLAN 2
Patient will need a follow up colonoscopy in the near future. Discussed with wife . Name and number given to patient
Patient will need a follow up colonoscopy in the near future
Patient will need a follow up colonoscopy in the near future. Discussed with wife yesterday. Name and number given to patient yesterday.
Patient will need a follow up colonoscopy in the near future. Discussed with wife yesterday

## 2018-12-14 NOTE — PROGRESS NOTE ADULT - SUBJECTIVE AND OBJECTIVE BOX
Patient seen and examined bedside resting comfortably.  No complaints offered.   Abdominal pain is well controlled.  Denies nausea and vomiting. Tolerating diet.  Flatus+ no BM yet  Denies chest pain, dyspnea, cough.    T(F): 98 (12-14-18 @ 05:25), Max: 99 (12-13-18 @ 13:25)  HR: 95 (12-14-18 @ 05:25) (91 - 98)  BP: 127/72 (12-14-18 @ 05:25) (120/56 - 146/73)  RR: 18 (12-14-18 @ 05:25) (17 - 18)  SpO2: 98% (12-14-18 @ 05:25) (98% - 100%)    PHYSICAL EXAM:  General: NAD  Neuro:  Alert & oriented x 3. Speech difficulty due to anoxic injury.  Abdomen:  NTND. BS+. Distended - wife said it looks about his normal size.    LABS:                        9.5    6.37  )-----------( 199      ( 14 Dec 2018 06:17 )             27.0     12-14    141  |  108  |  7   ----------------------------<  97  3.8   |  25  |  0.57    Ca    7.1<L>      14 Dec 2018 06:17    TPro  5.7<L>  /  Alb  2.2<L>  /  TBili  0.8  /  DBili  x   /  AST  21  /  ALT  18  /  AlkPhos  76  12-14    PT/INR - ( 13 Dec 2018 11:59 )   PT: 14.2 sec;   INR: 1.26 ratio           I&O's Detail    13 Dec 2018 07:01  -  14 Dec 2018 07:00  --------------------------------------------------------  IN:    Oral Fluid: 180 mL    Packed Red Blood Cells: 605 mL  Total IN: 785 mL    OUT:  Total OUT: 0 mL    Total NET: 785 mL          Impression: 68y Male admitted with GI BLEED RECTAL BLEEDING - this appears to be resolved  constipated      Plan:  -continue present medical supportive care  -monitor for return of rectal bleeding.  -will discuss with Dr. Tinsley.

## 2018-12-14 NOTE — CONSULT NOTE ADULT - PROBLEM SELECTOR RECOMMENDATION 9
- no past history of bleeding, or no family history of bleeding  - check PT/PTT/Fibrinogen  - anemia secondary to blood loss, low iron  - iron supplements, give IV Venofer

## 2018-12-14 NOTE — PROGRESS NOTE ADULT - PROBLEM SELECTOR PROBLEM 1
Gastrointestinal hemorrhage, unspecified gastrointestinal hemorrhage type

## 2018-12-15 ENCOUNTER — TRANSCRIPTION ENCOUNTER (OUTPATIENT)
Age: 68
End: 2018-12-15

## 2018-12-15 VITALS
SYSTOLIC BLOOD PRESSURE: 145 MMHG | OXYGEN SATURATION: 98 % | HEART RATE: 89 BPM | RESPIRATION RATE: 17 BRPM | DIASTOLIC BLOOD PRESSURE: 70 MMHG | TEMPERATURE: 99 F

## 2018-12-15 LAB
ALBUMIN SERPL ELPH-MCNC: 2.3 G/DL — LOW (ref 3.3–5)
ALP SERPL-CCNC: 79 U/L — SIGNIFICANT CHANGE UP (ref 40–120)
ALT FLD-CCNC: 18 U/L — SIGNIFICANT CHANGE UP (ref 12–78)
ANION GAP SERPL CALC-SCNC: 8 MMOL/L — SIGNIFICANT CHANGE UP (ref 5–17)
APTT BLD: 27.7 SEC — SIGNIFICANT CHANGE UP (ref 27.5–36.3)
AST SERPL-CCNC: 19 U/L — SIGNIFICANT CHANGE UP (ref 15–37)
BILIRUB SERPL-MCNC: 0.4 MG/DL — SIGNIFICANT CHANGE UP (ref 0.2–1.2)
BUN SERPL-MCNC: 10 MG/DL — SIGNIFICANT CHANGE UP (ref 7–23)
CALCIUM SERPL-MCNC: 7.3 MG/DL — LOW (ref 8.5–10.1)
CHLORIDE SERPL-SCNC: 107 MMOL/L — SIGNIFICANT CHANGE UP (ref 96–108)
CO2 SERPL-SCNC: 24 MMOL/L — SIGNIFICANT CHANGE UP (ref 22–31)
CREAT SERPL-MCNC: 0.6 MG/DL — SIGNIFICANT CHANGE UP (ref 0.5–1.3)
FIBRINOGEN PPP-MCNC: 556 MG/DL — HIGH (ref 350–510)
GLUCOSE SERPL-MCNC: 93 MG/DL — SIGNIFICANT CHANGE UP (ref 70–99)
HCT VFR BLD CALC: 29.8 % — LOW (ref 39–50)
HGB BLD-MCNC: 9.7 G/DL — LOW (ref 13–17)
INR BLD: 1.26 RATIO — HIGH (ref 0.88–1.16)
MCHC RBC-ENTMCNC: 26.7 PG — LOW (ref 27–34)
MCHC RBC-ENTMCNC: 32.6 GM/DL — SIGNIFICANT CHANGE UP (ref 32–36)
MCV RBC AUTO: 82.1 FL — SIGNIFICANT CHANGE UP (ref 80–100)
NRBC # BLD: 0 /100 WBCS — SIGNIFICANT CHANGE UP (ref 0–0)
PLATELET # BLD AUTO: 199 K/UL — SIGNIFICANT CHANGE UP (ref 150–400)
POTASSIUM SERPL-MCNC: 3.8 MMOL/L — SIGNIFICANT CHANGE UP (ref 3.5–5.3)
POTASSIUM SERPL-SCNC: 3.8 MMOL/L — SIGNIFICANT CHANGE UP (ref 3.5–5.3)
PROT SERPL-MCNC: 6 GM/DL — SIGNIFICANT CHANGE UP (ref 6–8.3)
PROTHROM AB SERPL-ACNC: 14.2 SEC — HIGH (ref 10–12.9)
RBC # BLD: 3.63 M/UL — LOW (ref 4.2–5.8)
RBC # FLD: 18 % — HIGH (ref 10.3–14.5)
SODIUM SERPL-SCNC: 139 MMOL/L — SIGNIFICANT CHANGE UP (ref 135–145)
WBC # BLD: 7.01 K/UL — SIGNIFICANT CHANGE UP (ref 3.8–10.5)
WBC # FLD AUTO: 7.01 K/UL — SIGNIFICANT CHANGE UP (ref 3.8–10.5)

## 2018-12-15 RX ORDER — IRON SUCROSE 20 MG/ML
100 INJECTION, SOLUTION INTRAVENOUS ONCE
Qty: 0 | Refills: 0 | Status: COMPLETED | OUTPATIENT
Start: 2018-12-15 | End: 2018-12-15

## 2018-12-15 RX ORDER — PANTOPRAZOLE SODIUM 20 MG/1
1 TABLET, DELAYED RELEASE ORAL
Qty: 30 | Refills: 0
Start: 2018-12-15 | End: 2019-01-13

## 2018-12-15 RX ORDER — FERROUS SULFATE 325(65) MG
1 TABLET ORAL
Qty: 30 | Refills: 0 | OUTPATIENT
Start: 2018-12-15 | End: 2019-01-13

## 2018-12-15 RX ORDER — POLYETHYLENE GLYCOL 3350 17 G/17G
17 POWDER, FOR SOLUTION ORAL
Qty: 0 | Refills: 0 | DISCHARGE
Start: 2018-12-15

## 2018-12-15 RX ADMIN — IRON SUCROSE 210 MILLIGRAM(S): 20 INJECTION, SOLUTION INTRAVENOUS at 13:19

## 2018-12-15 RX ADMIN — Medication 100 MILLIGRAM(S): at 05:41

## 2018-12-15 RX ADMIN — CARVEDILOL PHOSPHATE 3.12 MILLIGRAM(S): 80 CAPSULE, EXTENDED RELEASE ORAL at 05:41

## 2018-12-15 RX ADMIN — PANTOPRAZOLE SODIUM 40 MILLIGRAM(S): 20 TABLET, DELAYED RELEASE ORAL at 08:20

## 2018-12-15 NOTE — DISCHARGE NOTE ADULT - CARE PLAN
Principal Discharge DX:	Anemia due to blood loss  Goal:	avoidance  of  recurrence  Assessment and plan of treatment:	monitor  h/h  closely  iron supplementation  Secondary Diagnosis:	Anoxic encephalopathy  Secondary Diagnosis:	Gait abnormality  Secondary Diagnosis:	H/O prostate cancer  Secondary Diagnosis:	Hypertension  Secondary Diagnosis:	ICD (implantable cardioverter-defibrillator) in place  Secondary Diagnosis:	Status post cryoablation

## 2018-12-15 NOTE — PROGRESS NOTE ADULT - SUBJECTIVE AND OBJECTIVE BOX
INTERVAL HPI/OVERNIGHT EVENTS:        REVIEW OF SYSTEMS:  CONSTITUTIONAL: feels  well  no  complaints    NECK: No pain or stiffnes  RESPIRATORY: No SOB   CARDIOVASCULAR: No chest pain, palpitations, dizziness,   GASTROINTESTINAL: No abdominal pain. No nausea, vomiting,   NEUROLOGICAL: No headaches, no  blurry  vision no  dizziness  SKIN: No itching,   MUSCULOSKELETAL: No pain    MEDICATION:  ALBUTerol/ipratropium for Nebulization. 3 milliLiter(s) Nebulizer once  carvedilol 3.125 milliGRAM(s) Oral every 12 hours  docusate sodium 100 milliGRAM(s) Oral two times a day  pantoprazole    Tablet 40 milliGRAM(s) Oral before breakfast  polyethylene glycol 3350 17 Gram(s) Oral daily PRN    Vital Signs Last 24 Hrs  T(C): 37.4 (15 Dec 2018 05:00), Max: 37.4 (15 Dec 2018 05:00)  T(F): 99.3 (15 Dec 2018 05:00), Max: 99.3 (15 Dec 2018 05:00)  HR: 99 (15 Dec 2018 05:00) (93 - 102)  BP: 140/70 (15 Dec 2018 05:00) (140/70 - 152/74)  BP(mean): --  RR: 17 (15 Dec 2018 05:00) (17 - 18)  SpO2: 96% (15 Dec 2018 05:00) (96% - 100%)    PHYSICAL EXAM:  GENERAL: NAD, well-groomed, well-developed  EYES:  conjunctiva and sclera clear  ENMT:  Moist mucous membranes,   NECK: Supple, No JVD, Normal thyroid  NERVOUS SYSTEM:  Alert oriented   no  focal  deficits;   CHEST/LUNG: Clear    HEART: Regular rate and rhythm; No murmurs, rubs, or gallops  ABDOMEN: Soft, Nontender, Nondistended; Bowel sounds present  EXTREMITIES:  no  edema no  tenderness  SKIN: No rashes   LABS:                        9.7    7.01  )-----------( 199      ( 15 Dec 2018 06:54 )             29.8     12-15    139  |  107  |  10  ----------------------------<  93  3.8   |  24  |  0.60    Ca    7.3<L>      15 Dec 2018 06:54    TPro  6.0  /  Alb  2.3<L>  /  TBili  0.4  /  DBili  x   /  AST  19  /  ALT  18  /  AlkPhos  79  12-15    PT/INR - ( 15 Dec 2018 06:54 )   PT: 14.2 sec;   INR: 1.26 ratio         PTT - ( 15 Dec 2018 06:54 )  PTT:27.7 sec    CAPILLARY BLOOD GLUCOSE          RADIOLOGY & ADDITIONAL TESTS:    Imaging reports  Personally Reviewed:  [ x] YES  [ ] NO    Consultant(s) Notes Reviewed:  [ x] YES  [ ] NO    Care Discussed with Consultants/Other Providers [ x] YES  [ ] NO  Problem/Plan - 1:  ·  Problem: GI bleed.  Plan: IVF  observation      Problem/Plan - 2:  ·  Problem: Hypertension.  Plan: carvediol.     Problem/Plan - 3:  ·  Problem: Anoxic encephalopathy.  Plan: observation.     Problem/Plan - 4:  ·  Problem: ICD (implantable cardioverter-defibrillator) in place.  Plan: observation.   anemia stable  post  transfusion  continue  iron  supplementation discharge  to  Rehab

## 2018-12-15 NOTE — PROGRESS NOTE ADULT - SUBJECTIVE AND OBJECTIVE BOX
INTERVAL HPI/OVERNIGHT EVENTS: No acute events overnight. BM yesterday "brown". Tolerating diet. No pain. No flatus.    Vital Signs Last 24 Hrs  T(C): 37.4 (15 Dec 2018 05:00), Max: 37.4 (15 Dec 2018 05:00)  T(F): 99.3 (15 Dec 2018 05:00), Max: 99.3 (15 Dec 2018 05:00)  HR: 99 (15 Dec 2018 05:00) (93 - 102)  BP: 140/70 (15 Dec 2018 05:00) (140/70 - 152/74)  BP(mean): --  RR: 17 (15 Dec 2018 05:00) (17 - 18)  SpO2: 96% (15 Dec 2018 05:00) (96% - 100%)    MEDICATIONS  (STANDING):  ALBUTerol/ipratropium for Nebulization. 3 milliLiter(s) Nebulizer once  carvedilol 3.125 milliGRAM(s) Oral every 12 hours  docusate sodium 100 milliGRAM(s) Oral two times a day  pantoprazole    Tablet 40 milliGRAM(s) Oral before breakfast    MEDICATIONS  (PRN):  polyethylene glycol 3350 17 Gram(s) Oral daily PRN Constipation      PHYSICAL EXAM    GENERAL: AAO in NAD. Slowed speech.  CHEST/LUNG: No rrespiratory distress  ABDOMEN: Soft, NT, mild distention.    I&O:  I&O's Detail    14 Dec 2018 07:01  -  15 Dec 2018 07:00  --------------------------------------------------------  IN:    Oral Fluid: 240 mL  Total IN: 240 mL    OUT:  Total OUT: 0 mL    Total NET: 240 mL          LABS:                        9.7    7.01  )-----------( 199      ( 15 Dec 2018 06:54 )             29.8     12-15    139  |  107  |  10  ----------------------------<  93  3.8   |  24  |  0.60    Ca    7.3<L>      15 Dec 2018 06:54    TPro  6.0  /  Alb  2.3<L>  /  TBili  0.4  /  DBili  x   /  AST  19  /  ALT  18  /  AlkPhos  79  12-15    PT/INR - ( 15 Dec 2018 06:54 )   PT: 14.2 sec;   INR: 1.26 ratio         PTT - ( 15 Dec 2018 06:54 )  PTT:27.7 sec          Impression: 68M with LGIB, resolved.    Plan:  No acute surgical intervention.  Outpatient repeat colonoscopy by GI  Discharge planning to [facility] per medicine.

## 2018-12-15 NOTE — DISCHARGE NOTE ADULT - HOSPITAL COURSE
patient given  IVF   transfused  4  u  PRBCs  evaluated  by  GI  underwent colonoscopy  endoscopy  +  diverticular  disease  hemorrhoid  colon  polyp was  also  evaluated  by  Hematology  received  IV  iron  supplementation.  H/H  stable  VSS  patient  discharged  back  to  Rehab  monitor  labsa  closely  continue  iron  supplementation  off  asa  heparin  for  reapeat  colonoscopy  as  out patient

## 2018-12-15 NOTE — DISCHARGE NOTE ADULT - PATIENT PORTAL LINK FT
You can access the [facility] Patient Portal, offered by [facility], by registering with the following website: [email or web address]

## 2018-12-15 NOTE — PROGRESS NOTE ADULT - PROVIDER SPECIALTY LIST ADULT
Gastroenterology
Internal Medicine
Physiatry
Physiatry
Surgery
Internal Medicine
Physiatry
Surgery
Gastroenterology
Gastroenterology

## 2018-12-15 NOTE — DISCHARGE NOTE ADULT - MEDICATION SUMMARY - MEDICATIONS TO STOP TAKING
I will STOP taking the medications listed below when I get home from the hospital:    Aspirin Enteric Coated 81 mg oral delayed release tablet  -- 1 tab(s) by mouth once a day    heparin  -- 5000 unit(s) subcutaneous every 8 hours    cefpodoxime 200 mg oral tablet  -- 1 tab(s) by mouth every 12 hours for 7 days    metoclopramide 10 mg oral tablet  -- 1 tab(s) by mouth every 8 hours    potassium phosphate-sodium phosphate 305 mg-700 mg oral tablet  -- 1 tab(s) by mouth 3 times a day (with meals)

## 2018-12-15 NOTE — DISCHARGE NOTE ADULT - SECONDARY DIAGNOSIS.
ICD (implantable cardioverter-defibrillator) in place Status post cryoablation Anoxic encephalopathy Gait abnormality H/O prostate cancer Hypertension

## 2018-12-15 NOTE — DISCHARGE NOTE ADULT - CARE PROVIDER_API CALL
Sheyla Artis), Internal Medicine  [address] [place] [address]  Phone: [phone]  Fax: [phone]

## 2018-12-15 NOTE — DISCHARGE NOTE ADULT - MEDICATION SUMMARY - MEDICATIONS TO TAKE
I will START or STAY ON the medications listed below when I get home from the hospital:    carvedilol 3.125 mg oral tablet  -- 1 tab(s) by mouth once a day  -- Indication: For Hypertension    ipratropium-albuterol 0.5 mg-2.5 mg/3 mLinhalation solution  -- 3 milliliter(s) inhaled every 6 hours  -- Indication: For copd    ferrous sulfate (as elemental iron) 45 mg oral tablet, extended release  -- 1 tab(s) by mouth once a day   -- Check with your doctor before becoming pregnant.  May discolor urine or feces.  Some non-prescription drugs may aggravate your condition.  Read all labels carefully.  If a warning appears, check with your doctor before taking.  Swallow whole.  Do not crush.    -- Indication: For Anemia due to blood loss    polyethylene glycol 3350 oral powder for reconstitution  -- 17 gram(s) by mouth once a day, As needed, Constipation  -- Indication: For constipation    Protonix 40 mg oral delayed release tablet  -- 1 tab(s) by mouth once a day   -- It is very important that you take or use this exactly as directed.  Do not skip doses or discontinue unless directed by your doctor.  Obtain medical advice before taking any non-prescription drugs as some may affect the action of this medication.  Swallow whole.  Do not crush.    -- Indication: For Gastritis    multivitamin  -- 1 tab(s) by mouth once a day  -- Indication: For supplement    Vitamin D3 2000 intl units oral capsule  -- 1 cap(s) by mouth once a day  -- Indication: For supplement

## 2018-12-15 NOTE — PROGRESS NOTE ADULT - REASON FOR ADMISSION
gi  bleed  anemia
gi  bleed, anemia
gi  bleed  anemia

## 2018-12-18 ENCOUNTER — APPOINTMENT (OUTPATIENT)
Dept: NEUROLOGY | Facility: CLINIC | Age: 68
End: 2018-12-18

## 2018-12-18 DIAGNOSIS — I25.2 OLD MYOCARDIAL INFARCTION: ICD-10-CM

## 2018-12-18 DIAGNOSIS — Z88.8 ALLERGY STATUS TO OTHER DRUGS, MEDICAMENTS AND BIOLOGICAL SUBSTANCES: ICD-10-CM

## 2018-12-18 DIAGNOSIS — K59.00 CONSTIPATION, UNSPECIFIED: ICD-10-CM

## 2018-12-18 DIAGNOSIS — Z93.0 TRACHEOSTOMY STATUS: ICD-10-CM

## 2018-12-18 DIAGNOSIS — J44.9 CHRONIC OBSTRUCTIVE PULMONARY DISEASE, UNSPECIFIED: ICD-10-CM

## 2018-12-18 DIAGNOSIS — Z92.3 PERSONAL HISTORY OF IRRADIATION: ICD-10-CM

## 2018-12-18 DIAGNOSIS — Z95.810 PRESENCE OF AUTOMATIC (IMPLANTABLE) CARDIAC DEFIBRILLATOR: ICD-10-CM

## 2018-12-18 DIAGNOSIS — E43 UNSPECIFIED SEVERE PROTEIN-CALORIE MALNUTRITION: ICD-10-CM

## 2018-12-18 DIAGNOSIS — K64.9 UNSPECIFIED HEMORRHOIDS: ICD-10-CM

## 2018-12-18 DIAGNOSIS — I10 ESSENTIAL (PRIMARY) HYPERTENSION: ICD-10-CM

## 2018-12-18 DIAGNOSIS — Z79.01 LONG TERM (CURRENT) USE OF ANTICOAGULANTS: ICD-10-CM

## 2018-12-18 DIAGNOSIS — Z86.74 PERSONAL HISTORY OF SUDDEN CARDIAC ARREST: ICD-10-CM

## 2018-12-18 DIAGNOSIS — Z88.0 ALLERGY STATUS TO PENICILLIN: ICD-10-CM

## 2018-12-18 DIAGNOSIS — Z85.46 PERSONAL HISTORY OF MALIGNANT NEOPLASM OF PROSTATE: ICD-10-CM

## 2018-12-18 DIAGNOSIS — Z79.899 OTHER LONG TERM (CURRENT) DRUG THERAPY: ICD-10-CM

## 2018-12-18 DIAGNOSIS — G93.1 ANOXIC BRAIN DAMAGE, NOT ELSEWHERE CLASSIFIED: ICD-10-CM

## 2018-12-18 DIAGNOSIS — K29.70 GASTRITIS, UNSPECIFIED, WITHOUT BLEEDING: ICD-10-CM

## 2018-12-18 DIAGNOSIS — D62 ACUTE POSTHEMORRHAGIC ANEMIA: ICD-10-CM

## 2018-12-18 DIAGNOSIS — K92.2 GASTROINTESTINAL HEMORRHAGE, UNSPECIFIED: ICD-10-CM

## 2018-12-18 DIAGNOSIS — E83.51 HYPOCALCEMIA: ICD-10-CM

## 2018-12-18 DIAGNOSIS — K63.5 POLYP OF COLON: ICD-10-CM

## 2018-12-18 DIAGNOSIS — K57.31 DIVERTICULOSIS OF LARGE INTESTINE WITHOUT PERFORATION OR ABSCESS WITH BLEEDING: ICD-10-CM

## 2018-12-18 DIAGNOSIS — Z98.890 OTHER SPECIFIED POSTPROCEDURAL STATES: ICD-10-CM

## 2019-01-19 ENCOUNTER — INPATIENT (INPATIENT)
Facility: HOSPITAL | Age: 69
LOS: 2 days | Discharge: HOME HEALTH | End: 2019-01-22
Attending: INTERNAL MEDICINE | Admitting: INTERNAL MEDICINE
Payer: MEDICARE

## 2019-01-19 VITALS
HEIGHT: 65 IN | WEIGHT: 169.98 LBS | RESPIRATION RATE: 17 BRPM | OXYGEN SATURATION: 99 % | HEART RATE: 98 BPM | SYSTOLIC BLOOD PRESSURE: 107 MMHG | DIASTOLIC BLOOD PRESSURE: 71 MMHG | TEMPERATURE: 98 F

## 2019-01-19 DIAGNOSIS — Z87.820 PERSONAL HISTORY OF TRAUMATIC BRAIN INJURY: ICD-10-CM

## 2019-01-19 DIAGNOSIS — Z85.46 PERSONAL HISTORY OF MALIGNANT NEOPLASM OF PROSTATE: Chronic | ICD-10-CM

## 2019-01-19 DIAGNOSIS — Z93.0 TRACHEOSTOMY STATUS: Chronic | ICD-10-CM

## 2019-01-19 DIAGNOSIS — Z86.74 PERSONAL HISTORY OF SUDDEN CARDIAC ARREST: ICD-10-CM

## 2019-01-19 DIAGNOSIS — Z98.89 OTHER SPECIFIED POSTPROCEDURAL STATES: Chronic | ICD-10-CM

## 2019-01-19 DIAGNOSIS — Z95.810 PRESENCE OF AUTOMATIC (IMPLANTABLE) CARDIAC DEFIBRILLATOR: Chronic | ICD-10-CM

## 2019-01-19 LAB
ALBUMIN SERPL ELPH-MCNC: 3.1 G/DL — LOW (ref 3.3–5)
ALP SERPL-CCNC: 114 U/L — SIGNIFICANT CHANGE UP (ref 40–120)
ALT FLD-CCNC: 20 U/L — SIGNIFICANT CHANGE UP (ref 12–78)
ANION GAP SERPL CALC-SCNC: 7 MMOL/L — SIGNIFICANT CHANGE UP (ref 5–17)
AST SERPL-CCNC: 26 U/L — SIGNIFICANT CHANGE UP (ref 15–37)
BASOPHILS # BLD AUTO: 0.02 K/UL — SIGNIFICANT CHANGE UP (ref 0–0.2)
BASOPHILS NFR BLD AUTO: 0.2 % — SIGNIFICANT CHANGE UP (ref 0–2)
BILIRUB SERPL-MCNC: 0.3 MG/DL — SIGNIFICANT CHANGE UP (ref 0.2–1.2)
BUN SERPL-MCNC: 14 MG/DL — SIGNIFICANT CHANGE UP (ref 7–23)
CALCIUM SERPL-MCNC: 8.8 MG/DL — SIGNIFICANT CHANGE UP (ref 8.5–10.1)
CHLORIDE SERPL-SCNC: 107 MMOL/L — SIGNIFICANT CHANGE UP (ref 96–108)
CO2 SERPL-SCNC: 27 MMOL/L — SIGNIFICANT CHANGE UP (ref 22–31)
CREAT SERPL-MCNC: 0.81 MG/DL — SIGNIFICANT CHANGE UP (ref 0.5–1.3)
EOSINOPHIL # BLD AUTO: 0.13 K/UL — SIGNIFICANT CHANGE UP (ref 0–0.5)
EOSINOPHIL NFR BLD AUTO: 1.6 % — SIGNIFICANT CHANGE UP (ref 0–6)
GLUCOSE SERPL-MCNC: 102 MG/DL — HIGH (ref 70–99)
HCT VFR BLD CALC: 36.6 % — LOW (ref 39–50)
HGB BLD-MCNC: 11.6 G/DL — LOW (ref 13–17)
IMM GRANULOCYTES NFR BLD AUTO: 0.2 % — SIGNIFICANT CHANGE UP (ref 0–1.5)
LYMPHOCYTES # BLD AUTO: 2.19 K/UL — SIGNIFICANT CHANGE UP (ref 1–3.3)
LYMPHOCYTES # BLD AUTO: 26.8 % — SIGNIFICANT CHANGE UP (ref 13–44)
MCHC RBC-ENTMCNC: 24.1 PG — LOW (ref 27–34)
MCHC RBC-ENTMCNC: 31.7 GM/DL — LOW (ref 32–36)
MCV RBC AUTO: 76.1 FL — LOW (ref 80–100)
MONOCYTES # BLD AUTO: 0.56 K/UL — SIGNIFICANT CHANGE UP (ref 0–0.9)
MONOCYTES NFR BLD AUTO: 6.9 % — SIGNIFICANT CHANGE UP (ref 2–14)
NEUTROPHILS # BLD AUTO: 5.24 K/UL — SIGNIFICANT CHANGE UP (ref 1.8–7.4)
NEUTROPHILS NFR BLD AUTO: 64.3 % — SIGNIFICANT CHANGE UP (ref 43–77)
NRBC # BLD: 0 /100 WBCS — SIGNIFICANT CHANGE UP (ref 0–0)
PLATELET # BLD AUTO: 241 K/UL — SIGNIFICANT CHANGE UP (ref 150–400)
POTASSIUM SERPL-MCNC: 4.7 MMOL/L — SIGNIFICANT CHANGE UP (ref 3.5–5.3)
POTASSIUM SERPL-SCNC: 4.7 MMOL/L — SIGNIFICANT CHANGE UP (ref 3.5–5.3)
PROT SERPL-MCNC: 8.2 GM/DL — SIGNIFICANT CHANGE UP (ref 6–8.3)
RBC # BLD: 4.81 M/UL — SIGNIFICANT CHANGE UP (ref 4.2–5.8)
RBC # FLD: 19.9 % — HIGH (ref 10.3–14.5)
SODIUM SERPL-SCNC: 141 MMOL/L — SIGNIFICANT CHANGE UP (ref 135–145)
WBC # BLD: 8.16 K/UL — SIGNIFICANT CHANGE UP (ref 3.8–10.5)
WBC # FLD AUTO: 8.16 K/UL — SIGNIFICANT CHANGE UP (ref 3.8–10.5)

## 2019-01-19 PROCEDURE — 99285 EMERGENCY DEPT VISIT HI MDM: CPT

## 2019-01-19 PROCEDURE — 71045 X-RAY EXAM CHEST 1 VIEW: CPT | Mod: 26

## 2019-01-19 PROCEDURE — 93971 EXTREMITY STUDY: CPT | Mod: 26,RT

## 2019-01-19 PROCEDURE — 99223 1ST HOSP IP/OBS HIGH 75: CPT | Mod: AI

## 2019-01-19 NOTE — H&P ADULT - HISTORY OF PRESENT ILLNESS
Pt admitted for DVT, needs IVC filter, in progress. 68 year old man with PMH HTN, prostate Ca s/p radiation, gastritis, recent diverticular bleed requiring 4 units pRBC (Dec 2018) presents to ED with complaint of right leg pain and swelling for the last 2-3 days.  He denies any injury, recent travel, tobacco, family history of blood clots.  Unclear if pt was on hormonal therapy for prostate Ca?    In the ED, duplex shows RLE DVT, rest of work up unremarkable.

## 2019-01-19 NOTE — ED PROVIDER NOTE - MEDICAL DECISION MAKING DETAILS
pt with recent GI bleed, DVT noted - from External iliac to calf - will admit for likely IVC filter placement and care. No current complaint of SOB or chest pain.

## 2019-01-19 NOTE — H&P ADULT - PROBLEM SELECTOR PLAN 1
Recent GIB, cannot be anticoagulated  Will consult surgery for possible IVC filter insertion  Will keep on heparin SC q8h as there is no evidence of active bleeding now

## 2019-01-19 NOTE — ED ADULT NURSE NOTE - NSIMPLEMENTINTERV_GEN_ALL_ED
Implemented All Fall Risk Interventions:  [place] to call system. Call bell, personal items and telephone within reach. Instruct patient to call for assistance. Room bathroom lighting operational. Non-slip footwear when patient is off stretcher. Physically safe environment: no spills, clutter or unnecessary equipment. Stretcher in lowest position, wheels locked, appropriate side rails in place. Provide visual cue, wrist band, yellow gown, etc. Monitor gait and stability. Monitor for mental status changes and reorient to person, place, and time. Review medications for side effects contributing to fall risk. Reinforce activity limits and safety measures with patient and family.

## 2019-01-19 NOTE — H&P ADULT - NSHPLABSRESULTS_GEN_ALL_CORE
Vital Signs Last 24 Hrs  T(C): 36.7 (2019 00:30), Max: 36.9 (2019 19:48)  T(F): 98 (2019 00:30), Max: 98.5 (2019 19:48)  HR: 90 (2019 00:30) (90 - 98)  BP: 162/76 (2019 00:30) (107/71 - 162/76)  BP(mean): --  RR: 18 (2019 00:30) (17 - 18)  SpO2: 98% (2019 00:30) (98% - 100%)          LABS:                        11.6   8.16  )-----------( 241      ( 2019 21:27 )             36.6     01-19    141  |  107  |  14  ----------------------------<  102<H>  4.7   |  27  |  0.81    Ca    8.8      2019 21:27    TPro  8.2  /  Alb  3.1<L>  /  TBili  0.3  /  DBili  x   /  AST  26  /  ALT  20  /  AlkPhos  114  -      Urinalysis Basic - ( 2019 01:23 )    Color: Yellow / Appearance: Clear / S.020 / pH: x  Gluc: x / Ketone: Negative  / Bili: Negative / Urobili: 1 mg/dL   Blood: x / Protein: 30 mg/dL / Nitrite: Negative   Leuk Esterase: Small / RBC: x / WBC 3-5   Sq Epi: x / Non Sq Epi: x / Bacteria: Occasional        RADIOLOGY & ADDITIONAL STUDIES:    US duplex LE:  IMPRESSION:   Extensive occlusive above the knee and below the knee DVT within the   right lower extremity from the external iliac vein to the calf.

## 2019-01-19 NOTE — ED PROVIDER NOTE - OBJECTIVE STATEMENT
68 year old male with PMH of anoxic encephalopathy, HTN, Prostate CA with radiation therapy - otherwise family states R lower leg seems more swollen than usual for past 4 days. Otherwise also making less urine than usual.

## 2019-01-19 NOTE — H&P ADULT - ASSESSMENT
68 year old man with PMH HTN, prostate Ca s/p radiation, gastritis, recent diverticular bleed requiring 4 units pRBC (Dec 2018) presents to ED with complaint of right leg pain and swelling for the last 2-3 days.  Patient will require admission for at least 2 midnights as detailed below:    IMPROVE VTE Individual Risk Assessment          RISK                                                          Points    [x  ] Previous VTE                                                3    [  ] Thrombophilia                                             2    [  ] Lower limb paralysis                                    2        (unable to hold up >15 seconds)      [  ] Current Cancer                                             2         (within 6 months)    [x  ] Immobilization > 24 hrs                              1    [  ] ICU/CCU stay > 24 hours                            1    [ x ] Age > 60                                                    1    IMPROVE VTE Score ________5_

## 2019-01-19 NOTE — H&P ADULT - NSHPPHYSICALEXAM_GEN_ALL_CORE
GENERAL: NAD, well-groomed, well-developed  HEAD:  Atraumatic, Normocephalic  EYES: EOMI, PERRLA, conjunctiva and sclera clear  ENMT: No tonsillar erythema, exudates, or enlargement; Moist mucous membranes, No lesions  NECK: Supple, No JVD, Normal thyroid  NERVOUS SYSTEM:  Alert & Oriented X3, Good concentration  CHEST/LUNG: Clear to ascultation bilaterally; No rales, rhonchi, wheezing, or rubs  HEART: Regular rate and rhythm; No murmurs, rubs, or gallops  ABDOMEN: Soft, Nontender, Nondistended; Bowel sounds present  EXTREMITIES: RLE tenderness and swelling at calf  SKIN: no rashes or lesions   PSYCH: normal affect and behavior

## 2019-01-20 DIAGNOSIS — C61 MALIGNANT NEOPLASM OF PROSTATE: ICD-10-CM

## 2019-01-20 DIAGNOSIS — I82.411 ACUTE EMBOLISM AND THROMBOSIS OF RIGHT FEMORAL VEIN: ICD-10-CM

## 2019-01-20 DIAGNOSIS — Z87.19 PERSONAL HISTORY OF OTHER DISEASES OF THE DIGESTIVE SYSTEM: ICD-10-CM

## 2019-01-20 DIAGNOSIS — I10 ESSENTIAL (PRIMARY) HYPERTENSION: ICD-10-CM

## 2019-01-20 DIAGNOSIS — Z29.9 ENCOUNTER FOR PROPHYLACTIC MEASURES, UNSPECIFIED: ICD-10-CM

## 2019-01-20 DIAGNOSIS — I80.10 PHLEBITIS AND THROMBOPHLEBITIS OF UNSPECIFIED FEMORAL VEIN: ICD-10-CM

## 2019-01-20 LAB
ANION GAP SERPL CALC-SCNC: 6 MMOL/L — SIGNIFICANT CHANGE UP (ref 5–17)
APPEARANCE UR: CLEAR — SIGNIFICANT CHANGE UP
APTT BLD: 30 SEC — SIGNIFICANT CHANGE UP (ref 28.5–37)
BACTERIA # UR AUTO: ABNORMAL
BASOPHILS # BLD AUTO: 0.03 K/UL — SIGNIFICANT CHANGE UP (ref 0–0.2)
BASOPHILS NFR BLD AUTO: 0.4 % — SIGNIFICANT CHANGE UP (ref 0–2)
BILIRUB UR-MCNC: NEGATIVE — SIGNIFICANT CHANGE UP
BUN SERPL-MCNC: 14 MG/DL — SIGNIFICANT CHANGE UP (ref 7–23)
CALCIUM SERPL-MCNC: 8.6 MG/DL — SIGNIFICANT CHANGE UP (ref 8.5–10.1)
CHLORIDE SERPL-SCNC: 106 MMOL/L — SIGNIFICANT CHANGE UP (ref 96–108)
CO2 SERPL-SCNC: 29 MMOL/L — SIGNIFICANT CHANGE UP (ref 22–31)
COLOR SPEC: YELLOW — SIGNIFICANT CHANGE UP
COMMENT - URINE: SIGNIFICANT CHANGE UP
CREAT SERPL-MCNC: 0.8 MG/DL — SIGNIFICANT CHANGE UP (ref 0.5–1.3)
DIFF PNL FLD: NEGATIVE — SIGNIFICANT CHANGE UP
EOSINOPHIL # BLD AUTO: 0.11 K/UL — SIGNIFICANT CHANGE UP (ref 0–0.5)
EOSINOPHIL NFR BLD AUTO: 1.5 % — SIGNIFICANT CHANGE UP (ref 0–6)
GLUCOSE SERPL-MCNC: 133 MG/DL — HIGH (ref 70–99)
GLUCOSE UR QL: NEGATIVE MG/DL — SIGNIFICANT CHANGE UP
HCT VFR BLD CALC: 35.1 % — LOW (ref 39–50)
HGB BLD-MCNC: 11.2 G/DL — LOW (ref 13–17)
IMM GRANULOCYTES NFR BLD AUTO: 0.1 % — SIGNIFICANT CHANGE UP (ref 0–1.5)
INR BLD: 1.28 RATIO — HIGH (ref 0.88–1.16)
KETONES UR-MCNC: NEGATIVE — SIGNIFICANT CHANGE UP
LEUKOCYTE ESTERASE UR-ACNC: ABNORMAL
LYMPHOCYTES # BLD AUTO: 1.97 K/UL — SIGNIFICANT CHANGE UP (ref 1–3.3)
LYMPHOCYTES # BLD AUTO: 26.4 % — SIGNIFICANT CHANGE UP (ref 13–44)
MCHC RBC-ENTMCNC: 24.1 PG — LOW (ref 27–34)
MCHC RBC-ENTMCNC: 31.9 GM/DL — LOW (ref 32–36)
MCV RBC AUTO: 75.6 FL — LOW (ref 80–100)
MONOCYTES # BLD AUTO: 0.41 K/UL — SIGNIFICANT CHANGE UP (ref 0–0.9)
MONOCYTES NFR BLD AUTO: 5.5 % — SIGNIFICANT CHANGE UP (ref 2–14)
NEUTROPHILS # BLD AUTO: 4.93 K/UL — SIGNIFICANT CHANGE UP (ref 1.8–7.4)
NEUTROPHILS NFR BLD AUTO: 66.1 % — SIGNIFICANT CHANGE UP (ref 43–77)
NITRITE UR-MCNC: NEGATIVE — SIGNIFICANT CHANGE UP
PH UR: 5 — SIGNIFICANT CHANGE UP (ref 5–8)
PLATELET # BLD AUTO: 217 K/UL — SIGNIFICANT CHANGE UP (ref 150–400)
POTASSIUM SERPL-MCNC: 4.6 MMOL/L — SIGNIFICANT CHANGE UP (ref 3.5–5.3)
POTASSIUM SERPL-SCNC: 4.6 MMOL/L — SIGNIFICANT CHANGE UP (ref 3.5–5.3)
PROT UR-MCNC: 30 MG/DL
PROTHROM AB SERPL-ACNC: 14.5 SEC — HIGH (ref 10–12.9)
RBC # BLD: 4.64 M/UL — SIGNIFICANT CHANGE UP (ref 4.2–5.8)
RBC # FLD: 20 % — HIGH (ref 10.3–14.5)
SODIUM SERPL-SCNC: 141 MMOL/L — SIGNIFICANT CHANGE UP (ref 135–145)
SP GR SPEC: 1.02 — SIGNIFICANT CHANGE UP (ref 1.01–1.02)
UROBILINOGEN FLD QL: 1 MG/DL
WBC # BLD: 7.46 K/UL — SIGNIFICANT CHANGE UP (ref 3.8–10.5)
WBC # FLD AUTO: 7.46 K/UL — SIGNIFICANT CHANGE UP (ref 3.8–10.5)
WBC UR QL: SIGNIFICANT CHANGE UP

## 2019-01-20 PROCEDURE — 93010 ELECTROCARDIOGRAM REPORT: CPT

## 2019-01-20 PROCEDURE — 99223 1ST HOSP IP/OBS HIGH 75: CPT

## 2019-01-20 PROCEDURE — 74018 RADEX ABDOMEN 1 VIEW: CPT | Mod: 26

## 2019-01-20 RX ORDER — CHOLECALCIFEROL (VITAMIN D3) 125 MCG
2000 CAPSULE ORAL DAILY
Qty: 0 | Refills: 0 | Status: DISCONTINUED | OUTPATIENT
Start: 2019-01-20 | End: 2019-01-22

## 2019-01-20 RX ORDER — FERROUS SULFATE 325(65) MG
325 TABLET ORAL EVERY 8 HOURS
Qty: 0 | Refills: 0 | Status: DISCONTINUED | OUTPATIENT
Start: 2019-01-20 | End: 2019-01-22

## 2019-01-20 RX ORDER — TIOTROPIUM BROMIDE 18 UG/1
1 CAPSULE ORAL; RESPIRATORY (INHALATION) DAILY
Qty: 0 | Refills: 0 | Status: DISCONTINUED | OUTPATIENT
Start: 2019-01-20 | End: 2019-01-22

## 2019-01-20 RX ORDER — HEPARIN SODIUM 5000 [USP'U]/ML
5000 INJECTION INTRAVENOUS; SUBCUTANEOUS EVERY 8 HOURS
Qty: 0 | Refills: 0 | Status: DISCONTINUED | OUTPATIENT
Start: 2019-01-20 | End: 2019-01-21

## 2019-01-20 RX ORDER — CARVEDILOL PHOSPHATE 80 MG/1
3.12 CAPSULE, EXTENDED RELEASE ORAL EVERY 12 HOURS
Qty: 0 | Refills: 0 | Status: DISCONTINUED | OUTPATIENT
Start: 2019-01-20 | End: 2019-01-21

## 2019-01-20 RX ORDER — ALBUTEROL 90 UG/1
2 AEROSOL, METERED ORAL EVERY 6 HOURS
Qty: 0 | Refills: 0 | Status: DISCONTINUED | OUTPATIENT
Start: 2019-01-20 | End: 2019-01-22

## 2019-01-20 RX ORDER — PANTOPRAZOLE SODIUM 20 MG/1
40 TABLET, DELAYED RELEASE ORAL
Qty: 0 | Refills: 0 | Status: DISCONTINUED | OUTPATIENT
Start: 2019-01-20 | End: 2019-01-22

## 2019-01-20 RX ORDER — ACETAMINOPHEN 500 MG
650 TABLET ORAL EVERY 6 HOURS
Qty: 0 | Refills: 0 | Status: DISCONTINUED | OUTPATIENT
Start: 2019-01-20 | End: 2019-01-22

## 2019-01-20 RX ADMIN — ALBUTEROL 2 PUFF(S): 90 AEROSOL, METERED ORAL at 06:40

## 2019-01-20 RX ADMIN — ALBUTEROL 2 PUFF(S): 90 AEROSOL, METERED ORAL at 23:41

## 2019-01-20 RX ADMIN — Medication 2000 UNIT(S): at 11:57

## 2019-01-20 RX ADMIN — PANTOPRAZOLE SODIUM 40 MILLIGRAM(S): 20 TABLET, DELAYED RELEASE ORAL at 08:33

## 2019-01-20 RX ADMIN — Medication 1 TABLET(S): at 11:57

## 2019-01-20 RX ADMIN — Medication 325 MILLIGRAM(S): at 06:40

## 2019-01-20 RX ADMIN — HEPARIN SODIUM 5000 UNIT(S): 5000 INJECTION INTRAVENOUS; SUBCUTANEOUS at 06:40

## 2019-01-20 RX ADMIN — TIOTROPIUM BROMIDE 1 CAPSULE(S): 18 CAPSULE ORAL; RESPIRATORY (INHALATION) at 15:13

## 2019-01-20 RX ADMIN — Medication 325 MILLIGRAM(S): at 15:12

## 2019-01-20 RX ADMIN — ALBUTEROL 2 PUFF(S): 90 AEROSOL, METERED ORAL at 11:58

## 2019-01-20 RX ADMIN — ALBUTEROL 2 PUFF(S): 90 AEROSOL, METERED ORAL at 17:44

## 2019-01-20 RX ADMIN — HEPARIN SODIUM 5000 UNIT(S): 5000 INJECTION INTRAVENOUS; SUBCUTANEOUS at 15:12

## 2019-01-20 RX ADMIN — CARVEDILOL PHOSPHATE 3.12 MILLIGRAM(S): 80 CAPSULE, EXTENDED RELEASE ORAL at 17:44

## 2019-01-20 RX ADMIN — CARVEDILOL PHOSPHATE 3.12 MILLIGRAM(S): 80 CAPSULE, EXTENDED RELEASE ORAL at 06:40

## 2019-01-20 RX ADMIN — HEPARIN SODIUM 5000 UNIT(S): 5000 INJECTION INTRAVENOUS; SUBCUTANEOUS at 21:26

## 2019-01-20 NOTE — PROGRESS NOTE ADULT - PROBLEM SELECTOR PLAN 1
Recent GIB, and possibly cannot be anticoagulated. GI to see the pt to determine if we can give AC. If not then IVC in warranted   surgery consult appreciated   Will keep on heparin SC q8h as there is no evidence of active bleeding now

## 2019-01-20 NOTE — CONSULT NOTE ADULT - SUBJECTIVE AND OBJECTIVE BOX
HPI:  68 year old man with PMH HTN, prostate Ca s/p radiation, gastritis, recent diverticular bleed requiring 4 units pRBC (Dec 2018) presents to ED with complaint of right leg pain and swelling for the last 2-3 days.  He denies any injury, recent travel, tobacco, family history of blood clots.  Unclear if pt was on hormonal therapy for prostate Ca?    In the ED, duplex shows RLE DVT, rest of work up unremarkable. (2019 23:25)  --------- As Above ---------------------------------------------------------------------------  The patient presents with a right leg DVT. Consult requested to have clearance from GI in      PAST MEDICAL & SURGICAL HISTORY:  Sudden cardiac death  Gait abnormality  Leg pain, right  Anoxic encephalopathy  Brain injury with coma: x 2 weeks in   Prostate cancer: radiation therapy  Hypertension  S/P ICD (internal cardiac defibrillator) procedure: implanted on 2009  H/O tracheostomy  H/O prostate cancer: resection and radiation therapy  Status post cryoablation: of left renal mass      MEDICATIONS  (STANDING):  ALBUTerol    90 MICROgram(s) HFA Inhaler 2 Puff(s) Inhalation every 6 hours  carvedilol 3.125 milliGRAM(s) Oral every 12 hours  cholecalciferol 2000 Unit(s) Oral daily  ferrous    sulfate 325 milliGRAM(s) Oral every 8 hours  heparin  Injectable 5000 Unit(s) SubCutaneous every 8 hours  multivitamin 1 Tablet(s) Oral daily  pantoprazole    Tablet 40 milliGRAM(s) Oral before breakfast  tiotropium 18 MICROgram(s) Capsule 1 Capsule(s) Inhalation daily    MEDICATIONS  (PRN):  acetaminophen   Tablet .. 650 milliGRAM(s) Oral every 6 hours PRN Temp greater or equal to 38C (100.4F), Mild Pain (1 - 3)      Allergies    lisinopril (Unknown)  penicillins (Unknown)    Intolerances        FAMILY HISTORY:  No pertinent family history in first degree relatives      REVIEW OF SYSTEMS:    CONSTITUTIONAL: No fever, weight loss, or fatigue  EYES: No eye pain, visual disturbances, or discharge  ENMT:  No difficulty hearing, tinnitus, vertigo; No sinus or throat pain  NECK: No pain or stiffness  BREASTS: No pain, masses, or nipple discharge  RESPIRATORY: No cough, wheezing, chills or hemoptysis; No shortness of breath  CARDIOVASCULAR: No chest pain, palpitations, dizziness, or leg swelling  GASTROINTESTINAL: No abdominal or epigastric pain. No nausea, vomiting, or hematemesis; No diarrhea or constipation. No melena or hematochezia.  GENITOURINARY: No dysuria, frequency, hematuria, or incontinence  NEUROLOGICAL: No headaches, memory loss, loss of strength, numbness, or tremors  SKIN: No itching, burning, rashes, or lesions   LYMPH NODES: No enlarged glands  ENDOCRINE: No heat or cold intolerance; No hair loss  MUSCULOSKELETAL: No joint pain or swelling; No muscle, back, or extremity pain  PSYCHIATRIC: No depression, anxiety, mood swings, or difficulty sleeping  HEME/LYMPH: No easy bruising, or bleeding gums  ALLERGY AND IMMUNOLOGIC: No hives or eczema          SOCIAL HISTORY:    FAMILY HISTORY:  No pertinent family history in first degree relatives      Vital Signs Last 24 Hrs  T(C): 36.8 (2019 12:54), Max: 36.9 (2019 19:48)  T(F): 98.3 (2019 12:54), Max: 98.5 (2019 19:48)  HR: 108 (2019 12:54) (90 - 108)  BP: 139/70 (2019 12:54) (107/71 - 162/76)  BP(mean): --  RR: 16 (2019 12:54) (16 - 18)  SpO2: 98% (2019 12:54) (98% - 100%)    PHYSICAL EXAM:    GENERAL: NAD, well-groomed, well-developed  HEAD:  Atraumatic, Normocephalic  EYES: EOMI, PERRLA, conjunctiva and sclera clear  ENMT: No tonsillar erythema, exudates, or enlargement; Moist mucous membranes, Good dentition, No lesions  NECK: Supple, No JVD, Normal thyroid  NERVOUS SYSTEM:  Alert & Oriented X3, Good concentration; Motor Strength 5/5 B/L upper and lower extremities; DTRs 2+ intact and symmetric  CHEST/LUNG: Clear to percussion bilaterally; No rales, rhonchi, wheezing, or rubs  HEART: Regular rate and rhythm; No murmurs, rubs, or gallops  ABDOMEN: Soft, Nontender, Nondistended; Bowel sounds present  EXTREMITIES:  2+ Peripheral Pulses, No clubbing, cyanosis, or edema  LYMPH: No lymphadenopathy noted   RECTAL: No masses, prostate normal size and smooth, Guaiaci negative   BREAST: No palpable masses, skin no lesions no retractions, no discharges. adnexal no palpable masses noted   GYN: uterus normal size, adnexal, no palpable masses, no CMT, no uterine discharge   SKIN: No rashes or lesions    LABS:                        11.2   7.46  )-----------( 217      ( 2019 09:42 )             35.1       CBC:   @ 09:42  WBC  7.46  HGB 11.2  HCT 35.1 Plate 217  MCV 75.6   @ 21:27  WBC  8.16  HGB 11.6  HCT 36.6 Plate 241  MCV 76.1           2019 09:42    141    |  106    |  14     ----------------------------<  133    4.6     |  29     |  0.80   2019 21:27    141    |  107    |  14     ----------------------------<  102    4.7     |  27     |  0.81     Ca    8.6        2019 09:42  Ca    8.8        2019 21:27    TPro  8.2    /  Alb  3.1    /  TBili  0.3    /  DBili  x      /  AST  26     /  ALT  20     /  AlkPhos  114    2019 21:27    PT/INR - ( 2019 09:42 )   PT: 14.5 sec;   INR: 1.28 ratio         PTT - ( 2019 09:42 )  PTT:30.0 sec  Urinalysis Basic - ( 2019 01:23 )    Color: Yellow / Appearance: Clear / S.020 / pH: x  Gluc: x / Ketone: Negative  / Bili: Negative / Urobili: 1 mg/dL   Blood: x / Protein: 30 mg/dL / Nitrite: Negative   Leuk Esterase: Small / RBC: x / WBC 3-5   Sq Epi: x / Non Sq Epi: x / Bacteria: Occasional          RADIOLOGY & ADDITIONAL STUDIES: HPI:  68 year old man with PMH HTN, prostate Ca s/p radiation, gastritis, recent diverticular bleed requiring 4 units pRBC (Dec 2018) presents to ED with complaint of right leg pain and swelling for the last 2-3 days.  He denies any injury, recent travel, tobacco, family history of blood clots.  Unclear if pt was on hormonal therapy for prostate Ca?    In the ED, duplex shows RLE DVT, rest of work up unremarkable. (2019 23:25)  --------- As Above ---------------------------------------------------------------------------  The patient presents with a right leg DVT. Consult requested to have clearance from GI for anticoagulation regarding recent GI bleed. Patient presented to [facility] 18 with GI bleed. Patient recently had a large bowel obstruction s/p repair. Three weeks later, he had the GI bleed. On 12/10/18, he had a colonoscopy ( polyp (not removed ) and diverticulum ) . EGD revealed mild antral gastritis and a small hiatal hernia. Since then, he has been doing well. The patient's wife states that he does have constipation ( hard stools ) but it is probably secondary to not drinking enough liquids. The wife denies melena, hematochezia, hematemesis, nausea, vomiting, abdominal pain, diarrhea, or change in bowel movements       PAST MEDICAL & SURGICAL HISTORY:  Sudden cardiac death  Gait abnormality  Leg pain, right  Anoxic encephalopathy  Brain injury with coma: x 2 weeks in   Prostate cancer: radiation therapy  Hypertension  S/P ICD (internal cardiac defibrillator) procedure: implanted on 2009  H/O tracheostomy  H/O prostate cancer: resection and radiation therapy  Status post cryoablation: of left renal mass      MEDICATIONS  (STANDING):  ALBUTerol    90 MICROgram(s) HFA Inhaler 2 Puff(s) Inhalation every 6 hours  carvedilol 3.125 milliGRAM(s) Oral every 12 hours  cholecalciferol 2000 Unit(s) Oral daily  ferrous    sulfate 325 milliGRAM(s) Oral every 8 hours  heparin  Injectable 5000 Unit(s) SubCutaneous every 8 hours  multivitamin 1 Tablet(s) Oral daily  pantoprazole    Tablet 40 milliGRAM(s) Oral before breakfast  tiotropium 18 MICROgram(s) Capsule 1 Capsule(s) Inhalation daily    MEDICATIONS  (PRN):  acetaminophen   Tablet .. 650 milliGRAM(s) Oral every 6 hours PRN Temp greater or equal to 38C (100.4F), Mild Pain (1 - 3)      Allergies    lisinopril (Unknown)  penicillins (Unknown)    Intolerances        FAMILY HISTORY:  No pertinent family history in first degree relatives      REVIEW OF SYSTEMS:    CONSTITUTIONAL: No fever, weight loss, or fatigue  EYES: No eye pain, visual disturbances, or discharge  ENMT:  No difficulty hearing, tinnitus, vertigo; No sinus or throat pain  NECK: No pain or stiffness  BREASTS: No pain, masses, or nipple discharge  RESPIRATORY: No cough, wheezing, chills or hemoptysis; No shortness of breath  CARDIOVASCULAR: No chest pain, palpitations, dizziness, or leg swelling  GASTROINTESTINAL: No abdominal or epigastric pain. No nausea, vomiting, or hematemesis; No diarrhea or constipation. No melena or hematochezia.  GENITOURINARY: No dysuria, frequency, hematuria, or incontinence  NEUROLOGICAL: No headaches, memory loss, loss of strength, numbness, or tremors  SKIN: No itching, burning, rashes, or lesions   LYMPH NODES: No enlarged glands  ENDOCRINE: No heat or cold intolerance; No hair loss  MUSCULOSKELETAL: No joint pain or swelling; No muscle, back, or extremity pain  PSYCHIATRIC: No depression, anxiety, mood swings, or difficulty sleeping  HEME/LYMPH: No easy bruising, or bleeding gums  ALLERGY AND IMMUNOLOGIC: No hives or eczema          SOCIAL HISTORY:    FAMILY HISTORY:  No pertinent family history in first degree relatives      Vital Signs Last 24 Hrs  T(C): 36.8 (2019 12:54), Max: 36.9 (2019 19:48)  T(F): 98.3 (2019 12:54), Max: 98.5 (2019 19:48)  HR: 108 (2019 12:54) (90 - 108)  BP: 139/70 (2019 12:54) (107/71 - 162/76)  BP(mean): --  RR: 16 (2019 12:54) (16 - 18)  SpO2: 98% (2019 12:54) (98% - 100%)    PHYSICAL EXAM:    GENERAL: NAD, well-groomed, well-developed  HEAD:  Atraumatic, Normocephalic  EYES: EOMI, PERRLA, conjunctiva and sclera clear  ENMT: No tonsillar erythema, exudates, or enlargement; Moist mucous membranes, Good dentition, No lesions  NECK: Supple, No JVD, Normal thyroid  NERVOUS SYSTEM:  Alert & Oriented X3, Good concentration; Motor Strength 5/5 B/L upper and lower extremities; DTRs 2+ intact and symmetric  CHEST/LUNG: Clear to percussion bilaterally; No rales, rhonchi, wheezing, or rubs  HEART: Regular rate and rhythm; No murmurs, rubs, or gallops  ABDOMEN: Soft, Nontender, Nondistended; Bowel sounds present  EXTREMITIES:  2+ Peripheral Pulses, No clubbing, cyanosis, or edema  LYMPH: No lymphadenopathy noted   RECTAL: No masses, prostate normal size and smooth, Guaiaci negative   BREAST: No palpable masses, skin no lesions no retractions, no discharges. adnexal no palpable masses noted   GYN: uterus normal size, adnexal, no palpable masses, no CMT, no uterine discharge   SKIN: No rashes or lesions    LABS:                        11.2   7.46  )-----------( 217      ( 2019 09:42 )             35.1       CBC:   @ 09:42  WBC  7.46  HGB 11.2  HCT 35.1 Plate 217  MCV 75.6   @ 21:27  WBC  8.16  HGB 11.6  HCT 36.6 Plate 241  MCV 76.1           2019 09:42    141    |  106    |  14     ----------------------------<  133    4.6     |  29     |  0.80   2019 21:27    141    |  107    |  14     ----------------------------<  102    4.7     |  27     |  0.81     Ca    8.6        2019 09:42  Ca    8.8        2019 21:27    TPro  8.2    /  Alb  3.1    /  TBili  0.3    /  DBili  x      /  AST  26     /  ALT  20     /  AlkPhos  114    2019 21:27    PT/INR - ( 2019 09:42 )   PT: 14.5 sec;   INR: 1.28 ratio         PTT - ( 2019 09:42 )  PTT:30.0 sec  Urinalysis Basic - ( 2019 01:23 )    Color: Yellow / Appearance: Clear / S.020 / pH: x  Gluc: x / Ketone: Negative  / Bili: Negative / Urobili: 1 mg/dL   Blood: x / Protein: 30 mg/dL / Nitrite: Negative   Leuk Esterase: Small / RBC: x / WBC 3-5   Sq Epi: x / Non Sq Epi: x / Bacteria: Occasional          RADIOLOGY & ADDITIONAL STUDIES: HPI:  68 year old man with PMH HTN, prostate Ca s/p radiation, gastritis, recent diverticular bleed requiring 4 units pRBC (Dec 2018) presents to ED with complaint of right leg pain and swelling for the last 2-3 days.  He denies any injury, recent travel, tobacco, family history of blood clots.  Unclear if pt was on hormonal therapy for prostate Ca?    In the ED, duplex shows RLE DVT, rest of work up unremarkable. (2019 23:25)  --------- As Above ---------------------------------------------------------------------------  The patient presents with a right leg DVT. Consult requested to have clearance from GI for anticoagulation regarding recent GI bleed. Patient presented to [facility] 18 with GI bleed. Patient recently had a large bowel obstruction s/p repair. Three weeks later, he had the GI bleed. On 12/10/18, he had a colonoscopy ( polyp (not removed ) and diverticulum ) . EGD revealed mild antral gastritis and a small hiatal hernia. Since then, he has been doing well. The patient's wife states that he does have constipation ( hard stools ) but it is probably secondary to not drinking enough liquids. The wife denies melena, hematochezia, hematemesis, nausea, vomiting, abdominal pain, diarrhea, or change in bowel movements       PAST MEDICAL & SURGICAL HISTORY:  Sudden cardiac death  Gait abnormality  Leg pain, right  Anoxic encephalopathy  Brain injury with coma: x 2 weeks in   Prostate cancer: radiation therapy  Hypertension  S/P ICD (internal cardiac defibrillator) procedure: implanted on 2009  H/O tracheostomy  H/O prostate cancer: resection and radiation therapy  Status post cryoablation: of left renal mass      MEDICATIONS  (STANDING):  ALBUTerol    90 MICROgram(s) HFA Inhaler 2 Puff(s) Inhalation every 6 hours  carvedilol 3.125 milliGRAM(s) Oral every 12 hours  cholecalciferol 2000 Unit(s) Oral daily  ferrous    sulfate 325 milliGRAM(s) Oral every 8 hours  heparin  Injectable 5000 Unit(s) SubCutaneous every 8 hours  multivitamin 1 Tablet(s) Oral daily  pantoprazole    Tablet 40 milliGRAM(s) Oral before breakfast  tiotropium 18 MICROgram(s) Capsule 1 Capsule(s) Inhalation daily    MEDICATIONS  (PRN):  acetaminophen   Tablet .. 650 milliGRAM(s) Oral every 6 hours PRN Temp greater or equal to 38C (100.4F), Mild Pain (1 - 3)      Allergies    lisinopril (Unknown)  penicillins (Unknown)    Intolerances        FAMILY HISTORY:  No pertinent family history in first degree relatives      REVIEW OF SYSTEMS:    CONSTITUTIONAL: No fever, weight loss, or fatigue  EYES: No eye pain, visual disturbances, or discharge  ENMT:  No difficulty hearing, tinnitus, vertigo; No sinus or throat pain  NECK: No pain or stiffness  BREASTS: No pain, masses, or nipple discharge  RESPIRATORY: No cough, wheezing, chills or hemoptysis; No shortness of breath  CARDIOVASCULAR: No chest pain, palpitations, dizziness, or leg swelling  GASTROINTESTINAL: See above  GENITOURINARY: No dysuria, frequency, hematuria, or incontinence  NEUROLOGICAL: No headaches, numbness, or tremors  SKIN: No itching, burning, rashes, or lesions   LYMPH NODES: No enlarged glands  ENDOCRINE: No heat or cold intolerance; No hair loss  MUSCULOSKELETAL: No joint pain or swelling; No muscle, back, or extremity pain  PSYCHIATRIC: No depression, anxiety, mood swings, or difficulty sleeping  HEME/LYMPH: No easy bruising, or bleeding gums  ALLERGY AND IMMUNOLOGIC: No hives or eczema          SOCIAL HISTORY:    FAMILY HISTORY:  No pertinent family history in first degree relatives      Vital Signs Last 24 Hrs  T(C): 36.8 (2019 12:54), Max: 36.9 (2019 19:48)  T(F): 98.3 (2019 12:54), Max: 98.5 (2019 19:48)  HR: 108 (2019 12:54) (90 - 108)  BP: 139/70 (2019 12:54) (107/71 - 162/76)  BP(mean): --  RR: 16 (2019 12:54) (16 - 18)  SpO2: 98% (2019 12:54) (98% - 100%)    PHYSICAL EXAM:    GENERAL: NAD, well-groomed, well-developed  HEAD:  Atraumatic, Normocephalic  EYES: EOMI, PERRLA, conjunctiva and sclera clear  NECK: Supple, No JVD, Normal thyroid  NERVOUS SYSTEM:  Alert & Oriented X3, Good concentration;   CHEST/LUNG: Clear to percussion bilaterally; No rales, rhonchi, wheezing, or rubs  HEART: Regular rate and rhythm; No murmurs, rubs, or gallops  ABDOMEN: Soft, Nontender, Nondistended; Bowel sounds present  EXTREMITIES:  2+ Peripheral Pulses, No clubbing, cyanosis, or edema  LYMPH: No lymphadenopathy noted   RECTAL: Full of hard stool ( brown )    SKIN: No rashes or lesions    LABS:                        11.2   7.46  )-----------( 217      ( 2019 09:42 )             35.1       CBC:   @ 09:42  WBC  7.46  HGB 11.2  HCT 35.1 Plate 217  MCV 75.6   @ 21:27  WBC  8.16  HGB 11.6  HCT 36.6 Plate 241  MCV 76.1           2019 09:42    141    |  106    |  14     ----------------------------<  133    4.6     |  29     |  0.80   2019 21:27    141    |  107    |  14     ----------------------------<  102    4.7     |  27     |  0.81     Ca    8.6        2019 09:42  Ca    8.8        2019 21:27    TPro  8.2    /  Alb  3.1    /  TBili  0.3    /  DBili  x      /  AST  26     /  ALT  20     /  AlkPhos  114    2019 21:27    PT/INR - ( 2019 09:42 )   PT: 14.5 sec;   INR: 1.28 ratio         PTT - ( 2019 09:42 )  PTT:30.0 sec  Urinalysis Basic - ( 2019 01:23 )    Color: Yellow / Appearance: Clear / S.020 / pH: x  Gluc: x / Ketone: Negative  / Bili: Negative / Urobili: 1 mg/dL   Blood: x / Protein: 30 mg/dL / Nitrite: Negative   Leuk Esterase: Small / RBC: x / WBC 3-5   Sq Epi: x / Non Sq Epi: x / Bacteria: Occasional          RADIOLOGY & ADDITIONAL STUDIES:

## 2019-01-20 NOTE — PROGRESS NOTE ADULT - SUBJECTIVE AND OBJECTIVE BOX
Patient is a 68y old  Male who presents with a chief complaint of DVT, recent GIB, cannot be on AC (2019 09:46)      INTERVAL HPI/OVERNIGHT EVENTS: no acute events. denies cp     MEDICATIONS  (STANDING):  ALBUTerol    90 MICROgram(s) HFA Inhaler 2 Puff(s) Inhalation every 6 hours  carvedilol 3.125 milliGRAM(s) Oral every 12 hours  cholecalciferol 2000 Unit(s) Oral daily  ferrous    sulfate 325 milliGRAM(s) Oral every 8 hours  heparin  Injectable 5000 Unit(s) SubCutaneous every 8 hours  multivitamin 1 Tablet(s) Oral daily  pantoprazole    Tablet 40 milliGRAM(s) Oral before breakfast  tiotropium 18 MICROgram(s) Capsule 1 Capsule(s) Inhalation daily    MEDICATIONS  (PRN):  acetaminophen   Tablet .. 650 milliGRAM(s) Oral every 6 hours PRN Temp greater or equal to 38C (100.4F), Mild Pain (1 - 3)      Allergies    lisinopril (Unknown)  penicillins (Unknown)    Intolerances        REVIEW OF SYSTEMS:  CONSTITUTIONAL: No fever, weight loss, or fatigue  EYES: No eye pain, visual disturbances, or discharge  ENMT:  No difficulty hearing, tinnitus, vertigo; No sinus or throat pain  RESPIRATORY: No cough, wheezing, chills or hemoptysis; No shortness of breath  CARDIOVASCULAR: No chest pain, palpitations, dizziness, or leg swelling  GASTROINTESTINAL: No abdominal or epigastric pain. No nausea, vomiting, or hematemesis; No diarrhea or constipation. No melena or hematochezia.  GENITOURINARY: No dysuria, frequency, hematuria, or incontinence    Vital Signs Last 24 Hrs  T(C): 36.6 (2019 05:18), Max: 36.9 (2019 19:48)  T(F): 97.8 (2019 05:18), Max: 98.5 (2019 19:48)  HR: 99 (2019 05:18) (90 - 99)  BP: 142/80 (2019 05:18) (107/71 - 162/76)  BP(mean): --  RR: 18 (2019 05:18) (17 - 18)  SpO2: 99% (2019 05:18) (98% - 100%)    PHYSICAL EXAM:  GENERAL: non toxic, dysarthric   HEAD:  Atraumatic, Normocephalic  EYES: EOMI, PERRLA, conjunctiva and sclera clear  ENMT: No tonsillar erythema, exudates, or enlargement; Moist mucous membranes, Good dentition, No lesions  NECK: Supple, No JVD, Normal thyroid  NERVOUS SYSTEM:  Alert & Oriented X3, Good concentration;   CHEST/LUNG: Clear to percussion bilaterally; No rales, rhonchi, wheezing, or rubs  HEART: Regular rate and rhythm; No murmurs, rubs, or gallops  ABDOMEN: Soft, Nontender, Nondistended; Bowel sounds present  EXTREMITIES:  2+ Peripheral Pulses, No clubbing, cyanosis, or edema  LYMPH: No lymphadenopathy noted  SKIN: No rashes or lesions    LABS:                        11.2   7.46  )-----------( 217      ( 2019 09:42 )             35.1     -    141  |  106  |  14  ----------------------------<  133<H>  4.6   |  29  |  0.80    Ca    8.6      2019 09:42    TPro  8.2  /  Alb  3.1<L>  /  TBili  0.3  /  DBili  x   /  AST  26  /  ALT  20  /  AlkPhos  114  -    PT/INR - ( 2019 09:42 )   PT: 14.5 sec;   INR: 1.28 ratio         PTT - ( 2019 09:42 )  PTT:30.0 sec  Urinalysis Basic - ( 2019 01:23 )    Color: Yellow / Appearance: Clear / S.020 / pH: x  Gluc: x / Ketone: Negative  / Bili: Negative / Urobili: 1 mg/dL   Blood: x / Protein: 30 mg/dL / Nitrite: Negative   Leuk Esterase: Small / RBC: x / WBC 3-5   Sq Epi: x / Non Sq Epi: x / Bacteria: Occasional      CAPILLARY BLOOD GLUCOSE          RADIOLOGY & ADDITIONAL TESTS:    Imaging Personally Reviewed:  [ ] YES  [ ] NO    Consultant(s) Notes Reviewed:  [x ] YES  [ ] NO    Care Discussed with Consultants/Other Providers [ ] YES  [ ] NO Patient is a 68y old  Male who presents with a chief complaint of DVT, recent GIB, cannot be on AC (2019 09:46)      INTERVAL HPI/OVERNIGHT EVENTS: no acute events. denies cp     MEDICATIONS  (STANDING):  ALBUTerol    90 MICROgram(s) HFA Inhaler 2 Puff(s) Inhalation every 6 hours  carvedilol 3.125 milliGRAM(s) Oral every 12 hours  cholecalciferol 2000 Unit(s) Oral daily  ferrous    sulfate 325 milliGRAM(s) Oral every 8 hours  heparin  Injectable 5000 Unit(s) SubCutaneous every 8 hours  multivitamin 1 Tablet(s) Oral daily  pantoprazole    Tablet 40 milliGRAM(s) Oral before breakfast  tiotropium 18 MICROgram(s) Capsule 1 Capsule(s) Inhalation daily    MEDICATIONS  (PRN):  acetaminophen   Tablet .. 650 milliGRAM(s) Oral every 6 hours PRN Temp greater or equal to 38C (100.4F), Mild Pain (1 - 3)      Allergies    lisinopril (Unknown)  penicillins (Unknown)    Intolerances        REVIEW OF SYSTEMS:  CONSTITUTIONAL: No fever, weight loss, or fatigue  EYES: No eye pain, visual disturbances, or discharge  ENMT:  No difficulty hearing, tinnitus, vertigo; No sinus or throat pain  RESPIRATORY: No cough, wheezing, chills or hemoptysis; No shortness of breath  CARDIOVASCULAR: No chest pain, palpitations, dizziness, or leg swelling  GASTROINTESTINAL: No abdominal or epigastric pain. No nausea, vomiting, or hematemesis; No diarrhea or constipation. No melena or hematochezia.  GENITOURINARY: No dysuria, frequency, hematuria, or incontinence    Vital Signs Last 24 Hrs  T(C): 36.6 (2019 05:18), Max: 36.9 (2019 19:48)  T(F): 97.8 (2019 05:18), Max: 98.5 (2019 19:48)  HR: 99 (2019 05:18) (90 - 99)  BP: 142/80 (2019 05:18) (107/71 - 162/76)  BP(mean): --  RR: 18 (2019 05:18) (17 - 18)  SpO2: 99% (2019 05:18) (98% - 100%)    PHYSICAL EXAM:  GENERAL: non toxic, dysarthric   HEAD:  Atraumatic, Normocephalic  EYES: EOMI, PERRLA, conjunctiva and sclera clear  ENMT: No tonsillar erythema, exudates, or enlargement; Moist mucous membranes, Good dentition, No lesions  NECK: Supple, No JVD, Normal thyroid  NERVOUS SYSTEM:  Alert & Oriented X3, Good concentration;   CHEST/LUNG: Clear to percussion bilaterally; No rales, rhonchi, wheezing, or rubs  HEART: Regular rate and rhythm; No murmurs, rubs, or gallops  ABDOMEN: Soft, Nontender, Nondistended; Bowel sounds present  EXTREMITIES:  2+ Peripheral Pulses,RLE edema and tenderness  LYMPH: No lymphadenopathy noted  SKIN: No rashes or lesions    LABS:                        11.2   7.46  )-----------( 217      ( 2019 09:42 )             35.1     -    141  |  106  |  14  ----------------------------<  133<H>  4.6   |  29  |  0.80    Ca    8.6      2019 09:42    TPro  8.2  /  Alb  3.1<L>  /  TBili  0.3  /  DBili  x   /  AST  26  /  ALT  20  /  AlkPhos  114  01-19    PT/INR - ( 2019 09:42 )   PT: 14.5 sec;   INR: 1.28 ratio         PTT - ( 2019 09:42 )  PTT:30.0 sec  Urinalysis Basic - ( 2019 01:23 )    Color: Yellow / Appearance: Clear / S.020 / pH: x  Gluc: x / Ketone: Negative  / Bili: Negative / Urobili: 1 mg/dL   Blood: x / Protein: 30 mg/dL / Nitrite: Negative   Leuk Esterase: Small / RBC: x / WBC 3-5   Sq Epi: x / Non Sq Epi: x / Bacteria: Occasional      CAPILLARY BLOOD GLUCOSE          RADIOLOGY & ADDITIONAL TESTS:    Imaging Personally Reviewed:  [ ] YES  [ ] NO    Consultant(s) Notes Reviewed:  [x ] YES  [ ] NO    Care Discussed with Consultants/Other Providers [ ] YES  [ ] NO

## 2019-01-20 NOTE — CONSULT NOTE ADULT - ASSESSMENT
HPI:  68 year old man with PMH HTN, prostate Ca s/p radiation, gastritis, recent diverticular bleed requiring 4 units pRBC (Dec 2018) presents to ED with complaint of right leg pain and swelling for the last 2-3 days.  He denies any injury, recent travel, tobacco, family history of blood clots.  Unclear if pt was on hormonal therapy for prostate Ca?    In the ED, duplex shows RLE DVT, rest of work up unremarkable. (19 Jan 2019 23:25)  --------- As Above ---------------------------------------------------------------------------  The patient presents with a right leg DVT. Consult requested to have clearance from GI for anticoagulation regarding recent GI bleed. Patient presented to [facility] 12/9/18 with GI bleed. Patient recently had a large bowel obstruction s/p repair. Three weeks later, he had the GI bleed. On 12/10/18, he had a colonoscopy ( polyp (not removed ) and diverticulum ) . EGD revealed mild antral gastritis and a small hiatal hernia. Since then, he has been doing well. The patient's wife states that he does have constipation ( hard stools ) but it is probably secondary to not drinking enough liquids. The wife denies melena, hematochezia, hematemesis, nausea, vomiting, abdominal pain, diarrhea, or change in bowel movements     DVT, recent history of GI bleed - Patient probably experienced diverticular bleed in [facility]. With the exceptiont of constipation which is probably attrubutable to poor fluid intake and iron, the patient has no GI symptoms. Rectal exam was negative. a stool specimen was obtained. The nurse knows to take a sample from inside the stool ball to check for hemoglobin. If heme (-), cleared by GI for anticoagulation. IF (+), patient should have a SBS prior to anticoagulation.

## 2019-01-20 NOTE — CONSULT NOTE ADULT - SUBJECTIVE AND OBJECTIVE BOX
Vascular Attendin-20-19 Pt is seen and examined and the history is obtained from the wife at the Bedside. Pt with 3 days H/O swelling in the right LE and has H/O GI Bleed last monthe ,s/p colon resection in . No active GI Bleed now. Hb is 11.6  No SOB and clinical suspicion of PE.    HPI:  68 year old man with PMH HTN, prostate Ca s/p radiation, gastritis, recent diverticular bleed requiring 4 units pRBC (Dec 2018) presents to ED with complaint of right leg pain and swelling for the last 2-3 days.  He denies any injury, recent travel, tobacco, family history of blood clots.  Unclear if pt was on hormonal therapy for prostate Ca?    In the ED, duplex shows RLE DVT, rest of work up unremarkable. (2019 23:25)      PAST MEDICAL & SURGICAL HISTORY:  Sudden cardiac death  Gait abnormality  Leg pain, right  Anoxic encephalopathy  Brain injury with coma: x 2 weeks in   Prostate cancer: radiation therapy  Hypertension  S/P ICD (internal cardiac defibrillator) procedure: implanted on 2009  H/O tracheostomy  H/O prostate cancer: resection and radiation therapy  Status post cryoablation: of left renal mass        MEDICATIONS  (STANDING):  ALBUTerol    90 MICROgram(s) HFA Inhaler 2 Puff(s) Inhalation every 6 hours  carvedilol 3.125 milliGRAM(s) Oral every 12 hours  cholecalciferol 2000 Unit(s) Oral daily  ferrous    sulfate 325 milliGRAM(s) Oral every 8 hours  heparin  Injectable 5000 Unit(s) SubCutaneous every 8 hours  multivitamin 1 Tablet(s) Oral daily  pantoprazole    Tablet 40 milliGRAM(s) Oral before breakfast  tiotropium 18 MICROgram(s) Capsule 1 Capsule(s) Inhalation daily    MEDICATIONS  (PRN):  acetaminophen   Tablet .. 650 milliGRAM(s) Oral every 6 hours PRN Temp greater or equal to 38C (100.4F), Mild Pain (1 - 3)      Allergies    lisinopril (Unknown)  penicillins (Unknown)    Intolerances        SOCIAL HISTORY:      Vital Signs Last 24 Hrs  T(C): 36.6 (2019 05:18), Max: 36.9 (2019 19:48)  T(F): 97.8 (2019 05:18), Max: 98.5 (2019 19:48)  HR: 99 (2019 05:18) (90 - 99)  BP: 142/80 (2019 05:18) (107/71 - 162/76)  BP(mean): --  RR: 18 (2019 05:18) (17 - 18)  SpO2: 99% (2019 05:18) (98% - 100%)    P/E:-  Pt is awake well Built and well nourished, obvious dementia consistent with Anoxic Brain damage. Non Ambulatory.  CAROTIDS:- Bilateral carotids with no Bruits. No scars of previous catheterisation.  UPPER EXTREMITIES:- Bilateral radial artery pulses are normal and no ischemia of the Hands. No edema of the arms.  ABDOMEN:- No pulsatile mass in the abdomen and no ascites. Mid line well healed surgical incision.  LOWER EXTREMITIES:- Bilateral LE with  Edema right more than left and no CVI, No varicose veins, no ulcers.The arterial pulse are examined with palpation and Dopplers and the findings are as follows,    Pulses:   Right:                                                                          Left:  FEM [ ]2+ [ ]1+ [ ]doppler                                             FEM [ ]2+ [ ]1+ [ ]doppler    POP [ ]2+ [ ]1+ [ ]doppler                                             POP [ ]2+ [ ]1+ [ ]doppler    DP [ ]2+ [ ]1+ [ ]doppler                                                DP [ ]2+ [ ]1+ [ ]doppler  PT[ ]2+ [ ]1+ [ ]doppler                                                  PT [ ]2+ [ ]1+ [ ]doppler      LABS:                        11.2   7.46  )-----------( 217      ( 2019 09:42 )             35.1         141  |  107  |  14  ----------------------------<  102<H>  4.7   |  27  |  0.81    Ca    8.8      2019 21:27    TPro  8.2  /  Alb  3.1<L>  /  TBili  0.3  /  DBili  x   /  AST  26  /  ALT  20  /  AlkPhos  114        Urinalysis Basic - ( 2019 01:23 )    Color: Yellow / Appearance: Clear / S.020 / pH: x  Gluc: x / Ketone: Negative  / Bili: Negative / Urobili: 1 mg/dL   Blood: x / Protein: 30 mg/dL / Nitrite: Negative   Leuk Esterase: Small / RBC: x / WBC 3-5   Sq Epi: x / Non Sq Epi: x / Bacteria: Occasional        RADIOLOGY & ADDITIONAL STUDIES  AM:  US DPLX LWR EXT VEINS LTD RT                            PROCEDURE DATE:  2019          INTERPRETATION:  CLINICAL INFORMATION: Swelling    COMPARISON: Ultrasound 2018    TECHNIQUE: Duplex sonography of the RIGHT LOWER extremity with color and   spectral Doppler, with and without compression.      FINDINGS:    There is extensive DVT within the right external iliac vein, common   femoral vein, femoral vein, and popliteal vein which is mostly occlusive.   Minimal color flow notedwithin the common femoral and mid femoral veins.   Greater saphenous and calf vein thrombosis also noted.    IMPRESSION:     Extensive occlusive above the knee and below the knee DVT within the   right lower extremity from the external iliac vein to the calf.    Discussed with Dr. Gregorio at 11:06 PM            Impression and Plan: Patient has extensive right LE DVT, and H/O lower GI bleed post laparotomy. No active bleeding Now.    REC--- GI eval , (I spoke with  Dr Carpenter) to eval the pt if AC can be given, even for short term. If ok then start IV Heparin and if AC cannot be given then will schedule for IVC filter . I have discussed with the pts wife .

## 2019-01-21 DIAGNOSIS — I82.4Z1 ACUTE EMBOLISM AND THROMBOSIS OF UNSPECIFIED DEEP VEINS OF RIGHT DISTAL LOWER EXTREMITY: ICD-10-CM

## 2019-01-21 LAB
CULTURE RESULTS: SIGNIFICANT CHANGE UP
HCT VFR BLD CALC: 33.9 % — LOW (ref 39–50)
HGB BLD-MCNC: 10.8 G/DL — LOW (ref 13–17)
MCHC RBC-ENTMCNC: 24.1 PG — LOW (ref 27–34)
MCHC RBC-ENTMCNC: 31.9 GM/DL — LOW (ref 32–36)
MCV RBC AUTO: 75.7 FL — LOW (ref 80–100)
NRBC # BLD: 0 /100 WBCS — SIGNIFICANT CHANGE UP (ref 0–0)
OB PNL STL: NEGATIVE — SIGNIFICANT CHANGE UP
PLATELET # BLD AUTO: 196 K/UL — SIGNIFICANT CHANGE UP (ref 150–400)
RBC # BLD: 4.48 M/UL — SIGNIFICANT CHANGE UP (ref 4.2–5.8)
RBC # FLD: 19.9 % — HIGH (ref 10.3–14.5)
SPECIMEN SOURCE: SIGNIFICANT CHANGE UP
WBC # BLD: 5.91 K/UL — SIGNIFICANT CHANGE UP (ref 3.8–10.5)
WBC # FLD AUTO: 5.91 K/UL — SIGNIFICANT CHANGE UP (ref 3.8–10.5)

## 2019-01-21 PROCEDURE — 99232 SBSQ HOSP IP/OBS MODERATE 35: CPT

## 2019-01-21 RX ORDER — HEPARIN SODIUM 5000 [USP'U]/ML
6500 INJECTION INTRAVENOUS; SUBCUTANEOUS EVERY 6 HOURS
Qty: 0 | Refills: 0 | Status: DISCONTINUED | OUTPATIENT
Start: 2019-01-21 | End: 2019-01-21

## 2019-01-21 RX ORDER — RIVAROXABAN 15 MG-20MG
15 KIT ORAL
Qty: 0 | Refills: 0 | Status: DISCONTINUED | OUTPATIENT
Start: 2019-01-21 | End: 2019-01-22

## 2019-01-21 RX ORDER — HEPARIN SODIUM 5000 [USP'U]/ML
3000 INJECTION INTRAVENOUS; SUBCUTANEOUS EVERY 6 HOURS
Qty: 0 | Refills: 0 | Status: DISCONTINUED | OUTPATIENT
Start: 2019-01-21 | End: 2019-01-21

## 2019-01-21 RX ORDER — CARVEDILOL PHOSPHATE 80 MG/1
6.25 CAPSULE, EXTENDED RELEASE ORAL EVERY 12 HOURS
Qty: 0 | Refills: 0 | Status: DISCONTINUED | OUTPATIENT
Start: 2019-01-21 | End: 2019-01-22

## 2019-01-21 RX ORDER — HEPARIN SODIUM 5000 [USP'U]/ML
INJECTION INTRAVENOUS; SUBCUTANEOUS
Qty: 25000 | Refills: 0 | Status: DISCONTINUED | OUTPATIENT
Start: 2019-01-21 | End: 2019-01-21

## 2019-01-21 RX ORDER — HEPARIN SODIUM 5000 [USP'U]/ML
6500 INJECTION INTRAVENOUS; SUBCUTANEOUS ONCE
Qty: 0 | Refills: 0 | Status: DISCONTINUED | OUTPATIENT
Start: 2019-01-21 | End: 2019-01-21

## 2019-01-21 RX ADMIN — ALBUTEROL 2 PUFF(S): 90 AEROSOL, METERED ORAL at 11:41

## 2019-01-21 RX ADMIN — RIVAROXABAN 15 MILLIGRAM(S): KIT at 11:41

## 2019-01-21 RX ADMIN — TIOTROPIUM BROMIDE 1 CAPSULE(S): 18 CAPSULE ORAL; RESPIRATORY (INHALATION) at 11:41

## 2019-01-21 RX ADMIN — CARVEDILOL PHOSPHATE 6.25 MILLIGRAM(S): 80 CAPSULE, EXTENDED RELEASE ORAL at 17:20

## 2019-01-21 RX ADMIN — PANTOPRAZOLE SODIUM 40 MILLIGRAM(S): 20 TABLET, DELAYED RELEASE ORAL at 07:31

## 2019-01-21 RX ADMIN — CARVEDILOL PHOSPHATE 3.12 MILLIGRAM(S): 80 CAPSULE, EXTENDED RELEASE ORAL at 05:50

## 2019-01-21 RX ADMIN — ALBUTEROL 2 PUFF(S): 90 AEROSOL, METERED ORAL at 17:20

## 2019-01-21 RX ADMIN — Medication 325 MILLIGRAM(S): at 05:50

## 2019-01-21 RX ADMIN — Medication 325 MILLIGRAM(S): at 22:59

## 2019-01-21 RX ADMIN — Medication 2000 UNIT(S): at 11:41

## 2019-01-21 RX ADMIN — HEPARIN SODIUM 5000 UNIT(S): 5000 INJECTION INTRAVENOUS; SUBCUTANEOUS at 05:50

## 2019-01-21 RX ADMIN — ALBUTEROL 2 PUFF(S): 90 AEROSOL, METERED ORAL at 23:16

## 2019-01-21 RX ADMIN — Medication 1 TABLET(S): at 11:41

## 2019-01-21 RX ADMIN — ALBUTEROL 2 PUFF(S): 90 AEROSOL, METERED ORAL at 05:50

## 2019-01-21 RX ADMIN — RIVAROXABAN 15 MILLIGRAM(S): KIT at 22:59

## 2019-01-21 NOTE — PROGRESS NOTE ADULT - ASSESSMENT
HPI:  68 year old man with PMH HTN, prostate Ca s/p radiation, gastritis, recent diverticular bleed requiring 4 units pRBC (Dec 2018) presents to ED with complaint of right leg pain and swelling for the last 2-3 days.  He denies any injury, recent travel, tobacco, family history of blood clots.  Unclear if pt was on hormonal therapy for prostate Ca?    In the ED, duplex shows RLE DVT, rest of work up unremarkable. (19 Jan 2019 23:25)  --------- As Above ---------------------------------------------------------------------------  The patient presents with a right leg DVT. Consult requested to have clearance from GI for anticoagulation regarding recent GI bleed. Patient presented to [facility] 12/9/18 with GI bleed. Patient recently had a large bowel obstruction s/p repair. Three weeks later, he had the GI bleed. On 12/10/18, he had a colonoscopy ( polyp (not removed ) and diverticulum ) . EGD revealed mild antral gastritis and a small hiatal hernia. Since then, he has been doing well. The patient's wife states that he does have constipation ( hard stools ) but it is probably secondary to not drinking enough liquids. The wife denies melena, hematochezia, hematemesis, nausea, vomiting, abdominal pain, diarrhea, or change in bowel movements     DVT, recent history of GI bleed - Patient probably experienced diverticular bleed in [facility]. With the exceptiont of constipation which is probably attrubutable to poor fluid intake and iron, the patient has no GI symptoms. Rectal exam was negative. a stool specimen was obtained. The nurse knows to take a sample from inside the stool ball to check for hemoglobin. If heme (-), cleared by GI for anticoagulation. IF (+), patient should have a SBS prior to anticoagulation.

## 2019-01-21 NOTE — PROGRESS NOTE ADULT - PROBLEM SELECTOR PLAN 1
Vacular and GI notes appreciated  No evidence of bleeding at this time  Will start Xarelto 15 mg BID and monitor for bleeding

## 2019-01-21 NOTE — PROGRESS NOTE ADULT - SUBJECTIVE AND OBJECTIVE BOX
Patient is a 68y old  Male who presents with a chief complaint of DVT, recent GIB, cannot be on AC (2019 10:17)      HPI:  68 year old man with PMH HTN, prostate Ca s/p radiation, gastritis, recent diverticular bleed requiring 4 units pRBC (Dec 2018) presents to ED with complaint of right leg pain and swelling for the last 2-3 days.  He denies any injury, recent travel, tobacco, family history of blood clots.  Unclear if pt was on hormonal therapy for prostate Ca?    In the ED, duplex shows RLE DVT, rest of work up unremarkable. (2019 23:25)      INTERVAL HPI/OVERNIGHT EVENTS:  The patient denies melena, hematochezia, hematemesis, nausea, vomiting, abdominal pain, constipation, diarrhea, or change in bowel movements Tolerating diet    MEDICATIONS  (STANDING):  ALBUTerol    90 MICROgram(s) HFA Inhaler 2 Puff(s) Inhalation every 6 hours  carvedilol 6.25 milliGRAM(s) Oral every 12 hours  cholecalciferol 2000 Unit(s) Oral daily  ferrous    sulfate 325 milliGRAM(s) Oral every 8 hours  multivitamin 1 Tablet(s) Oral daily  pantoprazole    Tablet 40 milliGRAM(s) Oral before breakfast  rivaroxaban 15 milliGRAM(s) Oral two times a day  tiotropium 18 MICROgram(s) Capsule 1 Capsule(s) Inhalation daily    MEDICATIONS  (PRN):  acetaminophen   Tablet .. 650 milliGRAM(s) Oral every 6 hours PRN Temp greater or equal to 38C (100.4F), Mild Pain (1 - 3)      FAMILY HISTORY:  No pertinent family history in first degree relatives      Allergies    lisinopril (Unknown)  penicillins (Unknown)    Intolerances        PMH/PSH:  Sudden cardiac death  Gait abnormality  Leg pain, right  Anoxic encephalopathy  Brain injury with coma  Prostate cancer  Hypertension  S/P ICD (internal cardiac defibrillator) procedure  H/O tracheostomy  H/O prostate cancer  Status post cryoablation        REVIEW OF SYSTEMS:  CONSTITUTIONAL: No fever, weight loss, or fatigue  EYES: No eye pain, visual disturbances, or discharge  ENMT:  No difficulty hearing, tinnitus, vertigo; No sinus or throat pain  NECK: No pain or stiffness  BREASTS: No pain, masses, or nipple discharge  RESPIRATORY: No cough, wheezing, chills or hemoptysis; No shortness of breath  CARDIOVASCULAR: No chest pain, palpitations, dizziness, or leg swelling  GASTROINTESTINAL: See above  GENITOURINARY: No dysuria, frequency, hematuria, or incontinence  NEUROLOGICAL: No headaches, , numbness, or tremors  SKIN: No itching, burning, rashes, or lesions   LYMPH NODES: No enlarged glands  ENDOCRINE: No heat or cold intolerance; No hair loss  MUSCULOSKELETAL: No joint pain or swelling; No muscle, back, or extremity pain  PSYCHIATRIC: No depression, anxiety, mood swings, or difficulty sleeping  HEME/LYMPH: No easy bruising, or bleeding gums  ALLERGY AND IMMUNOLOGIC: No hives or eczema    Vital Signs Last 24 Hrs  T(C): 36.7 (2019 11:07), Max: 37.1 (2019 05:30)  T(F): 98.1 (2019 11:07), Max: 98.7 (2019 05:30)  HR: 90 (2019 11:07) (90 - 110)  BP: 114/61 (2019 11:07) (114/61 - 145/60)  BP(mean): --  RR: 16 (2019 11:07) (16 - 17)  SpO2: 100% (2019 11:07) (99% - 100%)    PHYSICAL EXAM:  GENERAL: NAD, well-groomed, well-developed  HEAD:  Atraumatic, Normocephalic  EYES: EOMI, PERRLA, conjunctiva and sclera clear  NECK: Supple, No JVD, Normal thyroid  NERVOUS SYSTEM:  Alert & Oriented X 1, Good concentration;   CHEST/LUNG: Clear to percussion bilaterally; No rales, rhonchi, wheezing, or rubs  HEART: Regular rate and rhythm; No murmurs, rubs, or gallops  ABDOMEN: Soft, Nontender, Nondistended; Bowel sounds present  EXTREMITIES:  2+ Peripheral Pulses, No clubbing, cyanosis, or edema  LYMPH: No lymphadenopathy noted  SKIN: No rashes or lesions    LAB                          10.8   5.91  )-----------( 196      ( 2019 06:23 )             33.9       CBC:   @ 06:23  WBC 5.91   Hgb 10.8   Hct 33.9   Plts 196  MCV 75.7   @ 09:42  WBC 7.46   Hgb 11.2   Hct 35.1   Plts 217  MCV 75.6   @ 21:27  WBC 8.16   Hgb 11.6   Hct 36.6   Plts 241  MCV 76.1      Chemistry:   @ 09:42  Na+ 141  K+ 4.6  Cl- 106  CO2 29  BUN 14  Cr 0.80      @ 21:27  Na+ 141  K+ 4.7  Cl- 107  CO2 27  BUN 14  Cr 0.81         Glucose, Serum: 133 mg/dL ( @ 09:42)  Glucose, Serum: 102 mg/dL ( @ 21:27)      2019 09:42    141    |  106    |  14     ----------------------------<  133    4.6     |  29     |  0.80   2019 21:27    141    |  107    |  14     ----------------------------<  102    4.7     |  27     |  0.81     Ca    8.6        2019 09:42  Ca    8.8        2019 21:27    TPro  8.2    /  Alb  3.1    /  TBili  0.3    /  DBili  x      /  AST  26     /  ALT  20     /  AlkPhos  114    2019 21:27      PT/INR - ( 2019 09:42 )   PT: 14.5 sec;   INR: 1.28 ratio         PTT - ( 2019 09:42 )  PTT:30.0 sec    Urinalysis Basic - ( 2019 01:23 )    Color: Yellow / Appearance: Clear / S.020 / pH: x  Gluc: x / Ketone: Negative  / Bili: Negative / Urobili: 1 mg/dL   Blood: x / Protein: 30 mg/dL / Nitrite: Negative   Leuk Esterase: Small / RBC: x / WBC 3-5   Sq Epi: x / Non Sq Epi: x / Bacteria: Occasional        CAPILLARY BLOOD GLUCOSE              RADIOLOGY & ADDITIONAL TESTS:    Imaging Personally Reviewed:  [ ] YES  [ ] NO    Consultant(s) Notes Reviewed:  [ ] YES  [ ] NO    Care Discussed with Consultants/Other Providers [ ] YES  [ ] NO

## 2019-01-21 NOTE — PROGRESS NOTE ADULT - PROBLEM SELECTOR PLAN 2
See above. Heme (-) stool in the hospital . No signs to suggest active bleeding or risk of increased risk of re bleeding. OK to start anticoagulation for DVT. Will follow on a PRN basis.

## 2019-01-21 NOTE — CHART NOTE - NSCHARTNOTEFT_GEN_A_CORE
Hospitalist Medicine PA Note    Per Dr Pang, since stool occult negative, okay to start pt on heparin gtt for DVT.     Heparin gtt initiated. Will monitor PTT per protocol.

## 2019-01-21 NOTE — PROGRESS NOTE ADULT - SUBJECTIVE AND OBJECTIVE BOX
Patient is a 68y old  Male who presents with a chief complaint of RLE DVT.      INTERVAL HPI/OVERNIGHT EVENTS: no acute events. denies cp     MEDICATIONS  (STANDING):  ALBUTerol    90 MICROgram(s) HFA Inhaler 2 Puff(s) Inhalation every 6 hours  carvedilol 3.125 milliGRAM(s) Oral every 12 hours  cholecalciferol 2000 Unit(s) Oral daily  ferrous    sulfate 325 milliGRAM(s) Oral every 8 hours  multivitamin 1 Tablet(s) Oral daily  pantoprazole    Tablet 40 milliGRAM(s) Oral before breakfast  rivaroxaban 15 milliGRAM(s) Oral two times a day  tiotropium 18 MICROgram(s) Capsule 1 Capsule(s) Inhalation daily    MEDICATIONS  (PRN):  acetaminophen   Tablet .. 650 milliGRAM(s) Oral every 6 hours PRN Temp greater or equal to 38C (100.4F), Mild Pain (1 - 3)    Allergies:    lisinopril (Unknown)  penicillins (Unknown)    REVIEW OF SYSTEMS:  All other systems negative    Vital Signs Last 24 Hrs  T(C): 37.1 (2019 05:30), Max: 37.1 (2019 05:30)  T(F): 98.7 (2019 05:30), Max: 98.7 (2019 05:30)  HR: 110 (2019 05:30) (98 - 110)  BP: 145/60 (2019 05:30) (125/58 - 145/60)  BP(mean): --  RR: 17 (2019 05:30) (16 - 17)  SpO2: 100% (2019 05:30) (98% - 100%)    PHYSICAL EXAM:  GENERAL: non toxic, dysarthric   HEAD:  Atraumatic, Normocephalic  EYES: EOMI, PERRLA, conjunctiva and sclera clear  ENMT: No tonsillar erythema, exudates, or enlargement; Moist mucous membranes, Good dentition, No lesions  NECK: Supple, No JVD, Normal thyroid  NERVOUS SYSTEM:  Alert & Oriented X3, Good concentration;   CHEST/LUNG: Clear to percussion bilaterally; No rales, rhonchi, wheezing, or rubs  HEART: Regular rate and rhythm; No murmurs, rubs, or gallops  ABDOMEN: Soft, Nontender, Nondistended; Bowel sounds present  EXTREMITIES:  2+ Peripheral Pulses,RLE edema and tenderness  LYMPH: No lymphadenopathy noted  SKIN: No rashes or lesions    LABS/RADIOLOGY RESULTS:                    10.8   5.91  )-----------( 196      ( 2019 06:23 )             33.9       141  |  106  |  14  ----------------------------<  133<H>  4.6   |  29  |  0.80    Ca    8.6      2019 09:42    TPro  8.2  /  Alb  3.1<L>  /  TBili  0.3  /  DBili  x   /  AST  26  /  ALT  20  /  AlkPhos  114    PT/INR - ( 2019 09:42 )   PT: 14.5 sec;   INR: 1.28 ratio    PTT - ( 2019 09:42 )  PTT:30.0 sec    Blood Culture--    @ 11:44    Results  Culture grew 3 or more types of organisms which indicate  collection contamination; consider recollection only if clinically  indicated.    Organism--    Organism ID--    Urine Culture    @ 11:44    --       Results  Culture grew 3 or more types of organisms which indicate  collection contamination; consider recollection only if clinically  indicated.    Organism--    Organism ID--  Urinalysis Basic - ( 2019 01:23 )    Color: Yellow / Appearance: Clear / S.020 / pH:   Gluc:  / Ketone: Negative  / Bili: Negative / Urobili: 1 mg/dL   Blood:  / Protein: 30 mg/dL / Nitrite: Negative   Leuk Esterase: Small / RBC:  / WBC 3-5   Sq Epi:  / Non Sq Epi:  / Bacteria: Occasional

## 2019-01-21 NOTE — PROGRESS NOTE ADULT - ASSESSMENT
68 year old man with PMH HTN, prostate Ca s/p radiation, gastritis, recent diverticular bleed requiring 4 units pRBC (Dec 2018) presents to ED with complaint of right leg pain and swelling for the last 2-3 days.

## 2019-01-22 ENCOUNTER — TRANSCRIPTION ENCOUNTER (OUTPATIENT)
Age: 69
End: 2019-01-22

## 2019-01-22 VITALS
RESPIRATION RATE: 16 BRPM | HEART RATE: 94 BPM | DIASTOLIC BLOOD PRESSURE: 60 MMHG | OXYGEN SATURATION: 98 % | SYSTOLIC BLOOD PRESSURE: 133 MMHG

## 2019-01-22 PROCEDURE — 99239 HOSP IP/OBS DSCHRG MGMT >30: CPT

## 2019-01-22 RX ORDER — RIVAROXABAN 15 MG-20MG
1 KIT ORAL
Qty: 40 | Refills: 0 | OUTPATIENT
Start: 2019-01-22 | End: 2019-02-10

## 2019-01-22 RX ORDER — CARVEDILOL PHOSPHATE 80 MG/1
1 CAPSULE, EXTENDED RELEASE ORAL
Qty: 60 | Refills: 0 | OUTPATIENT
Start: 2019-01-22

## 2019-01-22 RX ORDER — SALICYLIC ACID 0.5 %
1 CLEANSER (GRAM) TOPICAL
Qty: 100 | Refills: 0
Start: 2019-01-22

## 2019-01-22 RX ADMIN — RIVAROXABAN 15 MILLIGRAM(S): KIT at 05:40

## 2019-01-22 RX ADMIN — Medication 325 MILLIGRAM(S): at 08:02

## 2019-01-22 RX ADMIN — ALBUTEROL 2 PUFF(S): 90 AEROSOL, METERED ORAL at 11:51

## 2019-01-22 RX ADMIN — Medication 1 TABLET(S): at 14:26

## 2019-01-22 RX ADMIN — PANTOPRAZOLE SODIUM 40 MILLIGRAM(S): 20 TABLET, DELAYED RELEASE ORAL at 08:02

## 2019-01-22 RX ADMIN — Medication 325 MILLIGRAM(S): at 14:26

## 2019-01-22 RX ADMIN — ALBUTEROL 2 PUFF(S): 90 AEROSOL, METERED ORAL at 05:40

## 2019-01-22 RX ADMIN — Medication 2000 UNIT(S): at 14:26

## 2019-01-22 NOTE — DISCHARGE NOTE ADULT - MEDICATION SUMMARY - MEDICATIONS TO TAKE
I will START or STAY ON the medications listed below when I get home from the hospital:    Gloves  -- Dx: Incontinence N39.41  -- Indication: For Incontinence    Pull ups  -- Dx: Incontinence N39.41  -- Indication: For Incontinence    Chux  -- Dx: Incontinence N39.41  -- Indication: For Incontinence    Disposable clothes  -- Dx: Incontinence N39.41  -- Indication: For Incontinence    Physical Therapy  -- [facility]  2-3 times per week    Dx:  RLE DVT  -- Indication: For Gait    Diapers  -- XL    Dx: Incontinence N39.41    1 month supply  -- Indication: For Incontinence    rivaroxaban 15 mg oral tablet  -- 1 tab(s) by mouth 2 times a day  -- Indication: For DVT (deep venous thrombosis)    rivaroxaban 20 mg oral tablet  -- 1 tab(s) by mouth once a day - start after completing the 15 mg tabs twice daily  -- Check with your doctor before becoming pregnant.  It is very important that you take or use this exactly as directed.  Do not skip doses or discontinue unless directed by your doctor.  Obtain medical advice before taking any non-prescription drugs as some may affect the action of this medication.  Take with food.    -- Indication: For DEEP VEIN THROMBOSIS    carvedilol 6.25 mg oral tablet  -- 1 tab(s) by mouth every 12 hours  -- Indication: For Hypertension    ipratropium-albuterol 0.5 mg-2.5 mg/3 mLinhalation solution  -- 3 milliliter(s) inhaled every 6 hours  -- Indication: For Dyspnea    Vitamin A & D topical ointment  -- Apply on skin to affected area 2 times a day   -- For external use only.    -- Indication: For skin    ferrous sulfate (as elemental iron) 45 mg oral tablet, extended release  -- 1 tab(s) by mouth once a day   -- Check with your doctor before becoming pregnant.  May discolor urine or feces.  Some non-prescription drugs may aggravate your condition.  Read all labels carefully.  If a warning appears, check with your doctor before taking.  Swallow whole.  Do not crush.    -- Indication: For Anemia    polyethylene glycol 3350 oral powder for reconstitution  -- 17 gram(s) by mouth once a day, As needed, Constipation  -- Indication: For constipation    Protonix 40 mg oral delayed release tablet  -- 1 tab(s) by mouth once a day   -- It is very important that you take or use this exactly as directed.  Do not skip doses or discontinue unless directed by your doctor.  Obtain medical advice before taking any non-prescription drugs as some may affect the action of this medication.  Swallow whole.  Do not crush.    -- Indication: For GERD    multivitamin  -- 1 tab(s) by mouth once a day  -- Indication: For vitamin    Vitamin D3 2000 intl units oral capsule  -- 1 cap(s) by mouth once a day  -- Indication: For Vitamin

## 2019-01-22 NOTE — DISCHARGE NOTE ADULT - PLAN OF CARE
resolve DVT started on Xarelto 15 mg BID for 21 days then 20 mg daily  follow up with PCP PT as outpatient at [facility]

## 2019-01-22 NOTE — DISCHARGE NOTE ADULT - HOSPITAL COURSE
68 year old man with PMH HTN, prostate Ca s/p radiation, gastritis, recent diverticular bleed (Dec 2018) presented to ED with complaint of right leg pain and swelling for the last 2-3 days, found to have a DVT.  Seen by GI for clearance for anticoagulation and started on Xarelto 15 mg BID.  Stable for discharge home with outpatient PT and Follow up.  Family in agreement with above plan.

## 2019-01-22 NOTE — DISCHARGE NOTE ADULT - PATIENT PORTAL LINK FT
You can access the [facility] Portal, offered by [facility], by registering with the following website: [email or web address]

## 2019-01-22 NOTE — DISCHARGE NOTE ADULT - SMOKING EVEN A SINGLE PUFF INCREASES THE LIKELIHOOD OF A FULL RELAPSE, WITHDRAWAL SYMPTOMS PEAK WITHIN 1-2 WEEKS, BUT CAN PERSIST FOR MONTHS
hydralazine      Last Written Prescription Date: 10/26/16  Last Fill Quantity: 810, # refills: 2    Last Office Visit with [facility] or  Health prescribing provider:  1/18/17   Future Office Visit:        BP Readings from Last 3 Encounters:   01/18/17 162/62   12/08/16 150/70   11/30/16 138/66     simvastatin     Last Written Prescription Date: 10/28/16  Last Fill Quantity: 90, # refills: 2  Last Office Visit with [facility] or  Health prescribing provider: 1/18/17       Lab Results   Component Value Date    CHOL 91 09/14/2015     Lab Results   Component Value Date    HDL 46 09/14/2015     Lab Results   Component Value Date    LDL 23 09/14/2015     Lab Results   Component Value Date    TRIG 109 09/14/2015     Lab Results   Component Value Date    CHOLHDLRATIO 2.0 09/14/2015       Labs showing if normal/abnormal  Lab Results   Component Value Date    CHOL 91 09/14/2015    TRIG 109 09/14/2015    HDL 46 (L) 09/14/2015    LDL 23 09/14/2015    VLDL 22 09/14/2015    CHOLHDLRATIO 2.0 09/14/2015        Statement Selected

## 2019-01-22 NOTE — DISCHARGE NOTE ADULT - MEDICATION SUMMARY - MEDICATIONS TO CHANGE
I will SWITCH the dose or number of times a day I take the medications listed below when I get home from the hospital:    carvedilol 3.125 mg oral tablet  -- 1 tab(s) by mouth once a day

## 2019-01-22 NOTE — PROGRESS NOTE ADULT - SUBJECTIVE AND OBJECTIVE BOX
Patient is a 68y old  Male who presents with a chief complaint of RLE DVT.      INTERVAL HPI/OVERNIGHT EVENTS: no acute events. Feels fine    MEDICATIONS  (STANDING):  ALBUTerol    90 MICROgram(s) HFA Inhaler 2 Puff(s) Inhalation every 6 hours  carvedilol 6.25 milliGRAM(s) Oral every 12 hours  cholecalciferol 2000 Unit(s) Oral daily  ferrous    sulfate 325 milliGRAM(s) Oral every 8 hours  multivitamin 1 Tablet(s) Oral daily  pantoprazole    Tablet 40 milliGRAM(s) Oral before breakfast  rivaroxaban 15 milliGRAM(s) Oral two times a day  tiotropium 18 MICROgram(s) Capsule 1 Capsule(s) Inhalation daily    MEDICATIONS  (PRN):  acetaminophen   Tablet .. 650 milliGRAM(s) Oral every 6 hours PRN Temp greater or equal to 38C (100.4F), Mild Pain (1 - 3)    Allergies:    lisinopril (Unknown)  penicillins (Unknown)    REVIEW OF SYSTEMS:  All other systems negative    Vital Signs Last 24 Hrs  T(C): 37.2 (2019 06:07), Max: 37.2 (2019 06:07)  T(F): 99 (2019 06:07), Max: 99 (2019 06:07)  HR: 95 (2019 06:07) (90 - 97)  BP: 118/64 (2019 06:07) (114/61 - 129/72)  BP(mean): --  RR: 18 (2019 06:07) (16 - 18)  SpO2: 96% (2019 06:07) (96% - 100%)    PHYSICAL EXAM:  GENERAL: NAD  HEAD:  Atraumatic, Normocephalic  EYES: EOMI, PERRLA, conjunctiva and sclera clear  ENMT: No tonsillar erythema, exudates, or enlargement; Moist mucous membranes, Good dentition, No lesions  NECK: Supple, No JVD, Normal thyroid  NERVOUS SYSTEM:  Alert & Oriented X3, Good concentration;   CHEST/LUNG: Clear to percussion bilaterally; No rales, rhonchi, wheezing, or rubs  HEART: Regular rate and rhythm; No murmurs, rubs, or gallops  ABDOMEN: Soft, Nontender, Nondistended; Bowel sounds present  EXTREMITIES:  2+ Peripheral Pulses,RLE edema and tenderness  LYMPH: No lymphadenopathy noted  SKIN: No rashes or lesions    LABS/RADIOLOGY RESULTS:                   10.8   5.91  )-----------( 196      ( 2019 06:23 )             33.9       141  |  106  |  14  ----------------------------<  133<H>  4.6   |  29  |  0.80    Ca    8.6      2019 09:42    PT/INR - ( 2019 09:42 )   PT: 14.5 sec;   INR: 1.28 ratio    PTT - ( 2019 09:42 )  PTT:30.0 sec    Blood Culture--    @ 11:44    Results  Culture grew 3 or more types of organisms which indicate  collection contamination; consider recollection only if clinically  indicated.    Organism--    Organism ID--    Urine Culture    @ 11:44    --       Results  Culture grew 3 or more types of organisms which indicate  collection contamination; consider recollection only if clinically  indicated.    Organism--    Organism ID--  Urinalysis Basic - ( 2019 01:23 )    Color: Yellow / Appearance: Clear / S.020 / pH:   Gluc:  / Ketone: Negative  / Bili: Negative / Urobili: 1 mg/dL   Blood:  / Protein: 30 mg/dL / Nitrite: Negative   Leuk Esterase: Small / RBC:  / WBC 3-5   Sq Epi:  / Non Sq Epi:  / Bacteria: Occasional

## 2019-01-22 NOTE — PROGRESS NOTE ADULT - REASON FOR ADMISSION
DVT, recent GIB, cannot be on AC

## 2019-01-22 NOTE — PROGRESS NOTE ADULT - PROBLEM SELECTOR PLAN 1
Vacular and GI notes appreciated  No evidence of bleeding at this time  Continue Xarelto 15 mg BID for 21 days then 20 mg daily  monitor for bleeding

## 2019-01-22 NOTE — DISCHARGE NOTE ADULT - CARE PLAN
Principal Discharge DX:	Acute deep vein thrombosis (DVT) of femoral vein of right lower extremity  Goal:	resolve DVT  Assessment and plan of treatment:	started on Xarelto 15 mg BID for 21 days then 20 mg daily  follow up with PCP  Secondary Diagnosis:	Gait abnormality  Assessment and plan of treatment:	PT as outpatient at [facility]  Secondary Diagnosis:	Essential hypertension

## 2019-01-25 DIAGNOSIS — I10 ESSENTIAL (PRIMARY) HYPERTENSION: ICD-10-CM

## 2019-01-25 DIAGNOSIS — R26.9 UNSPECIFIED ABNORMALITIES OF GAIT AND MOBILITY: ICD-10-CM

## 2019-01-25 DIAGNOSIS — Z92.3 PERSONAL HISTORY OF IRRADIATION: ICD-10-CM

## 2019-01-25 DIAGNOSIS — I82.411 ACUTE EMBOLISM AND THROMBOSIS OF RIGHT FEMORAL VEIN: ICD-10-CM

## 2019-01-25 DIAGNOSIS — Z95.810 PRESENCE OF AUTOMATIC (IMPLANTABLE) CARDIAC DEFIBRILLATOR: ICD-10-CM

## 2019-01-25 DIAGNOSIS — Z85.46 PERSONAL HISTORY OF MALIGNANT NEOPLASM OF PROSTATE: ICD-10-CM

## 2019-02-07 ENCOUNTER — INPATIENT (INPATIENT)
Facility: HOSPITAL | Age: 69
LOS: 7 days | Discharge: HOME | End: 2019-02-15
Attending: INTERNAL MEDICINE | Admitting: INTERNAL MEDICINE
Payer: MEDICARE

## 2019-02-07 VITALS
HEART RATE: 110 BPM | SYSTOLIC BLOOD PRESSURE: 146 MMHG | TEMPERATURE: 99 F | HEIGHT: 65 IN | WEIGHT: 169.98 LBS | DIASTOLIC BLOOD PRESSURE: 71 MMHG | RESPIRATION RATE: 18 BRPM | OXYGEN SATURATION: 99 %

## 2019-02-07 DIAGNOSIS — Z85.46 PERSONAL HISTORY OF MALIGNANT NEOPLASM OF PROSTATE: Chronic | ICD-10-CM

## 2019-02-07 DIAGNOSIS — Z98.89 OTHER SPECIFIED POSTPROCEDURAL STATES: Chronic | ICD-10-CM

## 2019-02-07 DIAGNOSIS — Z93.0 TRACHEOSTOMY STATUS: Chronic | ICD-10-CM

## 2019-02-07 DIAGNOSIS — Z95.810 PRESENCE OF AUTOMATIC (IMPLANTABLE) CARDIAC DEFIBRILLATOR: Chronic | ICD-10-CM

## 2019-02-07 PROCEDURE — 99285 EMERGENCY DEPT VISIT HI MDM: CPT

## 2019-02-07 NOTE — ED ADULT TRIAGE NOTE - CHIEF COMPLAINT QUOTE
c/o increased swelling r leg + dvt dx'd 1/19/19 with xarelto rx'd states noted increased size x 3 days seen at pmd today for f/u recommended to return to er also c/o r arm pain over past several days per pts wife states slight hematuria noted discussed with pmd today referred to urology

## 2019-02-08 DIAGNOSIS — I80.00 PHLEBITIS AND THROMBOPHLEBITIS OF SUPERFICIAL VESSELS OF UNSPECIFIED LOWER EXTREMITY: ICD-10-CM

## 2019-02-08 DIAGNOSIS — Z29.9 ENCOUNTER FOR PROPHYLACTIC MEASURES, UNSPECIFIED: ICD-10-CM

## 2019-02-08 DIAGNOSIS — I10 ESSENTIAL (PRIMARY) HYPERTENSION: ICD-10-CM

## 2019-02-08 DIAGNOSIS — R53.1 WEAKNESS: ICD-10-CM

## 2019-02-08 DIAGNOSIS — I26.99 OTHER PULMONARY EMBOLISM WITHOUT ACUTE COR PULMONALE: ICD-10-CM

## 2019-02-08 DIAGNOSIS — Z95.810 PRESENCE OF AUTOMATIC (IMPLANTABLE) CARDIAC DEFIBRILLATOR: ICD-10-CM

## 2019-02-08 DIAGNOSIS — G93.1 ANOXIC BRAIN DAMAGE, NOT ELSEWHERE CLASSIFIED: ICD-10-CM

## 2019-02-08 LAB
ALBUMIN SERPL ELPH-MCNC: 2 G/DL — LOW (ref 3.3–5)
ALP SERPL-CCNC: 156 U/L — HIGH (ref 40–120)
ALT FLD-CCNC: 115 U/L — HIGH (ref 12–78)
ANION GAP SERPL CALC-SCNC: 8 MMOL/L — SIGNIFICANT CHANGE UP (ref 5–17)
ANION GAP SERPL CALC-SCNC: 9 MMOL/L — SIGNIFICANT CHANGE UP (ref 5–17)
APTT BLD: 40.2 SEC — HIGH (ref 27.5–36.3)
AST SERPL-CCNC: 152 U/L — HIGH (ref 15–37)
BASOPHILS # BLD AUTO: 0.04 K/UL — SIGNIFICANT CHANGE UP (ref 0–0.2)
BASOPHILS NFR BLD AUTO: 0.3 % — SIGNIFICANT CHANGE UP (ref 0–2)
BILIRUB SERPL-MCNC: 0.6 MG/DL — SIGNIFICANT CHANGE UP (ref 0.2–1.2)
BUN SERPL-MCNC: 22 MG/DL — SIGNIFICANT CHANGE UP (ref 7–23)
BUN SERPL-MCNC: 26 MG/DL — HIGH (ref 7–23)
CALCIUM SERPL-MCNC: 7.8 MG/DL — LOW (ref 8.5–10.1)
CALCIUM SERPL-MCNC: 8 MG/DL — LOW (ref 8.5–10.1)
CHLORIDE SERPL-SCNC: 102 MMOL/L — SIGNIFICANT CHANGE UP (ref 96–108)
CHLORIDE SERPL-SCNC: 103 MMOL/L — SIGNIFICANT CHANGE UP (ref 96–108)
CO2 SERPL-SCNC: 25 MMOL/L — SIGNIFICANT CHANGE UP (ref 22–31)
CO2 SERPL-SCNC: 27 MMOL/L — SIGNIFICANT CHANGE UP (ref 22–31)
CREAT SERPL-MCNC: 0.82 MG/DL — SIGNIFICANT CHANGE UP (ref 0.5–1.3)
CREAT SERPL-MCNC: 0.96 MG/DL — SIGNIFICANT CHANGE UP (ref 0.5–1.3)
EOSINOPHIL # BLD AUTO: 0.4 K/UL — SIGNIFICANT CHANGE UP (ref 0–0.5)
EOSINOPHIL NFR BLD AUTO: 3.4 % — SIGNIFICANT CHANGE UP (ref 0–6)
GLUCOSE SERPL-MCNC: 135 MG/DL — HIGH (ref 70–99)
GLUCOSE SERPL-MCNC: 143 MG/DL — HIGH (ref 70–99)
HCT VFR BLD CALC: 29.5 % — LOW (ref 39–50)
HCT VFR BLD CALC: 32.1 % — LOW (ref 39–50)
HGB BLD-MCNC: 10.1 G/DL — LOW (ref 13–17)
HGB BLD-MCNC: 9.7 G/DL — LOW (ref 13–17)
IMM GRANULOCYTES NFR BLD AUTO: 0.6 % — SIGNIFICANT CHANGE UP (ref 0–1.5)
INR BLD: 3.19 RATIO — HIGH (ref 0.88–1.16)
LYMPHOCYTES # BLD AUTO: 17.9 % — SIGNIFICANT CHANGE UP (ref 13–44)
LYMPHOCYTES # BLD AUTO: 2.13 K/UL — SIGNIFICANT CHANGE UP (ref 1–3.3)
MCHC RBC-ENTMCNC: 22.6 PG — LOW (ref 27–34)
MCHC RBC-ENTMCNC: 23.1 PG — LOW (ref 27–34)
MCHC RBC-ENTMCNC: 31.5 GM/DL — LOW (ref 32–36)
MCHC RBC-ENTMCNC: 32.9 GM/DL — SIGNIFICANT CHANGE UP (ref 32–36)
MCV RBC AUTO: 70.2 FL — LOW (ref 80–100)
MCV RBC AUTO: 71.8 FL — LOW (ref 80–100)
MONOCYTES # BLD AUTO: 0.69 K/UL — SIGNIFICANT CHANGE UP (ref 0–0.9)
MONOCYTES NFR BLD AUTO: 5.8 % — SIGNIFICANT CHANGE UP (ref 2–14)
NEUTROPHILS # BLD AUTO: 8.59 K/UL — HIGH (ref 1.8–7.4)
NEUTROPHILS NFR BLD AUTO: 72 % — SIGNIFICANT CHANGE UP (ref 43–77)
NRBC # BLD: 0 /100 WBCS — SIGNIFICANT CHANGE UP (ref 0–0)
PLATELET # BLD AUTO: 326 K/UL — SIGNIFICANT CHANGE UP (ref 150–400)
PLATELET # BLD AUTO: 364 K/UL — SIGNIFICANT CHANGE UP (ref 150–400)
POTASSIUM SERPL-MCNC: 4.4 MMOL/L — SIGNIFICANT CHANGE UP (ref 3.5–5.3)
POTASSIUM SERPL-MCNC: 5.8 MMOL/L — HIGH (ref 3.5–5.3)
POTASSIUM SERPL-SCNC: 4.4 MMOL/L — SIGNIFICANT CHANGE UP (ref 3.5–5.3)
POTASSIUM SERPL-SCNC: 5.8 MMOL/L — HIGH (ref 3.5–5.3)
PROT SERPL-MCNC: 6.9 GM/DL — SIGNIFICANT CHANGE UP (ref 6–8.3)
PROTHROM AB SERPL-ACNC: 37 SEC — HIGH (ref 10–12.9)
RBC # BLD: 4.2 M/UL — SIGNIFICANT CHANGE UP (ref 4.2–5.8)
RBC # BLD: 4.47 M/UL — SIGNIFICANT CHANGE UP (ref 4.2–5.8)
RBC # FLD: 20.5 % — HIGH (ref 10.3–14.5)
RBC # FLD: 20.7 % — HIGH (ref 10.3–14.5)
SODIUM SERPL-SCNC: 136 MMOL/L — SIGNIFICANT CHANGE UP (ref 135–145)
SODIUM SERPL-SCNC: 138 MMOL/L — SIGNIFICANT CHANGE UP (ref 135–145)
TROPONIN I SERPL-MCNC: <.015 NG/ML — SIGNIFICANT CHANGE UP (ref 0.01–0.04)
WBC # BLD: 11.92 K/UL — HIGH (ref 3.8–10.5)
WBC # BLD: 13.05 K/UL — HIGH (ref 3.8–10.5)
WBC # FLD AUTO: 11.92 K/UL — HIGH (ref 3.8–10.5)
WBC # FLD AUTO: 13.05 K/UL — HIGH (ref 3.8–10.5)

## 2019-02-08 PROCEDURE — 12345: CPT | Mod: NC

## 2019-02-08 PROCEDURE — 99223 1ST HOSP IP/OBS HIGH 75: CPT

## 2019-02-08 PROCEDURE — 70450 CT HEAD/BRAIN W/O DYE: CPT | Mod: 26

## 2019-02-08 PROCEDURE — 71275 CT ANGIOGRAPHY CHEST: CPT | Mod: 26

## 2019-02-08 PROCEDURE — 71045 X-RAY EXAM CHEST 1 VIEW: CPT | Mod: 26

## 2019-02-08 PROCEDURE — 93970 EXTREMITY STUDY: CPT | Mod: 26

## 2019-02-08 PROCEDURE — 93880 EXTRACRANIAL BILAT STUDY: CPT | Mod: 26

## 2019-02-08 RX ORDER — CARVEDILOL PHOSPHATE 80 MG/1
6.25 CAPSULE, EXTENDED RELEASE ORAL EVERY 12 HOURS
Qty: 0 | Refills: 0 | Status: DISCONTINUED | OUTPATIENT
Start: 2019-02-08 | End: 2019-02-09

## 2019-02-08 RX ORDER — HEPARIN SODIUM 5000 [USP'U]/ML
3000 INJECTION INTRAVENOUS; SUBCUTANEOUS EVERY 6 HOURS
Qty: 0 | Refills: 0 | Status: DISCONTINUED | OUTPATIENT
Start: 2019-02-08 | End: 2019-02-08

## 2019-02-08 RX ORDER — HEPARIN SODIUM 5000 [USP'U]/ML
6500 INJECTION INTRAVENOUS; SUBCUTANEOUS EVERY 6 HOURS
Qty: 0 | Refills: 0 | Status: DISCONTINUED | OUTPATIENT
Start: 2019-02-08 | End: 2019-02-08

## 2019-02-08 RX ORDER — RIVAROXABAN 15 MG-20MG
15 KIT ORAL
Qty: 0 | Refills: 0 | Status: DISCONTINUED | OUTPATIENT
Start: 2019-02-08 | End: 2019-02-09

## 2019-02-08 RX ORDER — PANTOPRAZOLE SODIUM 20 MG/1
40 TABLET, DELAYED RELEASE ORAL
Qty: 0 | Refills: 0 | Status: DISCONTINUED | OUTPATIENT
Start: 2019-02-08 | End: 2019-02-15

## 2019-02-08 RX ORDER — HEPARIN SODIUM 5000 [USP'U]/ML
INJECTION INTRAVENOUS; SUBCUTANEOUS
Qty: 25000 | Refills: 0 | Status: DISCONTINUED | OUTPATIENT
Start: 2019-02-08 | End: 2019-02-08

## 2019-02-08 RX ORDER — SODIUM POLYSTYRENE SULFONATE 4.1 MEQ/G
15 POWDER, FOR SUSPENSION ORAL ONCE
Qty: 0 | Refills: 0 | Status: COMPLETED | OUTPATIENT
Start: 2019-02-08 | End: 2019-02-08

## 2019-02-08 RX ORDER — HEPARIN SODIUM 5000 [USP'U]/ML
6500 INJECTION INTRAVENOUS; SUBCUTANEOUS ONCE
Qty: 0 | Refills: 0 | Status: COMPLETED | OUTPATIENT
Start: 2019-02-08 | End: 2019-02-08

## 2019-02-08 RX ORDER — RIVAROXABAN 15 MG-20MG
15 KIT ORAL
Qty: 0 | Refills: 0 | Status: DISCONTINUED | OUTPATIENT
Start: 2019-02-08 | End: 2019-02-08

## 2019-02-08 RX ADMIN — CARVEDILOL PHOSPHATE 6.25 MILLIGRAM(S): 80 CAPSULE, EXTENDED RELEASE ORAL at 17:40

## 2019-02-08 RX ADMIN — HEPARIN SODIUM UNIT(S)/HR: 5000 INJECTION INTRAVENOUS; SUBCUTANEOUS at 06:31

## 2019-02-08 RX ADMIN — SODIUM POLYSTYRENE SULFONATE 15 GRAM(S): 4.1 POWDER, FOR SUSPENSION ORAL at 10:08

## 2019-02-08 RX ADMIN — HEPARIN SODIUM 5000 UNIT(S): 5000 INJECTION INTRAVENOUS; SUBCUTANEOUS at 05:23

## 2019-02-08 RX ADMIN — Medication 1 TABLET(S): at 11:38

## 2019-02-08 RX ADMIN — PANTOPRAZOLE SODIUM 40 MILLIGRAM(S): 20 TABLET, DELAYED RELEASE ORAL at 07:46

## 2019-02-08 RX ADMIN — HEPARIN SODIUM 1400 UNIT(S)/HR: 5000 INJECTION INTRAVENOUS; SUBCUTANEOUS at 05:25

## 2019-02-08 RX ADMIN — RIVAROXABAN 15 MILLIGRAM(S): KIT at 17:40

## 2019-02-08 RX ADMIN — RIVAROXABAN 15 MILLIGRAM(S): KIT at 07:46

## 2019-02-08 NOTE — H&P ADULT - NSHPLABSRESULTS_GEN_ALL_CORE
Vital Signs Last 24 Hrs  T(C): 36.9 (08 Feb 2019 05:17), Max: 37.3 (07 Feb 2019 21:40)  T(F): 98.4 (08 Feb 2019 05:17), Max: 99.2 (07 Feb 2019 21:40)  HR: 102 (08 Feb 2019 05:17) (102 - 110)  BP: 130/70 (08 Feb 2019 05:17) (130/70 - 146/71)  BP(mean): --  RR: 20 (08 Feb 2019 05:17) (18 - 20)  SpO2: 100% (08 Feb 2019 05:17) (98% - 100%)          LABS:                        10.1   13.05 )-----------( 364      ( 08 Feb 2019 02:13 )             32.1     02-08    138  |  102  |  26<H>  ----------------------------<  143<H>  4.4   |  27  |  0.96    Ca    8.0<L>      08 Feb 2019 02:13    TPro  6.9  /  Alb  2.0<L>  /  TBili  0.6  /  DBili  x   /  AST  152<H>  /  ALT  115<H>  /  AlkPhos  156<H>  02-08    PT/INR - ( 08 Feb 2019 02:13 )   PT: 37.0 sec;   INR: 3.19 ratio         PTT - ( 08 Feb 2019 02:13 )  PTT:40.2 sec      RADIOLOGY & ADDITIONAL STUDIES:    CTA chest:  IMPRESSION:  Suboptimal study. Small acute pulmonary embolism in the distal right   lower lobe common basilar artery extending into proximal segmental   superior and lateral branches. No evidence of right heart strain or   pulmonary infarct.    CT head:  Impression: Unable to evaluate the high frontal parietal region due to   motion artifact. No intracranial hemorrhage or acute territorial infarct   otherwise identified.    LE venous duplex:  IMPRESSION:   Right lower extremity: Chronic partially occlusive deep venous thrombosis   from the external iliac through to popliteal veins. Calf vein now appear   grossly patent.  Left lower extremity: Age-indeterminate thrombus in the left external   iliac, common femoral and greater saphenous veins.

## 2019-02-08 NOTE — H&P ADULT - PROBLEM SELECTOR PLAN 1
Per family patient's deficit has always been on the left  Cannot get MRI due to ICD  Neuro consult  Will order carotid doppler, TTE  Consider repeat head CT ~48 h  Neuro checks q4h

## 2019-02-08 NOTE — H&P ADULT - HISTORY OF PRESENT ILLNESS
68 year old man with PMH HTN, s/p ICD, prostate Ca s/p radiation, gastritis, recent diverticular bleed requiring 4 units pRBC (Dec 2018), DVT on Xarelto, and TBI with residual left-sided weakness presents to ED with family after they noted right-sided weakness yesterday.  Pt cannot communicate due to TBI, history obtained from spouse at bedside.  She has also noted that his legs have become more swollen and thinks he has seemed SOB at times.    In the ED, CTA chest shows acute right PE, chronic right DVT and left age-indeterminate DVT.  No right heart strain or pulmonary infarcts.  CT head shows no acute findings.

## 2019-02-08 NOTE — ED ADULT NURSE NOTE - NSIMPLEMENTINTERV_GEN_ALL_ED
Implemented All Fall with Harm Risk Interventions:  [place] to call system. Call bell, personal items and telephone within reach. Instruct patient to call for assistance. Room bathroom lighting operational. Non-slip footwear when patient is off stretcher. Physically safe environment: no spills, clutter or unnecessary equipment. Stretcher in lowest position, wheels locked, appropriate side rails in place. Provide visual cue, wrist band, yellow gown, etc. Monitor gait and stability. Monitor for mental status changes and reorient to person, place, and time. Review medications for side effects contributing to fall risk. Reinforce activity limits and safety measures with patient and family. Provide visual clues: red socks.

## 2019-02-08 NOTE — ED PROVIDER NOTE - CARE PLAN
Principal Discharge DX:	Right sided weakness  Secondary Diagnosis:	Pulmonary embolism  Secondary Diagnosis:	DVT (deep venous thrombosis)

## 2019-02-08 NOTE — ED ADULT NURSE NOTE - NURSING [facility]
ANTICOAGULATION FOLLOW-UP CLINIC VISIT    Patient Name:  Jama Paul  Date:  9/17/2018  Contact Type:  Face to Face    SUBJECTIVE:     Patient Findings     Positives No Problem Findings           OBJECTIVE    INR Protime   Date Value Ref Range Status   09/17/2018 2.3 (A) 0.86 - 1.14 Final       ASSESSMENT / PLAN  INR assessment THER    Recheck INR In: 4 WEEKS    INR Location Clinic      Anticoagulation Summary as of 9/17/2018     INR goal 2.0-3.0   Today's INR 2.3   Warfarin maintenance plan 2.5 mg (5 mg x 0.5) on Mon; 5 mg (5 mg x 1) all other days   Full warfarin instructions 2.5 mg on Mon; 5 mg all other days   Weekly warfarin total 32.5 mg   No change documented Katherin Pastor RN   Plan last modified Katherin Pastor RN (8/30/2018)   Next INR check 10/15/2018   Target end date Indefinite    Indications   Long-term (current) use of anticoagulants [Z79.01] [Z79.01]  Atrial fibrillation (H) [I48.91]         Anticoagulation Episode Summary     INR check location     Preferred lab     Send INR reminders to [facility] HEART INR NURSE    Comments       Anticoagulation Care Providers     Provider Role Specialty Phone number    Long Pugh MD Responsible Cardiology [phone]            See the Encounter Report to view Anticoagulation Flowsheet and Dosing Calendar (Go to Encounters tab in chart review, and find the Anticoagulation Therapy Visit)    INR 2.3 INR staying in range on lower dosing schedule and consistent diet. Eating mixed green salad daily and drinking Ensure 3x/wk. No change in meds. No abnormal bleeding or bruising. Will continue current dosing of 2.5 mg Mondays and 5 mg all other days with recheck in 4 weeks before his OV with Amalia.    Katherin Pastor, RN               
limitations in strength

## 2019-02-08 NOTE — H&P ADULT - NSHPPHYSICALEXAM_GEN_ALL_CORE
GENERAL: NAD, well-groomed, well-developed  HEAD:  Atraumatic, Normocephalic  EYES: PERRLA, conjunctiva and sclera clear  ENMT: No tonsillar erythema, exudates, or enlargement; Moist mucous membranes, No lesions  NECK: Supple, No JVD, Normal thyroid  NERVOUS SYSTEM:  Awake, alert, not oriented, strength 3/5 RUE, RLE, 4/5 LLE, LUE.  PERRLA, patient cannot participate in full evaluation  CHEST/LUNG: Clear to ascultation bilaterally; No rales, rhonchi, wheezing, or rubs  HEART: Regular rate and rhythm; No murmurs, rubs, or gallops  ABDOMEN: Soft, Nontender, Nondistended; Bowel sounds present  EXTREMITIES: b/l swelling, non-pitting LE  SKIN: no rashes or lesions

## 2019-02-08 NOTE — ED PROVIDER NOTE - NS ED ROS FT
Constitutional: (-) fever  (-)chills  (-)sweats  Eyes/ENT: (-) blurry vision, (-) epistaxis  (-)rhinorrhea   (-) sore throat    Cardiovascular: (+ chest pain, (-) palpitations (-) edema   Respiratory: (-) cough, (+) shortness of breath   Gastrointestinal: (-)nausea  (-)vomiting, (-) diarrhea  (-) abdominal pain   :  (-)dysuria, (-)frequency, (-)urgency, (-)hematuria  Musculoskeletal: (-) neck pain, (-) back pain, (-) joint pain  Integumentary: (-) rash, (-) edema  Neurological: (-) headache, (-) altered mental status  (-)LOC  (+)weakness

## 2019-02-08 NOTE — H&P ADULT - ASSESSMENT
68 year old man with PMH HTN, s/p ICD, prostate Ca s/p radiation, gastritis, recent diverticular bleed requiring 4 units pRBC (Dec 2018), DVT on Xarelto, and TBI with residual left-sided weakness presents to ED with family after they noted right-sided weakness yesterday.  Patient will require admission for at least 2 midnights as detailed below:    IMPROVE VTE Individual Risk Assessment          RISK                                                          Points    [ x ] Previous VTE                                                3    [  ] Thrombophilia                                             2    [  ] Lower limb paralysis                                    2        (unable to hold up >15 seconds)      [  ] Current Cancer                                             2         (within 6 months)    [x  ] Immobilization > 24 hrs                              1    [  ] ICU/CCU stay > 24 hours                            1    [ x ] Age > 60                                                    1    IMPROVE VTE Score ______5___

## 2019-02-08 NOTE — H&P ADULT - PROBLEM SELECTOR PLAN 2
On Xarelto, continue  Will likely need IVC filter once neuro status is stable; difficult to determine if PE new after starting AC  Discussed options with family.

## 2019-02-08 NOTE — ED PROVIDER NOTE - OBJECTIVE STATEMENT
68yoM; with pmh signif for DVT in Right leg diagnosed 3 weeks ago (placed on Xeralto), HTN, Anoxic Brain Injury in 2008; now p/w right arm weakness x1 week. c/o chest pain and sob x2-3 days.  denies palpitation. daughter states both legs with swelling now and concerned about suboptimal treatment of DVT and propogation causing cva and pe.  PMH; DVT, HTN, anoxic brain injury  SOCIAL: No tobacco/illicit substance use/EtOH

## 2019-02-08 NOTE — CHART NOTE - NSCHARTNOTEFT_GEN_A_CORE
68 year old man with PMH HTN, s/p ICD, prostate Ca s/p radiation, gastritis, recent diverticular bleed requiring 4 units PRBC (Dec 2018), DVT on Xarelto, and TBI with residual left-sided weakness presents to ED with family after they noted right-sided weakness yesterday. She has also noted that his legs have become more swollen and thinks he has seemed SOB at times.  In the ED, CTA chest shows acute right PE, chronic right DVT and left age-indeterminate DVT.  No right heart strain or pulmonary infarcts.  CT head shows no acute findings.  Unclear if had PE in last admission as CT wasn't done  Pt seen and examined, no acute events.  Continue AC  Neuro eval  Will repeat CT head if symptoms persists.

## 2019-02-08 NOTE — ED ADULT NURSE NOTE - OBJECTIVE STATEMENT
pt a&o x3, pt contracted, bed bound. Pt 100 years ago suffered form tony anoxia injury. left side weakness at baseline. As per wife pt c/o increased swelling in right leg, new onset swelling in left leg. pmh r leg + dvt dx'd 1/19/19 with xarelto rx'd states noted increased size x 3 days. Pt was seen at pmd today for f/u recommended to return to er also c/o r arm pain over past several days per pts wife states slight hematuria noted discussed with pmd today referred to urology

## 2019-02-08 NOTE — ED PROVIDER NOTE - PHYSICAL EXAMINATION
Gen: Alert, NAD  Head: NC, AT, PERRL, EOMI, normal lids/conjunctiva  Neck: +supple, no tenderness/meningismus/JVD, +Trachea midline  Pulm: Bilateral BS, normal resp effort, no wheeze/stridor/retractions  CV: RRR, no M/R/G, +dist pulses  Abd: soft, NT/ND, +BS, no hepatosplenomegaly  Mskel: no edema/erythema/cyanosis  Neuro: AAOx3, see nih below

## 2019-02-09 LAB
ANION GAP SERPL CALC-SCNC: 5 MMOL/L — SIGNIFICANT CHANGE UP (ref 5–17)
APTT BLD: 34.5 SEC — SIGNIFICANT CHANGE UP (ref 27.5–36.3)
BUN SERPL-MCNC: 17 MG/DL — SIGNIFICANT CHANGE UP (ref 7–23)
CALCIUM SERPL-MCNC: 7.8 MG/DL — LOW (ref 8.5–10.1)
CHLORIDE SERPL-SCNC: 104 MMOL/L — SIGNIFICANT CHANGE UP (ref 96–108)
CO2 SERPL-SCNC: 31 MMOL/L — SIGNIFICANT CHANGE UP (ref 22–31)
CREAT SERPL-MCNC: 0.82 MG/DL — SIGNIFICANT CHANGE UP (ref 0.5–1.3)
GLUCOSE SERPL-MCNC: 95 MG/DL — SIGNIFICANT CHANGE UP (ref 70–99)
HCT VFR BLD CALC: 26.5 % — LOW (ref 39–50)
HGB BLD-MCNC: 8.6 G/DL — LOW (ref 13–17)
MCHC RBC-ENTMCNC: 23.1 PG — LOW (ref 27–34)
MCHC RBC-ENTMCNC: 32.5 GM/DL — SIGNIFICANT CHANGE UP (ref 32–36)
MCV RBC AUTO: 71 FL — LOW (ref 80–100)
NRBC # BLD: 0 /100 WBCS — SIGNIFICANT CHANGE UP (ref 0–0)
PLATELET # BLD AUTO: 286 K/UL — SIGNIFICANT CHANGE UP (ref 150–400)
POTASSIUM SERPL-MCNC: 4.9 MMOL/L — SIGNIFICANT CHANGE UP (ref 3.5–5.3)
POTASSIUM SERPL-SCNC: 4.9 MMOL/L — SIGNIFICANT CHANGE UP (ref 3.5–5.3)
RBC # BLD: 3.73 M/UL — LOW (ref 4.2–5.8)
RBC # FLD: 20.7 % — HIGH (ref 10.3–14.5)
SODIUM SERPL-SCNC: 140 MMOL/L — SIGNIFICANT CHANGE UP (ref 135–145)
WBC # BLD: 8.87 K/UL — SIGNIFICANT CHANGE UP (ref 3.8–10.5)
WBC # FLD AUTO: 8.87 K/UL — SIGNIFICANT CHANGE UP (ref 3.8–10.5)

## 2019-02-09 PROCEDURE — 70450 CT HEAD/BRAIN W/O DYE: CPT | Mod: 26

## 2019-02-09 PROCEDURE — 99233 SBSQ HOSP IP/OBS HIGH 50: CPT

## 2019-02-09 RX ORDER — HEPARIN SODIUM 5000 [USP'U]/ML
3000 INJECTION INTRAVENOUS; SUBCUTANEOUS EVERY 6 HOURS
Qty: 0 | Refills: 0 | Status: DISCONTINUED | OUTPATIENT
Start: 2019-02-09 | End: 2019-02-12

## 2019-02-09 RX ORDER — HEPARIN SODIUM 5000 [USP'U]/ML
INJECTION INTRAVENOUS; SUBCUTANEOUS
Qty: 25000 | Refills: 0 | Status: DISCONTINUED | OUTPATIENT
Start: 2019-02-09 | End: 2019-02-12

## 2019-02-09 RX ORDER — CARVEDILOL PHOSPHATE 80 MG/1
3.12 CAPSULE, EXTENDED RELEASE ORAL EVERY 12 HOURS
Qty: 0 | Refills: 0 | Status: DISCONTINUED | OUTPATIENT
Start: 2019-02-09 | End: 2019-02-15

## 2019-02-09 RX ORDER — HEPARIN SODIUM 5000 [USP'U]/ML
6500 INJECTION INTRAVENOUS; SUBCUTANEOUS EVERY 6 HOURS
Qty: 0 | Refills: 0 | Status: DISCONTINUED | OUTPATIENT
Start: 2019-02-09 | End: 2019-02-12

## 2019-02-09 RX ADMIN — CARVEDILOL PHOSPHATE 3.12 MILLIGRAM(S): 80 CAPSULE, EXTENDED RELEASE ORAL at 22:09

## 2019-02-09 RX ADMIN — RIVAROXABAN 15 MILLIGRAM(S): KIT at 05:27

## 2019-02-09 RX ADMIN — HEPARIN SODIUM 1400 UNIT(S)/HR: 5000 INJECTION INTRAVENOUS; SUBCUTANEOUS at 18:25

## 2019-02-09 RX ADMIN — PANTOPRAZOLE SODIUM 40 MILLIGRAM(S): 20 TABLET, DELAYED RELEASE ORAL at 06:18

## 2019-02-09 RX ADMIN — CARVEDILOL PHOSPHATE 6.25 MILLIGRAM(S): 80 CAPSULE, EXTENDED RELEASE ORAL at 05:27

## 2019-02-09 NOTE — PHYSICAL THERAPY INITIAL EVALUATION ADULT - ADDITIONAL COMMENTS
Pt has an aide 12 hours 6 days a week. Pt has fabiana lift, wheelchair, and rolling walker. Pt requires assist for all ADLs. Pt's wife states she and aide get pt to walk 5-10feet since discharge from rehab and requires 2 person due to wheelchair follow.

## 2019-02-09 NOTE — CONSULT NOTE ADULT - SUBJECTIVE AND OBJECTIVE BOX
Patient is a 68y old  Male who presents with a chief complaint of stroke-like symptoms, new PE (08 Feb 2019 06:12)    HPI:  68 year old man with, HTN, s/p ICD, prostate Ca (s/p resection & radiation), Gastritis, Cardiac Arrest S/P AICD, COMA due to Brain Injury and S/P Cecal Resection for bowel Obstruction due to Incarcerated Hernia, Diverticular bleed in NOV 2018 requiring 4 units of PEBC. Diagnosed with DVT about 3 weeks ago and was started on Xarelto.   Presented to ED with family after they noted right-sided weakness yesterday, and his legs + left arm appeared  more swollen. They thinks he has seemed SOB at times. Pt cannot communicate due to TBI, history obtained from spouse at bedside.      In the ED, CTA chest shows acute right PE, chronic right DVT and left age-indeterminate DVT.  No right heart strain or pulmonary infarcts.  CT head shows no acute findings.   Non smoker.    PAST MEDICAL & SURGICAL HISTORY:  Sudden cardiac death  Gait abnormality  Leg pain, right  Anoxic encephalopathy  Brain injury with coma: x 2 weeks in 2008  Prostate cancer: radiation therapy  Hypertension  S/P ICD (internal cardiac defibrillator) procedure: implanted on 1/6/2009  H/O tracheostomy  H/O prostate cancer: resection and radiation therapy  Status post cryoablation: of left renal mass    FAMILY HISTORY:  not able to provide.    SOCIAL HISTORY: non smoker    Allergies  lisinopril (Unknown)  penicillins (Unknown)    MEDICATIONS  (STANDING):  carvedilol 3.125 milliGRAM(s) Oral every 12 hours  multivitamin 1 Tablet(s) Oral daily  pantoprazole    Tablet 40 milliGRAM(s) Oral before breakfast  rivaroxaban 15 milliGRAM(s) Oral two times a day    REVIEW OF SYSTEMS:  not able to provide.    Vaccines - Influenza: no and Pneumovax: no  Tobacco:  no  Blood Pressure Screening / Control of: 154/86  Current Medications Reviewed: reviewed.    Vital Signs Last 24 Hrs  T(C): 36.9 (09 Feb 2019 05:26), Max: 37.2 (08 Feb 2019 16:52)  T(F): 98.4 (09 Feb 2019 05:26), Max: 99 (08 Feb 2019 16:52)  HR: 126 (09 Feb 2019 15:00) (100 - 126)  BP: 154/86 (09 Feb 2019 15:00) (110/55 - 154/86)  BP(mean): --  RR: 17 (09 Feb 2019 15:00) (17 - 18)  SpO2: 98% (09 Feb 2019 15:00) (98% - 99%)    PHYSICAL EXAM:  GEN:         Awake, responsive and comfortable.  HEENT:      Normal.    RESP:         no wheezing.  CVS:             Regular rate and rhythm.   ABD:         Soft, non-tender, non-distended;   :             No costovertebral angle tenderness  SKIN:           Warm and dry.  EXTR:            No clubbing, cyanosis or edema  CNS:            left weakness  PSYCH:        cooperative,     LABS:                        8.6    8.87  )-----------( 286      ( 09 Feb 2019 12:15 )             26.5     02-09    140  |  104  |  17  ----------------------------<  95  4.9   |  31  |  0.82    Ca    7.8<L>      09 Feb 2019 12:15    TPro  6.9  /  Alb  2.0<L>  /  TBili  0.6  /  DBili  x   /  AST  152<H>  /  ALT  115<H>  /  AlkPhos  156<H>  02-08    PT/INR - ( 08 Feb 2019 02:13 )   PT: 37.0 sec;   INR: 3.19 ratio      PTT - ( 09 Feb 2019 12:15 )  PTT:34.5 sec    EKG: sinus rhythm    RADIOLOGY & ADDITIONAL STUDIES:  < from: CT Angio Chest w/ IV Cont (02.08.19 @ 04:17) >    EXAM:  CT ANGIO CHEST (W)AW IC                          PROCEDURE DATE:  02/08/2019      INTERPRETATION:  HISTORY: Chest pain. History of DVT. Evaluate for   pulmonary embolism.    TECHNIQUE:  CT pulmonary angiography was performed of the chest according   to standard institutional protocol. Coronal, sagittal and transaxial 3-D   MIP reformatted images are provided from the transaxial source data.     85mL of Omnipaque 350 was administered without complication and 15 mL was   discarded.      COMPARISON: No similar prior study available for comparison.    FINDINGS:   There is suboptimal opacification of pulmonary arterial tree and motion   artifact present which degrades image quality. A faint linear filling   defect is seen at the branching point of the distal right common basilar   lower lobe pulmonary artery extending into proximal segmental branches of   the superior and lateral segments. No gross central pulmonary embolism is   seen on the left ovary within the right upper andmiddle lobes. There is   no evidence of right heart strain or pulmonary infarct.    The thyroid gland is unremarkable.     Evaluation of the lung parenchyma demonstrates no suspicious nodule or   mass. There is linear atelectasis versus scarring in the right lower lobe   and subsegmental atelectasis in the left lower lobe. Is no pleural   effusion or pneumothorax. The trachea and main bronchi are clear.     The heart is enlarged. There is a left anterior chest wall dual-lead   cardiac device withdistal leads intact of the right atrium and   ventricle. There is no pericardial effusion. The thoracic aorta is normal   in caliber without evidence for dissection.    Limited images to the upper abdomen demonstrate possible cyst in the   upper lobeof the left kidney although streak artifact limits evaluation.   No gross acute abnormality seen.    There are no acute osseous abnormalities.    IMPRESSION:  Suboptimal study. Small acute pulmonary embolism in the distal right   lower lobe common basilar artery extending into proximal segmental   superior and lateral branches. No evidence of right heart strain or   pulmonary infarct.    I discussed the findings with Dr. Mercado at 4:40 AM on 2/8/2019 with read   back verification.    AIME YEH M.D., RADIOLOGIST  This document has been electronically signed. Feb 8 2019  4:45AM    < from: US Duplex Carotid Arteries Complete, Bilateral (02.08.19 @ 08:24) >    EXAM:  US DPLX CAROTIDS COMPL BI                          PROCEDURE DATE:  02/08/2019      INTERPRETATION:  HISTORY: Right-sided weakness, possible CVA    COMPARISON:None    FINDINGS: There is mild bilateral intimal thickening without significant   atherosclerotic plaque. Please note that the study is technically limited   due to patient motion.    Peak systolic velocities are as follows (in cm/sec):     RIGHT:    CCA = 121 ;  ICA = 105 ; ECA = 95     LEFT   :    CCA = 120 ;  ICA = 90 ;  ECA = 128     There is antegrade flow through both vertebral arteries.       IMPRESSION: Technically limited exam. No visualized hemodynamically   significant carotid artery stenoses.     SHANNON EWING M.D., ATTENDING RADIOLOGIST  This document has been electronically signed. Feb 8 2019  8:28AM    < from: US Duplex Venous Lower Ext Complete, Bilateral (02.08.19 @ 02:59) >    EXAM:  US DPLX LWR EXT VEINS COMPL BI                          PROCEDURE DATE:  02/08/2019      INTERPRETATION:  CLINICAL INFORMATION: Leg swelling. Evaluate for DVT.   Patient on anticoagulation.    COMPARISON: Right lower extremity venous duplex from 1/19/2019    TECHNIQUE: Duplex sonography of the BILATERAL LOWER extremities with   color and spectral Doppler, with and without compression.      FINDINGS:    There is persisting clot within the right external iliac, common femoral,   greater saphenous, femoral and popliteal veins with partial color flow   and noncompressibility. Right posterior tibial and peroneal veins appear   grossly patent with color flow, improved from the prior exam.    Age-indeterminate thrombus is seen within the left external iliac, common   femoral and greater saphenous veins with partial color flow and   noncompressibility. The left proximal, mid and distal femoral, popliteal   and calf veins are patent and normal.    IMPRESSION:   Right lower extremity: Chronic partially occlusive deep venous thrombosis   from the external iliac through to popliteal veins. Calf vein now appear   grossly patent.    Left lower extremity: Age-indeterminate thrombus in the left external   iliac, common femoral and greater saphenous veins.    AIME YEH M.D., RADIOLOGIST  This document has been electronically signed. Feb 8 2019  3:06AM      ASSESSMENT AND PLAN:  ·	RLL PE.  ·	Bilateral DVT.  ·	Anemia.  ·	Cardiac arrest S/P AICD.  ·	S/P Hypoxic Encephalopathy.  ·	Traumatic Brain Injury.  ·	CA prostate history.  ·	HTN.    Family reports new bilateral  lower and Left upper extremity DVT(although was on Xarelto for 3 weeks).  Will change to IV heparin and Coumadin. Daughter made aware that if he develops rectal bleed, will require discontinuation of anticoagulation and placement of IVC filter.

## 2019-02-09 NOTE — PROGRESS NOTE ADULT - ASSESSMENT
68 year old man with PMH HTN, s/p ICD, prostate Ca s/p radiation, gastritis, recent diverticular bleed requiring 4 units pRBC (Dec 2018), DVT on Xarelto, and TBI with residual left-sided weakness presents to ED with family after they noted right-sided weakness and worsening rt LE [place].    RT sided wkness:  - Previously only with left sided paralysis  - Now also with rt sided wkness, improved now, as per aide at bedside he always had some wkness of rt side as well.  - CT head negative, unable to do MRI for AICD  - Repeat CT head  - 2D Echo, carotid doppler with no acute pathology  - Neurology follow up    DVT/ PE:  - Presumed Xarelto failure  - Discussed with Dr. Wilkins  - Will start on heparin drip, no bolus as received Xarelto today  - Start coumadin tomorrow       HTN:  - Continue current meds      Anoxic encephalopathy:  - Supportive care.       Discussed with wife.

## 2019-02-09 NOTE — PHYSICAL THERAPY INITIAL EVALUATION ADULT - PATIENT/FAMILY AGREES WITH PLAN
Pt's wife wants rehab, PT does not recommend rehab due to decreased functional mobility at baseline. Pt wound benefit from home PT for inhome modifications and education/no

## 2019-02-10 LAB
APTT BLD: 121.2 SEC — CRITICAL HIGH (ref 28.5–37)
APTT BLD: 34.7 SEC — SIGNIFICANT CHANGE UP (ref 27.5–36.3)
HCT VFR BLD CALC: 26.1 % — LOW (ref 39–50)
HCT VFR BLD CALC: 26.5 % — LOW (ref 39–50)
HGB BLD-MCNC: 8.5 G/DL — LOW (ref 13–17)
HGB BLD-MCNC: 8.5 G/DL — LOW (ref 13–17)
INR BLD: 1.59 RATIO — HIGH (ref 0.88–1.16)
MCHC RBC-ENTMCNC: 22.5 PG — LOW (ref 27–34)
MCHC RBC-ENTMCNC: 23 PG — LOW (ref 27–34)
MCHC RBC-ENTMCNC: 32.1 GM/DL — SIGNIFICANT CHANGE UP (ref 32–36)
MCHC RBC-ENTMCNC: 32.6 GM/DL — SIGNIFICANT CHANGE UP (ref 32–36)
MCV RBC AUTO: 70.3 FL — LOW (ref 80–100)
MCV RBC AUTO: 70.5 FL — LOW (ref 80–100)
NRBC # BLD: 0 /100 WBCS — SIGNIFICANT CHANGE UP (ref 0–0)
NRBC # BLD: 0 /100 WBCS — SIGNIFICANT CHANGE UP (ref 0–0)
PLATELET # BLD AUTO: 280 K/UL — SIGNIFICANT CHANGE UP (ref 150–400)
PLATELET # BLD AUTO: 288 K/UL — SIGNIFICANT CHANGE UP (ref 150–400)
PROTHROM AB SERPL-ACNC: 18.1 SEC — HIGH (ref 10–12.9)
RBC # BLD: 3.7 M/UL — LOW (ref 4.2–5.8)
RBC # BLD: 3.77 M/UL — LOW (ref 4.2–5.8)
RBC # FLD: 20.6 % — HIGH (ref 10.3–14.5)
RBC # FLD: 20.6 % — HIGH (ref 10.3–14.5)
WBC # BLD: 7.94 K/UL — SIGNIFICANT CHANGE UP (ref 3.8–10.5)
WBC # BLD: 8 K/UL — SIGNIFICANT CHANGE UP (ref 3.8–10.5)
WBC # FLD AUTO: 7.94 K/UL — SIGNIFICANT CHANGE UP (ref 3.8–10.5)
WBC # FLD AUTO: 8 K/UL — SIGNIFICANT CHANGE UP (ref 3.8–10.5)

## 2019-02-10 PROCEDURE — 93931 UPPER EXTREMITY STUDY: CPT | Mod: 26

## 2019-02-10 PROCEDURE — 99233 SBSQ HOSP IP/OBS HIGH 50: CPT

## 2019-02-10 RX ORDER — WARFARIN SODIUM 2.5 MG/1
5 TABLET ORAL ONCE
Qty: 0 | Refills: 0 | Status: COMPLETED | OUTPATIENT
Start: 2019-02-10 | End: 2019-02-10

## 2019-02-10 RX ADMIN — HEPARIN SODIUM 1400 UNIT(S)/HR: 5000 INJECTION INTRAVENOUS; SUBCUTANEOUS at 12:45

## 2019-02-10 RX ADMIN — HEPARIN SODIUM 6500 UNIT(S): 5000 INJECTION INTRAVENOUS; SUBCUTANEOUS at 02:51

## 2019-02-10 RX ADMIN — CARVEDILOL PHOSPHATE 3.12 MILLIGRAM(S): 80 CAPSULE, EXTENDED RELEASE ORAL at 11:46

## 2019-02-10 RX ADMIN — WARFARIN SODIUM 5 MILLIGRAM(S): 2.5 TABLET ORAL at 22:07

## 2019-02-10 RX ADMIN — HEPARIN SODIUM 1700 UNIT(S)/HR: 5000 INJECTION INTRAVENOUS; SUBCUTANEOUS at 02:53

## 2019-02-10 RX ADMIN — HEPARIN SODIUM 1200 UNIT(S)/HR: 5000 INJECTION INTRAVENOUS; SUBCUTANEOUS at 20:36

## 2019-02-10 RX ADMIN — HEPARIN SODIUM 0 UNIT(S)/HR: 5000 INJECTION INTRAVENOUS; SUBCUTANEOUS at 11:25

## 2019-02-10 RX ADMIN — Medication 1 TABLET(S): at 11:46

## 2019-02-10 RX ADMIN — PANTOPRAZOLE SODIUM 40 MILLIGRAM(S): 20 TABLET, DELAYED RELEASE ORAL at 06:15

## 2019-02-10 NOTE — PROGRESS NOTE ADULT - ASSESSMENT
68 year old man with PMH HTN, s/p ICD, prostate Ca s/p radiation, gastritis, recent diverticular bleed requiring 4 units pRBC (Dec 2018), DVT on Xarelto, and TBI with residual left-sided weakness presents to ED with family after they noted right-sided weakness and worsening rt LE [place].      DVT/ PE:  - Presumed Xarelto failure  - Discussed with Dr. Wilkins  - Started on heparin bridging to coumadin.  - Monitor CBC      RT sided wkness:  - Previously only with left sided paralysis  - Now also with rt sided wkness, improved now, as per aide at bedside he always had some wkness of rt side as well.  - Improved.  - CT head negative, unable to do MRI for AICD  - 2D Echo, carotid doppler with no acute pathology  - Neurology follow up    HTN:  - Continue current meds      Anoxic encephalopathy:  - Supportive care.       Discussed with wife.

## 2019-02-10 NOTE — CONSULT NOTE ADULT - SUBJECTIVE AND OBJECTIVE BOX
Subjective Complaints:  Historian:       Consult requested by ER doctor:                  Attending:     HPI:  68 year old man with PMH HTN, s/p ICD, prostate Ca s/p radiation, gastritis, recent diverticular bleed requiring 4 units pRBC (Dec 2018), DVT on Xarelto, and TBI with residual left-sided weakness presents to ED with family after they noted right-sided weakness yesterday.  Pt cannot communicate due to TBI, history obtained from spouse at bedside.  She has also noted that his legs have become more swollen and thinks he has seemed SOB at times.    In the ED, CTA chest shows acute right PE, chronic right DVT and left age-indeterminate DVT.  No right heart strain or pulmonary infarcts.  CT head shows no acute findings. (08 Feb 2019 06:12)    CARMINE LAMB    PAST MEDICAL & SURGICAL HISTORY:  Sudden cardiac death  Gait abnormality  Leg pain, right  Anoxic encephalopathy  Brain injury with coma: x 2 weeks in 2008  Prostate cancer: radiation therapy  Hypertension  S/P ICD (internal cardiac defibrillator) procedure: implanted on 1/6/2009  H/O tracheostomy  H/O prostate cancer: resection and radiation therapy  Status post cryoablation: of left renal mass  68yMale    MEDICATIONS  (STANDING):  carvedilol 3.125 milliGRAM(s) Oral every 12 hours  heparin  Infusion.  Unit(s)/Hr (14 mL/Hr) IV Continuous <Continuous>  multivitamin 1 Tablet(s) Oral daily  pantoprazole    Tablet 40 milliGRAM(s) Oral before breakfast  warfarin 5 milliGRAM(s) Oral once    MEDICATIONS  (PRN):  heparin  Injectable 6500 Unit(s) IV Push every 6 hours PRN For aPTT less than 40  heparin  Injectable 3000 Unit(s) IV Push every 6 hours PRN For aPTT between 40 - 57      Allergies    lisinopril (Unknown)  penicillins (Unknown)    Intolerances      FAMILY HISTORY:      REVIEW OF SYSTEMS:  General:  No wt loss, fevers, chills, night sweats  Eyes:  Good vision, no reported pain  ENT:  No sore throat, pain, runny nose, dysphagia  CV:  No pain, palpitatioins, hypo/hypertension  Resp:  No dyspnea, cough, tachypnea, wheezing  GI:  No pain, nausea, vomiting, diarrhea, constipatiion  :  No pain, bleeding, incontinence, nocturia  Muscle:  No pain, weakness  Breast:  No pain, abscess, mass, discharge  Neuro:  No weakness, tingling, memory problems  Psych:  No fatigue, insomnia, mood problems, depression  Endocrine:  No polyuria, polydypsia, cold/heat intolerance  Heme:  No petechiae, ecchymosis, easy bruisability  Skin:  No rash, tattoos, scars, edema      Vital Signs Last 24 Hrs  T(C): 37.5 (10 Feb 2019 12:03), Max: 37.5 (10 Feb 2019 12:03)  T(F): 99.5 (10 Feb 2019 12:03), Max: 99.5 (10 Feb 2019 12:03)  HR: 98 (10 Feb 2019 12:03) (98 - 110)  BP: 132/69 (10 Feb 2019 12:03) (123/59 - 132/69)  BP(mean): --  RR: 18 (10 Feb 2019 12:03) (18 - 18)  SpO2: 100% (10 Feb 2019 12:03) (99% - 100%)    GENERAL PHYSICAL EXAM:  General:  Appears stated age, well-groomed, well-nourished, no distress  HEENT:  NC/AT, patent nares w/ pink mucosa, OP clear w/o lesions, PERRL, EOMI, conjunctivae clear, no thyromegaly, nodules, adenopathy, no JVD  Chest:  Full & symmetric excursion, no increased effort, breath sounds clear  Cardiovascular:  Regular rhythm, S1, S2, no murmur/rub/S3/S4, no carotid/femoral/abdominal bruit, radial/pedal pulses 2+, no edema  Abdomen:  Soft, non-tender, non-distended, normoactive bowel sounds, no HSM  Extremities:  Gait & station:   Digits:   Nails:   Joints, Bones, Muscles:   ROM:   Stability:  Skin:  No rash/erythema/ecchymoses/petechiae/wounds/abscess/warm/dry  Musculoskeletal:  Full ROM in all joints w/o swelling/tenderness/effusion    NEUROLOGICAL EXAM:  HENT:  Normocephalic head; atraumatic head.  Neck supple.  ENT: normal looking.  Mental State:    AWAKE,  oriented to person, place   Speech is hesitant ,able to comprehend spoken language with difficulty     Cranial Nerves:  II-XII:   Pupils round and reactive to light and accommodation.  Extraocular movements full.  Visual fields deficit (no homonymous hemianopsia). Facial symmetry intact.  Tongue midline.  Motor Functions:  Moves both upper extremity with limitation strength is 3/5 ,lower extremity strength is 2/5   Sensory Functions:  unreliable   Reflexes:  Deep tendon reflexes normoactive to biceps, knees and ankles.plantar responses are mute  Cerebellar Testing:    Finger to nose intact.  Nystagmus absent.  Gait : unable to stand      LABS:                        8.5    7.94  )-----------( 288      ( 10 Feb 2019 08:13 )             26.1     02-09    140  |  104  |  17  ----------------------------<  95  4.9   |  31  |  0.82    Ca    7.8<L>      09 Feb 2019 12:15      PT/INR - ( 10 Feb 2019 08:13 )   PT: 18.1 sec;   INR: 1.59 ratio         PTT - ( 10 Feb 2019 08:13 )  PTT:>200.0 sec        RADIOLOGY & ADDITIONAL STUDIES:    heparin  Infusion.:   25,000 Unit(s) in dextrose 5% 250 milliLiter(s), infuse at 14 mL/Hr  Dose Rate: 1400 Unit(s)/Hr  Administration Instructions: Target aPTT = 58 - 99 seconds.  Call Prescriber for aPTT 40 or less or 128 or greater.  Titration:  USE FULL ANTICOAGULATION NOMOGRAM.  Base on drug dosing weight  Provider's Contact #: [phone] (02-09 @ 17:33)  heparin  Injectable:   6500 Unit(s), IV Push, every 6 hours, PRN for For aPTT less than 40  Special Instructions: Subsequent bolus  Provider's Contact #: [phone] (02-09 @ 17:33)  heparin  Injectable:   3000 Unit(s), IV Push, every 6 hours, PRN for For aPTT between 40 - 57  Special Instructions: Subsequent bolus  Provider's Contact #: [phone] (02-09 @ 17:33)  Provider to RN:       Call prescriber if patient's aPTT is outside the upper or lower limits based on Nomogram (02-09 @ 17:33)  Provider to RN:       Call prescriber with signs of bleeding or change in mental status AND discontinue heparin (02-09 @ 17:33)  Provider to RN:       Call prescriber to report INR if patient is on warfarin while on heparin infusion (02-09 @ 17:33)  Complete Blood Count: AM Sched. Collection: 10-Feb-2019 05:00 (02-09 @ 17:34)  Prothrombin Time and INR, Plasma:  Start Date:10-Feb-2019. AM Sched. Collection:10-Feb-2019 05:00 (02-09 @ 17:34)  Activated Partial Thromboplastin Time:  Start Date:09-Feb-2019. STAT (02-09 @ 23:56)  Complete Blood Count: Repeat From: 22-Feb-2019 00:00 To: 24-Feb-2019 05:00, Every 1 day(s) (02-09 @ 23:56)  Complete Blood Count: Repeat From: 19-Feb-2019 00:00 To: 21-Feb-2019 05:00, Every 1 day(s) (02-09 @ 23:56)  Complete Blood Count: Repeat From: 16-Feb-2019 00:00 To: 18-Feb-2019 05:00, Every 1 day(s) (02-09 @ 23:56)  Complete Blood Count: Repeat From: 13-Feb-2019 00:00 To: 15-Feb-2019 05:00, Every 1 day(s) (02-09 @ 23:56)  Complete Blood Count: Repeat From: 10-Feb-2019 00:00 To: 12-Feb-2019 05:00, Every 1 day(s) (02-09 @ 23:56)  Complete Blood Count: STAT (02-09 @ 23:56)  Complete Blood Count: AM Sched. Collection: 10-Feb-2019 05:00  Cancel Reason: Dup Cancel (02-10 @ 00:02)  Complete Blood Count: AM Sched. Collection: 10-Feb-2019 05:00  Cancel Reason: Dup Cancel (02-10 @ 00:02)  Activated Partial Thromboplastin Time:  Start Date:10-Feb-2019. STAT  Cancel Reason: -Lab Reordered (02-10 @ 08:25)  Activated Partial Thromboplastin Time:  Start Date:10-Feb-2019. 08:07 (02-10 @ 09:11)  warfarin: [Ordered as COUMADIN]  5 milliGRAM(s), Oral, once, Stop After 1 Doses  Indication: Confirmed VTE  Administration Instructions: Return unused medication/wrapper to Pharmacy.  Provider's Contact #: [phone] (02-10 @ 15:15)  Prothrombin Time and INR, Plasma: Repeat From: 10-Feb-2019 15:14 To: 17-Feb-2019 05:00, Every 1 day(s) Start Date:10-Feb-2019 (02-10 @ 15:15)  Prothrombin Time and INR, Plasma:  Start Date:11-Feb-2019. AM Sched. Collection:11-Feb-2019 05:00 (02-10 @ 15:15)  Complete Blood Count: Repeat From: 10-Feb-2019 15:16 To: 17-Feb-2019 05:00, Every 1 day(s) (02-10 @ 15:16)  Complete Blood Count: AM Sched. Collection: 11-Feb-2019 05:00 (02-10 @ 15:16)  HEAD CT ; DIFFUSE WHITE MATTER CHANGES     Assessment & Opinion: 68 Y O MAN WITH H/O TRAUMATIC BRAIN INJURY IS SEEN FOR POSSIBLE RIGHT SIDED WEAKNESS WHICH MAY BE DUE TO DVT IN THE ARM   DX SEQUELLA OF TRAUMATIC BRAIN INJURY --DVT     Recommendations:  Brain MRI CAN NOT BE DONE BECAUSE PATIENT HAS AN AICD  Carotid doppler.  Echocardiogram.  EEG.   DVT prophylaxis as ordered.  Medications:

## 2019-02-11 LAB
APTT BLD: 108.7 SEC — HIGH (ref 28.5–37)
APTT BLD: 82.7 SEC — HIGH (ref 27.5–36.3)
HCT VFR BLD CALC: 28 % — LOW (ref 39–50)
HCV AB S/CO SERPL IA: 0.05 S/CO — SIGNIFICANT CHANGE UP
HCV AB SERPL-IMP: SIGNIFICANT CHANGE UP
HGB BLD-MCNC: 9 G/DL — LOW (ref 13–17)
INR BLD: 1.41 RATIO — HIGH (ref 0.88–1.16)
MCHC RBC-ENTMCNC: 22.8 PG — LOW (ref 27–34)
MCHC RBC-ENTMCNC: 32.1 GM/DL — SIGNIFICANT CHANGE UP (ref 32–36)
MCV RBC AUTO: 70.9 FL — LOW (ref 80–100)
NRBC # BLD: 0 /100 WBCS — SIGNIFICANT CHANGE UP (ref 0–0)
PLATELET # BLD AUTO: 328 K/UL — SIGNIFICANT CHANGE UP (ref 150–400)
PROTHROM AB SERPL-ACNC: 16 SEC — HIGH (ref 10–12.9)
RBC # BLD: 3.95 M/UL — LOW (ref 4.2–5.8)
RBC # FLD: 20.9 % — HIGH (ref 10.3–14.5)
WBC # BLD: 8.68 K/UL — SIGNIFICANT CHANGE UP (ref 3.8–10.5)
WBC # FLD AUTO: 8.68 K/UL — SIGNIFICANT CHANGE UP (ref 3.8–10.5)

## 2019-02-11 PROCEDURE — 99233 SBSQ HOSP IP/OBS HIGH 50: CPT

## 2019-02-11 RX ORDER — WARFARIN SODIUM 2.5 MG/1
5 TABLET ORAL ONCE
Qty: 0 | Refills: 0 | Status: COMPLETED | OUTPATIENT
Start: 2019-02-11 | End: 2019-02-11

## 2019-02-11 RX ADMIN — Medication 1 TABLET(S): at 11:30

## 2019-02-11 RX ADMIN — WARFARIN SODIUM 5 MILLIGRAM(S): 2.5 TABLET ORAL at 22:01

## 2019-02-11 RX ADMIN — HEPARIN SODIUM 1200 UNIT(S)/HR: 5000 INJECTION INTRAVENOUS; SUBCUTANEOUS at 18:50

## 2019-02-11 RX ADMIN — PANTOPRAZOLE SODIUM 40 MILLIGRAM(S): 20 TABLET, DELAYED RELEASE ORAL at 05:20

## 2019-02-11 RX ADMIN — CARVEDILOL PHOSPHATE 3.12 MILLIGRAM(S): 80 CAPSULE, EXTENDED RELEASE ORAL at 05:20

## 2019-02-11 RX ADMIN — HEPARIN SODIUM 1000 UNIT(S)/HR: 5000 INJECTION INTRAVENOUS; SUBCUTANEOUS at 05:18

## 2019-02-11 RX ADMIN — HEPARIN SODIUM 1000 UNIT(S)/HR: 5000 INJECTION INTRAVENOUS; SUBCUTANEOUS at 11:46

## 2019-02-11 NOTE — PROGRESS NOTE ADULT - ASSESSMENT
68 year old man with PMH HTN, s/p ICD, prostate Ca s/p radiation, gastritis, recent diverticular bleed requiring 4 units pRBC (Dec 2018), DVT on Xarelto, and TBI with residual left-sided weakness presents to ED with family after they noted right-sided weakness and worsening rt LE [place].      DVT/ PE:  - Presumed Xarelto failure  - cont w/ heparin bridging to coumadin.     RT sided wkness:  - Previously only with left sided paralysis  - Now also with rt sided wkness, improved now, as per aide at bedside he always had some wkness of rt side as well.  - CT head negative, unable to do MRI for AICD  - 2D Echo, carotid doppler with no acute pathology  - Neurology follow up    Chronic CHF:   EF: 45-50%, grade II diastolic dysfunction.   -cont w/ coreg    HTN:  - Continue current meds      Anoxic encephalopathy:  - Supportive care.     Disposition: home with home PT

## 2019-02-12 ENCOUNTER — APPOINTMENT (OUTPATIENT)
Dept: UROLOGY | Facility: CLINIC | Age: 69
End: 2019-02-12

## 2019-02-12 LAB
APTT BLD: 59.8 SEC — HIGH (ref 28.5–37)
APTT BLD: 91.6 SEC — HIGH (ref 27.5–36.3)
FERRITIN SERPL-MCNC: 289 NG/ML — SIGNIFICANT CHANGE UP (ref 30–400)
HBA1C BLD-MCNC: 5.9 % — HIGH (ref 4–5.6)
HCT VFR BLD CALC: 27.3 % — LOW (ref 39–50)
HGB BLD-MCNC: 8.8 G/DL — LOW (ref 13–17)
INR BLD: 1.41 RATIO — HIGH (ref 0.88–1.16)
MCHC RBC-ENTMCNC: 22.9 PG — LOW (ref 27–34)
MCHC RBC-ENTMCNC: 32.2 GM/DL — SIGNIFICANT CHANGE UP (ref 32–36)
MCV RBC AUTO: 70.9 FL — LOW (ref 80–100)
NRBC # BLD: 0 /100 WBCS — SIGNIFICANT CHANGE UP (ref 0–0)
PLATELET # BLD AUTO: 327 K/UL — SIGNIFICANT CHANGE UP (ref 150–400)
PROTHROM AB SERPL-ACNC: 16 SEC — HIGH (ref 10–12.9)
RBC # BLD: 3.85 M/UL — LOW (ref 4.2–5.8)
RBC # FLD: 20.8 % — HIGH (ref 10.3–14.5)
WBC # BLD: 8.75 K/UL — SIGNIFICANT CHANGE UP (ref 3.8–10.5)
WBC # FLD AUTO: 8.75 K/UL — SIGNIFICANT CHANGE UP (ref 3.8–10.5)

## 2019-02-12 PROCEDURE — 99233 SBSQ HOSP IP/OBS HIGH 50: CPT

## 2019-02-12 RX ORDER — ATORVASTATIN CALCIUM 80 MG/1
20 TABLET, FILM COATED ORAL AT BEDTIME
Qty: 0 | Refills: 0 | Status: DISCONTINUED | OUTPATIENT
Start: 2019-02-12 | End: 2019-02-15

## 2019-02-12 RX ORDER — RIVAROXABAN 15 MG-20MG
1 KIT ORAL
Qty: 30 | Refills: 0 | OUTPATIENT
Start: 2019-02-12

## 2019-02-12 RX ORDER — WARFARIN SODIUM 2.5 MG/1
7.5 TABLET ORAL ONCE
Qty: 0 | Refills: 0 | Status: COMPLETED | OUTPATIENT
Start: 2019-02-12 | End: 2019-02-12

## 2019-02-12 RX ORDER — FERROUS SULFATE 325(65) MG
325 TABLET ORAL
Qty: 0 | Refills: 0 | Status: DISCONTINUED | OUTPATIENT
Start: 2019-02-12 | End: 2019-02-15

## 2019-02-12 RX ORDER — ENOXAPARIN SODIUM 100 MG/ML
80 INJECTION SUBCUTANEOUS EVERY 12 HOURS
Qty: 0 | Refills: 0 | Status: DISCONTINUED | OUTPATIENT
Start: 2019-02-12 | End: 2019-02-15

## 2019-02-12 RX ADMIN — CARVEDILOL PHOSPHATE 3.12 MILLIGRAM(S): 80 CAPSULE, EXTENDED RELEASE ORAL at 05:37

## 2019-02-12 RX ADMIN — CARVEDILOL PHOSPHATE 3.12 MILLIGRAM(S): 80 CAPSULE, EXTENDED RELEASE ORAL at 17:19

## 2019-02-12 RX ADMIN — Medication 1 TABLET(S): at 11:18

## 2019-02-12 RX ADMIN — ENOXAPARIN SODIUM 80 MILLIGRAM(S): 100 INJECTION SUBCUTANEOUS at 13:33

## 2019-02-12 RX ADMIN — Medication 325 MILLIGRAM(S): at 17:19

## 2019-02-12 RX ADMIN — WARFARIN SODIUM 7.5 MILLIGRAM(S): 2.5 TABLET ORAL at 21:49

## 2019-02-12 RX ADMIN — ATORVASTATIN CALCIUM 20 MILLIGRAM(S): 80 TABLET, FILM COATED ORAL at 21:49

## 2019-02-12 RX ADMIN — HEPARIN SODIUM 1200 UNIT(S)/HR: 5000 INJECTION INTRAVENOUS; SUBCUTANEOUS at 05:33

## 2019-02-12 RX ADMIN — PANTOPRAZOLE SODIUM 40 MILLIGRAM(S): 20 TABLET, DELAYED RELEASE ORAL at 05:37

## 2019-02-12 NOTE — PROGRESS NOTE ADULT - ASSESSMENT
68 year old man with PMH HTN, s/p ICD, prostate Ca s/p radiation, gastritis, recent diverticular bleed requiring 4 units pRBC (Dec 2018), DVT on Xarelto, and TBI with residual left-sided weakness presents to ED with family after they noted right-sided weakness and worsening rt MACY [place].      DVT/ PE:  - Presumed Xarelto failure  - cont w/ Lovenox bridge to coumadin.  - wife is not willing to give lovenox at home, will need to get pt therapeutic prior to discharge.      H/o CVA with residual Left hemiparesis, anoxic encephalopathy and aphasia:  - ?Right sided weakness on admission, wife states that he also had weakness on right at home as well  - CT head negative, unable to do MRI for AICD  - 2D Echo, carotid doppler with no acute pathology  - Neurology on board  -f/u hgA1c and lipid panel, cont w/ statin  - outpt PT on discharge.     Chronic CHF:   EF: 45-50%, grade II diastolic dysfunction.   -cont w/ coreg    HTN:  - Continue current meds    Iron Deficiency Anemia:   - Hgb is stable.   - f/u repeat iron studies.     Disposition:   - outpatient PT 68 year old man with PMH HTN, s/p ICD, prostate Ca s/p radiation, gastritis, recent diverticular bleed requiring 4 units pRBC (Dec 2018), DVT on Xarelto, and TBI with residual left-sided weakness presents to ED with family after they noted right-sided weakness and worsening rt LE swellinh.      Bilateral LE DVT/ RLL PE:  - Presumed Xarelto failure  - cont w/ Lovenox bridge to coumadin.  - wife is not willing to give lovenox at home, will need to get pt therapeutic prior to discharge.      H/o TBI, CVA with residual Left hemiparesis, anoxic encephalopathy and aphasia:  - ?Right sided weakness on admission  - CT head negative, unable to do MRI for AICD  - 2D Echo, carotid doppler with no acute pathology  - Neurology on board  -f/u hgA1c and lipid panel, cont w/ statin  - outpt PT on discharge.     Chronic CHF:   EF: 45-50%, grade II diastolic dysfunction.   -cont w/ coreg    HTN:  - Continue current meds    Iron Deficiency Anemia:   - Hgb is stable.   - f/u repeat iron studies.     Disposition:   - outpatient PT

## 2019-02-13 LAB
ANION GAP SERPL CALC-SCNC: 7 MMOL/L — SIGNIFICANT CHANGE UP (ref 5–17)
BUN SERPL-MCNC: 14 MG/DL — SIGNIFICANT CHANGE UP (ref 7–23)
CALCIUM SERPL-MCNC: 8.1 MG/DL — LOW (ref 8.5–10.1)
CHLORIDE SERPL-SCNC: 105 MMOL/L — SIGNIFICANT CHANGE UP (ref 96–108)
CHOLEST SERPL-MCNC: 164 MG/DL — SIGNIFICANT CHANGE UP (ref 10–199)
CO2 SERPL-SCNC: 26 MMOL/L — SIGNIFICANT CHANGE UP (ref 22–31)
CREAT SERPL-MCNC: 0.8 MG/DL — SIGNIFICANT CHANGE UP (ref 0.5–1.3)
GLUCOSE SERPL-MCNC: 106 MG/DL — HIGH (ref 70–99)
HCT VFR BLD CALC: 27.3 % — LOW (ref 39–50)
HDLC SERPL-MCNC: 44 MG/DL — SIGNIFICANT CHANGE UP
HGB BLD-MCNC: 8.8 G/DL — LOW (ref 13–17)
INR BLD: 1.44 RATIO — HIGH (ref 0.88–1.16)
IRON SATN MFR SERPL: 15 UG/DL — LOW (ref 45–165)
IRON SATN MFR SERPL: 8 % — LOW (ref 16–55)
LIPID PNL WITH DIRECT LDL SERPL: 101 MG/DL — SIGNIFICANT CHANGE UP
MCHC RBC-ENTMCNC: 22.9 PG — LOW (ref 27–34)
MCHC RBC-ENTMCNC: 32.2 GM/DL — SIGNIFICANT CHANGE UP (ref 32–36)
MCV RBC AUTO: 71.1 FL — LOW (ref 80–100)
NRBC # BLD: 0 /100 WBCS — SIGNIFICANT CHANGE UP (ref 0–0)
PLATELET # BLD AUTO: 396 K/UL — SIGNIFICANT CHANGE UP (ref 150–400)
POTASSIUM SERPL-MCNC: 4.2 MMOL/L — SIGNIFICANT CHANGE UP (ref 3.5–5.3)
POTASSIUM SERPL-SCNC: 4.2 MMOL/L — SIGNIFICANT CHANGE UP (ref 3.5–5.3)
PROTHROM AB SERPL-ACNC: 16.3 SEC — HIGH (ref 10–12.9)
RBC # BLD: 3.84 M/UL — LOW (ref 4.2–5.8)
RBC # FLD: 20.9 % — HIGH (ref 10.3–14.5)
SODIUM SERPL-SCNC: 138 MMOL/L — SIGNIFICANT CHANGE UP (ref 135–145)
TIBC SERPL-MCNC: 197 UG/DL — LOW (ref 220–430)
TOTAL CHOLESTEROL/HDL RATIO MEASUREMENT: 3.7 RATIO — SIGNIFICANT CHANGE UP (ref 3.4–9.6)
TRIGL SERPL-MCNC: 95 MG/DL — SIGNIFICANT CHANGE UP (ref 10–149)
UIBC SERPL-MCNC: 182 UG/DL — SIGNIFICANT CHANGE UP (ref 110–370)
WBC # BLD: 7.35 K/UL — SIGNIFICANT CHANGE UP (ref 3.8–10.5)
WBC # FLD AUTO: 7.35 K/UL — SIGNIFICANT CHANGE UP (ref 3.8–10.5)

## 2019-02-13 PROCEDURE — 99233 SBSQ HOSP IP/OBS HIGH 50: CPT

## 2019-02-13 RX ORDER — WARFARIN SODIUM 2.5 MG/1
10 TABLET ORAL ONCE
Qty: 0 | Refills: 0 | Status: COMPLETED | OUTPATIENT
Start: 2019-02-13 | End: 2019-02-13

## 2019-02-13 RX ORDER — DOCUSATE SODIUM 100 MG
100 CAPSULE ORAL
Qty: 0 | Refills: 0 | Status: DISCONTINUED | OUTPATIENT
Start: 2019-02-13 | End: 2019-02-15

## 2019-02-13 RX ADMIN — ATORVASTATIN CALCIUM 20 MILLIGRAM(S): 80 TABLET, FILM COATED ORAL at 21:24

## 2019-02-13 RX ADMIN — PANTOPRAZOLE SODIUM 40 MILLIGRAM(S): 20 TABLET, DELAYED RELEASE ORAL at 05:48

## 2019-02-13 RX ADMIN — Medication 325 MILLIGRAM(S): at 18:27

## 2019-02-13 RX ADMIN — Medication 1 TABLET(S): at 13:38

## 2019-02-13 RX ADMIN — WARFARIN SODIUM 10 MILLIGRAM(S): 2.5 TABLET ORAL at 21:25

## 2019-02-13 RX ADMIN — Medication 100 MILLIGRAM(S): at 18:26

## 2019-02-13 RX ADMIN — CARVEDILOL PHOSPHATE 3.12 MILLIGRAM(S): 80 CAPSULE, EXTENDED RELEASE ORAL at 05:48

## 2019-02-13 RX ADMIN — CARVEDILOL PHOSPHATE 3.12 MILLIGRAM(S): 80 CAPSULE, EXTENDED RELEASE ORAL at 18:26

## 2019-02-13 RX ADMIN — Medication 325 MILLIGRAM(S): at 05:48

## 2019-02-13 RX ADMIN — ENOXAPARIN SODIUM 80 MILLIGRAM(S): 100 INJECTION SUBCUTANEOUS at 13:39

## 2019-02-13 RX ADMIN — ENOXAPARIN SODIUM 80 MILLIGRAM(S): 100 INJECTION SUBCUTANEOUS at 00:48

## 2019-02-13 NOTE — PROGRESS NOTE ADULT - ASSESSMENT
68 year old man with PMH HTN, s/p ICD, prostate Ca s/p radiation, gastritis, recent diverticular bleed requiring 4 units pRBC (Dec 2018), DVT on Xarelto, and TBI with residual left-sided weakness presents to ED with family after they noted right-sided weakness and worsening rt LE swellinh.      Bilateral LE DVT/ RLL PE:  - Presumed Xarelto failure  - cont w/ Lovenox bridge to coumadin.  - wife is not willing to give lovenox at home, will need to get pt therapeutic prior to discharge.      H/o TBI, CVA with residual Left hemiparesis, anoxic encephalopathy and aphasia:  - ?Right sided weakness on admission  - CT head negative, unable to do MRI for AICD  - 2D Echo, carotid doppler with no acute pathology  - Neurology on board  -f/u hgA1c and lipid panel, cont w/ statin  - outpt PT on discharge.     Chronic CHF:   EF: 45-50%, grade II diastolic dysfunction.   -cont w/ coreg    HTN:  - Continue current meds    Iron Deficiency Anemia:   - Hgb is stable.       Disposition:   - outpatient PT

## 2019-02-14 LAB
HCT VFR BLD CALC: 29 % — LOW (ref 39–50)
HGB BLD-MCNC: 9.1 G/DL — LOW (ref 13–17)
INR BLD: 1.78 RATIO — HIGH (ref 0.88–1.16)
MCHC RBC-ENTMCNC: 22.6 PG — LOW (ref 27–34)
MCHC RBC-ENTMCNC: 31.4 GM/DL — LOW (ref 32–36)
MCV RBC AUTO: 72 FL — LOW (ref 80–100)
NRBC # BLD: 0 /100 WBCS — SIGNIFICANT CHANGE UP (ref 0–0)
PLATELET # BLD AUTO: 371 K/UL — SIGNIFICANT CHANGE UP (ref 150–400)
PROTHROM AB SERPL-ACNC: 20.3 SEC — HIGH (ref 10–12.9)
RBC # BLD: 4.03 M/UL — LOW (ref 4.2–5.8)
RBC # FLD: 20.7 % — HIGH (ref 10.3–14.5)
WBC # BLD: 7.44 K/UL — SIGNIFICANT CHANGE UP (ref 3.8–10.5)
WBC # FLD AUTO: 7.44 K/UL — SIGNIFICANT CHANGE UP (ref 3.8–10.5)

## 2019-02-14 PROCEDURE — 99233 SBSQ HOSP IP/OBS HIGH 50: CPT

## 2019-02-14 RX ORDER — WARFARIN SODIUM 2.5 MG/1
7.5 TABLET ORAL ONCE
Qty: 0 | Refills: 0 | Status: COMPLETED | OUTPATIENT
Start: 2019-02-14 | End: 2019-02-14

## 2019-02-14 RX ADMIN — ENOXAPARIN SODIUM 80 MILLIGRAM(S): 100 INJECTION SUBCUTANEOUS at 01:01

## 2019-02-14 RX ADMIN — ENOXAPARIN SODIUM 80 MILLIGRAM(S): 100 INJECTION SUBCUTANEOUS at 23:50

## 2019-02-14 RX ADMIN — CARVEDILOL PHOSPHATE 3.12 MILLIGRAM(S): 80 CAPSULE, EXTENDED RELEASE ORAL at 18:13

## 2019-02-14 RX ADMIN — Medication 325 MILLIGRAM(S): at 06:35

## 2019-02-14 RX ADMIN — CARVEDILOL PHOSPHATE 3.12 MILLIGRAM(S): 80 CAPSULE, EXTENDED RELEASE ORAL at 06:35

## 2019-02-14 RX ADMIN — Medication 100 MILLIGRAM(S): at 18:14

## 2019-02-14 RX ADMIN — WARFARIN SODIUM 7.5 MILLIGRAM(S): 2.5 TABLET ORAL at 22:33

## 2019-02-14 RX ADMIN — Medication 100 MILLIGRAM(S): at 06:35

## 2019-02-14 RX ADMIN — ENOXAPARIN SODIUM 80 MILLIGRAM(S): 100 INJECTION SUBCUTANEOUS at 14:16

## 2019-02-14 RX ADMIN — PANTOPRAZOLE SODIUM 40 MILLIGRAM(S): 20 TABLET, DELAYED RELEASE ORAL at 06:35

## 2019-02-14 RX ADMIN — Medication 325 MILLIGRAM(S): at 18:13

## 2019-02-14 RX ADMIN — Medication 1 TABLET(S): at 14:16

## 2019-02-14 RX ADMIN — ATORVASTATIN CALCIUM 20 MILLIGRAM(S): 80 TABLET, FILM COATED ORAL at 22:33

## 2019-02-14 NOTE — DIETITIAN INITIAL EVALUATION ADULT. - PERTINENT MEDS FT
MEDICATIONS  (STANDING):  atorvastatin 20 milliGRAM(s) Oral at bedtime  carvedilol 3.125 milliGRAM(s) Oral every 12 hours  docusate sodium 100 milliGRAM(s) Oral two times a day  enoxaparin Injectable 80 milliGRAM(s) SubCutaneous every 12 hours  ferrous    sulfate 325 milliGRAM(s) Oral two times a day  multivitamin 1 Tablet(s) Oral daily  pantoprazole    Tablet 40 milliGRAM(s) Oral before breakfast  warfarin 7.5 milliGRAM(s) Oral once    MEDICATIONS  (PRN):

## 2019-02-14 NOTE — DIETITIAN INITIAL EVALUATION ADULT. - OTHER INFO
Pt seen for LOS. Pt lives at home with wife. Pt wife does the cooking and shopping. Pt is otherwise independent with ADL. Pt denied any N/V/D and has no difficulty with chewing/swallowing. Pt requested RD call Mrs. Ayala. Reached out wife. Pt wife state pt with persistent constipation since surgery . Pt last BM 02-09. Pt wife staed pt has a normal diet but she has noticed him getting a little heavier over the last year. Pt stated wt at 180# x 1 year ago. Discussed with wife therapeutic diet and recommend continuing diet at home.

## 2019-02-14 NOTE — DIETITIAN INITIAL EVALUATION ADULT. - PERTINENT LABORATORY DATA
02-14 Na n/a   Glu n/a   K+ n/a   Cr  n/a   BUN n/a   Phos n/a   Alb n/a   PAB n/a   Hgb 9.1 g/dL<L> Hct 29.0 %<L>

## 2019-02-14 NOTE — PROGRESS NOTE ADULT - ASSESSMENT
68 year old man with PMH HTN, s/p ICD, prostate Ca s/p radiation, gastritis, recent diverticular bleed requiring 4 units pRBC (Dec 2018), DVT on Xarelto, and TBI with residual left-sided weakness presents to ED with family after they noted right-sided weakness and worsening rt LE swellinh.      Bilateral LE DVT/ RLL PE:  - Presumed Xarelto failure  - cont w/ Lovenox bridge to coumadin. discharge planning.      H/o TBI, CVA with residual Left hemiparesis, anoxic encephalopathy and aphasia:  - possible acute CVA on admission, Right sided weakness on admission  - CT head negative, unable to do MRI for AICD  - 2D Echo, carotid doppler with no acute pathology  - Neurology on board  -cont w/ AC and statin  - outpt PT on discharge.     Chronic Systolic and Diastolic CHF:   EF: 45-50%, grade II diastolic dysfunction.   -cont w/ coreg    HTN:  - Continue current meds    Iron Deficiency Anemia:   - Hgb is stable.   - cont w/ Ferrous sulfate.       Disposition:   - outpatient PT

## 2019-02-14 NOTE — DIETITIAN INITIAL EVALUATION ADULT. - ENERGY NEEDS
Ht; 5'5''  Wt (kg): 88.6  BMI: 32.45    IBW:   61.6kg    %IBW:   143  %        UBW:  81.6          %UBW:109%

## 2019-02-15 ENCOUNTER — TRANSCRIPTION ENCOUNTER (OUTPATIENT)
Age: 69
End: 2019-02-15

## 2019-02-15 VITALS
DIASTOLIC BLOOD PRESSURE: 71 MMHG | SYSTOLIC BLOOD PRESSURE: 148 MMHG | TEMPERATURE: 99 F | OXYGEN SATURATION: 98 % | HEART RATE: 100 BPM | RESPIRATION RATE: 16 BRPM

## 2019-02-15 LAB
INR BLD: 2.42 RATIO — HIGH (ref 0.88–1.16)
PROTHROM AB SERPL-ACNC: 27.9 SEC — HIGH (ref 10–12.9)

## 2019-02-15 PROCEDURE — 99239 HOSP IP/OBS DSCHRG MGMT >30: CPT

## 2019-02-15 RX ORDER — POLYETHYLENE GLYCOL 3350 17 G/17G
17 POWDER, FOR SOLUTION ORAL DAILY
Qty: 0 | Refills: 0 | Status: DISCONTINUED | OUTPATIENT
Start: 2019-02-15 | End: 2019-02-15

## 2019-02-15 RX ORDER — DOCUSATE SODIUM 100 MG
1 CAPSULE ORAL
Qty: 60 | Refills: 0
Start: 2019-02-15 | End: 2019-03-16

## 2019-02-15 RX ORDER — CARVEDILOL PHOSPHATE 80 MG/1
1 CAPSULE, EXTENDED RELEASE ORAL
Qty: 60 | Refills: 0
Start: 2019-02-15 | End: 2019-03-16

## 2019-02-15 RX ORDER — CARVEDILOL PHOSPHATE 80 MG/1
1 CAPSULE, EXTENDED RELEASE ORAL
Qty: 60 | Refills: 0
Start: 2019-02-15 | End: 2021-08-19

## 2019-02-15 RX ORDER — FERROUS SULFATE 325(65) MG
1 TABLET ORAL
Qty: 60 | Refills: 0
Start: 2019-02-15 | End: 2019-03-16

## 2019-02-15 RX ORDER — ATORVASTATIN CALCIUM 80 MG/1
1 TABLET, FILM COATED ORAL
Qty: 30 | Refills: 0
Start: 2019-02-15 | End: 2019-03-16

## 2019-02-15 RX ADMIN — ENOXAPARIN SODIUM 80 MILLIGRAM(S): 100 INJECTION SUBCUTANEOUS at 06:22

## 2019-02-15 RX ADMIN — Medication 1 TABLET(S): at 11:51

## 2019-02-15 RX ADMIN — PANTOPRAZOLE SODIUM 40 MILLIGRAM(S): 20 TABLET, DELAYED RELEASE ORAL at 07:41

## 2019-02-15 RX ADMIN — Medication 325 MILLIGRAM(S): at 06:21

## 2019-02-15 RX ADMIN — Medication 100 MILLIGRAM(S): at 06:21

## 2019-02-15 RX ADMIN — CARVEDILOL PHOSPHATE 3.12 MILLIGRAM(S): 80 CAPSULE, EXTENDED RELEASE ORAL at 06:21

## 2019-02-15 NOTE — DISCHARGE NOTE NURSING/CASE MANAGEMENT/SOCIAL WORK - NSDCDPATPORTLINK_GEN_ALL_CORE
You can access the [facility] Portal, offered by [facility], by registering with the following website: [email or web address]

## 2019-02-15 NOTE — DISCHARGE NOTE NURSING/CASE MANAGEMENT/SOCIAL WORK - NSDCPEPTCOWAFU_GEN_ALL_CORE
Go for blood test as directed. Because your dose is based on the INR blood test it is very important that you get your blood tested on the date and time that you are scheduled and keep your healthcare providers appointments.   Please follow up with your doctor within 3 days of discharge to schedule your next blood test.

## 2019-02-15 NOTE — PROGRESS NOTE ADULT - REASON FOR ADMISSION
stroke-like symptoms, new PE

## 2019-02-15 NOTE — DISCHARGE NOTE NURSING/CASE MANAGEMENT/SOCIAL WORK - NSDCPEPTCOWADIET_GEN_ALL_CORE
Keep your intake of Vitamin K Regular. The highest amount of vitamin K is found in green and leafy vegetables like broccoli, lettuces, cabbage and spinach. You can eat these foods however keep the amount the same as changes in the amount you eat can affect your INR blood tests. Contact your doctor before making any major changes in your diet.    Limit your alcohol intake.

## 2019-02-15 NOTE — DISCHARGE NOTE NURSING/CASE MANAGEMENT/SOCIAL WORK - NSDCPEPTCOWAR_GEN_ALL_CORE
Coumadin/Warfarin - Compliance/Coumadin/Warfarin - Dietary Advice/Coumadin/Warfarin - Follow up monitoring/Coumadin/Warfarin - Potential for adverse drug reactions and interactions

## 2019-02-15 NOTE — PROGRESS NOTE ADULT - SUBJECTIVE AND OBJECTIVE BOX
Patient is a 68y old  Male who presents with a chief complaint of stroke-like symptoms, new PE (10 Feb 2019 19:55)      INTERVAL HPI/ OVERNIGHT EVENTS: Pt was seen and examined at bedside today, No significant overnight events, pt is confused unable to interview, saying "leave me alone" speaking in his language (he knows how to speak english). Home aide at bedside states that he is at his baseline.      MEDICATIONS  (STANDING):  carvedilol 3.125 milliGRAM(s) Oral every 12 hours  heparin  Infusion.  Unit(s)/Hr (14 mL/Hr) IV Continuous <Continuous>  multivitamin 1 Tablet(s) Oral daily  pantoprazole    Tablet 40 milliGRAM(s) Oral before breakfast  warfarin 5 milliGRAM(s) Oral once    MEDICATIONS  (PRN):  heparin  Injectable 6500 Unit(s) IV Push every 6 hours PRN For aPTT less than 40  heparin  Injectable 3000 Unit(s) IV Push every 6 hours PRN For aPTT between 40 - 57      Allergies    lisinopril (Unknown)  penicillins (Unknown)    Intolerances        REVIEW OF SYSTEMS:    Unable to examine due to [ ] Encephalopathy [ ] Advanced Dementia [ ] Expressive Aphasia [ ] Non-verbal patient        Vital Signs Last 24 Hrs  T(C): 37.2 (11 Feb 2019 16:55), Max: 37.2 (11 Feb 2019 16:55)  T(F): 98.9 (11 Feb 2019 16:55), Max: 98.9 (11 Feb 2019 16:55)  HR: 102 (11 Feb 2019 16:55) (101 - 112)  BP: 133/84 (11 Feb 2019 16:55) (129/63 - 136/70)  BP(mean): --  RR: 19 (11 Feb 2019 16:55) (18 - 19)  SpO2: 100% (11 Feb 2019 16:55) (98% - 100%)    PHYSICAL EXAM:  GENERAL: NAD, well-developed, well-groomed  HEAD:  Atraumatic, Normocephalic  EYES: conjunctiva and sclera clear  ENMT: Moist mucous membranes  NECK: Supple, No JVD, Normal thyroid  CHEST/LUNG: Clear to Auscultation bilaterally; No rales, rhonchi, wheezing, or rubs  HEART: Regular rate and rhythm; No murmurs, rubs, or gallops  ABDOMEN: Soft, Nontender, Nondistended; Bowel sounds present  EXTREMITIES:  bilateral edema R>L, 2+ Peripheral Pulses, No clubbing, cyanosis  SKIN: No rashes or lesions  NERVOUS SYSTEM:  Alert, confused, pt not cooperating for full neuro exam, 4/5 left sided weakness.     LABS:                        9.0    8.68  )-----------( 328      ( 11 Feb 2019 10:24 )             28.0           PT/INR - ( 11 Feb 2019 10:24 )   PT: 16.0 sec;   INR: 1.41 ratio         PTT - ( 11 Feb 2019 10:24 )  PTT:82.7 sec    CAPILLARY BLOOD GLUCOSE              RADIOLOGY & ADDITIONAL TESTS:          Imaging Personally Reviewed:  [ ] YES  [ ] NO    Consultant(s) Notes Reviewed:  [x ] YES  [ ] NO    Care Discussed with Consultants/Other Providers [x ] YES  [ ] NO
INTERVAL HPI:     68 year old man with, HTN, s/p ICD, prostate Ca (s/p resection & radiation), Gastritis, Cardiac Arrest S/P AICD, COMA due to Brain Injury and S/P Cecal Resection for bowel Obstruction due to Incarcerated Hernia, Diverticular bleed in NOV 2018 requiring 4 units of PEBC. Diagnosed with DVT about 3 weeks ago and was started on Xarelto.   Presented to ED with family after they noted right-sided weakness yesterday, and his legs + left arm appeared  more swollen. They thinks he has seemed SOB at times. Pt cannot communicate due to TBI, history obtained from spouse at bedside.      In the ED, CTA chest shows acute right PE, chronic right DVT and left age-indeterminate DVT.  No right heart strain or pulmonary infarcts.  CT head shows no acute findings.   Non smoker    OVERNIGHT EVENTS:  Off telemetry. Awake and comfortable.    Vital Signs Last 24 Hrs  T(C): 36.8 (15 Feb 2019 05:34), Max: 36.8 (15 Feb 2019 05:34)  T(F): 98.2 (15 Feb 2019 05:34), Max: 98.2 (15 Feb 2019 05:34)  HR: 98 (15 Feb 2019 05:34) (91 - 98)  BP: 110/62 (15 Feb 2019 05:34) (110/62 - 145/70)  BP(mean): --  RR: 18 (15 Feb 2019 05:34) (18 - 18)  SpO2: 99% (15 Feb 2019 05:34) (99% - 100%)    PHYSICAL EXAM:  GEN:         Awake, responsive and comfortable.  HEENT:    Normal.    RESP:       no wheezing.  CVS:         Regular rate and rhythm.   ABD:         Soft, non-tender, non-distended;     MEDICATIONS  (STANDING):  atorvastatin 20 milliGRAM(s) Oral at bedtime  carvedilol 3.125 milliGRAM(s) Oral every 12 hours  docusate sodium 100 milliGRAM(s) Oral two times a day  ferrous    sulfate 325 milliGRAM(s) Oral two times a day  multivitamin 1 Tablet(s) Oral daily  pantoprazole    Tablet 40 milliGRAM(s) Oral before breakfast    MEDICATIONS  (PRN):  polyethylene glycol 3350 17 Gram(s) Oral daily PRN Constipation    LABS:                        9.1    7.44  )-----------( 371      ( 14 Feb 2019 11:32 )             29.0    PT/INR - ( 15 Feb 2019 07:26 )   PT: 27.9 sec;   INR: 2.42 ratio       ASSESSMENT AND PLAN:  ·	RLL PE.  ·	Bilateral DVT.  ·	Anemia.  ·	Cardiac arrest S/P AICD.  ·	S/P Hypoxic Encephalopathy.  ·	Traumatic Brain Injury.  ·	CA prostate history.  ·	HTN.    INR is now therapeutic.  Discharge planning.
INTERVAL HPI:    68 year old man with, HTN, s/p ICD, prostate Ca (s/p resection & radiation), Gastritis, Cardiac Arrest S/P AICD, COMA due to Brain Injury and S/P Cecal Resection for bowel Obstruction due to Incarcerated Hernia, Diverticular bleed in NOV 2018 requiring 4 units of PEBC. Diagnosed with DVT about 3 weeks ago and was started on Xarelto.   Presented to ED with family after they noted right-sided weakness yesterday, and his legs + left arm appeared  more swollen. They thinks he has seemed SOB at times. Pt cannot communicate due to TBI, history obtained from spouse at bedside.      In the ED, CTA chest shows acute right PE, chronic right DVT and left age-indeterminate DVT.  No right heart strain or pulmonary infarcts.  CT head shows no acute findings.   Non smoker    OVERNIGHT EVENTS:  Awake and comfortable.    Vital Signs Last 24 Hrs  T(C): 36.5 (14 Feb 2019 05:10), Max: 36.8 (14 Feb 2019 00:20)  T(F): 97.7 (14 Feb 2019 05:10), Max: 98.3 (14 Feb 2019 00:20)  HR: 106 (14 Feb 2019 05:10) (96 - 106)  BP: 129/69 (14 Feb 2019 05:10) (120/51 - 132/73)  BP(mean): --  RR: 20 (14 Feb 2019 05:10) (16 - 20)  SpO2: 100% (14 Feb 2019 05:10) (97% - 100%)    PHYSICAL EXAM:  GEN:         Awake, responsive and comfortable.  HEENT:    Normal.    RESP:       no wheezing.  CVS:          Regular rate and rhythm.   ABD:         Soft, non-tender, non-distended;     MEDICATIONS  (STANDING):  atorvastatin 20 milliGRAM(s) Oral at bedtime  carvedilol 3.125 milliGRAM(s) Oral every 12 hours  docusate sodium 100 milliGRAM(s) Oral two times a day  enoxaparin Injectable 80 milliGRAM(s) SubCutaneous every 12 hours  ferrous    sulfate 325 milliGRAM(s) Oral two times a day  multivitamin 1 Tablet(s) Oral daily  pantoprazole    Tablet 40 milliGRAM(s) Oral before breakfast    LABS:                        8.8    7.35  )-----------( 396      ( 13 Feb 2019 09:21 )             27.3     02-13    138  |  105  |  14  ----------------------------<  106<H>  4.2   |  26  |  0.80    Ca    8.1<L>      13 Feb 2019 09:22    PT/INR - ( 13 Feb 2019 09:22 )   PT: 16.3 sec;   INR: 1.44 ratio      PTT - ( 12 Feb 2019 12:31 )  PTT:59.8 sec    ASSESSMENT AND PLAN:  ·	RLL PE.  ·	Bilateral DVT.  ·	Anemia.  ·	Cardiac arrest S/P AICD.  ·	S/P Hypoxic Encephalopathy.  ·	Traumatic Brain Injury.  ·	CA prostate history.  ·	HTN.    On Lovenox and Coumadin.  Follow INR.
INTERVAL HPI:   68 year old man with, HTN, s/p ICD, prostate Ca (s/p resection & radiation), Gastritis, Cardiac Arrest S/P AICD, COMA due to Brain Injury and S/P Cecal Resection for bowel Obstruction due to Incarcerated Hernia, Diverticular bleed in NOV 2018 requiring 4 units of PEBC. Diagnosed with DVT about 3 weeks ago and was started on Xarelto.   Presented to ED with family after they noted right-sided weakness yesterday, and his legs + left arm appeared  more swollen. They thinks he has seemed SOB at times. Pt cannot communicate due to TBI, history obtained from spouse at bedside.      In the ED, CTA chest shows acute right PE, chronic right DVT and left age-indeterminate DVT.  No right heart strain or pulmonary infarcts.  CT head shows no acute findings.   Non smoker    OVERNIGHT EVENTS:  Comfortable in chair.    Vital Signs Last 24 Hrs  T(C): 36.6 (13 Feb 2019 11:59), Max: 37.1 (13 Feb 2019 00:04)  T(F): 97.9 (13 Feb 2019 11:59), Max: 98.8 (13 Feb 2019 00:04)  HR: 97 (13 Feb 2019 12:41) (96 - 108)  BP: 124/60 (13 Feb 2019 12:41) (120/51 - 138/72)  BP(mean): --  RR: 16 (13 Feb 2019 11:59) (16 - 18)  SpO2: 97% (13 Feb 2019 12:41) (95% - 100%)    PHYSICAL EXAM:  GEN:         Awake, responsive and comfortable.  HEENT:    Normal.    RESP:        no wheezing.  CVS:          Regular rate and rhythm.   ABD:         Soft, non-tender, non-distended;     MEDICATIONS  (STANDING):  atorvastatin 20 milliGRAM(s) Oral at bedtime  carvedilol 3.125 milliGRAM(s) Oral every 12 hours  docusate sodium 100 milliGRAM(s) Oral two times a day  enoxaparin Injectable 80 milliGRAM(s) SubCutaneous every 12 hours  ferrous    sulfate 325 milliGRAM(s) Oral two times a day  multivitamin 1 Tablet(s) Oral daily  pantoprazole    Tablet 40 milliGRAM(s) Oral before breakfast  warfarin 10 milliGRAM(s) Oral once    LABS:                        8.8    7.35  )-----------( 396      ( 13 Feb 2019 09:21 )             27.3     02-13    138  |  105  |  14  ----------------------------<  106<H>  4.2   |  26  |  0.80    Ca    8.1<L>      13 Feb 2019 09:22    PT/INR - ( 13 Feb 2019 09:22 )   PT: 16.3 sec;   INR: 1.44 ratio      PTT - ( 12 Feb 2019 12:31 )  PTT:59.8 sec    ASSESSMENT AND PLAN:  ·	RLL PE.  ·	Bilateral DVT.  ·	Anemia.  ·	Cardiac arrest S/P AICD.  ·	S/P Hypoxic Encephalopathy.  ·	Traumatic Brain Injury.  ·	CA prostate history.  ·	HTN.    On full dose Lovenox and PO Coumadin.  Follow INR.  Discussed with wife at bedside.
INTERVAL HPI:   68 year old man with, HTN, s/p ICD, prostate Ca (s/p resection & radiation), Gastritis, Cardiac Arrest S/P AICD, COMA due to Brain Injury and S/P Cecal Resection for bowel Obstruction due to Incarcerated Hernia, Diverticular bleed in NOV 2018 requiring 4 units of PEBC. Diagnosed with DVT about 3 weeks ago and was started on Xarelto.   Presented to ED with family after they noted right-sided weakness yesterday, and his legs + left arm appeared  more swollen. They thinks he has seemed SOB at times. Pt cannot communicate due to TBI, history obtained from spouse at bedside.      In the ED, CTA chest shows acute right PE, chronic right DVT and left age-indeterminate DVT.  No right heart strain or pulmonary infarcts.  CT head shows no acute findings.   Non smoker.    OVERNIGHT EVENTS:  Awake and feels better.    Vital Signs Last 24 Hrs  T(C): 36.2 (12 Feb 2019 10:51), Max: 36.4 (12 Feb 2019 05:43)  T(F): 97.2 (12 Feb 2019 10:51), Max: 97.6 (12 Feb 2019 05:43)  HR: 101 (12 Feb 2019 10:51) (98 - 104)  BP: 129/62 (12 Feb 2019 10:51) (122/57 - 136/54)  BP(mean): --  RR: 18 (12 Feb 2019 10:51) (16 - 20)  SpO2: 96% (12 Feb 2019 10:51) (96% - 98%)    PHYSICAL EXAM:  GEN:         Awake, responsive and comfortable.  HEENT:    Normal.    RESP:        no wheezing.  CVS:             Regular rate and rhythm.   ABD:         Soft, non-tender, non-distended;     MEDICATIONS  (STANDING):  atorvastatin 20 milliGRAM(s) Oral at bedtime  carvedilol 3.125 milliGRAM(s) Oral every 12 hours  enoxaparin Injectable 80 milliGRAM(s) SubCutaneous every 12 hours  ferrous    sulfate 325 milliGRAM(s) Oral two times a day  multivitamin 1 Tablet(s) Oral daily  pantoprazole    Tablet 40 milliGRAM(s) Oral before breakfast  warfarin 7.5 milliGRAM(s) Oral once    LABS:                        8.8    8.75  )-----------( 327      ( 12 Feb 2019 12:31 )             27.3   PT/INR - ( 12 Feb 2019 12:31 )   PT: 16.0 sec;   INR: 1.41 ratio      PTT - ( 12 Feb 2019 12:31 )  PTT:59.8 sec    ASSESSMENT AND PLAN:  ·	RLL PE.  ·	Bilateral DVT.  ·	Anemia.  ·	Cardiac arrest S/P AICD.  ·	S/P Hypoxic Encephalopathy.  ·	Traumatic Brain Injury.  ·	CA prostate history.  ·	HTN.    Continue full anticoagulation.  Follow INR.
INTERVAL HPI:   68 year old man with, HTN, s/p ICD, prostate Ca (s/p resection & radiation), Gastritis, Cardiac Arrest S/P AICD, COMA due to Brain Injury and S/P Cecal Resection for bowel Obstruction due to Incarcerated Hernia, Diverticular bleed in NOV 2018 requiring 4 units of PEBC. Diagnosed with DVT about 3 weeks ago and was started on Xarelto.   Presented to ED with family after they noted right-sided weakness yesterday, and his legs + left arm appeared  more swollen. They thinks he has seemed SOB at times. Pt cannot communicate due to TBI, history obtained from spouse at bedside.      In the ED, CTA chest shows acute right PE, chronic right DVT and left age-indeterminate DVT.  No right heart strain or pulmonary infarcts.  CT head shows no acute findings.   Non smoker.    OVERNIGHT EVENTS:  Comfortable.    Vital Signs Last 24 Hrs  T(C): 37.2 (11 Feb 2019 16:55), Max: 37.2 (11 Feb 2019 16:55)  T(F): 98.9 (11 Feb 2019 16:55), Max: 98.9 (11 Feb 2019 16:55)  HR: 102 (11 Feb 2019 16:55) (101 - 112)  BP: 133/84 (11 Feb 2019 16:55) (129/63 - 136/70)  BP(mean): --  RR: 19 (11 Feb 2019 16:55) (18 - 19)  SpO2: 100% (11 Feb 2019 16:55) (98% - 100%)    PHYSICAL EXAM:  GEN:         Awake, responsive and comfortable.  HEENT:    Normal.    RESP:        no wheezing.  CVS:             Regular rate and rhythm.   ABD:         Soft, non-tender, non-distended;     MEDICATIONS  (STANDING):  carvedilol 3.125 milliGRAM(s) Oral every 12 hours  heparin  Infusion.  Unit(s)/Hr (14 mL/Hr) IV Continuous <Continuous>  multivitamin 1 Tablet(s) Oral daily  pantoprazole    Tablet 40 milliGRAM(s) Oral before breakfast  warfarin 5 milliGRAM(s) Oral once    MEDICATIONS  (PRN):  heparin  Injectable 6500 Unit(s) IV Push every 6 hours PRN For aPTT less than 40  heparin  Injectable 3000 Unit(s) IV Push every 6 hours PRN For aPTT between 40 - 57    LABS:                        9.0    8.68  )-----------( 328      ( 11 Feb 2019 10:24 )             28.0   PT/INR - ( 11 Feb 2019 10:24 )   PT: 16.0 sec;   INR: 1.41 ratio      PTT - ( 11 Feb 2019 18:09 )  PTT:55.4 sec  ASSESSMENT AND PLAN:  ·	RLL PE.  ·	Bilateral DVT.  ·	Anemia.  ·	Cardiac arrest S/P AICD.  ·	S/P Hypoxic Encephalopathy.  ·	Traumatic Brain Injury.  ·	CA prostate history.  ·	HTN.    O2, heparin and coumadin.  Discussed with wife.
INTERVAL HPI:  68 year old man with, HTN, s/p ICD, prostate Ca (s/p resection & radiation), Gastritis, Cardiac Arrest S/P AICD, COMA due to Brain Injury and S/P Cecal Resection for bowel Obstruction due to Incarcerated Hernia, Diverticular bleed in NOV 2018 requiring 4 units of PEBC. Diagnosed with DVT about 3 weeks ago and was started on Xarelto.   Presented to ED with family after they noted right-sided weakness yesterday, and his legs + left arm appeared  more swollen. They thinks he has seemed SOB at times. Pt cannot communicate due to TBI, history obtained from spouse at bedside.      In the ED, CTA chest shows acute right PE, chronic right DVT and left age-indeterminate DVT.  No right heart strain or pulmonary infarcts.  CT head shows no acute findings.   Non smoker.    OVERNIGHT EVENTS:  Awake, comfortable.    Vital Signs Last 24 Hrs  T(C): 37.3 (10 Feb 2019 17:22), Max: 37.5 (10 Feb 2019 12:03)  T(F): 99.2 (10 Feb 2019 17:22), Max: 99.5 (10 Feb 2019 12:03)  HR: 99 (10 Feb 2019 17:22) (98 - 104)  BP: 128/69 (10 Feb 2019 17:22) (123/59 - 132/69)  BP(mean): --  RR: 17 (10 Feb 2019 17:22) (17 - 18)  SpO2: 99% (10 Feb 2019 17:22) (99% - 100%)    PHYSICAL EXAM:  GEN:         Awake, responsive and comfortable.  HEENT:    Normal.    RESP:        no wheezing.  CVS:           Regular rate and rhythm.   ABD:         Soft, non-tender, non-distended;     MEDICATIONS  (STANDING):  carvedilol 3.125 milliGRAM(s) Oral every 12 hours  heparin  Infusion.  Unit(s)/Hr (14 mL/Hr) IV Continuous <Continuous>  multivitamin 1 Tablet(s) Oral daily  pantoprazole    Tablet 40 milliGRAM(s) Oral before breakfast  warfarin 5 milliGRAM(s) Oral once    MEDICATIONS  (PRN):  heparin  Injectable 6500 Unit(s) IV Push every 6 hours PRN For aPTT less than 40  heparin  Injectable 3000 Unit(s) IV Push every 6 hours PRN For aPTT between 40 - 57    LABS:                        8.5    7.94  )-----------( 288      ( 10 Feb 2019 08:13 )             26.1     02-09    140  |  104  |  17  ----------------------------<  95  4.9   |  31  |  0.82    Ca    7.8<L>      09 Feb 2019 12:15    PT/INR - ( 10 Feb 2019 08:13 )   PT: 18.1 sec;   INR: 1.59 ratio      PTT - ( 10 Feb 2019 08:13 )  PTT:>200.0 sec    ASSESSMENT AND PLAN:  ·	RLL PE.  ·	Bilateral DVT.  ·	Anemia.  ·	Cardiac arrest S/P AICD.  ·	S/P Hypoxic Encephalopathy.  ·	Traumatic Brain Injury.  ·	CA prostate history.  ·	HTN.    Continue IV heparin and PO coumadin.  Follow INR.
Patient is a 68y old  Male who presents with a chief complaint of stroke-like symptoms, new PE (09 Feb 2019 16:03)      INTERVAL HPI/OVERNIGHT EVENTS:  Pt was seen and examined, no acute events.    MEDICATIONS  (STANDING):  carvedilol 3.125 milliGRAM(s) Oral every 12 hours  heparin  Infusion.  Unit(s)/Hr (14 mL/Hr) IV Continuous <Continuous>  multivitamin 1 Tablet(s) Oral daily  pantoprazole    Tablet 40 milliGRAM(s) Oral before breakfast    MEDICATIONS  (PRN):  heparin  Injectable 6500 Unit(s) IV Push every 6 hours PRN For aPTT less than 40  heparin  Injectable 3000 Unit(s) IV Push every 6 hours PRN For aPTT between 40 - 57      Allergies  lisinopril (Unknown)  penicillins (Unknown)    Vital Signs Last 24 Hrs  T(C): 37.4 (09 Feb 2019 17:24), Max: 37.4 (09 Feb 2019 17:24)  T(F): 99.4 (09 Feb 2019 17:24), Max: 99.4 (09 Feb 2019 17:24)  HR: 110 (09 Feb 2019 17:24) (100 - 126)  BP: 132/69 (09 Feb 2019 17:24) (110/55 - 154/86)  BP(mean): --  RR: 18 (09 Feb 2019 17:24) (17 - 18)  SpO2: 99% (09 Feb 2019 17:24) (98% - 99%)    PHYSICAL EXAM:  GENERAL: NAD  HEAD:  Atraumatic  EYES: PERRLA  NERVOUS SYSTEM:  Awake, minimally verbal.   CHEST/LUNG: Clear  HEART: RRR  ABDOMEN: Soft, non tender  EXTREMITIES:  3+ RLE, 2+ RUE    LABS:                        8.6    8.87  )-----------( 286      ( 09 Feb 2019 12:15 )             26.5     02-09    140  |  104  |  17  ----------------------------<  95  4.9   |  31  |  0.82    Ca    7.8<L>      09 Feb 2019 12:15    TPro  6.9  /  Alb  2.0<L>  /  TBili  0.6  /  DBili  x   /  AST  152<H>  /  ALT  115<H>  /  AlkPhos  156<H>  02-08    PT/INR - ( 08 Feb 2019 02:13 )   PT: 37.0 sec;   INR: 3.19 ratio         PTT - ( 09 Feb 2019 12:15 )  PTT:34.5 sec    CAPILLARY BLOOD GLUCOSE          RADIOLOGY & ADDITIONAL TESTS:    Imaging Personally Reviewed:  [ ] YES  [ ] NO    Consultant(s) Notes Reviewed:  [ ] YES  [ ] NO    Care Discussed with Consultants/Other Providers [ ] YES  [ ] NO
Patient is a 68y old  Male who presents with a chief complaint of stroke-like symptoms, new PE (09 Feb 2019 17:34)      INTERVAL HPI/OVERNIGHT EVENTS:  Pt was seen and examined, no acute events, no acute events.    MEDICATIONS  (STANDING):  carvedilol 3.125 milliGRAM(s) Oral every 12 hours  heparin  Infusion.  Unit(s)/Hr (14 mL/Hr) IV Continuous <Continuous>  multivitamin 1 Tablet(s) Oral daily  pantoprazole    Tablet 40 milliGRAM(s) Oral before breakfast  warfarin 5 milliGRAM(s) Oral once    MEDICATIONS  (PRN):  heparin  Injectable 6500 Unit(s) IV Push every 6 hours PRN For aPTT less than 40  heparin  Injectable 3000 Unit(s) IV Push every 6 hours PRN For aPTT between 40 - 57      Allergies  lisinopril (Unknown)  penicillins (Unknown)        Vital Signs Last 24 Hrs  T(C): 37.5 (10 Feb 2019 12:03), Max: 37.5 (10 Feb 2019 12:03)  T(F): 99.5 (10 Feb 2019 12:03), Max: 99.5 (10 Feb 2019 12:03)  HR: 98 (10 Feb 2019 12:03) (98 - 110)  BP: 132/69 (10 Feb 2019 12:03) (123/59 - 132/69)  BP(mean): --  RR: 18 (10 Feb 2019 12:03) (18 - 18)  SpO2: 100% (10 Feb 2019 12:03) (99% - 100%)    PHYSICAL EXAM:  GENERAL: NAD  HEAD:  Atraumatic  EYES: PERRLA  NERVOUS SYSTEM:  Awake, following command, left hemophoresis, rt sided wkeness improved significantly  CHEST/LUNG: Clear  HEART: RRR  ABDOMEN: soft, non tender  EXTREMITIES:  no edema    LABS:                        8.5    7.94  )-----------( 288      ( 10 Feb 2019 08:13 )             26.1     02-09    140  |  104  |  17  ----------------------------<  95  4.9   |  31  |  0.82    Ca    7.8<L>      09 Feb 2019 12:15      PT/INR - ( 10 Feb 2019 08:13 )   PT: 18.1 sec;   INR: 1.59 ratio         PTT - ( 10 Feb 2019 08:13 )  PTT:>200.0 sec    CAPILLARY BLOOD GLUCOSE          RADIOLOGY & ADDITIONAL TESTS:    Imaging Personally Reviewed:  [ ] YES  [ ] NO    Consultant(s) Notes Reviewed:  [ ] YES  [ ] NO    Care Discussed with Consultants/Other Providers [ ] YES  [ ] NO
Patient is a 68y old  Male who presents with a chief complaint of stroke-like symptoms, new PE (11 Feb 2019 21:56)      INTERVAL HPI/ OVERNIGHT EVENTS: Pt was seen and examined at bedside today, No significant overnight events, pt denies any new complaints, poor insight.     MEDICATIONS  (STANDING):  carvedilol 3.125 milliGRAM(s) Oral every 12 hours  enoxaparin Injectable 80 milliGRAM(s) SubCutaneous every 12 hours  multivitamin 1 Tablet(s) Oral daily  pantoprazole    Tablet 40 milliGRAM(s) Oral before breakfast  warfarin 7.5 milliGRAM(s) Oral once    MEDICATIONS  (PRN):      Allergies    lisinopril (Unknown)  penicillins (Unknown)    Intolerances        REVIEW OF SYSTEMS:    Unable to examine due to [ ] Encephalopathy [ ] Advanced Dementia [ ] Expressive Aphasia [ ] Non-verbal patient    CONSTITUTIONAL: No fever, NO generalized weakness/Fatigue, No weight loss  EYES: No eye pain, visual disturbances, or discharge  ENMT:  No difficulty hearing, tinnitus, vertigo; No sinus or throat pain  NECK: No pain or stiffness  RESPIRATORY: No shortness of breath,  cough, wheezing, sputum or hemoptysis   CARDIOVASCULAR: No chest pain, palpitations, or leg swelling  GASTROINTESTINAL: No abdominal pain. No nausea, vomiting, diarrhea or constipation. No melena or hematochezia.  GENITOURINARY: No dysuria, frequency, hematuria, or incontinence  NEUROLOGICAL: No headaches, Dizziness, memory loss, loss of strength, numbness, or tremors  SKIN: No itching, burning, rashes, or lesions   MUSCULOSKELETAL: No joint pain or swelling; No muscle, back, or extremity pain  PSYCHIATRIC: No depression, anxiety, mood swings, or difficulty sleeping  HEME/LYMPH: No easy bruising, or bleeding gums      Vital Signs Last 24 Hrs  T(C): 36.2 (12 Feb 2019 10:51), Max: 37.2 (11 Feb 2019 16:55)  T(F): 97.2 (12 Feb 2019 10:51), Max: 98.9 (11 Feb 2019 16:55)  HR: 101 (12 Feb 2019 10:51) (98 - 104)  BP: 129/62 (12 Feb 2019 10:51) (122/57 - 136/54)  BP(mean): --  RR: 18 (12 Feb 2019 10:51) (16 - 20)  SpO2: 96% (12 Feb 2019 10:51) (96% - 100%)    PHYSICAL EXAM:  GENERAL: NAD, well-developed, well-groomed  HEAD:  Atraumatic, Normocephalic  EYES: conjunctiva and sclera clear  ENMT: Moist mucous membranes  NECK: Supple, No JVD, Normal thyroid  CHEST/LUNG: Clear to Auscultation bilaterally; No rales, rhonchi, wheezing, or rubs  HEART: Regular rate and rhythm; No murmurs, rubs, or gallops  ABDOMEN: Soft, Nontender, Nondistended; Bowel sounds present  EXTREMITIES:  bilateral edema R>L, 2+ Peripheral Pulses, No clubbing, cyanosis  SKIN: No rashes or lesions  NERVOUS SYSTEM:  Alert, poor insight, aphasia, 4/5 in bilateral UE, 3/5 in bilateral LE (unclear if pt is fully cooperating for exam).     LABS:                        8.8    8.75  )-----------( 327      ( 12 Feb 2019 12:31 )             27.3           PT/INR - ( 12 Feb 2019 12:31 )   PT: 16.0 sec;   INR: 1.41 ratio         PTT - ( 12 Feb 2019 12:31 )  PTT:59.8 sec    CAPILLARY BLOOD GLUCOSE              RADIOLOGY & ADDITIONAL TESTS:          Imaging Personally Reviewed:  [ ] YES  [ ] NO    Consultant(s) Notes Reviewed:  [x ] YES  [ ] NO    Care Discussed with Consultants/Other Providers [x ] YES  [ ] NO
Patient is a 68y old  Male who presents with a chief complaint of stroke-like symptoms, new PE (13 Feb 2019 14:22)      INTERVAL HPI/ OVERNIGHT EVENTS: Pt was seen and examined at bedside today, No significant overnight events, pt denies any complaints (unclear if reliable), Wife at bedside and goals of care was discussed.      MEDICATIONS  (STANDING):  atorvastatin 20 milliGRAM(s) Oral at bedtime  carvedilol 3.125 milliGRAM(s) Oral every 12 hours  docusate sodium 100 milliGRAM(s) Oral two times a day  enoxaparin Injectable 80 milliGRAM(s) SubCutaneous every 12 hours  ferrous    sulfate 325 milliGRAM(s) Oral two times a day  multivitamin 1 Tablet(s) Oral daily  pantoprazole    Tablet 40 milliGRAM(s) Oral before breakfast  warfarin 10 milliGRAM(s) Oral once    MEDICATIONS  (PRN):      Allergies    lisinopril (Unknown)  penicillins (Unknown)    Intolerances        REVIEW OF SYSTEMS:    Unable to examine due to [ ] Encephalopathy [ ] Advanced Dementia [ ] Expressive Aphasia [ ] Non-verbal patient    CONSTITUTIONAL: No fever,  No weight loss  EYES: No eye pain, visual disturbances, or discharge  ENMT:  No difficulty hearing, tinnitus, vertigo; No sinus or throat pain  NECK: No pain or stiffness  RESPIRATORY: No shortness of breath,  cough, wheezing, sputum or hemoptysis   CARDIOVASCULAR: No chest pain, palpitations, or leg swelling  GASTROINTESTINAL: No abdominal pain. No nausea, vomiting, diarrhea or constipation. No melena or hematochezia.  GENITOURINARY: No dysuria, frequency, hematuria, or incontinence  NEUROLOGICAL: No headaches, Dizziness, memory loss, loss of strength, numbness, or tremors  SKIN: No itching, burning, rashes, or lesions   MUSCULOSKELETAL: No joint pain or swelling; No muscle, back, or extremity pain  PSYCHIATRIC: No depression, anxiety, mood swings, or difficulty sleeping  HEME/LYMPH: No easy bruising, or bleeding gums      Vital Signs Last 24 Hrs  T(C): 36.6 (13 Feb 2019 11:59), Max: 37.1 (13 Feb 2019 00:04)  T(F): 97.9 (13 Feb 2019 11:59), Max: 98.8 (13 Feb 2019 00:04)  HR: 97 (13 Feb 2019 12:41) (96 - 108)  BP: 124/60 (13 Feb 2019 12:41) (120/51 - 138/72)  BP(mean): --  RR: 16 (13 Feb 2019 11:59) (16 - 18)  SpO2: 97% (13 Feb 2019 12:41) (95% - 100%)    PHYSICAL EXAM:  GENERAL: NAD, well-developed, well-groomed  HEAD:  Atraumatic, Normocephalic  EYES: conjunctiva and sclera clear  ENMT: Moist mucous membranes  NECK: Supple, No JVD, Normal thyroid  CHEST/LUNG: Clear to Auscultation bilaterally; No rales, rhonchi, wheezing, or rubs  HEART: Regular rate and rhythm; No murmurs, rubs, or gallops  ABDOMEN: Soft, Nontender, Nondistended; Bowel sounds present  EXTREMITIES:  bilateral edema R>L, 2+ Peripheral Pulses, No clubbing, cyanosis  SKIN: No rashes or lesions  NERVOUS SYSTEM:  Alert, poor insight, aphasia, 4/5 in bilateral UE, 3/5 in bilateral LE (unclear if pt is fully cooperating for exam).     LABS:                        8.8    7.35  )-----------( 396      ( 13 Feb 2019 09:21 )             27.3     02-13    138  |  105  |  14  ----------------------------<  106<H>  4.2   |  26  |  0.80    Ca    8.1<L>      13 Feb 2019 09:22      PT/INR - ( 13 Feb 2019 09:22 )   PT: 16.3 sec;   INR: 1.44 ratio         PTT - ( 12 Feb 2019 12:31 )  PTT:59.8 sec    CAPILLARY BLOOD GLUCOSE              RADIOLOGY & ADDITIONAL TESTS:          Imaging Personally Reviewed:  [ ] YES  [ ] NO    Consultant(s) Notes Reviewed:  [x ] YES  [ ] NO    Care Discussed with Consultants/Other Providers [x ] YES  [ ] NO
Patient is a 68y old  Male who presents with a chief complaint of stroke-like symptoms, new PE (14 Feb 2019 11:15)      INTERVAL HPI/ OVERNIGHT EVENTS: Pt was seen and examined at bedside today, No significant overnight events, pt denies any complaints but this is unreliable.      MEDICATIONS  (STANDING):  atorvastatin 20 milliGRAM(s) Oral at bedtime  carvedilol 3.125 milliGRAM(s) Oral every 12 hours  docusate sodium 100 milliGRAM(s) Oral two times a day  enoxaparin Injectable 80 milliGRAM(s) SubCutaneous every 12 hours  ferrous    sulfate 325 milliGRAM(s) Oral two times a day  multivitamin 1 Tablet(s) Oral daily  pantoprazole    Tablet 40 milliGRAM(s) Oral before breakfast  warfarin 7.5 milliGRAM(s) Oral once    MEDICATIONS  (PRN):      Allergies    lisinopril (Unknown)  penicillins (Unknown)    Intolerances        REVIEW OF SYSTEMS:    Unable to examine due to [ ] Encephalopathy [ ] Advanced Dementia [x ] Expressive Aphasia [ ] Non-verbal patient        Vital Signs Last 24 Hrs  T(C): 36.6 (14 Feb 2019 11:50), Max: 36.8 (14 Feb 2019 00:20)  T(F): 97.9 (14 Feb 2019 11:50), Max: 98.3 (14 Feb 2019 00:20)  HR: 91 (14 Feb 2019 14:09) (91 - 106)  BP: 141/79 (14 Feb 2019 14:09) (127/63 - 149/56)  BP(mean): --  RR: 18 (14 Feb 2019 11:50) (18 - 20)  SpO2: 99% (14 Feb 2019 14:09) (98% - 100%)    PHYSICAL EXAM:  GENERAL: NAD, well-developed, well-groomed  HEAD:  Atraumatic, Normocephalic  EYES: conjunctiva and sclera clear  ENMT: Moist mucous membranes  NECK: Supple, No JVD, Normal thyroid  CHEST/LUNG: Clear to Auscultation bilaterally; No rales, rhonchi, wheezing, or rubs  HEART: Regular rate and rhythm; No murmurs, rubs, or gallops  ABDOMEN: Soft, Nontender, Nondistended; Bowel sounds present  EXTREMITIES:  bilateral edema R>L, 2+ Peripheral Pulses, No clubbing, cyanosis  SKIN: No rashes or lesions  NERVOUS SYSTEM:  Alert, expressive aphasia, presumed confusion, 4/5 in bilateral UE, 3/5 in bilateral LE (unclear if pt is fully cooperating for exam).     LABS:                        9.1    7.44  )-----------( 371      ( 14 Feb 2019 11:32 )             29.0     02-13    138  |  105  |  14  ----------------------------<  106<H>  4.2   |  26  |  0.80    Ca    8.1<L>      13 Feb 2019 09:22      PT/INR - ( 14 Feb 2019 11:32 )   PT: 20.3 sec;   INR: 1.78 ratio             CAPILLARY BLOOD GLUCOSE              RADIOLOGY & ADDITIONAL TESTS:          Imaging Personally Reviewed:  [ ] YES  [ ] NO    Consultant(s) Notes Reviewed:  [x ] YES  [ ] NO    Care Discussed with Consultants/Other Providers [x ] YES  [ ] NO
Subjective Complaints:  Historian:         REVIEW OF SYSTEMS:  Eyes:  Good vision, no reported pain  ENT:  No sore throat, pain, runny nose, dysphagia  CV:  No pain, palpitatioins, hypo/hypertension  Resp:  No dyspnea, cough, tachypnea, wheezing  GI:  No pain, nausea, vomiting, diarrhea, constipatiion  Muscle:  No pain, weakness  Neuro:  No weakness, tingling, memory problems  Psych:  No fatigue, insomnia, mood problems, depression  Endocrine:  No polyuria, polydypsia, cold/heat intolerance    Vital Signs Last 24 Hrs  T(C): 36.5 (14 Feb 2019 05:10), Max: 36.8 (14 Feb 2019 00:20)  T(F): 97.7 (14 Feb 2019 05:10), Max: 98.3 (14 Feb 2019 00:20)  HR: 106 (14 Feb 2019 05:10) (96 - 106)  BP: 129/69 (14 Feb 2019 05:10) (120/51 - 132/73)  BP(mean): --  RR: 20 (14 Feb 2019 05:10) (16 - 20)  SpO2: 100% (14 Feb 2019 05:10) (97% - 100%)    GENERAL PHYSICAL EXAM:  General:  Appears stated age, well-groomed, well-nourished, no distress  HEENT:  NC/AT, patent nares w/ pink mucosa, OP clear w/o lesions, PERRL, EOMI, conjunctivae clear, no thyromegaly, nodules, adenopathy, no JVD  Chest:  Full & symmetric excursion, no increased effort, breath sounds clear  Cardiovascular:  Regular rhythm, S1, S2, no murmur/rub/S3/S4, no carotid/femoral/abdominal bruit, radial/pedal pulses 2+, no edema  Abdomen:  Soft, non-tender, non-distended, normoactive bowel sounds, no HSM  Extremities:  Gait & station:   Digits:   Nails:   Joints, Bones, Muscles:   ROM:   Stability:  Skin:  No rash/erythema/ecchymoses/petechiae/wounds/abscess/warm/dry  Musculoskeletal:  Full ROM in all joints w/o swelling/tenderness/effusion    NEUROLOGICAL EXAM:  HENT:  Normocephalic head; atraumatic head.  Neck supple.  ENT: normal looking.  Mental State:    Alert.  oriented to person,  Cognitive function is poor ,    Cranial Nerves:  II-XII:   Pupils round and reactive to light and accommodation.  Extraocular movements full.  Visual field can not be assessed Facial symmetry intact.  Tongue midline.  Motor Functions:  Motor strength is 3/5 in the upper extremities and 2/5 in the lower    Sensory Functions:   Intact to touch and pinprick to face and extremities.    Reflexes:  Deep tendon reflexes normoactive to biceps, knees and ankles.  Babinski absent (present).  Cerebellar Testing:  unable to perform   GAIT: UNABLE TO STAND       LABS:                        8.8    7.35  )-----------( 396      ( 13 Feb 2019 09:21 )             27.3     02-13    138  |  105  |  14  ----------------------------<  106<H>  4.2   |  26  |  0.80    Ca    8.1<L>      13 Feb 2019 09:22      PT/INR - ( 13 Feb 2019 09:22 )   PT: 16.3 sec;   INR: 1.44 ratio         PTT - ( 12 Feb 2019 12:31 )  PTT:59.8 sec        RADIOLOGY & ADDITIONAL STUDIES:    DX EMBOLISM OF THE EXTREMITIES --STATUS POST HEAD INJURY --TRAUMATIC ENCEPHALOPATHY     Recommendations: CONTINUE ANTICOAGULATION     DVT prophylaxis as ordered.  Medications:

## 2019-02-15 NOTE — PROGRESS NOTE ADULT - PROVIDER SPECIALTY LIST ADULT
Hospitalist
Neurology
Pulmonology
Hospitalist

## 2019-02-15 NOTE — DISCHARGE NOTE PROVIDER - PROVIDER TOKENS
FREE:[LAST:[Dr. Pruett],PHONE:[(   )    -],FAX:[(   )    -],ADDRESS:[PMD],FOLLOWUP:[1-3 days]],PROVIDER:[TOKEN:[[id number]],FOLLOWUP:[1 week]] PROVIDER:[TOKEN:[[id number]],FOLLOWUP:[1 week]],FREE:[LAST:[Dr. Seay],PHONE:[(   )    -],FAX:[(   )    -]]

## 2019-02-15 NOTE — DISCHARGE NOTE PROVIDER - NSDCHHNEEDSERVICE_GEN_ALL_CORE
Rehabilitation services Medication teaching and assessment/Observation and assessment/Rehabilitation services

## 2019-02-15 NOTE — DISCHARGE NOTE PROVIDER - NSDCCPCAREPLAN_GEN_ALL_CORE_FT
PRINCIPAL DISCHARGE DIAGNOSIS  Problem: DVT (deep venous thrombosis)  Assessment and Plan of Treatment: Continue Coumadin  Follow up with PMD within 1-3 days for repeat INR      SECONDARY DISCHARGE DIAGNOSES  Problem: Pulmonary embolism  Assessment and Plan of Treatment:     Problem: Right sided weakness  Assessment and Plan of Treatment: Physical therapy  Initial CT negative for acute CVA    Problem: Essential hypertension  Assessment and Plan of Treatment: Continue medications as prescribed  Follow up with PMD  Low-sodium diet  AHA Recipes - [email or web address]/ PRINCIPAL DISCHARGE DIAGNOSIS  Problem: DVT (deep venous thrombosis)  Assessment and Plan of Treatment: Acute Right lower lobe Pulmonary embolism, and bilateral LE DVT  Last INR (2.42), continue with Coumadin  Follow up with PMD within 1-3 days for repeat INR and dosing.      SECONDARY DISCHARGE DIAGNOSES  Problem: Essential hypertension  Assessment and Plan of Treatment: Continue medications as prescribed  Follow up with PMD  Low-sodium diet  AHA Recipes - [email or web address]      Problem: History of CVA (cerebrovascular accident)  Assessment and Plan of Treatment: H/o TBI, CVA with residual Left hemiparesis, chronic anoxic encephalopathy and expressive aphasia  Acute CVA unlikely on workup  continue Coumadin and Statin.   Follow up with Neurology with Dr. Vidal    Problem: SARA (iron deficiency anemia)  Assessment and Plan of Treatment: Hemoglobin stable.   Continue with Ferrous sulfate    Problem: Systolic and diastolic CHF, chronic  Assessment and Plan of Treatment: EF: 45-50%, grade II diastolic dysfunction.  s/p AICD placement   -cont w/ coreg

## 2019-02-15 NOTE — DISCHARGE NOTE NURSING/CASE MANAGEMENT/SOCIAL WORK - NSDCPEPTCOWADR_GEN_ALL_CORE
Coumadin/Warfarin increases the risk for bleeding. Notify your doctor if you see any bleeding or any of the side effects listed in the Coumadin/Warfarin Booklet. Diet and medications can affect the PT/INR blood level. When Coumadin/Warfarin is taken with other medicines it can change the way other medicines work. Other medicines can also change the way Coumadin/Warfarin works. It is very important to tell your healthcare provider about all of the other medicines, including over-the-counter medications, herbs, diet supplements, or products containing Vitamin K. Call your doctor before starting, stopping, or changing the dose of any prescription or over-the-counter medications.    Any product containing Aspirin lessens the blood's ability to form clots and adds to the effect of Coumadin/Warfarin.    Never take Aspirin without speaking with your health care provider.

## 2019-02-15 NOTE — DISCHARGE NOTE PROVIDER - CARE PROVIDER_API CALL
Dr. Pruett,   PMD  Phone: (   )    -  Fax: (   )    -  Follow Up Time: 1-3 days    Roro Vidal)  Neurology  [address] [place] [address]  Phone: [phone]  Fax: [phone]  Follow Up Time: 1 week Roro Vidal)  Neurology  [address] [place] [address], [place] [address]  Phone: [phone]  Fax: [phone]  Follow Up Time: 1 week    Dr. Seay,   Phone: (   )    -  Fax: (   )    -  Follow Up Time:

## 2019-02-15 NOTE — DISCHARGE NOTE PROVIDER - HOSPITAL COURSE
68 year old man with PMH HTN, s/p ICD, prostate Ca s/p radiation, gastritis, recent diverticular bleed requiring 4 units pRBC (Dec 2018), DVT on Xarelto, and TBI with residual left-sided weakness presents to ED with family after they noted right-sided weakness yesterday.  Pt cannot communicate due to TBI, history obtained from spouse at bedside.  She has also noted that his legs have become more swollen and thinks he has seemed SOB at times.        In the ED, CTA chest shows acute right PE, chronic right DVT and left age-indeterminate DVT.  No right heart strain or pulmonary infarcts.  CT head shows no acute findings.        The patient was admitted to telemetry bed. Neurology and Pulmonary were consulted and followed the patient. The  patient was started on IV heparin drip with PTT monitoring and was started on Coumadin. INR was monitored with coumadin and now is therapeutic. The patient was evaluated for CVA with head CT, echo and carotid doppler; there is no evidence of acute CVA. The patient will be discharge home with coumadin for Pulmonary embolism/DVT. The patient is recommended to follow up with PCP for INR monitoring and coumadin dosing.          Xray Chest 1 View AP/PA. (02.08.19 @ 01:27) >        Lungs: The lungs are clear, although the patient's chin obscures the lung     apices. Questionable trace bilateral pleural effusions.    Heart: The heart cannot be accurately evaluated in this projection. This     single lead left-sided cardiac pacing device in place.    Mediastinum: The mediastinum is within normal limits.    IMPRESSION:    No significant change compared to 1/19/2019.        US Duplex Venous Lower Ext Complete, Bilateral (02.08.19 @ 02:59) >        IMPRESSION:     Right lower extremity: Chronic partially occlusive deep venous thrombosis     from the external iliac through to popliteal veins. Calf vein now appear     grossly patent.    Left lower extremity: Age-indeterminate thrombus in the left external     iliac, common femoral and greater saphenous veins.        CT Head No Cont (02.08.19 @ 03:10) >    Findings:     Exam is limited due to patient ambulated for cooperate and extensive     motion artifact degrading evaluation of the high frontoparietal region.     There is stable moderate to marked ventriculomegaly and evidence of     volume loss. No acute intracranial hemorrhage is identified.  No     extra-axial fluid collection is identified.  No mass effect or midline     shift is seen.  There is no evidence of acute territorial infarct.  There     are periventricular and subcortical white matter hypodensities, which are     nonspecific, but likely reflect microvascular ischemic disease.     The visualized paranasal sinuses are clear. The mastoid air cells are     well aerated.  No acute osseous abnormality is seen.         Impression: Unable to evaluate the high frontal parietal region due to     motion artifact. No intracranial hemorrhage or acute territorial infarct     otherwise identified.        US Duplex Venous Upper Ext Complete, Bilateral (02.10.19 @ 11:09) >    IMPRESSION:     No evidence of deep vein thrombosis in the visualized venous segments of     the right and left upper extremities.        US Duplex Carotid Arteries Complete, Bilateral (02.08.19 @ 08:24) >    FINDINGS: There is mild bilateral intimal thickening without significant     atherosclerotic plaque. Please note that the study is technically limited     due to patient motion.    Peak systolic velocities are as follows (in cm/sec):     RIGHT:    CCA = 121 ;  ICA = 105 ; ECA = 95     LEFT   :    CCA = 120 ;  ICA = 90 ;  ECA = 128     There is antegrade flow through both vertebral arteries.     IMPRESSION: Technically limited exam. No visualized hemodynamically     significant carotid artery stenoses.         < from: TTE Echo Doppler w/o Cont (02.08.19 @ 08:26) >        Summary:     1. Left ventricular ejection fraction, by visual estimation, is 45 to     50%.     2. Normal left ventricular internal cavity size.     3. Spectral Doppler shows pseudonormal pattern of left ventricular     myocardial filling (Grade II diastolic dysfunction).     4. Normal right ventricular size and function.     5. The left atrium is normal in size.     6. The right atrium is normal in size.     7. There is no evidence of pericardial effusion.     8. Thickening of the anterior mitral valve leaflet.     9. Mild tricuspid regurgitation.    10. Structurally normal tricuspid valve, with normal leaflet excursion.    11. Mild aortic regurgitation.    12. Normal trileaflet aortic valve with normal opening.    13. Structurally normal pulmonic valve, with normal leaflet excursion.    14. Estimated pulmonary artery systolic pressure is 40.6 mmHg assuming a     right atrial pressure of 5 mmHg, which is consistent with mild pulmonary     hypertension.    15. The aortic valve mean gradient is 3.1 mmHg consistent with normally     opening aortic valve.

## 2019-02-15 NOTE — DISCHARGE NOTE NURSING/CASE MANAGEMENT/SOCIAL WORK - SOCIAL WORKER'S NAME
Nigel Galvan  Pt discharged to home via [facility] ambulance.  MLTC (aide) for 12 hr 7 days per week has resumed.  Aide at bedside and will travel with pt.

## 2019-02-19 ENCOUNTER — APPOINTMENT (OUTPATIENT)
Dept: ELECTROPHYSIOLOGY | Facility: CLINIC | Age: 69
End: 2019-02-19

## 2019-02-21 DIAGNOSIS — Z88.0 ALLERGY STATUS TO PENICILLIN: ICD-10-CM

## 2019-02-21 DIAGNOSIS — K29.70 GASTRITIS, UNSPECIFIED, WITHOUT BLEEDING: ICD-10-CM

## 2019-02-21 DIAGNOSIS — D50.9 IRON DEFICIENCY ANEMIA, UNSPECIFIED: ICD-10-CM

## 2019-02-21 DIAGNOSIS — I26.99 OTHER PULMONARY EMBOLISM WITHOUT ACUTE COR PULMONALE: ICD-10-CM

## 2019-02-21 DIAGNOSIS — Z87.820 PERSONAL HISTORY OF TRAUMATIC BRAIN INJURY: ICD-10-CM

## 2019-02-21 DIAGNOSIS — Z88.8 ALLERGY STATUS TO OTHER DRUGS, MEDICAMENTS AND BIOLOGICAL SUBSTANCES: ICD-10-CM

## 2019-02-21 DIAGNOSIS — G93.1 ANOXIC BRAIN DAMAGE, NOT ELSEWHERE CLASSIFIED: ICD-10-CM

## 2019-02-21 DIAGNOSIS — I50.42 CHRONIC COMBINED SYSTOLIC (CONGESTIVE) AND DIASTOLIC (CONGESTIVE) HEART FAILURE: ICD-10-CM

## 2019-02-21 DIAGNOSIS — I69.320 APHASIA FOLLOWING CEREBRAL INFARCTION: ICD-10-CM

## 2019-02-21 DIAGNOSIS — Z86.718 PERSONAL HISTORY OF OTHER VENOUS THROMBOSIS AND EMBOLISM: ICD-10-CM

## 2019-02-21 DIAGNOSIS — Z92.3 PERSONAL HISTORY OF IRRADIATION: ICD-10-CM

## 2019-02-21 DIAGNOSIS — Z95.810 PRESENCE OF AUTOMATIC (IMPLANTABLE) CARDIAC DEFIBRILLATOR: ICD-10-CM

## 2019-02-21 DIAGNOSIS — R26.0 ATAXIC GAIT: ICD-10-CM

## 2019-02-21 DIAGNOSIS — I69.354 HEMIPLEGIA AND HEMIPARESIS FOLLOWING CEREBRAL INFARCTION AFFECTING LEFT NON-DOMINANT SIDE: ICD-10-CM

## 2019-02-21 DIAGNOSIS — I11.0 HYPERTENSIVE HEART DISEASE WITH HEART FAILURE: ICD-10-CM

## 2019-02-21 DIAGNOSIS — Z85.46 PERSONAL HISTORY OF MALIGNANT NEOPLASM OF PROSTATE: ICD-10-CM

## 2019-03-20 ENCOUNTER — APPOINTMENT (OUTPATIENT)
Dept: ELECTROPHYSIOLOGY | Facility: CLINIC | Age: 69
End: 2019-03-20
Payer: MEDICARE

## 2019-03-20 VITALS
DIASTOLIC BLOOD PRESSURE: 72 MMHG | SYSTOLIC BLOOD PRESSURE: 132 MMHG | BODY MASS INDEX: 30.53 KG/M2 | WEIGHT: 190 LBS | HEIGHT: 66 IN | HEART RATE: 85 BPM | OXYGEN SATURATION: 100 %

## 2019-03-20 PROCEDURE — 93282 PRGRMG EVAL IMPLANTABLE DFB: CPT

## 2019-04-30 ENCOUNTER — APPOINTMENT (OUTPATIENT)
Dept: GASTROENTEROLOGY | Facility: CLINIC | Age: 69
End: 2019-04-30
Payer: MEDICARE

## 2019-04-30 VITALS
OXYGEN SATURATION: 100 % | SYSTOLIC BLOOD PRESSURE: 161 MMHG | HEART RATE: 82 BPM | TEMPERATURE: 98 F | BODY MASS INDEX: 30.53 KG/M2 | WEIGHT: 190 LBS | DIASTOLIC BLOOD PRESSURE: 79 MMHG | HEIGHT: 66 IN

## 2019-04-30 DIAGNOSIS — D64.9 ANEMIA, UNSPECIFIED: ICD-10-CM

## 2019-04-30 DIAGNOSIS — I46.9 CARDIAC ARREST, CAUSE UNSPECIFIED: ICD-10-CM

## 2019-04-30 DIAGNOSIS — K92.2 GASTROINTESTINAL HEMORRHAGE, UNSPECIFIED: ICD-10-CM

## 2019-04-30 PROCEDURE — 99215 OFFICE O/P EST HI 40 MIN: CPT

## 2019-04-30 NOTE — PHYSICAL EXAM
[General Appearance - Alert] : alert [General Appearance - In No Acute Distress] : in no acute distress [Sclera] : the sclera and conjunctiva were normal [PERRL With Normal Accommodation] : pupils were equal in size, round, and reactive to light [Extraocular Movements] : extraocular movements were intact [Outer Ear] : the ears and nose were normal in appearance [Oropharynx] : the oropharynx was normal [Auscultation Breath Sounds / Voice Sounds] : lungs were clear to auscultation bilaterally [Heart Rate And Rhythm] : heart rate was normal and rhythm regular [Heart Sounds] : normal S1 and S2 [Heart Sounds Gallop] : no gallops [Murmurs] : no murmurs [Heart Sounds Pericardial Friction Rub] : no pericardial rub [Bowel Sounds] : normal bowel sounds [Abdomen Soft] : soft [Abdomen Tenderness] : non-tender [] : no hepato-splenomegaly [Abdomen Mass (___ Cm)] : no abdominal mass palpated [No CVA Tenderness] : no ~M costovertebral angle tenderness [Oriented To Time, Place, And Person] : oriented to person, place, and time [Impaired Insight] : insight and judgment were intact [Affect] : the affect was normal [FreeTextEntry1] : +Right inguinal hernia

## 2019-04-30 NOTE — HISTORY OF PRESENT ILLNESS
[Heartburn] : denies heartburn [Nausea] : denies nausea [Vomiting] : denies vomiting [Diarrhea] : denies diarrhea [Constipation] : denies constipation [Yellow Skin Or Eyes (Jaundice)] : denies jaundice [Abdominal Pain] : denies abdominal pain [Abdominal Swelling] : denies abdominal swelling [Rectal Pain] : denies rectal pain [Malignancy] : malignancy [GERD] : no gastroesophageal reflux disease [Hiatus Hernia] : no hiatus hernia [Pancreatitis] : no pancreatitis [Cholelithiasis] : no cholelithiasis [Kidney Stone] : no kidney stone [Inflammatory Bowel Disease] : no inflammatory bowel disease [Irritable Bowel Syndrome] : no irritable bowel syndrome [Diverticulitis] : no diverticulitis [Alcohol Abuse] : no alcohol abuse [Abdominal Surgery] : no abdominal surgery [Appendectomy] : no appendectomy [Cholecystectomy] : no cholecystectomy [de-identified] : 66-year-old male presents for follow up.  This past December he went for inguinal hernia repair. During the procedure he was found to have torsed colon and needed a partial colectomy. Post op about one month he presented to [facility] with active bleeding. EGD and colonoscopy (reviewed by me) were unremarkable. Since discharge he has not had any further episodes of Gi bleed. He feels well overall. \par MHx: Cardiac arrest , Encephalopathy \par SHx:  AICD placement \par Meds: Denies ASA  81 mg daily . \par Allergies: NKDA \par Family history: Denies a family history of colon CA, gastric CA, high risk polyps, Inflammatory and autoimmune disease. \par Social: Denies TOB, ETOH, IVDA,  No Tattoos \par Health maintenance: -ve history of blood transfusions, unknown: DEXA, FLU vaccine, Pneumococcal vaccine.  Last colonoscopy:  > 10 years ago     Last EGD : > 10 years ago \par

## 2019-04-30 NOTE — ASSESSMENT
[FreeTextEntry1] : 68 year old male with history of Altaf bleeding. \par At this juncture I don’t believe a capsule endoscopy would give much information. he has no evidence of active bleeding and his hemoglobin remains stable. I discussed with the wife and we will proceed with a CTE to rule out any luminal pathology.

## 2019-05-02 LAB
ALBUMIN SERPL ELPH-MCNC: 4.4 G/DL
ALP BLD-CCNC: 86 U/L
ALT SERPL-CCNC: 29 U/L
ANION GAP SERPL CALC-SCNC: 12 MMOL/L
AST SERPL-CCNC: 23 U/L
BASOPHILS # BLD AUTO: 0.03 K/UL
BASOPHILS NFR BLD AUTO: 0.6 %
BILIRUB SERPL-MCNC: 0.3 MG/DL
BUN SERPL-MCNC: 15 MG/DL
CALCIUM SERPL-MCNC: 10.5 MG/DL
CHLORIDE SERPL-SCNC: 97 MMOL/L
CO2 SERPL-SCNC: 28 MMOL/L
CREAT SERPL-MCNC: 0.91 MG/DL
EOSINOPHIL # BLD AUTO: 0.06 K/UL
EOSINOPHIL NFR BLD AUTO: 1.2 %
GLUCOSE SERPL-MCNC: 106 MG/DL
HCT VFR BLD CALC: 40.3 %
HGB BLD-MCNC: 12.7 G/DL
IMM GRANULOCYTES NFR BLD AUTO: 0.2 %
LYMPHOCYTES # BLD AUTO: 1.88 K/UL
LYMPHOCYTES NFR BLD AUTO: 36.9 %
MAN DIFF?: NORMAL
MCHC RBC-ENTMCNC: 24.7 PG
MCHC RBC-ENTMCNC: 31.5 GM/DL
MCV RBC AUTO: 78.3 FL
MONOCYTES # BLD AUTO: 0.43 K/UL
MONOCYTES NFR BLD AUTO: 8.4 %
NEUTROPHILS # BLD AUTO: 2.69 K/UL
NEUTROPHILS NFR BLD AUTO: 52.7 %
PLATELET # BLD AUTO: 237 K/UL
POTASSIUM SERPL-SCNC: 5 MMOL/L
PROT SERPL-MCNC: 7.7 G/DL
RBC # BLD: 5.15 M/UL
RBC # FLD: 19.7 %
SODIUM SERPL-SCNC: 137 MMOL/L
WBC # FLD AUTO: 5.1 K/UL

## 2019-05-08 ENCOUNTER — FORM ENCOUNTER (OUTPATIENT)
Age: 69
End: 2019-05-08

## 2019-05-09 ENCOUNTER — OUTPATIENT (OUTPATIENT)
Dept: OUTPATIENT SERVICES | Facility: HOSPITAL | Age: 69
LOS: 1 days | End: 2019-05-09
Payer: MEDICARE

## 2019-05-09 ENCOUNTER — APPOINTMENT (OUTPATIENT)
Dept: CT IMAGING | Facility: CLINIC | Age: 69
End: 2019-05-09

## 2019-05-09 DIAGNOSIS — Z98.89 OTHER SPECIFIED POSTPROCEDURAL STATES: Chronic | ICD-10-CM

## 2019-05-09 DIAGNOSIS — Z95.810 PRESENCE OF AUTOMATIC (IMPLANTABLE) CARDIAC DEFIBRILLATOR: Chronic | ICD-10-CM

## 2019-05-09 DIAGNOSIS — Z00.8 ENCOUNTER FOR OTHER GENERAL EXAMINATION: ICD-10-CM

## 2019-05-09 DIAGNOSIS — Z85.46 PERSONAL HISTORY OF MALIGNANT NEOPLASM OF PROSTATE: Chronic | ICD-10-CM

## 2019-05-09 DIAGNOSIS — Z93.0 TRACHEOSTOMY STATUS: Chronic | ICD-10-CM

## 2019-05-09 PROCEDURE — 74177 CT ABD & PELVIS W/CONTRAST: CPT | Mod: 26

## 2019-05-09 PROCEDURE — 74177 CT ABD & PELVIS W/CONTRAST: CPT

## 2019-06-24 ENCOUNTER — APPOINTMENT (OUTPATIENT)
Dept: ELECTROPHYSIOLOGY | Facility: CLINIC | Age: 69
End: 2019-06-24
Payer: MEDICARE

## 2019-06-24 PROCEDURE — 93295 DEV INTERROG REMOTE 1/2/MLT: CPT

## 2019-06-24 PROCEDURE — 93296 REM INTERROG EVL PM/IDS: CPT

## 2019-06-26 ENCOUNTER — FORM ENCOUNTER (OUTPATIENT)
Age: 69
End: 2019-06-26

## 2019-06-27 ENCOUNTER — APPOINTMENT (OUTPATIENT)
Dept: ELECTROPHYSIOLOGY | Facility: CLINIC | Age: 69
End: 2019-06-27
Payer: MEDICARE

## 2019-08-23 ENCOUNTER — EMERGENCY (EMERGENCY)
Facility: HOSPITAL | Age: 69
LOS: 0 days | Discharge: HOME | End: 2019-08-23
Attending: EMERGENCY MEDICINE
Payer: MEDICARE

## 2019-08-23 VITALS
TEMPERATURE: 98 F | SYSTOLIC BLOOD PRESSURE: 137 MMHG | RESPIRATION RATE: 18 BRPM | HEART RATE: 74 BPM | DIASTOLIC BLOOD PRESSURE: 69 MMHG | OXYGEN SATURATION: 99 %

## 2019-08-23 VITALS
RESPIRATION RATE: 18 BRPM | OXYGEN SATURATION: 99 % | DIASTOLIC BLOOD PRESSURE: 72 MMHG | HEIGHT: 64 IN | HEART RATE: 76 BPM | TEMPERATURE: 98 F | WEIGHT: 175.05 LBS | SYSTOLIC BLOOD PRESSURE: 143 MMHG

## 2019-08-23 DIAGNOSIS — Z98.89 OTHER SPECIFIED POSTPROCEDURAL STATES: Chronic | ICD-10-CM

## 2019-08-23 DIAGNOSIS — Z95.810 PRESENCE OF AUTOMATIC (IMPLANTABLE) CARDIAC DEFIBRILLATOR: Chronic | ICD-10-CM

## 2019-08-23 DIAGNOSIS — R10.9 UNSPECIFIED ABDOMINAL PAIN: ICD-10-CM

## 2019-08-23 DIAGNOSIS — Z93.0 TRACHEOSTOMY STATUS: Chronic | ICD-10-CM

## 2019-08-23 DIAGNOSIS — I10 ESSENTIAL (PRIMARY) HYPERTENSION: ICD-10-CM

## 2019-08-23 DIAGNOSIS — I82.409 ACUTE EMBOLISM AND THROMBOSIS OF UNSPECIFIED DEEP VEINS OF UNSPECIFIED LOWER EXTREMITY: ICD-10-CM

## 2019-08-23 DIAGNOSIS — Z85.46 PERSONAL HISTORY OF MALIGNANT NEOPLASM OF PROSTATE: Chronic | ICD-10-CM

## 2019-08-23 DIAGNOSIS — M79.662 PAIN IN LEFT LOWER LEG: ICD-10-CM

## 2019-08-23 DIAGNOSIS — G93.1 ANOXIC BRAIN DAMAGE, NOT ELSEWHERE CLASSIFIED: ICD-10-CM

## 2019-08-23 DIAGNOSIS — C61 MALIGNANT NEOPLASM OF PROSTATE: ICD-10-CM

## 2019-08-23 DIAGNOSIS — Z88.0 ALLERGY STATUS TO PENICILLIN: ICD-10-CM

## 2019-08-23 DIAGNOSIS — I46.9 CARDIAC ARREST, CAUSE UNSPECIFIED: ICD-10-CM

## 2019-08-23 LAB
ALBUMIN SERPL ELPH-MCNC: 3.1 G/DL — LOW (ref 3.3–5)
ALP SERPL-CCNC: 88 U/L — SIGNIFICANT CHANGE UP (ref 40–120)
ALT FLD-CCNC: 21 U/L — SIGNIFICANT CHANGE UP (ref 12–78)
ANION GAP SERPL CALC-SCNC: 6 MMOL/L — SIGNIFICANT CHANGE UP (ref 5–17)
APTT BLD: 31.7 SEC — SIGNIFICANT CHANGE UP (ref 27.5–36.3)
AST SERPL-CCNC: 15 U/L — SIGNIFICANT CHANGE UP (ref 15–37)
BASOPHILS # BLD AUTO: 0.03 K/UL — SIGNIFICANT CHANGE UP (ref 0–0.2)
BASOPHILS NFR BLD AUTO: 0.6 % — SIGNIFICANT CHANGE UP (ref 0–2)
BILIRUB SERPL-MCNC: 0.3 MG/DL — SIGNIFICANT CHANGE UP (ref 0.2–1.2)
BUN SERPL-MCNC: 13 MG/DL — SIGNIFICANT CHANGE UP (ref 7–23)
CALCIUM SERPL-MCNC: 9.4 MG/DL — SIGNIFICANT CHANGE UP (ref 8.5–10.1)
CHLORIDE SERPL-SCNC: 101 MMOL/L — SIGNIFICANT CHANGE UP (ref 96–108)
CO2 SERPL-SCNC: 28 MMOL/L — SIGNIFICANT CHANGE UP (ref 22–31)
CREAT SERPL-MCNC: 0.88 MG/DL — SIGNIFICANT CHANGE UP (ref 0.5–1.3)
EOSINOPHIL # BLD AUTO: 0.13 K/UL — SIGNIFICANT CHANGE UP (ref 0–0.5)
EOSINOPHIL NFR BLD AUTO: 2.5 % — SIGNIFICANT CHANGE UP (ref 0–6)
GLUCOSE SERPL-MCNC: 86 MG/DL — SIGNIFICANT CHANGE UP (ref 70–99)
HCT VFR BLD CALC: 38 % — LOW (ref 39–50)
HGB BLD-MCNC: 12.8 G/DL — LOW (ref 13–17)
IMM GRANULOCYTES NFR BLD AUTO: 0.4 % — SIGNIFICANT CHANGE UP (ref 0–1.5)
INR BLD: 1.21 RATIO — HIGH (ref 0.88–1.16)
LIDOCAIN IGE QN: 130 U/L — SIGNIFICANT CHANGE UP (ref 73–393)
LYMPHOCYTES # BLD AUTO: 1.8 K/UL — SIGNIFICANT CHANGE UP (ref 1–3.3)
LYMPHOCYTES # BLD AUTO: 34.2 % — SIGNIFICANT CHANGE UP (ref 13–44)
MCHC RBC-ENTMCNC: 25.8 PG — LOW (ref 27–34)
MCHC RBC-ENTMCNC: 33.7 GM/DL — SIGNIFICANT CHANGE UP (ref 32–36)
MCV RBC AUTO: 76.6 FL — LOW (ref 80–100)
MONOCYTES # BLD AUTO: 0.53 K/UL — SIGNIFICANT CHANGE UP (ref 0–0.9)
MONOCYTES NFR BLD AUTO: 10.1 % — SIGNIFICANT CHANGE UP (ref 2–14)
NEUTROPHILS # BLD AUTO: 2.75 K/UL — SIGNIFICANT CHANGE UP (ref 1.8–7.4)
NEUTROPHILS NFR BLD AUTO: 52.2 % — SIGNIFICANT CHANGE UP (ref 43–77)
NRBC # BLD: 0 /100 WBCS — SIGNIFICANT CHANGE UP (ref 0–0)
PLATELET # BLD AUTO: 250 K/UL — SIGNIFICANT CHANGE UP (ref 150–400)
POTASSIUM SERPL-MCNC: 4.6 MMOL/L — SIGNIFICANT CHANGE UP (ref 3.5–5.3)
POTASSIUM SERPL-SCNC: 4.6 MMOL/L — SIGNIFICANT CHANGE UP (ref 3.5–5.3)
PROT SERPL-MCNC: 8.1 GM/DL — SIGNIFICANT CHANGE UP (ref 6–8.3)
PROTHROM AB SERPL-ACNC: 13.6 SEC — HIGH (ref 10–12.9)
RBC # BLD: 4.96 M/UL — SIGNIFICANT CHANGE UP (ref 4.2–5.8)
RBC # FLD: 15.2 % — HIGH (ref 10.3–14.5)
SODIUM SERPL-SCNC: 135 MMOL/L — SIGNIFICANT CHANGE UP (ref 135–145)
WBC # BLD: 5.26 K/UL — SIGNIFICANT CHANGE UP (ref 3.8–10.5)
WBC # FLD AUTO: 5.26 K/UL — SIGNIFICANT CHANGE UP (ref 3.8–10.5)

## 2019-08-23 PROCEDURE — 74176 CT ABD & PELVIS W/O CONTRAST: CPT | Mod: 26

## 2019-08-23 PROCEDURE — 93971 EXTREMITY STUDY: CPT | Mod: 26

## 2019-08-23 PROCEDURE — 99284 EMERGENCY DEPT VISIT MOD MDM: CPT

## 2019-08-23 RX ORDER — SODIUM CHLORIDE 9 MG/ML
1000 INJECTION INTRAMUSCULAR; INTRAVENOUS; SUBCUTANEOUS ONCE
Refills: 0 | Status: COMPLETED | OUTPATIENT
Start: 2019-08-23 | End: 2019-08-23

## 2019-08-23 RX ADMIN — SODIUM CHLORIDE 1000 MILLILITER(S): 9 INJECTION INTRAMUSCULAR; INTRAVENOUS; SUBCUTANEOUS at 12:00

## 2019-08-23 NOTE — ED ADULT TRIAGE NOTE - CHIEF COMPLAINT QUOTE
Pt with PMH of brain injury with coma 2008, abdominal surgery last year leaving him wheelchair bound complaint of left thigh swelling and warmth for 2 days, pt also complains of left abdominal swelling, pt taking Lovenox sub q on abdomen area where there is swelling. Pt with left arm pain which is chronic. Pt hx of blood clots.

## 2019-08-23 NOTE — ED ADULT NURSE NOTE - OBJECTIVE STATEMENT
Pt with PMH of brain injury with coma 2008 oriented tib self, abdominal surgery last year leaving him wheelchair bound complaint of left thigh swelling and warmth for 2 days, pt also complains of left abdominal swelling, pt taking Lovenox sub q on abdomen area where there is swelling. Pt with left arm pain which is chronic. Pt hx of blood clots. Pt with PMH of brain injury with coma 2008 oriented tib self, abdominal surgery last year leaving him wheelchair bound complaint of left thigh swelling and warmth for 2 days, pt also complains of left abdominal swelling, pt taking Lovenox sub q on abdomen area where there is swelling. Pt with left arm pain which is chronic. bilateral swelling. Pt hx of blood clots.

## 2019-08-23 NOTE — ED PROVIDER NOTE - OBJECTIVE STATEMENT
69 yo male presents with abdominal distention and left leg pain for two days. Patient denies chest pain, sob, fever. + left sided abdominal pain for two days. Wife provides lovenox nightly for history of left leg dvt.

## 2019-08-23 NOTE — ED ADULT NURSE NOTE - NSIMPLEMENTINTERV_GEN_ALL_ED
Implemented All Fall Risk Interventions:  [place] to call system. Call bell, personal items and telephone within reach. Instruct patient to call for assistance. Room bathroom lighting operational. Non-slip footwear when patient is off stretcher. Physically safe environment: no spills, clutter or unnecessary equipment. Stretcher in lowest position, wheels locked, appropriate side rails in place. Provide visual cue, wrist band, yellow gown, etc. Monitor gait and stability. Monitor for mental status changes and reorient to person, place, and time. Review medications for side effects contributing to fall risk. Reinforce activity limits and safety measures with patient and family.

## 2019-08-23 NOTE — ED ADULT NURSE REASSESSMENT NOTE - NS ED NURSE REASSESS COMMENT FT1
Pt resting comfortably in wheelchair, no distress noted, resp even and unlabored, pt updated about the plan of care, pt does not appear to be in any distress.

## 2019-09-20 ENCOUNTER — APPOINTMENT (OUTPATIENT)
Dept: ELECTROPHYSIOLOGY | Facility: CLINIC | Age: 69
End: 2019-09-20

## 2019-10-09 ENCOUNTER — APPOINTMENT (OUTPATIENT)
Dept: VASCULAR SURGERY | Facility: CLINIC | Age: 69
End: 2019-10-09

## 2019-10-25 ENCOUNTER — INPATIENT (INPATIENT)
Facility: HOSPITAL | Age: 69
LOS: 4 days | Discharge: HOME HEALTH | End: 2019-10-30
Attending: INTERNAL MEDICINE | Admitting: INTERNAL MEDICINE
Payer: MEDICARE

## 2019-10-25 VITALS
SYSTOLIC BLOOD PRESSURE: 110 MMHG | OXYGEN SATURATION: 99 % | HEART RATE: 104 BPM | RESPIRATION RATE: 18 BRPM | DIASTOLIC BLOOD PRESSURE: 56 MMHG | TEMPERATURE: 98 F

## 2019-10-25 DIAGNOSIS — Z98.89 OTHER SPECIFIED POSTPROCEDURAL STATES: Chronic | ICD-10-CM

## 2019-10-25 DIAGNOSIS — Z93.0 TRACHEOSTOMY STATUS: Chronic | ICD-10-CM

## 2019-10-25 DIAGNOSIS — Z95.810 PRESENCE OF AUTOMATIC (IMPLANTABLE) CARDIAC DEFIBRILLATOR: Chronic | ICD-10-CM

## 2019-10-25 DIAGNOSIS — R09.89 OTHER SPECIFIED SYMPTOMS AND SIGNS INVOLVING THE CIRCULATORY AND RESPIRATORY SYSTEMS: ICD-10-CM

## 2019-10-25 DIAGNOSIS — Z85.46 PERSONAL HISTORY OF MALIGNANT NEOPLASM OF PROSTATE: Chronic | ICD-10-CM

## 2019-10-25 LAB
ALBUMIN SERPL ELPH-MCNC: 3.1 G/DL — LOW (ref 3.3–5)
ALP SERPL-CCNC: 72 U/L — SIGNIFICANT CHANGE UP (ref 40–120)
ALT FLD-CCNC: 18 U/L — SIGNIFICANT CHANGE UP (ref 12–78)
ANION GAP SERPL CALC-SCNC: 7 MMOL/L — SIGNIFICANT CHANGE UP (ref 5–17)
APTT BLD: 33.3 SEC — SIGNIFICANT CHANGE UP (ref 27.5–36.3)
AST SERPL-CCNC: 15 U/L — SIGNIFICANT CHANGE UP (ref 15–37)
BASOPHILS # BLD AUTO: 0.04 K/UL — SIGNIFICANT CHANGE UP (ref 0–0.2)
BASOPHILS NFR BLD AUTO: 0.5 % — SIGNIFICANT CHANGE UP (ref 0–2)
BILIRUB SERPL-MCNC: 0.2 MG/DL — SIGNIFICANT CHANGE UP (ref 0.2–1.2)
BLD GP AB SCN SERPL QL: SIGNIFICANT CHANGE UP
BUN SERPL-MCNC: 24 MG/DL — HIGH (ref 7–23)
CALCIUM SERPL-MCNC: 8.7 MG/DL — SIGNIFICANT CHANGE UP (ref 8.5–10.1)
CHLORIDE SERPL-SCNC: 101 MMOL/L — SIGNIFICANT CHANGE UP (ref 96–108)
CO2 SERPL-SCNC: 28 MMOL/L — SIGNIFICANT CHANGE UP (ref 22–31)
CREAT SERPL-MCNC: 1.05 MG/DL — SIGNIFICANT CHANGE UP (ref 0.5–1.3)
EOSINOPHIL # BLD AUTO: 0.05 K/UL — SIGNIFICANT CHANGE UP (ref 0–0.5)
EOSINOPHIL NFR BLD AUTO: 0.6 % — SIGNIFICANT CHANGE UP (ref 0–6)
GLUCOSE SERPL-MCNC: 155 MG/DL — HIGH (ref 70–99)
HCT VFR BLD CALC: 26.5 % — LOW (ref 39–50)
HCT VFR BLD CALC: 28.5 % — LOW (ref 39–50)
HCT VFR BLD CALC: 31.1 % — LOW (ref 39–50)
HGB BLD-MCNC: 10.3 G/DL — LOW (ref 13–17)
HGB BLD-MCNC: 8.8 G/DL — LOW (ref 13–17)
HGB BLD-MCNC: 9.6 G/DL — LOW (ref 13–17)
IMM GRANULOCYTES NFR BLD AUTO: 0.5 % — SIGNIFICANT CHANGE UP (ref 0–1.5)
INR BLD: 1.29 RATIO — HIGH (ref 0.88–1.16)
LYMPHOCYTES # BLD AUTO: 2.78 K/UL — SIGNIFICANT CHANGE UP (ref 1–3.3)
LYMPHOCYTES # BLD AUTO: 34.3 % — SIGNIFICANT CHANGE UP (ref 13–44)
MCHC RBC-ENTMCNC: 25.8 PG — LOW (ref 27–34)
MCHC RBC-ENTMCNC: 25.9 PG — LOW (ref 27–34)
MCHC RBC-ENTMCNC: 25.9 PG — LOW (ref 27–34)
MCHC RBC-ENTMCNC: 33.1 GM/DL — SIGNIFICANT CHANGE UP (ref 32–36)
MCHC RBC-ENTMCNC: 33.2 GM/DL — SIGNIFICANT CHANGE UP (ref 32–36)
MCHC RBC-ENTMCNC: 33.7 GM/DL — SIGNIFICANT CHANGE UP (ref 32–36)
MCV RBC AUTO: 77 FL — LOW (ref 80–100)
MCV RBC AUTO: 77.9 FL — LOW (ref 80–100)
MCV RBC AUTO: 77.9 FL — LOW (ref 80–100)
MONOCYTES # BLD AUTO: 0.35 K/UL — SIGNIFICANT CHANGE UP (ref 0–0.9)
MONOCYTES NFR BLD AUTO: 4.3 % — SIGNIFICANT CHANGE UP (ref 2–14)
NEUTROPHILS # BLD AUTO: 4.84 K/UL — SIGNIFICANT CHANGE UP (ref 1.8–7.4)
NEUTROPHILS NFR BLD AUTO: 59.8 % — SIGNIFICANT CHANGE UP (ref 43–77)
NRBC # BLD: 0 /100 WBCS — SIGNIFICANT CHANGE UP (ref 0–0)
OB PNL STL: POSITIVE
PLATELET # BLD AUTO: 186 K/UL — SIGNIFICANT CHANGE UP (ref 150–400)
PLATELET # BLD AUTO: 194 K/UL — SIGNIFICANT CHANGE UP (ref 150–400)
PLATELET # BLD AUTO: 252 K/UL — SIGNIFICANT CHANGE UP (ref 150–400)
POTASSIUM SERPL-MCNC: 5.1 MMOL/L — SIGNIFICANT CHANGE UP (ref 3.5–5.3)
POTASSIUM SERPL-SCNC: 5.1 MMOL/L — SIGNIFICANT CHANGE UP (ref 3.5–5.3)
PROT SERPL-MCNC: 7 GM/DL — SIGNIFICANT CHANGE UP (ref 6–8.3)
PROTHROM AB SERPL-ACNC: 14.6 SEC — HIGH (ref 10–12.9)
RBC # BLD: 3.4 M/UL — LOW (ref 4.2–5.8)
RBC # BLD: 3.7 M/UL — LOW (ref 4.2–5.8)
RBC # BLD: 3.99 M/UL — LOW (ref 4.2–5.8)
RBC # FLD: 17.1 % — HIGH (ref 10.3–14.5)
RBC # FLD: 17.1 % — HIGH (ref 10.3–14.5)
RBC # FLD: 17.2 % — HIGH (ref 10.3–14.5)
SODIUM SERPL-SCNC: 136 MMOL/L — SIGNIFICANT CHANGE UP (ref 135–145)
WBC # BLD: 6.58 K/UL — SIGNIFICANT CHANGE UP (ref 3.8–10.5)
WBC # BLD: 8.1 K/UL — SIGNIFICANT CHANGE UP (ref 3.8–10.5)
WBC # BLD: 8.77 K/UL — SIGNIFICANT CHANGE UP (ref 3.8–10.5)
WBC # FLD AUTO: 6.58 K/UL — SIGNIFICANT CHANGE UP (ref 3.8–10.5)
WBC # FLD AUTO: 8.1 K/UL — SIGNIFICANT CHANGE UP (ref 3.8–10.5)
WBC # FLD AUTO: 8.77 K/UL — SIGNIFICANT CHANGE UP (ref 3.8–10.5)

## 2019-10-25 PROCEDURE — 71045 X-RAY EXAM CHEST 1 VIEW: CPT | Mod: 26

## 2019-10-25 PROCEDURE — 99223 1ST HOSP IP/OBS HIGH 75: CPT | Mod: AI

## 2019-10-25 PROCEDURE — 74174 CTA ABD&PLVS W/CONTRAST: CPT | Mod: 26

## 2019-10-25 PROCEDURE — 93970 EXTREMITY STUDY: CPT | Mod: 26

## 2019-10-25 PROCEDURE — 99285 EMERGENCY DEPT VISIT HI MDM: CPT

## 2019-10-25 RX ORDER — CARVEDILOL PHOSPHATE 80 MG/1
3.12 CAPSULE, EXTENDED RELEASE ORAL EVERY 12 HOURS
Refills: 0 | Status: DISCONTINUED | OUTPATIENT
Start: 2019-10-25 | End: 2019-10-30

## 2019-10-25 RX ORDER — SODIUM CHLORIDE 9 MG/ML
1000 INJECTION INTRAMUSCULAR; INTRAVENOUS; SUBCUTANEOUS
Refills: 0 | Status: DISCONTINUED | OUTPATIENT
Start: 2019-10-25 | End: 2019-10-30

## 2019-10-25 RX ORDER — SODIUM CHLORIDE 9 MG/ML
1000 INJECTION INTRAMUSCULAR; INTRAVENOUS; SUBCUTANEOUS ONCE
Refills: 0 | Status: COMPLETED | OUTPATIENT
Start: 2019-10-25 | End: 2019-10-25

## 2019-10-25 RX ADMIN — CARVEDILOL PHOSPHATE 3.12 MILLIGRAM(S): 80 CAPSULE, EXTENDED RELEASE ORAL at 14:22

## 2019-10-25 RX ADMIN — Medication 1 TABLET(S): at 14:22

## 2019-10-25 RX ADMIN — SODIUM CHLORIDE 1000 MILLILITER(S): 9 INJECTION INTRAMUSCULAR; INTRAVENOUS; SUBCUTANEOUS at 09:03

## 2019-10-25 RX ADMIN — SODIUM CHLORIDE 75 MILLILITER(S): 9 INJECTION INTRAMUSCULAR; INTRAVENOUS; SUBCUTANEOUS at 14:51

## 2019-10-25 NOTE — ED PROVIDER NOTE - CONSTITUTIONAL, MLM
normal... Non-verbal due to TBI 10 years ago. Well appearing, well nourished, awake, alert, oriented to person, place, time/situation ,

## 2019-10-25 NOTE — H&P ADULT - NSICDXPASTMEDICALHX_GEN_ALL_CORE_FT
PAST MEDICAL HISTORY:  Anoxic encephalopathy     Brain injury with coma x 2 weeks in 2008    Gait abnormality     Hypertension     Leg pain, right     Prostate cancer radiation therapy    Sudden cardiac death

## 2019-10-25 NOTE — H&P ADULT - NSHPPHYSICALEXAM_GEN_ALL_CORE
GENERAL: NAD, well-groomed, confused   HEAD:  Atraumatic, Normocephalic  EYES: EOMI, PERRLA, conjunctiva and sclera clear  ENMT: No tonsillar erythema, exudates, or enlargement; Moist mucous membranes, No lesions  NECK: Supple, No JVD, Normal thyroid  NERVOUS SYSTEM:  Alert & Oriented X2, Good concentration DTRs 2+ intact and symmetric  CHEST/LUNG: Clear to percussion bilaterally; No rales, rhonchi, wheezing, or rubs  HEART: Regular rate and rhythm; No murmurs, rubs, or gallops  ABDOMEN: Soft, Nontender, Nondistended; Bowel sounds present. midline scar   EXTREMITIES: distal pulses palpated   NEURO: LE strength 2/5. no other focal deficits   LYMPH: No lymphadenopathy noted

## 2019-10-25 NOTE — ED ADULT TRIAGE NOTE - CHIEF COMPLAINT QUOTE
pt complaining of constipation for 1 week. family noted dark stools this am. history of TBI, htn and prostate cancer. pt on xarelto for dvt.

## 2019-10-25 NOTE — H&P ADULT - HISTORY OF PRESENT ILLNESS
68 year old man with PMH HTN, s/p ICD, prostate Ca s/p radiation, gastritis, bowel resection 11/18,  diverticular bleed requiring 4 units pRBC (Dec 2018), recurrent DVT on lovenox, presents with multiple dark colored stool this am. Denies n/v/ diarrhea abd pain or cp. denies fever or chills. eating well.     VSS   hemoglobin 10.3  in the ED   CTA shows no acute bleed but had large melanotic BM in the ED

## 2019-10-25 NOTE — H&P ADULT - NSICDXPASTSURGICALHX_GEN_ALL_CORE_FT
PAST SURGICAL HISTORY:  H/O prostate cancer resection and radiation therapy    H/O tracheostomy     S/P ICD (internal cardiac defibrillator) procedure implanted on 1/6/2009    Status post cryoablation of left renal mass

## 2019-10-25 NOTE — H&P ADULT - ASSESSMENT
68 year old man with PMH HTN, s/p ICD, prostate Ca s/p radiation, gastritis, bowel resection 11/18,  diverticular bleed requiring 4 units pRBC (Dec 2018), recurrent DVT on lovenox, presents with LGIB.     LGIB   - Hbg 10.3 and hr normal.  - c.w fluids   - serial cbc. Dr. Pang called   - may need to consider IVC filter as this is his second bleed s/p resection and has recurrent DVTs on xarelto and coumadin. will discuss with GI   - NPO until cleared by GI   HTN   - carvedilol     chronic systolic and diastolic chf   - carvedilol  - not on asa or statin. will defer to pcp   - has a defibrillator   - no signs of fluid overload     DVT   - recheck dopplers   - hold lovenox   - can add ICD if dopplers are neg

## 2019-10-25 NOTE — ED PROVIDER NOTE - OBJECTIVE STATEMENT
Pt is a 70 y/o male with a PMHx of TBI, HTN,  and diverticula, presents today brought in by the ambulance, accompanied by his wife, with rectal bleeding since this morning. His wife says she's changed 3 completely soaked diapers. Pt does have a hx of a GI bleed due to diverticula and is currently on Lovenox for DVT. Otherwise, pt does have nausea, is not experiencing vomiting, pallor, diaphoresis, or syncope.

## 2019-10-25 NOTE — ED ADULT TRIAGE NOTE - AS O2 DELIVERY
room air 35 YO Man with no PMHx, presented with altered speech. Receiving TPA with improvement in speech.

## 2019-10-25 NOTE — ED PROVIDER NOTE - MUSCULOSKELETAL, MLM
Lower extremities are edematous, no pitting edema.  Left leg DVT. Spine appears normal, range of motion is not limited, no muscle or joint tenderness

## 2019-10-25 NOTE — ED ADULT NURSE NOTE - OBJECTIVE STATEMENT
PT c//o of gi bleed since last night. Wife reported that 3 saturated pads of blood were changed. Pt is actively bleeding but asymptomatic. Pt is on Xarelto for DVT.

## 2019-10-26 DIAGNOSIS — K92.2 GASTROINTESTINAL HEMORRHAGE, UNSPECIFIED: ICD-10-CM

## 2019-10-26 LAB
ANION GAP SERPL CALC-SCNC: 5 MMOL/L — SIGNIFICANT CHANGE UP (ref 5–17)
BUN SERPL-MCNC: 17 MG/DL — SIGNIFICANT CHANGE UP (ref 7–23)
CALCIUM SERPL-MCNC: 8.1 MG/DL — LOW (ref 8.5–10.1)
CHLORIDE SERPL-SCNC: 109 MMOL/L — HIGH (ref 96–108)
CO2 SERPL-SCNC: 26 MMOL/L — SIGNIFICANT CHANGE UP (ref 22–31)
CREAT SERPL-MCNC: 0.71 MG/DL — SIGNIFICANT CHANGE UP (ref 0.5–1.3)
GLUCOSE SERPL-MCNC: 101 MG/DL — HIGH (ref 70–99)
HCT VFR BLD CALC: 24.7 % — LOW (ref 39–50)
HCT VFR BLD CALC: 24.7 % — LOW (ref 39–50)
HCT VFR BLD CALC: 26 % — LOW (ref 39–50)
HGB BLD-MCNC: 8.3 G/DL — LOW (ref 13–17)
HGB BLD-MCNC: 8.4 G/DL — LOW (ref 13–17)
HGB BLD-MCNC: 8.6 G/DL — LOW (ref 13–17)
MCHC RBC-ENTMCNC: 25.8 PG — LOW (ref 27–34)
MCHC RBC-ENTMCNC: 26.1 PG — LOW (ref 27–34)
MCHC RBC-ENTMCNC: 26.6 PG — LOW (ref 27–34)
MCHC RBC-ENTMCNC: 33.1 GM/DL — SIGNIFICANT CHANGE UP (ref 32–36)
MCHC RBC-ENTMCNC: 33.6 GM/DL — SIGNIFICANT CHANGE UP (ref 32–36)
MCHC RBC-ENTMCNC: 34 GM/DL — SIGNIFICANT CHANGE UP (ref 32–36)
MCV RBC AUTO: 77.7 FL — LOW (ref 80–100)
MCV RBC AUTO: 78.1 FL — LOW (ref 80–100)
MCV RBC AUTO: 78.2 FL — LOW (ref 80–100)
NRBC # BLD: 0 /100 WBCS — SIGNIFICANT CHANGE UP (ref 0–0)
PLATELET # BLD AUTO: 151 K/UL — SIGNIFICANT CHANGE UP (ref 150–400)
PLATELET # BLD AUTO: 158 K/UL — SIGNIFICANT CHANGE UP (ref 150–400)
PLATELET # BLD AUTO: 165 K/UL — SIGNIFICANT CHANGE UP (ref 150–400)
POTASSIUM SERPL-MCNC: 4 MMOL/L — SIGNIFICANT CHANGE UP (ref 3.5–5.3)
POTASSIUM SERPL-SCNC: 4 MMOL/L — SIGNIFICANT CHANGE UP (ref 3.5–5.3)
RBC # BLD: 3.16 M/UL — LOW (ref 4.2–5.8)
RBC # BLD: 3.18 M/UL — LOW (ref 4.2–5.8)
RBC # BLD: 3.33 M/UL — LOW (ref 4.2–5.8)
RBC # FLD: 17.1 % — HIGH (ref 10.3–14.5)
RBC # FLD: 17.1 % — HIGH (ref 10.3–14.5)
RBC # FLD: 17.2 % — HIGH (ref 10.3–14.5)
SODIUM SERPL-SCNC: 140 MMOL/L — SIGNIFICANT CHANGE UP (ref 135–145)
WBC # BLD: 4.8 K/UL — SIGNIFICANT CHANGE UP (ref 3.8–10.5)
WBC # BLD: 5.2 K/UL — SIGNIFICANT CHANGE UP (ref 3.8–10.5)
WBC # BLD: 5.99 K/UL — SIGNIFICANT CHANGE UP (ref 3.8–10.5)
WBC # FLD AUTO: 4.8 K/UL — SIGNIFICANT CHANGE UP (ref 3.8–10.5)
WBC # FLD AUTO: 5.2 K/UL — SIGNIFICANT CHANGE UP (ref 3.8–10.5)
WBC # FLD AUTO: 5.99 K/UL — SIGNIFICANT CHANGE UP (ref 3.8–10.5)

## 2019-10-26 PROCEDURE — 99233 SBSQ HOSP IP/OBS HIGH 50: CPT

## 2019-10-26 RX ADMIN — CARVEDILOL PHOSPHATE 3.12 MILLIGRAM(S): 80 CAPSULE, EXTENDED RELEASE ORAL at 06:18

## 2019-10-26 RX ADMIN — CARVEDILOL PHOSPHATE 3.12 MILLIGRAM(S): 80 CAPSULE, EXTENDED RELEASE ORAL at 17:15

## 2019-10-26 RX ADMIN — SODIUM CHLORIDE 75 MILLILITER(S): 9 INJECTION INTRAMUSCULAR; INTRAVENOUS; SUBCUTANEOUS at 06:18

## 2019-10-26 NOTE — PROGRESS NOTE ADULT - ASSESSMENT
68 year old man with PMH HTN, s/p ICD, prostate Ca s/p radiation, gastritis, bowel resection 11/18,  diverticular bleed requiring 4 units pRBC (Dec 2018), recurrent DVT on lovenox, presents with LGIB.     LGIB   - Hbg  to 8s but no new bleeding. hold transfusion for now. trend cbc   - c.w fluids   - serial cbc. Dr. Pang called   - may need to consider IVC filter as this is his second bleed s/p resection and has recurrent DVTs on xarelto and coumadin. will discuss  hematology and try to contact his personal hematologist Rogelio Brito.  - will start clears     HTN   - carvedilol     chronic systolic and diastolic chf   - carvedilol  - not on asa or statin. will defer to pcp   - has a defibrillator   - no signs of fluid overload     DVT   - repeat doppler shows persistent left sided LE DVT  - hold lovenox

## 2019-10-26 NOTE — CONSULT NOTE ADULT - ASSESSMENT
HPI:  68 year old man with PMH HTN, s/p ICD, prostate Ca s/p radiation, gastritis, bowel resection 11/18,  diverticular bleed requiring 4 units pRBC (Dec 2018), recurrent DVT on lovenox, presents with multiple dark colored stool this am. Denies n/v/ diarrhea abd pain or cp. denies fever or chills. eating well.     VSS   hemoglobin 10.3  in the ED   CTA shows no acute bleed but had large melanotic BM in the ED (25 Oct 2019 12:47)  --------------- As Above -------------------  Patient can not give any history. History obtained from chart and wife. Patient had been doing well until today when he had a dark maroon bowel movement at home. He also had one in the ER . Patient on Lovenox 1.5 QD for DVT.  HGb 12.8 --> 10.3 --> 9.6. Painless.   Patient had a lower GI Bleed in December of 2018. Colonoscopy revealed diverticulum and a small polyp. EGd was essentially negative..... S/P partial colectomy for colonic obstruction.   CT angio essentially negative.    Lower GI Bleed - 1) hold anticoagulants 2) NPO  3) f/u labs 4) EGD / Colonoscopy early next week.
Anemia

## 2019-10-26 NOTE — CONSULT NOTE ADULT - PROBLEM SELECTOR RECOMMENDATION 9
- watch H/H and transfuse as necessary  - vascular evaluation for IVC Filter  - GI Evaluation - for cause of bleed  - Supportive care

## 2019-10-26 NOTE — CONSULT NOTE ADULT - SUBJECTIVE AND OBJECTIVE BOX
HPI:  68 year old man with PMH HTN, s/p ICD, prostate Ca s/p radiation, gastritis, bowel resection 11/18,  diverticular bleed requiring 4 units pRBC (Dec 2018), recurrent DVT on lovenox, presents with multiple dark colored stool this am. Denies n/v/ diarrhea abd pain or cp. denies fever or chills. eating well.     VSS   hemoglobin 10.3  in the ED   CTA shows no acute bleed but had large melanotic BM in the ED (25 Oct 2019 12:47)  --------------- As Above -------------------  Patient can not give any history. History obtained from chart and wife. Patient had been doing well until today when he had a dark maroon bowel movement at home. He also had one in the ER . Patient on Lovenox 1.5 QD for DVT.  HGb 12.8 --> 10.3 --> 9.6. Painless.   Patient had a lower GI Bleed in December of 2018. Colonoscopy revealed diverticulum and a small polyp. EGd was essentially negative..... S/P partial colectomy for colonic obstruction.   CT angio essentially negative.      PAST MEDICAL & SURGICAL HISTORY:  Sudden cardiac death  Gait abnormality  Leg pain, right  Anoxic encephalopathy  Brain injury with coma: x 2 weeks in 2008  Prostate cancer: radiation therapy  Hypertension  S/P ICD (internal cardiac defibrillator) procedure: implanted on 1/6/2009  H/O tracheostomy  H/O prostate cancer: resection and radiation therapy  Status post cryoablation: of left renal mass      MEDICATIONS  (STANDING):  carvedilol 3.125 milliGRAM(s) Oral every 12 hours  sodium chloride 0.9%. 1000 milliLiter(s) (75 mL/Hr) IV Continuous <Continuous>    MEDICATIONS  (PRN):      Allergies    lisinopril (Unknown)  penicillins (Unknown)    Intolerances        FAMILY HISTORY:      REVIEW OF SYSTEMS:    CONSTITUTIONAL: No fever, weight loss,   EYES: No eye pain, visual disturbances, or discharge  ENMT:  No difficulty hearing, tinnitus, vertigo; No sinus or throat pain  NECK: No pain or stiffness  BREASTS: No pain, masses, or nipple discharge  RESPIRATORY: No cough, wheezing, chills or hemoptysis; No shortness of breath  CARDIOVASCULAR: No chest pain, palpitations, dizziness, or leg swelling  GASTROINTESTINAL: See above  GENITOURINARY: No dysuria, frequency, hematuria, or incontinence  NEUROLOGICAL: No headaches, , numbness, or tremors  SKIN: No itching, burning, rashes, or lesions   LYMPH NODES: No enlarged glands  ENDOCRINE: No heat or cold intolerance; No hair loss  MUSCULOSKELETAL: No joint pain or swelling; No muscle, back, or extremity pain  PSYCHIATRIC: No depression, anxiety, mood swings, or difficulty sleeping  HEME/LYMPH: No easy bruising, or bleeding gums  ALLERGY AND IMMUNOLOGIC: No hives or eczema          SOCIAL HISTORY:    FAMILY HISTORY:      Vital Signs Last 24 Hrs  T(C): 37.2 (25 Oct 2019 16:32), Max: 37.2 (25 Oct 2019 16:32)  T(F): 98.9 (25 Oct 2019 16:32), Max: 98.9 (25 Oct 2019 16:32)  HR: 88 (25 Oct 2019 16:32) (87 - 104)  BP: 144/65 (25 Oct 2019 16:32) (110/56 - 144/65)  BP(mean): --  RR: 18 (25 Oct 2019 16:32) (15 - 18)  SpO2: 98% (25 Oct 2019 16:32) (98% - 100%)    PHYSICAL EXAM:    GENERAL: NAD, well-groomed, well-developed  HEAD:  Atraumatic, Normocephalic  EYES: EOMI, PERRLA, conjunctiva and sclera clear  NECK: Supple, No JVD, Normal thyroid  NERVOUS SYSTEM:  Alert & at baseline Good concentration;   CHEST/LUNG: Clear to percussion bilaterally; No rales, rhonchi, wheezing, or rubs  HEART: Regular rate and rhythm; No murmurs, rubs, or gallops  ABDOMEN: Soft, Nontender, Nondistended; Bowel sounds present  EXTREMITIES:  2+ Peripheral Pulses, No clubbing, cyanosis, or edema  LYMPH: No lymphadenopathy noted   RECTAL: Deferred   SKIN: No rashes or lesions    LABS:                        9.6    8.77  )-----------( 194      ( 25 Oct 2019 16:43 )             28.5       CBC:  10-25 @ 16:43  WBC  8.77  HGB 9.6  HCT 28.5 Plate 194  MCV 77.0  10-25 @ 09:11  WBC  8.10  HGB 10.3  HCT 31.1 Plate 252  MCV 77.9           25 Oct 2019 09:11    136    |  101    |  24     ----------------------------<  155    5.1     |  28     |  1.05     Ca    8.7        25 Oct 2019 09:11    TPro  7.0    /  Alb  3.1    /  TBili  0.2    /  DBili  x      /  AST  15     /  ALT  18     /  AlkPhos  72     25 Oct 2019 09:11    PT/INR - ( 25 Oct 2019 09:11 )   PT: 14.6 sec;   INR: 1.29 ratio         PTT - ( 25 Oct 2019 09:11 )  PTT:33.3 sec        RADIOLOGY & ADDITIONAL STUDIES:  < from: CT Angio Abdomen and Pelvis w/ IV Cont (10.25.19 @ 11:47) >    EXAM:  CT ANGIO ABD PELV (W)AW IC                            *** ADDENDUM 10/25/2019  ***    Please note that there is a focal filling defect within the left external   iliac vein, compatible with venous thrombosis, better evaluated on lower   extremity Doppler performed later on the same day.      *** END OF ADDENDUM 10/25/2019  ***      PROCEDURE DATE:  10/25/2019          INTERPRETATION:  CLINICAL INFORMATION: Gastrointestinal bleeding    COMPARISON: 8/23/2019    PROCEDURE:   CT of the Abdomen and Pelvis was performed with and without intravenous   contrast.  Precontrast, Arterial and Delayed phases were performed.  Intravenous contrast: 90 ml Omnipaque 350. 10 ml discarded.  Oral contrast: None.  Sagittal and coronal 3-D reformats were performed.    FINDINGS:    LOWER CHEST: Bilateral gynecomastia. Basilar atelectasis. Partially   imaged cardiac conduction device lead.    LIVER: Cavernous transformation of the portal vein.  BILE DUCTS: Normal caliber.  GALLBLADDER: Within normal limits.  SPLEEN: Within normal limits.  PANCREAS: Within normal limits.  ADRENALS: Within normal limits.  KIDNEYS/URETERS: Renal cysts measuring up to 2.9 cm. Additional   subcentimeter renal hypodensities are too small to further characterize.   Renal cortical scarring. No hydronephrosis.    BLADDER: Mild wall thickening and inflammation.  REPRODUCTIVE ORGANS: Prostatectomy.    BOWEL: Ileocolic anastomosis. No bowel obstruction. Colonic   diverticulosis. Prominent colonic mucosal enhancement. Underdistended   descending colon, limiting evaluation. Otherwise, no areas of active   intravenous contrast extravasation identified within the bowel.  PERITONEUM: No ascites.  VESSELS: The abdominal aorta demonstrates atherosclerotic changes. There   is no evidence for abdominal aortic aneurysm or intraluminal flap to   suggest aortic dissection. No periaortic fluid collections are seen. The   origins of the celiac artery, superior mesenteric artery, bilateral renal   arteries, and inferior mesentericartery are patent.  RETROPERITONEUM/LYMPH NODES: No lymphadenopathy.    ABDOMINAL WALL: Postsurgical changes and injection granulomas. Several   areas of subcutaneous emphysema, possibly recent medication injection.   Small fat-containing left inguinal hernia. Diastases of rectus abdominis   muscles.  BONES: Spinal degenerative changes.    IMPRESSION:     No CT evidence of active gastrointestinal bleeding.  Colonic diverticulosis without CT evidence of acute diverticulitis.  No bowel obstruction.  Urinary bladder findings suspicious for cystitis.        ***Please see the addendum at the top of this report. It may contain   additional important information or changes.****          MARK MENA M.D., ATTENDING RADIOLOGIST  This document has been electronically signed. Oct 25 2019 12:09PM  Addend:  MARK MENA M.D., ATTENDING RADIOLOGIST  This addendum was electronically signed on: Oct 25 2019  3:48PM.          < end of copied text >
Reason for Consultation: Anemia    HPI: Patient is a 69y Male seen on consultatioin for the evaluation and management of Anemia    68 year old man with PMH HTN, s/p ICD, prostate Ca s/p radiation, gastritis, bowel resection 11/18,  diverticular bleed requiring 4 units pRBC (Dec 2018), recurrent DVT on lovenox, presents with multiple dark colored stool this am. Denies n/v/ diarrhea abd pain or cp. denies fever or chills. eating well.     VSS   hemoglobin 10.3  in the ED   CTA shows no acute bleed but had large melanotic BM in the ED     PAST MEDICAL & SURGICAL HISTORY:  Sudden cardiac death  Gait abnormality  Leg pain, right  Anoxic encephalopathy  Brain injury with coma: x 2 weeks in 2008  Prostate cancer: radiation therapy  Hypertension  S/P ICD (internal cardiac defibrillator) procedure: implanted on 1/6/2009  H/O tracheostomy  H/O prostate cancer: resection and radiation therapy  Status post cryoablation: of left renal mass        MEDICATIONS  (STANDING):  carvedilol 3.125 milliGRAM(s) Oral every 12 hours  sodium chloride 0.9%. 1000 milliLiter(s) (75 mL/Hr) IV Continuous <Continuous>    MEDICATIONS  (PRN):      Allergies    lisinopril (Unknown)  penicillins (Unknown)    Intolerances        SOCIAL HISTORY:    Smoking Status: no  Alcohol: no  Marital Status: yes  Occupation: no    FAMILY HISTORY:        Vital Signs Last 24 Hrs  T(C): 36.4 (26 Oct 2019 11:00), Max: 37.2 (25 Oct 2019 16:32)  T(F): 97.6 (26 Oct 2019 11:00), Max: 98.9 (25 Oct 2019 16:32)  HR: 84 (26 Oct 2019 11:00) (84 - 95)  BP: 122/58 (26 Oct 2019 11:00) (122/58 - 151/63)  BP(mean): --  RR: 18 (26 Oct 2019 11:00) (16 - 18)  SpO2: 100% (26 Oct 2019 11:00) (98% - 100%)    PHYSICAL EXAM:    general - confused  HEENT - No Icterus  CVS - RRR  RS - AE B/L  Abd - soft, NT  Ext - Pulses +        LABS:                        8.4    5.20  )-----------( 151      ( 26 Oct 2019 10:38 )             24.7     10-26    140  |  109<H>  |  17  ----------------------------<  101<H>  4.0   |  26  |  0.71    Ca    8.1<L>      26 Oct 2019 07:45    TPro  7.0  /  Alb  3.1<L>  /  TBili  0.2  /  DBili  x   /  AST  15  /  ALT  18  /  AlkPhos  72  10-25    PT/INR - ( 25 Oct 2019 09:11 )   PT: 14.6 sec;   INR: 1.29 ratio         PTT - ( 25 Oct 2019 09:11 )  PTT:33.3 sec        RADIOLOGY & ADDITIONAL STUDIES:

## 2019-10-26 NOTE — PROGRESS NOTE ADULT - ASSESSMENT
68 year old man with PMH HTN, s/p ICD, prostate Ca s/p radiation, gastritis, bowel resection 11/18,  diverticular bleed requiring 4 units pRBC (Dec 2018), recurrent DVT on lovenox, presents with multiple dark colored stool this am  Hx Colon resection

## 2019-10-27 LAB
ANION GAP SERPL CALC-SCNC: 7 MMOL/L — SIGNIFICANT CHANGE UP (ref 5–17)
BUN SERPL-MCNC: 11 MG/DL — SIGNIFICANT CHANGE UP (ref 7–23)
CALCIUM SERPL-MCNC: 8.1 MG/DL — LOW (ref 8.5–10.1)
CHLORIDE SERPL-SCNC: 110 MMOL/L — HIGH (ref 96–108)
CO2 SERPL-SCNC: 23 MMOL/L — SIGNIFICANT CHANGE UP (ref 22–31)
CREAT SERPL-MCNC: 0.61 MG/DL — SIGNIFICANT CHANGE UP (ref 0.5–1.3)
FERRITIN SERPL-MCNC: 253 NG/ML — SIGNIFICANT CHANGE UP (ref 30–400)
FOLATE SERPL-MCNC: >20 NG/ML — SIGNIFICANT CHANGE UP
GLUCOSE SERPL-MCNC: 118 MG/DL — HIGH (ref 70–99)
HCT VFR BLD CALC: 24.9 % — LOW (ref 39–50)
HGB BLD-MCNC: 8.4 G/DL — LOW (ref 13–17)
IRON SATN MFR SERPL: 20 % — SIGNIFICANT CHANGE UP (ref 16–55)
IRON SATN MFR SERPL: 43 UG/DL — LOW (ref 45–165)
MCHC RBC-ENTMCNC: 26.4 PG — LOW (ref 27–34)
MCHC RBC-ENTMCNC: 33.7 GM/DL — SIGNIFICANT CHANGE UP (ref 32–36)
MCV RBC AUTO: 78.3 FL — LOW (ref 80–100)
NRBC # BLD: 0 /100 WBCS — SIGNIFICANT CHANGE UP (ref 0–0)
PLATELET # BLD AUTO: 87 K/UL — LOW (ref 150–400)
POTASSIUM SERPL-MCNC: 3.9 MMOL/L — SIGNIFICANT CHANGE UP (ref 3.5–5.3)
POTASSIUM SERPL-SCNC: 3.9 MMOL/L — SIGNIFICANT CHANGE UP (ref 3.5–5.3)
RBC # BLD: 3.18 M/UL — LOW (ref 4.2–5.8)
RBC # BLD: 3.18 M/UL — LOW (ref 4.2–5.8)
RBC # FLD: 17.2 % — HIGH (ref 10.3–14.5)
RETICS #: 84.3 K/UL — SIGNIFICANT CHANGE UP (ref 25–125)
RETICS/RBC NFR: 2.7 % — HIGH (ref 0.5–2.5)
SODIUM SERPL-SCNC: 140 MMOL/L — SIGNIFICANT CHANGE UP (ref 135–145)
TIBC SERPL-MCNC: 211 UG/DL — LOW (ref 220–430)
UIBC SERPL-MCNC: 168 UG/DL — SIGNIFICANT CHANGE UP (ref 110–370)
VIT B12 SERPL-MCNC: 588 PG/ML — SIGNIFICANT CHANGE UP (ref 232–1245)
WBC # BLD: 4.54 K/UL — SIGNIFICANT CHANGE UP (ref 3.8–10.5)
WBC # FLD AUTO: 4.54 K/UL — SIGNIFICANT CHANGE UP (ref 3.8–10.5)

## 2019-10-27 PROCEDURE — 99233 SBSQ HOSP IP/OBS HIGH 50: CPT

## 2019-10-27 RX ORDER — ZINC OXIDE 200 MG/G
1 OINTMENT TOPICAL THREE TIMES A DAY
Refills: 0 | Status: DISCONTINUED | OUTPATIENT
Start: 2019-10-27 | End: 2019-10-30

## 2019-10-27 RX ADMIN — CARVEDILOL PHOSPHATE 3.12 MILLIGRAM(S): 80 CAPSULE, EXTENDED RELEASE ORAL at 05:45

## 2019-10-27 RX ADMIN — CARVEDILOL PHOSPHATE 3.12 MILLIGRAM(S): 80 CAPSULE, EXTENDED RELEASE ORAL at 17:50

## 2019-10-27 RX ADMIN — ZINC OXIDE 1 APPLICATION(S): 200 OINTMENT TOPICAL at 17:50

## 2019-10-27 NOTE — PROGRESS NOTE ADULT - ASSESSMENT
68 year old man with PMH HTN, s/p ICD, prostate Ca s/p radiation, gastritis, bowel resection 11/18,  diverticular bleed requiring 4 units pRBC (Dec 2018), recurrent DVT on lovenox, presents with LGIB.     LGIB   - Hbg  to 8s but no new bleeding. hold transfusion for now. trend cbc   - serial cbc. Dr. Pang called. possible EGD/colon this week    - may need to consider IVC filter as this is his second bleed s/p resection and has recurrent DVTs on xarelto and coumadin. discussed case with  hematology here.  will try to contact his personal hematologist Rogelio Brito tomorrow  - will start regular diet     HTN   - carvedilol     chronic systolic and diastolic chf   - carvedilol  - not on asa or statin. will defer to pcp   - has a defibrillator   - no signs of fluid overload     DVT   - repeat doppler shows persistent left sided LE DVT  - hold lovenox   - possible IVC placement tomorrow.

## 2019-10-28 LAB
APTT BLD: >200 SEC — CRITICAL HIGH (ref 27.5–36.3)
HCT VFR BLD CALC: 23.4 % — LOW (ref 39–50)
HCT VFR BLD CALC: 23.7 % — LOW (ref 39–50)
HGB BLD-MCNC: 7.8 G/DL — LOW (ref 13–17)
HGB BLD-MCNC: 7.9 G/DL — LOW (ref 13–17)
MCHC RBC-ENTMCNC: 25.9 PG — LOW (ref 27–34)
MCHC RBC-ENTMCNC: 26.4 PG — LOW (ref 27–34)
MCHC RBC-ENTMCNC: 33.3 GM/DL — SIGNIFICANT CHANGE UP (ref 32–36)
MCHC RBC-ENTMCNC: 33.3 GM/DL — SIGNIFICANT CHANGE UP (ref 32–36)
MCV RBC AUTO: 77.7 FL — LOW (ref 80–100)
MCV RBC AUTO: 79.1 FL — LOW (ref 80–100)
NRBC # BLD: 0 /100 WBCS — SIGNIFICANT CHANGE UP (ref 0–0)
NRBC # BLD: 0 /100 WBCS — SIGNIFICANT CHANGE UP (ref 0–0)
PLATELET # BLD AUTO: 148 K/UL — LOW (ref 150–400)
PLATELET # BLD AUTO: 159 K/UL — SIGNIFICANT CHANGE UP (ref 150–400)
RBC # BLD: 2.96 M/UL — LOW (ref 4.2–5.8)
RBC # BLD: 3.05 M/UL — LOW (ref 4.2–5.8)
RBC # FLD: 17.2 % — HIGH (ref 10.3–14.5)
RBC # FLD: 17.4 % — HIGH (ref 10.3–14.5)
WBC # BLD: 4.59 K/UL — SIGNIFICANT CHANGE UP (ref 3.8–10.5)
WBC # BLD: 6.98 K/UL — SIGNIFICANT CHANGE UP (ref 3.8–10.5)
WBC # FLD AUTO: 4.59 K/UL — SIGNIFICANT CHANGE UP (ref 3.8–10.5)
WBC # FLD AUTO: 6.98 K/UL — SIGNIFICANT CHANGE UP (ref 3.8–10.5)

## 2019-10-28 PROCEDURE — 99233 SBSQ HOSP IP/OBS HIGH 50: CPT

## 2019-10-28 RX ORDER — HEPARIN SODIUM 5000 [USP'U]/ML
3000 INJECTION INTRAVENOUS; SUBCUTANEOUS EVERY 6 HOURS
Refills: 0 | Status: DISCONTINUED | OUTPATIENT
Start: 2019-10-28 | End: 2019-10-30

## 2019-10-28 RX ORDER — HEPARIN SODIUM 5000 [USP'U]/ML
INJECTION INTRAVENOUS; SUBCUTANEOUS
Qty: 25000 | Refills: 0 | Status: DISCONTINUED | OUTPATIENT
Start: 2019-10-28 | End: 2019-10-30

## 2019-10-28 RX ORDER — HEPARIN SODIUM 5000 [USP'U]/ML
6500 INJECTION INTRAVENOUS; SUBCUTANEOUS ONCE
Refills: 0 | Status: COMPLETED | OUTPATIENT
Start: 2019-10-28 | End: 2019-10-28

## 2019-10-28 RX ORDER — HEPARIN SODIUM 5000 [USP'U]/ML
6500 INJECTION INTRAVENOUS; SUBCUTANEOUS EVERY 6 HOURS
Refills: 0 | Status: DISCONTINUED | OUTPATIENT
Start: 2019-10-28 | End: 2019-10-30

## 2019-10-28 RX ADMIN — HEPARIN SODIUM 6500 UNIT(S): 5000 INJECTION INTRAVENOUS; SUBCUTANEOUS at 15:26

## 2019-10-28 RX ADMIN — HEPARIN SODIUM 1500 UNIT(S)/HR: 5000 INJECTION INTRAVENOUS; SUBCUTANEOUS at 15:22

## 2019-10-28 RX ADMIN — HEPARIN SODIUM 0 UNIT(S)/HR: 5000 INJECTION INTRAVENOUS; SUBCUTANEOUS at 22:25

## 2019-10-28 RX ADMIN — SODIUM CHLORIDE 75 MILLILITER(S): 9 INJECTION INTRAMUSCULAR; INTRAVENOUS; SUBCUTANEOUS at 17:10

## 2019-10-28 RX ADMIN — CARVEDILOL PHOSPHATE 3.12 MILLIGRAM(S): 80 CAPSULE, EXTENDED RELEASE ORAL at 05:44

## 2019-10-28 RX ADMIN — CARVEDILOL PHOSPHATE 3.12 MILLIGRAM(S): 80 CAPSULE, EXTENDED RELEASE ORAL at 17:10

## 2019-10-28 NOTE — PROGRESS NOTE ADULT - ASSESSMENT
68 year old man with PMH HTN, s/p ICD, prostate Ca s/p radiation, gastritis, bowel resection 11/18,  diverticular bleed requiring 4 units pRBC (Dec 2018), recurrent DVT on lovenox, presents with LGIB.     LGIB   - Hbg  dowbn to 7.9  but no new bleeding. hold transfusion for now. trend cbc   - discussed case with his hematologist and would like to see if we can try holding off on an IVC and try heparin first and to see if he bleeds again or if we can order MRI enterography to eval any for any mass.    Dr. Pang aware and will discuss EGD/colon this week vs MRI enterography and consider starting heparin drip. Dr. Velazquez aware and will discuss with his hematologist   - Rogelio Brito [facility] [phone]  - will start regular diet     HTN   - carvedilol     chronic systolic and diastolic chf   - carvedilol  - not on asa or statin. will defer to pcp   - has a defibrillator   - no signs of fluid overload     DVT   - repeat doppler shows persistent left sided LE DVT  - hold lovenox   - possible IVC placement tomorrow.

## 2019-10-28 NOTE — PROGRESS NOTE ADULT - ASSESSMENT
HPI:  68 year old man with PMH HTN, s/p ICD, prostate Ca s/p radiation, gastritis, bowel resection 11/18,  diverticular bleed requiring 4 units pRBC (Dec 2018), recurrent DVT on lovenox, presents with multiple dark colored stool this am. Denies n/v/ diarrhea abd pain or cp. denies fever or chills. eating well.     VSS   hemoglobin 10.3  in the ED   CTA shows no acute bleed but had large melanotic BM in the ED (25 Oct 2019 12:47)  --------------- As Above -------------------  Patient can not give any history. History obtained from chart and wife. Patient had been doing well until today when he had a dark maroon bowel movement at home. He also had one in the ER . Patient on Lovenox 1.5 QD for DVT.  HGb 12.8 --> 10.3 --> 9.6. Painless.   Patient had a lower GI Bleed in December of 2018. Colonoscopy revealed diverticulum and a small polyp. EGd was essentially negative..... S/P partial colectomy for colonic obstruction.   CT angio essentially negative.    Lower GI Bleed - 1) hold anticoagulants 2) NPO  3) f/u labs 4) EGD / Colonoscopy early next week.    [id number]  ----------  Lower GI Bleed - Last BM was Friday morning  No signs of active bleeding. Wife does not want EGD or colonoscopy. 1) Restart anticoagulants ( IV heparin ) 2) Advance diet and observe NPO  3) f/u labs 4) CT Enteroscopy ( patient not a candidaate for MRI ( needs to follow commands and hold breath )

## 2019-10-29 LAB
APTT BLD: 161.5 SEC — CRITICAL HIGH (ref 27.5–36.3)
APTT BLD: >200 SEC — CRITICAL HIGH (ref 27.5–36.3)
HCT VFR BLD CALC: 24.6 % — LOW (ref 39–50)
HGB BLD-MCNC: 8.2 G/DL — LOW (ref 13–17)
MCHC RBC-ENTMCNC: 26.2 PG — LOW (ref 27–34)
MCHC RBC-ENTMCNC: 33.3 GM/DL — SIGNIFICANT CHANGE UP (ref 32–36)
MCV RBC AUTO: 78.6 FL — LOW (ref 80–100)
NRBC # BLD: 0 /100 WBCS — SIGNIFICANT CHANGE UP (ref 0–0)
PLATELET # BLD AUTO: 148 K/UL — LOW (ref 150–400)
RBC # BLD: 3.13 M/UL — LOW (ref 4.2–5.8)
RBC # FLD: 17.6 % — HIGH (ref 10.3–14.5)
WBC # BLD: 5.94 K/UL — SIGNIFICANT CHANGE UP (ref 3.8–10.5)
WBC # FLD AUTO: 5.94 K/UL — SIGNIFICANT CHANGE UP (ref 3.8–10.5)

## 2019-10-29 PROCEDURE — 99233 SBSQ HOSP IP/OBS HIGH 50: CPT

## 2019-10-29 PROCEDURE — 74177 CT ABD & PELVIS W/CONTRAST: CPT | Mod: 26

## 2019-10-29 RX ORDER — RADIOPAQUE PVC MARKERS/BARIUM 24MARKERS
900 CAPSULE ORAL ONCE
Refills: 0 | Status: COMPLETED | OUTPATIENT
Start: 2019-10-29 | End: 2019-10-29

## 2019-10-29 RX ADMIN — SODIUM CHLORIDE 75 MILLILITER(S): 9 INJECTION INTRAMUSCULAR; INTRAVENOUS; SUBCUTANEOUS at 17:49

## 2019-10-29 RX ADMIN — HEPARIN SODIUM 900 UNIT(S)/HR: 5000 INJECTION INTRAVENOUS; SUBCUTANEOUS at 10:04

## 2019-10-29 RX ADMIN — Medication 900 MILLILITER(S): at 11:46

## 2019-10-29 RX ADMIN — HEPARIN SODIUM 0 UNIT(S)/HR: 5000 INJECTION INTRAVENOUS; SUBCUTANEOUS at 18:05

## 2019-10-29 RX ADMIN — CARVEDILOL PHOSPHATE 3.12 MILLIGRAM(S): 80 CAPSULE, EXTENDED RELEASE ORAL at 17:47

## 2019-10-29 RX ADMIN — HEPARIN SODIUM 1200 UNIT(S)/HR: 5000 INJECTION INTRAVENOUS; SUBCUTANEOUS at 00:10

## 2019-10-29 RX ADMIN — HEPARIN SODIUM 0 UNIT(S)/HR: 5000 INJECTION INTRAVENOUS; SUBCUTANEOUS at 08:43

## 2019-10-29 RX ADMIN — HEPARIN SODIUM 600 UNIT(S)/HR: 5000 INJECTION INTRAVENOUS; SUBCUTANEOUS at 19:12

## 2019-10-29 RX ADMIN — CARVEDILOL PHOSPHATE 3.12 MILLIGRAM(S): 80 CAPSULE, EXTENDED RELEASE ORAL at 06:41

## 2019-10-29 NOTE — PROGRESS NOTE ADULT - ASSESSMENT
HPI:  68 year old man with PMH HTN, s/p ICD, prostate Ca s/p radiation, gastritis, bowel resection 11/18,  diverticular bleed requiring 4 units pRBC (Dec 2018), recurrent DVT on lovenox, presents with multiple dark colored stool this am. Denies n/v/ diarrhea abd pain or cp. denies fever or chills. eating well.     VSS   hemoglobin 10.3  in the ED   CTA shows no acute bleed but had large melanotic BM in the ED (25 Oct 2019 12:47)  --------------- As Above -------------------  Patient can not give any history. History obtained from chart and wife. Patient had been doing well until today when he had a dark maroon bowel movement at home. He also had one in the ER . Patient on Lovenox 1.5 QD for DVT.  HGb 12.8 --> 10.3 --> 9.6. Painless.   Patient had a lower GI Bleed in December of 2018. Colonoscopy revealed diverticulum and a small polyp. EGd was essentially negative..... S/P partial colectomy for colonic obstruction.   CT angio essentially negative.    Lower GI Bleed - 1) hold anticoagulants 2) NPO  3) f/u labs 4) EGD / Colonoscopy early next week.    [id number]  ----------  Lower GI Bleed - Last BM was Friday morning  No signs of active bleeding. Wife does not want EGD or colonoscopy. 1) Restart anticoagulants ( IV heparin ) 2) Advance diet and observe NPO  3) f/u labs 4) CT Enteroscopy ( patient not a candidaate for MRI ( needs to follow commands and hold breath )     [id number]  ----------  Lower GI Bleed - Decrease in H/H probably related to re hydration. No signs of active bleeding. Wife does not want EGD or colonoscopy. On  IV heparin  2) Advance diet and observe NPO  3) f/u labs 4) CT Enteroscopy essentially negative. HPI:  68 year old man with PMH HTN, s/p ICD, prostate Ca s/p radiation, gastritis, bowel resection 11/18,  diverticular bleed requiring 4 units pRBC (Dec 2018), recurrent DVT on lovenox, presents with multiple dark colored stool this am. Denies n/v/ diarrhea abd pain or cp. denies fever or chills. eating well.     VSS   hemoglobin 10.3  in the ED   CTA shows no acute bleed but had large melanotic BM in the ED (25 Oct 2019 12:47)  --------------- As Above -------------------  Patient can not give any history. History obtained from chart and wife. Patient had been doing well until today when he had a dark maroon bowel movement at home. He also had one in the ER . Patient on Lovenox 1.5 QD for DVT.  HGb 12.8 --> 10.3 --> 9.6. Painless.   Patient had a lower GI Bleed in December of 2018. Colonoscopy revealed diverticulum and a small polyp. EGd was essentially negative..... S/P partial colectomy for colonic obstruction.   CT angio essentially negative.    Lower GI Bleed - 1) hold anticoagulants 2) NPO  3) f/u labs 4) EGD / Colonoscopy early next week.    [id number]  ----------  Lower GI Bleed - Last BM was Friday morning  No signs of active bleeding. Wife does not want EGD or colonoscopy. 1) Restart anticoagulants ( IV heparin ) 2) Advance diet and observe NPO  3) f/u labs 4) CT Enteroscopy ( patient not a candidaate for MRI ( needs to follow commands and hold breath )     [id number]  ----------  Lower GI Bleed - Decrease in H/H probably related to re hydration. No signs of active bleeding. Wife does not want EGD or colonoscopy. On  IV heparin  CT Enteroscopy essentially negative. Tolerating solid diet. ----Patient doing well. Will follow on PRN basis.

## 2019-10-29 NOTE — PROVIDER CONTACT NOTE (CRITICAL VALUE NOTIFICATION) - BACKGROUND
patient is on heparin drip
Pt on a heparin drip. Drip stopped as per protocol. Will restart in one hour at 12ml/hr.

## 2019-10-29 NOTE — PROGRESS NOTE ADULT - ASSESSMENT
68 year old man with PMH HTN, s/p ICD, prostate Ca s/p radiation, gastritis, bowel resection 11/18,  diverticular bleed requiring 4 units pRBC (Dec 2018), recurrent DVT on lovenox, presents with LGIB.     LGIB   - Hbg  stable and  no new bleeding an restarting heparin drip.  hold transfusion for now. trend cbc   - discussed case with his hematologist and would like to see if we can try holding off on an IVC and try heparin first and to see if he bleeds again and if we can order CT enterography to eval any for any mass.    Dr. Pang aware and family doesnt want EGD/colon. If patietnt doesnt bleed on heparin drip then can start lovenox. Will discuss with hematologist prior to dc   Dr. Marcus cruz and will discuss with his hematologist   - f/u ct scan   - Rogelio Brito cell [phone]  - will start regular diet     HTN   - carvedilol     chronic systolic and diastolic chf   - carvedilol  - not on asa or statin. will defer to pcp   - has a defibrillator   - no signs of fluid overload     DVT   - repeat doppler shows persistent left sided LE DVT  - hold lovenox   - possible IVC placement tomorrow.

## 2019-10-30 ENCOUNTER — TRANSCRIPTION ENCOUNTER (OUTPATIENT)
Age: 69
End: 2019-10-30

## 2019-10-30 VITALS
HEART RATE: 101 BPM | SYSTOLIC BLOOD PRESSURE: 153 MMHG | TEMPERATURE: 99 F | OXYGEN SATURATION: 100 % | DIASTOLIC BLOOD PRESSURE: 83 MMHG | RESPIRATION RATE: 18 BRPM

## 2019-10-30 LAB — APTT BLD: 72.5 SEC — HIGH (ref 27.5–36.3)

## 2019-10-30 PROCEDURE — 99239 HOSP IP/OBS DSCHRG MGMT >30: CPT

## 2019-10-30 RX ORDER — ENOXAPARIN SODIUM 100 MG/ML
80 INJECTION SUBCUTANEOUS
Qty: 1 | Refills: 0
Start: 2019-10-30 | End: 2019-11-28

## 2019-10-30 RX ORDER — ENOXAPARIN SODIUM 100 MG/ML
80 INJECTION SUBCUTANEOUS EVERY 12 HOURS
Refills: 0 | Status: DISCONTINUED | OUTPATIENT
Start: 2019-10-30 | End: 2019-10-30

## 2019-10-30 RX ORDER — ENOXAPARIN SODIUM 100 MG/ML
80 INJECTION SUBCUTANEOUS
Qty: 1 | Refills: 0
Start: 2019-10-30 | End: 2021-08-19

## 2019-10-30 RX ORDER — ENOXAPARIN SODIUM 100 MG/ML
0 INJECTION SUBCUTANEOUS
Qty: 0 | Refills: 0 | DISCHARGE

## 2019-10-30 RX ORDER — ENOXAPARIN SODIUM 100 MG/ML
120 INJECTION SUBCUTANEOUS DAILY
Refills: 0 | Status: DISCONTINUED | OUTPATIENT
Start: 2019-10-30 | End: 2019-10-30

## 2019-10-30 RX ORDER — ZINC OXIDE 200 MG/G
1 OINTMENT TOPICAL
Qty: 0 | Refills: 0 | DISCHARGE
Start: 2019-10-30

## 2019-10-30 RX ADMIN — ENOXAPARIN SODIUM 80 MILLIGRAM(S): 100 INJECTION SUBCUTANEOUS at 17:17

## 2019-10-30 RX ADMIN — SODIUM CHLORIDE 75 MILLILITER(S): 9 INJECTION INTRAMUSCULAR; INTRAVENOUS; SUBCUTANEOUS at 06:25

## 2019-10-30 RX ADMIN — HEPARIN SODIUM 600 UNIT(S)/HR: 5000 INJECTION INTRAVENOUS; SUBCUTANEOUS at 06:26

## 2019-10-30 RX ADMIN — CARVEDILOL PHOSPHATE 3.12 MILLIGRAM(S): 80 CAPSULE, EXTENDED RELEASE ORAL at 17:17

## 2019-10-30 RX ADMIN — CARVEDILOL PHOSPHATE 3.12 MILLIGRAM(S): 80 CAPSULE, EXTENDED RELEASE ORAL at 06:25

## 2019-10-30 NOTE — DISCHARGE NOTE PROVIDER - PROVIDER TOKENS
FREE:[LAST:[Follow with your PMD],PHONE:[(   )    -],FAX:[(   )    -]] FREE:[LAST:[Follow with your PMD],PHONE:[(   )    -],FAX:[(   )    -]],PROVIDER:[TOKEN:[[id number]]]

## 2019-10-30 NOTE — DISCHARGE NOTE PROVIDER - NSDCCPCAREPLAN_GEN_ALL_CORE_FT
PRINCIPAL DISCHARGE DIAGNOSIS  Diagnosis: Lower GI bleeding  Assessment and Plan of Treatment: No active bleeding, resume your Lovenox and follow with your doctors at home.

## 2019-10-30 NOTE — PROGRESS NOTE ADULT - PROVIDER SPECIALTY LIST ADULT
Gastroenterology
Heme/Onc
Heme/Onc
Hospitalist
Heme/Onc
Heme/Onc

## 2019-10-30 NOTE — PROGRESS NOTE ADULT - SUBJECTIVE AND OBJECTIVE BOX
Gastroenterology Coverage for Dr Pang  Patient seen and examined bedside resting comfortably  No complaints offered. Abdominal pain is well controlled.  Denies nausea and vomiting. Tolerating diet.  NO Hematochezia or melena per nursing    T(F): 97 (10-27-19 @ 05:42), Max: 98.5 (10-26-19 @ 17:21)  HR: 94 (10-27-19 @ 05:42) (84 - 94)  BP: 149/66 (10-27-19 @ 05:42) (122/58 - 149/66)  RR: 17 (10-27-19 @ 05:42) (17 - 18)  SpO2: 94% (10-27-19 @ 05:42) (94% - 100%)  Wt(kg): --  CAPILLARY BLOOD GLUCOSE          PHYSICAL EXAM:  General: NAD, WDWN.   Neuro:  Alert & responsive  HEENT: NCAT, EOMI, conjunctiva clear  CV: +S1+S2 regular rate and rhythm  Lung: clear to ausculation bilaterally, respirations nonlabored, good inspiratory effort  Abdomen: soft, Non Tender, No distention Normal active BS  Extremities: no cyanosis, clubbing or edema    LABS:                        8.3    5.99  )-----------( 165      ( 26 Oct 2019 15:53 )             24.7     10-26    140  |  109<H>  |  17  ----------------------------<  101<H>  4.0   |  26  |  0.71    Ca    8.1<L>      26 Oct 2019 07:45    TPro  7.0  /  Alb  3.1<L>  /  TBili  0.2  /  DBili  x   /  AST  15  /  ALT  18  /  AlkPhos  72  10-25    LIVER FUNCTIONS - ( 25 Oct 2019 09:11 )  Alb: 3.1 g/dL / Pro: 7.0 gm/dL / ALK PHOS: 72 U/L / ALT: 18 U/L / AST: 15 U/L / GGT: x           PT/INR - ( 25 Oct 2019 09:11 )   PT: 14.6 sec;   INR: 1.29 ratio         PTT - ( 25 Oct 2019 09:11 )  PTT:33.3 sec  I&O's Detail    26 Oct 2019 07:01  -  27 Oct 2019 07:00  --------------------------------------------------------  IN:    Oral Fluid: 300 mL  Total IN: 300 mL    OUT:  Total OUT: 0 mL    Total NET: 300 mL        10-26 @ 07:45    140 | 109 | 17  /8.1 | -- | --  _______________________/  4.0 | 26 | 0.71                           \par
Gastroenterology Coverage for Dr Pang  Patient seen and examined bedside resting comfortably with wife present  No complaints offered. Abdominal pain is well controlled.  Denies nausea and vomiting. Tolerating diet.  NO Hematochezia or melena per nursing    T(F): 97.6 (10-26-19 @ 11:00), Max: 98.9 (10-25-19 @ 16:32)  HR: 84 (10-26-19 @ 11:00) (84 - 95)  BP: 122/58 (10-26-19 @ 11:00) (122/58 - 151/63)  RR: 18 (10-26-19 @ 11:00) (16 - 18)  SpO2: 100% (10-26-19 @ 11:00) (98% - 100%)  Wt(kg): --  CAPILLARY BLOOD GLUCOSE          PHYSICAL EXAM:  General: NAD, WDWN.   Neuro:  Alert & responsive  HEENT: NCAT, EOMI, conjunctiva clear  CV: +S1+S2 regular rate and rhythm  Lung: clear to ausculation bilaterally, respirations nonlabored, good inspiratory effort  Abdomen: soft, Non tender, No Distension. Normal active BS  Extremities: no pedal edema or calf tenderness noted     LABS:                        8.4    5.20  )-----------( 151      ( 26 Oct 2019 10:38 )             24.7     10-26    140  |  109<H>  |  17  ----------------------------<  101<H>  4.0   |  26  |  0.71    Ca    8.1<L>      26 Oct 2019 07:45    TPro  7.0  /  Alb  3.1<L>  /  TBili  0.2  /  DBili  x   /  AST  15  /  ALT  18  /  AlkPhos  72  10-25    LIVER FUNCTIONS - ( 25 Oct 2019 09:11 )  Alb: 3.1 g/dL / Pro: 7.0 gm/dL / ALK PHOS: 72 U/L / ALT: 18 U/L / AST: 15 U/L / GGT: x           PT/INR - ( 25 Oct 2019 09:11 )   PT: 14.6 sec;   INR: 1.29 ratio         PTT - ( 25 Oct 2019 09:11 )  PTT:33.3 sec  I&O's Detail
Lying in bed, H/H stable    Vital Signs Last 24 Hrs  T(C): 36.6 (30 Oct 2019 06:15), Max: 36.6 (29 Oct 2019 17:53)  T(F): 97.8 (30 Oct 2019 06:15), Max: 97.8 (29 Oct 2019 17:53)  HR: 88 (30 Oct 2019 06:15) (77 - 92)  BP: 139/75 (30 Oct 2019 06:15) (133/82 - 155/55)  BP(mean): --  RR: 18 (30 Oct 2019 06:15) (18 - 18)  SpO2: 99% (30 Oct 2019 06:15) (98% - 100%)    PHYSICAL EXAM:    general - AAO x 3  HEENT - No Icterus  CVS - RRR  RS - AE B/L  Abd - soft, NT  Ext - Pulses +        LABS:                        8.2    5.94  )-----------( 148      ( 29 Oct 2019 07:21 )             24.6           PTT - ( 30 Oct 2019 01:26 )  PTT:72.5 sec        RADIOLOGY & ADDITIONAL STUDIES:
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 Denies n/v/ diarrhea abd pain or cp. denies fever or chills. eating well.     VSS   hemoglobin 10.3  in the ED   CTA shows no acute bleed but had large melanotic BM in the ED (25 Oct 2019 12:47)      INTERVAL HPI/OVERNIGHT EVENTS:  Patient seen earlier today.  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 On IV heparin    MEDICATIONS  (STANDING):  carvedilol 3.125 milliGRAM(s) Oral every 12 hours  heparin  Infusion.  Unit(s)/Hr (15 mL/Hr) IV Continuous <Continuous>  sodium chloride 0.9%. 1000 milliLiter(s) (75 mL/Hr) IV Continuous <Continuous>    MEDICATIONS  (PRN):  heparin  Injectable 6500 Unit(s) IV Push every 6 hours PRN For aPTT less than 40  heparin  Injectable 3000 Unit(s) IV Push every 6 hours PRN For aPTT between 40 - 57  zinc oxide 40% Ointment 1 Application(s) Topical three times a day PRN skin breakdown      FAMILY HISTORY:      Allergies    lisinopril (Unknown)  penicillins (Unknown)    Intolerances        PMH/PSH:  Sudden cardiac death  Gait abnormality  Leg pain, right  Anoxic encephalopathy  Brain injury with coma  Prostate cancer  Hypertension  S/P ICD (internal cardiac defibrillator) procedure  H/O tracheostomy  H/O prostate cancer  Status post cryoablation        REVIEW OF SYSTEMS:  CONSTITUTIONAL: No fever, weight loss,   EYES: No eye pain, visual disturbances, or discharge  ENMT:  No difficulty hearing, tinnitus, vertigo; No sinus or throat pain  NECK: No pain or stiffness  BREASTS: No pain, masses, or nipple discharge  RESPIRATORY: No cough, wheezing, chills or hemoptysis; No shortness of breath  CARDIOVASCULAR: No chest pain, palpitations, dizziness, or leg swelling  GASTROINTESTINAL:  see above  GENITOURINARY: No dysuria, frequency, hematuria, or incontinence  NEUROLOGICAL: No headaches, , numbness, or tremors  SKIN: No itching, burning, rashes, or lesions   LYMPH NODES: No enlarged glands  ENDOCRINE: No heat or cold intolerance; No hair loss  MUSCULOSKELETAL: No joint pain or swelling; No muscle, back, or extremity pain  PSYCHIATRIC: No depression, anxiety, mood swings, or difficulty sleeping  HEME/LYMPH: No easy bruising, or bleeding gums  ALLERGY AND IMMUNOLOGIC: No hives or eczema    Vital Signs Last 24 Hrs  T(C): 36.6 (29 Oct 2019 17:53), Max: 36.9 (29 Oct 2019 00:16)  T(F): 97.8 (29 Oct 2019 17:53), Max: 98.4 (29 Oct 2019 00:16)  HR: 80 (29 Oct 2019 17:53) (77 - 94)  BP: 133/82 (29 Oct 2019 17:53) (133/82 - 159/77)  BP(mean): --  RR: 18 (29 Oct 2019 17:53) (18 - 18)  SpO2: 100% (29 Oct 2019 17:53) (99% - 100%)    PHYSICAL EXAM:  GENERAL: NAD, well-groomed, well-developed  HEAD:  Atraumatic, Normocephalic  EYES: EOMI, PERRLA, conjunctiva and sclera clear  NECK: Supple, No JVD, Normal thyroid  NERVOUS SYSTEM:  Alert & Oriented  CHEST/LUNG: Clear to percussion bilaterally; No rales, rhonchi, wheezing, or rubs  HEART: Regular rate and rhythm; No murmurs, rubs, or gallops  ABDOMEN: Soft, Nontender, Nondistended; Bowel sounds present  EXTREMITIES:  2+ Peripheral Pulses, No clubbing, cyanosis, or edema  LYMPH: No lymphadenopathy noted  SKIN: No rashes or lesions    LAB                          8.2    5.94  )-----------( 148      ( 29 Oct 2019 07:21 )             24.6       CBC:  10-29 @ 07:21  WBC 5.94   Hgb 8.2   Hct 24.6   Plts 148  MCV 78.6  10-28 @ 21:34  WBC 6.98   Hgb 7.8   Hct 23.4   Plts 159  MCV 79.1  10-28 @ 07:55  WBC 4.59   Hgb 7.9   Hct 23.7   Plts 148  MCV 77.7  10-27 @ 10:21  WBC 4.54   Hgb 8.4   Hct 24.9   Plts 87  MCV 78.3  10-26 @ 15:53  WBC 5.99   Hgb 8.3   Hct 24.7   Plts 165  MCV 77.7  10-26 @ 10:38  WBC 5.20   Hgb 8.4   Hct 24.7   Plts 151  MCV 78.2  10-26 @ 07:48  WBC 4.80   Hgb 8.6   Hct 26.0   Plts 158  MCV 78.1  10-25 @ 22:52  WBC 6.58   Hgb 8.8   Hct 26.5   Plts 186  MCV 77.9  10-25 @ 16:43  WBC 8.77   Hgb 9.6   Hct 28.5   Plts 194  MCV 77.0  10-25 @ 09:11  WBC 8.10   Hgb 10.3   Hct 31.1   Plts 252  MCV 77.9      Chemistry:  10-27 @ 10:21  Na+ 140  K+ 3.9  Cl- 110  CO2 23  BUN 11  Cr 0.61     10-26 @ 07:45  Na+ 140  K+ 4.0  Cl- 109  CO2 26  BUN 17  Cr 0.71     10-25 @ 09:11  Na+ 136  K+ 5.1  Cl- 101  CO2 28  BUN 24  Cr 1.05         Glucose, Serum: 118 mg/dL (10-27 @ 10:21)  Glucose, Serum: 101 mg/dL (10-26 @ 07:45)  Glucose, Serum: 155 mg/dL (10-25 @ 09:11)      27 Oct 2019 10:21    140    |  110    |  11     ----------------------------<  118    3.9     |  23     |  0.61   26 Oct 2019 07:45    140    |  109    |  17     ----------------------------<  101    4.0     |  26     |  0.71   25 Oct 2019 09:11    136    |  101    |  24     ----------------------------<  155    5.1     |  28     |  1.05     Ca    8.1        27 Oct 2019 10:21  Ca    8.1        26 Oct 2019 07:45  Ca    8.7        25 Oct 2019 09:11    TPro  7.0    /  Alb  3.1    /  TBili  0.2    /  DBili  x      /  AST  15     /  ALT  18     /  AlkPhos  72     25 Oct 2019 09:11      PTT - ( 29 Oct 2019 17:35 )  PTT:161.5 sec        CAPILLARY BLOOD GLUCOSE              RADIOLOGY & ADDITIONAL TESTS:    Imaging Personally Reviewed:  [ ] YES  [ ] NO    Consultant(s) Notes Reviewed:  [ ] YES  [ ] NO    Care Discussed with Consultants/Other Providers [ ] YES  [ ] NO
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 Denies n/v/ diarrhea abd pain or cp. denies fever or chills. eating well.     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  Unit(s)/Hr (15 mL/Hr) IV Continuous <Continuous>  heparin  Injectable 6500 Unit(s) IV Push once  sodium chloride 0.9%. 1000 milliLiter(s) (75 mL/Hr) IV Continuous <Continuous>    MEDICATIONS  (PRN):  heparin  Injectable 6500 Unit(s) IV Push every 6 hours PRN For aPTT less than 40  heparin  Injectable 3000 Unit(s) IV Push every 6 hours PRN For aPTT between 40 - 57  zinc oxide 40% Ointment 1 Application(s) Topical three times a day PRN skin breakdown      FAMILY HISTORY:      Allergies    lisinopril (Unknown)  penicillins (Unknown)    Intolerances        PMH/PSH:  Sudden cardiac death  Gait abnormality  Leg pain, right  Anoxic encephalopathy  Brain injury with coma  Prostate cancer  Hypertension  S/P ICD (internal cardiac defibrillator) procedure  H/O tracheostomy  H/O prostate cancer  Status post cryoablation        REVIEW OF SYSTEMS:  CONSTITUTIONAL: No fever, weight loss,   EYES: No eye pain, visual disturbances, or discharge  ENMT:  No difficulty hearing, tinnitus, vertigo; No sinus or throat pain  NECK: No pain or stiffness  BREASTS: No pain, masses, or nipple discharge  RESPIRATORY: No cough, wheezing, chills or hemoptysis; No shortness of breath  CARDIOVASCULAR: No chest pain, palpitations, dizziness, or leg swelling  GASTROINTESTINAL: See above  GENITOURINARY: No dysuria, frequency, hematuria, or incontinence  NEUROLOGICAL: No headaches,  numbness, or tremors  SKIN: No itching, burning, rashes, or lesions   LYMPH NODES: No enlarged glands  ENDOCRINE: No heat or cold intolerance; No hair loss  MUSCULOSKELETAL: No joint pain or swelling; No muscle, back, or extremity pain  PSYCHIATRIC: No depression, anxiety, mood swings, or difficulty sleeping  HEME/LYMPH: No easy bruising, or bleeding gums  ALLERGY AND IMMUNOLOGIC: No hives or eczema    Vital Signs Last 24 Hrs  T(C): 36.4 (28 Oct 2019 12:40), Max: 37.3 (27 Oct 2019 16:56)  T(F): 97.6 (28 Oct 2019 12:40), Max: 99.1 (27 Oct 2019 16:56)  HR: 74 (28 Oct 2019 12:40) (74 - 104)  BP: 135/60 (28 Oct 2019 12:40) (127/61 - 146/67)  BP(mean): --  RR: 16 (28 Oct 2019 12:40) (16 - 19)  SpO2: 99% (28 Oct 2019 12:40) (93% - 100%)    PHYSICAL EXAM:  GENERAL: NAD, well-groomed, well-developed  HEAD:  Atraumatic, Normocephalic  EYES: EOMI, PERRLA, conjunctiva and sclera clear  NECK: Supple, No JVD, Normal thyroid  NERVOUS SYSTEM:  Alert & Oriented Good concentration;   CHEST/LUNG: Clear to percussion bilaterally; No rales, rhonchi, wheezing, or rubs  HEART: Regular rate and rhythm; No murmurs, rubs, or gallops  ABDOMEN: Soft, Nontender, Nondistended; Bowel sounds present  EXTREMITIES:  2+ Peripheral Pulses, No clubbing, cyanosis, or edema  LYMPH: No lymphadenopathy noted  SKIN: No rashes or lesions    LAB                          7.9    4.59  )-----------( 148      ( 28 Oct 2019 07:55 )             23.7       CBC:  10-28 @ 07:55  WBC 4.59   Hgb 7.9   Hct 23.7   Plts 148  MCV 77.7  10-27 @ 10:21  WBC 4.54   Hgb 8.4   Hct 24.9   Plts 87  MCV 78.3  10-26 @ 15:53  WBC 5.99   Hgb 8.3   Hct 24.7   Plts 165  MCV 77.7  10-26 @ 10:38  WBC 5.20   Hgb 8.4   Hct 24.7   Plts 151  MCV 78.2  10-26 @ 07:48  WBC 4.80   Hgb 8.6   Hct 26.0   Plts 158  MCV 78.1  10-25 @ 22:52  WBC 6.58   Hgb 8.8   Hct 26.5   Plts 186  MCV 77.9  10-25 @ 16:43  WBC 8.77   Hgb 9.6   Hct 28.5   Plts 194  MCV 77.0  10-25 @ 09:11  WBC 8.10   Hgb 10.3   Hct 31.1   Plts 252  MCV 77.9      Chemistry:  10-27 @ 10:21  Na+ 140  K+ 3.9  Cl- 110  CO2 23  BUN 11  Cr 0.61     10-26 @ 07:45  Na+ 140  K+ 4.0  Cl- 109  CO2 26  BUN 17  Cr 0.71     10-25 @ 09:11  Na+ 136  K+ 5.1  Cl- 101  CO2 28  BUN 24  Cr 1.05         Glucose, Serum: 118 mg/dL (10-27 @ 10:21)  Glucose, Serum: 101 mg/dL (10-26 @ 07:45)  Glucose, Serum: 155 mg/dL (10-25 @ 09:11)      27 Oct 2019 10:21    140    |  110    |  11     ----------------------------<  118    3.9     |  23     |  0.61   26 Oct 2019 07:45    140    |  109    |  17     ----------------------------<  101    4.0     |  26     |  0.71   25 Oct 2019 09:11    136    |  101    |  24     ----------------------------<  155    5.1     |  28     |  1.05     Ca    8.1        27 Oct 2019 10:21  Ca    8.1        26 Oct 2019 07:45  Ca    8.7        25 Oct 2019 09:11    TPro  7.0    /  Alb  3.1    /  TBili  0.2    /  DBili  x      /  AST  15     /  ALT  18     /  AlkPhos  72     25 Oct 2019 09:11              CAPILLARY BLOOD GLUCOSE              RADIOLOGY & ADDITIONAL TESTS:    Imaging Personally Reviewed:  [ ] YES  [ ] NO    Consultant(s) Notes Reviewed:  [ ] YES  [ ] NO    Care Discussed with Consultants/Other Providers [ ] YES  [ ] NO
Patient is a 69y old  Male who presents with a chief complaint of     INTERVAL HPI/OVERNIGHT EVENTS: had normal briown colored  BM last night     MEDICATIONS  (STANDING):  carvedilol 3.125 milliGRAM(s) Oral every 12 hours  sodium chloride 0.9%. 1000 milliLiter(s) (75 mL/Hr) IV Continuous <Continuous>    MEDICATIONS  (PRN):        Allergies    lisinopril (Unknown)  penicillins (Unknown)    Intolerances      Vital Signs Last 24 Hrs  T(C): 36.4 (26 Oct 2019 11:00), Max: 37.2 (25 Oct 2019 16:32)  T(F): 97.6 (26 Oct 2019 11:00), Max: 98.9 (25 Oct 2019 16:32)  HR: 84 (26 Oct 2019 11:00) (84 - 95)  BP: 122/58 (26 Oct 2019 11:00) (122/58 - 151/63)  BP(mean): --  RR: 18 (26 Oct 2019 11:00) (16 - 18)  SpO2: 100% (26 Oct 2019 11:00) (98% - 100%)    PHYSICAL EXAM:  GENERAL: NAD, well-groomed, well-developed  HEAD:  Atraumatic, Normocephalic  EYES: EOMI, PERRLA, conjunctiva and sclera clear  ENMT: No tonsillar erythema, exudates, or enlargement; Moist mucous membranes, Good dentition, No lesions  NECK: Supple, No JVD, Normal thyroid  NERVOUS SYSTEM:  Alert & Oriented X2  CHEST/LUNG: Clear to percussion bilaterally; No rales, rhonchi, wheezing, or rubs  HEART: Regular rate and rhythm; No murmurs, rubs, or gallops  ABDOMEN: Soft, Nontender, Nondistended; Bowel sounds present  EXTREMITIES:  2+ Peripheral Pulses, No clubbing, cyanosis, or edema  LYMPH: No lymphadenopathy noted  SKIN: No rashes or lesions    LABS:                                   8.4    5.20  )-----------( 151      ( 26 Oct 2019 10:38 )             24.7     10-26    140  |  109<H>  |  17  ----------------------------<  101<H>  4.0   |  26  |  0.71    Ca    8.1<L>      26 Oct 2019 07:45    TPro  7.0  /  Alb  3.1<L>  /  TBili  0.2  /  DBili  x   /  AST  15  /  ALT  18  /  AlkPhos  72  10-25    PT/INR - ( 25 Oct 2019 09:11 )   PT: 14.6 sec;   INR: 1.29 ratio         PTT - ( 25 Oct 2019 09:11 )  PTT:33.3 sec        CAPILLARY BLOOD GLUCOSE          RADIOLOGY & ADDITIONAL TESTS:    Imaging Personally Reviewed:  [ ] YES  [ ] NO    Consultant(s) Notes Reviewed:  [ ] YES  [ ] NO    Care Discussed with Consultants/Other Providers [ ] YES  [ ] NO
Patient is a 69y old  Male who presents with a chief complaint of     INTERVAL HPI/OVERNIGHT EVENTS: no new bleeding     MEDICATIONS  (STANDING):  carvedilol 3.125 milliGRAM(s) Oral every 12 hours  heparin  Infusion.  Unit(s)/Hr (15 mL/Hr) IV Continuous <Continuous>  sodium chloride 0.9%. 1000 milliLiter(s) (75 mL/Hr) IV Continuous <Continuous>    MEDICATIONS  (PRN):  heparin  Injectable 6500 Unit(s) IV Push every 6 hours PRN For aPTT less than 40  heparin  Injectable 3000 Unit(s) IV Push every 6 hours PRN For aPTT between 40 - 57  zinc oxide 40% Ointment 1 Application(s) Topical three times a day PRN skin breakdown            Allergies    lisinopril (Unknown)  penicillins (Unknown)    Intolerances      Vital Signs Last 24 Hrs  Vital Signs Last 24 Hrs  T(C): 36.3 (29 Oct 2019 11:27), Max: 36.9 (28 Oct 2019 16:56)  T(F): 97.4 (29 Oct 2019 11:27), Max: 98.4 (28 Oct 2019 16:56)  HR: 77 (29 Oct 2019 11:27) (77 - 94)  BP: 141/67 (29 Oct 2019 11:27) (128/80 - 159/77)  BP(mean): --  RR: 18 (29 Oct 2019 11:27) (18 - 18)  SpO2: 99% (29 Oct 2019 11:27) (99% - 100%)      PHYSICAL EXAM:  GENERAL: NAD, well-groomed, well-developed  HEAD:  Atraumatic, Normocephalic  EYES: EOMI, PERRLA, conjunctiva and sclera clear  ENMT: No tonsillar erythema, exudates, or enlargement; Moist mucous membranes, Good dentition, No lesions  NECK: Supple, No JVD, Normal thyroid  NERVOUS SYSTEM:  Alert & Oriented X2  CHEST/LUNG: Clear to percussion bilaterally; No rales, rhonchi, wheezing, or rubs  HEART: Regular rate and rhythm; No murmurs, rubs, or gallops  ABDOMEN: Soft, Nontender, Nondistended; Bowel sounds present  EXTREMITIES:  2+ Peripheral Pulses, No clubbing, cyanosis, or edema  LYMPH: No lymphadenopathy noted  SKIN: No rashes or lesions    LABS:                                     8.2    5.94  )-----------( 148      ( 29 Oct 2019 07:21 )             24.6           PTT - ( 29 Oct 2019 07:21 )  PTT:>200.0 sec          RADIOLOGY & ADDITIONAL TESTS:    Imaging Personally Reviewed:  [ x] YES  [ ] NO    Consultant(s) Notes Reviewed:  [ ] YES  [ ] NO    Care Discussed with Consultants/Other Providers [ ] YES  [ ] NO
Patient is a 69y old  Male who presents with a chief complaint of     INTERVAL HPI/OVERNIGHT EVENTS: no new bleeding     MEDICATIONS  (STANDING):  carvedilol 3.125 milliGRAM(s) Oral every 12 hours  sodium chloride 0.9%. 1000 milliLiter(s) (75 mL/Hr) IV Continuous <Continuous>    MEDICATIONS  (PRN):          Allergies    lisinopril (Unknown)  penicillins (Unknown)    Intolerances      Vital Signs Last 24 Hrs  T(C): 36.1 (27 Oct 2019 05:42), Max: 36.9 (26 Oct 2019 17:21)  T(F): 97 (27 Oct 2019 05:42), Max: 98.5 (26 Oct 2019 17:21)  HR: 94 (27 Oct 2019 05:42) (84 - 94)  BP: 149/66 (27 Oct 2019 05:42) (122/58 - 149/66)  BP(mean): 86 (26 Oct 2019 17:21) (86 - 86)  RR: 17 (27 Oct 2019 05:42) (17 - 18)  SpO2: 94% (27 Oct 2019 05:42) (94% - 100%)    PHYSICAL EXAM:  GENERAL: NAD, well-groomed, well-developed  HEAD:  Atraumatic, Normocephalic  EYES: EOMI, PERRLA, conjunctiva and sclera clear  ENMT: No tonsillar erythema, exudates, or enlargement; Moist mucous membranes, Good dentition, No lesions  NECK: Supple, No JVD, Normal thyroid  NERVOUS SYSTEM:  Alert & Oriented X2  CHEST/LUNG: Clear to percussion bilaterally; No rales, rhonchi, wheezing, or rubs  HEART: Regular rate and rhythm; No murmurs, rubs, or gallops  ABDOMEN: Soft, Nontender, Nondistended; Bowel sounds present  EXTREMITIES:  2+ Peripheral Pulses, No clubbing, cyanosis, or edema  LYMPH: No lymphadenopathy noted  SKIN: No rashes or lesions    LABS:                                   8.3    5.99  )-----------( 165      ( 26 Oct 2019 15:53 )             24.7     10-26    140  |  109<H>  |  17  ----------------------------<  101<H>  4.0   |  26  |  0.71    Ca    8.1<L>      26 Oct 2019 07:45                    RADIOLOGY & ADDITIONAL TESTS:    Imaging Personally Reviewed:  [ ] YES  [ ] NO    Consultant(s) Notes Reviewed:  [ ] YES  [ ] NO    Care Discussed with Consultants/Other Providers [ ] YES  [ ] NO
Patient is a 69y old  Male who presents with a chief complaint of     INTERVAL HPI/OVERNIGHT EVENTS: no new bleeding     MEDICATIONS  (STANDING):  carvedilol 3.125 milliGRAM(s) Oral every 12 hours  sodium chloride 0.9%. 1000 milliLiter(s) (75 mL/Hr) IV Continuous <Continuous>    MEDICATIONS  (PRN):  zinc oxide 40% Ointment 1 Application(s) Topical three times a day PRN skin breakdown          Allergies    lisinopril (Unknown)  penicillins (Unknown)    Intolerances      Vital Signs Last 24 Hrs  T(C): 36.4 (28 Oct 2019 12:40), Max: 37.3 (27 Oct 2019 16:56)  T(F): 97.6 (28 Oct 2019 12:40), Max: 99.1 (27 Oct 2019 16:56)  HR: 74 (28 Oct 2019 12:40) (74 - 104)  BP: 135/60 (28 Oct 2019 12:40) (127/61 - 146/67)  BP(mean): --  RR: 16 (28 Oct 2019 12:40) (16 - 19)  SpO2: 99% (28 Oct 2019 12:40) (93% - 100%)    PHYSICAL EXAM:  GENERAL: NAD, well-groomed, well-developed  HEAD:  Atraumatic, Normocephalic  EYES: EOMI, PERRLA, conjunctiva and sclera clear  ENMT: No tonsillar erythema, exudates, or enlargement; Moist mucous membranes, Good dentition, No lesions  NECK: Supple, No JVD, Normal thyroid  NERVOUS SYSTEM:  Alert & Oriented X2  CHEST/LUNG: Clear to percussion bilaterally; No rales, rhonchi, wheezing, or rubs  HEART: Regular rate and rhythm; No murmurs, rubs, or gallops  ABDOMEN: Soft, Nontender, Nondistended; Bowel sounds present  EXTREMITIES:  2+ Peripheral Pulses, No clubbing, cyanosis, or edema  LYMPH: No lymphadenopathy noted  SKIN: No rashes or lesions    LABS:                                                   7.9    4.59  )-----------( 148      ( 28 Oct 2019 07:55 )             23.7     10-27    140  |  110<H>  |  11  ----------------------------<  118<H>  3.9   |  23  |  0.61    Ca    8.1<L>      27 Oct 2019 10:21                          RADIOLOGY & ADDITIONAL TESTS:    Imaging Personally Reviewed:  [ ] YES  [ ] NO    Consultant(s) Notes Reviewed:  [ ] YES  [ ] NO    Care Discussed with Consultants/Other Providers [ ] YES  [ ] NO
lying in bed    Vital Signs Last 24 Hrs  T(C): 36.1 (28 Oct 2019 05:31), Max: 37.3 (27 Oct 2019 16:56)  T(F): 97 (28 Oct 2019 05:31), Max: 99.1 (27 Oct 2019 16:56)  HR: 86 (28 Oct 2019 05:31) (83 - 104)  BP: 132/52 (28 Oct 2019 05:31) (127/61 - 146/67)  BP(mean): --  RR: 17 (28 Oct 2019 05:31) (17 - 19)  SpO2: 93% (28 Oct 2019 05:31) (93% - 100%)    PHYSICAL EXAM:    general - confused  HEENT - No Icterus  CVS - RRR  RS - AE B/L  Abd - soft, NT  Ext - Pulses +        LABS:                        7.9    4.59  )-----------( 148      ( 28 Oct 2019 07:55 )             23.7     10-27    140  |  110<H>  |  11  ----------------------------<  118<H>  3.9   |  23  |  0.61    Ca    8.1<L>      27 Oct 2019 10:21              RADIOLOGY & ADDITIONAL STUDIES:
lying in bed    Vital Signs Last 24 Hrs  T(C): 36.7 (27 Oct 2019 11:04), Max: 36.9 (26 Oct 2019 17:21)  T(F): 98 (27 Oct 2019 11:04), Max: 98.5 (26 Oct 2019 17:21)  HR: 83 (27 Oct 2019 11:04) (83 - 94)  BP: 134/63 (27 Oct 2019 11:04) (131/47 - 149/66)  BP(mean): 86 (26 Oct 2019 17:21) (86 - 86)  RR: 17 (27 Oct 2019 11:04) (17 - 18)  SpO2: 100% (27 Oct 2019 11:04) (94% - 100%)    PHYSICAL EXAM:    general - confused  HEENT - No Icterus  CVS - RRR  RS - AE B/L  Abd - soft, NT  Ext - Pulses +        LABS:                        8.4    4.54  )-----------( 87       ( 27 Oct 2019 10:21 )             24.9     10-27    140  |  110<H>  |  11  ----------------------------<  118<H>  3.9   |  23  |  0.61    Ca    8.1<L>      27 Oct 2019 10:21              RADIOLOGY & ADDITIONAL STUDIES:
lying in bed, wife at bedsdie    Vital Signs Last 24 Hrs  T(C): 36.5 (29 Oct 2019 06:32), Max: 36.9 (28 Oct 2019 16:56)  T(F): 97.7 (29 Oct 2019 06:32), Max: 98.4 (28 Oct 2019 16:56)  HR: 86 (29 Oct 2019 06:32) (74 - 94)  BP: 155/78 (29 Oct 2019 06:32) (128/80 - 159/77)  BP(mean): --  RR: 18 (29 Oct 2019 06:32) (16 - 18)  SpO2: 99% (29 Oct 2019 06:32) (99% - 100%)    PHYSICAL EXAM:    general - confused  HEENT - No Icterus  CVS - RRR  RS - AE B/L  Abd - soft, NT  Ext - Pulses +        LABS:                        8.2    5.94  )-----------( 148      ( 29 Oct 2019 07:21 )             24.6     10-27    140  |  110<H>  |  11  ----------------------------<  118<H>  3.9   |  23  |  0.61    Ca    8.1<L>      27 Oct 2019 10:21      PTT - ( 29 Oct 2019 07:21 )  PTT:>200.0 sec        RADIOLOGY & ADDITIONAL STUDIES:
today

## 2019-10-30 NOTE — DISCHARGE NOTE PROVIDER - CARE PROVIDER_API CALL
Follow with your PMD,   Phone: (   )    -  Fax: (   )    -  Follow Up Time: Follow with your PMD,   Phone: (   )    -  Fax: (   )    -  Follow Up Time:     Rogelio Brito (DO)  Internal Medicine; Oncology  [address] [place] [address]  Phone: [phone]  Fax: [phone]  Follow Up Time:

## 2019-10-30 NOTE — DISCHARGE NOTE PROVIDER - NSDCMRMEDTOKEN_GEN_ALL_CORE_FT
carvedilol 3.125 mg oral tablet: 1 tab(s) orally every 12 hours  Lovenox 120 mg/0.8 mL injectable solution:   multivitamin: 1 tab(s) orally once a day  Vitamin A &amp; D topical ointment: Apply topically to affected area 2 times a day   Vitamin D3 2000 intl units oral capsule: 1 cap(s) orally once a day  zinc oxide 40% topical ointment: 1 application topically 3 times a day, As needed, skin breakdown

## 2019-10-30 NOTE — PROGRESS NOTE ADULT - PROBLEM SELECTOR PLAN 1
- Patients primary Hematologist did not want to have IVC Filter Placed as per conversataion with Hospitalist, wanted to re-start Anticoagulation to look for bleeding  - Gi f/u regarding GI Work-up  - d/w patients wife who is in agreement with above
- watch H/H  - GI follow up  - vascular evaluation for IVC filter  - may benefit from low dose DVT prophlxsis dose of AC as ptient with history of mutliple VTE  - d/w wife about complex situation of multiple VTE's and GI Bleeding both of which can be life threatnening
- Vascular/IR Evaluation for IVC Filter  - GI follow up  - may benefit from low dose DVT prophlxsis dose of AC as ptient with history of mutliple VTE  - d/w wife about complex situation of multiple VTE's and GI Bleeding both of which can be life threatnening.
- d/w patients Hematologist Dr Brito, did not want to place IVC Filter because of risks of VTE  - wants to send patient on once a day Lovenox 1mg/Kg once a day to follow up with him next week  - patient complex with history of GI Bleeding, with history of VTE's, d/w wife who agrees
Continue to hold AC  Follow CBC closely  For EGD and Colonoscopy early next week, discussed with wife
Continue to hold AC  Follow CBC closely  For EGD and Colonoscopy early next week, discussed with wife  possible advance to clear liquids in am

## 2019-10-30 NOTE — PROGRESS NOTE ADULT - PROBLEM SELECTOR PROBLEM 1
Lower GI bleeding

## 2019-10-30 NOTE — DISCHARGE NOTE NURSING/CASE MANAGEMENT/SOCIAL WORK - PATIENT PORTAL LINK FT
You can access the FollowMyHealth Patient Portal offered by [facility] by registering at the following website: [email or web address]. By joining [facility]’s FollowMyHealth portal, you will also be able to view your health information using other applications (apps) compatible with our system.

## 2019-10-30 NOTE — DISCHARGE NOTE PROVIDER - HOSPITAL COURSE
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        Pt's h/h remained stable throughout admission; GI has signed off.  Pt and wife refused EGD/colonoscopy.  Lovenox was held and plan was initially IVC filter; primary team worked with outpatient heme/onc; plan was changed as such: to restart patient on AC and monitor for bleeding.  If no bleed, no IVC filter needed.        Pt has been on heparin drip for >24 hours without episodes of bleeding.  Heparin drip was started for quick DC if IVC filter needed given bleed.  Patient will be restarted on home dose Lovenox (120 mg daily) and follow up with his PCP and heme/onc specialist.  Pt currently stable for discharge. 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        Pt's h/h remained stable throughout admission; GI has signed off.  Pt and wife refused EGD/colonoscopy.  Lovenox was held and plan was initially IVC filter; primary team worked with outpatient heme/onc; plan was changed as such: to restart patient on AC and monitor for bleeding.  If no bleed, no IVC filter needed.        Pt has been on heparin drip for >24 hours without episodes of bleeding.  Heparin drip was started in the event IVC filter placement was needed given bleed.  Discussed case with patient's hematologist Dr. Rogelio Brito; will give iron prior to discharge and change Lovenox to 80 mg SC BID (was on 120 mg daily).  Pt currently stable for discharge.

## 2019-11-04 DIAGNOSIS — Z95.810 PRESENCE OF AUTOMATIC (IMPLANTABLE) CARDIAC DEFIBRILLATOR: ICD-10-CM

## 2019-11-04 DIAGNOSIS — Z88.8 ALLERGY STATUS TO OTHER DRUGS, MEDICAMENTS AND BIOLOGICAL SUBSTANCES STATUS: ICD-10-CM

## 2019-11-04 DIAGNOSIS — Z79.01 LONG TERM (CURRENT) USE OF ANTICOAGULANTS: ICD-10-CM

## 2019-11-04 DIAGNOSIS — Z92.3 PERSONAL HISTORY OF IRRADIATION: ICD-10-CM

## 2019-11-04 DIAGNOSIS — Z53.20 PROCEDURE AND TREATMENT NOT CARRIED OUT BECAUSE OF PATIENT'S DECISION FOR UNSPECIFIED REASONS: ICD-10-CM

## 2019-11-04 DIAGNOSIS — Z88.0 ALLERGY STATUS TO PENICILLIN: ICD-10-CM

## 2019-11-04 DIAGNOSIS — K92.2 GASTROINTESTINAL HEMORRHAGE, UNSPECIFIED: ICD-10-CM

## 2019-11-04 DIAGNOSIS — Z90.49 ACQUIRED ABSENCE OF OTHER SPECIFIED PARTS OF DIGESTIVE TRACT: ICD-10-CM

## 2019-11-04 DIAGNOSIS — Z86.718 PERSONAL HISTORY OF OTHER VENOUS THROMBOSIS AND EMBOLISM: ICD-10-CM

## 2019-11-04 DIAGNOSIS — I50.42 CHRONIC COMBINED SYSTOLIC (CONGESTIVE) AND DIASTOLIC (CONGESTIVE) HEART FAILURE: ICD-10-CM

## 2019-11-04 DIAGNOSIS — Z79.899 OTHER LONG TERM (CURRENT) DRUG THERAPY: ICD-10-CM

## 2019-11-04 DIAGNOSIS — I10 ESSENTIAL (PRIMARY) HYPERTENSION: ICD-10-CM

## 2019-11-04 DIAGNOSIS — Z85.46 PERSONAL HISTORY OF MALIGNANT NEOPLASM OF PROSTATE: ICD-10-CM

## 2019-11-15 PROBLEM — C64.9 CANCER OF KIDNEY: Status: ACTIVE | Noted: 2019-11-15

## 2019-11-15 PROBLEM — Z86.718 HISTORY OF DVT (DEEP VEIN THROMBOSIS): Status: ACTIVE | Noted: 2019-11-15

## 2019-11-15 PROBLEM — I81 PORTAL VEIN THROMBOSIS: Status: ACTIVE | Noted: 2019-11-15

## 2019-11-15 PROBLEM — I86.4 VARICES, GASTRIC: Status: ACTIVE | Noted: 2019-11-15

## 2019-11-15 PROBLEM — K74.60 CIRRHOSIS: Status: ACTIVE | Noted: 2019-11-15

## 2019-11-15 PROBLEM — K76.6 PORTAL HYPERTENSION: Status: ACTIVE | Noted: 2019-11-15

## 2019-11-18 ENCOUNTER — APPOINTMENT (OUTPATIENT)
Dept: ENDOVASCULAR SURGERY | Facility: CLINIC | Age: 69
End: 2019-11-18
Payer: MEDICARE

## 2019-11-18 VITALS
HEIGHT: 65 IN | OXYGEN SATURATION: 97 % | TEMPERATURE: 97.7 F | DIASTOLIC BLOOD PRESSURE: 75 MMHG | SYSTOLIC BLOOD PRESSURE: 153 MMHG | RESPIRATION RATE: 15 BRPM | HEART RATE: 82 BPM

## 2019-11-18 DIAGNOSIS — Z86.718 PERSONAL HISTORY OF OTHER VENOUS THROMBOSIS AND EMBOLISM: ICD-10-CM

## 2019-11-18 DIAGNOSIS — I81 PORTAL VEIN THROMBOSIS: ICD-10-CM

## 2019-11-18 DIAGNOSIS — K76.6 PORTAL HYPERTENSION: ICD-10-CM

## 2019-11-18 DIAGNOSIS — C64.9 MALIGNANT NEOPLASM OF UNSPECIFIED KIDNEY, EXCEPT RENAL PELVIS: ICD-10-CM

## 2019-11-18 DIAGNOSIS — I86.4 GASTRIC VARICES: ICD-10-CM

## 2019-11-18 DIAGNOSIS — K74.60 UNSPECIFIED CIRRHOSIS OF LIVER: ICD-10-CM

## 2019-11-18 PROCEDURE — 77001 FLUOROGUIDE FOR VEIN DEVICE: CPT

## 2019-11-18 PROCEDURE — 36561 INSERT TUNNELED CV CATH: CPT

## 2019-11-18 PROCEDURE — 76937 US GUIDE VASCULAR ACCESS: CPT

## 2019-11-18 RX ORDER — PANTOPRAZOLE 40 MG/1
40 TABLET, DELAYED RELEASE ORAL DAILY
Qty: 30 | Refills: 4 | Status: DISCONTINUED | COMMUNITY
Start: 2017-04-26 | End: 2019-11-18

## 2019-11-18 NOTE — HISTORY OF PRESENT ILLNESS
[FreeTextEntry1] : alert and oriented \par accompanied by wife Qi [phone]\par didn’t take any medications today [FreeTextEntry5] : yesterday at 9 [FreeTextEntry6] : Dr. Sanchez

## 2019-11-18 NOTE — PROCEDURE
[D/C IV on discharge] : D/C IV on discharge [Resume diet] : resume diet [Site check for bleeding/hematoma] : Site check for bleeding/hematoma [Vital signs on admission the q 15 mins x2] : Vital signs on admission the q 15 mins x2 [FreeTextEntry1] : mediport insertion right chest

## 2019-11-18 NOTE — ASSESSMENT
[FreeTextEntry1] : 69M Fe deficiency, blood loss and DVT on Lovenox (held for port) refferred for port insertion for infusion therapy. Informed consent on chart.\par INR 1.1\par Plt 224

## 2019-11-18 NOTE — PAST MEDICAL HISTORY
[Increasing age ( >40 years old)] : Increasing age ( >40 years old) [Altered mobility] : Altered mobility [Malignancy] : Malignancy [Previous history of DVT or PE] : Previous history of DVT or PE [No therapy indicated for cases scheduled for less than one hour] : No therapy indicated for cases scheduled for less than one hour. [FreeTextEntry1] : Malignant Hyperthermia Screening Tool and Risk of Bleeding Assessment\par \par Mr. ANITHA LAMB denies family history of unexpected death following Anesthesia or Exercise.\par Denies Family history of Malignant Hyperthermia, Muscle or Neuromuscular disorder and High Temperature following exercise.\par \par Mr. ANITHA LAMB denies history of Muscle Spasm, Dark or Chocolate - Colored urine and Unanticipated fever immediately following anesthesia or serious exercise. \par Mr. LAMB also denies bleeding tendencies/ Risks of Bleeding.\par

## 2019-12-23 ENCOUNTER — APPOINTMENT (OUTPATIENT)
Dept: ELECTROPHYSIOLOGY | Facility: CLINIC | Age: 69
End: 2019-12-23
Payer: MEDICARE

## 2019-12-23 PROCEDURE — 93295 DEV INTERROG REMOTE 1/2/MLT: CPT

## 2019-12-23 PROCEDURE — 93296 REM INTERROG EVL PM/IDS: CPT

## 2020-03-23 ENCOUNTER — APPOINTMENT (OUTPATIENT)
Dept: ELECTROPHYSIOLOGY | Facility: CLINIC | Age: 70
End: 2020-03-23
Payer: MEDICARE

## 2020-03-23 PROCEDURE — 93296 REM INTERROG EVL PM/IDS: CPT

## 2020-03-23 PROCEDURE — 93295 DEV INTERROG REMOTE 1/2/MLT: CPT

## 2020-06-22 ENCOUNTER — APPOINTMENT (OUTPATIENT)
Dept: ELECTROPHYSIOLOGY | Facility: CLINIC | Age: 70
End: 2020-06-22

## 2020-07-08 ENCOUNTER — APPOINTMENT (OUTPATIENT)
Dept: GASTROENTEROLOGY | Facility: CLINIC | Age: 70
End: 2020-07-08
Payer: MEDICARE

## 2020-07-08 ENCOUNTER — FORM ENCOUNTER (OUTPATIENT)
Age: 70
End: 2020-07-08

## 2020-07-08 VITALS
TEMPERATURE: 99 F | SYSTOLIC BLOOD PRESSURE: 120 MMHG | HEART RATE: 75 BPM | DIASTOLIC BLOOD PRESSURE: 70 MMHG | OXYGEN SATURATION: 98 %

## 2020-07-08 PROCEDURE — 99202 OFFICE O/P NEW SF 15 MIN: CPT

## 2020-07-08 NOTE — PHYSICAL EXAM
[General Appearance - Alert] : alert [Sclera] : the sclera and conjunctiva were normal [General Appearance - In No Acute Distress] : in no acute distress [Neck Appearance] : the appearance of the neck was normal [Outer Ear] : the ears and nose were normal in appearance [Auscultation Breath Sounds / Voice Sounds] : lungs were clear to auscultation bilaterally [Heart Rate And Rhythm] : heart rate was normal and rhythm regular [Heart Sounds] : normal S1 and S2 [Heart Sounds Gallop] : no gallops [Murmurs] : no murmurs [Heart Sounds Pericardial Friction Rub] : no pericardial rub [Bowel Sounds] : normal bowel sounds [Abdomen Soft] : soft [Abdomen Tenderness] : non-tender [Abdomen Mass (___ Cm)] : no abdominal mass palpated [] : no hepato-splenomegaly [Abnormal Walk] : normal gait

## 2020-07-10 NOTE — ASSESSMENT
[FreeTextEntry1] : 68yo M pmh Kidney cancer, prostate cancer, HTN, s/p ICD, prostate Ca s/p radiation, gastritis, partial colectomy referred by  for evaluation of GI bleeding. Patient was placed on bloodthinners for DVT in the past which precipitated the GI bleeds. EGD and colonoscopy done by  in 2017 were unremarkable. CT Abdomen 10/2019 revealed cirrhosis and gastric varices. \par \par \par \par Plan:\par \par Discussed that the cirrhosis and gastric varices are the likely etiology of patient’s GI bleeds, and will need to be managed by hepatology. Referred patient to  of hepatology, and provided phone number to schedule an appointment.\par

## 2020-07-10 NOTE — HISTORY OF PRESENT ILLNESS
[de-identified] : 68yo M pmh Kidney cancer, prostate cancer, HTN, s/p ICD, prostate Ca s/p radiation, gastritis, partial colectomy referred by  for evaluation of GI bleeding. Patient was placed on bloodthinners for DVT in the past which precipitated the GI bleeds. EGD and colonoscopy done by  in 2017 were unremarkable. CT Abdomen 10/2019 revealed cirrhosis and gastric varices.

## 2020-07-27 ENCOUNTER — APPOINTMENT (OUTPATIENT)
Dept: ELECTROPHYSIOLOGY | Facility: CLINIC | Age: 70
End: 2020-07-27
Payer: MEDICARE

## 2020-07-27 PROCEDURE — 93295 DEV INTERROG REMOTE 1/2/MLT: CPT

## 2020-07-27 PROCEDURE — 93296 REM INTERROG EVL PM/IDS: CPT

## 2020-10-23 NOTE — ED ADULT TRIAGE NOTE - HEIGHT IN INCHES
Telemedicine Visit: New Patient     This encounter was conducted via Zoom.   Verbal consent was obtained. Patient's identity was verified. Patient aware this will be   billed the same as an in person evaluation.  Patient in home, I am in office at [address] [place] [address]  Subjective:     Chief Complaint   Patient presents with   • Lab Results     Sydney Mckeon is a 53 y.o. female with history of IFG, melanoma on virtual visit to follow up about muscle cramps, headache, polyuria, polydipsia, and night sweats. Most have resolved complexly exept the night sweats and now she is having fatigue as well.    Also  reports all fingers and all toes have tingling in them. She is taking magnesium which has helped. And she gets b12 infections. And recenlty had a b12 that was normal a few months ago.     She had been taking sodium 3mg daily and some electrolyte drinks since her low sodium read. She sees a functional medicine doctor who had prescribed this.     She is fatigued. She is sleeping well enough now though waking up with night sweats still. Which started 4 months ago.   She stopped having her periods about 4 months ago.     ROS    See HPI  Constitutional: Negative for fever, chills and with malaise/fatigue.   HENT: Negative for congestion, itchy watery eyes  Eyes: Negative for pain or sudden changes in vision  Respiratory: Negative for cough and shortness of breath.    Cardiovascular: Negative for leg swelling or chest pain  Gastrointestinal: Negative for nausea, vomiting, abdominal pain and diarrhea.   Genitourinary: Negative for dysuria and hematuria.   Skin: Negative for rash or concerning moles   Neurological: Negative for dizziness, focal weakness   Psychiatric/Behavioral: Negative for depression.  The patient is not nervous/anxious.    Musculoskeletal: no weakness or joint stiffness    Allergies   Allergen Reactions   • Sulfa Drugs Rash     Rash and shortness of breath   • Vicodin  "[Hydrocodone-Acetaminophen] Itching     \"bugs crawling on me\"       Current medicines (including changes today)  Current Outpatient Medications   Medication Sig Dispense Refill   • sodium chloride (SALT) 1 GM Tab TAKE 1 TABLET BY MOUTH 2 TO 3 TIMES A DAY     • MELATONIN PO Take  by mouth.     • Multiple Vitamins-Minerals (MULTIVITAMIN ADULT PO) Take  by mouth.     • vitamin D (CHOLECALCIFEROL) 1000 UNIT Tab Take 1,000 Units by mouth every day.       No current facility-administered medications for this visit.        She  has a past medical history of Herpes genitalia.  She  has a past surgical history that includes appendectomy; hysteroscopy novasure-2 (N/A, 2018); and dilation and curettage (N/A, 2018).      Family History   Problem Relation Age of Onset   • Cancer Mother 50        breast   • Dementia Mother    • Diabetes Father    • Other Father         glioblastoma   • Thyroid Father    • Cancer Father         skin cancer   • Heart Attack Father    • Diabetes Maternal Grandmother    • No Known Problems Daughter      Family Status   Relation Name Status   • Mo     • Fa   at age 77   • Sis  Alive   • MGMo     • MGFa     • PGMo     • PGFa     • Mike  Alive       Patient Active Problem List    Diagnosis Date Noted   • Menopause 10/23/2020   • Hyponatremia 10/02/2020   • Acute nonintractable headache 10/02/2020   • Weight loss 10/02/2020   • Excessive thirst 10/02/2020   • IFG (impaired fasting glucose) 2019   • Dyslipidemia (high LDL; low HDL) 2019   • Genital herpes simplex 2018   • Family history of melanoma 2018   • Vitamin D deficiency 2018   • Insomnia 2018   • Healthcare maintenance 2018          Objective:   Vitals obtained by patient:  Pulse 72 Comment: smart watch  Ht 1.727 m (5' 8\")   Wt 53.5 kg (118 lb)   BMI 17.94 kg/m²     Physical Exam:  Constitutional: Alert, no distress, well-groomed.  Skin: No rashes " in visible areas.  Eye: Round. Conjunctiva clear, lids normal. No icterus.   ENMT: Lips pink without lesions, good dentition, moist mucous membranes. Phonation normal.  Neck: No masses, no thyromegaly. Moves freely without pain.  CV: Pulse as reported by patient  Respiratory: Unlabored respiratory effort, no cough or audible wheeze  Psych: Alert and oriented x3, normal affect and mood.   Neuro: symmetric face. Alert and oriented. Follows commands. No aphasia or dysarthria.    Labs:  Hospital Outpatient Visit on 10/02/2020   Component Date Value Ref Range Status   • Osmolality Serum 10/02/2020 296  278 - 298 mOsm/kg H2O Final   • Osmolality Urine 10/02/2020 165* 300 - 900 mOsm/kg H2O Final   • Sodium 10/02/2020 138  135 - 145 mmol/L Final   • Sodium, Urine -per volume 10/02/2020 27  mmol/L Final    Comment: Reference ranges have not been established for this specimen type.  Result interpretation should include consideration of patient's  medical condition and clinical presentation.     • Sed Rate Westergren 10/02/2020 8  0 - 30 mm/hour Final   • Color 10/02/2020 Yellow   Final   • Character 10/02/2020 Clear   Final   • Specific Gravity 10/02/2020 1.006  <1.035 Final   • Ph 10/02/2020 7.5  5.0 - 8.0 Final   • Glucose 10/02/2020 Negative  Negative mg/dL Final   • Ketones 10/02/2020 Negative  Negative mg/dL Final   • Protein 10/02/2020 Negative  Negative mg/dL Final   • Bilirubin 10/02/2020 Negative  Negative Final   • Urobilinogen, Urine 10/02/2020 0.2  Negative Final   • Nitrite 10/02/2020 Negative  Negative Final   • Leukocyte Esterase 10/02/2020 Small* Negative Final   • Occult Blood 10/02/2020 Negative  Negative Final   • Micro Urine Req 10/02/2020 Microscopic   Final   • LDH Total 10/02/2020 210  107 - 266 U/L Final   • WBC 10/02/2020 0-2  /hpf Final    Comment: Female  <12 Yr 0-2  >12 Yr 0-5  Male   None     • RBC 10/02/2020 0-2  /hpf Final    Comment: Female  >12 Yr 0-2  Male   None     • Bacteria 10/02/2020  Negative  None /hpf Final   • Epithelial Cells 10/02/2020 Negative  /hpf Final   • Hyaline Cast 10/02/2020 0-2  /lpf Final   Hospital Outpatient Visit on 09/25/2020   Component Date Value Ref Range Status   • TSH 09/25/2020 1.000  0.380 - 5.330 uIU/mL Final    Comment: Please note new reference ranges effective 12/14/2017 10:00 AM  Pregnant Females, 1st Trimester  0.050-3.700  Pregnant Females, 2nd Trimester  0.310-4.350  Pregnant Females, 3rd Trimester  0.410-5.180     • Sodium 09/25/2020 132* 135 - 145 mmol/L Final   • Potassium 09/25/2020 4.3  3.6 - 5.5 mmol/L Final   • Chloride 09/25/2020 97  96 - 112 mmol/L Final   • Co2 09/25/2020 24  20 - 33 mmol/L Final   • Anion Gap 09/25/2020 11.0  7.0 - 16.0 Final   • Glucose 09/25/2020 99  65 - 99 mg/dL Final   • Bun 09/25/2020 6* 8 - 22 mg/dL Final   • Creatinine 09/25/2020 0.56  0.50 - 1.40 mg/dL Final   • Calcium 09/25/2020 9.2  8.5 - 10.5 mg/dL Final   • AST(SGOT) 09/25/2020 21  12 - 45 U/L Final   • ALT(SGPT) 09/25/2020 18  2 - 50 U/L Final   • Alkaline Phosphatase 09/25/2020 52  30 - 99 U/L Final   • Total Bilirubin 09/25/2020 1.3  0.1 - 1.5 mg/dL Final   • Albumin 09/25/2020 5.0* 3.2 - 4.9 g/dL Final   • Total Protein 09/25/2020 7.5  6.0 - 8.2 g/dL Final   • Globulin 09/25/2020 2.5  1.9 - 3.5 g/dL Final   • A-G Ratio 09/25/2020 2.0  g/dL Final   • WBC 09/25/2020 5.6  4.8 - 10.8 K/uL Final   • RBC 09/25/2020 4.42  4.20 - 5.40 M/uL Final   • Hemoglobin 09/25/2020 13.6  12.0 - 16.0 g/dL Final   • Hematocrit 09/25/2020 40.7  37.0 - 47.0 % Final   • MCV 09/25/2020 92.1  81.4 - 97.8 fL Final   • MCH 09/25/2020 30.8  27.0 - 33.0 pg Final   • MCHC 09/25/2020 33.4* 33.6 - 35.0 g/dL Final   • RDW 09/25/2020 42.5  35.9 - 50.0 fL Final   • Platelet Count 09/25/2020 238  164 - 446 K/uL Final   • MPV 09/25/2020 11.9  9.0 - 12.9 fL Final   • Neutrophils-Polys 09/25/2020 68.50  44.00 - 72.00 % Final   • Lymphocytes 09/25/2020 24.90  22.00 - 41.00 % Final   • Monocytes 09/25/2020  5.00  0.00 - 13.40 % Final   • Eosinophils 09/25/2020 0.50  0.00 - 6.90 % Final   • Basophils 09/25/2020 0.90  0.00 - 1.80 % Final   • Immature Granulocytes 09/25/2020 0.20  0.00 - 0.90 % Final   • Nucleated RBC 09/25/2020 0.00  /100 WBC Final   • Neutrophils (Absolute) 09/25/2020 3.86  2.00 - 7.15 K/uL Final    Includes immature neutrophils, if present.   • Lymphs (Absolute) 09/25/2020 1.40  1.00 - 4.80 K/uL Final   • Monos (Absolute) 09/25/2020 0.28  0.00 - 0.85 K/uL Final   • Eos (Absolute) 09/25/2020 0.03  0.00 - 0.51 K/uL Final   • Baso (Absolute) 09/25/2020 0.05  0.00 - 0.12 K/uL Final   • Immature Granulocytes (abs) 09/25/2020 0.01  0.00 - 0.11 K/uL Final   • NRBC (Absolute) 09/25/2020 0.00  K/uL Final   • vWF Antigen 09/25/2020 85  52 - 214 % Final    Comment: REFERENCE INTERVAL: von Willebrand Factor, Antigen  Access complete set of age- and/or gender-specific reference  intervals for this test in the [facility] Test Directory  ([email or web address]).  Performed by [facility],  [address] [place] [id number] [phone]  [email or web address], Tanya Landers MD - Lab. Director     • V Leiden Factor 09/25/2020 Negative   Final    Comment: Indication for testing: Assess genetic risk for thrombosis.  NEGATIVE: The factor V Leiden variant, c.1601G>A; p.[id number], was  not detected. This does not exclude a genetic cause for  thrombophilia. If this individual has had a previous venous  thromboembolism, this negative result is unlikely to significantly  reduce the risk for recurrence; thus, future clinical management  to reduce recurrence should not be altered.  This result has been reviewed and approved by Pardeep Laboy, Ph.D.  BACKGROUND INFORMATION: Factor V Leiden (F5) R506Q Mutation  CHARACTERISTICS: Venous thromboembolism (VTE) is multifactorial  caused by a combination of genetic and environmental factors. The  Factor V Leiden (FVL) variant is the most common cause of  inherited VTEs, accounting for over 90 percent  of activated  protein C (APC) resistance. Because the FVL variant eliminates the  APC cleavage site, factor V is inactivated slower, thus persisting  longer in blood circulation, leading to more thrombin productio                           n.  Other genetic risk factors for VTE include, male sex and variants  in antithrombin, protein C, protein S, or factor XIII. Non-genetic  risk factors include, age, smoking, prolonged immobilization,  malignant neoplasms, surgery, pregnancy, oral contraceptives,  estrogen replacement therapy, tamoxifen and raloxifene therapy.  INCIDENCE OF FACTOR V LEIDEN VARIANT: Approximately 5 percent of  Caucasians, 2 percent of Hispanics, 1 percent of  Americans  and 0.5 percent of Asians are  heterozygous; homozygosity occurs in 1 in 1500 Caucasians.  INHERITANCE: Semi-dominant; both heterozygotes and homozygotes are  at increased risk for VTE.  PENETRANCE: Lifetime risk of VTE is 10 percent for heterozygotes  and 80 percent of homozygotes.  CAUSE: The pathogenic gain of function in the F5 gene variant  c.1601G>A (p.[id number]). Legacy nomenclature: R506Q (1691G>A)  CLINICAL SENSITIVITY: 20-50 percent of individuals with an  isolated VTE have the FVL variant.  METHODOLOGY: Polymerase chain                            reaction and fluorescence monitoring.  ANALYTICAL SENSITIVITY AND SPECIFICITY: 99 percent.  LIMITATIONS: Diagnostic errors can occur due to rare sequence  variations. F5 gene mutations, other than p.[id number], will not be  detected.  This test was developed and its performance characteristics  determined by [facility]. It has not been cleared or  approved by the US Food and Drug Administration. This test was  performed in a CLIA certified laboratory and is intended for  clinical purposes.  Counseling and informed consent are recommended for genetic  testing. Consent forms are available online.  Performed by [facility],  [address] [place] [id number]  [phone]  [email or web address], Tanya Landers MD - Lab. Director     • D-Dimer Screen 09/25/2020 0.30  0.00 - 0.50 ug/mL (FEU) Final    Comment: Effective 09/18/2018, please note new units of measure.  A negative D-Dimer result when combined with a clinical  assessment of low probability has been shown to have a high  negative predictive value for DVT or PE.  This assay should not be used independently to exclude or  diagnose DVT or Pulmonary Embolism.  This test methodology does not differentiate between DVT and  PE when an elevation in results is demonstrated.     • Cortisol 09/25/2020 10.2  0.0 - 23.0 ug/dL Final    Comment: Male                   Female  Age                 ug/dL                   ug/dL  5th day              0.6 - 19.8             0.6 - 19.8  2 - 12 month         2.4 - 22.9             2.4 - 22.9  2 - 13 year          2.5 - 22.9             2.5 - 22.9  14 - 15 year         2.5 - 22.9             2.4 - 28.6  16 - 18 year         2.4 - 28.6             2.4 - 28.6  >18years:  AM Cortisol (0800 hours):  6.0-23.0 ug/dL  PM Cortisol (2000 hours):  0.0-9.0 ug/dL  ACTH Stimulation Test: 30-60 minutes post 250 mcg cosyntropin  I.V. A rise of >20 mcg/dL rules out primary adrenal insuffic-  iency but some patients with primary adrenal insufficiency  may have a rise in cortisol less than 20 mcg/dL.     • Acth 09/25/2020 7.2  7.2 - 63.3 pg/mL Final    Comment: INTERPRETIVE INFORMATION: Adrenocorticotropic Hormone  Reference interval based on samples collected between 7 a.m. and  10 a.m.  No reference intervals established for p.m. collections.  Pediatric reference values are the same as adults (Acta Paediatr  Scand 1981;70:341-345).  This assay measures intact ACTH 1-39;  some types of synthetic ACTH and ACTH fragments are not detected  by this assay.  Performed By: [facility]  [address] [place] [id number]  : Tanya Landers MD     • Anti-Cariolipin Ab Igg 09/25/2020 5   0 - 14 GPL Final    Comment: INTERPRETIVE INFORMATION: Anti-Cardiolipin IgG Ab  0-14 GPL: Negative  15-19 GPL: Indeterminate  20-80 GPL: Low to Moderately Positive  81 GPL or above: High Positive  The persistent presence of IgG and/or IgM cardiolipin (CL)  antibodies in moderate or high levels (greater than 40 GPL and/or  greater  than 40 MPL units or greater than 99th percentile) is a  laboratory criterion for the diagnosis of antiphospholipid  syndrome (APS). Persistence is defined as moderate or high levels  of IgG and/or IgM CL antibodies detected in two or more specimens  drawn at least 12 weeks apart (J Throm Haemost. 2006;4:295-306).  Lower positive levels of IgG and/or IgM CL antibodies (above  cutoff but less than 40 GPL and/or less than 40 MPL units) may  occur in patients with the clinical symptoms of APS; therefore,  the actual significance of these levels is undefined. Results  should not be used alone for diagnosis and must be interpreted in  light of APS-specific clinical manifestations and/or other                             criteria phospholipid antibody tests.     • Anti-Cardiolipin Ab Igm 09/25/2020 10  0 - 12 MPL Final    Comment: INTERPRETIVE INFORMATION: Anti-Cardiolipin IgM  0-12 MPL: Negative  13-19 MPL: Indeterminate  20-80 MPL: Low to Moderately Positive  81 MPL or above: High Positive  The persistent presence of IgG and/or IgM cardiolipin (CL)  antibodies in moderate or high levels (greater than 40 GPL and/or  greater than 40 MPL units or greater than 99th percentile) is a  laboratory criterion for the diagnosis of antiphospholipid  syndrome (APS). Persistence is defined as moderate or high levels  of IgG and/or IgM CL antibodies detected  in two or more specimens  drawn at least 12 weeks apart (J Throm Haemost. 2006;4:295-306).  Lower positive levels of IgG and/or IgM CL antibodies (above  cutoff but less than 40 GPL and/or less than 40 MPL units) may  occur in patients with the clinical  symptoms of APS; therefore,  the actual significance of these levels is undefined. Results  should not be used alone for diagnosis and must be interpreted in  light of APS-specific clinical manifestations and/or other  c                           riteria phospholipid antibody tests.     • Anti-Cardiolipin Ab Iga 09/25/2020 1  0 - 11 APL Final    Comment: INTERPRETIVE INFORMATION: Cardiolipin Antibodies, IgA  0-11 APL: Negative  12-19 APL: Indeterminate  20-80 APL: Low to Moderately  Positive  81 APL or above: High Positive  Performed By: [facility]  [address] [place] [id number]  : Tanya Landers MD     • GFR If  09/25/2020 >60  >60 mL/min/1.73 m 2 Final   • GFR If Non  09/25/2020 >60  >60 mL/min/1.73 m 2 Final       Imaging:   Dx-chest-2 Views    Result Date: 10/20/2020  10/20/2020 8:52 AM HISTORY/REASON FOR EXAM:  Unexplained weight loss for one month. TECHNIQUE/EXAM DESCRIPTION AND NUMBER OF VIEWS: Two views of the chest. COMPARISON:  None. FINDINGS: The heart is normal in size. No pulmonary infiltrates or consolidations are noted. No pleural effusions are appreciated. There is mild upper thoracic scoliosis convex left.     No focal pulmonary consolidation    Us-abdomen Complete Survey    Result Date: 10/20/2020  10/20/2020 8:18 AM HISTORY/REASON FOR EXAM: Weight loss TECHNIQUE/EXAM DESCRIPTION AND NUMBER OF VIEWS:  Complete abdomen survey. COMPARISON: None FINDINGS: The liver is normal in contour. There is no evidence of solid mass lesion. The liver measures 14.86 cm. The gallbladder is normal. There is no evidence of cholelithiasis. The gallbladder wall thickness measures 1.40 mm There is no pericholecystic fluid. The common duct measures 4.00 mm. The visualized pancreas is unremarkable. The visualized aorta is normal in caliber. Intrahepatic IVC is patent. The portal vein is patent with hepatopetal flow. The MPV measures 1.0 cm. The right  kidney measures 9.58 cm. The left kidney measures 10.21 cm. There is no hydronephrosis. The spleen measures 9.02 cm maximally. The bladder demonstrates no focal wall abnormality. There is no ascites.     Unremarkable abdominal ultrasound.     Ma-screening Mammo Bilat W/tomosynthesis W/cad    Result Date: 10/21/2020  10/20/2020 9:00 AM HISTORY/REASON FOR EXAM:  Routine Mammographic Screening. TECHNIQUE/EXAM DESCRIPTION: Bilateral tomosynthesis screening mammography was performed with standard mammographic images generated from the data set. Images were reviewed and interpreted with CAD. COMPARISON:   04/08/2019 FINDINGS: The breast parenchyma is extremely dense which limits the sensitivity of mammography. There is no dominant mass, suspicious calcification, or any secondary malignant sign.     1.  Breasts are extremely dense, which lowers sensitivity of mammography. No gross evidence of malignancy and no interval change. 2.  Screening mammogram in one year is recommended. R1 - Category 1:  Negative      Assessment and Plan:   The following treatment plan was discussed:   -Sodium normalized. Urine osm was low.   -she was 120 last visit 118 today. Will need to monitor this. mammo negative cxr wnl abd us wnl with substantial blood work mostly normal including tsh and cortisol.   -I suspect she is going through menopause. Discussed at length.   -I am not sure what is going on with her sodium but I know prior labs have been masked by infusions with her functional doctor, as well as taking sodium replacement tabs. Will repeat sodium and if she is normal will just monitor. Otherwise will do further workup for hyponatremia.   -with le on urine. Follow up dysuria, urgency, incontinence, suprapubic discomfort. Message sent in [employer]   Problem List Items Addressed This Visit     Hyponatremia    Relevant Orders    SODIUM SERUM (NA)    Menopause     She prefers herbal remedies. Discussed evening primrose 500mg. discussed  headache and gi upset. Ginseng 200mg for fatgue though insufficient date. Vit e 50mg for hotflashes. Discussed avoiding red clover. insuf datea black cohosh and ginko.                Follow-up: Return if symptoms worsen or fail to improve.        Portions of this note may be dictated using [employer] voice recognition software.  Variances in spelling and vocabulary are possible and unintentional.  Not all areas may be caught/corrected.  Please notify me if any discrepancies are noted or if the meaning of any statement is not correct/clear.     5

## 2020-10-27 ENCOUNTER — APPOINTMENT (OUTPATIENT)
Dept: ELECTROPHYSIOLOGY | Facility: CLINIC | Age: 70
End: 2020-10-27
Payer: MEDICARE

## 2020-10-27 PROCEDURE — 93296 REM INTERROG EVL PM/IDS: CPT

## 2020-10-27 PROCEDURE — 93295 DEV INTERROG REMOTE 1/2/MLT: CPT

## 2020-11-07 NOTE — PATIENT PROFILE ADULT - SPECIFY WHICH ONES ARE ON CHART
Health Care Proxy (HCP) [20/___] : left eye 20/[unfilled] [No Acute Distress] : no acute distress [Well Nourished] : well nourished [Well Developed] : well developed [Well-Appearing] : well-appearing [Normal Sclera/Conjunctiva] : normal sclera/conjunctiva [PERRL] : pupils equal round and reactive to light [EOMI] : extraocular movements intact [Normal Outer Ear/Nose] : the outer ears and nose were normal in appearance [Normal Oropharynx] : the oropharynx was normal [No JVD] : no jugular venous distention [No Lymphadenopathy] : no lymphadenopathy [Supple] : supple [Thyroid Normal, No Nodules] : the thyroid was normal and there were no nodules present [No Respiratory Distress] : no respiratory distress  [No Accessory Muscle Use] : no accessory muscle use [Clear to Auscultation] : lungs were clear to auscultation bilaterally [Normal Rate] : normal rate  [Regular Rhythm] : with a regular rhythm [Normal S1, S2] : normal S1 and S2 [No Murmur] : no murmur heard [No Carotid Bruits] : no carotid bruits [No Abdominal Bruit] : a ~M bruit was not heard ~T in the abdomen [No Varicosities] : no varicosities [Pedal Pulses Present] : the pedal pulses are present [No Edema] : there was no peripheral edema [No Palpable Aorta] : no palpable aorta [No Extremity Clubbing/Cyanosis] : no extremity clubbing/cyanosis [Soft] : abdomen soft [Non Tender] : non-tender [Non-distended] : non-distended [No Masses] : no abdominal mass palpated [No HSM] : no HSM [Normal Bowel Sounds] : normal bowel sounds [Normal Posterior Cervical Nodes] : no posterior cervical lymphadenopathy [Normal Anterior Cervical Nodes] : no anterior cervical lymphadenopathy [No CVA Tenderness] : no CVA  tenderness [No Spinal Tenderness] : no spinal tenderness [No Joint Swelling] : no joint swelling [Grossly Normal Strength/Tone] : grossly normal strength/tone [No Rash] : no rash [Coordination Grossly Intact] : coordination grossly intact [No Focal Deficits] : no focal deficits [Normal Gait] : normal gait [Normal Affect] : the affect was normal [Normal Insight/Judgement] : insight and judgment were intact [FreeTextEntry1] : Right\par 0.5-30\par 1-25\par 2-15\par 4-15\par \par Left\par 0.5-30\par 1-25\par 2-15\par 4-15

## 2021-01-26 ENCOUNTER — APPOINTMENT (OUTPATIENT)
Dept: ELECTROPHYSIOLOGY | Facility: CLINIC | Age: 71
End: 2021-01-26
Payer: MEDICARE

## 2021-01-26 PROCEDURE — 93295 DEV INTERROG REMOTE 1/2/MLT: CPT

## 2021-01-26 PROCEDURE — 93296 REM INTERROG EVL PM/IDS: CPT

## 2021-04-27 ENCOUNTER — NON-APPOINTMENT (OUTPATIENT)
Age: 71
End: 2021-04-27

## 2021-04-27 ENCOUNTER — APPOINTMENT (OUTPATIENT)
Dept: ELECTROPHYSIOLOGY | Facility: CLINIC | Age: 71
End: 2021-04-27
Payer: MEDICARE

## 2021-04-27 PROCEDURE — 93296 REM INTERROG EVL PM/IDS: CPT

## 2021-04-27 PROCEDURE — 93295 DEV INTERROG REMOTE 1/2/MLT: CPT

## 2021-07-06 ENCOUNTER — INPATIENT (INPATIENT)
Facility: HOSPITAL | Age: 71
LOS: 12 days | Discharge: HOME | End: 2021-07-19
Attending: FAMILY MEDICINE | Admitting: FAMILY MEDICINE
Payer: MEDICARE

## 2021-07-06 VITALS
OXYGEN SATURATION: 99 % | HEIGHT: 65 IN | HEART RATE: 91 BPM | TEMPERATURE: 99 F | DIASTOLIC BLOOD PRESSURE: 86 MMHG | WEIGHT: 199.96 LBS | RESPIRATION RATE: 21 BRPM | SYSTOLIC BLOOD PRESSURE: 149 MMHG

## 2021-07-06 DIAGNOSIS — Z95.810 PRESENCE OF AUTOMATIC (IMPLANTABLE) CARDIAC DEFIBRILLATOR: Chronic | ICD-10-CM

## 2021-07-06 DIAGNOSIS — Z98.89 OTHER SPECIFIED POSTPROCEDURAL STATES: Chronic | ICD-10-CM

## 2021-07-06 DIAGNOSIS — Z85.46 PERSONAL HISTORY OF MALIGNANT NEOPLASM OF PROSTATE: Chronic | ICD-10-CM

## 2021-07-06 DIAGNOSIS — Z93.0 TRACHEOSTOMY STATUS: Chronic | ICD-10-CM

## 2021-07-06 PROCEDURE — 99285 EMERGENCY DEPT VISIT HI MDM: CPT

## 2021-07-06 RX ORDER — ACETAMINOPHEN 500 MG
975 TABLET ORAL ONCE
Refills: 0 | Status: DISCONTINUED | OUTPATIENT
Start: 2021-07-06 | End: 2021-07-07

## 2021-07-06 RX ORDER — CIPROFLOXACIN LACTATE 400MG/40ML
400 VIAL (ML) INTRAVENOUS ONCE
Refills: 0 | Status: COMPLETED | OUTPATIENT
Start: 2021-07-06 | End: 2021-07-06

## 2021-07-06 NOTE — ED ADULT TRIAGE NOTE - CHIEF COMPLAINT QUOTE
71 y/o male PMHx HTN, brain anoxic injury BIBA for fever 101.8 at home and R- ear drainage x 2 days. as per wife, patient not at baseline

## 2021-07-06 NOTE — ED PROVIDER NOTE - CLINICAL SUMMARY MEDICAL DECISION MAKING FREE TEXT BOX
bedbound, largely nonverbal pt w/ evidence of moderate / severe R-sided otitis externa (visible purulent discharge on exam) and fevers. No evidence of mastoiditis on CT head. Elevated ESR to support diagnosis. Will admit pt for IV abx / further mgmt.

## 2021-07-06 NOTE — ED PROVIDER NOTE - OBJECTIVE STATEMENT
69 y/o M with PMHx of HTN, Anoxic Encephalopathy, Gait abnormality and Prostate Cancer and PSHx of Bowel Resection presents to the ED BIBEMS c/o right ear discharge for x2 days, as well as a subjective fever of 101.8. As per pt's wife, pt had a fever for the last x2-3 days, and has been managed with Tylenol by his wife every 8 hours, with last dose given around lunchtime today. As per wife, pt is verbal and bedbound at baseline, but noticed today pt wasn't verbalizing much, was drooling and was unable to eat or drink. Endorses intermittent SOB, but denies chills, cough, sore throat, vision changes or have any other complaints of pain. As per wife, pt develops rashes when ingesting Penicillin. Patient denies EtOH/tobacco/illicit substance use.

## 2021-07-06 NOTE — ED PROVIDER NOTE - RIGHT EAR
right ear canal notably stenotic with purulent drainage at opening. Inner tragus with erythema and notably swollen

## 2021-07-07 DIAGNOSIS — H60.391 OTHER INFECTIVE OTITIS EXTERNA, RIGHT EAR: ICD-10-CM

## 2021-07-07 DIAGNOSIS — R53.2 FUNCTIONAL QUADRIPLEGIA: ICD-10-CM

## 2021-07-07 DIAGNOSIS — Z29.9 ENCOUNTER FOR PROPHYLACTIC MEASURES, UNSPECIFIED: ICD-10-CM

## 2021-07-07 DIAGNOSIS — I10 ESSENTIAL (PRIMARY) HYPERTENSION: ICD-10-CM

## 2021-07-07 DIAGNOSIS — R06.00 DYSPNEA, UNSPECIFIED: ICD-10-CM

## 2021-07-07 DIAGNOSIS — C61 MALIGNANT NEOPLASM OF PROSTATE: ICD-10-CM

## 2021-07-07 DIAGNOSIS — G93.41 METABOLIC ENCEPHALOPATHY: ICD-10-CM

## 2021-07-07 DIAGNOSIS — K57.90 DIVERTICULOSIS OF INTESTINE, PART UNSPECIFIED, WITHOUT PERFORATION OR ABSCESS WITHOUT BLEEDING: ICD-10-CM

## 2021-07-07 LAB
ALBUMIN SERPL ELPH-MCNC: 3.3 G/DL — SIGNIFICANT CHANGE UP (ref 3.3–5)
ALP SERPL-CCNC: 81 U/L — SIGNIFICANT CHANGE UP (ref 40–120)
ALT FLD-CCNC: 16 U/L — SIGNIFICANT CHANGE UP (ref 12–78)
ANION GAP SERPL CALC-SCNC: 8 MMOL/L — SIGNIFICANT CHANGE UP (ref 5–17)
APPEARANCE UR: CLEAR — SIGNIFICANT CHANGE UP
AST SERPL-CCNC: 13 U/L — LOW (ref 15–37)
BACTERIA # UR AUTO: ABNORMAL
BASOPHILS # BLD AUTO: 0.05 K/UL — SIGNIFICANT CHANGE UP (ref 0–0.2)
BASOPHILS NFR BLD AUTO: 0.9 % — SIGNIFICANT CHANGE UP (ref 0–2)
BILIRUB SERPL-MCNC: 0.4 MG/DL — SIGNIFICANT CHANGE UP (ref 0.2–1.2)
BILIRUB UR-MCNC: NEGATIVE — SIGNIFICANT CHANGE UP
BUN SERPL-MCNC: 12 MG/DL — SIGNIFICANT CHANGE UP (ref 7–23)
CALCIUM SERPL-MCNC: 8.4 MG/DL — LOW (ref 8.5–10.1)
CHLORIDE SERPL-SCNC: 96 MMOL/L — SIGNIFICANT CHANGE UP (ref 96–108)
CO2 SERPL-SCNC: 25 MMOL/L — SIGNIFICANT CHANGE UP (ref 22–31)
COLOR SPEC: YELLOW — SIGNIFICANT CHANGE UP
CREAT SERPL-MCNC: 0.79 MG/DL — SIGNIFICANT CHANGE UP (ref 0.5–1.3)
CRP SERPL-MCNC: 31 MG/L — HIGH
DIFF PNL FLD: ABNORMAL
EOSINOPHIL # BLD AUTO: 0.07 K/UL — SIGNIFICANT CHANGE UP (ref 0–0.5)
EOSINOPHIL NFR BLD AUTO: 1.2 % — SIGNIFICANT CHANGE UP (ref 0–6)
EPI CELLS # UR: SIGNIFICANT CHANGE UP
ERYTHROCYTE [SEDIMENTATION RATE] IN BLOOD: 33 MM/HR — HIGH (ref 0–20)
GLUCOSE SERPL-MCNC: 115 MG/DL — HIGH (ref 70–99)
GLUCOSE UR QL: NEGATIVE MG/DL — SIGNIFICANT CHANGE UP
HCT VFR BLD CALC: 38.5 % — LOW (ref 39–50)
HGB BLD-MCNC: 13.3 G/DL — SIGNIFICANT CHANGE UP (ref 13–17)
IMM GRANULOCYTES NFR BLD AUTO: 0.7 % — SIGNIFICANT CHANGE UP (ref 0–1.5)
KETONES UR-MCNC: ABNORMAL
LACTATE SERPL-SCNC: 1.9 MMOL/L — SIGNIFICANT CHANGE UP (ref 0.7–2)
LEUKOCYTE ESTERASE UR-ACNC: ABNORMAL
LYMPHOCYTES # BLD AUTO: 1.27 K/UL — SIGNIFICANT CHANGE UP (ref 1–3.3)
LYMPHOCYTES # BLD AUTO: 22.3 % — SIGNIFICANT CHANGE UP (ref 13–44)
MCHC RBC-ENTMCNC: 26.4 PG — LOW (ref 27–34)
MCHC RBC-ENTMCNC: 34.5 GM/DL — SIGNIFICANT CHANGE UP (ref 32–36)
MCV RBC AUTO: 76.5 FL — LOW (ref 80–100)
MONOCYTES # BLD AUTO: 0.62 K/UL — SIGNIFICANT CHANGE UP (ref 0–0.9)
MONOCYTES NFR BLD AUTO: 10.9 % — SIGNIFICANT CHANGE UP (ref 2–14)
NEUTROPHILS # BLD AUTO: 3.65 K/UL — SIGNIFICANT CHANGE UP (ref 1.8–7.4)
NEUTROPHILS NFR BLD AUTO: 64 % — SIGNIFICANT CHANGE UP (ref 43–77)
NITRITE UR-MCNC: NEGATIVE — SIGNIFICANT CHANGE UP
NRBC # BLD: 0 /100 WBCS — SIGNIFICANT CHANGE UP (ref 0–0)
PH UR: 7 — SIGNIFICANT CHANGE UP (ref 5–8)
PLATELET # BLD AUTO: 238 K/UL — SIGNIFICANT CHANGE UP (ref 150–400)
POTASSIUM SERPL-MCNC: 4.4 MMOL/L — SIGNIFICANT CHANGE UP (ref 3.5–5.3)
POTASSIUM SERPL-SCNC: 4.4 MMOL/L — SIGNIFICANT CHANGE UP (ref 3.5–5.3)
PROT SERPL-MCNC: 7.7 GM/DL — SIGNIFICANT CHANGE UP (ref 6–8.3)
PROT UR-MCNC: 30 MG/DL
RAPID RVP RESULT: SIGNIFICANT CHANGE UP
RBC # BLD: 5.03 M/UL — SIGNIFICANT CHANGE UP (ref 4.2–5.8)
RBC # FLD: 14.2 % — SIGNIFICANT CHANGE UP (ref 10.3–14.5)
RBC CASTS # UR COMP ASSIST: ABNORMAL /HPF (ref 0–4)
SARS-COV-2 RNA SPEC QL NAA+PROBE: SIGNIFICANT CHANGE UP
SODIUM SERPL-SCNC: 129 MMOL/L — LOW (ref 135–145)
SP GR SPEC: 1.01 — SIGNIFICANT CHANGE UP (ref 1.01–1.02)
UROBILINOGEN FLD QL: NEGATIVE MG/DL — SIGNIFICANT CHANGE UP
WBC # BLD: 5.7 K/UL — SIGNIFICANT CHANGE UP (ref 3.8–10.5)
WBC # FLD AUTO: 5.7 K/UL — SIGNIFICANT CHANGE UP (ref 3.8–10.5)
WBC UR QL: SIGNIFICANT CHANGE UP

## 2021-07-07 PROCEDURE — 99222 1ST HOSP IP/OBS MODERATE 55: CPT

## 2021-07-07 PROCEDURE — 70460 CT HEAD/BRAIN W/DYE: CPT | Mod: 26,MA

## 2021-07-07 PROCEDURE — 71045 X-RAY EXAM CHEST 1 VIEW: CPT | Mod: 26

## 2021-07-07 PROCEDURE — 99223 1ST HOSP IP/OBS HIGH 75: CPT

## 2021-07-07 PROCEDURE — 12345: CPT | Mod: NC

## 2021-07-07 PROCEDURE — 93970 EXTREMITY STUDY: CPT | Mod: 26

## 2021-07-07 PROCEDURE — 71250 CT THORAX DX C-: CPT | Mod: 26

## 2021-07-07 RX ORDER — SODIUM CHLORIDE 9 MG/ML
1000 INJECTION INTRAMUSCULAR; INTRAVENOUS; SUBCUTANEOUS ONCE
Refills: 0 | Status: COMPLETED | OUTPATIENT
Start: 2021-07-07 | End: 2021-07-07

## 2021-07-07 RX ORDER — CIPROFLOXACIN LACTATE 400MG/40ML
400 VIAL (ML) INTRAVENOUS EVERY 12 HOURS
Refills: 0 | Status: DISCONTINUED | OUTPATIENT
Start: 2021-07-07 | End: 2021-07-11

## 2021-07-07 RX ORDER — ACETAMINOPHEN 500 MG
1000 TABLET ORAL ONCE
Refills: 0 | Status: COMPLETED | OUTPATIENT
Start: 2021-07-07 | End: 2021-07-07

## 2021-07-07 RX ORDER — IPRATROPIUM/ALBUTEROL SULFATE 18-103MCG
3 AEROSOL WITH ADAPTER (GRAM) INHALATION EVERY 6 HOURS
Refills: 0 | Status: DISCONTINUED | OUTPATIENT
Start: 2021-07-07 | End: 2021-07-19

## 2021-07-07 RX ORDER — HYDRALAZINE HCL 50 MG
5 TABLET ORAL EVERY 6 HOURS
Refills: 0 | Status: DISCONTINUED | OUTPATIENT
Start: 2021-07-07 | End: 2021-07-19

## 2021-07-07 RX ORDER — SODIUM CHLORIDE 9 MG/ML
1000 INJECTION, SOLUTION INTRAVENOUS
Refills: 0 | Status: COMPLETED | OUTPATIENT
Start: 2021-07-07 | End: 2021-07-07

## 2021-07-07 RX ORDER — NEOMYCIN/POLYMYXIN B/HYDROCORT
4 SUSPENSION, DROPS(FINAL DOSAGE FORM)(ML) OTIC (EAR) THREE TIMES A DAY
Refills: 0 | Status: DISCONTINUED | OUTPATIENT
Start: 2021-07-07 | End: 2021-07-11

## 2021-07-07 RX ORDER — SODIUM CHLORIDE 9 MG/ML
1000 INJECTION INTRAMUSCULAR; INTRAVENOUS; SUBCUTANEOUS
Refills: 0 | Status: DISCONTINUED | OUTPATIENT
Start: 2021-07-07 | End: 2021-07-08

## 2021-07-07 RX ORDER — ACETAMINOPHEN 500 MG
650 TABLET ORAL EVERY 6 HOURS
Refills: 0 | Status: DISCONTINUED | OUTPATIENT
Start: 2021-07-07 | End: 2021-07-19

## 2021-07-07 RX ADMIN — Medication 400 MILLIGRAM(S): at 03:01

## 2021-07-07 RX ADMIN — SODIUM CHLORIDE 100 MILLILITER(S): 9 INJECTION INTRAMUSCULAR; INTRAVENOUS; SUBCUTANEOUS at 20:34

## 2021-07-07 RX ADMIN — Medication 1000 MILLIGRAM(S): at 16:41

## 2021-07-07 RX ADMIN — Medication 5 MILLIGRAM(S): at 10:07

## 2021-07-07 RX ADMIN — Medication 400 MILLIGRAM(S): at 16:26

## 2021-07-07 RX ADMIN — Medication 400 MILLIGRAM(S): at 02:58

## 2021-07-07 RX ADMIN — SODIUM CHLORIDE 1000 MILLILITER(S): 9 INJECTION INTRAMUSCULAR; INTRAVENOUS; SUBCUTANEOUS at 05:24

## 2021-07-07 RX ADMIN — Medication 200 MILLIGRAM(S): at 14:50

## 2021-07-07 RX ADMIN — SODIUM CHLORIDE 50 MILLILITER(S): 9 INJECTION, SOLUTION INTRAVENOUS at 06:25

## 2021-07-07 RX ADMIN — Medication 200 MILLIGRAM(S): at 01:21

## 2021-07-07 NOTE — SWALLOW BEDSIDE ASSESSMENT ADULT - COMMENTS
PMH of Anoxic encephalopathy, prostate cancer, HTN, bowel resection ;  minimally verbal and bedbound at baseline  7/7 CT chest no lung consolidations; CT head no acute intracranial disease  7/7 Pulmonary note; dyspnea; has ho trach in past Has ho anoxic encephalopathy and is at risk for aspn pneum PMH of Anoxic encephalopathy, prostate cancer, HTN, bowel resection ;  minimally verbal and bedbound at baseline  7/7 CT chest no lung consolidations; CT head no acute intracranial disease  7/7 Pulmonary note; dyspnea; has ho trach in past; has ho anoxic encephalopathy and is at risk for asp pneum

## 2021-07-07 NOTE — H&P ADULT - HISTORY OF PRESENT ILLNESS
Patient is a 70M with a PMH of Anoxic encephalopathy, prostate cancer, HTN, bowel resection who presents to the ED for R ear discharge.  Patient minimally verbal and bedbound at baseline, unable to provide history.  Wife at the bedside to provide history.  Patient noted to have R ear discharge for two days along with temperatures at home.  Wife states that she has been giving him tylenol at home without resolution of the fever.  Per wife, patient was talking much less and was not accepting PO intake so she presents to the ED for evaluation.  Denies history of ear infections.  Patient also noted to have sporadic dyspnea over the same time span.  No other complaints.  Current SpO2 on 2L via NC is 92%.  Vitals stable, labs benign.  Will admit to med surg.

## 2021-07-07 NOTE — SWALLOW BEDSIDE ASSESSMENT ADULT - SLP GENERAL OBSERVATIONS
lethargic without eye opening despite max tactile cues from wife and SLP; unable to follow directions; did not arouse from deep sleep and RN confirms this is baseline all day

## 2021-07-07 NOTE — CONSULT NOTE ADULT - SUBJECTIVE AND OBJECTIVE BOX
ANITHA LAMB    [facility] 02    Patient is a 70y old  Male who presents with a chief complaint of sepsis, ear infection (07 Jul 2021 05:04)       Allergies    lisinopril (Unknown)  penicillins (Unknown)    Intolerances        HPI:  Patient is a 70M with a PMH of Anoxic encephalopathy, prostate cancer, HTN, bowel resection who presents to the ED for R ear discharge.  Patient minimally verbal and bedbound at baseline, unable to provide history.  Wife at the bedside to provide history.  Patient noted to have R ear discharge for two days along with temperatures at home.  Wife states that she has been giving him tylenol at home without resolution of the fever.  Per wife, patient was talking much less and was not accepting PO intake so she presents to the ED for evaluation.  Denies history of ear infections.  Patient also noted to have sporadic dyspnea over the same time span.  No other complaints.  Current SpO2 on 2L via NC is 92%.  Vitals stable, labs benign.  Will admit to med surg.     (07 Jul 2021 05:04)      PAST MEDICAL & SURGICAL HISTORY:  Hypertension    Prostate cancer  radiation therapy    Brain injury with coma  x 2 weeks in 2008    Anoxic encephalopathy    Leg pain, right    Gait abnormality    Sudden cardiac death    Status post cryoablation  of left renal mass    H/O prostate cancer  resection and radiation therapy    H/O tracheostomy    S/P ICD (internal cardiac defibrillator) procedure  implanted on 1/6/2009        FAMILY HISTORY:  FH: HTN (hypertension)          MEDICATIONS   ciprofloxacin   IVPB 400 milliGRAM(s) IV Intermittent every 12 hours  hydrALAZINE Injectable 5 milliGRAM(s) IV Push every 6 hours PRN      Vital Signs Last 24 Hrs  T(C): 37.6 (07 Jul 2021 06:09), Max: 38.3 (07 Jul 2021 04:26)  T(F): 99.6 (07 Jul 2021 06:09), Max: 100.9 (07 Jul 2021 04:26)  HR: 83 (07 Jul 2021 06:09) (83 - 91)  BP: 121/60 (07 Jul 2021 06:09) (121/60 - 149/86)  BP(mean): --  RR: 17 (07 Jul 2021 06:09) (17 - 21)  SpO2: 97% (07 Jul 2021 06:09) (93% - 99%)            LABS:                        13.3   5.70  )-----------( 238      ( 07 Jul 2021 00:54 )             38.5     07-07    129<L>  |  96  |  12  ----------------------------<  115<H>  4.4   |  25  |  0.79    Ca    8.4<L>      07 Jul 2021 00:54    TPro  7.7  /  Alb  3.3  /  TBili  0.4  /  DBili  x   /  AST  13<L>  /  ALT  16  /  AlkPhos  81  07-07              WBC:  WBC Count: 5.70 K/uL (07-07 @ 00:54)      MICROBIOLOGY:  RECENT CULTURES:                  Sodium:  Sodium, Serum: 129 mmol/L (07-07 @ 00:54)      0.79 mg/dL 07-07 @ 00:54      Hemoglobin:  Hemoglobin: 13.3 g/dL (07-07 @ 00:54)      Platelets: Platelet Count - Automated: 238 K/uL (07-07 @ 00:54)      LIVER FUNCTIONS - ( 07 Jul 2021 00:54 )  Alb: 3.3 g/dL / Pro: 7.7 gm/dL / ALK PHOS: 81 U/L / ALT: 16 U/L / AST: 13 U/L / GGT: x                 RADIOLOGY & ADDITIONAL STUDIES:

## 2021-07-07 NOTE — ED ADULT NURSE NOTE - OBJECTIVE STATEMENT
69 y/o male PMHx HTN, brain anoxic injury BIBA for fever 101.8 at home and R- ear drainage x 2 days. as per wife, patient not at baseline. Pt currently sleeping, breathing with ease.

## 2021-07-07 NOTE — H&P ADULT - NSHPLABSRESULTS_GEN_ALL_CORE
Recent Vitals  T(C): 38.3 (07-07-21 @ 04:26), Max: 38.3 (07-07-21 @ 04:26)  HR: 90 (07-07-21 @ 04:26) (90 - 91)  BP: 128/56 (07-07-21 @ 04:26) (128/56 - 149/86)  RR: 18 (07-07-21 @ 04:26) (18 - 21)  SpO2: 93% (07-07-21 @ 04:26) (93% - 99%)                        13.3   5.70  )-----------( 238      ( 07 Jul 2021 00:54 )             38.5     07-07    129<L>  |  96  |  12  ----------------------------<  115<H>  4.4   |  25  |  0.79    Ca    8.4<L>      07 Jul 2021 00:54    TPro  7.7  /  Alb  3.3  /  TBili  0.4  /  DBili  x   /  AST  13<L>  /  ALT  16  /  AlkPhos  81  07-07      LIVER FUNCTIONS - ( 07 Jul 2021 00:54 )  Alb: 3.3 g/dL / Pro: 7.7 gm/dL / ALK PHOS: 81 U/L / ALT: 16 U/L / AST: 13 U/L / GGT: x               Home Medications:  multivitamin: 1 tab(s) orally once a day (23 Aug 2019 12:11)  Vitamin D3 2000 intl units oral capsule: 1 cap(s) orally once a day (23 Aug 2019 12:11)  zinc oxide 40% topical ointment: 1 application topically 3 times a day, As needed, skin breakdown (30 Oct 2019 13:37)

## 2021-07-07 NOTE — SWALLOW BEDSIDE ASSESSMENT ADULT - SPECIFY REASON(S)
Clinical assessment of swallow function Septic encephalopathy.  Plan: Change in mental status, poor swallowing. Clinical assessment of swallow function; Septic encephalopathy; Change in mental status, poor swallowing

## 2021-07-07 NOTE — SWALLOW BEDSIDE ASSESSMENT ADULT - NS SPL SWALLOW CLINIC TRIAL FT
pharyngeal dry swallow was elicited; however insufficient bolus remained in mouth for propulsion as mostly fell anteriorly and laterally after spoon placement

## 2021-07-07 NOTE — ED ADULT NURSE NOTE - CHIEF COMPLAINT QUOTE
69 y/o male PMHx HTN, brain anoxic injury BIBA for fever 101.8 at home and R- ear drainage x 2 days. as per wife, patient not at baseline

## 2021-07-07 NOTE — PATIENT PROFILE ADULT - LEGAL HELP
Pt is a 66 yr old  male whom presents today for a screening colonoscopy. Pt denies abdominal pain, hematochezia, melena, abnormal weight loss, or family hx of colon cancer. Pt has never had a colonoscopy.  Pt takes Plavix for A-fib.
no

## 2021-07-07 NOTE — PROGRESS NOTE ADULT - ASSESSMENT
Patient is a 70M with a PMH of Anoxic encephalopathy, prostate cancer, HTN, bowel resection who presents to the ED for R ear discharge.  Patient minimally verbal and bedbound at baseline, unable to provide history.  Wife at the bedside to provide history.  Patient noted to have R ear discharge for two days along with temperatures at home.  Wife states that she has been giving him tylenol at home without resolution of the fever.  Per wife, patient was talking much less and was not accpting PO intake so she presente to the ED for evaluation.  Denies history of ear infections.  Patient also noted to have sporadic dyspnea over the same time span.  No other complaints.  Current SpO2 on 2L via NC is 92%.  Vitals stable, labs benign.  Will admit to med surg.

## 2021-07-07 NOTE — ED ADULT NURSE NOTE - NSIMPLEMENTINTERV_GEN_ALL_ED
Implemented All Fall Risk Interventions:  [place] to call system. Call bell, personal items and telephone within reach. Instruct patient to call for assistance. Room bathroom lighting operational. Non-slip footwear when patient is off stretcher. Physically safe environment: no spills, clutter or unnecessary equipment. Stretcher in lowest position, wheels locked, appropriate side rails in place. Provide visual cue, wrist band, yellow gown, etc. Monitor gait and stability. Monitor for mental status changes and reorient to person, place, and time. Review medications for side effects contributing to fall risk. Reinforce activity limits and safety measures with patient and family.

## 2021-07-07 NOTE — H&P ADULT - NSHPPHYSICALEXAM_GEN_ALL_CORE
Physical exam:  General: patient in no acute distress, resting comfortably  Head:  Atraumatic, Normocephalic  Eyes: EOMI, PERRLA, clear sclera  Ears: L ear exam benign. R ear with purulent drainage at the external canal, external canal swelling and erythema, erythema of inner ear as well.  Neck: Supple, thyroid nontender, non enlarged  Cardio: S1/S2 +ve, regular rate and rhythm, no M/G/R  Resp: clear to ausculation bilaterally, no rales or wheezes  GI: abdomen soft, nontender, non distended, no guarding, BS +ve x 4  Ext: no significant pedal edema  Neuro: CN 2-12 intact, no significant motor or sensory deficits.  Skin: No rashes or lesions

## 2021-07-07 NOTE — H&P ADULT - PROBLEM SELECTOR PLAN 1
Change in mental status, poor swallowing.  Will make NPO.  swallow eval in am  Started on cipro, will continue.  Follow blood cultures - de-escalate antibiotics as needed   ID consult in Am - Chantel

## 2021-07-07 NOTE — H&P ADULT - ASSESSMENT
Patient is a 70M with a PMH of Anoxic encephalopathy, prostate cancer, HTN, bowel resection who presents to the ED for R ear discharge.  Patient minimally verbal and bedbound at baseline, unable to provide history.  Wife at the bedside to provide history.  Patient noted to have R ear discharge for two days along with temperatures at home.  Wife states that she has been giving him tylenol at home without resolution of the fever.  Per wife, patient was talking much less and was not accpting PO intake so she presente to the ED for evaluation.  Denies history of ear infections.  Patient also noted to have sporadic dyspnea over the same time span.  No other complaints.  Current SpO2 on 2L via NC is 92%.  Vitals stable, labs benign.  Will admit to med surg.        IMPROVE VTE Individual Risk Assessment          RISK                                                          Points  [  ] Previous VTE                                                3  [  ] Thrombophilia                                             2  [  ] Lower limb paralysis                                    2        (unable to hold up >15 seconds)    [  ] Current Cancer                                             2         (within 6 months)  [  ] Immobilization > 24 hrs                              1  [  ] ICU/CCU stay > 24 hours                            1  [  ] Age > 60                                                    1    IMPROVE VTE Score - 1

## 2021-07-07 NOTE — CONSULT NOTE ADULT - SUBJECTIVE AND OBJECTIVE BOX
full note to follow ANITHA LAMB  MRN-[id number]        Patient is a 70y old  Male who presents with a chief complaint of sepsis, ear infection (2021 20:23)      HPI:  Patient is a 70M with a PMH of Anoxic encephalopathy, prostate cancer, HTN, bowel resection who presents to the ED for R ear discharge.  Patient minimally verbal and bedbound at baseline, unable to provide history.  Wife at the bedside to provide history.  Patient noted to have R ear discharge for two days along with temperatures at home.  Wife states that she has been giving him tylenol at home without resolution of the fever.  Per wife, patient was talking much less and was not accepting PO intake so she presents to the ED for evaluation.  Denies history of ear infections.  Patient also noted to have sporadic dyspnea over the same time span.  No other complaints.  Current SpO2 on 2L via NC is 92%.  Vitals stable, labs benign.  Will admit to med surg.     (2021 05:04)      ID consulted for workup and antibiotic management     PAST MEDICAL & SURGICAL HISTORY:  Hypertension    Prostate cancer  radiation therapy    Brain injury with coma  x 2 weeks in     Anoxic encephalopathy    Leg pain, right    Gait abnormality    Sudden cardiac death    Status post cryoablation  of left renal mass    H/O prostate cancer  resection and radiation therapy    H/O tracheostomy    S/P ICD (internal cardiac defibrillator) procedure  implanted on 2009        Allergies  lisinopril (Unknown)  penicillins (Unknown)        ANTIMICROBIALS:  ciprofloxacin   IVPB 400 every 12 hours      MEDICATIONS  (STANDING):  ciprofloxacin   IVPB   200 mL/Hr IV Intermittent (21 @ 14:50)    ciprofloxacin   IVPB   200 mL/Hr IV Intermittent (21 @ 01:21)        OTHER MEDS: MEDICATIONS  (STANDING):  acetaminophen  Suppository .. 650 every 6 hours PRN  albuterol/ipratropium for Nebulization 3 every 6 hours PRN  hydrALAZINE Injectable 5 every 6 hours PRN      SOCIAL HISTORY:     unable   FAMILY HISTORY:  FH: HTN (hypertension)        REVIEW OF SYSTEMS  [X  ] ROS unobtainable because:  nonverbal   [  ] All other systems negative except as noted below:	    Constitutional:  [ ] fever [ ] chills  [ ] weight loss  [ ] weakness  Skin:  [ ] rash [ ] phlebitis	  Eyes: [ ] icterus [ ] pain  [ ] discharge	  ENMT: [ ] sore throat  [ ] thrush [ ] ulcers [ ] exudates  Respiratory: [ ] dyspnea [ ] hemoptysis [ ] cough [ ] sputum	  Cardiovascular:  [ ] chest pain [ ] palpitations [ ] edema	  Gastrointestinal:  [ ] nausea [ ] vomiting [ ] diarrhea [ ] constipation [ ] pain	  Genitourinary:  [ ] dysuria [ ] frequency [ ] hematuria [ ] discharge [ ] flank pain  [ ] incontinence  Musculoskeletal:  [ ] myalgias [ ] arthralgias [ ] arthritis  [ ] back pain  Neurological:  [ ] headache [ ] seizures  [ ] confusion/altered mental status  Psychiatric:  [ ] anxiety [ ] depression	  Hematology/Lymphatics:  [ ] lymphadenopathy  Endocrine:  [ ] adrenal [ ] thyroid  Allergic/Immunologic:	 [ ] transplant [ ] seasonal    Vital Signs Last 24 Hrs  T(F): 99.5 (21 @ 19:15), Max: 101.9 (21 @ 16:27)    Vital Signs Last 24 Hrs  HR: 94 (21 @ 15:23) (83 - 98)  BP: 135/74 (21 @ 15:23) (121/60 - 186/70)  RR: 16 (21 @ 15:23)  SpO2: 99% (21 @ 15:23) (93% - 100%)  Wt(kg): --    PHYSICAL EXAM:  Constitutional: non-toxic, no distress, chronically ill appearing male   HEAD/EYES: anicteric, no conjunctival injection  ENT:  supple, right ear with orange foul odor discharge with some crusting   Cardiovascular:   normal S1, S2, no murmur, pitting edema  Respiratory:  clear BS bilaterally, no wheezes, no rales  GI:  soft, non-tender, normal bowel sounds  :  no shea, no CVA tenderness  Musculoskeletal:  no synovitis, contracted  Neurologic: eyes closed, doesnt follow commands  Skin:  no rash, no erythema, no phlebitis  Heme/Onc: no lymphadenopathy   Psychiatric:  not awake,         WBC Count: 5.70 K/uL ( @ 00:54)                            13.3   5.70  )-----------( 238      ( 2021 00:54 )             38.5           129<L>  |  96  |  12  ----------------------------<  115<H>  4.4   |  25  |  0.79    Ca    8.4<L>      2021 00:54    TPro  7.7  /  Alb  3.3  /  TBili  0.4  /  DBili  x   /  AST  13<L>  /  ALT  16  /  AlkPhos  81        Creatinine Trend: 0.79<--    Urinalysis Basic - ( 2021 20:28 )    Color: Yellow / Appearance: Clear / S.010 / pH: x  Gluc: x / Ketone: Trace  / Bili: Negative / Urobili: Negative mg/dL   Blood: x / Protein: 30 mg/dL / Nitrite: Negative   Leuk Esterase: Trace / RBC: 6-10 /HPF / WBC 3-5   Sq Epi: x / Non Sq Epi: Occasional / Bacteria: Few        MICROBIOLOGY:    Rapid RVP Result: NotDetec ( @ 06:31)        C-Reactive Protein, Serum: 31 ()      RADIOLOGY:  < from: US Duplex Venous Lower Ext Complete, Bilateral (21 @ 12:14) >  IMPRESSION:  No evidence of deep venous thrombosis in either lower extremity.    < from: CT Chest No Cont (21 @ 10:26) >  IMPRESSION:  No lung consolidations.    < from: CT Head w/ IV Cont (21 @ 02:47) >  IMPRESSION:    1.  No acute intracranial disease.

## 2021-07-08 DIAGNOSIS — E83.39 OTHER DISORDERS OF PHOSPHORUS METABOLISM: ICD-10-CM

## 2021-07-08 DIAGNOSIS — R13.10 DYSPHAGIA, UNSPECIFIED: ICD-10-CM

## 2021-07-08 LAB
ANION GAP SERPL CALC-SCNC: 8 MMOL/L — SIGNIFICANT CHANGE UP (ref 5–17)
BUN SERPL-MCNC: 10 MG/DL — SIGNIFICANT CHANGE UP (ref 7–23)
CALCIUM SERPL-MCNC: 7.9 MG/DL — LOW (ref 8.5–10.1)
CHLORIDE SERPL-SCNC: 98 MMOL/L — SIGNIFICANT CHANGE UP (ref 96–108)
CO2 SERPL-SCNC: 25 MMOL/L — SIGNIFICANT CHANGE UP (ref 22–31)
COVID-19 SPIKE DOMAIN AB INTERP: POSITIVE
COVID-19 SPIKE DOMAIN ANTIBODY RESULT: >250 U/ML — HIGH
CREAT SERPL-MCNC: 0.7 MG/DL — SIGNIFICANT CHANGE UP (ref 0.5–1.3)
CULTURE RESULTS: NO GROWTH — SIGNIFICANT CHANGE UP
GLUCOSE SERPL-MCNC: 107 MG/DL — HIGH (ref 70–99)
HCT VFR BLD CALC: 34.6 % — LOW (ref 39–50)
HGB BLD-MCNC: 11.8 G/DL — LOW (ref 13–17)
HSV+VZV DNA SPEC QL NAA+PROBE: ABNORMAL
MAGNESIUM SERPL-MCNC: 2.1 MG/DL — SIGNIFICANT CHANGE UP (ref 1.6–2.6)
MCHC RBC-ENTMCNC: 26.3 PG — LOW (ref 27–34)
MCHC RBC-ENTMCNC: 34.1 GM/DL — SIGNIFICANT CHANGE UP (ref 32–36)
MCV RBC AUTO: 77.1 FL — LOW (ref 80–100)
NRBC # BLD: 0 /100 WBCS — SIGNIFICANT CHANGE UP (ref 0–0)
PHOSPHATE SERPL-MCNC: 2.4 MG/DL — LOW (ref 2.5–4.5)
PLATELET # BLD AUTO: 186 K/UL — SIGNIFICANT CHANGE UP (ref 150–400)
POTASSIUM SERPL-MCNC: 3.9 MMOL/L — SIGNIFICANT CHANGE UP (ref 3.5–5.3)
POTASSIUM SERPL-SCNC: 3.9 MMOL/L — SIGNIFICANT CHANGE UP (ref 3.5–5.3)
RBC # BLD: 4.49 M/UL — SIGNIFICANT CHANGE UP (ref 4.2–5.8)
RBC # FLD: 14.1 % — SIGNIFICANT CHANGE UP (ref 10.3–14.5)
SARS-COV-2 IGG+IGM SERPL QL IA: >250 U/ML — HIGH
SARS-COV-2 IGG+IGM SERPL QL IA: POSITIVE
SODIUM SERPL-SCNC: 131 MMOL/L — LOW (ref 135–145)
SPECIMEN SOURCE: SIGNIFICANT CHANGE UP
SPECIMEN SOURCE: SIGNIFICANT CHANGE UP
WBC # BLD: 5.25 K/UL — SIGNIFICANT CHANGE UP (ref 3.8–10.5)
WBC # FLD AUTO: 5.25 K/UL — SIGNIFICANT CHANGE UP (ref 3.8–10.5)

## 2021-07-08 PROCEDURE — 99233 SBSQ HOSP IP/OBS HIGH 50: CPT

## 2021-07-08 PROCEDURE — 99232 SBSQ HOSP IP/OBS MODERATE 35: CPT

## 2021-07-08 RX ORDER — ACYCLOVIR SODIUM 500 MG
900 VIAL (EA) INTRAVENOUS ONCE
Refills: 0 | Status: COMPLETED | OUTPATIENT
Start: 2021-07-08 | End: 2021-07-08

## 2021-07-08 RX ORDER — NYSTATIN CREAM 100000 [USP'U]/G
1 CREAM TOPICAL
Refills: 0 | Status: DISCONTINUED | OUTPATIENT
Start: 2021-07-08 | End: 2021-07-19

## 2021-07-08 RX ORDER — HEPARIN SODIUM 5000 [USP'U]/ML
5000 INJECTION INTRAVENOUS; SUBCUTANEOUS EVERY 12 HOURS
Refills: 0 | Status: DISCONTINUED | OUTPATIENT
Start: 2021-07-08 | End: 2021-07-08

## 2021-07-08 RX ORDER — ACYCLOVIR SODIUM 500 MG
VIAL (EA) INTRAVENOUS
Refills: 0 | Status: DISCONTINUED | OUTPATIENT
Start: 2021-07-08 | End: 2021-07-09

## 2021-07-08 RX ORDER — ACYCLOVIR SODIUM 500 MG
900 VIAL (EA) INTRAVENOUS EVERY 8 HOURS
Refills: 0 | Status: DISCONTINUED | OUTPATIENT
Start: 2021-07-08 | End: 2021-07-09

## 2021-07-08 RX ORDER — SODIUM CHLORIDE 9 MG/ML
1000 INJECTION, SOLUTION INTRAVENOUS
Refills: 0 | Status: DISCONTINUED | OUTPATIENT
Start: 2021-07-08 | End: 2021-07-09

## 2021-07-08 RX ADMIN — SODIUM CHLORIDE 100 MILLILITER(S): 9 INJECTION INTRAMUSCULAR; INTRAVENOUS; SUBCUTANEOUS at 11:17

## 2021-07-08 RX ADMIN — Medication 4 DROP(S): at 05:58

## 2021-07-08 RX ADMIN — Medication 4 DROP(S): at 13:57

## 2021-07-08 RX ADMIN — Medication 650 MILLIGRAM(S): at 13:05

## 2021-07-08 RX ADMIN — SODIUM CHLORIDE 100 MILLILITER(S): 9 INJECTION, SOLUTION INTRAVENOUS at 19:51

## 2021-07-08 RX ADMIN — Medication 268 MILLIGRAM(S): at 17:10

## 2021-07-08 RX ADMIN — Medication 4 DROP(S): at 21:47

## 2021-07-08 RX ADMIN — HEPARIN SODIUM 5000 UNIT(S): 5000 INJECTION INTRAVENOUS; SUBCUTANEOUS at 17:10

## 2021-07-08 RX ADMIN — Medication 200 MILLIGRAM(S): at 13:56

## 2021-07-08 RX ADMIN — Medication 200 MILLIGRAM(S): at 00:14

## 2021-07-08 RX ADMIN — NYSTATIN CREAM 1 APPLICATION(S): 100000 CREAM TOPICAL at 18:12

## 2021-07-08 RX ADMIN — Medication 268 MILLIGRAM(S): at 21:48

## 2021-07-08 RX ADMIN — Medication 650 MILLIGRAM(S): at 12:05

## 2021-07-08 NOTE — PROGRESS NOTE ADULT - SUBJECTIVE AND OBJECTIVE BOX
ANITHA LAMB  MRN-[id number]    Follow Up:  herpes zoster oticus    Interval History: pt seen and examined, still has drainage from ear, herpes/VZVof lesion was done of right ear is positive and fits with diagnosis, would continue cipro for now to cover to superimposed bacterial infection and will need LP tomorrow     ROS:    [X ] Unobtainable because: AMS , nonverbal  [ ] All other systems negative    Constitutional: no fever, no chills  Head: no trauma  Eyes: no vision changes, no eye pain  ENT:  no sore throat, no rhinorrhea  Cardiovascular:  no chest pain, no palpitation  Respiratory:  no SOB, no cough  GI:  no abd pain, no vomiting, no diarrhea  urinary: no dysuria, no hematuria, no flank pain  musculoskeletal:  no joint pain, no joint swelling  skin:  no rash  neurology:  no headache, no seizure, no change in mental status  psych: no anxiety, no depression         Allergies  lisinopril (Unknown)  penicillins (Unknown)        ANTIMICROBIALS:  acyclovir IVPB    acyclovir IVPB 900 every 8 hours  ciprofloxacin   IVPB 400 every 12 hours      OTHER MEDS:  acetaminophen  Suppository .. 650 milliGRAM(s) Rectal every 6 hours PRN  albuterol/ipratropium for Nebulization 3 milliLiter(s) Nebulizer every 6 hours PRN  dextrose 5% + lactated ringers. 1000 milliLiter(s) IV Continuous <Continuous>  hydrALAZINE Injectable 5 milliGRAM(s) IV Push every 6 hours PRN  hydrocortisone/polymyxin/neomycin Suspension 4 Drop(s) Right Ear three times a day  nystatin Powder 1 Application(s) Topical two times a day      Physical Exam:  Vital Signs Last 24 Hrs  T(C): 37.6 (2021 18:29), Max: 38.5 (2021 12:01)  T(F): 99.7 (2021 18:29), Max: 101.3 (2021 12:01)  HR: 112 (2021 18:29) (94 - 112)  BP: 133/54 (2021 18:29) (133/54 - 167/74)  BP(mean): --  RR: 18 (2021 18:29) (17 - 18)  SpO2: 98% (2021 18:29) (95% - 98%)    General:    NAD,  non toxic, snoring   Head: atraumatic, normocephalic  Eye: normal sclera and conjunctiva  ENT:    no oral lesions, neck quite stiff and not supple  Cardio:     regular S1, S2,  no murmur  Respiratory:    clear b/l,    no wheezing  abd:     soft,   BS +,   no tenderness  :   unable to assess CVAT,  no suprapubic tenderness,   ext shea  Musculoskeletal:   no joint swelling,   no edema  vascular: no central lines, +PIV   Skin:    no rash  Neurologic:     poor mntal status  psych: normal affect    WBC Count: 5.25 K/uL ( @ 08:36)  WBC Count: 5.70 K/uL ( @ 00:54)                            11.8   5.25  )-----------( 186      ( 2021 08:36 )             34.6       07-08    131<L>  |  98  |  10  ----------------------------<  107<H>  3.9   |  25  |  0.70    Ca    7.9<L>      2021 08:36  Phos  2.4     07-  Mg     2.1     -    TPro  7.7  /  Alb  3.3  /  TBili  0.4  /  DBili  x   /  AST  13<L>  /  ALT  16  /  AlkPhos  81  -07      Urinalysis Basic - ( 2021 20:28 )    Color: Yellow / Appearance: Clear / S.010 / pH: x  Gluc: x / Ketone: Trace  / Bili: Negative / Urobili: Negative mg/dL   Blood: x / Protein: 30 mg/dL / Nitrite: Negative   Leuk Esterase: Trace / RBC: 6-10 /HPF / WBC 3-5   Sq Epi: x / Non Sq Epi: Occasional / Bacteria: Few        Creatinine Trend: 0.70<--, 0.79<--  Lactate, Blood: 1.9 mmol/L (21 @ 01:35)      MICROBIOLOGY:  v  .Urine Clean Catch (Midstream)  21   No growth  --  --      .Blood Blood-Peripheral  21   No growth to date.  --  --      Skin R ear discharge  21   Rare Coag Negative Staphylococcus "Susceptibilities not performed"  --  --      Herpes Simplex Virus 1/2 VZV Lesions, PCR (21 @ 09:11)    Herpes Simplex Virus 1/2  VZV PCR Source: lesion    Herpes Simplex Virus 1/2  VZV PCR Result: VZV Detected HSV 1/2  and VZV assay is a Real-Time PCR test for the qualitative  detection and differentiation of herpes simplex virus type 1 (HSV1), 2  (HSV2) and varicella-zoster virus (VZV) DNA in lesion samples. The result  should be interpreted with consideration of all clinical and laboratory  findings.          Rapid RVP Result: NotDetec ( @ 06:31)        C-Reactive Protein, Serum: 31 ()              RADIOLOGY:

## 2021-07-08 NOTE — PROGRESS NOTE ADULT - ASSESSMENT
ADONIS Barrera M 7/7/2021 1949 DR JAXON BROWN      REVIEW OF SYMPTOMS      Able to give (reliable) ROS  NO     PHYSICAL EXAM    HEENT Unremarkable  atraumatic   RESP Fair air entry EXP prolonged    Harsh breath sound Resp distres mild   CARDIAC S1 S2 No S3     NO JVD    ABDOMEN SOFT BS PRESENT NOT DISTENDED No hepatosplenomegaly   PEDAL EDEMA present No calf tenderness  NO rash     ADVANCED DIRECTIVES.                            COVID STATUS.                                       scv2 7/7/2021 (-)   ADONIS Barrera M   CC 7/7/2021 ADMISSN .   7/7/2021 FEVER     PRESENTING PROBLEMS 7/7/2021 .   OTITIS EXTERNA R EAR   FEVER   HYPONATREMIA Na 7/7/2021 Na 129  DYSPNEA  FUNCTIONAL QUADRIPLEGIA     PMH.  BASELINE MINIMALLY VERBAL BEDBOUND   SBR   HO TRACH SP DECANNULATION   ANOXIC ENCEPHALOPATHY Brain injury 2008   HO SCD  HO AICD 2009   HO CYROABLATION L RENAL MASS   PROSTATE CA     HOSPITAL COURSE.  HSV VZV pcr (+) 7/8 R EAR   ACUYCLOVIR (7/8/2021)       GLOBAL AND BEST PRACTICE ISSUES                     ALLERGY.    PNCL LISINOPRIL  WEIGHT.          7/7/2021 90        BMI                7/7/2021 33        .                          ADVANCED DIRECTIVE.                               HEAD OF BED ELEVATION. Yes  DVT PROPHYLAXIS. hpsc (7/8)   APA.                   GONZALEZ PROPHYLAXIS.                                                                     DYSPHAGIA RECOMM.   DIET.    NPO (77)   INFECTION PROPHYLAXIS.     PATIENT DATA                ABG.                 OXYGENATION.       7/7-7/8/2021 2l 97%   - ra 97%          VITALS/IO/VENT/DRIPS.     7/8/2021 99f 140/7     ASSESSMENT/RECOMMENDATIONS.    DYSPNEA  Has ho trach in past Has ho anoxic encephalopathy and is at risk for aspn pneum  7/8/2021 V duplex (-)  7/8 ct ch no contr No pneum  Check abg   OTITIS EXTRENAL   Started on cipro by admitting md 7/7/2021 7/7 herpes vzv pcr (+)  7/8/2021 acyclovir 900.3 started 7/8/2021 Dr Ponce    WHEEZE   DUNEB.4 P 97/7)   HYPONATREMIA   Monitor  NPO STATUS   On iv fl    TIME SPENT   Over 25 minutes aggregate care time spent on encounter; activities included   direct patient care, counseling and/or coordinating care reviewing notes, lab data/ imaging , discussion with multidisciplinary team/ patient  /family and explaining in detail risks, benefits, alternatives  of the recommendations     ADONIS Barrera M 7/7/2021 1949 DR JAXON BROWN

## 2021-07-08 NOTE — PROGRESS NOTE ADULT - SUBJECTIVE AND OBJECTIVE BOX
ANITHA ADONIS    LVS 1E 182 D    Patient is a 70y old  Male who presents with a chief complaint of sepsis, ear infection (2021 20:23)       Allergies    lisinopril (Unknown)  penicillins (Unknown)    Intolerances        HPI:  Patient is a 70M with a PMH of Anoxic encephalopathy, prostate cancer, HTN, bowel resection who presents to the ED for R ear discharge.  Patient minimally verbal and bedbound at baseline, unable to provide history.  Wife at the bedside to provide history.  Patient noted to have R ear discharge for two days along with temperatures at home.  Wife states that she has been giving him tylenol at home without resolution of the fever.  Per wife, patient was talking much less and was not accepting PO intake so she presents to the ED for evaluation.  Denies history of ear infections.  Patient also noted to have sporadic dyspnea over the same time span.  No other complaints.  Current SpO2 on 2L via NC is 92%.  Vitals stable, labs benign.  Will admit to med surg.     (2021 05:04)      PAST MEDICAL & SURGICAL HISTORY:  Hypertension    Prostate cancer  radiation therapy    Brain injury with coma  x 2 weeks in     Anoxic encephalopathy    Leg pain, right    Gait abnormality    Sudden cardiac death    Status post cryoablation  of left renal mass    H/O prostate cancer  resection and radiation therapy    H/O tracheostomy    S/P ICD (internal cardiac defibrillator) procedure  implanted on 2009        FAMILY HISTORY:  FH: HTN (hypertension)          MEDICATIONS   acetaminophen  Suppository .. 650 milliGRAM(s) Rectal every 6 hours PRN  albuterol/ipratropium for Nebulization 3 milliLiter(s) Nebulizer every 6 hours PRN  ciprofloxacin   IVPB 400 milliGRAM(s) IV Intermittent every 12 hours  hydrALAZINE Injectable 5 milliGRAM(s) IV Push every 6 hours PRN  hydrocortisone/polymyxin/neomycin Suspension 4 Drop(s) Right Ear three times a day  sodium chloride 0.9%. 1000 milliLiter(s) IV Continuous <Continuous>      Vital Signs Last 24 Hrs  T(C): 37 (2021 05:22), Max: 38.8 (2021 15:23)  T(F): 98.6 (2021 05:22), Max: 101.9 (2021 16:27)  HR: 105 (2021 05:22) (88 - 105)  BP: 145/68 (2021 05:22) (135/74 - 186/70)  BP(mean): 103 (2021 09:53) (103 - 103)  RR: 18 (2021 05:22) (16 - 18)  SpO2: 97% (2021 05:22) (97% - 100%)      21 @ 07:01  -  21 @ 07:00  --------------------------------------------------------  IN: 1200 mL / OUT: 0 mL / NET: 1200 mL            LABS:                        11.8   5.25  )-----------( 186      ( 2021 08:36 )             34.6     07-08    131<L>  |  98  |  10  ----------------------------<  107<H>  3.9   |  25  |  0.70    Ca    7.9<L>      2021 08:36  Phos  2.4     07-08  Mg     2.1     07-08    TPro  7.7  /  Alb  3.3  /  TBili  0.4  /  DBili  x   /  AST  13<L>  /  ALT  16  /  AlkPhos  81  07-07      Urinalysis Basic - ( 2021 20:28 )    Color: Yellow / Appearance: Clear / S.010 / pH: x  Gluc: x / Ketone: Trace  / Bili: Negative / Urobili: Negative mg/dL   Blood: x / Protein: 30 mg/dL / Nitrite: Negative   Leuk Esterase: Trace / RBC: 6-10 /HPF / WBC 3-5   Sq Epi: x / Non Sq Epi: Occasional / Bacteria: Few            WBC:  WBC Count: 5.25 K/uL ( @ 08:36)  WBC Count: 5.70 K/uL ( @ 00:54)      MICROBIOLOGY:  RECENT CULTURES:                  Sodium:  Sodium, Serum: 131 mmol/L ( @ 08:36)  Sodium, Serum: 129 mmol/L ( @ 00:54)      0.70 mg/dL  @ 08:36  0.79 mg/dL  @ 00:54      Hemoglobin:  Hemoglobin: 11.8 g/dL ( @ 08:36)  Hemoglobin: 13.3 g/dL ( @ 00:54)      Platelets: Platelet Count - Automated: 186 K/uL ( @ 08:36)  Platelet Count - Automated: 238 K/uL ( @ 00:54)      LIVER FUNCTIONS - ( 2021 00:54 )  Alb: 3.3 g/dL / Pro: 7.7 gm/dL / ALK PHOS: 81 U/L / ALT: 16 U/L / AST: 13 U/L / GGT: x             Urinalysis Basic - ( 2021 20:28 )    Color: Yellow / Appearance: Clear / S.010 / pH: x  Gluc: x / Ketone: Trace  / Bili: Negative / Urobili: Negative mg/dL   Blood: x / Protein: 30 mg/dL / Nitrite: Negative   Leuk Esterase: Trace / RBC: 6-10 /HPF / WBC 3-5   Sq Epi: x / Non Sq Epi: Occasional / Bacteria: Few        RADIOLOGY & ADDITIONAL STUDIES:

## 2021-07-08 NOTE — PROGRESS NOTE ADULT - ASSESSMENT
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 with possible otitis externa  Fsebrile, normal WBC   right ear with murky looking discharge  DDx otitis externa vs shingles  Blood cultures sent as well ear culture  was put on IV cipro  Reportedly has PCN allergy but has tolerated cefpodoxime     7/8:still febrile, VZV positive, now  concern for encephalitis from vzv, have requested radiology for LP     Herpes Zoster Oticus  acute encephalopathy  otitis externa  Fever  Functional Quadriplegia     Plan:   Start IV acyclovir 10mg q8hrs  Reminder to place on IV fluids while on acyclovir   continue IV Ciprofloxacin   start cortisporin drops 4 drops in right ear 3 times a day   f/u blood cultures   f/u ear cultures  offloading, frequent turns, nutrition optimization   trend fevers   trend wbc   send Herpes/VZV PCR from ear fluid to rule out herpes zoster oticus     D/W Dr. Brice Seymour,   Infectious Disease Attending  Pager [phone]  After 5pm/weekends please call [phone] for all inquiries and new consults

## 2021-07-08 NOTE — PROGRESS NOTE ADULT - SUBJECTIVE AND OBJECTIVE BOX
Patient is a 70y old  Male who presents with a chief complaint of sepsis, ear infection (2021 09:29)      INTERVAL HPI/OVERNIGHT EVENTS:    Per wife, he seems a little more responsive to her, but still not speaking.    REVIEW OF SYSTEMS:   Remaining ROS negative    MEDICATIONS  (STANDING):  acyclovir IVPB      acyclovir IVPB 900 milliGRAM(s) IV Intermittent every 8 hours  ciprofloxacin   IVPB 400 milliGRAM(s) IV Intermittent every 12 hours  dextrose 5% + lactated ringers. 1000 milliLiter(s) (100 mL/Hr) IV Continuous <Continuous>  heparin   Injectable 5000 Unit(s) SubCutaneous every 12 hours  hydrocortisone/polymyxin/neomycin Suspension 4 Drop(s) Right Ear three times a day  nystatin Powder 1 Application(s) Topical two times a day    MEDICATIONS  (PRN):  acetaminophen  Suppository .. 650 milliGRAM(s) Rectal every 6 hours PRN Temp greater or equal to 38C (100.4F), Mild Pain (1 - 3)  albuterol/ipratropium for Nebulization 3 milliLiter(s) Nebulizer every 6 hours PRN wheeze  hydrALAZINE Injectable 5 milliGRAM(s) IV Push every 6 hours PRN SBP > 160      Allergies    lisinopril (Unknown)  penicillins (Unknown)    Intolerances        Vital Signs Last 24 Hrs  T(C): 37.9 (2021 17:09), Max: 38.5 (2021 12:01)  T(F): 100.2 (2021 17:09), Max: 101.3 (2021 12:01)  HR: 94 (2021 17:09) (94 - 105)  BP: 145/73 (2021 17:09) (135/64 - 167/74)  BP(mean): --  RR: 18 (2021 17:09) (17 - 18)  SpO2: 97% (2021 17:09) (95% - 97%)    PHYSICAL EXAM:  GENERAL: NAD; appears more comfortable; moans and pulls away from noxious stimulus.   HEAD:  Atraumatic, Normocephalic; Mildly reduced swelling of upper part of R pinna  EYES: EOMI, PERRLA, conjunctiva and sclera clear  ENT: O/P Clear  NECK: Supple, No JVD  NERVOUS SYSTEM:  No focal deficits  CHEST/LUNG: Clear to percussion bilaterally; No rales, rhonchi, wheezing  HEART: Regular rate and rhythm; No murmurs, rubs, or gallops  ABDOMEN: Soft, Nontender, Nondistended; Bowel sounds present  EXTREMITIES:  2+ Peripheral Pulses, No clubbing, cyanosis, or edema  SKIN: No rashes or lesions    LABS:                        11.8   5.25  )-----------( 186      ( 2021 08:36 )             34.6     07-08    131<L>  |  98  |  10  ----------------------------<  107<H>  3.9   |  25  |  0.70    Ca    7.9<L>      2021 08:36  Phos  2.4     07-08  Mg     2.1     07-08    TPro  7.7  /  Alb  3.3  /  TBili  0.4  /  DBili  x   /  AST  13<L>  /  ALT  16  /  AlkPhos  81  07-07      Urinalysis Basic - ( 2021 20:28 )    Color: Yellow / Appearance: Clear / S.010 / pH: x  Gluc: x / Ketone: Trace  / Bili: Negative / Urobili: Negative mg/dL   Blood: x / Protein: 30 mg/dL / Nitrite: Negative   Leuk Esterase: Trace / RBC: 6-10 /HPF / WBC 3-5   Sq Epi: x / Non Sq Epi: Occasional / Bacteria: Few      CAPILLARY BLOOD GLUCOSE          RADIOLOGY & ADDITIONAL TESTS:    Imaging Personally Reviewed:  [ ] YES  [ ] NO    Consultant(s) Notes Reviewed:  [ ] YES  [ ] NO    Care Discussed with Consultants/Other Providers [ ] YES  [ ] NO

## 2021-07-09 LAB
ALBUMIN SERPL ELPH-MCNC: 2.6 G/DL — LOW (ref 3.3–5)
ALP SERPL-CCNC: 61 U/L — SIGNIFICANT CHANGE UP (ref 40–120)
ALT FLD-CCNC: 19 U/L — SIGNIFICANT CHANGE UP (ref 12–78)
ANION GAP SERPL CALC-SCNC: 6 MMOL/L — SIGNIFICANT CHANGE UP (ref 5–17)
APPEARANCE CSF: CLEAR — SIGNIFICANT CHANGE UP
APTT BLD: 29.9 SEC — SIGNIFICANT CHANGE UP (ref 27.5–35.5)
AST SERPL-CCNC: 33 U/L — SIGNIFICANT CHANGE UP (ref 15–37)
BILIRUB SERPL-MCNC: 0.6 MG/DL — SIGNIFICANT CHANGE UP (ref 0.2–1.2)
BUN SERPL-MCNC: 6 MG/DL — LOW (ref 7–23)
CALCIUM SERPL-MCNC: 7.3 MG/DL — LOW (ref 8.5–10.1)
CHLORIDE SERPL-SCNC: 94 MMOL/L — LOW (ref 96–108)
CO2 SERPL-SCNC: 27 MMOL/L — SIGNIFICANT CHANGE UP (ref 22–31)
COLOR CSF: SIGNIFICANT CHANGE UP
CREAT SERPL-MCNC: 0.6 MG/DL — SIGNIFICANT CHANGE UP (ref 0.5–1.3)
CSF COMMENTS: SIGNIFICANT CHANGE UP
GLUCOSE CSF-MCNC: 85 MG/DL — HIGH (ref 40–70)
GLUCOSE SERPL-MCNC: 141 MG/DL — HIGH (ref 70–99)
HCT VFR BLD CALC: 32.8 % — LOW (ref 39–50)
HGB BLD-MCNC: 11.5 G/DL — LOW (ref 13–17)
INR BLD: 1.37 RATIO — HIGH (ref 0.88–1.16)
LYMPHOCYTES # CSF: 100 % — HIGH (ref 40–80)
MAGNESIUM SERPL-MCNC: 1.9 MG/DL — SIGNIFICANT CHANGE UP (ref 1.6–2.6)
MCHC RBC-ENTMCNC: 26.5 PG — LOW (ref 27–34)
MCHC RBC-ENTMCNC: 35.1 GM/DL — SIGNIFICANT CHANGE UP (ref 32–36)
MCV RBC AUTO: 75.6 FL — LOW (ref 80–100)
NEUTROPHILS # CSF: 0 % — SIGNIFICANT CHANGE UP (ref 0–6)
NRBC # BLD: 0 /100 WBCS — SIGNIFICANT CHANGE UP (ref 0–0)
NRBC NFR CSF: 43 /UL — HIGH (ref 0–5)
PHOSPHATE SERPL-MCNC: 1.5 MG/DL — LOW (ref 2.5–4.5)
PLATELET # BLD AUTO: 162 K/UL — SIGNIFICANT CHANGE UP (ref 150–400)
POTASSIUM SERPL-MCNC: 3.4 MMOL/L — LOW (ref 3.5–5.3)
POTASSIUM SERPL-SCNC: 3.4 MMOL/L — LOW (ref 3.5–5.3)
PROT CSF-MCNC: 37 MG/DL — SIGNIFICANT CHANGE UP (ref 15–45)
PROT SERPL-MCNC: 6.5 GM/DL — SIGNIFICANT CHANGE UP (ref 6–8.3)
PROTHROM AB SERPL-ACNC: 15.7 SEC — HIGH (ref 10.6–13.6)
RBC # BLD: 4.34 M/UL — SIGNIFICANT CHANGE UP (ref 4.2–5.8)
RBC # CSF: 22 /UL — HIGH (ref 0–0)
RBC # FLD: 13.7 % — SIGNIFICANT CHANGE UP (ref 10.3–14.5)
SODIUM SERPL-SCNC: 127 MMOL/L — LOW (ref 135–145)
TUBE TYPE: SIGNIFICANT CHANGE UP
WBC # BLD: 5.62 K/UL — SIGNIFICANT CHANGE UP (ref 3.8–10.5)
WBC # FLD AUTO: 5.62 K/UL — SIGNIFICANT CHANGE UP (ref 3.8–10.5)

## 2021-07-09 PROCEDURE — 99233 SBSQ HOSP IP/OBS HIGH 50: CPT

## 2021-07-09 PROCEDURE — 99232 SBSQ HOSP IP/OBS MODERATE 35: CPT

## 2021-07-09 RX ORDER — FAMOTIDINE 10 MG/ML
20 INJECTION INTRAVENOUS
Refills: 0 | Status: DISCONTINUED | OUTPATIENT
Start: 2021-07-09 | End: 2021-07-19

## 2021-07-09 RX ORDER — POTASSIUM CHLORIDE 20 MEQ
10 PACKET (EA) ORAL
Refills: 0 | Status: COMPLETED | OUTPATIENT
Start: 2021-07-09 | End: 2021-07-09

## 2021-07-09 RX ORDER — FAMOTIDINE 10 MG/ML
20 INJECTION INTRAVENOUS
Refills: 0 | Status: DISCONTINUED | OUTPATIENT
Start: 2021-07-09 | End: 2021-07-09

## 2021-07-09 RX ORDER — LIDOCAINE HCL 20 MG/ML
10 VIAL (ML) INJECTION ONCE
Refills: 0 | Status: DISCONTINUED | OUTPATIENT
Start: 2021-07-09 | End: 2021-07-19

## 2021-07-09 RX ORDER — CHLORHEXIDINE GLUCONATE 213 G/1000ML
1 SOLUTION TOPICAL
Refills: 0 | Status: DISCONTINUED | OUTPATIENT
Start: 2021-07-09 | End: 2021-07-12

## 2021-07-09 RX ORDER — ACYCLOVIR SODIUM 500 MG
600 VIAL (EA) INTRAVENOUS EVERY 8 HOURS
Refills: 0 | Status: DISCONTINUED | OUTPATIENT
Start: 2021-07-09 | End: 2021-07-10

## 2021-07-09 RX ORDER — SODIUM CHLORIDE 9 MG/ML
10 INJECTION INTRAMUSCULAR; INTRAVENOUS; SUBCUTANEOUS
Refills: 0 | Status: DISCONTINUED | OUTPATIENT
Start: 2021-07-09 | End: 2021-07-19

## 2021-07-09 RX ORDER — HEPARIN SODIUM 5000 [USP'U]/ML
5000 INJECTION INTRAVENOUS; SUBCUTANEOUS EVERY 12 HOURS
Refills: 0 | Status: DISCONTINUED | OUTPATIENT
Start: 2021-07-09 | End: 2021-07-19

## 2021-07-09 RX ORDER — SODIUM CHLORIDE 9 MG/ML
1000 INJECTION, SOLUTION INTRAVENOUS
Refills: 0 | Status: DISCONTINUED | OUTPATIENT
Start: 2021-07-09 | End: 2021-07-14

## 2021-07-09 RX ADMIN — Medication 200 MILLIGRAM(S): at 00:57

## 2021-07-09 RX ADMIN — Medication 262 MILLIGRAM(S): at 22:13

## 2021-07-09 RX ADMIN — Medication 3 MILLILITER(S): at 14:02

## 2021-07-09 RX ADMIN — SODIUM CHLORIDE 75 MILLILITER(S): 9 INJECTION, SOLUTION INTRAVENOUS at 22:13

## 2021-07-09 RX ADMIN — SODIUM CHLORIDE 100 MILLILITER(S): 9 INJECTION, SOLUTION INTRAVENOUS at 06:26

## 2021-07-09 RX ADMIN — Medication 200 MILLIGRAM(S): at 12:02

## 2021-07-09 RX ADMIN — Medication 4 DROP(S): at 21:16

## 2021-07-09 RX ADMIN — HEPARIN SODIUM 5000 UNIT(S): 5000 INJECTION INTRAVENOUS; SUBCUTANEOUS at 17:12

## 2021-07-09 RX ADMIN — FAMOTIDINE 20 MILLIGRAM(S): 10 INJECTION INTRAVENOUS at 17:12

## 2021-07-09 RX ADMIN — Medication 4 DROP(S): at 06:27

## 2021-07-09 RX ADMIN — Medication 262 MILLIGRAM(S): at 13:41

## 2021-07-09 RX ADMIN — NYSTATIN CREAM 1 APPLICATION(S): 100000 CREAM TOPICAL at 17:12

## 2021-07-09 RX ADMIN — Medication 268 MILLIGRAM(S): at 06:26

## 2021-07-09 RX ADMIN — Medication 4 DROP(S): at 13:41

## 2021-07-09 RX ADMIN — Medication 100 MILLIEQUIVALENT(S): at 21:16

## 2021-07-09 RX ADMIN — NYSTATIN CREAM 1 APPLICATION(S): 100000 CREAM TOPICAL at 06:27

## 2021-07-09 NOTE — PROGRESS NOTE ADULT - SUBJECTIVE AND OBJECTIVE BOX
Patient is a 70y old  Male who presents with a chief complaint of sepsis, ear infection (2021 09:36)      INTERVAL HPI/OVERNIGHT EVENTS:    MEDICATIONS  (STANDING):  acyclovir IVPB 600 milliGRAM(s) IV Intermittent every 8 hours  chlorhexidine 4% Liquid 1 Application(s) Topical <User Schedule>  ciprofloxacin   IVPB 400 milliGRAM(s) IV Intermittent every 12 hours  dextrose 5% + lactated ringers. 1000 milliLiter(s) (100 mL/Hr) IV Continuous <Continuous>  hydrocortisone/polymyxin/neomycin Suspension 4 Drop(s) Right Ear three times a day  nystatin Powder 1 Application(s) Topical two times a day    MEDICATIONS  (PRN):  acetaminophen  Suppository .. 650 milliGRAM(s) Rectal every 6 hours PRN Temp greater or equal to 38C (100.4F), Mild Pain (1 - 3)  albuterol/ipratropium for Nebulization 3 milliLiter(s) Nebulizer every 6 hours PRN wheeze  hydrALAZINE Injectable 5 milliGRAM(s) IV Push every 6 hours PRN SBP > 160  sodium chloride 0.9% lock flush 10 milliLiter(s) IV Push every 1 hour PRN Pre/post blood products, medications, blood draw, and to maintain line patency      Allergies    lisinopril (Unknown)  penicillins (Unknown)    Intolerances        Vital Signs Last 24 Hrs  T(C): 36.4 (2021 10:29), Max: 37.9 (2021 17:09)  T(F): 97.6 (2021 10:29), Max: 100.2 (2021 17:09)  HR: 100 (2021 14:02) (94 - 112)  BP: 145/75 (2021 10:29) (133/54 - 162/73)  BP(mean): --  RR: 18 (2021 10:29) (18 - 20)  SpO2: 95% (2021 14:02) (94% - 98%)    PHYSICAL EXAM:  GENERAL: NAD, well-groomed, well-developed  HEAD:  Atraumatic, Normocephalic  EYES: EOMI, PERRLA, conjunctiva and sclera clear  ENMT: No tonsillar erythema, exudates, or enlargement; Moist mucous membranes, Good dentition, No lesions  NECK: Supple, No JVD, Normal thyroid  NERVOUS SYSTEM:  Alert & Oriented X3, Good concentration; Motor Strength 5/5 B/L upper and lower extremities; DTRs 2+ intact and symmetric  CHEST/LUNG: Clear to auscultation bilaterally; No rales, rhonchi, wheezing, or rubs  HEART: Regular rate and rhythm; No murmurs, rubs, or gallops  ABDOMEN: Soft, Nontender, Nondistended; Bowel sounds present  EXTREMITIES:  2+ Peripheral Pulses, No clubbing, cyanosis, or edema  LYMPH: No lymphadenopathy noted  SKIN: No rashes or lesions    LABS:                        11.5   5.62  )-----------( 162      ( 2021 10:50 )             32.8     -    127<L>  |  94<L>  |  6<L>  ----------------------------<  141<H>  3.4<L>   |  27  |  0.60    Ca    7.3<L>      2021 10:50  Phos  1.5     -  Mg     1.9     -    TPro  6.5  /  Alb  2.6<L>  /  TBili  0.6  /  DBili  x   /  AST  33  /  ALT  19  /  AlkPhos  61  07-09    PT/INR - ( 2021 10:50 )   PT: 15.7 sec;   INR: 1.37 ratio         PTT - ( 2021 10:50 )  PTT:29.9 sec  Urinalysis Basic - ( 2021 20:28 )    Color: Yellow / Appearance: Clear / S.010 / pH: x  Gluc: x / Ketone: Trace  / Bili: Negative / Urobili: Negative mg/dL   Blood: x / Protein: 30 mg/dL / Nitrite: Negative   Leuk Esterase: Trace / RBC: 6-10 /HPF / WBC 3-5   Sq Epi: x / Non Sq Epi: Occasional / Bacteria: Few      CAPILLARY BLOOD GLUCOSE          Culture - Urine (collected 2021 00:38)  Source: .Urine Clean Catch (Midstream)  Final Report (2021 21:13):    No growth    Culture - Blood (collected 2021 10:27)  Source: .Blood Blood-Venous  Preliminary Report (2021 11:02):    No growth to date.    Culture - Blood (collected 2021 10:27)  Source: .Blood Blood-Peripheral  Preliminary Report (2021 11:02):    No growth to date.    Culture - Other (collected 2021 10:19)  Source: Skin R ear discharge  Preliminary Report (2021 11:54):    Few Corynebacterium propinquum "Susceptibilities not performed"    Rare Coag Negative Staphylococcus "Susceptibilities not performed"      RADIOLOGY & ADDITIONAL TESTS:    21 @ 07:01  -  21 @ 07:00  --------------------------------------------------------  IN:    dextrose 5% + lactated ringers: 1200 mL    IV PiggyBack: 500 mL    IV PiggyBack: 200 mL  Total IN: 1900 mL    OUT:  Total OUT: 0 mL    Total NET: 1900 mL       71 yo M with a PMH of Anoxic encephalopathy - minimally verbal and bedbound at baseline, prostate cancer, HTN, bowel resection & ICD who presented w/ R ear discharge. Pt's wife also reported that he was talking less and had worsening PO intake. He is lying in bed in NAD.    MEDICATIONS  (STANDING):  acyclovir IVPB 600 milliGRAM(s) IV Intermittent every 8 hours  chlorhexidine 4% Liquid 1 Application(s) Topical <User Schedule>  ciprofloxacin   IVPB 400 milliGRAM(s) IV Intermittent every 12 hours  dextrose 5% + lactated ringers. 1000 milliLiter(s) (100 mL/Hr) IV Continuous <Continuous>  hydrocortisone/polymyxin/neomycin Suspension 4 Drop(s) Right Ear three times a day  nystatin Powder 1 Application(s) Topical two times a day    MEDICATIONS  (PRN):  acetaminophen  Suppository .. 650 milliGRAM(s) Rectal every 6 hours PRN Temp greater or equal to 38C (100.4F), Mild Pain (1 - 3)  albuterol/ipratropium for Nebulization 3 milliLiter(s) Nebulizer every 6 hours PRN wheeze  hydrALAZINE Injectable 5 milliGRAM(s) IV Push every 6 hours PRN SBP > 160  sodium chloride 0.9% lock flush 10 milliLiter(s) IV Push every 1 hour PRN Pre/post blood products, medications, blood draw, and to maintain line patency      Allergies    lisinopril (Unknown)  penicillins (Unknown)    Intolerances        Vital Signs Last 24 Hrs  T(C): 36.4 (2021 10:29), Max: 37.9 (2021 17:09)  T(F): 97.6 (2021 10:29), Max: 100.2 (2021 17:09)  HR: 100 (2021 14:02) (94 - 112)  BP: 145/75 (2021 10:29) (133/54 - 162/73)   RR: 18 (2021 10:29) (18 - 20)  SpO2: 95% (2021 14:02) (94% - 98%)    PHYSICAL EXAM:  GENERAL: NAD, well-groomed, well-developed  HEAD:  Atraumatic, Normocephalic  EYES: EOMI, PERRLA   NECK: Supple   NERVOUS SYSTEM:  Alert   CHEST/LUNG: Clear to auscultation bilaterally; No rales, rhonchi, wheezing, or rubs  HEART: Regular rate and rhythm; No murmurs, rubs, or gallops  ABDOMEN: Soft, Nontender, Nondistended; Bowel sounds present  EXTREMITIES: No clubbing, cyanosis, or edema     LABS:                        11.5   5.62  )-----------( 162      ( 2021 10:50 )             32.8         127<L>  |  94<L>  |  6<L>  ----------------------------<  141<H>  3.4<L>   |  27  |  0.60    Ca    7.3<L>      2021 10:50  Phos  1.5       Mg     1.9         TPro  6.5  /  Alb  2.6<L>  /  TBili  0.6  /  DBili  x   /  AST  33  /  ALT  19  /  AlkPhos  61      PT/INR - ( 2021 10:50 )   PT: 15.7 sec;   INR: 1.37 ratio         PTT - ( 2021 10:50 )  PTT:29.9 sec  Urinalysis Basic - ( 2021 20:28 )    Color: Yellow / Appearance: Clear / S.010 / pH: x  Gluc: x / Ketone: Trace  / Bili: Negative / Urobili: Negative mg/dL   Blood: x / Protein: 30 mg/dL / Nitrite: Negative   Leuk Esterase: Trace / RBC: 6-10 /HPF / WBC 3-5   Sq Epi: x / Non Sq Epi: Occasional / Bacteria: Few      CAPILLARY BLOOD GLUCOSE          Culture - Urine (collected 2021 00:38)  Source: .Urine Clean Catch (Midstream)  Final Report (2021 21:13):    No growth    Culture - Blood (collected 2021 10:27)  Source: .Blood Blood-Venous  Preliminary Report (2021 11:02):    No growth to date.    Culture - Blood (collected 2021 10:27)  Source: .Blood Blood-Peripheral  Preliminary Report (2021 11:02):    No growth to date.    Culture - Other (collected 2021 10:19)  Source: Skin R ear discharge  Preliminary Report (2021 11:54):    Few Corynebacterium propinquum "Susceptibilities not performed"    Rare Coag Negative Staphylococcus "Susceptibilities not performed"      RADIOLOGY & ADDITIONAL TESTS:    21 @ 07:01  -  21 @ 07:00  --------------------------------------------------------  IN:    dextrose 5% + lactated ringers: 1200 mL    IV PiggyBack: 500 mL    IV PiggyBack: 200 mL  Total IN: 1900 mL    OUT:  Total OUT: 0 mL    Total NET: 1900 mL

## 2021-07-09 NOTE — PROGRESS NOTE ADULT - ASSESSMENT
ADONIS Barrera M 7/7/2021 1949 DR JAXON BROWN      REVIEW OF SYMPTOMS      Able to give (reliable) ROS  NO     PHYSICAL EXAM    HEENT Unremarkable  atraumatic   RESP Fair air entry EXP prolonged    Harsh breath sound Resp distres mild   CARDIAC S1 S2 No S3     NO JVD    ABDOMEN SOFT BS PRESENT NOT DISTENDED No hepatosplenomegaly   PEDAL EDEMA present No calf tenderness  NO rash       ADVANCED DIRECTIVES.                            COVID STATUS.                                       scv2 7/7/2021 (-)   ADONIS Barrera M   CC 7/7/2021 ADMISSN .   7/7/2021 FEVER     PRESENTING PROBLEMS 7/7/2021 .   OTITIS EXTERNA R EAR   FEVER   HYPONATREMIA Na 7/7/2021 Na 129  DYSPNEA  FUNCTIONAL QUADRIPLEGIA     PMH.  BASELINE MINIMALLY VERBAL BEDBOUND   SBR   HO TRACH SP DECANNULATION   ANOXIC ENCEPHALOPATHY Brain injury 2008   HO SCD  HO AICD 2009   HO CYROABLATION L RENAL MASS   PROSTATE CA        HOSPITAL COURSE.  HSV VZV pcr (+) 7/8 R EAR   ACUYCLOVIR (7/8/2021)   HYPONATREMIA Na 127 7/9/2021   SPINAL TAP 7/9/2021       GLOBAL AND BEST PRACTICE ISSUES                     ALLERGY.    PNCL LISINOPRIL  WEIGHT.          7/7/2021 90        BMI                7/7/2021 33        .                          ADVANCED DIRECTIVE.                               HEAD OF BED ELEVATION. Yes  DVT PROPHYLAXIS. hpsc (7/8)   APA.                   GONZALEZ PROPHYLAXIS.                                                                     DYSPHAGIA RECOMM.   DIET.    NPO (77)   INFECTION PROPHYLAXIS.     PATIENT DATA                ABG.                 OXYGENATION.    7/9/2021 3l 96%      7/7-7/8/2021 2l 97%   - ra 97%          VITALS/IO/VENT/DRIPS.     7/9/2021 99f 100 140/70     ASSESSMENT/RECOMMENDATIONS.    DYSPNEA  Has ho trach in past Has ho anoxic encephalopathy and is at risk for aspn pneum  7/8/2021 V duplex (-)  7/8 ct ch no contr No pneum  Check abg   HERPES OTITIS EXTRENAL RO ENCEPHALITIS   Started on cipro by admitting md 7/7/2021 7/7 herpes vzv pcr (+)  7/8/2021 acyclovir 900.3 started 7/8/2021 Dr Ponce    7/9/2021 Sp tap done   WHEEZE   DUNEB.4 P 97/7)   HYPONATREMIA   7/9/2021 Na 127  SOSM UOSM Tashi ordered7/9/2021   Consider renal eval   NPO STATUS   On iv fl      TIME SPENT   Over 25 minutes aggregate care time spent on encounter; activities included   direct patient care, counseling and/or coordinating care reviewing notes, lab data/ imaging , discussion with multidisciplinary team/ patient  /family and explaining in detail risks, benefits, alternatives  of the recommendations     ADONSI WELSH 70 M 7/7/2021 1949 DR JAXON BROWN

## 2021-07-09 NOTE — PROGRESS NOTE ADULT - SUBJECTIVE AND OBJECTIVE BOX
ANITHA ADONIS    S 2C 233 I    Patient is a 70y old  Male who presents with a chief complaint of sepsis, ear infection (2021 23:59)       Allergies    lisinopril (Unknown)  penicillins (Unknown)    Intolerances        HPI:  Patient is a 70M with a PMH of Anoxic encephalopathy, prostate cancer, HTN, bowel resection who presents to the ED for R ear discharge.  Patient minimally verbal and bedbound at baseline, unable to provide history.  Wife at the bedside to provide history.  Patient noted to have R ear discharge for two days along with temperatures at home.  Wife states that she has been giving him tylenol at home without resolution of the fever.  Per wife, patient was talking much less and was not accepting PO intake so she presents to the ED for evaluation.  Denies history of ear infections.  Patient also noted to have sporadic dyspnea over the same time span.  No other complaints.  Current SpO2 on 2L via NC is 92%.  Vitals stable, labs benign.  Will admit to med surg.     (2021 05:04)      PAST MEDICAL & SURGICAL HISTORY:  Hypertension    Prostate cancer  radiation therapy    Brain injury with coma  x 2 weeks in     Anoxic encephalopathy    Leg pain, right    Gait abnormality    Sudden cardiac death    Status post cryoablation  of left renal mass    H/O prostate cancer  resection and radiation therapy    H/O tracheostomy    S/P ICD (internal cardiac defibrillator) procedure  implanted on 2009        FAMILY HISTORY:  FH: HTN (hypertension)          MEDICATIONS   acetaminophen  Suppository .. 650 milliGRAM(s) Rectal every 6 hours PRN  acyclovir IVPB      acyclovir IVPB 900 milliGRAM(s) IV Intermittent every 8 hours  albuterol/ipratropium for Nebulization 3 milliLiter(s) Nebulizer every 6 hours PRN  ciprofloxacin   IVPB 400 milliGRAM(s) IV Intermittent every 12 hours  dextrose 5% + lactated ringers. 1000 milliLiter(s) IV Continuous <Continuous>  hydrALAZINE Injectable 5 milliGRAM(s) IV Push every 6 hours PRN  hydrocortisone/polymyxin/neomycin Suspension 4 Drop(s) Right Ear three times a day  nystatin Powder 1 Application(s) Topical two times a day      Vital Signs Last 24 Hrs  T(C): 36.8 (2021 04:56), Max: 38.5 (2021 12:01)  T(F): 98.2 (2021 04:56), Max: 101.3 (2021 12:01)  HR: 99 (2021 04:56) (94 - 112)  BP: 158/72 (2021 04:56) (133/54 - 162/73)  BP(mean): --  RR: 20 (2021 04:56) (18 - 20)  SpO2: 94% (2021 04:56) (94% - 98%)      21 @ 07:01  -  21 @ 07:00  --------------------------------------------------------  IN: 1900 mL / OUT: 0 mL / NET: 1900 mL            LABS:                        11.8   5.25  )-----------( 186      ( 2021 08:36 )             34.6     -08    131<L>  |  98  |  10  ----------------------------<  107<H>  3.9   |  25  |  0.70    Ca    7.9<L>      2021 08:36  Phos  2.4       Mg     2.1             Urinalysis Basic - ( 2021 20:28 )    Color: Yellow / Appearance: Clear / S.010 / pH: x  Gluc: x / Ketone: Trace  / Bili: Negative / Urobili: Negative mg/dL   Blood: x / Protein: 30 mg/dL / Nitrite: Negative   Leuk Esterase: Trace / RBC: 6-10 /HPF / WBC 3-5   Sq Epi: x / Non Sq Epi: Occasional / Bacteria: Few            WBC:  WBC Count: 5.25 K/uL ( @ 08:36)  WBC Count: 5.70 K/uL ( @ 00:54)      MICROBIOLOGY:  RECENT CULTURES:   .Urine Clean Catch (Midstream) XXXX XXXX   No growth     .Blood Blood-Peripheral XXXX XXXX   No growth to date.     Skin R ear discharge XXXX XXXX   Rare Coag Negative Staphylococcus "Susceptibilities not performed"                    Sodium:  Sodium, Serum: 131 mmol/L ( @ 08:36)  Sodium, Serum: 129 mmol/L ( @ 00:54)      0.70 mg/dL  @ :36  0.79 mg/dL  @ 00:54      Hemoglobin:  Hemoglobin: 11.8 g/dL ( @ 08:36)  Hemoglobin: 13.3 g/dL ( @ 00:54)      Platelets: Platelet Count - Automated: 186 K/uL ( @ 08:36)  Platelet Count - Automated: 238 K/uL ( @ 00:54)          Urinalysis Basic - ( 2021 20:28 )    Color: Yellow / Appearance: Clear / S.010 / pH: x  Gluc: x / Ketone: Trace  / Bili: Negative / Urobili: Negative mg/dL   Blood: x / Protein: 30 mg/dL / Nitrite: Negative   Leuk Esterase: Trace / RBC: 6-10 /HPF / WBC 3-5   Sq Epi: x / Non Sq Epi: Occasional / Bacteria: Few        RADIOLOGY & ADDITIONAL STUDIES:

## 2021-07-09 NOTE — PROGRESS NOTE ADULT - ASSESSMENT
70M with a PMH of Anoxic encephalopathy - minimally verbal and bedbound at baseline, prostate cancer, HTN, bowel resection & ICD who presented w/ R ear discharge. Pt's wife also reported that he was talking less and had worsening PO intake.      Septic encephalopathy  - viral swab (+) for varicella, suggesting possibility of viral meningoencephalitis  - patient started on Cipro and IV acyclovir  - Blood Cx NGTD  - ID following  - due to chronic hydro on CT, IR refused to do LP at this time as per Dr. Chacon, will ask Dr. Mcadams    Acute otitis externa of right ear  - ID following   - Unable to get ENT consult at this hospital   - c/w IV Cipro & cortisporin     Dyspnea  - may be due to sepsis and encephalopathy   - c/w O2 2L  - Pulm consulted - Juan F  - no DVT on US  - no pneumonia on plain CT of chest     Dysphagia  - at baseline, pt unable to feed self, but normally able to swallow  - Failed swallow eval, so will keep NPO  - c/w D5LR for now  - if no neuro improvement within another 2-3 days, patient will need NG tube and TFs    Prostate cancer  - leuprolide when tolerating PO    Hx of Diverticulosis w/ significant diverticular bleed in 2018, requiring 4 transfusions    - patient had another GI bleed in 2019, but refused EGD/colonoscopy, so source unknown  - family agreed to SQ heparin and watch H/H     Essential hypertension  - c/w hydralazine PRN     Functional quadriplegia  - history of anoxic encephalopathy - per wife, occurred 2008 due to fall   - c/w supportive care    Prophylaxis:  DVT: Heparin  GI: Pepcid   69 yo M with a PMH of Anoxic encephalopathy - minimally verbal and bedbound at baseline, prostate cancer, HTN, bowel resection & ICD who presented w/ R ear discharge. Pt's wife also reported that he was talking less and had worsening PO intake.      Septic encephalopathy  - viral swab (+) for varicella, suggesting possibility of viral meningoencephalitis  - patient started on Cipro and IV acyclovir  - Blood Cx NGTD  - ID following  - due to chronic hydro on CT, IR refused to do LP at this time as per Dr. Chacon, will ask Dr. Mcadams    Acute otitis externa of right ear  - ID following   - Unable to get ENT consult at this hospital   - c/w IV Cipro & cortisporin     Dyspnea  - may be due to sepsis and encephalopathy   - c/w O2 2L  - Pulm consulted - Juan F  - no DVT on US  - no pneumonia on plain CT of chest     Dysphagia  - at baseline, pt unable to feed self, but normally able to swallow  - Failed swallow eval, so will keep NPO  - c/w D5LR for now  - if no neuro improvement within another 2-3 days, patient will need NG tube and TFs    Prostate cancer  - leuprolide when tolerating PO    Hx of Diverticulosis w/ significant diverticular bleed in 2018, requiring 4 transfusions    - patient had another GI bleed in 2019, but refused EGD/colonoscopy, so source unknown  - family agreed to SQ heparin and watch H/H     Essential hypertension  - c/w hydralazine PRN     Functional quadriplegia  - history of anoxic encephalopathy - per wife, occurred 2008 due to fall   - c/w supportive care    Prophylaxis:  DVT: Heparin  GI: Pepcid   69 yo M with a PMH of Anoxic encephalopathy - minimally verbal and bedbound at baseline, prostate cancer, HTN, bowel resection & ICD who presented w/ R ear discharge. Pt's wife also reported that he was talking less and had worsening PO intake.      Septic encephalopathy  - viral swab (+) for varicella, suggesting possibility of viral meningoencephalitis  - patient started on Cipro and IV acyclovir  - Blood Cx NGTD  - ID following  - due to chronic hydro on CT, IR refused to do LP at this time as per Dr. Chacon, will ask Dr. Mcadams    Hypophosphatemia  - replace w/ PO Phos    Acute otitis externa of right ear  - ID following   - Unable to get ENT consult at this hospital   - c/w IV Cipro & cortisporin     Dyspnea  - may be due to sepsis and encephalopathy   - c/w O2 2L  - Pulm consulted - Juan F  - no DVT on US  - no pneumonia on plain CT of chest     Dysphagia  - at baseline, pt unable to feed self, but normally able to swallow  - Failed swallow eval, so will keep NPO  - c/w D5LR for now  - if no neuro improvement within another 2-3 days, patient will need NG tube and TFs    Prostate cancer  - leuprolide when tolerating PO    Hx of Diverticulosis w/ significant diverticular bleed in 2018, requiring 4 transfusions    - patient had another GI bleed in 2019, but refused EGD/colonoscopy, so source unknown  - family agreed to SQ heparin and watch H/H     Essential hypertension  - c/w hydralazine PRN     Functional quadriplegia  - history of anoxic encephalopathy - per wife, occurred 2008 due to fall   - c/w supportive care    Prophylaxis:  DVT: Heparin  GI: Pepcid   69 yo M with a PMH of Anoxic encephalopathy - minimally verbal and bedbound at baseline, prostate cancer, HTN, bowel resection & ICD who presented w/ R ear discharge. Pt's wife also reported that he was talking less and had worsening PO intake.      Septic encephalopathy  - viral swab (+) for varicella, suggesting possibility of viral meningoencephalitis  - patient started on Cipro and IV acyclovir  - Blood Cx NGTD  - ID following  - due to chronic hydro on CT, IR refused to do LP at this time as per Dr. Chacon, will ask Dr. Mcadams    Hypophosphatemia/ Hypokalemia  - replace w/ Phos & KCl    Acute otitis externa of right ear  - ID following   - Unable to get ENT consult at this hospital   - c/w IV Cipro & cortisporin     Dyspnea  - may be due to sepsis and encephalopathy   - c/w O2 2L  - Pulm consulted - Juan F  - no DVT on US  - no pneumonia on plain CT of chest     Dysphagia  - at baseline, pt unable to feed self, but normally able to swallow  - Failed swallow eval, so will keep NPO  - c/w D5LR for now  - if no neuro improvement within another 2-3 days, patient will need NG tube and TFs    Prostate cancer  - leuprolide when tolerating PO    Hx of Diverticulosis w/ significant diverticular bleed in 2018, requiring 4 transfusions    - patient had another GI bleed in 2019, but refused EGD/colonoscopy, so source unknown  - family agreed to SQ heparin and watch H/H     Essential hypertension  - c/w hydralazine PRN     Functional quadriplegia  - history of anoxic encephalopathy - per wife, occurred 2008 due to fall   - c/w supportive care    Prophylaxis:  DVT: Heparin  GI: Pepcid

## 2021-07-10 LAB
ANION GAP SERPL CALC-SCNC: 6 MMOL/L — SIGNIFICANT CHANGE UP (ref 5–17)
BASE EXCESS BLDA CALC-SCNC: 4.1 MMOL/L — HIGH (ref -2–2)
BLOOD GAS COMMENTS: SIGNIFICANT CHANGE UP
BLOOD GAS SOURCE: SIGNIFICANT CHANGE UP
BUN SERPL-MCNC: 4 MG/DL — LOW (ref 7–23)
CALCIUM SERPL-MCNC: 7.5 MG/DL — LOW (ref 8.5–10.1)
CHLORIDE SERPL-SCNC: 97 MMOL/L — SIGNIFICANT CHANGE UP (ref 96–108)
CO2 SERPL-SCNC: 28 MMOL/L — SIGNIFICANT CHANGE UP (ref 22–31)
CREAT SERPL-MCNC: 0.54 MG/DL — SIGNIFICANT CHANGE UP (ref 0.5–1.3)
CRYPTOC AG CSF-ACNC: NEGATIVE — SIGNIFICANT CHANGE UP
CULTURE RESULTS: SIGNIFICANT CHANGE UP
GLUCOSE SERPL-MCNC: 170 MG/DL — HIGH (ref 70–99)
GRAM STN FLD: SIGNIFICANT CHANGE UP
GRAM STN FLD: SIGNIFICANT CHANGE UP
HCO3 BLDA-SCNC: 27 MMOL/L — SIGNIFICANT CHANGE UP (ref 21–29)
HCT VFR BLD CALC: 32.4 % — LOW (ref 39–50)
HGB BLD-MCNC: 11.3 G/DL — LOW (ref 13–17)
HOROWITZ INDEX BLDA+IHG-RTO: 0.32 — SIGNIFICANT CHANGE UP
LABORATORY COMMENT REPORT: SIGNIFICANT CHANGE UP
LDH CSF L TO P-CCNC: 19 U/L — SIGNIFICANT CHANGE UP
LDH FLD-CCNC: 19 U/L — SIGNIFICANT CHANGE UP
MAGNESIUM SERPL-MCNC: 2.1 MG/DL — SIGNIFICANT CHANGE UP (ref 1.6–2.6)
MCHC RBC-ENTMCNC: 25.7 PG — LOW (ref 27–34)
MCHC RBC-ENTMCNC: 34.9 GM/DL — SIGNIFICANT CHANGE UP (ref 32–36)
MCV RBC AUTO: 73.6 FL — LOW (ref 80–100)
NIGHT BLUE STAIN TISS: SIGNIFICANT CHANGE UP
NRBC # BLD: 0 /100 WBCS — SIGNIFICANT CHANGE UP (ref 0–0)
OSMOLALITY SERPL: 272 MOSMOL/KG — LOW (ref 280–301)
PCO2 BLDA: 36 MMHG — SIGNIFICANT CHANGE UP (ref 32–46)
PH BLD: 7.49 — HIGH (ref 7.35–7.45)
PHOSPHATE SERPL-MCNC: 2.4 MG/DL — LOW (ref 2.5–4.5)
PLATELET # BLD AUTO: 186 K/UL — SIGNIFICANT CHANGE UP (ref 150–400)
PO2 BLDA: 111 MMHG — HIGH (ref 74–108)
POTASSIUM SERPL-MCNC: 3.3 MMOL/L — LOW (ref 3.5–5.3)
POTASSIUM SERPL-SCNC: 3.3 MMOL/L — LOW (ref 3.5–5.3)
RBC # BLD: 4.4 M/UL — SIGNIFICANT CHANGE UP (ref 4.2–5.8)
RBC # FLD: 13.9 % — SIGNIFICANT CHANGE UP (ref 10.3–14.5)
SAO2 % BLDA: 98 % — HIGH (ref 92–96)
SODIUM SERPL-SCNC: 131 MMOL/L — LOW (ref 135–145)
SOURCE HSV 1/2: SIGNIFICANT CHANGE UP
SPECIMEN SOURCE: SIGNIFICANT CHANGE UP
VZV DNA CSF QL NAA+PROBE: DETECTED
WBC # BLD: 4.53 K/UL — SIGNIFICANT CHANGE UP (ref 3.8–10.5)
WBC # FLD AUTO: 4.53 K/UL — SIGNIFICANT CHANGE UP (ref 3.8–10.5)

## 2021-07-10 PROCEDURE — 99232 SBSQ HOSP IP/OBS MODERATE 35: CPT

## 2021-07-10 PROCEDURE — 99233 SBSQ HOSP IP/OBS HIGH 50: CPT

## 2021-07-10 RX ORDER — ACYCLOVIR SODIUM 500 MG
600 VIAL (EA) INTRAVENOUS EVERY 8 HOURS
Refills: 0 | Status: DISCONTINUED | OUTPATIENT
Start: 2021-07-09 | End: 2021-07-10

## 2021-07-10 RX ORDER — ACYCLOVIR SODIUM 500 MG
600 VIAL (EA) INTRAVENOUS EVERY 8 HOURS
Refills: 0 | Status: DISCONTINUED | OUTPATIENT
Start: 2021-07-09 | End: 2021-07-12

## 2021-07-10 RX ORDER — POTASSIUM CHLORIDE 20 MEQ
10 PACKET (EA) ORAL
Refills: 0 | Status: COMPLETED | OUTPATIENT
Start: 2021-07-10 | End: 2021-07-10

## 2021-07-10 RX ADMIN — NYSTATIN CREAM 1 APPLICATION(S): 100000 CREAM TOPICAL at 17:56

## 2021-07-10 RX ADMIN — Medication 3 MILLILITER(S): at 18:17

## 2021-07-10 RX ADMIN — Medication 200 MILLIGRAM(S): at 01:39

## 2021-07-10 RX ADMIN — FAMOTIDINE 20 MILLIGRAM(S): 10 INJECTION INTRAVENOUS at 17:54

## 2021-07-10 RX ADMIN — Medication 200 MILLIGRAM(S): at 13:47

## 2021-07-10 RX ADMIN — Medication 112 MILLIGRAM(S): at 22:23

## 2021-07-10 RX ADMIN — HEPARIN SODIUM 5000 UNIT(S): 5000 INJECTION INTRAVENOUS; SUBCUTANEOUS at 05:58

## 2021-07-10 RX ADMIN — CHLORHEXIDINE GLUCONATE 1 APPLICATION(S): 213 SOLUTION TOPICAL at 06:10

## 2021-07-10 RX ADMIN — FAMOTIDINE 20 MILLIGRAM(S): 10 INJECTION INTRAVENOUS at 05:58

## 2021-07-10 RX ADMIN — Medication 4 DROP(S): at 22:17

## 2021-07-10 RX ADMIN — NYSTATIN CREAM 1 APPLICATION(S): 100000 CREAM TOPICAL at 05:58

## 2021-07-10 RX ADMIN — Medication 4 DROP(S): at 05:59

## 2021-07-10 RX ADMIN — SODIUM CHLORIDE 75 MILLILITER(S): 9 INJECTION, SOLUTION INTRAVENOUS at 22:23

## 2021-07-10 RX ADMIN — Medication 100 MILLIEQUIVALENT(S): at 11:55

## 2021-07-10 RX ADMIN — Medication 262 MILLIGRAM(S): at 06:43

## 2021-07-10 RX ADMIN — Medication 100 MILLIEQUIVALENT(S): at 12:55

## 2021-07-10 RX ADMIN — Medication 100 MILLIEQUIVALENT(S): at 02:41

## 2021-07-10 RX ADMIN — Medication 62.5 MILLIMOLE(S): at 04:23

## 2021-07-10 RX ADMIN — Medication 100 MILLIEQUIVALENT(S): at 00:04

## 2021-07-10 RX ADMIN — HEPARIN SODIUM 5000 UNIT(S): 5000 INJECTION INTRAVENOUS; SUBCUTANEOUS at 17:54

## 2021-07-10 RX ADMIN — Medication 100 MILLIEQUIVALENT(S): at 14:53

## 2021-07-10 RX ADMIN — Medication 112 MILLIGRAM(S): at 13:53

## 2021-07-10 RX ADMIN — Medication 200 MILLIGRAM(S): at 23:51

## 2021-07-10 RX ADMIN — Medication 4 DROP(S): at 13:48

## 2021-07-10 NOTE — PROGRESS NOTE ADULT - ASSESSMENT
ADONIS Barrera M 7/7/2021 1949 DR JAXON BROWN      REVIEW OF SYMPTOMS      Able to give (reliable) ROS  NO     PHYSICAL EXAM    HEENT Unremarkable  atraumatic   RESP Fair air entry EXP prolonged    Harsh breath sound Resp distres mild   CARDIAC S1 S2 No S3     NO JVD    ABDOMEN SOFT BS PRESENT NOT DISTENDED No hepatosplenomegaly   PEDAL EDEMA present No calf tenderness  NO rash       ADVANCED DIRECTIVES.                            COVID STATUS.                                       scv2 7/7/2021 (-)   ADONIS Barrera M   CC 7/7/2021 ADMISSN .   7/7/2021 FEVER     PRESENTING PROBLEMS 7/7/2021 .   OTITIS EXTERNA R EAR   FEVER   HYPONATREMIA Na 7/7/2021 Na 129  DYSPNEA  FUNCTIONAL QUADRIPLEGIA     PMH.  BASELINE MINIMALLY VERBAL BEDBOUND   SBR   HO TRACH SP DECANNULATION   ANOXIC ENCEPHALOPATHY Brain injury 2008   HO SCD  HO AICD 2009   HO CYROABLATION L RENAL MASS   PROSTATE CA        HOSPITAL COURSE.  HSV VZV pcr (+) 7/8 R EAR   ACUYCLOVIR (7/8/2021)   HYPONATREMIA Na 127 7/9/2021   SPINAL TAP 7/9/2021 ldh 19 Lymph 100% w 43 VZpcr (+)   VZV  ENCEPHALITIS      GLOBAL AND BEST PRACTICE ISSUES                     ALLERGY.    PNCL LISINOPRIL  WEIGHT.          7/7/2021 90        BMI                7/7/2021 33        .                          ADVANCED DIRECTIVE.                               HEAD OF BED ELEVATION. Yes  DVT PROPHYLAXIS. hpsc (7/8)   APA.                   GONZALEZ PROPHYLAXIS.                                                                     DYSPHAGIA RECOMM.   DIET.    NPO (77)   INFECTION PROPHYLAXIS.  chlorhexidine (7/9)      PATIENT DATA                ABG.     7/10/2021 .32 749/36/111              OXYGENATION.    7/10/2021 ra 97%   7/9/2021 3l 96%      7/7-7/8/2021 2l 97%   - ra 97%          VITALS/IO/VENT/DRIPS.     7/10/2021 AFEB 100 120/60     ASSESSMENT/RECOMMENDATIONS.    DYSPNEA  Has ho trach in past Has ho anoxic encephalopathy and is at risk for aspn pneum  7/8/2021 V duplex (-)  7/8 ct ch no contr No pneum  ABG acceptab;e   HERPES OTITIS EXTRENAL   VZV  ENCEPHALITIS   Started on cipro by admitting md 7/7/2021 7/7 herpes vzv pcr (+) EAR  7/9 CSF vzv pcr (+)   7/8/2021 acyclovir 900.3 started 7/8/2021 Dr Ponce    More awake on 7/10/2021   WHEEZE   DUNEB.4 P 97/7)   HYPONATREMIA   7/9-7/10/2021 Na 127-131  SOSM UOSM Tashi ordered7/9/2021   Consider renal eval   NPO STATUS   On iv fl  Suggest speech eval to start po diet      TIME SPENT   Over 25 minutes aggregate care time spent on encounter; activities included   direct patient care, counseling and/or coordinating care reviewing notes, lab data/ imaging , discussion with multidisciplinary team/ patient  /family and explaining in detail risks, benefits, alternatives  of the recommendations     ADONIS ANITHA 70 M 7/7/2021 1949 DR JAXON BROWN

## 2021-07-10 NOTE — PROGRESS NOTE ADULT - ASSESSMENT
Subjective Complaints:  Historian:             Vital Signs Last 24 Hrs  T(C): 37 (10 Jul 2021 16:03), Max: 37.2 (10 Jul 2021 00:00)  T(F): 98.6 (10 Jul 2021 16:03), Max: 98.9 (10 Jul 2021 00:00)  HR: 102 (10 Jul 2021 16:03) (89 - 102)  BP: 125/69 (10 Jul 2021 16:03) (125/69 - 166/78)  BP(mean): --  RR: 16 (10 Jul 2021 16:03) (16 - 18)  SpO2: 97% (10 Jul 2021 16:03) (97% - 99%)    GENERAL PHYSICAL EXAM:  General:  Appears stated age, well-groomed, well-nourished, no distress  HEENT:  NC/AT, patent nares w/ pink mucosa, OP clear w/o lesions, PERRL, EOMI, conjunctivae clear, no thyromegaly, nodules, adenopathy, no JVD  Chest:  Full & symmetric excursion, no increased effort, breath sounds clear  Cardiovascular:  Regular rhythm, S1, S2, no murmur/rub/S3/S4, no carotid/femoral/abdominal bruit, radial/pedal pulses 2+, no edema  Abdomen:  Soft, non-tender, non-distended, normoactive bowel sounds, no HSM  Extremities:  Gait & station:   Digits:   Nails:   Joints, Bones, Muscles:   ROM:   Stability:  Skin:  No rash/erythema/ecchymoses/petechiae/wounds/abscess/warm/dry  Musculoskeletal:  Full ROM in all joints w/o swelling/tenderness/effusion        LABS:                        11.3   4.53  )-----------( 186      ( 10 Jul 2021 07:51 )             32.4     07-10    131<L>  |  97  |  4<L>  ----------------------------<  170<H>  3.3<L>   |  28  |  0.54    Ca    7.5<L>      10 Jul 2021 07:51  Phos  2.4     07-10  Mg     2.1     07-10    TPro  6.5  /  Alb  2.6<L>  /  TBili  0.6  /  DBili  x   /  AST  33  /  ALT  19  /  AlkPhos  61  07-09    PT/INR - ( 09 Jul 2021 10:50 )   PT: 15.7 sec;   INR: 1.37 ratio         PTT - ( 09 Jul 2021 10:50 )  PTT:29.9 sec      RADIOLOGY & ADDITIONAL STUDIES:        Neurology Progress Note:      Mental Status: awake open eyes speech mumbles       Cranial Nerves:defrd      Motor:   quadroparesis         Sensory:defrd      Cerebellar: defrd      Gait:defrd      Assesment/Plan: csf wbc  43 , lymphocytes 100 viral encephalitis  on acyclovir   id eval ,  quadroparesis no seizure hx of anoxic encephalaothy

## 2021-07-10 NOTE — PROGRESS NOTE ADULT - SUBJECTIVE AND OBJECTIVE BOX
69 yo M with a PMH of Anoxic encephalopathy - minimally verbal and bedbound at baseline, prostate cancer, HTN, bowel resection & ICD who presented w/ R ear discharge. Pt's wife also reported that he was talking less and had worsening PO intake. He is lying in bed in NAD.      MEDICATIONS  (STANDING):  acyclovir IVPB 600 milliGRAM(s) IV Intermittent every 8 hours  chlorhexidine 4% Liquid 1 Application(s) Topical <User Schedule>  ciprofloxacin   IVPB 400 milliGRAM(s) IV Intermittent every 12 hours  dextrose 5% + sodium chloride 0.9% 1000 milliLiter(s) (75 mL/Hr) IV Continuous <Continuous>  famotidine Injectable 20 milliGRAM(s) IV Push two times a day  heparin   Injectable 5000 Unit(s) SubCutaneous every 12 hours  hydrocortisone/polymyxin/neomycin Suspension 4 Drop(s) Right Ear three times a day  lidocaine 1% Injectable 10 milliLiter(s) Local Injection once  nystatin Powder 1 Application(s) Topical two times a day    MEDICATIONS  (PRN):  acetaminophen  Suppository .. 650 milliGRAM(s) Rectal every 6 hours PRN Temp greater or equal to 38C (100.4F), Mild Pain (1 - 3)  albuterol/ipratropium for Nebulization 3 milliLiter(s) Nebulizer every 6 hours PRN wheeze  hydrALAZINE Injectable 5 milliGRAM(s) IV Push every 6 hours PRN SBP > 160  sodium chloride 0.9% lock flush 10 milliLiter(s) IV Push every 1 hour PRN Pre/post blood products, medications, blood draw, and to maintain line patency      Allergies    lisinopril (Unknown)  penicillins (Unknown)    Intolerances        Vital Signs Last 24 Hrs  T(C): 36.4 (10 Jul 2021 10:50), Max: 37.4 (09 Jul 2021 16:10)  T(F): 97.6 (10 Jul 2021 10:50), Max: 99.3 (09 Jul 2021 16:10)  HR: 89 (10 Jul 2021 10:50) (89 - 103)  BP: 151/77 (10 Jul 2021 10:50) (129/67 - 166/78)  BP(mean): --  RR: 16 (10 Jul 2021 10:50) (16 - 18)  SpO2: 97% (10 Jul 2021 10:50) (96% - 99%)    PHYSICAL EXAM:  GENERAL: NAD, well-groomed, well-developed  HEAD:  Atraumatic, Normocephalic  EYES: EOMI, PERRLA   NECK: Supple   NERVOUS SYSTEM: more alert today  CHEST/LUNG: Clear to auscultation bilaterally; No rales, rhonchi, wheezing, or rubs  HEART: Regular rate and rhythm; No murmurs, rubs, or gallops  ABDOMEN: Soft, Nontender, Nondistended; Bowel sounds present  EXTREMITIES: No clubbing, cyanosis, or edema    LABS:                        11.3   4.53  )-----------( 186      ( 10 Jul 2021 07:51 )             32.4     07-10    131<L>  |  97  |  4<L>  ----------------------------<  170<H>  3.3<L>   |  28  |  0.54    Ca    7.5<L>      10 Jul 2021 07:51  Phos  2.4     07-10  Mg     2.1     07-10    TPro  6.5  /  Alb  2.6<L>  /  TBili  0.6  /  DBili  x   /  AST  33  /  ALT  19  /  AlkPhos  61  07-09    PT/INR - ( 09 Jul 2021 10:50 )   PT: 15.7 sec;   INR: 1.37 ratio         PTT - ( 09 Jul 2021 10:50 )  PTT:29.9 sec    CAPILLARY BLOOD GLUCOSE          Culture - Acid Fast - CSF (collected 10 Jul 2021 00:14)  Source: .CSF CSF    Culture - CSF with Gram Stain (collected 10 Jul 2021 00:14)  Source: .CSF CSF  Gram Stain (10 Jul 2021 01:20):    polymorphonuclear leukocytes seen    No organisms seen    by cytocentrifuge    Culture - Urine (collected 08 Jul 2021 00:38)  Source: .Urine Clean Catch (Midstream)  Final Report (08 Jul 2021 21:13):    No growth    Culture - Blood (collected 07 Jul 2021 10:27)  Source: .Blood Blood-Venous  Preliminary Report (08 Jul 2021 11:02):    No growth to date.    Culture - Blood (collected 07 Jul 2021 10:27)  Source: .Blood Blood-Peripheral  Preliminary Report (08 Jul 2021 11:02):    No growth to date.    Culture - Other (collected 07 Jul 2021 10:19)  Source: Skin R ear discharge  Preliminary Report (09 Jul 2021 11:54):    Few Corynebacterium propinquum "Susceptibilities not performed"    Rare Coag Negative Staphylococcus "Susceptibilities not performed"      RADIOLOGY & ADDITIONAL TESTS:    07-09-21 @ 07:01  -  07-10-21 @ 07:00  --------------------------------------------------------  IN:    dextrose 5% + sodium chloride 0.9% w/ Additives: 600 mL    IV PiggyBack: 500 mL    IV PiggyBack: 200 mL    IV PiggyBack: 300 mL    IV PiggyBack: 250 mL  Total IN: 1850 mL    OUT:    Oral Fluid: 0 mL    Voided (mL): 1700 mL  Total OUT: 1700 mL    Total NET: 150 mL

## 2021-07-10 NOTE — PROGRESS NOTE ADULT - SUBJECTIVE AND OBJECTIVE BOX
ANITHA ADONIS    S 2C 233 I    Patient is a 70y old  Male who presents with a chief complaint of sepsis, ear infection (09 Jul 2021 19:01)       Allergies    lisinopril (Unknown)  penicillins (Unknown)    Intolerances        HPI:  Patient is a 70M with a PMH of Anoxic encephalopathy, prostate cancer, HTN, bowel resection who presents to the ED for R ear discharge.  Patient minimally verbal and bedbound at baseline, unable to provide history.  Wife at the bedside to provide history.  Patient noted to have R ear discharge for two days along with temperatures at home.  Wife states that she has been giving him tylenol at home without resolution of the fever.  Per wife, patient was talking much less and was not accepting PO intake so she presents to the ED for evaluation.  Denies history of ear infections.  Patient also noted to have sporadic dyspnea over the same time span.  No other complaints.  Current SpO2 on 2L via NC is 92%.  Vitals stable, labs benign.  Will admit to med surg.     (07 Jul 2021 05:04)      PAST MEDICAL & SURGICAL HISTORY:  Hypertension    Prostate cancer  radiation therapy    Brain injury with coma  x 2 weeks in 2008    Anoxic encephalopathy    Leg pain, right    Gait abnormality    Sudden cardiac death    Status post cryoablation  of left renal mass    H/O prostate cancer  resection and radiation therapy    H/O tracheostomy    S/P ICD (internal cardiac defibrillator) procedure  implanted on 1/6/2009        FAMILY HISTORY:  FH: HTN (hypertension)          MEDICATIONS   acetaminophen  Suppository .. 650 milliGRAM(s) Rectal every 6 hours PRN  acyclovir IVPB 600 milliGRAM(s) IV Intermittent every 8 hours  albuterol/ipratropium for Nebulization 3 milliLiter(s) Nebulizer every 6 hours PRN  chlorhexidine 4% Liquid 1 Application(s) Topical <User Schedule>  ciprofloxacin   IVPB 400 milliGRAM(s) IV Intermittent every 12 hours  dextrose 5% + sodium chloride 0.9% 1000 milliLiter(s) IV Continuous <Continuous>  famotidine Injectable 20 milliGRAM(s) IV Push two times a day  heparin   Injectable 5000 Unit(s) SubCutaneous every 12 hours  hydrALAZINE Injectable 5 milliGRAM(s) IV Push every 6 hours PRN  hydrocortisone/polymyxin/neomycin Suspension 4 Drop(s) Right Ear three times a day  lidocaine 1% Injectable 10 milliLiter(s) Local Injection once  nystatin Powder 1 Application(s) Topical two times a day  sodium chloride 0.9% lock flush 10 milliLiter(s) IV Push every 1 hour PRN      Vital Signs Last 24 Hrs  T(C): 36.8 (10 Jul 2021 05:15), Max: 37.4 (09 Jul 2021 16:10)  T(F): 98.3 (10 Jul 2021 05:15), Max: 99.3 (09 Jul 2021 16:10)  HR: 90 (10 Jul 2021 05:15) (90 - 103)  BP: 166/78 (10 Jul 2021 05:15) (129/67 - 166/78)  BP(mean): --  RR: 16 (10 Jul 2021 05:15) (16 - 18)  SpO2: 99% (10 Jul 2021 05:15) (95% - 99%)      07-09-21 @ 07:01  -  07-10-21 @ 07:00  --------------------------------------------------------  IN: 1850 mL / OUT: 1700 mL / NET: 150 mL            LABS:                        11.3   4.53  )-----------( 186      ( 10 Jul 2021 07:51 )             32.4     07-10    131<L>  |  97  |  4<L>  ----------------------------<  170<H>  3.3<L>   |  28  |  0.54    Ca    7.5<L>      10 Jul 2021 07:51  Phos  2.4     07-10  Mg     2.1     07-10    TPro  6.5  /  Alb  2.6<L>  /  TBili  0.6  /  DBili  x   /  AST  33  /  ALT  19  /  AlkPhos  61  07-09    PT/INR - ( 09 Jul 2021 10:50 )   PT: 15.7 sec;   INR: 1.37 ratio         PTT - ( 09 Jul 2021 10:50 )  PTT:29.9 sec      ABG - ( 10 Jul 2021 05:00 )  pH, Arterial: x     pH, Blood: 7.49  /  pCO2: 36    /  pO2: 111   / HCO3: 27    / Base Excess: 4.1   /  SaO2: 98                  WBC:  WBC Count: 4.53 K/uL (07-10 @ 07:51)  WBC Count: 5.62 K/uL (07-09 @ 10:50)  WBC Count: 5.25 K/uL (07-08 @ 08:36)  WBC Count: 5.70 K/uL (07-07 @ 00:54)      MICROBIOLOGY:  RECENT CULTURES:  07-10 .Smear Other XXXX   polymorphonuclear leukocytes seen  No organisms seen  by cytocentrifuge XXXX    07-10 .CSF CSF XXXX   polymorphonuclear leukocytes seen  No organisms seen  by cytocentrifuge XXXX    07-08 .Urine Clean Catch (Midstream) XXXX XXXX   No growth    07-07 .Blood Blood-Peripheral XXXX XXXX   No growth to date.    07-07 Skin R ear discharge XXXX XXXX   Few Corynebacterium propinquum "Susceptibilities not performed"  Rare Coag Negative Staphylococcus "Susceptibilities not performed"                PT/INR - ( 09 Jul 2021 10:50 )   PT: 15.7 sec;   INR: 1.37 ratio         PTT - ( 09 Jul 2021 10:50 )  PTT:29.9 sec    Sodium:  Sodium, Serum: 131 mmol/L (07-10 @ 07:51)  Sodium, Serum: 127 mmol/L (07-09 @ 10:50)  Sodium, Serum: 131 mmol/L (07-08 @ 08:36)  Sodium, Serum: 129 mmol/L (07-07 @ 00:54)      0.54 mg/dL 07-10 @ 07:51  0.60 mg/dL 07-09 @ 10:50  0.70 mg/dL 07-08 @ 08:36  0.79 mg/dL 07-07 @ 00:54      Hemoglobin:  Hemoglobin: 11.3 g/dL (07-10 @ 07:51)  Hemoglobin: 11.5 g/dL (07-09 @ 10:50)  Hemoglobin: 11.8 g/dL (07-08 @ 08:36)  Hemoglobin: 13.3 g/dL (07-07 @ 00:54)      Platelets: Platelet Count - Automated: 186 K/uL (07-10 @ 07:51)  Platelet Count - Automated: 162 K/uL (07-09 @ 10:50)  Platelet Count - Automated: 186 K/uL (07-08 @ 08:36)  Platelet Count - Automated: 238 K/uL (07-07 @ 00:54)      LIVER FUNCTIONS - ( 09 Jul 2021 10:50 )  Alb: 2.6 g/dL / Pro: 6.5 gm/dL / ALK PHOS: 61 U/L / ALT: 19 U/L / AST: 33 U/L / GGT: x                 RADIOLOGY & ADDITIONAL STUDIES:

## 2021-07-10 NOTE — PROGRESS NOTE ADULT - ASSESSMENT
69 yo M with a PMH of Anoxic encephalopathy - minimally verbal and bedbound at baseline, prostate cancer, HTN, bowel resection & ICD who presented w/ R ear discharge. Pt's wife also reported that he was talking less and had worsening PO intake.      Septic encephalopathy  - viral swab (+) for varicella, suggesting possibility of viral meningoencephalitis  - patient started on Cipro and IV acyclovir  - Blood Cx NGTD  - ID following  - due to chronic hydro on CT, IR refused to do LP at this time as per Dr. Chacon, so Dr. Mcadams did it instead- which may be c/w viral meningoencephalitis    Hypophosphatemia/ Hypokalemia  - replace w/ Phos & KCl    Acute otitis externa of right ear  - ID following   - Unable to get ENT consult at this hospital   - c/w IV Cipro & cortisporin     Dyspnea  - may be due to sepsis and encephalopathy   - c/w O2 2L  - Pulm consulted - Juan F  - no DVT on US  - no pneumonia on plain CT of chest     Dysphagia  - at baseline, pt unable to feed self, but normally able to swallow  - Failed swallow eval, so will keep NPO  - c/w D5LR for now  - if no neuro improvement within another 2-3 days, patient will need NG tube and TFs    Prostate cancer  - leuprolide when tolerating PO    Hx of Diverticulosis w/ significant diverticular bleed in 2018, requiring 4 transfusions    - patient had another GI bleed in 2019, but refused EGD/colonoscopy, so source unknown  - family agreed to SQ heparin and watch H/H     Essential hypertension  - c/w hydralazine PRN     Functional quadriplegia  - history of anoxic encephalopathy - per wife, occurred 2008 due to fall   - c/w supportive care    Prophylaxis:  DVT: Heparin  GI: Pepcid

## 2021-07-11 LAB
ANION GAP SERPL CALC-SCNC: 8 MMOL/L — SIGNIFICANT CHANGE UP (ref 5–17)
BUN SERPL-MCNC: 4 MG/DL — LOW (ref 7–23)
CALCIUM SERPL-MCNC: 7.3 MG/DL — LOW (ref 8.5–10.1)
CHLORIDE SERPL-SCNC: 101 MMOL/L — SIGNIFICANT CHANGE UP (ref 96–108)
CO2 SERPL-SCNC: 26 MMOL/L — SIGNIFICANT CHANGE UP (ref 22–31)
CREAT SERPL-MCNC: 0.61 MG/DL — SIGNIFICANT CHANGE UP (ref 0.5–1.3)
GLUCOSE SERPL-MCNC: 125 MG/DL — HIGH (ref 70–99)
HCT VFR BLD CALC: 31.6 % — LOW (ref 39–50)
HGB BLD-MCNC: 10.9 G/DL — LOW (ref 13–17)
MAGNESIUM SERPL-MCNC: 2.1 MG/DL — SIGNIFICANT CHANGE UP (ref 1.6–2.6)
MCHC RBC-ENTMCNC: 26.3 PG — LOW (ref 27–34)
MCHC RBC-ENTMCNC: 34.5 GM/DL — SIGNIFICANT CHANGE UP (ref 32–36)
MCV RBC AUTO: 76.3 FL — LOW (ref 80–100)
NRBC # BLD: 0 /100 WBCS — SIGNIFICANT CHANGE UP (ref 0–0)
PHOSPHATE SERPL-MCNC: 2 MG/DL — LOW (ref 2.5–4.5)
PLATELET # BLD AUTO: 183 K/UL — SIGNIFICANT CHANGE UP (ref 150–400)
POTASSIUM SERPL-MCNC: 3.7 MMOL/L — SIGNIFICANT CHANGE UP (ref 3.5–5.3)
POTASSIUM SERPL-SCNC: 3.7 MMOL/L — SIGNIFICANT CHANGE UP (ref 3.5–5.3)
RBC # BLD: 4.14 M/UL — LOW (ref 4.2–5.8)
RBC # FLD: 14 % — SIGNIFICANT CHANGE UP (ref 10.3–14.5)
SODIUM SERPL-SCNC: 135 MMOL/L — SIGNIFICANT CHANGE UP (ref 135–145)
WBC # BLD: 3.61 K/UL — LOW (ref 3.8–10.5)
WBC # FLD AUTO: 3.61 K/UL — LOW (ref 3.8–10.5)

## 2021-07-11 PROCEDURE — 99233 SBSQ HOSP IP/OBS HIGH 50: CPT

## 2021-07-11 PROCEDURE — 99232 SBSQ HOSP IP/OBS MODERATE 35: CPT

## 2021-07-11 RX ORDER — FUROSEMIDE 40 MG
40 TABLET ORAL ONCE
Refills: 0 | Status: COMPLETED | OUTPATIENT
Start: 2021-07-11 | End: 2021-07-11

## 2021-07-11 RX ORDER — IPRATROPIUM/ALBUTEROL SULFATE 18-103MCG
3 AEROSOL WITH ADAPTER (GRAM) INHALATION EVERY 6 HOURS
Refills: 0 | Status: COMPLETED | OUTPATIENT
Start: 2021-07-11 | End: 2021-07-12

## 2021-07-11 RX ADMIN — Medication 112 MILLIGRAM(S): at 05:46

## 2021-07-11 RX ADMIN — SODIUM CHLORIDE 75 MILLILITER(S): 9 INJECTION, SOLUTION INTRAVENOUS at 11:57

## 2021-07-11 RX ADMIN — NYSTATIN CREAM 1 APPLICATION(S): 100000 CREAM TOPICAL at 17:38

## 2021-07-11 RX ADMIN — HEPARIN SODIUM 5000 UNIT(S): 5000 INJECTION INTRAVENOUS; SUBCUTANEOUS at 05:46

## 2021-07-11 RX ADMIN — Medication 3 MILLILITER(S): at 17:13

## 2021-07-11 RX ADMIN — Medication 40 MILLIGRAM(S): at 17:27

## 2021-07-11 RX ADMIN — FAMOTIDINE 20 MILLIGRAM(S): 10 INJECTION INTRAVENOUS at 17:28

## 2021-07-11 RX ADMIN — Medication 4 DROP(S): at 05:49

## 2021-07-11 RX ADMIN — FAMOTIDINE 20 MILLIGRAM(S): 10 INJECTION INTRAVENOUS at 05:46

## 2021-07-11 RX ADMIN — NYSTATIN CREAM 1 APPLICATION(S): 100000 CREAM TOPICAL at 05:49

## 2021-07-11 RX ADMIN — Medication 62.5 MILLIMOLE(S): at 11:56

## 2021-07-11 RX ADMIN — SODIUM CHLORIDE 75 MILLILITER(S): 9 INJECTION, SOLUTION INTRAVENOUS at 22:07

## 2021-07-11 RX ADMIN — HEPARIN SODIUM 5000 UNIT(S): 5000 INJECTION INTRAVENOUS; SUBCUTANEOUS at 17:28

## 2021-07-11 RX ADMIN — Medication 112 MILLIGRAM(S): at 14:52

## 2021-07-11 RX ADMIN — CHLORHEXIDINE GLUCONATE 1 APPLICATION(S): 213 SOLUTION TOPICAL at 05:50

## 2021-07-11 RX ADMIN — Medication 112 MILLIGRAM(S): at 22:07

## 2021-07-11 NOTE — DIETITIAN INITIAL EVALUATION ADULT. - ORAL INTAKE PTA/DIET HISTORY
Pt is non-verbal, asleep when seen.  Pt's wife was @ bedside, stated that pt was eating well PTA, c difficulty eating c signs of aspiration prior to  adm.  Diet PTA: Low sodium.

## 2021-07-11 NOTE — PROGRESS NOTE ADULT - SUBJECTIVE AND OBJECTIVE BOX
ANITHA ADONIS    S 2C 233 I    Patient is a 70y old  Male who presents with a chief complaint of sepsis, ear infection (10 Jul 2021 19:35)       Allergies    lisinopril (Unknown)  penicillins (Unknown)    Intolerances        HPI:  Patient is a 70M with a PMH of Anoxic encephalopathy, prostate cancer, HTN, bowel resection who presents to the ED for R ear discharge.  Patient minimally verbal and bedbound at baseline, unable to provide history.  Wife at the bedside to provide history.  Patient noted to have R ear discharge for two days along with temperatures at home.  Wife states that she has been giving him tylenol at home without resolution of the fever.  Per wife, patient was talking much less and was not accepting PO intake so she presents to the ED for evaluation.  Denies history of ear infections.  Patient also noted to have sporadic dyspnea over the same time span.  No other complaints.  Current SpO2 on 2L via NC is 92%.  Vitals stable, labs benign.  Will admit to med surg.     (07 Jul 2021 05:04)      PAST MEDICAL & SURGICAL HISTORY:  Hypertension    Prostate cancer  radiation therapy    Brain injury with coma  x 2 weeks in 2008    Anoxic encephalopathy    Leg pain, right    Gait abnormality    Sudden cardiac death    Status post cryoablation  of left renal mass    H/O prostate cancer  resection and radiation therapy    H/O tracheostomy    S/P ICD (internal cardiac defibrillator) procedure  implanted on 1/6/2009        FAMILY HISTORY:  FH: HTN (hypertension)          MEDICATIONS   acetaminophen  Suppository .. 650 milliGRAM(s) Rectal every 6 hours PRN  acyclovir IVPB 600 milliGRAM(s) IV Intermittent every 8 hours  albuterol/ipratropium for Nebulization 3 milliLiter(s) Nebulizer every 6 hours PRN  chlorhexidine 4% Liquid 1 Application(s) Topical <User Schedule>  ciprofloxacin   IVPB 400 milliGRAM(s) IV Intermittent every 12 hours  dextrose 5% + sodium chloride 0.9% 1000 milliLiter(s) IV Continuous <Continuous>  famotidine Injectable 20 milliGRAM(s) IV Push two times a day  heparin   Injectable 5000 Unit(s) SubCutaneous every 12 hours  hydrALAZINE Injectable 5 milliGRAM(s) IV Push every 6 hours PRN  hydrocortisone/polymyxin/neomycin Suspension 4 Drop(s) Right Ear three times a day  lidocaine 1% Injectable 10 milliLiter(s) Local Injection once  nystatin Powder 1 Application(s) Topical two times a day  sodium chloride 0.9% lock flush 10 milliLiter(s) IV Push every 1 hour PRN      Vital Signs Last 24 Hrs  T(C): 36.6 (11 Jul 2021 05:04), Max: 37 (10 Jul 2021 16:03)  T(F): 97.9 (11 Jul 2021 05:04), Max: 98.6 (10 Jul 2021 16:03)  HR: 102 (11 Jul 2021 05:04) (89 - 102)  BP: 145/74 (11 Jul 2021 05:04) (125/69 - 151/77)  BP(mean): --  RR: 18 (11 Jul 2021 05:04) (16 - 18)  SpO2: 93% (11 Jul 2021 05:04) (93% - 97%)      07-10-21 @ 07:01  -  07-11-21 @ 07:00  --------------------------------------------------------  IN: 2810 mL / OUT: 0 mL / NET: 2810 mL            LABS:                        10.9   3.61  )-----------( 183      ( 11 Jul 2021 06:57 )             31.6     07-11    135  |  101  |  4<L>  ----------------------------<  125<H>  3.7   |  26  |  0.61    Ca    7.3<L>      11 Jul 2021 06:57  Phos  2.0     07-11  Mg     2.1     07-11    TPro  6.5  /  Alb  2.6<L>  /  TBili  0.6  /  DBili  x   /  AST  33  /  ALT  19  /  AlkPhos  61  07-09    PT/INR - ( 09 Jul 2021 10:50 )   PT: 15.7 sec;   INR: 1.37 ratio         PTT - ( 09 Jul 2021 10:50 )  PTT:29.9 sec      ABG - ( 10 Jul 2021 05:00 )  pH, Arterial: x     pH, Blood: 7.49  /  pCO2: 36    /  pO2: 111   / HCO3: 27    / Base Excess: 4.1   /  SaO2: 98                  WBC:  WBC Count: 3.61 K/uL (07-11 @ 06:57)  WBC Count: 4.53 K/uL (07-10 @ 07:51)  WBC Count: 5.62 K/uL (07-09 @ 10:50)  WBC Count: 5.25 K/uL (07-08 @ 08:36)      MICROBIOLOGY:  RECENT CULTURES:  07-10 .Smear Other XXXX   polymorphonuclear leukocytes seen  No organisms seen  by cytocentrifuge XXXX    07-10 .CSF CSF XXXX   polymorphonuclear leukocytes seen  No organisms seen  by cytocentrifuge   No growth    07-08 .Urine Clean Catch (Midstream) XXXX XXXX   No growth    07-07 .Blood Blood-Peripheral XXXX XXXX   No growth to date.    07-07 Skin R ear discharge XXXX XXXX   Few Corynebacterium propinquum "Susceptibilities not performed"  Rare Coag Negative Staphylococcus "Susceptibilities not performed"                PT/INR - ( 09 Jul 2021 10:50 )   PT: 15.7 sec;   INR: 1.37 ratio         PTT - ( 09 Jul 2021 10:50 )  PTT:29.9 sec    Sodium:  Sodium, Serum: 135 mmol/L (07-11 @ 06:57)  Sodium, Serum: 131 mmol/L (07-10 @ 07:51)  Sodium, Serum: 127 mmol/L (07-09 @ 10:50)  Sodium, Serum: 131 mmol/L (07-08 @ 08:36)      0.61 mg/dL 07-11 @ 06:57  0.54 mg/dL 07-10 @ 07:51  0.60 mg/dL 07-09 @ 10:50  0.70 mg/dL 07-08 @ 08:36      Hemoglobin:  Hemoglobin: 10.9 g/dL (07-11 @ 06:57)  Hemoglobin: 11.3 g/dL (07-10 @ 07:51)  Hemoglobin: 11.5 g/dL (07-09 @ 10:50)  Hemoglobin: 11.8 g/dL (07-08 @ 08:36)      Platelets: Platelet Count - Automated: 183 K/uL (07-11 @ 06:57)  Platelet Count - Automated: 186 K/uL (07-10 @ 07:51)  Platelet Count - Automated: 162 K/uL (07-09 @ 10:50)  Platelet Count - Automated: 186 K/uL (07-08 @ 08:36)      LIVER FUNCTIONS - ( 09 Jul 2021 10:50 )  Alb: 2.6 g/dL / Pro: 6.5 gm/dL / ALK PHOS: 61 U/L / ALT: 19 U/L / AST: 33 U/L / GGT: x                 RADIOLOGY & ADDITIONAL STUDIES:

## 2021-07-11 NOTE — PROGRESS NOTE ADULT - SUBJECTIVE AND OBJECTIVE BOX
69 yo M with a PMH of Anoxic encephalopathy - minimally verbal and bedbound at baseline, prostate cancer, HTN, bowel resection & ICD who presented w/ R ear discharge. Pt's wife also reported that he was talking less and had worsening PO intake. He is lying in bed in NAD.        MEDICATIONS  (STANDING):  acyclovir IVPB 600 milliGRAM(s) IV Intermittent every 8 hours  chlorhexidine 4% Liquid 1 Application(s) Topical <User Schedule>  dextrose 5% + sodium chloride 0.9% 1000 milliLiter(s) (75 mL/Hr) IV Continuous <Continuous>  famotidine Injectable 20 milliGRAM(s) IV Push two times a day  heparin   Injectable 5000 Unit(s) SubCutaneous every 12 hours  lidocaine 1% Injectable 10 milliLiter(s) Local Injection once  nystatin Powder 1 Application(s) Topical two times a day    MEDICATIONS  (PRN):  acetaminophen  Suppository .. 650 milliGRAM(s) Rectal every 6 hours PRN Temp greater or equal to 38C (100.4F), Mild Pain (1 - 3)  albuterol/ipratropium for Nebulization 3 milliLiter(s) Nebulizer every 6 hours PRN wheeze  hydrALAZINE Injectable 5 milliGRAM(s) IV Push every 6 hours PRN SBP > 160  sodium chloride 0.9% lock flush 10 milliLiter(s) IV Push every 1 hour PRN Pre/post blood products, medications, blood draw, and to maintain line patency    Allergies    lisinopril (Unknown)  penicillins (Unknown)    Intolerances    Vital Signs Last 24 Hrs  T(C): 36.6 (11 Jul 2021 05:04), Max: 37 (10 Jul 2021 16:03)  T(F): 97.9 (11 Jul 2021 05:04), Max: 98.6 (10 Jul 2021 16:03)  HR: 102 (11 Jul 2021 05:04) (102 - 102)  BP: 145/74 (11 Jul 2021 05:04) (125/69 - 145/74)  BP(mean): --  RR: 18 (11 Jul 2021 05:04) (16 - 18)  SpO2: 93% (11 Jul 2021 05:04) (93% - 97%)    PHYSICAL EXAM:  GENERAL: NAD, well-groomed, well-developed  HEAD:  Atraumatic, Normocephalic  EYES: EOMI, PERRLA   NECK: Supple   NERVOUS SYSTEM: more alert today  CHEST/LUNG: Clear to auscultation bilaterally; No rales, rhonchi, wheezing, or rubs  HEART: Regular rate and rhythm; No murmurs, rubs, or gallops  ABDOMEN: Soft, Nontender, Nondistended; Bowel sounds present  EXTREMITIES: No clubbing, cyanosis, or edema    LABS:                                     10.9   3.61  )-----------( 183      ( 11 Jul 2021 06:57 )             31.6   07-11    135  |  101  |  4<L>  ----------------------------<  125<H>  3.7   |  26  |  0.61    Ca    7.3<L>      11 Jul 2021 06:57  Phos  2.0     07-11  Mg     2.1     07-11      CAPILLARY BLOOD GLUCOSE          Culture - Acid Fast - CSF (collected 10 Jul 2021 00:14)  Source: .CSF CSF    Culture - CSF with Gram Stain (collected 10 Jul 2021 00:14)  Source: .CSF CSF  Gram Stain (10 Jul 2021 01:20):    polymorphonuclear leukocytes seen    No organisms seen    by cytocentrifuge    Culture - Urine (collected 08 Jul 2021 00:38)  Source: .Urine Clean Catch (Midstream)  Final Report (08 Jul 2021 21:13):    No growth    Culture - Blood (collected 07 Jul 2021 10:27)  Source: .Blood Blood-Venous  Preliminary Report (08 Jul 2021 11:02):    No growth to date.    Culture - Blood (collected 07 Jul 2021 10:27)  Source: .Blood Blood-Peripheral  Preliminary Report (08 Jul 2021 11:02):    No growth to date.    Culture - Other (collected 07 Jul 2021 10:19)  Source: Skin R ear discharge  Preliminary Report (09 Jul 2021 11:54):    Few Corynebacterium propinquum "Susceptibilities not performed"    Rare Coag Negative Staphylococcus "Susceptibilities not performed"         I am inheriting this patinet on today 7/11/2021    71 yo M with a PMH of Anoxic encephalopathy - minimally verbal and bedbound at baseline, prostate cancer, HTN, bowel resection & ICD who presented w/ R ear discharge. Pt's wife also reported that he was talking less and had worsening PO intake. He is lying in bed in NAD.        MEDICATIONS  (STANDING):  acyclovir IVPB 600 milliGRAM(s) IV Intermittent every 8 hours  chlorhexidine 4% Liquid 1 Application(s) Topical <User Schedule>  dextrose 5% + sodium chloride 0.9% 1000 milliLiter(s) (75 mL/Hr) IV Continuous <Continuous>  famotidine Injectable 20 milliGRAM(s) IV Push two times a day  heparin   Injectable 5000 Unit(s) SubCutaneous every 12 hours  lidocaine 1% Injectable 10 milliLiter(s) Local Injection once  nystatin Powder 1 Application(s) Topical two times a day    MEDICATIONS  (PRN):  acetaminophen  Suppository .. 650 milliGRAM(s) Rectal every 6 hours PRN Temp greater or equal to 38C (100.4F), Mild Pain (1 - 3)  albuterol/ipratropium for Nebulization 3 milliLiter(s) Nebulizer every 6 hours PRN wheeze  hydrALAZINE Injectable 5 milliGRAM(s) IV Push every 6 hours PRN SBP > 160  sodium chloride 0.9% lock flush 10 milliLiter(s) IV Push every 1 hour PRN Pre/post blood products, medications, blood draw, and to maintain line patency    Allergies    lisinopril (Unknown)  penicillins (Unknown)    Intolerances    Vital Signs Last 24 Hrs  T(C): 36.6 (11 Jul 2021 05:04), Max: 37 (10 Jul 2021 16:03)  T(F): 97.9 (11 Jul 2021 05:04), Max: 98.6 (10 Jul 2021 16:03)  HR: 102 (11 Jul 2021 05:04) (102 - 102)  BP: 145/74 (11 Jul 2021 05:04) (125/69 - 145/74)  BP(mean): --  RR: 18 (11 Jul 2021 05:04) (16 - 18)  SpO2: 93% (11 Jul 2021 05:04) (93% - 97%)    PHYSICAL EXAM:  GENERAL: NAD, well-groomed, well-developed  HEAD:  Atraumatic, Normocephalic  EYES: EOMI, PERRLA   NECK: Supple   NERVOUS SYSTEM: more alert today  CHEST/LUNG: Clear to auscultation bilaterally; No rales, rhonchi, wheezing, or rubs  HEART: Regular rate and rhythm; No murmurs, rubs, or gallops  ABDOMEN: Soft, Nontender, Nondistended; Bowel sounds present  EXTREMITIES: No clubbing, cyanosis, or edema    LABS:                                     10.9   3.61  )-----------( 183      ( 11 Jul 2021 06:57 )             31.6   07-11    135  |  101  |  4<L>  ----------------------------<  125<H>  3.7   |  26  |  0.61    Ca    7.3<L>      11 Jul 2021 06:57  Phos  2.0     07-11  Mg     2.1     07-11      CAPILLARY BLOOD GLUCOSE          Culture - Acid Fast - CSF (collected 10 Jul 2021 00:14)  Source: .CSF CSF    Culture - CSF with Gram Stain (collected 10 Jul 2021 00:14)  Source: .CSF CSF  Gram Stain (10 Jul 2021 01:20):    polymorphonuclear leukocytes seen    No organisms seen    by cytocentrifuge    Culture - Urine (collected 08 Jul 2021 00:38)  Source: .Urine Clean Catch (Midstream)  Final Report (08 Jul 2021 21:13):    No growth    Culture - Blood (collected 07 Jul 2021 10:27)  Source: .Blood Blood-Venous  Preliminary Report (08 Jul 2021 11:02):    No growth to date.    Culture - Blood (collected 07 Jul 2021 10:27)  Source: .Blood Blood-Peripheral  Preliminary Report (08 Jul 2021 11:02):    No growth to date.    Culture - Other (collected 07 Jul 2021 10:19)  Source: Skin R ear discharge  Preliminary Report (09 Jul 2021 11:54):    Few Corynebacterium propinquum "Susceptibilities not performed"    Rare Coag Negative Staphylococcus "Susceptibilities not performed"

## 2021-07-11 NOTE — PROGRESS NOTE ADULT - SUBJECTIVE AND OBJECTIVE BOX
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 more alert and awake  ROS:    [X ] Unobtainable because: AMS , nonverbal  [ ] All other systems negative    Constitutional: no fever, no chills  Head: no trauma  Eyes: no vision changes, no eye pain  ENT:  no sore throat, no rhinorrhea  Cardiovascular:  no chest pain, no palpitation  Respiratory:  no SOB, no cough  GI:  no abd pain, no vomiting, no diarrhea  urinary: no dysuria, no hematuria, no flank pain  musculoskeletal:  no joint pain, no joint swelling  skin:  no rash  neurology:  no headache, no seizure, no change in mental status  psych: no anxiety, no depression         Allergies  lisinopril (Unknown)  penicillins (Unknown)        ANTIMICROBIALS:    acyclovir IVPB 600 every 8 hours      MEDICATIONS  (STANDING):  albuterol/ipratropium for Nebulization 3 every 6 hours  famotidine Injectable 20 two times a day  heparin   Injectable 5000 every 12 hours      PRN  acetaminophen  Suppository .. 650 milliGRAM(s) Rectal every 6 hours PRN  albuterol/ipratropium for Nebulization 3 milliLiter(s) Nebulizer every 6 hours PRN  hydrALAZINE Injectable 5 milliGRAM(s) IV Push every 6 hours PRN      Physical Exam:  Vital Signs Last 24 Hrs  T(F): 98.2 (21 @ 16:28), Max: 98.4 (21 @ 11:48)  HR: 95 (21 @ 16:28)  BP: 141/69 (21 @ 16:28)  RR: 18 (21 @ 16:28)  SpO2: 98% (21 @ 16:28) (93% - 98%)  Wt(kg): --    General:    NAD,  non toxic, awake, trackas around the room  Head: atraumatic, normocephalic  Eye: normal sclera and conjunctiva  ENT:    no oral lesions, neck less stiff   Cardio:     regular S1, S2,  no murmur  Respiratory:    crackles b/l,    no wheezing  abd:     soft,   BS +,   no tenderness  :   unable to assess CVAT,  no suprapubic tenderness,   Musculoskeletal:   no joint swelling,   no edema  vascular: no central lines, +PIV   Skin:    no rash  Neurologic:     poor mental status though more alert today, doesnt follow commands   psych: normal affect    WBC Count: 3.61 K/uL ( @ 06:57)  WBC Count: 4.53 K/uL (07-10 @ 07:51)  WBC Count: 5.62 K/uL ( @ 10:50)  WBC Count: 5.25 K/uL ( @ 08:36)  WBC Count: 5.70 K/uL ( @ 00:54)                            10.9   3.61  )-----------( 183      ( 2021 06:57 )             31.6           135  |  101  |  4<L>  ----------------------------<  125<H>  3.7   |  26  |  0.61    Ca    7.3<L>      2021 06:57  Phos  2.0       Mg     2.1             Creatinine Trend: 0.61<--, 0.54<--, 0.60<--, 0.70<--, 0.79<--        Urinalysis Basic - ( 2021 20:28 )    Color: Yellow / Appearance: Clear / S.010 / pH: x  Gluc: x / Ketone: Trace  / Bili: Negative / Urobili: Negative mg/dL   Blood: x / Protein: 30 mg/dL / Nitrite: Negative   Leuk Esterase: Trace / RBC: 6-10 /HPF / WBC 3-5   Sq Epi: x / Non Sq Epi: Occasional / Bacteria: Few        Creatinine Trend: 0.70<--, 0.79<--  Lactate, Blood: 1.9 mmol/L (21 @ 01:35)      MICROBIOLOGY:  Culture - Acid Fast - CSF (07.10.21 @ 00:14)    Specimen Source: .CSF CSF    Acid Fast Bacilli Smear:   Less than 5 cc of CSF received,  unable to perform AFB smear.    Culture - CSF with Gram Stain . (07.10.21 @ 00:14)    Gram Stain:   polymorphonuclear leukocytes seen  No organisms seen  by cytocentrifuge    Specimen Source: .CSF CSF    Culture Results:   No growth      Protein, CSF (21 @ 21:22)    Protein, CSF: 37 mg/dL    Glucose, CSF (21 @ 21:22)    Glucose, CSF: 85 mg/dL    Cerebrospinal Fluid Cell Count-1 (21 @ 21:22)    CSF Color: No Color    Tube Type: Tube 4    CSF Appearance: Clear    CSF Comments: Differential is based on _6 cells counted after cytocentrifugation.    CSF Lymphocytes: 100 %    CSF Segmented Neutrophils: 0 %    Total Nucleated Cell Count, CSF: 43 /uL    RBC Count - Spinal Fluid: 22 /uL        Herpes Simplex Virus 1/2 VZV Lesions, PCR (21 @ 09:11)    Herpes Simplex Virus 1/2  VZV PCR Source: lesion    Herpes Simplex Virus 1/2  VZV PCR Result: VZV Detected HSV 1/2  and VZV assay is a Real-Time PCR test for the qualitative    CSF PCR Panel (21 @ 23:16)    Varicella zoster virus: Detected      Rapid RVP Result: NotDetec ( @ 06:31)        C-Reactive Protein, Serum: 31 ()    RADIOLOGY:

## 2021-07-11 NOTE — DIETITIAN INITIAL EVALUATION ADULT. - NS FNS REASON FOR WEIGHT CHANG
wt. gain due to decreased activity reported by wife, as per previous adm records, wt. fluctuations noted; pt was 84.8 kg/186.9 LBS(12/10/18), 88.6 kg/195.3 LBS (02/14/19), 91.2 kg/201 LBS(11/18/18)/fluid retention/other (specify)

## 2021-07-11 NOTE — DIETITIAN INITIAL EVALUATION ADULT. - PHYSCIAL ASSESSMENT
BMI=34.8(07/07/21),07/07, 1+ generalized edema -> 07/11, 2+ generalized edema, 3+ edema of feet, legs noted/obese/other (specify)

## 2021-07-11 NOTE — DIETITIAN INITIAL EVALUATION ADULT. - OTHER CALCULATIONS
IBW used for calculation of estimated needs. Wt. 07/07, 92. 2 kg    IBW: 58.9 kg  %IBW: 156%    UBW:  90.7 kg     % UBW: 101%, wt. loss of 1.7 kg since adm noted

## 2021-07-11 NOTE — PROGRESS NOTE ADULT - ASSESSMENT
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 with possible otitis externa  Fsebrile, normal WBC   right ear with murky looking discharge  DDx otitis externa vs shingles  Blood cultures sent as well ear culture  was put on IV cipro  Reportedly has PCN allergy but has tolerated cefpodoxime     7/8:still febrile, VZV positive, now  concern for encephalitis from vzv, have requested radiology for LP   7/11: s/p LP, VZV +on CSF PCR, stopped cipro, more awake, wife wants re-eval from speech and swallow, more congested today, CXR ordered     Herpes Zoster Oticus  VZV encephalitis   acute encephalopathy  Fever  Functional Quadriplegia     Plan:   continue IV acyclovir 10mg q8hrs based on ideal body weight   IV fluids while on acyclovir   stopped IV Ciprofloxacin   stopped cortisporin drops    f/u blood cultures negative   f/u ear cultures likely contaminant   offloading, frequent turns, nutrition optimization   trend fevers   trend wbc   CXR  Diuresis   Swallowing eval     D/W Dr. Eladio Seymour,   Infectious Disease Attending  Pager [phone]  After 5pm/weekends please call [phone] for all inquiries and new consults

## 2021-07-11 NOTE — PROGRESS NOTE ADULT - ASSESSMENT
69 yo M with a PMH of Anoxic encephalopathy - minimally verbal and bedbound at baseline, prostate cancer, HTN, bowel resection & ICD who presented w/ R ear discharge. Pt's wife also reported that he was talking less and had worsening PO intake.      Septic encephalopathy  - viral swab (+) for varicella, suggesting possibility of viral meningoencephalitis  - patient started on Cipro and IV acyclovir  - Blood Cx NGTD  - ID following  - per documentation by my colleague  due to chronic hydro on CT, IR refused to do LP at this time as per Dr. Chacon, so Dr. Mcadams did it instead- which may be c/w viral meningoencephalitis  7/11/2021  csf positive for varicella    Hypophosphatemia/   - replace w/ Phos     Acute otitis externa of right ear  - ID following   - Unable to get ENT consult at this hospital   - c/w IV Cipro & cortisporin     Dyspnea  - may be due to sepsis and encephalopathy   - c/w O2 2L  - Pulm consulted - Juan F  - no DVT on US  - no pneumonia on plain CT of chest     Dysphagia  - at baseline, pt unable to feed self, but normally able to swallow  - Failed swallow eval, been npo since admisison  - , patient will need NG tube and TFs    Prostate cancer  - leuprolide when tolerating PO    Hx of Diverticulosis w/ significant diverticular bleed in 2018, requiring 4 transfusions    - patient had another GI bleed in 2019, but refused EGD/colonoscopy, so source unknown  - family agreed to SQ heparin and watch H/H     Essential hypertension  - c/w hydralazine PRN     Functional quadriplegia  - history of anoxic encephalopathy - per wife, occurred 2008 due to fall   - c/w supportive care    Prophylaxis:  DVT: Heparin  GI: Pepcid

## 2021-07-11 NOTE — DIETITIAN INITIAL EVALUATION ADULT. - FACTORS AFF FOOD INTAKE
07/07, swallow evaluation recommended NPO; pt's wife feels that pt is showing signs that he may be able to eat soon, bit on mouth swab./change in mental status/difficulty feeding self/difficulty swallowing/other (specify)

## 2021-07-11 NOTE — PROGRESS NOTE ADULT - ASSESSMENT
ADONIS WELSH 70 M 7/7/2021 1949 DR JAXON BROWN      REVIEW OF SYMPTOMS      Able to give (reliable) ROS  NO     PHYSICAL EXAM    HEENT Unremarkable  atraumatic   RESP Fair air entry EXP prolonged    Harsh breath sound Resp distres mild   CARDIAC S1 S2 No S3     NO JVD    ABDOMEN SOFT BS PRESENT NOT DISTENDED No hepatosplenomegaly   PEDAL EDEMA present No calf tenderness  NO rash       ADVANCED DIRECTIVES.                            COVID STATUS.                                       scv2 7/7/2021 (-)       CC 7/7/2021 ADMISSN .   70 m HO brain injury 2008 previously able to eat po 3 w ago  7/7/2021 FEVER     PRESENTING PROBLEMS 7/7/2021 .   OTITIS EXTERNA R EAR   FEVER   HYPONATREMIA Na 7/7/2021 Na 129  DYSPNEA  FUNCTIONAL QUADRIPLEGIA     PMH.  BASELINE  VERBAL BEDBOUND   SBR   HO TRACH SP DECANNULATION   ANOXIC ENCEPHALOPATHY Brain injury 2008   HO SCD  HO AICD 2009   HO CYROABLATION L RENAL MASS   PROSTATE CA     HOSPITAL COURSE.  HSV VZV pcr (+) 7/8 R EAR   ACUYCLOVIR (7/8/2021)   HYPONATREMIA Na 127 7/9/2021   SPINAL TAP 7/9/2021 ldh 19 Lymph 100% w 43 VZpcr (+)   VZV  ENCEPHALITIS  CHEST CONGESTION 7/11/2021     GLOBAL AND BEST PRACTICE ISSUES                     ALLERGY.    PNCL LISINOPRIL  WEIGHT.          7/7/2021 90        BMI                7/7/2021 33        .                          ADVANCED DIRECTIVE.                               HEAD OF BED ELEVATION. Yes  DVT PROPHYLAXIS. hpsc (7/8)   APA.                   GONZALEZ PROPHYLAXIS.                                                                     DYSPHAGIA RECOMM.   DIET.    NPO (77)   INFECTION PROPHYLAXIS.  chlorhexidine (7/9)      PATIENT DATA                ABG.     7/10/2021 .32 749/36/111              OXYGENATION.    7/10/2021 ra 97%   7/9/2021 3l 96%      7/7-7/8/2021 2l 97%   - ra 97%          VITALS/IO/VENT/DRIPS.     7/11/2021 afeb 95 140/60     ASSESSMENT/RECOMMENDATIONS.    DYSPNEA  Has ho trach in past Has ho anoxic encephalopathy and is at risk for aspn pneum  7/8/2021 V duplex (-)  7/8 ct ch no contr No pneum  ABG acceptab;e   HERPES OTITIS EXTRENAL   VZV  ENCEPHALITIS   Started on cipro by admitting md 7/7/2021 7/7 herpes vzv pcr (+) EAR  7/9 CSF vzv pcr (+)   7/8/2021 acyclovir 900.3 started 7/8/2021 Dr Ponce    More awake on 7/10/2021   WHEEZE   DUNEB.4 P 97/7)    cxr ordered 7/11/2021   spouse feels he is congested (7/11/20210   NPO STATUS   On iv fl  Pt used to eat po June 2021 as per spouse (7/11/2021)   7/11/2021 speech eval ordered to start po diet      TIME SPENT   Over 25 minutes aggregate care time spent on encounter; activities included   direct patient care, counseling and/or coordinating care reviewing notes, lab data/ imaging , discussion with multidisciplinary team/ patient  /family and explaining in detail risks, benefits, alternatives  of the recommendations     ADONIS WELSH 70 M 7/7/2021 1949 DR JAXON BROWN

## 2021-07-11 NOTE — DIETITIAN INITIAL EVALUATION ADULT. - PERTINENT LABORATORY DATA
07-11 Na135 mmol/L Glu 125 mg/dL<H> K+ 3.7 mmol/L Cr  0.61 mg/dL BUN 4 mg/dL<L> 07-11 Phos 2.0 mg/dL<L> 07-09 Alb 2.6 g/dL<L>07-09 ALT 19 U/L AST 33 U/L Alkaline Phosphatase 61 U/L

## 2021-07-11 NOTE — DIETITIAN INITIAL EVALUATION ADULT. - PERTINENT MEDS FT
MEDICATIONS  (STANDING):  acyclovir IVPB 600 milliGRAM(s) IV Intermittent every 8 hours  chlorhexidine 4% Liquid 1 Application(s) Topical <User Schedule>  dextrose 5% + sodium chloride 0.9% 1000 milliLiter(s) (75 mL/Hr) IV Continuous <Continuous>  famotidine Injectable 20 milliGRAM(s) IV Push two times a day  heparin   Injectable 5000 Unit(s) SubCutaneous every 12 hours  lidocaine 1% Injectable 10 milliLiter(s) Local Injection once  nystatin Powder 1 Application(s) Topical two times a day  sodium phosphate IVPB 15 milliMole(s) IV Intermittent once    MEDICATIONS  (PRN):  acetaminophen  Suppository .. 650 milliGRAM(s) Rectal every 6 hours PRN Temp greater or equal to 38C (100.4F), Mild Pain (1 - 3)  albuterol/ipratropium for Nebulization 3 milliLiter(s) Nebulizer every 6 hours PRN wheeze  hydrALAZINE Injectable 5 milliGRAM(s) IV Push every 6 hours PRN SBP > 160  sodium chloride 0.9% lock flush 10 milliLiter(s) IV Push every 1 hour PRN Pre/post blood products, medications, blood draw, and to maintain line patency

## 2021-07-11 NOTE — DIETITIAN INITIAL EVALUATION ADULT. - CONTINUE CURRENT NUTRITION CARE PLAN
recommend swallow re-evaluation, if pt remains NPO, recommend consider NGT feeding c Jevity 1.2 @ 30 mL/hr, increase by 5 ml q 8 hours to goal rate 55 mL/hr x 24 hrs (=1320 ml,1584 camacho, 73 gm pro, 1069 mL free water, 110% RDIs)/yes

## 2021-07-12 LAB
ALBUMIN SERPL ELPH-MCNC: 2.6 G/DL — LOW (ref 3.3–5)
ALP SERPL-CCNC: 58 U/L — SIGNIFICANT CHANGE UP (ref 40–120)
ALT FLD-CCNC: 20 U/L — SIGNIFICANT CHANGE UP (ref 12–78)
ANION GAP SERPL CALC-SCNC: 6 MMOL/L — SIGNIFICANT CHANGE UP (ref 5–17)
AST SERPL-CCNC: 30 U/L — SIGNIFICANT CHANGE UP (ref 15–37)
BILIRUB SERPL-MCNC: 0.5 MG/DL — SIGNIFICANT CHANGE UP (ref 0.2–1.2)
BUN SERPL-MCNC: 3 MG/DL — LOW (ref 7–23)
CALCIUM SERPL-MCNC: 7.5 MG/DL — LOW (ref 8.5–10.1)
CHLORIDE SERPL-SCNC: 105 MMOL/L — SIGNIFICANT CHANGE UP (ref 96–108)
CO2 SERPL-SCNC: 27 MMOL/L — SIGNIFICANT CHANGE UP (ref 22–31)
CREAT SERPL-MCNC: 0.58 MG/DL — SIGNIFICANT CHANGE UP (ref 0.5–1.3)
CULTURE RESULTS: NO GROWTH — SIGNIFICANT CHANGE UP
CULTURE RESULTS: SIGNIFICANT CHANGE UP
CULTURE RESULTS: SIGNIFICANT CHANGE UP
GLUCOSE SERPL-MCNC: 123 MG/DL — HIGH (ref 70–99)
GRAM STN FLD: SIGNIFICANT CHANGE UP
HCT VFR BLD CALC: 34.2 % — LOW (ref 39–50)
HGB BLD-MCNC: 11.7 G/DL — LOW (ref 13–17)
MAGNESIUM SERPL-MCNC: 2 MG/DL — SIGNIFICANT CHANGE UP (ref 1.6–2.6)
MCHC RBC-ENTMCNC: 26 PG — LOW (ref 27–34)
MCHC RBC-ENTMCNC: 34.2 GM/DL — SIGNIFICANT CHANGE UP (ref 32–36)
MCV RBC AUTO: 76 FL — LOW (ref 80–100)
NRBC # BLD: 0 /100 WBCS — SIGNIFICANT CHANGE UP (ref 0–0)
NT-PROBNP SERPL-SCNC: 2411 PG/ML — HIGH (ref 0–125)
OSMOLALITY UR: 462 MOSM/KG — SIGNIFICANT CHANGE UP (ref 50–1200)
PHOSPHATE SERPL-MCNC: 1.9 MG/DL — LOW (ref 2.5–4.5)
PLATELET # BLD AUTO: 235 K/UL — SIGNIFICANT CHANGE UP (ref 150–400)
POTASSIUM SERPL-MCNC: 3.6 MMOL/L — SIGNIFICANT CHANGE UP (ref 3.5–5.3)
POTASSIUM SERPL-SCNC: 3.6 MMOL/L — SIGNIFICANT CHANGE UP (ref 3.5–5.3)
PROCALCITONIN SERPL-MCNC: 0.04 NG/ML — SIGNIFICANT CHANGE UP (ref 0.02–0.1)
PROT SERPL-MCNC: 6.9 GM/DL — SIGNIFICANT CHANGE UP (ref 6–8.3)
RBC # BLD: 4.5 M/UL — SIGNIFICANT CHANGE UP (ref 4.2–5.8)
RBC # FLD: 14.2 % — SIGNIFICANT CHANGE UP (ref 10.3–14.5)
SODIUM SERPL-SCNC: 138 MMOL/L — SIGNIFICANT CHANGE UP (ref 135–145)
SODIUM UR-SCNC: 178 MMOL/L — SIGNIFICANT CHANGE UP
SPECIMEN SOURCE: SIGNIFICANT CHANGE UP
VDRL CSF-TITR: SIGNIFICANT CHANGE UP
WBC # BLD: 4.1 K/UL — SIGNIFICANT CHANGE UP (ref 3.8–10.5)
WBC # FLD AUTO: 4.1 K/UL — SIGNIFICANT CHANGE UP (ref 3.8–10.5)
WNV IGG CSF IA-ACNC: NEGATIVE — SIGNIFICANT CHANGE UP
WNV IGM CSF IA-ACNC: NEGATIVE — SIGNIFICANT CHANGE UP

## 2021-07-12 PROCEDURE — 99233 SBSQ HOSP IP/OBS HIGH 50: CPT

## 2021-07-12 PROCEDURE — 99232 SBSQ HOSP IP/OBS MODERATE 35: CPT

## 2021-07-12 PROCEDURE — 71045 X-RAY EXAM CHEST 1 VIEW: CPT | Mod: 26

## 2021-07-12 RX ORDER — SODIUM,POTASSIUM PHOSPHATES 278-250MG
2 POWDER IN PACKET (EA) ORAL
Refills: 0 | Status: COMPLETED | OUTPATIENT
Start: 2021-07-12 | End: 2021-07-14

## 2021-07-12 RX ORDER — ACYCLOVIR SODIUM 500 MG
600 VIAL (EA) INTRAVENOUS EVERY 8 HOURS
Refills: 0 | Status: DISCONTINUED | OUTPATIENT
Start: 2021-07-12 | End: 2021-07-13

## 2021-07-12 RX ORDER — CHLORHEXIDINE GLUCONATE 213 G/1000ML
1 SOLUTION TOPICAL DAILY
Refills: 0 | Status: DISCONTINUED | OUTPATIENT
Start: 2021-07-12 | End: 2021-07-19

## 2021-07-12 RX ADMIN — Medication 112 MILLIGRAM(S): at 05:38

## 2021-07-12 RX ADMIN — CHLORHEXIDINE GLUCONATE 1 APPLICATION(S): 213 SOLUTION TOPICAL at 11:44

## 2021-07-12 RX ADMIN — NYSTATIN CREAM 1 APPLICATION(S): 100000 CREAM TOPICAL at 05:44

## 2021-07-12 RX ADMIN — Medication 3 MILLILITER(S): at 00:32

## 2021-07-12 RX ADMIN — Medication 3 MILLILITER(S): at 11:09

## 2021-07-12 RX ADMIN — CHLORHEXIDINE GLUCONATE 1 APPLICATION(S): 213 SOLUTION TOPICAL at 05:44

## 2021-07-12 RX ADMIN — Medication 262 MILLIGRAM(S): at 21:59

## 2021-07-12 RX ADMIN — Medication 262 MILLIGRAM(S): at 13:12

## 2021-07-12 RX ADMIN — Medication 3 MILLILITER(S): at 20:23

## 2021-07-12 RX ADMIN — HEPARIN SODIUM 5000 UNIT(S): 5000 INJECTION INTRAVENOUS; SUBCUTANEOUS at 17:48

## 2021-07-12 RX ADMIN — SODIUM CHLORIDE 75 MILLILITER(S): 9 INJECTION, SOLUTION INTRAVENOUS at 11:44

## 2021-07-12 RX ADMIN — Medication 3 MILLILITER(S): at 05:25

## 2021-07-12 RX ADMIN — HEPARIN SODIUM 5000 UNIT(S): 5000 INJECTION INTRAVENOUS; SUBCUTANEOUS at 05:38

## 2021-07-12 RX ADMIN — NYSTATIN CREAM 1 APPLICATION(S): 100000 CREAM TOPICAL at 17:48

## 2021-07-12 RX ADMIN — FAMOTIDINE 20 MILLIGRAM(S): 10 INJECTION INTRAVENOUS at 17:48

## 2021-07-12 RX ADMIN — FAMOTIDINE 20 MILLIGRAM(S): 10 INJECTION INTRAVENOUS at 05:38

## 2021-07-12 NOTE — SWALLOW BEDSIDE ASSESSMENT ADULT - SWALLOW EVAL: RECOMMENDED FEEDING/EATING TECHNIQUES
?calorie count/allow for swallow between intakes/crush medication (when feasible)/maintain upright posture during/after eating for 30 mins/oral hygiene/small sips/bites

## 2021-07-12 NOTE — SWALLOW BEDSIDE ASSESSMENT ADULT - DIET PRIOR TO ADMI
as per HHA puree with thin liquid
wife present for exam and reports regular solids and thin liquids at home

## 2021-07-12 NOTE — SWALLOW BEDSIDE ASSESSMENT ADULT - ORAL PHASE
Decreased anterior-posterior movement of the bolus/Stasis in anterior sulcus/Stasis in lateral sulci
Delayed oral transit time

## 2021-07-12 NOTE — PROGRESS NOTE ADULT - ASSESSMENT
Subjective Complaints:  Historian:             Vital Signs Last 24 Hrs  T(C): 36.9 (12 Jul 2021 16:29), Max: 36.9 (12 Jul 2021 00:02)  T(F): 98.5 (12 Jul 2021 16:29), Max: 98.5 (12 Jul 2021 00:02)  HR: 110 (12 Jul 2021 16:29) (90 - 110)  BP: 123/76 (12 Jul 2021 16:29) (123/76 - 163/83)  BP(mean): --  RR: 20 (12 Jul 2021 16:29) (16 - 20)  SpO2: 96% (12 Jul 2021 16:29) (96% - 99%)    GENERAL PHYSICAL EXAM:  General:  Appears stated age, well-groomed, well-nourished, no distress  HEENT:  NC/AT, patent nares w/ pink mucosa, OP clear w/o lesions, PERRL, EOMI, conjunctivae clear, no thyromegaly, nodules, adenopathy, no JVD  Chest:  Full & symmetric excursion, no increased effort, breath sounds clear  Cardiovascular:  Regular rhythm, S1, S2, no murmur/rub/S3/S4, no carotid/femoral/abdominal bruit, radial/pedal pulses 2+, no edema  Abdomen:  Soft, non-tender, non-distended, normoactive bowel sounds, no HSM  Extremities:  Gait & station:   Digits:   Nails:   Joints, Bones, Muscles:   ROM:   Stability:  Skin:  No rash/erythema/ecchymoses/petechiae/wounds/abscess/warm/dry  Musculoskeletal:  Full ROM in all joints w/o swelling/tenderness/effusion        LABS:                        11.7   4.10  )-----------( 235      ( 12 Jul 2021 06:52 )             34.2     07-12    138  |  105  |  3<L>  ----------------------------<  123<H>  3.6   |  27  |  0.58    Ca    7.5<L>      12 Jul 2021 06:52  Phos  1.9     07-12  Mg     2.0     07-12    TPro  6.9  /  Alb  2.6<L>  /  TBili  0.5  /  DBili  x   /  AST  30  /  ALT  20  /  AlkPhos  58  07-12          RADIOLOGY & ADDITIONAL STUDIES:        Neurology Progress Note:      Mental Status: pt is sleeping       Cranial Nerves:defrd      Motor:   quadroparesis         Sensory:defrd      Cerebellar: derd      Gait:defrd      Assesment/Plan: s/p spinal tap viral zoster  encephalaot[athy  on acyclovir  s/p anoxic encephalaothy  id follow up  no seizure

## 2021-07-12 NOTE — PROGRESS NOTE ADULT - SUBJECTIVE AND OBJECTIVE BOX
ANITHA LAMB    S 2C 233 I    Patient is a 70y old  Male who presents with a chief complaint of sepsis, ear infection (11 Jul 2021 23:10)       Allergies    lisinopril (Unknown)  penicillins (Unknown)    Intolerances        HPI:  Patient is a 70M with a PMH of Anoxic encephalopathy, prostate cancer, HTN, bowel resection who presents to the ED for R ear discharge.  Patient minimally verbal and bedbound at baseline, unable to provide history.  Wife at the bedside to provide history.  Patient noted to have R ear discharge for two days along with temperatures at home.  Wife states that she has been giving him tylenol at home without resolution of the fever.  Per wife, patient was talking much less and was not accepting PO intake so she presents to the ED for evaluation.  Denies history of ear infections.  Patient also noted to have sporadic dyspnea over the same time span.  No other complaints.  Current SpO2 on 2L via NC is 92%.  Vitals stable, labs benign.  Will admit to med surg.     (07 Jul 2021 05:04)      PAST MEDICAL & SURGICAL HISTORY:  Hypertension    Prostate cancer  radiation therapy    Brain injury with coma  x 2 weeks in 2008    Anoxic encephalopathy    Leg pain, right    Gait abnormality    Sudden cardiac death    Status post cryoablation  of left renal mass    H/O prostate cancer  resection and radiation therapy    H/O tracheostomy    S/P ICD (internal cardiac defibrillator) procedure  implanted on 1/6/2009        FAMILY HISTORY:  FH: HTN (hypertension)          MEDICATIONS   acetaminophen  Suppository .. 650 milliGRAM(s) Rectal every 6 hours PRN  acyclovir IVPB 600 milliGRAM(s) IV Intermittent every 8 hours  albuterol/ipratropium for Nebulization 3 milliLiter(s) Nebulizer every 6 hours  albuterol/ipratropium for Nebulization 3 milliLiter(s) Nebulizer every 6 hours PRN  chlorhexidine 2% Cloths 1 Application(s) Topical daily  dextrose 5% + sodium chloride 0.9% 1000 milliLiter(s) IV Continuous <Continuous>  famotidine Injectable 20 milliGRAM(s) IV Push two times a day  heparin   Injectable 5000 Unit(s) SubCutaneous every 12 hours  hydrALAZINE Injectable 5 milliGRAM(s) IV Push every 6 hours PRN  lidocaine 1% Injectable 10 milliLiter(s) Local Injection once  nystatin Powder 1 Application(s) Topical two times a day  sodium chloride 0.9% lock flush 10 milliLiter(s) IV Push every 1 hour PRN      Vital Signs Last 24 Hrs  T(C): 36.4 (12 Jul 2021 05:11), Max: 36.9 (11 Jul 2021 11:48)  T(F): 97.5 (12 Jul 2021 05:11), Max: 98.5 (12 Jul 2021 00:02)  HR: 91 (12 Jul 2021 07:01) (89 - 98)  BP: 163/83 (12 Jul 2021 05:11) (137/83 - 163/83)  BP(mean): --  RR: 18 (12 Jul 2021 05:11) (16 - 18)  SpO2: 96% (12 Jul 2021 07:01) (96% - 99%)      07-11-21 @ 07:01  -  07-12-21 @ 07:00  --------------------------------------------------------  IN: 1260 mL / OUT: 0 mL / NET: 1260 mL            LABS:                        11.7   4.10  )-----------( 235      ( 12 Jul 2021 06:52 )             34.2     07-12    138  |  105  |  3<L>  ----------------------------<  123<H>  3.6   |  27  |  0.58    Ca    7.5<L>      12 Jul 2021 06:52  Phos  1.9     07-12  Mg     2.0     07-12    TPro  6.9  /  Alb  2.6<L>  /  TBili  0.5  /  DBili  x   /  AST  30  /  ALT  20  /  AlkPhos  58  07-12              WBC:  WBC Count: 4.10 K/uL (07-12 @ 06:52)  WBC Count: 3.61 K/uL (07-11 @ 06:57)  WBC Count: 4.53 K/uL (07-10 @ 07:51)  WBC Count: 5.62 K/uL (07-09 @ 10:50)      MICROBIOLOGY:  RECENT CULTURES:  07-10 .Smear Other XXXX   polymorphonuclear leukocytes seen  No organisms seen  by cytocentrifuge XXXX    07-10 .CSF CSF XXXX   polymorphonuclear leukocytes seen  No organisms seen  by cytocentrifuge   No growth    07-08 .Urine Clean Catch (Midstream) XXXX XXXX   No growth    07-07 .Blood Blood-Peripheral XXXX XXXX   No growth to date.    07-07 Skin R ear discharge XXXX XXXX   Few Corynebacterium propinquum "Susceptibilities not performed"  Rare Coag Negative Staphylococcus "Susceptibilities not performed"                    Sodium:  Sodium, Serum: 138 mmol/L (07-12 @ 06:52)  Sodium, Serum: 135 mmol/L (07-11 @ 06:57)  Sodium, Serum: 131 mmol/L (07-10 @ 07:51)  Sodium, Serum: 127 mmol/L (07-09 @ 10:50)      0.58 mg/dL 07-12 @ 06:52  0.61 mg/dL 07-11 @ 06:57  0.54 mg/dL 07-10 @ 07:51  0.60 mg/dL 07-09 @ 10:50      Hemoglobin:  Hemoglobin: 11.7 g/dL (07-12 @ 06:52)  Hemoglobin: 10.9 g/dL (07-11 @ 06:57)  Hemoglobin: 11.3 g/dL (07-10 @ 07:51)  Hemoglobin: 11.5 g/dL (07-09 @ 10:50)      Platelets: Platelet Count - Automated: 235 K/uL (07-12 @ 06:52)  Platelet Count - Automated: 183 K/uL (07-11 @ 06:57)  Platelet Count - Automated: 186 K/uL (07-10 @ 07:51)  Platelet Count - Automated: 162 K/uL (07-09 @ 10:50)      LIVER FUNCTIONS - ( 12 Jul 2021 06:52 )  Alb: 2.6 g/dL / Pro: 6.9 gm/dL / ALK PHOS: 58 U/L / ALT: 20 U/L / AST: 30 U/L / GGT: x                 RADIOLOGY & ADDITIONAL STUDIES:

## 2021-07-12 NOTE — PROGRESS NOTE ADULT - SUBJECTIVE AND OBJECTIVE BOX
71 yo M with a PMH of Anoxic encephalopathy - minimally verbal and bedbound at baseline, prostate cancer, HTN, bowel resection & ICD who presented w/ R ear discharge. Pt's wife also reported that he was talking less and had worsening PO intake. He is lying in bed in NAD.      MEDICATIONS  (STANDING):  acyclovir IVPB 600 milliGRAM(s) IV Intermittent every 8 hours  chlorhexidine 2% Cloths 1 Application(s) Topical daily  dextrose 5% + sodium chloride 0.9% 1000 milliLiter(s) (75 mL/Hr) IV Continuous <Continuous>  famotidine Injectable 20 milliGRAM(s) IV Push two times a day  heparin   Injectable 5000 Unit(s) SubCutaneous every 12 hours  lidocaine 1% Injectable 10 milliLiter(s) Local Injection once  nystatin Powder 1 Application(s) Topical two times a day    MEDICATIONS  (PRN):  acetaminophen  Suppository .. 650 milliGRAM(s) Rectal every 6 hours PRN Temp greater or equal to 38C (100.4F), Mild Pain (1 - 3)  albuterol/ipratropium for Nebulization 3 milliLiter(s) Nebulizer every 6 hours PRN wheeze  hydrALAZINE Injectable 5 milliGRAM(s) IV Push every 6 hours PRN SBP > 160  sodium chloride 0.9% lock flush 10 milliLiter(s) IV Push every 1 hour PRN Pre/post blood products, medications, blood draw, and to maintain line patency      Allergies    lisinopril (Unknown)  penicillins (Unknown)    Intolerances        Vital Signs Last 24 Hrs  T(C): 36.9 (12 Jul 2021 16:29), Max: 36.9 (12 Jul 2021 00:02)  T(F): 98.5 (12 Jul 2021 16:29), Max: 98.5 (12 Jul 2021 00:02)  HR: 110 (12 Jul 2021 16:29) (90 - 110)  BP: 123/76 (12 Jul 2021 16:29) (123/76 - 163/83)   RR: 20 (12 Jul 2021 16:29) (16 - 20)  SpO2: 96% (12 Jul 2021 16:29) (96% - 99%)    PHYSICAL EXAM:  GENERAL: NAD, well-groomed, well-developed  HEAD:  Atraumatic, Normocephalic  EYES: EOMI, PERRLA   NECK: Supple   NERVOUS SYSTEM: more alert today  CHEST/LUNG: Clear to auscultation bilaterally; No rales, rhonchi, wheezing, or rubs  HEART: Regular rate and rhythm; No murmurs, rubs, or gallops  ABDOMEN: Soft, Nontender, Nondistended; Bowel sounds present  EXTREMITIES: No clubbing, cyanosis, or edema      LABS:                        11.7   4.10  )-----------( 235      ( 12 Jul 2021 06:52 )             34.2     07-12    138  |  105  |  3<L>  ----------------------------<  123<H>  3.6   |  27  |  0.58    Ca    7.5<L>      12 Jul 2021 06:52  Phos  1.9     07-12  Mg     2.0     07-12    TPro  6.9  /  Alb  2.6<L>  /  TBili  0.5  /  DBili  x   /  AST  30  /  ALT  20  /  AlkPhos  58  07-12        CAPILLARY BLOOD GLUCOSE          Culture - Fungal, CSF (collected 10 Jul 2021 00:14)  Source: .CSF CSF  Preliminary Report (12 Jul 2021 10:31):    Testing in progress    Culture - Acid Fast - CSF (collected 10 Jul 2021 00:14)  Source: .CSF CSF    Culture - CSF with Gram Stain (collected 10 Jul 2021 00:14)  Source: .CSF CSF  Gram Stain (12 Jul 2021 17:13):    polymorphonuclear leukocytes seen    No organisms seen    by cytocentrifuge  Final Report (12 Jul 2021 17:13):    No growth    Culture - Urine (collected 08 Jul 2021 00:38)  Source: .Urine Clean Catch (Midstream)  Final Report (08 Jul 2021 21:13):    No growth      RADIOLOGY & ADDITIONAL TESTS:    07-11-21 @ 07:01  -  07-12-21 @ 07:00  --------------------------------------------------------  IN:    dextrose 5% + sodium chloride 0.9% w/ Additives: 900 mL    IV PiggyBack: 250 mL    IV PiggyBack: 110 mL  Total IN: 1260 mL    OUT:  Total OUT: 0 mL    Total NET: 1260 mL

## 2021-07-12 NOTE — PROGRESS NOTE ADULT - ASSESSMENT
ADONIS WELSH 70 M 7/7/2021 1949 DR JAXON BROWN      REVIEW OF SYMPTOMS      Able to give (reliable) ROS  NO     PHYSICAL EXAM    HEENT Unremarkable  atraumatic   RESP Fair air entry EXP prolonged    Harsh breath sound Resp distres mild   CARDIAC S1 S2 No S3     NO JVD    ABDOMEN SOFT BS PRESENT NOT DISTENDED No hepatosplenomegaly   PEDAL EDEMA present No calf tenderness  NO rash       ADVANCED DIRECTIVES.                            COVID STATUS.                                       scv2 7/7/2021 (-)       CC 7/7/2021 ADMISSN .   70 m HO brain injury 2008 previously able to eat po 3 w ago  7/7/2021 FEVER     PRESENTING PROBLEMS 7/7/2021 .   OTITIS EXTERNA R EAR   FEVER   HYPONATREMIA Na 7/7/2021 Na 129  DYSPNEA  FUNCTIONAL QUADRIPLEGIA     PMH.  BASELINE  VERBAL BEDBOUND   SBR   HO TRACH SP DECANNULATION   ANOXIC ENCEPHALOPATHY Brain injury 2008   HO SCD  HO AICD 2009   HO CYROABLATION L RENAL MASS   PROSTATE CA        HOSPITAL COURSE.  VZV  ENCEPHALITIS  HSV VZV pcr (+) 7/8 R EAR   ACUYCLOVIR (7/8/2021)   SPINAL TAP 7/9/2021 ldh 19 Lymph 100% w 43 VZpcr (+)   CHEST CONGESTION 7/11/2021 On duoneb   DYSPHAGIA. dysphagia diet started 7/12/2021   HYPONATREMIA   Na 127 7/9/2021           GLOBAL AND BEST PRACTICE ISSUES                     ALLERGY.    PNCL LISINOPRIL  WEIGHT.          7/7/2021 90        BMI                7/7/2021 33        .                          ADVANCED DIRECTIVE.                               HEAD OF BED ELEVATION. Yes  DVT PROPHYLAXIS. hpsc (7/8)   APA.                   GONZALEZ PROPHYLAXIS.                                                                     DYSPHAGIA RECOMM.7/12/2021 puree honey (7/12/2021)    DIET.    NPO (77) -> puree honey (7/12/2021)   INFECTION PROPHYLAXIS.  chlorhexidine (7/9)      PATIENT DATA                ABG.     7/10/2021 .32 749/36/111              OXYGENATION.    7/10/2021 ra 97%   7/9/2021 3l 96%      7/7-7/8/2021 2l 97%   - ra 97%          VITALS/IO/VENT/DRIPS.     7/12/2021 afeb 92 130/60     ASSESSMENT/RECOMMENDATIONS.    HERPES OTITIS EXTRENAL   VZV  ENCEPHALITIS   Started on cipro by admitting md 7/7/2021 7/7 herpes vzv pcr (+) EAR  7/9 CSF vzv pcr (+)   7/8/2021 acyclovir 900.3 started 7/8/2021 Dr Ponce    More awake on 7/10/2021   WHEEZE   DUNEB.4 P 97/7)    cxr  7/12/2021 no consoldation   7/11 spouse feels he is congested (7/11/20210   Cont bd   DYSPHAGIA DIET STARTED 7/12/2021   On iv fl  Pt used to eat po June 2021 as per spouse (7/11/2021)   7/11/2021 speech eval ordered to start po diet  7/12/2021 Dys diet stared       TIME SPENT   Over 25 minutes aggregate care time spent on encounter; activities included   direct patient care, counseling and/or coordinating care reviewing notes, lab data/ imaging , discussion with multidisciplinary team/ patient  /family and explaining in detail risks, benefits, alternatives  of the recommendations     ADONIS Barrera M 7/7/2021 1949 DR JAXON BROWN

## 2021-07-12 NOTE — PROGRESS NOTE ADULT - SUBJECTIVE AND OBJECTIVE BOX
ANITHA LAMB  MRN-[id number]    Follow Up:  herpes zoster oticus and encephalitis     Interval History: pt seen and examined, confirmed diagnosis of VZV encephalitis based on LP. more alert and awake    ROS:    [X ] Unobtainable because: AMS , nonverbal  [ ] All other systems negative    Constitutional: no fever, no chills  Head: no trauma  Eyes: no vision changes, no eye pain  ENT:  no sore throat, no rhinorrhea  Cardiovascular:  no chest pain, no palpitation  Respiratory:  no SOB, no cough  GI:  no abd pain, no vomiting, no diarrhea  urinary: no dysuria, no hematuria, no flank pain  musculoskeletal:  no joint pain, no joint swelling  skin:  no rash  neurology:  no headache, no seizure, no change in mental status  psych: no anxiety, no depression         Allergies  lisinopril (Unknown)  penicillins (Unknown)        ANTIMICROBIALS:    acyclovir IVPB 600 every 8 hours      MEDICATIONS  (STANDING):  famotidine Injectable 20 two times a day  heparin   Injectable 5000 every 12 hours      PRN  acetaminophen  Suppository .. 650 milliGRAM(s) Rectal every 6 hours PRN  albuterol/ipratropium for Nebulization 3 milliLiter(s) Nebulizer every 6 hours PRN  hydrALAZINE Injectable 5 milliGRAM(s) IV Push every 6 hours PRN      Physical Exam:  Vital Signs Last 24 Hrs  T(F): 98.5 (21 @ 16:29), Max: 98.5 (21 @ 00:02)  HR: 110 (21 @ 16:29)  BP: 123/76 (21 @ 16:29)  RR: 20 (21 @ 16:29)  SpO2: 96% (21 @ 16:29) (96% - 99%)  Wt(kg): --    General:    NAD,  non toxic, awake, tracks around the room  Head: atraumatic, normocephalic  Eye: normal sclera and conjunctiva  ENT:    no oral lesions, neck less stiff   Cardio:     regular S1, S2,  no murmur  Respiratory:    crackles b/l,    no wheezing  abd:     soft,   BS +,   no tenderness  :   unable to assess CVAT,  no suprapubic tenderness,  Musculoskeletal:   no joint swelling,   no edema  vascular: no central lines, +PIV   Skin:    no rash  Neurologic:     poor mental status though more alert today, doesnt follow commands   psych: normal affect    WBC Count: 4.10 K/uL ( @ 06:52)  WBC Count: 3.61 K/uL ( @ 06:57)  WBC Count: 4.53 K/uL (07-10 @ 07:51)  WBC Count: 5.62 K/uL ( @ 10:50)  WBC Count: 5.25 K/uL ( @ 08:36)  WBC Count: 5.70 K/uL ( @ 00:54)                            11.7   4.10  )-----------( 235      ( 2021 06:52 )             34.2           138  |  105  |  3<L>  ----------------------------<  123<H>  3.6   |  27  |  0.58    Ca    7.5<L>      2021 06:52  Phos  1.9       Mg     2.0         TPro  6.9  /  Alb  2.6<L>  /  TBili  0.5  /  DBili  x   /  AST  30  /  ALT  20  /  AlkPhos  58        Creatinine Trend: 0.58<--, 0.61<--, 0.54<--, 0.60<--, 0.70<--, 0.79<--          Urinalysis Basic - ( 2021 20:28 )    Color: Yellow / Appearance: Clear / S.010 / pH: x  Gluc: x / Ketone: Trace  / Bili: Negative / Urobili: Negative mg/dL   Blood: x / Protein: 30 mg/dL / Nitrite: Negative   Leuk Esterase: Trace / RBC: 6-10 /HPF / WBC 3-5   Sq Epi: x / Non Sq Epi: Occasional / Bacteria: Few        Creatinine Trend: 0.70<--, 0.79<--  Lactate, Blood: 1.9 mmol/L (21 @ 01:35)      MICROBIOLOGY:  Culture - Acid Fast - CSF (07.10.21 @ 00:14)    Specimen Source: .CSF CSF    Acid Fast Bacilli Smear:   Less than 5 cc of CSF received,  unable to perform AFB smear.    Culture - CSF with Gram Stain . (07.10.21 @ 00:14)    Gram Stain:   polymorphonuclear leukocytes seen  No organisms seen  by cytocentrifuge    Specimen Source: .CSF CSF    Culture Results:   No growth      Protein, CSF (21 @ 21:22)    Protein, CSF: 37 mg/dL    Glucose, CSF (21 @ 21:22)    Glucose, CSF: 85 mg/dL    Cerebrospinal Fluid Cell Count-1 (21 @ 21:22)    CSF Color: No Color    Tube Type: Tube 4    CSF Appearance: Clear    CSF Comments: Differential is based on _6 cells counted after cytocentrifugation.    CSF Lymphocytes: 100 %    CSF Segmented Neutrophils: 0 %    Total Nucleated Cell Count, CSF: 43 /uL    RBC Count - Spinal Fluid: 22 /uL        Herpes Simplex Virus 1/2 VZV Lesions, PCR (21 @ 09:11)    Herpes Simplex Virus 1/2  VZV PCR Source: lesion    Herpes Simplex Virus 1/2  VZV PCR Result: VZV Detected HSV 1/2  and VZV assay is a Real-Time PCR test for the qualitative    CSF PCR Panel (21 @ 23:16)    Varicella zoster virus: Detected      Rapid RVP Result: NotDetec ( @ 06:31)        C-Reactive Protein, Serum: 31 ()    RADIOLOGY:

## 2021-07-12 NOTE — SWALLOW BEDSIDE ASSESSMENT ADULT - SWALLOW EVAL: DIAGNOSIS
pt presented with oropharyngeal phases of swallow marked by baseline cough, decreased labial seal, slow oral transport posterior with suspected delay in swallow trigger, reduced hyolaryngeal excursion
Oropharyngeal dysphagia in context of altered mental status; pt did not respond to eating/swallowing context; poor management of oral secretions and required oral suctioning

## 2021-07-12 NOTE — PROGRESS NOTE ADULT - ASSESSMENT
71 yo M with a PMH of Anoxic encephalopathy - minimally verbal and bedbound at baseline, prostate cancer, HTN, bowel resection & ICD who presented w/ R ear discharge. Pt's wife also reported that he was talking less and had worsening PO intake.      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 Chacon, so Dr. Mcadams did it instead-  CSF PCR positive for Varicella zoster virus    Hypophosphatemia   - replace w/ Phos     Acute otitis externa of right ear  - ID following - Herpes Zoster Oticus  - Unable to get ENT consult at this hospital   - c/w Acyclovir  - ID stopped IV Cipro & cortisporin     Dyspnea  - may be due to sepsis and encephalopathy   - c/w O2 2L  - Pulm consulted - Juan F  - no DVT on US  - no pneumonia on plain CT of chest     Dysphagia  - at baseline, pt unable to feed self, but normally able to swallow  - Failed swallow eval, so will keep NPO  - c/w D5LR for now  - if no neuro improvement within another 2-3 days, patient will need NG tube and TFs    Prostate cancer  - leuprolide when tolerating PO    Hx of Diverticulosis w/ significant diverticular bleed in 2018, requiring 4 transfusions    - patient had another GI bleed in 2019, but refused EGD/colonoscopy, so source unknown  - family agreed to SQ heparin and watch H/H     Essential hypertension  - c/w hydralazine PRN     Functional quadriplegia  - history of anoxic encephalopathy - per wife, occurred 2008 due to fall   - c/w supportive care    Prophylaxis:  DVT: Heparin  GI: Pepcid

## 2021-07-12 NOTE — SWALLOW BEDSIDE ASSESSMENT ADULT - COMMENTS
CT chest no contrast FINDINGS:LUNGS AND AIRWAYS: Patent central airways.  Bilateral dependent atelectasis. No lung consolidations.  PLEURA: No pleural effusion.  MEDIASTINUM AND ALETA: No lymphadenopathy.  VESSELS: Atherosclerotic calcifications of thoracic aorta and coronary arteries.  HEART: Heart size is normal. No pericardial effusion.  CHEST WALL AND LOWER NECK: Gynecomastia. Left upper anterior chest wall cardiac device with lead to the heart. Right anterior chest wall medication port with catheter tip at the superior vena cava/right atrial junction.  VISUALIZED UPPER ABDOMEN: Gallbladder contains vicariously excreted intravenous contrast. Excretion of intravenous contrast noted in both renal collecting systems. Bilateral renal hypodensities, possibly cysts.  BONES: Degenerative changes.  IMPRESSION:No lung consolidations.

## 2021-07-12 NOTE — SWALLOW BEDSIDE ASSESSMENT ADULT - SWALLOW EVAL: RECOMMENDED DIET
NPO; pt is not a candidate for oral feedings at this time; consider alternative means of nut/hyd if poor mental status persists
puree with honey thick liquid

## 2021-07-12 NOTE — SWALLOW BEDSIDE ASSESSMENT ADULT - SLP PERTINENT HISTORY OF CURRENT PROBLEM
swallow eval completed 7/7/2021 at which time NPO was recommended, see report for details. swallow eval completed 7/7/2021 at which time NPO was recommended, see report for details.  await 7/12CXR

## 2021-07-12 NOTE — SWALLOW BEDSIDE ASSESSMENT ADULT - SLP GENERAL OBSERVATIONS
Daily Note     Today's date: 2021  Patient name: Sae Dozier  : 1973  MRN: [id number]  Referring provider: Joni Jerez PA-C  Dx:   Encounter Diagnosis     ICD-10-CM    1  Adhesive capsulitis of right shoulder  M75 01                   Subjective: ***      Objective: See treatment diary below      Assessment: Tolerated treatment fair  Patient demonstrated fatigue post treatment and would benefit from continued PT  Plan: Continue per plan of care  Progress treatment as tolerated  Precautions: standard      Manuals            Right shoulder PROM             Gr  II-IV GHJ AP, Inf mobs             Posterior capsule str  Gr  II-IV inf ACJ mobs in s/l             Neuro Re-Ed                                                                                                        Ther Ex             UBE             Sleeper str  : standing vs s/l Reviewed            Posterior capsule str   Reviewed            Prone shoulder ext, abd, scaption             Seated self inf GHJ distraction             Webslide: low row             TB ER             Bilateral ER with scapular retraction                          Ther Activity                                       Gait Training                                       Modalities pt seen bedside. on NC 02 alert and oriented to self. pt inconsistently responded to questions- mostly simple want questions, noted decreased initiation and he had difficulty following one step directions. speech intelligibility poor for single word utterances pt seen bedside. on NC 02 alert and oriented to self. pt inconsistently responded to questions- mostly simple want questions, noted decreased initiation and he had difficulty following one step directions. speech intelligibility poor for single word utterances. intermittent cough during po trials unclear if related to baseline cough- FORTINO calderón reported pt required suction. home caregiver present

## 2021-07-12 NOTE — SWALLOW BEDSIDE ASSESSMENT ADULT - ORAL PREPARATORY PHASE
Reduced oral grading/Anterior loss of bolus/Lateral loss of bolus/Bolus falls into anterior sulcus/Bolus falls into left lateral sulci/Bolus falls into right lateral sulci
Reduced oral grading

## 2021-07-12 NOTE — SWALLOW BEDSIDE ASSESSMENT ADULT - H & P REVIEW
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  Patient minimally verbal and bedbound at baseline, unable to provide history.  Wife at the bedside to provide history.  Patient noted to have R ear discharge for two days along with temperatures at home.  Wife states that she has been giving him tylenol at home without resolution of the fever.  Per wife, patient was talking much less and was not accepting PO intake so she presents to the ED for evaluation.  Denies history of ear infections.  Patient also noted to have sporadic dyspnea over the same time span.  No other complaints.  Current SpO2 on 2L via NC is 92%.  Vitals stable, labs benign.  Will admit to med surg.  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

## 2021-07-13 DIAGNOSIS — B00.4 HERPESVIRAL ENCEPHALITIS: ICD-10-CM

## 2021-07-13 DIAGNOSIS — G93.1 ANOXIC BRAIN DAMAGE, NOT ELSEWHERE CLASSIFIED: ICD-10-CM

## 2021-07-13 LAB
ANION GAP SERPL CALC-SCNC: 8 MMOL/L — SIGNIFICANT CHANGE UP (ref 5–17)
BUN SERPL-MCNC: 7 MG/DL — SIGNIFICANT CHANGE UP (ref 7–23)
CALCIUM SERPL-MCNC: 7.1 MG/DL — LOW (ref 8.5–10.1)
CHLORIDE SERPL-SCNC: 106 MMOL/L — SIGNIFICANT CHANGE UP (ref 96–108)
CO2 SERPL-SCNC: 23 MMOL/L — SIGNIFICANT CHANGE UP (ref 22–31)
CREAT SERPL-MCNC: 1.7 MG/DL — HIGH (ref 0.5–1.3)
GLUCOSE SERPL-MCNC: 160 MG/DL — HIGH (ref 70–99)
HCT VFR BLD CALC: 31.3 % — LOW (ref 39–50)
HGB BLD-MCNC: 10.8 G/DL — LOW (ref 13–17)
MAGNESIUM SERPL-MCNC: 2 MG/DL — SIGNIFICANT CHANGE UP (ref 1.6–2.6)
MCHC RBC-ENTMCNC: 25.9 PG — LOW (ref 27–34)
MCHC RBC-ENTMCNC: 34.5 GM/DL — SIGNIFICANT CHANGE UP (ref 32–36)
MCV RBC AUTO: 75.1 FL — LOW (ref 80–100)
NRBC # BLD: 0 /100 WBCS — SIGNIFICANT CHANGE UP (ref 0–0)
PHOSPHATE SERPL-MCNC: 2.3 MG/DL — LOW (ref 2.5–4.5)
PLATELET # BLD AUTO: 235 K/UL — SIGNIFICANT CHANGE UP (ref 150–400)
POTASSIUM SERPL-MCNC: 4 MMOL/L — SIGNIFICANT CHANGE UP (ref 3.5–5.3)
POTASSIUM SERPL-SCNC: 4 MMOL/L — SIGNIFICANT CHANGE UP (ref 3.5–5.3)
RBC # BLD: 4.17 M/UL — LOW (ref 4.2–5.8)
RBC # FLD: 14.2 % — SIGNIFICANT CHANGE UP (ref 10.3–14.5)
SODIUM SERPL-SCNC: 137 MMOL/L — SIGNIFICANT CHANGE UP (ref 135–145)
WBC # BLD: 6.11 K/UL — SIGNIFICANT CHANGE UP (ref 3.8–10.5)
WBC # FLD AUTO: 6.11 K/UL — SIGNIFICANT CHANGE UP (ref 3.8–10.5)

## 2021-07-13 PROCEDURE — 99232 SBSQ HOSP IP/OBS MODERATE 35: CPT

## 2021-07-13 PROCEDURE — 99233 SBSQ HOSP IP/OBS HIGH 50: CPT

## 2021-07-13 RX ORDER — VALACYCLOVIR 500 MG/1
1000 TABLET, FILM COATED ORAL EVERY 12 HOURS
Refills: 0 | Status: DISCONTINUED | OUTPATIENT
Start: 2021-07-13 | End: 2021-07-14

## 2021-07-13 RX ADMIN — Medication 262 MILLIGRAM(S): at 05:11

## 2021-07-13 RX ADMIN — NYSTATIN CREAM 1 APPLICATION(S): 100000 CREAM TOPICAL at 05:12

## 2021-07-13 RX ADMIN — FAMOTIDINE 20 MILLIGRAM(S): 10 INJECTION INTRAVENOUS at 05:11

## 2021-07-13 RX ADMIN — SODIUM CHLORIDE 75 MILLILITER(S): 9 INJECTION, SOLUTION INTRAVENOUS at 17:44

## 2021-07-13 RX ADMIN — Medication 2 PACKET(S): at 21:18

## 2021-07-13 RX ADMIN — SODIUM CHLORIDE 75 MILLILITER(S): 9 INJECTION, SOLUTION INTRAVENOUS at 21:21

## 2021-07-13 RX ADMIN — NYSTATIN CREAM 1 APPLICATION(S): 100000 CREAM TOPICAL at 17:26

## 2021-07-13 RX ADMIN — HEPARIN SODIUM 5000 UNIT(S): 5000 INJECTION INTRAVENOUS; SUBCUTANEOUS at 05:11

## 2021-07-13 RX ADMIN — HEPARIN SODIUM 5000 UNIT(S): 5000 INJECTION INTRAVENOUS; SUBCUTANEOUS at 17:44

## 2021-07-13 RX ADMIN — SODIUM CHLORIDE 75 MILLILITER(S): 9 INJECTION, SOLUTION INTRAVENOUS at 05:12

## 2021-07-13 RX ADMIN — Medication 262 MILLIGRAM(S): at 14:08

## 2021-07-13 RX ADMIN — Medication 2 PACKET(S): at 09:24

## 2021-07-13 RX ADMIN — CHLORHEXIDINE GLUCONATE 1 APPLICATION(S): 213 SOLUTION TOPICAL at 11:25

## 2021-07-13 RX ADMIN — VALACYCLOVIR 1000 MILLIGRAM(S): 500 TABLET, FILM COATED ORAL at 20:10

## 2021-07-13 RX ADMIN — FAMOTIDINE 20 MILLIGRAM(S): 10 INJECTION INTRAVENOUS at 17:44

## 2021-07-13 RX ADMIN — Medication 3 MILLILITER(S): at 05:12

## 2021-07-13 RX ADMIN — Medication 3 MILLILITER(S): at 18:34

## 2021-07-13 NOTE — PROGRESS NOTE ADULT - ASSESSMENT
ADONIS WELSH 70 M 7/7/2021 1949 DR JAXON BROWN      REVIEW OF SYMPTOMS      Able to give (reliable) ROS  NO     PHYSICAL EXAM    HEENT Unremarkable  atraumatic   RESP Fair air entry EXP prolonged    Harsh breath sound Resp distres mild   CARDIAC S1 S2 No S3     NO JVD    ABDOMEN SOFT BS PRESENT NOT DISTENDED No hepatosplenomegaly   PEDAL EDEMA present No calf tenderness  NO rash       ADVANCED DIRECTIVES.                            COVID STATUS.                                       scv2 7/7/2021 (-)       CC 7/7/2021 ADMISSN .   70 m HO brain injury 2008 previously able to eat po 3 w ago  7/7/2021 FEVER     PRESENTING PROBLEMS 7/7/2021 .   OTITIS EXTERNA R EAR   FEVER   HYPONATREMIA Na 7/7/2021 Na 129  DYSPNEA  FUNCTIONAL QUADRIPLEGIA     PMH.  BASELINE  VERBAL BEDBOUND   SBR   HO TRACH SP DECANNULATION   ANOXIC ENCEPHALOPATHY Brain injury 2008   HO SCD  HO AICD 2009   HO CYROABLATION L RENAL MASS   PROSTATE CA        GLOBAL AND BEST PRACTICE ISSUES                     ALLERGY.    PNCL LISINOPRIL  WEIGHT.          7/7/2021 90        BMI                7/7/2021 33        .                          ADVANCED DIRECTIVE.                               HEAD OF BED ELEVATION. Yes  DVT PROPHYLAXIS. hpsc (7/8)   APA.                   GONZALEZ PROPHYLAXIS.                                                                     DYSPHAGIA RECOMM.7/12/2021 puree honey (7/12/2021)    DIET.    NPO (77) -> puree honey (7/12/2021)   INFECTION PROPHYLAXIS.  chlorhexidine (7/9)    ABIO.  VALACYCLOVIR 1.2x6d (7/13/20210     PATIENT DATA                ABG.     7/10/2021 .32 749/36/111              OXYGENATION.    7/13/2021 3l 100%   7/10/2021 ra 97%   7/9/2021 3l 96%      7/7-7/8/2021 2l 97%   - ra 97%          VITALS/IO/VENT/DRIPS.     7/13/2021 afeb 100 150/70     ASSESSMENT/RECOMMENDATIONS.    HERPES OTITIS EXTRENAL   VZV  ENCEPHALITIS   7/7 herpes vzv pcr (+) EAR  7/9 CSF vzv pcr (+)   7/8/2021 acyclovir 900.3 started 7/8/2021 Dr Ponce    More awake on 7/10/2021   chnged to valacyclovir 7/13/2021   WHEEZE   DUNEB.4 P 97/7)    cxr  7/12/2021 no consoldation   7/11 spouse feels he is congested (7/11/20210   Cont bd   DYSPHAGIA DIET STARTED 7/12/2021   On iv fl  Pt used to eat po June 2021 as per spouse (7/11/2021)   7/11/2021 speech eval ordered to start po diet  7/12/2021 Dys diet stared   MALVIN  Cr 7/12-7/13 Cr .5-1.7  D5NS 20K 75 (7/9)   Monitor     CURRENT ISSUES  VZV ENCEPHALITIS ON VALACYCLOVIR  DYSPHAGIA   MALVIN 7/12-7/13 Cr .5-1.7      TIME SPENT   Over 25 minutes aggregate care time spent on encounter; activities included   direct patient care, counseling and/or coordinating care reviewing notes, lab data/ imaging , discussion with multidisciplinary team/ patient  /family and explaining in detail risks, benefits, alternatives  of the recommendations     ADONIS WELSH 70 M 7/7/2021 1949 DR JAXON BROWN

## 2021-07-13 NOTE — PROGRESS NOTE ADULT - SUBJECTIVE AND OBJECTIVE BOX
71 yo M with a PMH of Anoxic encephalopathy - minimally verbal and bedbound at baseline, prostate cancer, HTN, bowel resection & ICD who presented w/ R ear discharge. Pt's wife also reported that he was talking less and had worsening PO intake. He is lying in bed in NAD.      MEDICATIONS  (STANDING):  chlorhexidine 2% Cloths 1 Application(s) Topical daily  dextrose 5% + sodium chloride 0.9% 1000 milliLiter(s) (75 mL/Hr) IV Continuous <Continuous>  famotidine Injectable 20 milliGRAM(s) IV Push two times a day  heparin   Injectable 5000 Unit(s) SubCutaneous every 12 hours  lidocaine 1% Injectable 10 milliLiter(s) Local Injection once  nystatin Powder 1 Application(s) Topical two times a day  potassium phosphate / sodium phosphate Powder (PHOS-NaK) 2 Packet(s) Oral four times a day with meals  valACYclovir 1000 milliGRAM(s) Oral every 12 hours    MEDICATIONS  (PRN):  acetaminophen  Suppository .. 650 milliGRAM(s) Rectal every 6 hours PRN Temp greater or equal to 38C (100.4F), Mild Pain (1 - 3)  albuterol/ipratropium for Nebulization 3 milliLiter(s) Nebulizer every 6 hours PRN wheeze  hydrALAZINE Injectable 5 milliGRAM(s) IV Push every 6 hours PRN SBP > 160  sodium chloride 0.9% lock flush 10 milliLiter(s) IV Push every 1 hour PRN Pre/post blood products, medications, blood draw, and to maintain line patency      Allergies    lisinopril (Unknown)  penicillins (Unknown)    Intolerances        Vital Signs Last 24 Hrs  T(C): 36.8 (13 Jul 2021 17:31), Max: 36.8 (12 Jul 2021 23:38)  T(F): 98.3 (13 Jul 2021 17:31), Max: 98.3 (12 Jul 2021 23:38)  HR: 105 (13 Jul 2021 18:37) (105 - 113)  BP: 148/71 (13 Jul 2021 17:31) (148/71 - 158/75)   RR: 18 (13 Jul 2021 17:31) (18 - 20)  SpO2: 99% (13 Jul 2021 18:37) (96% - 100%)    PHYSICAL EXAM:  GENERAL: NAD, well-groomed, well-developed  HEAD:  Atraumatic, Normocephalic  EYES: EOMI, PERRLA   NECK: Supple   NERVOUS SYSTEM: more alert today  CHEST/LUNG: Clear to auscultation bilaterally; No rales, rhonchi, wheezing, or rubs  HEART: Regular rate and rhythm; No murmurs, rubs, or gallops  ABDOMEN: Soft, Nontender, Nondistended; Bowel sounds present  EXTREMITIES: No clubbing, cyanosis, or edema    LABS:                        10.8   6.11  )-----------( 235      ( 13 Jul 2021 07:55 )             31.3     07-13    137  |  106  |  7   ----------------------------<  160<H>  4.0   |  23  |  1.70<H>    Ca    7.1<L>      13 Jul 2021 07:55  Phos  2.3     07-13  Mg     2.0     07-13    TPro  6.9  /  Alb  2.6<L>  /  TBili  0.5  /  DBili  x   /  AST  30  /  ALT  20  /  AlkPhos  58  07-12         Culture - Fungal, CSF (collected 10 Jul 2021 00:14)  Source: .CSF CSF  Preliminary Report (12 Jul 2021 10:31):    Testing in progress    Culture - Acid Fast - CSF (collected 10 Jul 2021 00:14)  Source: .CSF CSF    Culture - CSF with Gram Stain (collected 10 Jul 2021 00:14)  Source: .CSF CSF  Gram Stain (12 Jul 2021 17:13):    polymorphonuclear leukocytes seen    No organisms seen    by cytocentrifuge  Final Report (12 Jul 2021 17:13):    No growth      RADIOLOGY & ADDITIONAL TESTS:    07-12-21 @ 07:01  -  07-13-21 @ 07:00  --------------------------------------------------------  IN:    dextrose 5% + sodium chloride 0.9% w/ Additives: 900 mL    IV PiggyBack: 100 mL  Total IN: 1000 mL    OUT:  Total OUT: 0 mL    Total NET: 1000 mL      07-13-21 @ 07:01 - 07-13-21 @ 18:46  --------------------------------------------------------  IN:    Oral Fluid: 240 mL  Total IN: 240 mL    OUT:  Total OUT: 0 mL    Total NET: 240 mL

## 2021-07-13 NOTE — CONSULT NOTE ADULT - SUBJECTIVE AND OBJECTIVE BOX
Nephrology Consultation: MD ADONIS SmithANITHA  70y    HPI:  Patient is a 70M with a PMH of Anoxic encephalopathy, prostate cancer, HTN, bowel resection who presents to the ED for R ear discharge.  Patient minimally verbal and bedbound at baseline, unable to provide history.  Wife at the bedside to provide history.  Patient noted to have R ear discharge for two days along with temperatures at home.  Wife states that she has been giving him tylenol at home without resolution of the fever.  Per wife, patient was talking much less and was not accepting PO intake so she presents to the ED for evaluation.  Denies history of ear infections.  Patient also noted to have sporadic dyspnea over the same time span.  No other complaints.  Current SpO2 on 2L via NC is 92%.  Vitals stable, labs benign.  Will admit to med surg.        was given IV contrast on admission  now found to have MALVIN  he has been on acyclovir IV.       PAST MEDICAL & SURGICAL HISTORY:  Hypertension    Prostate cancer  radiation therapy    Brain injury with coma  x 2 weeks in 2008    Anoxic encephalopathy    Leg pain, right    Gait abnormality    Sudden cardiac death    Status post cryoablation  of left renal mass    H/O prostate cancer  resection and radiation therapy    H/O tracheostomy    S/P ICD (internal cardiac defibrillator) procedure  implanted on 1/6/2009        Allergies    lisinopril (Unknown)  penicillins (Unknown)    Intolerances        Home Medications:  multivitamin: 1 tab(s) orally once a day (23 Aug 2019 12:11)  Vitamin D3 2000 intl units oral capsule: 1 cap(s) orally once a day (23 Aug 2019 12:11)  zinc oxide 40% topical ointment: 1 application topically 3 times a day, As needed, skin breakdown (30 Oct 2019 13:37)        FAMILY HISTORY:  FH: HTN (hypertension)        SOCIAL HISTORY:    REVIEW OF SYSTEMS:    Constitutional: No fever, weight loss or fatigue  Eyes: No eye pain, visual disturbances, or discharge  ENT:  No difficulty hearing, tinnitus, vertigo; No sinus or throat pain  Neck: No pain or stiffness  Breasts: No pain, masses or nipple discharge  Respiratory: No cough, wheezing, chills or hemoptysis  Cardiovascular: No chest pain, palpitations, shortness of breath, dizziness or leg swelling  Gastrointestinal: No abdominal or epigastric pain. No nausea, vomiting or hematemesis; No diarrhea or constipation. No melena or hematochezia.  Genitourinary: No dysuria, frequency, hematuria or incontinence  Rectal: No pain, hemorrhoids or incontinence  Neurological: No headaches, memory loss, loss of strength, numbness or tremors  Skin: No itching, burning, rashes or lesions   Lymph Nodes: No enlarged glands  Endocrine: No heat or cold intolerance; No hair loss  Musculoskeletal: No joint pain or swelling; No muscle, back or extremity pain  Psychiatric: No depression, anxiety, mood swings or difficulty sleeping  Heme/Lymph: No easy bruising or bleeding gums  Allergy and Immunologic: No hives or eczema    current medications:    acetaminophen  Suppository .. 650 milliGRAM(s) Rectal every 6 hours PRN  acyclovir IVPB 600 milliGRAM(s) IV Intermittent every 8 hours  albuterol/ipratropium for Nebulization 3 milliLiter(s) Nebulizer every 6 hours PRN  chlorhexidine 2% Cloths 1 Application(s) Topical daily  dextrose 5% + sodium chloride 0.9% 1000 milliLiter(s) IV Continuous <Continuous>  famotidine Injectable 20 milliGRAM(s) IV Push two times a day  heparin   Injectable 5000 Unit(s) SubCutaneous every 12 hours  hydrALAZINE Injectable 5 milliGRAM(s) IV Push every 6 hours PRN  lidocaine 1% Injectable 10 milliLiter(s) Local Injection once  nystatin Powder 1 Application(s) Topical two times a day  potassium phosphate / sodium phosphate Powder (PHOS-NaK) 2 Packet(s) Oral four times a day with meals  sodium chloride 0.9% lock flush 10 milliLiter(s) IV Push every 1 hour PRN      Vital Signs Last 24 Hrs  T(C): 36.1 (13 Jul 2021 10:03), Max: 36.9 (12 Jul 2021 16:29)  T(F): 97 (13 Jul 2021 10:03), Max: 98.5 (12 Jul 2021 16:29)  HR: 105 (13 Jul 2021 10:03) (105 - 113)  BP: 153/77 (13 Jul 2021 10:03) (123/76 - 158/75)  BP(mean): --  RR: 18 (13 Jul 2021 10:03) (18 - 20)  SpO2: 100% (13 Jul 2021 10:03) (96% - 100%)    PHYSICAL EXAM:    Constitutional: NAD, well-groomed, well-developed  HEENT: PERRLA, EOMI, Normal Hearing, MMM  Neck: No LAD, No JVD  Back: Normal spine flexure, No CVA tenderness  Respiratory: CTAB/L   Cardiovascular: S1 and S2, RRR, no M/G/R  Gastrointestinal: BS+, soft, NT/ND  Extremities: No peripheral edema  Vascular: 2+ peripheral pulses  Neurological: A/O   Skin: No rashes      LABS:                        10.8   6.11  )-----------( 235      ( 13 Jul 2021 07:55 )             31.3     07-13    137  |  106  |  7   ----------------------------<  160<H>  4.0   |  23  |  1.70<H>    Ca    7.1<L>      13 Jul 2021 07:55  Phos  2.3     07-13  Mg     2.0     07-13    TPro  6.9  /  Alb  2.6<L>  /  TBili  0.5  /  DBili  x   /  AST  30  /  ALT  20  /  AlkPhos  58  07-12        Creatinine Trend: 1.70<--, 0.58<--, 0.61<--, 0.54<--, 0.60<--, 0.70<--    MICROBIOLOGY:  RECENT CULTURES:  07-10 .Smear Other XXXX   polymorphonuclear leukocytes seen  No organisms seen  by cytocentrifuge XXXX    07-10 .CSF CSF XXXX   polymorphonuclear leukocytes seen  No organisms seen  by cytocentrifuge   No growth    07-08 .Urine Clean Catch (Midstream) XXXX XXXX   No growth    07-07 .Blood Blood-Peripheral XXXX XXXX   No Growth Final    07-07 Skin R ear discharge XXXX XXXX   Few Corynebacterium propinquum "Susceptibilities not performed"  Rare Coag Negative Staphylococcus "Susceptibilities not performed"          RADIOLOGY & ADDITIONAL STUDIES:

## 2021-07-13 NOTE — PROGRESS NOTE ADULT - SUBJECTIVE AND OBJECTIVE BOX
ANITHA ADONIS    S 1E 180 I    Patient is a 70y old  Male who presents with a chief complaint of sepsis, ear infection (12 Jul 2021 23:15)       Allergies    lisinopril (Unknown)  penicillins (Unknown)    Intolerances        HPI:  Patient is a 70M with a PMH of Anoxic encephalopathy, prostate cancer, HTN, bowel resection who presents to the ED for R ear discharge.  Patient minimally verbal and bedbound at baseline, unable to provide history.  Wife at the bedside to provide history.  Patient noted to have R ear discharge for two days along with temperatures at home.  Wife states that she has been giving him tylenol at home without resolution of the fever.  Per wife, patient was talking much less and was not accepting PO intake so she presents to the ED for evaluation.  Denies history of ear infections.  Patient also noted to have sporadic dyspnea over the same time span.  No other complaints.  Current SpO2 on 2L via NC is 92%.  Vitals stable, labs benign.  Will admit to med surg.     (07 Jul 2021 05:04)      PAST MEDICAL & SURGICAL HISTORY:  Hypertension    Prostate cancer  radiation therapy    Brain injury with coma  x 2 weeks in 2008    Anoxic encephalopathy    Leg pain, right    Gait abnormality    Sudden cardiac death    Status post cryoablation  of left renal mass    H/O prostate cancer  resection and radiation therapy    H/O tracheostomy    S/P ICD (internal cardiac defibrillator) procedure  implanted on 1/6/2009        FAMILY HISTORY:  FH: HTN (hypertension)          MEDICATIONS   acetaminophen  Suppository .. 650 milliGRAM(s) Rectal every 6 hours PRN  acyclovir IVPB 600 milliGRAM(s) IV Intermittent every 8 hours  albuterol/ipratropium for Nebulization 3 milliLiter(s) Nebulizer every 6 hours PRN  chlorhexidine 2% Cloths 1 Application(s) Topical daily  dextrose 5% + sodium chloride 0.9% 1000 milliLiter(s) IV Continuous <Continuous>  famotidine Injectable 20 milliGRAM(s) IV Push two times a day  heparin   Injectable 5000 Unit(s) SubCutaneous every 12 hours  hydrALAZINE Injectable 5 milliGRAM(s) IV Push every 6 hours PRN  lidocaine 1% Injectable 10 milliLiter(s) Local Injection once  nystatin Powder 1 Application(s) Topical two times a day  potassium phosphate / sodium phosphate Powder (PHOS-NaK) 2 Packet(s) Oral four times a day with meals  sodium chloride 0.9% lock flush 10 milliLiter(s) IV Push every 1 hour PRN      Vital Signs Last 24 Hrs  T(C): 36.1 (13 Jul 2021 10:03), Max: 36.9 (12 Jul 2021 16:29)  T(F): 97 (13 Jul 2021 10:03), Max: 98.5 (12 Jul 2021 16:29)  HR: 105 (13 Jul 2021 10:03) (92 - 113)  BP: 153/77 (13 Jul 2021 10:03) (123/76 - 158/75)  BP(mean): --  RR: 18 (13 Jul 2021 10:03) (18 - 20)  SpO2: 100% (13 Jul 2021 10:03) (96% - 100%)      07-12-21 @ 07:01  -  07-13-21 @ 07:00  --------------------------------------------------------  IN: 1000 mL / OUT: 0 mL / NET: 1000 mL            LABS:                        10.8   6.11  )-----------( 235      ( 13 Jul 2021 07:55 )             31.3     07-13    137  |  106  |  7   ----------------------------<  160<H>  4.0   |  23  |  1.70<H>    Ca    7.1<L>      13 Jul 2021 07:55  Phos  2.3     07-13  Mg     2.0     07-13    TPro  6.9  /  Alb  2.6<L>  /  TBili  0.5  /  DBili  x   /  AST  30  /  ALT  20  /  AlkPhos  58  07-12              WBC:  WBC Count: 6.11 K/uL (07-13 @ 07:55)  WBC Count: 4.10 K/uL (07-12 @ 06:52)  WBC Count: 3.61 K/uL (07-11 @ 06:57)  WBC Count: 4.53 K/uL (07-10 @ 07:51)  WBC Count: 5.62 K/uL (07-09 @ 10:50)      MICROBIOLOGY:  RECENT CULTURES:  07-10 .Smear Other XXXX   polymorphonuclear leukocytes seen  No organisms seen  by cytocentrifuge XXXX    07-10 .CSF CSF XXXX   polymorphonuclear leukocytes seen  No organisms seen  by cytocentrifuge   No growth    07-08 .Urine Clean Catch (Midstream) XXXX XXXX   No growth    07-07 .Blood Blood-Peripheral XXXX XXXX   No Growth Final    07-07 Skin R ear discharge XXXX XXXX   Few Corynebacterium propinquum "Susceptibilities not performed"  Rare Coag Negative Staphylococcus "Susceptibilities not performed"                    Sodium:  Sodium, Serum: 137 mmol/L (07-13 @ 07:55)  Sodium, Serum: 138 mmol/L (07-12 @ 06:52)  Sodium, Serum: 135 mmol/L (07-11 @ 06:57)  Sodium, Serum: 131 mmol/L (07-10 @ 07:51)  Sodium, Serum: 127 mmol/L (07-09 @ 10:50)      1.70 mg/dL 07-13 @ 07:55  0.58 mg/dL 07-12 @ 06:52  0.61 mg/dL 07-11 @ 06:57  0.54 mg/dL 07-10 @ 07:51  0.60 mg/dL 07-09 @ 10:50      Hemoglobin:  Hemoglobin: 10.8 g/dL (07-13 @ 07:55)  Hemoglobin: 11.7 g/dL (07-12 @ 06:52)  Hemoglobin: 10.9 g/dL (07-11 @ 06:57)  Hemoglobin: 11.3 g/dL (07-10 @ 07:51)  Hemoglobin: 11.5 g/dL (07-09 @ 10:50)      Platelets: Platelet Count - Automated: 235 K/uL (07-13 @ 07:55)  Platelet Count - Automated: 235 K/uL (07-12 @ 06:52)  Platelet Count - Automated: 183 K/uL (07-11 @ 06:57)  Platelet Count - Automated: 186 K/uL (07-10 @ 07:51)  Platelet Count - Automated: 162 K/uL (07-09 @ 10:50)      LIVER FUNCTIONS - ( 12 Jul 2021 06:52 )  Alb: 2.6 g/dL / Pro: 6.9 gm/dL / ALK PHOS: 58 U/L / ALT: 20 U/L / AST: 30 U/L / GGT: x                 RADIOLOGY & ADDITIONAL STUDIES:

## 2021-07-13 NOTE — CONSULT NOTE ADULT - ASSESSMENT
Subjective Complaints:      Consult requested by ER doctor:                  Attending:     History of Present Illness:  Chief Complaint/Reason for Admission:  History of Present Illness:  HPI:  Patient is a 70M with a PMH of Anoxic encephalopathy, prostate cancer, HTN, bowel resection who presents to the ED for R ear discharge.  Patient minimally verbal and bedbound at baseline, unable to provide history.  Wife at the bedside to provide history.  Patient noted to have R ear discharge for two days along with temperatures at home.  Wife states that she has been giving him tylenol at home without resolution of the fever.  Per wife, patient was talking much less and was not accepting PO intake so she presents to the ED for evaluation.  Denies history of ear infections.  Patient also noted to have sporadic dyspnea over the same time span.  No other complaints.  Current SpO2 on 2L via NC is 92%.  Vitals stable, labs benign.  Will admit to med surg.     (2021 05:04)        PAST MEDICAL & SURGICAL HISTORY:  Hypertension    Prostate cancer  radiation therapy    Brain injury with coma  x 2 weeks in     Anoxic encephalopathy    Leg pain, right    Gait abnormality    Sudden cardiac death    Status post cryoablation  of left renal mass    H/O prostate cancer  resection and radiation therapy    H/O tracheostomy    S/P ICD (internal cardiac defibrillator) procedure  implanted on 2009    70yMale    MEDICATIONS  (STANDING):  acyclovir IVPB 600 milliGRAM(s) IV Intermittent every 8 hours  chlorhexidine 4% Liquid 1 Application(s) Topical <User Schedule>  ciprofloxacin   IVPB 400 milliGRAM(s) IV Intermittent every 12 hours  dextrose 5% + lactated ringers. 1000 milliLiter(s) (100 mL/Hr) IV Continuous <Continuous>  famotidine Injectable 20 milliGRAM(s) IV Push two times a day  heparin   Injectable 5000 Unit(s) SubCutaneous every 12 hours  hydrocortisone/polymyxin/neomycin Suspension 4 Drop(s) Right Ear three times a day  lidocaine 1% Injectable 10 milliLiter(s) Local Injection once  nystatin Powder 1 Application(s) Topical two times a day    MEDICATIONS  (PRN):  acetaminophen  Suppository .. 650 milliGRAM(s) Rectal every 6 hours PRN Temp greater or equal to 38C (100.4F), Mild Pain (1 - 3)  albuterol/ipratropium for Nebulization 3 milliLiter(s) Nebulizer every 6 hours PRN wheeze  hydrALAZINE Injectable 5 milliGRAM(s) IV Push every 6 hours PRN SBP > 160  sodium chloride 0.9% lock flush 10 milliLiter(s) IV Push every 1 hour PRN Pre/post blood products, medications, blood draw, and to maintain line patency      Allergies    lisinopril (Unknown)  penicillins (Unknown)    Intolerances      FAMILY HISTORY:  FH: HTN (hypertension)        REVIEW OF SYSTEMS:  General:  No wt loss, fevers, chills, night sweats  Eyes:  Good vision, no reported pain  ENT:  No sore throat, pain, runny nose, dysphagia  CV:  No pain, palpitatioins, hypo/hypertension  Resp:  No dyspnea, cough, tachypnea, wheezing  GI:  No pain, nausea, vomiting, diarrhea, constipatiion  :  No pain, bleeding, incontinence, nocturia  Muscle:  No pain, weakness  Breast:  No pain, abscess, mass, discharge  Neuro:  No weakness, tingling, memory problems  Psych:  No fatigue, insomnia, mood problems, depression  Endocrine:  No polyuria, polydypsia, cold/heat intolerance  Heme:  No petechiae, ecchymosis, easy bruisability  Skin:  No rash, tattoos, scars, edema      Vital Signs Last 24 Hrs  T(C): 37.4 (2021 16:10), Max: 37.6 (2021 00:34)  T(F): 99.3 (2021 16:10), Max: 99.7 (2021 00:34)  HR: 103 (2021 16:10) (97 - 103)  BP: 129/67 (2021 16:10) (129/67 - 162/73)  BP(mean): --  RR: 18 (2021 16:10) (18 - 20)  SpO2: 96% (2021 16:10) (94% - 98%)    GENERAL PHYSICAL EXAM:  General:  Appears stated age, well-groomed, well-nourished, no distress  HEENT:  NC/AT, patent nares w/ pink mucosa, OP clear w/o lesions, PERRL, EOMI, conjunctivae clear, no thyromegaly, nodules, adenopathy, no JVD  Chest:  Full & symmetric excursion, no increased effort, breath sounds clear  Cardiovascular:  Regular rhythm, S1, S2, no murmur/rub/S3/S4, no carotid/femoral/abdominal bruit, radial/pedal pulses 2+, no edema  Abdomen:  Soft, non-tender, non-distended, normoactive bowel sounds, no HSM  Extremities:  Gait & station:   Digits:   Nails:   Joints, Bones, Muscles:   ROM:   Stability:  Skin:  No rash/erythema/ecchymoses/petechiae/wounds/abscess/warm/dry  Musculoskeletal:  Full ROM in all joints w/o swelling/tenderness/effusion    NEUROLOGICAL EXAM:  HENT:  Normocephalic head; atraumatic head.  Neck supple.  ENT: normal looking.  Mental State:      letahrgic open eys does not follows commands quadroparesis neck stiffness  no seizure reported   LABS:                        11.5   5.62  )-----------( 162      ( 2021 10:50 )             32.8     07-09    127<L>  |  94<L>  |  6<L>  ----------------------------<  141<H>  3.4<L>   |  27  |  0.60    Ca    7.3<L>      2021 10:50  Phos  1.5     07-09  Mg     1.9     07-09    TPro  6.5  /  Alb  2.6<L>  /  TBili  0.6  /  DBili  x   /  AST  33  /  ALT  19  /  AlkPhos  61  07-09    PT/INR - ( 2021 10:50 )   PT: 15.7 sec;   INR: 1.37 ratio         PTT - ( 2021 10:50 )  PTT:29.9 sec    Urinalysis Basic - ( 2021 20:28 )    Color: Yellow / Appearance: Clear / S.010 / pH: x  Gluc: x / Ketone: Trace  / Bili: Negative / Urobili: Negative mg/dL   Blood: x / Protein: 30 mg/dL / Nitrite: Negative   Leuk Esterase: Trace / RBC: 6-10 /HPF / WBC 3-5   Sq Epi: x / Non Sq Epi: Occasional / Bacteria: Few        RADIOLOGY & ADDITIONAL STUDIES:      Assessment & Opinion: spinal tap done clear  csf obtained and sent for test discuss with wife  r.o infection  quadroparesis ct head noted hydrocephalus  will follow     Recommendations:  Brain MRI.  Carotid doppler.  Echocardiogram.  EEG.   DVT prophylaxis as ordered.  Medications:    
71 yo M with a PMH of Anoxic encephalopathy - minimally verbal and bedbound at baseline, prostate cancer, HTN, bowel resection & ICD who presented w/ R ear discharge and found to have HZV. he is now on IV acyclovir and found to have MALVIN today.   Most likely due to pre renal azotemia complicated by Acyclovir leading to ATN. Continue IVF for now and monitor renal indices closely. Will obtain  a renal sonogram when stable. . 
    ADONIS Barrera M 7/7/2021 1949 DR JAXON BROWN        Initial evaluation/Pulmonary Critical Care consultation requested on  7/7/2021  by Dr BROWN    from Dr Rogel   Patient examined chart reviewed    HOSPITAL ADMISSION   PATIENT CAME  FROM (if information available)      ADONIS Barrera M 7/7/2021 1949 DR JAXON BROWN      REVIEW OF SYMPTOMS      Able to give (reliable) ROS  NO     PHYSICAL EXAM    HEENT Unremarkable  atraumatic   RESP Fair air entry EXP prolonged    Harsh breath sound Resp distres mild   CARDIAC S1 S2 No S3     NO JVD    ABDOMEN SOFT BS PRESENT NOT DISTENDED No hepatosplenomegaly   PEDAL EDEMA present No calf tenderness  NO rash     PATIENT PRESENTATION.     71 y/o M with PMHx of HTN, Anoxic Encephalopathy, Gait abnormality and Prostate Cancer and PSHx of Bowel Resection presents to the ED BIBEMS c/o right ear discharge for x2 days, as well as a subjective fever of 101.8. As per pt's wife, pt had a fever for the last x2-3 days, and has been managed with Tylenol by his wife every 8 hours, with last dose given around lunchtime today. As per wife, pt is verbal and bedbound at baseline, but noticed today pt wasn't verbalizing much, was drooling and was unable to eat or drink. Endorses intermittent SOB, but denies chills, cough, sore throat, vision changes or have any other complaints of pain. As per wife, pt develops rashes when ingesting Penicillin. Patient denies EtOH/tobacco/illicit substance use.  Pulm consulted 7/7/2021 for dyspnea       ADVANCED DIRECTIVES.                            COVID STATUS.                                       scv2 7/7/2021 (-)   ADONIS Barrera M 7/7/2021 ADMISSN   CC.   7/7/2021 FEVER     PRESENTING PROBLEMS 7/7/2021 .   FEVER   HYPONATREMIA Na 7/7/2021 Na 129  DYSPNEA    PMH.  BASELINE MINIMALLY VERBAL BEDBOUND   SBR   HO TRACH   ANOXIC ENCEPHALOPATHY Brain injury 2008   HO SCD  HO AICD 2009   HO CYROABLATION L RENAL MASS   PROSTATE CA        GLOBAL AND BEST PRACTICE ISSUES                     ALLERGY.    PNCL LISINOPRIL  WEIGHT.          7/7/2021 90        BMI                7/7/2021 33        .                          ADVANCED DIRECTIVE.                               HEAD OF BED ELEVATION. Yes    PATIENT DATA                ABG.                 OXYGENATION.       7/7/2021 2l 97%            VITALS/IO/VENT/DRIPS.     7/7/2021 99f 80 120/60        PATIENT DATA.    DYSPNEA   INFECTION  W 7/7/2021 W 5.7   ESR 7/7/2021 ESR 33   rvp 7/7/2021 (-)   CIPRO (7/7/20210 (OTITIS EXTERNA) (DR JONES)   HYPONATREMIA  Na 7/7/2021 Na 129     ASSESSMENT/RECOMMENDATIONS.    DYSPNEA  Has ho trach in past Has ho anoxic encephalopathy and is at risk for aspn pneum  Check V duplex   Check ct ch no contr ro pneum  Check abg   OTITIS EXTRENAL   Started on cipro by admitting md 7/7/2021   HYPONATREMIA   Monitor    TIME SPENT   Over 55 minutes aggregate care time spent on encounter; activities included   direct patient care, counseling and/or coordinating care reviewing notes, lab data/ imaging , discussion with multidisciplinary team/ patient  /family and explaining in detail risks, benefits, alternatives  of the recommendations     ADONIS WELSH 70 M 7/7/2021 1949 DR JAXON BROWN     
70M with otits externa   Febrile, normal WBC   right ear with murky looking discharge  DDx otitis externa vs shingles  Blood cultures sent as well ear culture  was put on IV cipro  Reportedly has PCN allergy but has tolerated cefpodoxime     otitis externa  Fever  Functional Quadriplegia     Plan:   continue IV Ciprofloxacin   start cortisporin drops 4 drops in right ear 3 times a day   f/u blood cultures   f/u ear cultures  offloading, frequent turns, nutrition optimization   trend fevers   trend wbc   send Herpes/VZV PCR from ear fluid to rule out herpes zoster oticus     D/W Dr. medicine team    Aris Seymour DO  Infectious Disease Attending  Pager [phone]  After 5pm/weekends please call [phone] for all inquiries and new consults

## 2021-07-13 NOTE — PROGRESS NOTE ADULT - ASSESSMENT
Subjective Complaints:  Historian:             Vital Signs Last 24 Hrs  T(C): 36.8 (13 Jul 2021 17:31), Max: 36.8 (12 Jul 2021 23:38)  T(F): 98.3 (13 Jul 2021 17:31), Max: 98.3 (12 Jul 2021 23:38)  HR: 105 (13 Jul 2021 18:37) (105 - 113)  BP: 148/71 (13 Jul 2021 17:31) (148/71 - 158/75)  BP(mean): --  RR: 18 (13 Jul 2021 17:31) (18 - 20)  SpO2: 99% (13 Jul 2021 18:37) (96% - 100%)    GENERAL PHYSICAL EXAM:  General:  Appears stated age, well-groomed, well-nourished, no distress  HEENT:  NC/AT, patent nares w/ pink mucosa, OP clear w/o lesions, PERRL, EOMI, conjunctivae clear, no thyromegaly, nodules, adenopathy, no JVD  Chest:  Full & symmetric excursion, no increased effort, breath sounds clear  Cardiovascular:  Regular rhythm, S1, S2, no murmur/rub/S3/S4, no carotid/femoral/abdominal bruit, radial/pedal pulses 2+, no edema  Abdomen:  Soft, non-tender, non-distended, normoactive bowel sounds, no HSM  Extremities:  Gait & station:   Digits:   Nails:   Joints, Bones, Muscles:   ROM:   Stability:  Skin:  No rash/erythema/ecchymoses/petechiae/wounds/abscess/warm/dry  Musculoskeletal:  Full ROM in all joints w/o swelling/tenderness/effusion        LABS:                        10.8   6.11  )-----------( 235      ( 13 Jul 2021 07:55 )             31.3     07-13    137  |  106  |  7   ----------------------------<  160<H>  4.0   |  23  |  1.70<H>    Ca    7.1<L>      13 Jul 2021 07:55  Phos  2.3     07-13  Mg     2.0     07-13    TPro  6.9  /  Alb  2.6<L>  /  TBili  0.5  /  DBili  x   /  AST  30  /  ALT  20  /  AlkPhos  58  07-12          RADIOLOGY & ADDITIONAL STUDIES:        Neurology Progress Note:      Mental Status: letahrgic arousable tries to open eyes       Cranial Nerves: defrd      Motor:   quadroparesis         Sensory:defrd      Cerebellar: defrd      Gait:defrd      Assesment/Plan: s/p anoxic encephalaothy s/p spinal tap viral encephalaopathy id follow up no seizure  discuss with  wife

## 2021-07-13 NOTE — PROGRESS NOTE ADULT - ASSESSMENT
71 yo M with a PMH of Anoxic encephalopathy - minimally verbal and bedbound at baseline, prostate cancer, HTN, bowel resection & ICD who presented w/ R ear discharge. Pt's wife also reported that he was talking less and had worsening PO intake.      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 Chacon, so Dr. Mcadams did it instead-  CSF PCR positive for Varicella zoster virus    MALVIN  - likely prerenal  - c/w IVF  - nephro note read and appreciated     Hypophosphatemia   - replace w/ Phos     Acute otitis externa of right ear  - ID following - Herpes Zoster Oticus  - Unable to get ENT consult at this hospital   - ID following & changed acyclovir to Valtrex  - ID stopped IV Cipro & cortisporin     Dyspnea  - may be due to sepsis and encephalopathy   - c/w O2 2L  - Pulm consulted - Juan F  - no DVT on US  - no pneumonia on plain CT of chest     Dysphagia  - at baseline, pt unable to feed self, but normally able to swallow  - Failed swallow eval, so will keep NPO  - c/w D5LR for now  - if no neuro improvement within another 2-3 days, patient will need NG tube and TFs    Prostate cancer  - leuprolide when tolerating PO    Hx of Diverticulosis w/ significant diverticular bleed in 2018, requiring 4 transfusions    - patient had another GI bleed in 2019, but refused EGD/colonoscopy, so source unknown  - family agreed to SQ heparin and watch H/H     Essential hypertension  - c/w hydralazine PRN     Functional quadriplegia  - history of anoxic encephalopathy - per wife, occurred 2008 due to fall   - c/w supportive care    Prophylaxis:  DVT: Heparin  GI: Pepcid

## 2021-07-13 NOTE — PROGRESS NOTE ADULT - SUBJECTIVE AND OBJECTIVE BOX
ANITHA LAMB  MRN-[id number]    Follow Up:  herpes zoster oticus and encephalitis     Interval History: pt seen and examined earlier, oral feeding is in progress - the pt is not very compliant, pt is awake and alert, not interactive. The pt is afebrile since 7/8, no leukocytosis, Cr increased 1.7.     PAST MEDICAL & SURGICAL HISTORY:  Hypertension    Prostate cancer  radiation therapy    Brain injury with coma  x 2 weeks in 2008    Anoxic encephalopathy    Leg pain, right    Gait abnormality    Sudden cardiac death    Status post cryoablation  of left renal mass    H/O prostate cancer  resection and radiation therapy    H/O tracheostomy    S/P ICD (internal cardiac defibrillator) procedure  implanted on 1/6/2009        ROS:    [x ] Unobtainable because: pt is not answering any of my questions, not interactive   [ ] All other systems negative    Constitutional: no fever, no chills  Head: no trauma  Eyes: no vision changes, no eye pain  ENT:  no sore throat, no rhinorrhea  Cardiovascular:  no chest pain, no palpitation  Respiratory:  no SOB, no cough  GI:  no abd pain, no vomiting, no diarrhea  urinary: no dysuria, no hematuria, no flank pain  musculoskeletal:  no joint pain, no joint swelling  skin:  no rash  neurology:  no headache, no seizure, no change in mental status  psych: no anxiety, no depression         Allergies  lisinopril (Unknown)  penicillins (Unknown)        ANTIMICROBIALS:  acyclovir IVPB 600 every 8 hours      OTHER MEDS:  acetaminophen  Suppository .. 650 milliGRAM(s) Rectal every 6 hours PRN  albuterol/ipratropium for Nebulization 3 milliLiter(s) Nebulizer every 6 hours PRN  chlorhexidine 2% Cloths 1 Application(s) Topical daily  dextrose 5% + sodium chloride 0.9% 1000 milliLiter(s) IV Continuous <Continuous>  famotidine Injectable 20 milliGRAM(s) IV Push two times a day  heparin   Injectable 5000 Unit(s) SubCutaneous every 12 hours  hydrALAZINE Injectable 5 milliGRAM(s) IV Push every 6 hours PRN  lidocaine 1% Injectable 10 milliLiter(s) Local Injection once  nystatin Powder 1 Application(s) Topical two times a day  potassium phosphate / sodium phosphate Powder (PHOS-NaK) 2 Packet(s) Oral four times a day with meals  sodium chloride 0.9% lock flush 10 milliLiter(s) IV Push every 1 hour PRN      Vital Signs Last 24 Hrs  T(C): 36.1 (13 Jul 2021 10:03), Max: 36.9 (12 Jul 2021 16:29)  T(F): 97 (13 Jul 2021 10:03), Max: 98.5 (12 Jul 2021 16:29)  HR: 105 (13 Jul 2021 10:03) (105 - 113)  BP: 153/77 (13 Jul 2021 10:03) (123/76 - 158/75)  BP(mean): --  RR: 18 (13 Jul 2021 10:03) (18 - 20)  SpO2: 100% (13 Jul 2021 10:03) (96% - 100%)    Physical Exam:  General:    NAD,  non toxic, awake and alert  Head: atraumatic, normocephalic  Eye: normal sclera and conjunctiva  ENT:    unable to assess pt's mouth due to non-compliance   Cardio:     regular S1, S2,  no murmur  Respiratory:    diminished breath sounds b/l at the bases,  no wheezing, no rales, no rhonchi  abd:     soft,   BS +,   no tenderness  :   unable to assess CVAT,  no suprapubic tenderness  Musculoskeletal:   no joint swelling,   + edema of bilateral upper and lower extremities   vascular: chest port right sided   Skin:    no rash  Neurologic:     poor mental status, doesn't follow commands, awake and alert   psych: calm     WBC Count: 6.11 K/uL (07-13 @ 07:55)  WBC Count: 4.10 K/uL (07-12 @ 06:52)  WBC Count: 3.61 K/uL (07-11 @ 06:57)  WBC Count: 4.53 K/uL (07-10 @ 07:51)  WBC Count: 5.62 K/uL (07-09 @ 10:50)  WBC Count: 5.25 K/uL (07-08 @ 08:36)  WBC Count: 5.70 K/uL (07-07 @ 00:54)                            10.8   6.11  )-----------( 235      ( 13 Jul 2021 07:55 )             31.3       07-13    137  |  106  |  7   ----------------------------<  160<H>  4.0   |  23  |  1.70<H>    Ca    7.1<L>      13 Jul 2021 07:55  Phos  2.3     07-13  Mg     2.0     07-13    TPro  6.9  /  Alb  2.6<L>  /  TBili  0.5  /  DBili  x   /  AST  30  /  ALT  20  /  AlkPhos  58  07-12          Creatinine Trend: 1.70<--, 0.58<--, 0.61<--, 0.54<--, 0.60<--, 0.70<--      MICROBIOLOGY:  v  .Smear Other  07-10-21 --  --    polymorphonuclear leukocytes seen  No organisms seen  by cytocentrifuge      .CSF CSF  07-10-21   No growth  --    polymorphonuclear leukocytes seen  No organisms seen  by cytocentrifuge      .Urine Clean Catch (Midstream)  07-08-21   No growth  --  --      .Blood Blood-Peripheral  07-07-21   No Growth Final  --  --      Skin R ear discharge  07-07-21   Few Corynebacterium propinquum "Susceptibilities not performed"  Rare Coag Negative Staphylococcus "Susceptibilities not performed"  --  --          HSV 1/2 PCR: Allyson (07-09 @ 23:16)  Rapid RVP Result: Norma (07-07 @ 06:31)    C-Reactive Protein, Serum: 31 (07-07)    Procalcitonin, Serum: 0.04 (07-12-21 @ 08:56)    SARS-CoV-2: Allyson (07 Jul 2021 06:31)    RADIOLOGY:

## 2021-07-13 NOTE — PROGRESS NOTE ADULT - ASSESSMENT
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 with possible otitis externa  Fsebrile, normal WBC   right ear with murky looking discharge  DDx otitis externa vs shingles  Blood cultures sent as well ear culture  was put on IV cipro  Reportedly has PCN allergy but has tolerated cefpodoxime     7/8:still febrile, VZV positive, now  concern for encephalitis from vzv, have requested radiology for LP   7/11: s/p LP, VZV +on CSF PCR, stopped cipro, more awake, wife wants re-eval from speech and swallow, more congested today, CXR ordered   7/12: breathing better today but still congested, tolerating acyclovir, being evaluated again by S&S   7/13: s/p S&S yesterday, passed with recommendation - puree with honey thick liquid, no fevers since 7/8, tachycardic, no leukocytosis, cr is elevated today 1.7, acyclovir continued.     Herpes Zoster Oticus  VZV encephalitis   acute encephalopathy  Fever  Functional Quadriplegia     Plan:   ·	continue IV acyclovir 10mg q8hrs based on ideal body weight   ·	IV fluids while on acyclovir   ·	f/u blood cultures negative   ·	f/u ear cultures likely contaminant   ·	offloading, frequent turns, nutrition optimization   ·	trend fevers   ·	trend wbc   ·	CXR- clear   ·	Diuresis       spoke with Dr. Thompson  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 with possible otitis externa  Fsebrile, normal WBC   right ear with murky looking discharge  DDx otitis externa vs shingles  Blood cultures sent as well ear culture  was put on IV cipro  Reportedly has PCN allergy but has tolerated cefpodoxime     7/8:still febrile, VZV positive, now  concern for encephalitis from vzv, have requested radiology for LP   7/11: s/p LP, VZV +on CSF PCR, stopped cipro, more awake, wife wants re-eval from speech and swallow, more congested today, CXR ordered   7/12: breathing better today but still congested, tolerating acyclovir, being evaluated again by S&S   7/13: s/p S&S yesterday, passed with recommendation - puree with honey thick liquid, no fevers since 7/8, tachycardic, no leukocytosis, cr is elevated today 1.7, will stop acyclovir and will start Valtrex po 1 gram q 12 hrs to complete 10 days total, last dose on 7/18/2021.     Herpes Zoster Oticus  VZV encephalitis   acute encephalopathy  Fever  Functional Quadriplegia     Plan:   ·	stop IV acyclovir 10mg q8hrs  ·	start Valtrex 1 gram po q 12 hrs to complete 10 days course, last dose on 7/18   ·	f/u blood cultures negative   ·	f/u ear cultures likely contaminant   ·	offloading, frequent turns, nutrition optimization   ·	trend fevers   ·	trend wbc   ·	CXR- clear   ·	Diuresis

## 2021-07-14 LAB
ANION GAP SERPL CALC-SCNC: 7 MMOL/L — SIGNIFICANT CHANGE UP (ref 5–17)
B BURGDOR DNA SPEC QL NAA+PROBE: NEGATIVE — SIGNIFICANT CHANGE UP
BUN SERPL-MCNC: 8 MG/DL — SIGNIFICANT CHANGE UP (ref 7–23)
CALCIUM SERPL-MCNC: 6.9 MG/DL — LOW (ref 8.5–10.1)
CHLORIDE SERPL-SCNC: 111 MMOL/L — HIGH (ref 96–108)
CO2 SERPL-SCNC: 23 MMOL/L — SIGNIFICANT CHANGE UP (ref 22–31)
CREAT SERPL-MCNC: 1.81 MG/DL — HIGH (ref 0.5–1.3)
GLUCOSE SERPL-MCNC: 110 MG/DL — HIGH (ref 70–99)
HCT VFR BLD CALC: 31.2 % — LOW (ref 39–50)
HGB BLD-MCNC: 10.7 G/DL — LOW (ref 13–17)
MAGNESIUM SERPL-MCNC: 2.1 MG/DL — SIGNIFICANT CHANGE UP (ref 1.6–2.6)
MCHC RBC-ENTMCNC: 25.8 PG — LOW (ref 27–34)
MCHC RBC-ENTMCNC: 34.3 GM/DL — SIGNIFICANT CHANGE UP (ref 32–36)
MCV RBC AUTO: 75.2 FL — LOW (ref 80–100)
NRBC # BLD: 0 /100 WBCS — SIGNIFICANT CHANGE UP (ref 0–0)
PHOSPHATE SERPL-MCNC: 2.4 MG/DL — LOW (ref 2.5–4.5)
PLATELET # BLD AUTO: 242 K/UL — SIGNIFICANT CHANGE UP (ref 150–400)
POTASSIUM SERPL-MCNC: 4.3 MMOL/L — SIGNIFICANT CHANGE UP (ref 3.5–5.3)
POTASSIUM SERPL-SCNC: 4.3 MMOL/L — SIGNIFICANT CHANGE UP (ref 3.5–5.3)
PROCALCITONIN SERPL-MCNC: 0.21 NG/ML — HIGH (ref 0.02–0.1)
RBC # BLD: 4.15 M/UL — LOW (ref 4.2–5.8)
RBC # FLD: 14.4 % — SIGNIFICANT CHANGE UP (ref 10.3–14.5)
SODIUM SERPL-SCNC: 141 MMOL/L — SIGNIFICANT CHANGE UP (ref 135–145)
WBC # BLD: 4.84 K/UL — SIGNIFICANT CHANGE UP (ref 3.8–10.5)
WBC # FLD AUTO: 4.84 K/UL — SIGNIFICANT CHANGE UP (ref 3.8–10.5)

## 2021-07-14 PROCEDURE — 99232 SBSQ HOSP IP/OBS MODERATE 35: CPT

## 2021-07-14 PROCEDURE — 99233 SBSQ HOSP IP/OBS HIGH 50: CPT

## 2021-07-14 RX ORDER — ACYCLOVIR SODIUM 500 MG
600 VIAL (EA) INTRAVENOUS EVERY 8 HOURS
Refills: 0 | Status: DISCONTINUED | OUTPATIENT
Start: 2021-07-14 | End: 2021-07-14

## 2021-07-14 RX ORDER — ACYCLOVIR SODIUM 500 MG
600 VIAL (EA) INTRAVENOUS EVERY 12 HOURS
Refills: 0 | Status: DISCONTINUED | OUTPATIENT
Start: 2021-07-14 | End: 2021-07-18

## 2021-07-14 RX ORDER — SODIUM CHLORIDE 9 MG/ML
1000 INJECTION INTRAMUSCULAR; INTRAVENOUS; SUBCUTANEOUS
Refills: 0 | Status: DISCONTINUED | OUTPATIENT
Start: 2021-07-14 | End: 2021-07-17

## 2021-07-14 RX ADMIN — Medication 3 MILLILITER(S): at 06:18

## 2021-07-14 RX ADMIN — SODIUM CHLORIDE 125 MILLILITER(S): 9 INJECTION, SOLUTION INTRAVENOUS at 14:08

## 2021-07-14 RX ADMIN — CHLORHEXIDINE GLUCONATE 1 APPLICATION(S): 213 SOLUTION TOPICAL at 11:41

## 2021-07-14 RX ADMIN — SODIUM CHLORIDE 125 MILLILITER(S): 9 INJECTION INTRAMUSCULAR; INTRAVENOUS; SUBCUTANEOUS at 22:43

## 2021-07-14 RX ADMIN — FAMOTIDINE 20 MILLIGRAM(S): 10 INJECTION INTRAVENOUS at 05:20

## 2021-07-14 RX ADMIN — NYSTATIN CREAM 1 APPLICATION(S): 100000 CREAM TOPICAL at 05:21

## 2021-07-14 RX ADMIN — Medication 3 MILLILITER(S): at 19:47

## 2021-07-14 RX ADMIN — FAMOTIDINE 20 MILLIGRAM(S): 10 INJECTION INTRAVENOUS at 18:05

## 2021-07-14 RX ADMIN — SODIUM CHLORIDE 75 MILLILITER(S): 9 INJECTION, SOLUTION INTRAVENOUS at 01:17

## 2021-07-14 RX ADMIN — NYSTATIN CREAM 1 APPLICATION(S): 100000 CREAM TOPICAL at 18:05

## 2021-07-14 RX ADMIN — Medication 262 MILLIGRAM(S): at 18:05

## 2021-07-14 RX ADMIN — HEPARIN SODIUM 5000 UNIT(S): 5000 INJECTION INTRAVENOUS; SUBCUTANEOUS at 05:21

## 2021-07-14 RX ADMIN — HEPARIN SODIUM 5000 UNIT(S): 5000 INJECTION INTRAVENOUS; SUBCUTANEOUS at 18:06

## 2021-07-14 RX ADMIN — SODIUM CHLORIDE 75 MILLILITER(S): 9 INJECTION, SOLUTION INTRAVENOUS at 09:16

## 2021-07-14 NOTE — PROGRESS NOTE ADULT - SUBJECTIVE AND OBJECTIVE BOX
ANITHA LAMB  MRN-[id number]    Interval History: the pt was seen and examined earlier, no distress, sitting in his bed, family member present. The pt is not able to swallow his pills, antivirals were changed to IV by the medical staff. No fever, no WBC, Cr is elevated.     PAST MEDICAL & SURGICAL HISTORY:  Hypertension    Prostate cancer  radiation therapy    Brain injury with coma  x 2 weeks in 2008    Anoxic encephalopathy    Leg pain, right    Gait abnormality    Sudden cardiac death    Status post cryoablation  of left renal mass    H/O prostate cancer  resection and radiation therapy    H/O tracheostomy    S/P ICD (internal cardiac defibrillator) procedure  implanted on 1/6/2009        ROS:    [x ] Unobtainable because: pt is not verbal, not interactive   [ ] All other systems negative    Constitutional: no fever, no chills  Head: no trauma  Eyes: no vision changes, no eye pain  ENT:  no sore throat, no rhinorrhea  Cardiovascular:  no chest pain, no palpitation  Respiratory:  no SOB, no cough  GI:  no abd pain, no vomiting, no diarrhea  urinary: no dysuria, no hematuria, no flank pain  musculoskeletal:  no joint pain, no joint swelling  skin:  no rash  neurology:  no headache, no seizure, no change in mental status  psych: no anxiety, no depression         Allergies  lisinopril (Unknown)  penicillins (Unknown)        ANTIMICROBIALS:  acyclovir IVPB 600 every 12 hours      OTHER MEDS:  acetaminophen  Suppository .. 650 milliGRAM(s) Rectal every 6 hours PRN  albuterol/ipratropium for Nebulization 3 milliLiter(s) Nebulizer every 6 hours PRN  chlorhexidine 2% Cloths 1 Application(s) Topical daily  dextrose 5% + sodium chloride 0.9% 1000 milliLiter(s) IV Continuous <Continuous>  famotidine Injectable 20 milliGRAM(s) IV Push two times a day  heparin   Injectable 5000 Unit(s) SubCutaneous every 12 hours  hydrALAZINE Injectable 5 milliGRAM(s) IV Push every 6 hours PRN  lidocaine 1% Injectable 10 milliLiter(s) Local Injection once  nystatin Powder 1 Application(s) Topical two times a day  sodium chloride 0.9% lock flush 10 milliLiter(s) IV Push every 1 hour PRN      Vital Signs Last 24 Hrs  T(C): 36.1 (14 Jul 2021 16:53), Max: 37.3 (14 Jul 2021 05:04)  T(F): 97 (14 Jul 2021 16:53), Max: 99.1 (14 Jul 2021 05:04)  HR: 97 (14 Jul 2021 16:53) (94 - 108)  BP: 142/62 (14 Jul 2021 16:53) (139/64 - 155/73)  BP(mean): --  RR: 19 (14 Jul 2021 16:53) (17 - 19)  SpO2: 100% (14 Jul 2021 16:53) (97% - 100%)    Physical Exam:  General:    NAD,  non toxic, awake and alert  Head: atraumatic, normocephalic  Eye: normal sclera and conjunctiva  ENT:    unable to assess pt's mouth due to non-compliance   Cardio:     regular S1, S2,  no murmur  Respiratory:    diminished breath sounds b/l at the bases,  no wheezing, no rales, no rhonchi  abd:     soft,   BS +,   no tenderness  :   unable to assess CVAT,  no suprapubic tenderness  Musculoskeletal:   no joint swelling,   + edema of bilateral upper and lower extremities   vascular: chest port right sided   Skin:    no rash  Neurologic:     poor mental status, doesn't follow commands, awake and alert   psych: calm     WBC Count: 4.84 K/uL (07-14 @ 06:25)  WBC Count: 6.11 K/uL (07-13 @ 07:55)  WBC Count: 4.10 K/uL (07-12 @ 06:52)  WBC Count: 3.61 K/uL (07-11 @ 06:57)  WBC Count: 4.53 K/uL (07-10 @ 07:51)  WBC Count: 5.62 K/uL (07-09 @ 10:50)  WBC Count: 5.25 K/uL (07-08 @ 08:36)                            10.7   4.84  )-----------( 242      ( 14 Jul 2021 06:25 )             31.2       07-14    141  |  111<H>  |  8   ----------------------------<  110<H>  4.3   |  23  |  1.81<H>    Ca    6.9<L>      14 Jul 2021 06:25  Phos  2.4     07-14  Mg     2.1     07-14            Creatinine Trend: 1.81<--, 1.70<--, 0.58<--, 0.61<--, 0.54<--, 0.60<--      MICROBIOLOGY:  v  .Smear Other  07-10-21 --  --    polymorphonuclear leukocytes seen  No organisms seen  by cytocentrifuge      .CSF CSF  07-10-21   No growth  --    polymorphonuclear leukocytes seen  No organisms seen  by cytocentrifuge      .Urine Clean Catch (Midstream)  07-08-21   No growth  --  --      .Blood Blood-Peripheral  07-07-21   No Growth Final  --  --      Skin R ear discharge  07-07-21   Few Corynebacterium propinquum "Susceptibilities not performed"  Rare Coag Negative Staphylococcus "Susceptibilities not performed"  --  --          HSV 1/2 PCR: Allyson (07-09 @ 23:16)        C-Reactive Protein, Serum: 31 (07-07)          Procalcitonin, Serum: 0.21 (07-14-21 @ 09:08)  Procalcitonin, Serum: 0.04 (07-12-21 @ 08:56)    SARS-CoV-2: Allyson (07 Jul 2021 06:31)    RADIOLOGY:     ANITHA LAMB  MRN-[id number]    Interval History: the pt was seen and examined earlier, no distress, sitting in his bed, family member present. The pt is not able to swallow his pills, antivirals were changed to IV by the medical staff. No fever, no WBC, Cr is elevated.     PAST MEDICAL & SURGICAL HISTORY:  Hypertension    Prostate cancer  radiation therapy    Brain injury with coma  x 2 weeks in 2008    Anoxic encephalopathy    Leg pain, right    Gait abnormality    Sudden cardiac death    Status post cryoablation  of left renal mass    H/O prostate cancer  resection and radiation therapy    H/O tracheostomy    S/P ICD (internal cardiac defibrillator) procedure  implanted on 1/6/2009        ROS:    [x ] Unobtainable because: pt is not verbal, not interactive   [ ] All other systems negative    Constitutional: no fever, no chills  Head: no trauma  Eyes: no vision changes, no eye pain  ENT:  no sore throat, no rhinorrhea  Cardiovascular:  no chest pain, no palpitation  Respiratory:  no SOB, no cough  GI:  no abd pain, no vomiting, no diarrhea  urinary: no dysuria, no hematuria, no flank pain  musculoskeletal:  no joint pain, no joint swelling  skin:  no rash  neurology:  no headache, no seizure, no change in mental status  psych: no anxiety, no depression         Allergies  lisinopril (Unknown)  penicillins (Unknown)        ANTIMICROBIALS:  acyclovir IVPB 600 every 12 hours      OTHER MEDS:  acetaminophen  Suppository .. 650 milliGRAM(s) Rectal every 6 hours PRN  albuterol/ipratropium for Nebulization 3 milliLiter(s) Nebulizer every 6 hours PRN  chlorhexidine 2% Cloths 1 Application(s) Topical daily  dextrose 5% + sodium chloride 0.9% 1000 milliLiter(s) IV Continuous <Continuous>  famotidine Injectable 20 milliGRAM(s) IV Push two times a day  heparin   Injectable 5000 Unit(s) SubCutaneous every 12 hours  hydrALAZINE Injectable 5 milliGRAM(s) IV Push every 6 hours PRN  lidocaine 1% Injectable 10 milliLiter(s) Local Injection once  nystatin Powder 1 Application(s) Topical two times a day  sodium chloride 0.9% lock flush 10 milliLiter(s) IV Push every 1 hour PRN      Vital Signs Last 24 Hrs  T(C): 36.1 (14 Jul 2021 16:53), Max: 37.3 (14 Jul 2021 05:04)  T(F): 97 (14 Jul 2021 16:53), Max: 99.1 (14 Jul 2021 05:04)  HR: 97 (14 Jul 2021 16:53) (94 - 108)  BP: 142/62 (14 Jul 2021 16:53) (139/64 - 155/73)  BP(mean): --  RR: 19 (14 Jul 2021 16:53) (17 - 19)  SpO2: 100% (14 Jul 2021 16:53) (97% - 100%)    Physical Exam:  General:    NAD,  non toxic, awake and alert  Head: atraumatic, normocephalic  Eye: normal sclera and conjunctiva  ENT:    unable to assess pt's mouth due to non-compliance   Cardio:     regular S1, S2,  no murmur  Respiratory:    diminished breath sounds b/l at the bases,  no wheezing, no rales, no rhonchi  abd:     soft,   BS +,   no tenderness  :   unable to assess CVAT,  no suprapubic tenderness  Musculoskeletal:   no joint swelling,   + edema of bilateral upper and lower extremities   vascular: chest port right sided   Skin:    no rash  Neurologic:     poor mental status, doesn't follow commands, awake and alert   psych: calm     WBC Count: 4.84 K/uL (07-14 @ 06:25)  WBC Count: 6.11 K/uL (07-13 @ 07:55)  WBC Count: 4.10 K/uL (07-12 @ 06:52)  WBC Count: 3.61 K/uL (07-11 @ 06:57)  WBC Count: 4.53 K/uL (07-10 @ 07:51)  WBC Count: 5.62 K/uL (07-09 @ 10:50)  WBC Count: 5.25 K/uL (07-08 @ 08:36)                            10.7   4.84  )-----------( 242      ( 14 Jul 2021 06:25 )             31.2       07-14    141  |  111<H>  |  8   ----------------------------<  110<H>  4.3   |  23  |  1.81<H>    Ca    6.9<L>      14 Jul 2021 06:25  Phos  2.4     07-14  Mg     2.1     07-14            Creatinine Trend: 1.81<--, 1.70<--, 0.58<--, 0.61<--, 0.54<--, 0.60<--      MICROBIOLOGY:  v  .Smear Other  07-10-21 --  --    polymorphonuclear leukocytes seen  No organisms seen  by cytocentrifuge      .CSF CSF  07-10-21   No growth  --    polymorphonuclear leukocytes seen  No organisms seen  by cytocentrifuge      .Urine Clean Catch (Midstream)  07-08-21   No growth  --  --      .Blood Blood-Peripheral  07-07-21   No Growth Final  --  --      Skin R ear discharge  07-07-21   Few Corynebacterium propinquum "Susceptibilities not performed"  Rare Coag Negative Staphylococcus "Susceptibilities not performed"  --  --          HSV 1/2 PCR: Allyson (07-09 @ 23:16)        C-Reactive Protein, Serum: 31 (07-07)          Procalcitonin, Serum: 0.21 (07-14-21 @ 09:08)  Procalcitonin, Serum: 0.04 (07-12-21 @ 08:56)    SARS-CoV-2: Allyson (07 Jul 2021 06:31)    RADIOLOGY:

## 2021-07-14 NOTE — PROGRESS NOTE ADULT - ASSESSMENT
69 yo M with a PMH of Anoxic encephalopathy - minimally verbal and bedbound at baseline, prostate cancer, HTN, bowel resection & ICD who presented w/ R ear discharge. Pt's wife also reported that he was talking less and had worsening PO intake.      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 Chacon, so Dr. Mcadams did it instead-  CSF PCR positive for Varicella zoster virus    MALVIN  - likely prerenal  - c/w IVF  - nephro note read and appreciated       Acute otitis externa of right ear  - ID following - Herpes Zoster Oticus  - Unable to get ENT consult at this hospital   - ID following & changed acyclovir to Valtrex - now switched back to Acyclovir because patient is not taking food & medicine by mouth  - ID stopped IV Cipro & cortisporin     Dyspnea  - may be due to sepsis and encephalopathy   - c/w O2 2L  - Pulm consulted - Juan F  - no DVT on US  - no pneumonia on plain CT of chest     Dysphagia  - at baseline, pt unable to feed self, but normally able to swallow  - Failed swallow eval, so will keep NPO  - c/w D5LR for now  - if no neuro improvement within another 2-3 days, patient will need NG tube and TFs    Prostate cancer  - leuprolide when tolerating PO    Hx of Diverticulosis w/ significant diverticular bleed in 2018, requiring 4 transfusions    - patient had another GI bleed in 2019, but refused EGD/colonoscopy, so source unknown  - family agreed to SQ heparin and watch H/H     Essential hypertension  - c/w hydralazine PRN     Functional quadriplegia  - history of anoxic encephalopathy - per wife, occurred 2008 due to fall   - c/w supportive care    Prophylaxis:  DVT: Heparin  GI: Pepcid

## 2021-07-14 NOTE — PROGRESS NOTE ADULT - SUBJECTIVE AND OBJECTIVE BOX
[facility] NEPHROLOGY SERVICES, [facility]  NEPHROLOGY AND HYPERTENSION  [address]  [address]  [place] [address]  [phone]    MD SANDIE AREVALO MD ANDREY GONCHARUK, MD MADHU KORRAPATI, MD YELENA ROSENBERG, MD BINNY KOSHY, MD CHRISTOPHER CAPUTO, MD GAGE SONG MD          Patient events noted  No distress    MEDICATIONS  (STANDING):  acyclovir IVPB 600 milliGRAM(s) IV Intermittent every 12 hours  chlorhexidine 2% Cloths 1 Application(s) Topical daily  dextrose 5% + sodium chloride 0.9% 1000 milliLiter(s) (125 mL/Hr) IV Continuous <Continuous>  famotidine Injectable 20 milliGRAM(s) IV Push two times a day  heparin   Injectable 5000 Unit(s) SubCutaneous every 12 hours  lidocaine 1% Injectable 10 milliLiter(s) Local Injection once  nystatin Powder 1 Application(s) Topical two times a day    MEDICATIONS  (PRN):  acetaminophen  Suppository .. 650 milliGRAM(s) Rectal every 6 hours PRN Temp greater or equal to 38C (100.4F), Mild Pain (1 - 3)  albuterol/ipratropium for Nebulization 3 milliLiter(s) Nebulizer every 6 hours PRN wheeze  hydrALAZINE Injectable 5 milliGRAM(s) IV Push every 6 hours PRN SBP > 160  sodium chloride 0.9% lock flush 10 milliLiter(s) IV Push every 1 hour PRN Pre/post blood products, medications, blood draw, and to maintain line patency      07-13-21 @ 07:01  -  07-14-21 @ 07:00  --------------------------------------------------------  IN: 1260 mL / OUT: 0 mL / NET: 1260 mL    07-14-21 @ 07:01  -  07-14-21 @ 22:24  --------------------------------------------------------  IN: 1750 mL / OUT: 0 mL / NET: 1750 mL      PHYSICAL EXAM:      T(C): 36.1 (07-14-21 @ 16:53), Max: 37.3 (07-14-21 @ 05:04)  HR: 97 (07-14-21 @ 16:53) (94 - 108)  BP: 142/62 (07-14-21 @ 16:53) (139/64 - 155/73)  RR: 19 (07-14-21 @ 16:53) (17 - 19)  SpO2: 100% (07-14-21 @ 16:53) (97% - 100%)  Wt(kg): --  Lungs clear  Heart S1S2  Abd soft NT ND  Extremities:   tr edema                                    10.7   4.84  )-----------( 242      ( 14 Jul 2021 06:25 )             31.2     07-14    141  |  111<H>  |  8   ----------------------------<  110<H>  4.3   |  23  |  1.81<H>    Ca    6.9<L>      14 Jul 2021 06:25  Phos  2.4     07-14  Mg     2.1     07-14          Creatinine Trend: 1.81<--, 1.70<--, 0.58<--, 0.61<--, 0.54<--, 0.60<--      Assessment   MALVIN; suspected acyclovir related obstruction      Plan:  Increase IVF rate;   IV lasix pending UO trend     Tommie Michelle MD

## 2021-07-14 NOTE — PROGRESS NOTE ADULT - ATTENDING COMMENTS
patient has improved close to his baseline  change to PO valtrex and complete on 7/18   rest of care per medicine  No ID contraindication to discharge and complete antiviral at home    Aris Seymour DO  Infectious Disease Attending  Pager [phone]  After 5pm/weekends please call [phone] for all inquiries and new consults
ideally would like to keep patient on PO however concern for not taking PO properly he has been changed back to IV acyclovir  creatinine worsening   restarted on acyclovir->will have to hydrate and watch renal function closely  careful balance between becoming volume overloaded vs worsening kidney function    Discussed with Dr. Donna Seymour DO  Infectious Disease Attending  Pager [phone]  After 5pm/weekends please call [phone] for all inquiries and new consults

## 2021-07-14 NOTE — PROGRESS NOTE ADULT - ASSESSMENT
ADONIS WELSH 70 M 7/7/2021 1949 DR JAXON BROWN      REVIEW OF SYMPTOMS      Able to give (reliable) ROS  NO     PHYSICAL EXAM    HEENT Unremarkable  atraumatic   RESP Fair air entry EXP prolonged    Harsh breath sound Resp distres mild   CARDIAC S1 S2 No S3     NO JVD    ABDOMEN SOFT BS PRESENT NOT DISTENDED No hepatosplenomegaly   PEDAL EDEMA present No calf tenderness  NO rash       ADVANCED DIRECTIVES.                            COVID STATUS.                                       scv2 7/7/2021 (-)       CC 7/7/2021 ADMISSN .   70 m HO brain injury 2008 previously able to eat po 3 w ago  7/7/2021 FEVER     PRESENTING PROBLEMS 7/7/2021 .   OTITIS EXTERNA R EAR   FEVER   HYPONATREMIA Na 7/7/2021 Na 129  DYSPNEA  FUNCTIONAL QUADRIPLEGIA     PMH.  BASELINE  VERBAL BEDBOUND   SBR   HO TRACH SP DECANNULATION   ANOXIC ENCEPHALOPATHY Brain injury 2008   HO SCD  HO AICD 2009   HO CYROABLATION L RENAL MASS   PROSTATE CA        GLOBAL AND BEST PRACTICE ISSUES                     ALLERGY.    PNCL LISINOPRIL  WEIGHT.          7/7/2021 90        BMI                7/7/2021 33        .                          ADVANCED DIRECTIVE.                               HEAD OF BED ELEVATION. Yes  DVT PROPHYLAXIS. hpsc (7/8)   APA.                   GONZALEZ PROPHYLAXIS.                                                                     DYSPHAGIA RECOMM.7/12/2021 puree honey (7/12/2021)    DIET.    NPO (77) -> puree honey (7/12/2021)   INFECTION PROPHYLAXIS.  chlorhexidine (7/9)    ABIO.  VALACYCLOVIR 1.2x6d (7/13/20210     ASSESSMENT/RECOMMENDATIONS.    HERPES OTITIS EXTRENAL   VZV  ENCEPHALITIS   7/7 herpes vzv pcr (+) EAR  7/9 CSF vzv pcr (+)   7/8/2021 acyclovir 900.3 started 7/8/2021 Dr Ponce    More awake on 7/10/2021   chnged to valacyclovir 7/13/2021   WHEEZE   DUNEB.4 P 97/7)    cxr  7/12/2021 no consoldation   7/11 spouse feels he is congested (7/11/20210   Cont bd     DYSPHAGIA DIET STARTED 7/12/2021   On iv fl  Pt used to eat po June 2021 as per spouse (7/11/2021)   7/11/2021 speech eval ordered to start po diet  7/12/2021 Dys diet stared   MALVIN  Cr 7/12-7/13-7/14 Cr .5-1.7-1.8  Monitor     CURRENT ISSUES  VZV ENCEPHALITIS ON ACYCLOVIR   DYSPHAGIA   MALVIN 7/12-7/13 Cr .5-1.7      TIME SPENT   Over 25 minutes aggregate care time spent on encounter; activities included   direct patient care, counseling and/or coordinating care reviewing notes, lab data/ imaging , discussion with multidisciplinary team/ patient  /family and explaining in detail risks, benefits, alternatives  of the recommendations     ADONIS WELSH 70 M 7/7/2021 1949 DR JAXON BROWN

## 2021-07-14 NOTE — PROGRESS NOTE ADULT - ASSESSMENT
Subjective Complaints:  Historian:             Vital Signs Last 24 Hrs  T(C): 36.1 (14 Jul 2021 16:53), Max: 37.3 (14 Jul 2021 05:04)  T(F): 97 (14 Jul 2021 16:53), Max: 99.1 (14 Jul 2021 05:04)  HR: 97 (14 Jul 2021 16:53) (94 - 108)  BP: 142/62 (14 Jul 2021 16:53) (139/64 - 155/73)  BP(mean): --  RR: 19 (14 Jul 2021 16:53) (17 - 19)  SpO2: 100% (14 Jul 2021 16:53) (97% - 100%)    GENERAL PHYSICAL EXAM:  General:  Appears stated age, well-groomed, well-nourished, no distress  HEENT:  NC/AT, patent nares w/ pink mucosa, OP clear w/o lesions, PERRL, EOMI, conjunctivae clear, no thyromegaly, nodules, adenopathy, no JVD  Chest:  Full & symmetric excursion, no increased effort, breath sounds clear  Cardiovascular:  Regular rhythm, S1, S2, no murmur/rub/S3/S4, no carotid/femoral/abdominal bruit, radial/pedal pulses 2+, no edema  Abdomen:  Soft, non-tender, non-distended, normoactive bowel sounds, no HSM  Extremities:  Gait & station:   Digits:   Nails:   Joints, Bones, Muscles:   ROM:   Stability:  Skin:  No rash/erythema/ecchymoses/petechiae/wounds/abscess/warm/dry  Musculoskeletal:  Full ROM in all joints w/o swelling/tenderness/effusion        LABS:                        10.7   4.84  )-----------( 242      ( 14 Jul 2021 06:25 )             31.2     07-14    141  |  111<H>  |  8   ----------------------------<  110<H>  4.3   |  23  |  1.81<H>    Ca    6.9<L>      14 Jul 2021 06:25  Phos  2.4     07-14  Mg     2.1     07-14            RADIOLOGY & ADDITIONAL STUDIES:        Neurology Progress Note:      Mental Status: letahrgic arousabe tries to open eyes does not follows commands       Cranial Nerves:defrd      Motor:   quadroparesis         Sensory: intact      Cerebellar: defrd      Gait:dfrd      Assesment/Plan: s/p spinal tap viral encephalaothy  on acyclovir  hx of anoxic encephalaothy for peg not eating discuss with wife

## 2021-07-14 NOTE — PROGRESS NOTE ADULT - ASSESSMENT
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 with possible otitis externa  Fsebrile, normal WBC   right ear with murky looking discharge  DDx otitis externa vs shingles  Blood cultures sent as well ear culture  was put on IV cipro  Reportedly has PCN allergy but has tolerated cefpodoxime     7/8:still febrile, VZV positive, now  concern for encephalitis from vzv, have requested radiology for LP   7/11: s/p LP, VZV +on CSF PCR, stopped cipro, more awake, wife wants re-eval from speech and swallow, more congested today, CXR ordered   7/12: breathing better today but still congested, tolerating acyclovir, being evaluated again by S&S   7/13: s/p S&S yesterday, passed with recommendation - puree with honey thick liquid, no fevers since 7/8, tachycardic, no leukocytosis, cr is elevated today 1.7, will stop acyclovir and will start Valtrex po 1 gram q 12 hrs to complete 10 days total, last dose on 7/18/2021.   Attending addendum:  patient has improved close to his baseline  change to PO valtrex and complete on 7/18   rest of care per medicine  No ID contraindication to discharge and complete antiviral at home    7/14: Valtrex changed to IV acyclovir, now given every 12 hrs, as pt is unable to swallow pills and his renal function is worsening, will monitor. The pt has no fevers, no leukocytosis.     Herpes Zoster Oticus  VZV encephalitis   acute encephalopathy  Fever  Functional Quadriplegia     Plan:   ·	continue acyclovir - last dose on 7/18   ·	f/u blood cultures negative   ·	f/u ear cultures likely contaminant   ·	offloading, frequent turns, nutrition optimization   ·	trend fevers   ·	trend wbc   ·	CXR- clear   ·	Diuresis        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 with possible otitis externa  Fsebrile, normal WBC   right ear with murky looking discharge  DDx otitis externa vs shingles  Blood cultures sent as well ear culture  was put on IV cipro  Reportedly has PCN allergy but has tolerated cefpodoxime     7/8:still febrile, VZV positive, now  concern for encephalitis from vzv, have requested radiology for LP   7/11: s/p LP, VZV +on CSF PCR, stopped cipro, more awake, wife wants re-eval from speech and swallow, more congested today, CXR ordered   7/12: breathing better today but still congested, tolerating acyclovir, being evaluated again by S&S   7/13: s/p S&S yesterday, passed with recommendation - puree with honey thick liquid, no fevers since 7/8, tachycardic, no leukocytosis, cr is elevated today 1.7, will stop acyclovir and will start Valtrex po 1 gram q 12 hrs to complete 10 days total, last dose on 7/18/2021.   Attending addendum:  patient has improved close to his baseline  change to PO valtrex and complete on 7/18   rest of care per medicine  No ID contraindication to discharge and complete antiviral at home    7/14: Valtrex changed to IV acyclovir, now given every 12 hrs, as pt is unable to swallow pills and his renal function is worsening, will monitor. The pt has no fevers, no leukocytosis.     Herpes Zoster Oticus  VZV encephalitis   acute encephalopathy  Fever  Functional Quadriplegia     Plan:   ·	continue acyclovir - last dose on 7/18   ·	add hydration  ·	f/u blood cultures negative   ·	f/u ear cultures likely contaminant   ·	offloading, frequent turns, nutrition optimization   ·	trend fevers   ·	trend wbc   ·	CXR- clear   ·	Diuresis

## 2021-07-14 NOTE — PROGRESS NOTE ADULT - SUBJECTIVE AND OBJECTIVE BOX
71 yo M with a PMH of Anoxic encephalopathy - minimally verbal and bedbound at baseline, prostate cancer, HTN, bowel resection & ICD who presented w/ R ear discharge. Pt's wife also reported that he was talking less and had worsening PO intake. He is lying in bed in NAD.     MEDICATIONS  (STANDING):  acyclovir IVPB 600 milliGRAM(s) IV Intermittent every 12 hours  chlorhexidine 2% Cloths 1 Application(s) Topical daily  dextrose 5% + sodium chloride 0.9% 1000 milliLiter(s) (125 mL/Hr) IV Continuous <Continuous>  famotidine Injectable 20 milliGRAM(s) IV Push two times a day  heparin   Injectable 5000 Unit(s) SubCutaneous every 12 hours  lidocaine 1% Injectable 10 milliLiter(s) Local Injection once  nystatin Powder 1 Application(s) Topical two times a day    MEDICATIONS  (PRN):  acetaminophen  Suppository .. 650 milliGRAM(s) Rectal every 6 hours PRN Temp greater or equal to 38C (100.4F), Mild Pain (1 - 3)  albuterol/ipratropium for Nebulization 3 milliLiter(s) Nebulizer every 6 hours PRN wheeze  hydrALAZINE Injectable 5 milliGRAM(s) IV Push every 6 hours PRN SBP > 160  sodium chloride 0.9% lock flush 10 milliLiter(s) IV Push every 1 hour PRN Pre/post blood products, medications, blood draw, and to maintain line patency      Allergies    lisinopril (Unknown)  penicillins (Unknown)    Intolerances        Vital Signs Last 24 Hrs  T(C): 36.1 (14 Jul 2021 16:53), Max: 37.3 (14 Jul 2021 05:04)  T(F): 97 (14 Jul 2021 16:53), Max: 99.1 (14 Jul 2021 05:04)  HR: 97 (14 Jul 2021 16:53) (94 - 108)  BP: 142/62 (14 Jul 2021 16:53) (139/64 - 155/73)  BP(mean): --  RR: 19 (14 Jul 2021 16:53) (17 - 19)  SpO2: 100% (14 Jul 2021 16:53) (97% - 100%)    PHYSICAL EXAM:  GENERAL: NAD, well-groomed, well-developed  HEAD:  Atraumatic, Normocephalic  EYES: EOMI, PERRLA   NECK: Supple   NERVOUS SYSTEM: more alert today  CHEST/LUNG: Clear to auscultation bilaterally; No rales, rhonchi, wheezing, or rubs  HEART: Regular rate and rhythm; No murmurs, rubs, or gallops  ABDOMEN: Soft, Nontender, Nondistended; Bowel sounds present  EXTREMITIES: No clubbing, cyanosis, or edema    LABS:                        10.7   4.84  )-----------( 242      ( 14 Jul 2021 06:25 )             31.2     07-14    141  |  111<H>  |  8   ----------------------------<  110<H>  4.3   |  23  |  1.81<H>    Ca    6.9<L>      14 Jul 2021 06:25  Phos  2.4     07-14  Mg     2.1     07-14          CAPILLARY BLOOD GLUCOSE          Culture - Fungal, CSF (collected 10 Jul 2021 00:14)  Source: .CSF CSF  Preliminary Report (12 Jul 2021 10:31):    Testing in progress    Culture - Acid Fast - CSF (collected 10 Jul 2021 00:14)  Source: .CSF CSF  Preliminary Report (14 Jul 2021 15:04):    Culture is being performed.    Culture - CSF with Gram Stain (collected 10 Jul 2021 00:14)  Source: .CSF CSF  Gram Stain (12 Jul 2021 17:13):    polymorphonuclear leukocytes seen    No organisms seen    by cytocentrifuge  Final Report (12 Jul 2021 17:13):    No growth      RADIOLOGY & ADDITIONAL TESTS:    07-13-21 @ 07:01  -  07-14-21 @ 07:00  --------------------------------------------------------  IN:    dextrose 5% + sodium chloride 0.9% w/ Additives: 900 mL    Oral Fluid: 360 mL  Total IN: 1260 mL    OUT:  Total OUT: 0 mL    Total NET: 1260 mL

## 2021-07-15 LAB
ANION GAP SERPL CALC-SCNC: 5 MMOL/L — SIGNIFICANT CHANGE UP (ref 5–17)
APPEARANCE UR: CLEAR — SIGNIFICANT CHANGE UP
BILIRUB UR-MCNC: NEGATIVE — SIGNIFICANT CHANGE UP
BUN SERPL-MCNC: 5 MG/DL — LOW (ref 7–23)
CALCIUM SERPL-MCNC: 7 MG/DL — LOW (ref 8.5–10.1)
CHLORIDE SERPL-SCNC: 111 MMOL/L — HIGH (ref 96–108)
CO2 SERPL-SCNC: 25 MMOL/L — SIGNIFICANT CHANGE UP (ref 22–31)
COLOR SPEC: YELLOW — SIGNIFICANT CHANGE UP
CREAT SERPL-MCNC: 1.63 MG/DL — HIGH (ref 0.5–1.3)
DIFF PNL FLD: NEGATIVE — SIGNIFICANT CHANGE UP
EOSINOPHIL NFR URNS MANUAL: NEGATIVE — SIGNIFICANT CHANGE UP
GLUCOSE SERPL-MCNC: 123 MG/DL — HIGH (ref 70–99)
GLUCOSE UR QL: NEGATIVE MG/DL — SIGNIFICANT CHANGE UP
HCT VFR BLD CALC: 31.7 % — LOW (ref 39–50)
HGB BLD-MCNC: 11 G/DL — LOW (ref 13–17)
KETONES UR-MCNC: NEGATIVE — SIGNIFICANT CHANGE UP
LEUKOCYTE ESTERASE UR-ACNC: NEGATIVE — SIGNIFICANT CHANGE UP
MAGNESIUM SERPL-MCNC: 2.1 MG/DL — SIGNIFICANT CHANGE UP (ref 1.6–2.6)
MCHC RBC-ENTMCNC: 26.3 PG — LOW (ref 27–34)
MCHC RBC-ENTMCNC: 34.7 GM/DL — SIGNIFICANT CHANGE UP (ref 32–36)
MCV RBC AUTO: 75.7 FL — LOW (ref 80–100)
NITRITE UR-MCNC: NEGATIVE — SIGNIFICANT CHANGE UP
NRBC # BLD: 0 /100 WBCS — SIGNIFICANT CHANGE UP (ref 0–0)
PH UR: 7 — SIGNIFICANT CHANGE UP (ref 5–8)
PHOSPHATE SERPL-MCNC: 2.6 MG/DL — SIGNIFICANT CHANGE UP (ref 2.5–4.5)
PLATELET # BLD AUTO: 274 K/UL — SIGNIFICANT CHANGE UP (ref 150–400)
POTASSIUM SERPL-MCNC: 4.2 MMOL/L — SIGNIFICANT CHANGE UP (ref 3.5–5.3)
POTASSIUM SERPL-SCNC: 4.2 MMOL/L — SIGNIFICANT CHANGE UP (ref 3.5–5.3)
PROT UR-MCNC: NEGATIVE MG/DL — SIGNIFICANT CHANGE UP
RBC # BLD: 4.19 M/UL — LOW (ref 4.2–5.8)
RBC # FLD: 14.6 % — HIGH (ref 10.3–14.5)
RBC CASTS # UR COMP ASSIST: SIGNIFICANT CHANGE UP /HPF (ref 0–4)
SODIUM SERPL-SCNC: 141 MMOL/L — SIGNIFICANT CHANGE UP (ref 135–145)
SP GR SPEC: 1 — LOW (ref 1.01–1.02)
UROBILINOGEN FLD QL: NEGATIVE MG/DL — SIGNIFICANT CHANGE UP
WBC # BLD: 4.58 K/UL — SIGNIFICANT CHANGE UP (ref 3.8–10.5)
WBC # FLD AUTO: 4.58 K/UL — SIGNIFICANT CHANGE UP (ref 3.8–10.5)
WBC UR QL: SIGNIFICANT CHANGE UP

## 2021-07-15 PROCEDURE — 99233 SBSQ HOSP IP/OBS HIGH 50: CPT

## 2021-07-15 PROCEDURE — 76775 US EXAM ABDO BACK WALL LIM: CPT | Mod: 26

## 2021-07-15 PROCEDURE — 99232 SBSQ HOSP IP/OBS MODERATE 35: CPT

## 2021-07-15 RX ADMIN — HEPARIN SODIUM 5000 UNIT(S): 5000 INJECTION INTRAVENOUS; SUBCUTANEOUS at 05:04

## 2021-07-15 RX ADMIN — Medication 262 MILLIGRAM(S): at 05:03

## 2021-07-15 RX ADMIN — CHLORHEXIDINE GLUCONATE 1 APPLICATION(S): 213 SOLUTION TOPICAL at 11:34

## 2021-07-15 RX ADMIN — NYSTATIN CREAM 1 APPLICATION(S): 100000 CREAM TOPICAL at 17:41

## 2021-07-15 RX ADMIN — HEPARIN SODIUM 5000 UNIT(S): 5000 INJECTION INTRAVENOUS; SUBCUTANEOUS at 17:41

## 2021-07-15 RX ADMIN — SODIUM CHLORIDE 125 MILLILITER(S): 9 INJECTION INTRAMUSCULAR; INTRAVENOUS; SUBCUTANEOUS at 05:37

## 2021-07-15 RX ADMIN — NYSTATIN CREAM 1 APPLICATION(S): 100000 CREAM TOPICAL at 05:04

## 2021-07-15 RX ADMIN — FAMOTIDINE 20 MILLIGRAM(S): 10 INJECTION INTRAVENOUS at 17:41

## 2021-07-15 RX ADMIN — Medication 262 MILLIGRAM(S): at 17:41

## 2021-07-15 RX ADMIN — Medication 3 MILLILITER(S): at 20:46

## 2021-07-15 RX ADMIN — SODIUM CHLORIDE 125 MILLILITER(S): 9 INJECTION INTRAMUSCULAR; INTRAVENOUS; SUBCUTANEOUS at 17:43

## 2021-07-15 RX ADMIN — FAMOTIDINE 20 MILLIGRAM(S): 10 INJECTION INTRAVENOUS at 05:04

## 2021-07-15 NOTE — PROGRESS NOTE ADULT - ASSESSMENT
Subjective Complaints:  Historian:             Vital Signs Last 24 Hrs  T(C): 36.6 (15 Jul 2021 17:45), Max: 37.1 (15 Jul 2021 11:42)  T(F): 97.8 (15 Jul 2021 17:45), Max: 98.7 (15 Jul 2021 11:42)  HR: 79 (15 Jul 2021 17:45) (79 - 105)  BP: 149/81 (15 Jul 2021 17:45) (122/58 - 159/76)  BP(mean): --  RR: 18 (15 Jul 2021 17:45) (18 - 18)  SpO2: 94% (15 Jul 2021 17:45) (94% - 100%)    GENERAL PHYSICAL EXAM:  General:  Appears stated age, well-groomed, well-nourished, no distress  HEENT:  NC/AT, patent nares w/ pink mucosa, OP clear w/o lesions, PERRL, EOMI, conjunctivae clear, no thyromegaly, nodules, adenopathy, no JVD  Chest:  Full & symmetric excursion, no increased effort, breath sounds clear  Cardiovascular:  Regular rhythm, S1, S2, no murmur/rub/S3/S4, no carotid/femoral/abdominal bruit, radial/pedal pulses 2+, no edema  Abdomen:  Soft, non-tender, non-distended, normoactive bowel sounds, no HSM  Extremities:  Gait & station:   Digits:   Nails:   Joints, Bones, Muscles:   ROM:   Stability:  Skin:  No rash/erythema/ecchymoses/petechiae/wounds/abscess/warm/dry  Musculoskeletal:  Full ROM in all joints w/o swelling/tenderness/effusion        LABS:                        11.0   4.58  )-----------( 274      ( 15 Jul 2021 07:36 )             31.7     07-15    141  |  111<H>  |  5<L>  ----------------------------<  123<H>  4.2   |  25  |  1.63<H>    Ca    7.0<L>      15 Jul 2021 07:36  Phos  2.6     07-15  Mg     2.1     07-15        Urinalysis Basic - ( 15 Jul 2021 07:38 )    Color: Yellow / Appearance: Clear / S.005 / pH: x  Gluc: x / Ketone: Negative  / Bili: Negative / Urobili: Negative mg/dL   Blood: x / Protein: Negative mg/dL / Nitrite: Negative   Leuk Esterase: Negative / RBC: 0-2 /HPF / WBC 0-2   Sq Epi: x / Non Sq Epi: x / Bacteria: x        RADIOLOGY & ADDITIONAL STUDIES:        Neurology Progress Note:      Mental Status: letahrgic arousable tries to open eyes does not follows commands       Cranial Nerves:defrd      Motor:  quadroparesis         Sensory: intact      Cerebellar: defrd      Gait: defrd      Assesment/Plan: s/p spinal tap vz encephalaothy on acyclovir  no seizure s/p anoxic encephalaothy pace maker  id follow up.

## 2021-07-15 NOTE — PROGRESS NOTE ADULT - SUBJECTIVE AND OBJECTIVE BOX
69 yo M with a PMH of Anoxic encephalopathy - minimally verbal and bedbound at baseline, prostate cancer, HTN, bowel resection & ICD who presented w/ R ear discharge. Pt's wife also reported that he was talking less and had worsening PO intake. He is lying in bed in NAD.     MEDICATIONS  (STANDING):  acyclovir IVPB 600 milliGRAM(s) IV Intermittent every 12 hours  chlorhexidine 2% Cloths 1 Application(s) Topical daily  famotidine Injectable 20 milliGRAM(s) IV Push two times a day  heparin   Injectable 5000 Unit(s) SubCutaneous every 12 hours  lidocaine 1% Injectable 10 milliLiter(s) Local Injection once  nystatin Powder 1 Application(s) Topical two times a day  sodium chloride 0.9%. 1000 milliLiter(s) (125 mL/Hr) IV Continuous <Continuous>    MEDICATIONS  (PRN):  acetaminophen  Suppository .. 650 milliGRAM(s) Rectal every 6 hours PRN Temp greater or equal to 38C (100.4F), Mild Pain (1 - 3)  albuterol/ipratropium for Nebulization 3 milliLiter(s) Nebulizer every 6 hours PRN wheeze  hydrALAZINE Injectable 5 milliGRAM(s) IV Push every 6 hours PRN SBP > 160  sodium chloride 0.9% lock flush 10 milliLiter(s) IV Push every 1 hour PRN Pre/post blood products, medications, blood draw, and to maintain line patency      Allergies    lisinopril (Unknown)  penicillins (Unknown)    Intolerances        Vital Signs Last 24 Hrs  T(C): 36.6 (15 Jul 2021 17:45), Max: 37.1 (15 Jul 2021 11:42)  T(F): 97.8 (15 Jul 2021 17:45), Max: 98.7 (15 Jul 2021 11:42)  HR: 86 (15 Jul 2021 20:47) (79 - 105)  BP: 149/81 (15 Jul 2021 17:45) (122/58 - 159/76)   RR: 18 (15 Jul 2021 17:45) (18 - 18)  SpO2: 96% (15 Jul 2021 20:47) (94% - 100%)    PHYSICAL EXAM:  GENERAL: NAD, well-groomed, well-developed  HEAD:  Atraumatic, Normocephalic  EYES: EOMI, PERRLA   NECK: Supple   NERVOUS SYSTEM: more alert today  CHEST/LUNG: Clear to auscultation bilaterally; No rales, rhonchi, wheezing, or rubs  HEART: Regular rate and rhythm; No murmurs, rubs, or gallops  ABDOMEN: Soft, Nontender, Nondistended; Bowel sounds present  EXTREMITIES: No clubbing, cyanosis, or edema    LABS:                        11.0   4.58  )-----------( 274      ( 15 Jul 2021 07:36 )             31.7     07-15    141  |  111<H>  |  5<L>  ----------------------------<  123<H>  4.2   |  25  |  1.63<H>    Ca    7.0<L>      15 Jul 2021 07:36  Phos  2.6     07-15  Mg     2.1     07-15        Urinalysis Basic - ( 15 Jul 2021 07:38 )    Color: Yellow / Appearance: Clear / S.005 / pH: x  Gluc: x / Ketone: Negative  / Bili: Negative / Urobili: Negative mg/dL   Blood: x / Protein: Negative mg/dL / Nitrite: Negative   Leuk Esterase: Negative / RBC: 0-2 /HPF / WBC 0-2   Sq Epi: x / Non Sq Epi: x / Bacteria: x       RADIOLOGY & ADDITIONAL TESTS:    21 @ 07:  -  07-15-21 @ 07:00  --------------------------------------------------------  IN:    dextrose 5% + sodium chloride 0.9% w/ Additives: 1500 mL    IV PiggyBack: 250 mL  Total IN: 1750 mL    OUT:  Total OUT: 0 mL    Total NET: 1750 mL      07-15-21 @ 07:  -  07-15-21 @ 20:53  --------------------------------------------------------  IN:  Total IN: 0 mL    OUT:    Voided (mL): 1000 mL  Total OUT: 1000 mL    Total NET: -1000 mL

## 2021-07-15 NOTE — PROGRESS NOTE ADULT - ASSESSMENT
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 with possible otitis externa  Fsebrile, normal WBC   right ear with murky looking discharge  DDx otitis externa vs shingles  Blood cultures sent as well ear culture  was put on IV cipro  Reportedly has PCN allergy but has tolerated cefpodoxime     7/8:still febrile, VZV positive, now  concern for encephalitis from vzv, have requested radiology for LP   7/11: s/p LP, VZV +on CSF PCR, stopped cipro, more awake, wife wants re-eval from speech and swallow, more congested today, CXR ordered   7/12: breathing better today but still congested, tolerating acyclovir, being evaluated again by S&S   7/13: s/p S&S yesterday, passed with recommendation - puree with honey thick liquid, no fevers since 7/8, tachycardic, no leukocytosis, cr is elevated today 1.7, will stop acyclovir and will start Valtrex po 1 gram q 12 hrs to complete 10 days total, last dose on 7/18/2021.   Attending addendum:  patient has improved close to his baseline  change to PO valtrex and complete on 7/18   rest of care per medicine  No ID contraindication to discharge and complete antiviral at home    7/14: Valtrex changed to IV acyclovir, now given every 12 hrs, as pt is unable to swallow pills and his renal function is worsening, will monitor. The pt has no fevers, no leukocytosis.   Attending addendum:  ideally would like to keep patient on PO however concern for not taking PO properly he has been changed back to IV acyclovir  creatinine worsening   restarted on acyclovir->will have to hydrate and watch renal function closely  careful balance between becoming volume overloaded vs worsening kidney function    7/15: no fevers, no leukocytosis, renal function is a little better today, acyclovir IV continued, needs to complete 10 days course. It would be preferable to switch the medication to po Valtrex, monitor pt's renal function.     Herpes Zoster Oticus  VZV encephalitis   acute encephalopathy  Fever  Functional Quadriplegia     Plan:   ·	continue acyclovir - last dose on 7/18 (change to Valtrex po if possible)   ·	continue hydration  ·	f/u blood cultures negative   ·	f/u ear cultures likely contaminant   ·	offloading, frequent turns, nutrition optimization   ·	trend fevers   ·	trend wbc   ·	CXR- clear   ·	Diuresis   ·	will sign off, re-consult as needed     Discussed with Dr. Seymour

## 2021-07-15 NOTE — PROGRESS NOTE ADULT - SUBJECTIVE AND OBJECTIVE BOX
ANITHA THURMANO    LVS 1E 180 I    Patient is a 70y old  Male who presents with a chief complaint of sepsis, ear infection (15 Jul 2021 07:48)       Allergies    lisinopril (Unknown)  penicillins (Unknown)    Intolerances        HPI:  Patient is a 70M with a PMH of Anoxic encephalopathy, prostate cancer, HTN, bowel resection who presents to the ED for R ear discharge.  Patient minimally verbal and bedbound at baseline, unable to provide history.  Wife at the bedside to provide history.  Patient noted to have R ear discharge for two days along with temperatures at home.  Wife states that she has been giving him tylenol at home without resolution of the fever.  Per wife, patient was talking much less and was not accepting PO intake so she presents to the ED for evaluation.  Denies history of ear infections.  Patient also noted to have sporadic dyspnea over the same time span.  No other complaints.  Current SpO2 on 2L via NC is 92%.  Vitals stable, labs benign.  Will admit to med surg.     (2021 05:04)      PAST MEDICAL & SURGICAL HISTORY:  Hypertension    Prostate cancer  radiation therapy    Brain injury with coma  x 2 weeks in     Anoxic encephalopathy    Leg pain, right    Gait abnormality    Sudden cardiac death    Status post cryoablation  of left renal mass    H/O prostate cancer  resection and radiation therapy    H/O tracheostomy    S/P ICD (internal cardiac defibrillator) procedure  implanted on 2009        FAMILY HISTORY:  FH: HTN (hypertension)          MEDICATIONS   acetaminophen  Suppository .. 650 milliGRAM(s) Rectal every 6 hours PRN  acyclovir IVPB 600 milliGRAM(s) IV Intermittent every 12 hours  albuterol/ipratropium for Nebulization 3 milliLiter(s) Nebulizer every 6 hours PRN  chlorhexidine 2% Cloths 1 Application(s) Topical daily  famotidine Injectable 20 milliGRAM(s) IV Push two times a day  heparin   Injectable 5000 Unit(s) SubCutaneous every 12 hours  hydrALAZINE Injectable 5 milliGRAM(s) IV Push every 6 hours PRN  lidocaine 1% Injectable 10 milliLiter(s) Local Injection once  nystatin Powder 1 Application(s) Topical two times a day  sodium chloride 0.9% lock flush 10 milliLiter(s) IV Push every 1 hour PRN  sodium chloride 0.9%. 1000 milliLiter(s) IV Continuous <Continuous>      Vital Signs Last 24 Hrs  T(C): 36.4 (15 Jul 2021 05:29), Max: 37.3 (2021 11:43)  T(F): 97.5 (15 Jul 2021 05:29), Max: 99.1 (2021 11:43)  HR: 105 (15 Jul 2021 05:29) (97 - 105)  BP: 132/72 (15 Jul 2021 05:29) (122/58 - 150/61)  BP(mean): --  RR: 18 (15 Jul 2021 05:29) (17 - 19)  SpO2: 100% (15 Jul 2021 05:29) (95% - 100%)      21 @ 07:01  -  07-15-21 @ 07:00  --------------------------------------------------------  IN: 1750 mL / OUT: 0 mL / NET: 1750 mL    07-15-21 @ 07:01  -  07-15-21 @ 08:59  --------------------------------------------------------  IN: 0 mL / OUT: 600 mL / NET: -600 mL            LABS:                        11.0   4.58  )-----------( 274      ( 15 Jul 2021 07:36 )             31.7     0715    141  |  111<H>  |  5<L>  ----------------------------<  123<H>  4.2   |  25  |  1.63<H>    Ca    7.0<L>      15 Jul 2021 07:36  Phos  2.6     15  Mg     2.1     15        Urinalysis Basic - ( 15 Jul 2021 07:38 )    Color: Yellow / Appearance: Clear / S.005 / pH: x  Gluc: x / Ketone: Negative  / Bili: Negative / Urobili: Negative mg/dL   Blood: x / Protein: Negative mg/dL / Nitrite: Negative   Leuk Esterase: Negative / RBC: 0-2 /HPF / WBC 0-2   Sq Epi: x / Non Sq Epi: x / Bacteria: x            WBC:  WBC Count: 4.58 K/uL (07-15 @ 07:36)  WBC Count: 4.84 K/uL ( @ 06:25)  WBC Count: 6.11 K/uL ( @ 07:55)  WBC Count: 4.10 K/uL ( @ 06:52)      MICROBIOLOGY:  RECENT CULTURES:  07-10 .Smear Other XXXX   polymorphonuclear leukocytes seen  No organisms seen  by cytocentrifuge XXXX    07-10 .CSF CSF XXXX   polymorphonuclear leukocytes seen  No organisms seen  by cytocentrifuge   No growth                    Sodium:  Sodium, Serum: 141 mmol/L (07-15 @ 07:36)  Sodium, Serum: 141 mmol/L ( @ 06:25)  Sodium, Serum: 137 mmol/L ( @ 07:55)  Sodium, Serum: 138 mmol/L ( @ 06:52)      1.63 mg/dL 07-15 @ 07:36  1.81 mg/dL  @ 06:25  1.70 mg/dL  @ 07:55  0.58 mg/dL  @ 06:52      Hemoglobin:  Hemoglobin: 11.0 g/dL (07-15 @ 07:36)  Hemoglobin: 10.7 g/dL ( @ 06:25)  Hemoglobin: 10.8 g/dL ( @ 07:55)  Hemoglobin: 11.7 g/dL ( @ 06:52)      Platelets: Platelet Count - Automated: 274 K/uL (07-15 @ 07:36)  Platelet Count - Automated: 242 K/uL ( @ 06:25)  Platelet Count - Automated: 235 K/uL ( @ 07:55)  Platelet Count - Automated: 235 K/uL ( @ 06:52)          Urinalysis Basic - ( 15 Jul 2021 07:38 )    Color: Yellow / Appearance: Clear / S.005 / pH: x  Gluc: x / Ketone: Negative  / Bili: Negative / Urobili: Negative mg/dL   Blood: x / Protein: Negative mg/dL / Nitrite: Negative   Leuk Esterase: Negative / RBC: 0-2 /HPF / WBC 0-2   Sq Epi: x / Non Sq Epi: x / Bacteria: x        RADIOLOGY & ADDITIONAL STUDIES:

## 2021-07-15 NOTE — PROGRESS NOTE ADULT - SUBJECTIVE AND OBJECTIVE BOX
ANITHA LAMB  MRN-[id number]    Interval History: The pt was seen and examined earlier, no distress, family member or private aid present. The pt is upright in his bed, remains not interactive, opens his eyes. The pt is afebrile, on RA, no leukocytosis, Cr is a little better today.    PAST MEDICAL & SURGICAL HISTORY:  Hypertension    Prostate cancer  radiation therapy    Brain injury with coma  x 2 weeks in     Anoxic encephalopathy    Leg pain, right    Gait abnormality    Sudden cardiac death    Status post cryoablation  of left renal mass    H/O prostate cancer  resection and radiation therapy    H/O tracheostomy    S/P ICD (internal cardiac defibrillator) procedure  implanted on 2009        ROS:    [x ] Unobtainable because: the pt is not interactive   [ ] All other systems negative    Constitutional: no fever, no chills  Head: no trauma  Eyes: no vision changes, no eye pain  ENT:  no sore throat, no rhinorrhea  Cardiovascular:  no chest pain, no palpitation  Respiratory:  no SOB, no cough  GI:  no abd pain, no vomiting, no diarrhea  urinary: no dysuria, no hematuria, no flank pain  musculoskeletal:  no joint pain, no joint swelling  skin:  no rash  neurology:  no headache, no seizure, no change in mental status  psych: no anxiety, no depression         Allergies  lisinopril (Unknown)  penicillins (Unknown)        ANTIMICROBIALS:  acyclovir IVPB 600 every 12 hours      OTHER MEDS:  acetaminophen  Suppository .. 650 milliGRAM(s) Rectal every 6 hours PRN  albuterol/ipratropium for Nebulization 3 milliLiter(s) Nebulizer every 6 hours PRN  chlorhexidine 2% Cloths 1 Application(s) Topical daily  famotidine Injectable 20 milliGRAM(s) IV Push two times a day  heparin   Injectable 5000 Unit(s) SubCutaneous every 12 hours  hydrALAZINE Injectable 5 milliGRAM(s) IV Push every 6 hours PRN  lidocaine 1% Injectable 10 milliLiter(s) Local Injection once  nystatin Powder 1 Application(s) Topical two times a day  sodium chloride 0.9% lock flush 10 milliLiter(s) IV Push every 1 hour PRN  sodium chloride 0.9%. 1000 milliLiter(s) IV Continuous <Continuous>      Vital Signs Last 24 Hrs  T(C): 37.1 (15 Jul 2021 11:42), Max: 37.1 (15 Jul 2021 11:42)  T(F): 98.7 (15 Jul 2021 11:42), Max: 98.7 (15 Jul 2021 11:42)  HR: 98 (15 Jul 2021 11:42) (97 - 105)  BP: 159/76 (15 Jul 2021 11:42) (122/58 - 159/76)  BP(mean): --  RR: 18 (15 Jul 2021 11:42) (18 - 19)  SpO2: 100% (15 Jul 2021 05:29) (95% - 100%)    Physical Exam:  General:    NAD,  non toxic, awake and alert  Head: atraumatic, normocephalic  Eye: normal sclera and conjunctiva  ENT:    unable to assess pt's mouth due to non-compliance   Cardio:     regular S1, S2,  no murmur  Respiratory:    diminished breath sounds b/l at the bases,  no wheezing, no rales, no rhonchi  abd:     soft,   BS +,   no tenderness  :   unable to assess CVAT,  no suprapubic tenderness  Musculoskeletal:   no joint swelling,   + edema of bilateral upper and lower extremities   vascular: chest port right sided   Skin:    no rash  Neurologic:     poor mental status, doesn't follow commands, awake and alert   psych: calm     WBC Count: 4.58 K/uL (07-15 @ 07:36)  WBC Count: 4.84 K/uL ( @ 06:25)  WBC Count: 6.11 K/uL ( @ 07:55)  WBC Count: 4.10 K/uL ( @ 06:52)  WBC Count: 3.61 K/uL ( @ 06:57)  WBC Count: 4.53 K/uL (07-10 @ 07:51)  WBC Count: 5.62 K/uL ( @ 10:50)                            11.0   4.58  )-----------( 274      ( 15 Jul 2021 07:36 )             31.7       07-15    141  |  111<H>  |  5<L>  ----------------------------<  123<H>  4.2   |  25  |  1.63<H>    Ca    7.0<L>      15 Jul 2021 07:36  Phos  2.6     07-15  Mg     2.1     07-15        Urinalysis Basic - ( 15 Jul 2021 07:38 )    Color: Yellow / Appearance: Clear / S.005 / pH: x  Gluc: x / Ketone: Negative  / Bili: Negative / Urobili: Negative mg/dL   Blood: x / Protein: Negative mg/dL / Nitrite: Negative   Leuk Esterase: Negative / RBC: 0-2 /HPF / WBC 0-2   Sq Epi: x / Non Sq Epi: x / Bacteria: x        Creatinine Trend: 1.63<--, 1.81<--, 1.70<--, 0.58<--, 0.61<--, 0.54<--      MICROBIOLOGY:  v  .Smear Other  07-10-21 --  --    polymorphonuclear leukocytes seen  No organisms seen  by cytocentrifuge      .CSF CSF  07-10-21   No growth  --    polymorphonuclear leukocytes seen  No organisms seen  by cytocentrifuge      .Urine Clean Catch (Midstream)  21   No growth  --  --      .Blood Blood-Peripheral  21   No Growth Final  --  --      Skin R ear discharge  21   Few Corynebacterium propinquum "Susceptibilities not performed"  Rare Coag Negative Staphylococcus "Susceptibilities not performed"  --  --          HSV 1/2 PCR: NotDete ( @ 23:16)        C-Reactive Protein, Serum: 31 ()          Procalcitonin, Serum: 0.21 (21 @ 09:08)  Procalcitonin, Serum: 0.04 (21 @ 08:56)    SARS-CoV-2: Norma (2021 06:31)    RADIOLOGY:

## 2021-07-15 NOTE — PROGRESS NOTE ADULT - SUBJECTIVE AND OBJECTIVE BOX
Subjective: responds to touch. Does not verbalize      MEDICATIONS  (STANDING):  acyclovir IVPB 600 milliGRAM(s) IV Intermittent every 12 hours  chlorhexidine 2% Cloths 1 Application(s) Topical daily  famotidine Injectable 20 milliGRAM(s) IV Push two times a day  heparin   Injectable 5000 Unit(s) SubCutaneous every 12 hours  lidocaine 1% Injectable 10 milliLiter(s) Local Injection once  nystatin Powder 1 Application(s) Topical two times a day  sodium chloride 0.9%. 1000 milliLiter(s) (125 mL/Hr) IV Continuous <Continuous>    MEDICATIONS  (PRN):  acetaminophen  Suppository .. 650 milliGRAM(s) Rectal every 6 hours PRN Temp greater or equal to 38C (100.4F), Mild Pain (1 - 3)  albuterol/ipratropium for Nebulization 3 milliLiter(s) Nebulizer every 6 hours PRN wheeze  hydrALAZINE Injectable 5 milliGRAM(s) IV Push every 6 hours PRN SBP > 160  sodium chloride 0.9% lock flush 10 milliLiter(s) IV Push every 1 hour PRN Pre/post blood products, medications, blood draw, and to maintain line patency          T(C): 36.4 (07-15-21 @ 05:29), Max: 37.3 (21 @ 11:43)  HR: 105 (07-15-21 @ 05:29) (97 - 105)  BP: 132/72 (07-15-21 @ 05:29) (122/58 - 150/61)  RR: 18 (07-15-21 @ 05:29) (17 - 19)  SpO2: 100% (07-15-21 @ 05:29) (95% - 100%)  Wt(kg): --        I&O's Detail    2021 07:01  -  15 Jul 2021 07:00  --------------------------------------------------------  IN:    dextrose 5% + sodium chloride 0.9% w/ Additives: 1500 mL    IV PiggyBack: 250 mL  Total IN: 1750 mL    OUT:  Total OUT: 0 mL    Total NET: 1750 mL               PHYSICAL EXAM:    NECK: Supple, no inc in JVP  CHEST/LUNG: Clear  HEART: S1S2  ABDOMEN: Soft  EXTREMITIES:  pos edema      LABS:  CBC Full  -  ( 15 Jul 2021 07:36 )  WBC Count : 4.58 K/uL  RBC Count : 4.19 M/uL  Hemoglobin : 11.0 g/dL  Hematocrit : 31.7 %  Platelet Count - Automated : 274 K/uL  Mean Cell Volume : 75.7 fl  Mean Cell Hemoglobin : 26.3 pg  Mean Cell Hemoglobin Concentration : 34.7 gm/dL  Auto Neutrophil # : x  Auto Lymphocyte # : x  Auto Monocyte # : x  Auto Eosinophil # : x  Auto Basophil # : x  Auto Neutrophil % : x  Auto Lymphocyte % : x  Auto Monocyte % : x  Auto Eosinophil % : x  Auto Basophil % : x        141  |  111<H>  |  8   ----------------------------<  110<H>  4.3   |  23  |  1.81<H>    Ca    6.9<L>      2021 06:25  Phos  2.4     -  Mg     2.1     -14        Urinalysis Basic - ( 15 Jul 2021 07:38 )    Color: Yellow / Appearance: Clear / S.005 / pH: x  Gluc: x / Ketone: Negative  / Bili: Negative / Urobili: Negative mg/dL   Blood: x / Protein: Negative mg/dL / Nitrite: Negative   Leuk Esterase: Negative / RBC: x / WBC x   Sq Epi: x / Non Sq Epi: x / Bacteria: x          Impression:  * MALVIN -- acyclovir crystalline tubular toxicity  * VZV encephalitis    Recommendations:   * Cont judicious IVFs while monitoring for increase in WOB  * BMP daily

## 2021-07-15 NOTE — PROGRESS NOTE ADULT - ASSESSMENT
71 yo M with a PMH of Anoxic encephalopathy - minimally verbal and bedbound at baseline, prostate cancer, HTN, bowel resection & ICD who presented w/ R ear discharge. Pt's wife also reported that he was talking less and had worsening PO intake.      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 Chacon, so Dr. Mcadams did it instead-  CSF PCR positive for Varicella zoster virus    Dysphagia  - at baseline, pt unable to feed self, but normally able to swallow  - patient is eating, but not much and not always, so will keep him on IV meds  - c/w D5LR for now  - if no improvement of PO intake by tomorrow, patient will need NG tube and TFs    MALVIN  - likely prerenal  - Cr improving w/ IVF  - nephro note read and appreciated   - renal US showed no hydronephrosis      Acute otitis externa of right ear  - ID following - Herpes Zoster Oticus  - Unable to get ENT consult at this hospital   - ID following & changed acyclovir to Valtrex - now switched back to Acyclovir because patient is not taking food & medicine by mouth  - ID stopped IV Cipro & cortisporin     Dyspnea  - may be due to sepsis and encephalopathy   - c/w O2 2L  - Pulm consulted - Juan F  - no DVT on US  - no pneumonia on plain CT of chest     Prostate cancer  - leuprolide when tolerating PO    Hx of Diverticulosis w/ significant diverticular bleed in 2018, requiring 4 transfusions    - patient had another GI bleed in 2019, but refused EGD/colonoscopy, so source unknown  - family agreed to SQ heparin and watch H/H     Essential hypertension  - c/w hydralazine PRN     Functional quadriplegia  - history of anoxic encephalopathy - per wife, occurred 2008 due to fall   - c/w supportive care    Prophylaxis:  DVT: Heparin  GI: Pepcid

## 2021-07-16 LAB
ANION GAP SERPL CALC-SCNC: 8 MMOL/L — SIGNIFICANT CHANGE UP (ref 5–17)
BUN SERPL-MCNC: 6 MG/DL — LOW (ref 7–23)
CALCIUM SERPL-MCNC: 7.4 MG/DL — LOW (ref 8.5–10.1)
CHLORIDE SERPL-SCNC: 110 MMOL/L — HIGH (ref 96–108)
CO2 SERPL-SCNC: 24 MMOL/L — SIGNIFICANT CHANGE UP (ref 22–31)
CREAT SERPL-MCNC: 1.39 MG/DL — HIGH (ref 0.5–1.3)
GLUCOSE SERPL-MCNC: 98 MG/DL — SIGNIFICANT CHANGE UP (ref 70–99)
HCT VFR BLD CALC: 32.9 % — LOW (ref 39–50)
HGB BLD-MCNC: 11.3 G/DL — LOW (ref 13–17)
MAGNESIUM SERPL-MCNC: 1.9 MG/DL — SIGNIFICANT CHANGE UP (ref 1.6–2.6)
MCHC RBC-ENTMCNC: 26 PG — LOW (ref 27–34)
MCHC RBC-ENTMCNC: 34.3 GM/DL — SIGNIFICANT CHANGE UP (ref 32–36)
MCV RBC AUTO: 75.6 FL — LOW (ref 80–100)
NRBC # BLD: 0 /100 WBCS — SIGNIFICANT CHANGE UP (ref 0–0)
PHOSPHATE SERPL-MCNC: 2.9 MG/DL — SIGNIFICANT CHANGE UP (ref 2.5–4.5)
PLATELET # BLD AUTO: 306 K/UL — SIGNIFICANT CHANGE UP (ref 150–400)
POTASSIUM SERPL-MCNC: 3.7 MMOL/L — SIGNIFICANT CHANGE UP (ref 3.5–5.3)
POTASSIUM SERPL-SCNC: 3.7 MMOL/L — SIGNIFICANT CHANGE UP (ref 3.5–5.3)
RBC # BLD: 4.35 M/UL — SIGNIFICANT CHANGE UP (ref 4.2–5.8)
RBC # FLD: 14.7 % — HIGH (ref 10.3–14.5)
SODIUM SERPL-SCNC: 142 MMOL/L — SIGNIFICANT CHANGE UP (ref 135–145)
WBC # BLD: 4.59 K/UL — SIGNIFICANT CHANGE UP (ref 3.8–10.5)
WBC # FLD AUTO: 4.59 K/UL — SIGNIFICANT CHANGE UP (ref 3.8–10.5)

## 2021-07-16 PROCEDURE — 99232 SBSQ HOSP IP/OBS MODERATE 35: CPT

## 2021-07-16 PROCEDURE — 99233 SBSQ HOSP IP/OBS HIGH 50: CPT

## 2021-07-16 RX ADMIN — FAMOTIDINE 20 MILLIGRAM(S): 10 INJECTION INTRAVENOUS at 05:41

## 2021-07-16 RX ADMIN — Medication 262 MILLIGRAM(S): at 05:40

## 2021-07-16 RX ADMIN — FAMOTIDINE 20 MILLIGRAM(S): 10 INJECTION INTRAVENOUS at 17:07

## 2021-07-16 RX ADMIN — SODIUM CHLORIDE 125 MILLILITER(S): 9 INJECTION INTRAMUSCULAR; INTRAVENOUS; SUBCUTANEOUS at 05:40

## 2021-07-16 RX ADMIN — Medication 262 MILLIGRAM(S): at 17:07

## 2021-07-16 RX ADMIN — SODIUM CHLORIDE 125 MILLILITER(S): 9 INJECTION INTRAMUSCULAR; INTRAVENOUS; SUBCUTANEOUS at 17:08

## 2021-07-16 RX ADMIN — CHLORHEXIDINE GLUCONATE 1 APPLICATION(S): 213 SOLUTION TOPICAL at 11:22

## 2021-07-16 RX ADMIN — SODIUM CHLORIDE 125 MILLILITER(S): 9 INJECTION INTRAMUSCULAR; INTRAVENOUS; SUBCUTANEOUS at 03:16

## 2021-07-16 RX ADMIN — NYSTATIN CREAM 1 APPLICATION(S): 100000 CREAM TOPICAL at 17:07

## 2021-07-16 RX ADMIN — HEPARIN SODIUM 5000 UNIT(S): 5000 INJECTION INTRAVENOUS; SUBCUTANEOUS at 05:40

## 2021-07-16 RX ADMIN — NYSTATIN CREAM 1 APPLICATION(S): 100000 CREAM TOPICAL at 05:41

## 2021-07-16 RX ADMIN — HEPARIN SODIUM 5000 UNIT(S): 5000 INJECTION INTRAVENOUS; SUBCUTANEOUS at 17:07

## 2021-07-16 NOTE — PROGRESS NOTE ADULT - SUBJECTIVE AND OBJECTIVE BOX
Patient is a 70y old  Male who presents with a chief complaint of sepsis, ear infection (2021 16:27)      INTERVAL HPI/OVERNIGHT EVENTS:    Per RN, pt eating about 25% of his meals (w/ assistance).     REVIEW OF SYSTEMS:   Non-verbal; unable to get ROS    MEDICATIONS  (STANDING):  acyclovir IVPB 600 milliGRAM(s) IV Intermittent every 12 hours  chlorhexidine 2% Cloths 1 Application(s) Topical daily  famotidine Injectable 20 milliGRAM(s) IV Push two times a day  heparin   Injectable 5000 Unit(s) SubCutaneous every 12 hours  lidocaine 1% Injectable 10 milliLiter(s) Local Injection once  nystatin Powder 1 Application(s) Topical two times a day  sodium chloride 0.9%. 1000 milliLiter(s) (125 mL/Hr) IV Continuous <Continuous>    MEDICATIONS  (PRN):  acetaminophen  Suppository .. 650 milliGRAM(s) Rectal every 6 hours PRN Temp greater or equal to 38C (100.4F), Mild Pain (1 - 3)  albuterol/ipratropium for Nebulization 3 milliLiter(s) Nebulizer every 6 hours PRN wheeze  hydrALAZINE Injectable 5 milliGRAM(s) IV Push every 6 hours PRN SBP > 160  sodium chloride 0.9% lock flush 10 milliLiter(s) IV Push every 1 hour PRN Pre/post blood products, medications, blood draw, and to maintain line patency      Allergies    lisinopril (Unknown)  penicillins (Unknown)    Intolerances        Vital Signs Last 24 Hrs  T(C): 37.5 (2021 16:50), Max: 37.5 (2021 16:50)  T(F): 99.5 (2021 16:50), Max: 99.5 (2021 16:50)  HR: 105 (2021 16:50) (86 - 110)  BP: 165/83 (2021 16:50) (145/72 - 165/83)  BP(mean): --  RR: 18 (2021 16:50) (18 - 18)  SpO2: 96% (2021 16:50) (94% - 96%)    PHYSICAL EXAM:  GENERAL: NAD; non-verbal, but awake, alert, follows me w/ eyes.   HEAD:  Atraumatic, Normocephalic; R ear markedly improved from my last visit a week ago.  EYES: EOMI, PERRLA, conjunctiva and sclera clear  ENT: O/P Clear  NECK: Supple, No JVD  NERVOUS SYSTEM:  Difficult to assess, but no obvious focal deficits  CHEST/LUNG: Clear to percussion bilaterally; No rales, rhonchi, wheezing  HEART: Regular rate and rhythm; No murmurs, rubs, or gallops  ABDOMEN: Soft, Nontender, Nondistended; Bowel sounds present  EXTREMITIES:  2+ Peripheral Pulses, No clubbing, cyanosis; moderate edema.   SKIN: No rashes or lesions    LABS:                        11.3   4.59  )-----------( 306      ( 2021 07:21 )             32.9     07-16    142  |  110<H>  |  6<L>  ----------------------------<  98  3.7   |  24  |  1.39<H>    Ca    7.4<L>      2021 07:21  Phos  2.9     07-16  Mg     1.9     -16        Urinalysis Basic - ( 15 Jul 2021 07:38 )    Color: Yellow / Appearance: Clear / S.005 / pH: x  Gluc: x / Ketone: Negative  / Bili: Negative / Urobili: Negative mg/dL   Blood: x / Protein: Negative mg/dL / Nitrite: Negative   Leuk Esterase: Negative / RBC: 0-2 /HPF / WBC 0-2   Sq Epi: x / Non Sq Epi: x / Bacteria: x      CAPILLARY BLOOD GLUCOSE          RADIOLOGY & ADDITIONAL TESTS:    Imaging Personally Reviewed:  [ ] YES  [ ] NO    Consultant(s) Notes Reviewed:  [ ] YES  [ ] NO    Care Discussed with Consultants/Other Providers [ ] YES  [ ] NO

## 2021-07-16 NOTE — PHYSICAL THERAPY INITIAL EVALUATION ADULT - ADDITIONAL COMMENTS
As per wife Remedios Ayala at [phone], patient requires 1 person assist, verbal and tactile cues for bed mobility. Patient is able to perform transfers with rolling walker and 2 person assist, has been nonambulatory x less than 1 year. Patient has a standing table, nustep, and hospital bed. Patient's wife reports that the hospital bed has a regular mattress. Patient has a reclining wheel chair that has broken brakes. Per 2/8/19 eval- patient had a mechanical lift, per wife, patient does not have a mechanical lift. Patient has a home health aide 12 hours x 6 days, 1 day x 6 hours.

## 2021-07-16 NOTE — PROGRESS NOTE ADULT - ASSESSMENT
Subjective Complaints:  Historian:             Vital Signs Last 24 Hrs  T(C): 36.9 (2021 06:04), Max: 37 (2021 00:41)  T(F): 98.5 (2021 06:04), Max: 98.6 (2021 00:41)  HR: 110 (2021 06:04) (79 - 110)  BP: 145/72 (2021 06:04) (145/72 - 156/71)  BP(mean): --  RR: 18 (2021 06:04) (18 - 18)  SpO2: 96% (2021 06:04) (94% - 96%)    GENERAL PHYSICAL EXAM:  General:  Appears stated age, well-groomed, well-nourished, no distress  HEENT:  NC/AT, patent nares w/ pink mucosa, OP clear w/o lesions, PERRL, EOMI, conjunctivae clear, no thyromegaly, nodules, adenopathy, no JVD  Chest:  Full & symmetric excursion, no increased effort, breath sounds clear  Cardiovascular:  Regular rhythm, S1, S2, no murmur/rub/S3/S4, no carotid/femoral/abdominal bruit, radial/pedal pulses 2+, no edema  Abdomen:  Soft, non-tender, non-distended, normoactive bowel sounds, no HSM  Extremities:  Gait & station:   Digits:   Nails:   Joints, Bones, Muscles:   ROM:   Stability:  Skin:  No rash/erythema/ecchymoses/petechiae/wounds/abscess/warm/dry  Musculoskeletal:  Full ROM in all joints w/o swelling/tenderness/effusion        LABS:                        11.3   4.59  )-----------( 306      ( 2021 07:21 )             32.9     07-16    142  |  110<H>  |  6<L>  ----------------------------<  98  3.7   |  24  |  1.39<H>    Ca    7.4<L>      2021 07:21  Phos  2.9     07-16  Mg     1.9     07-16        Urinalysis Basic - ( 15 Jul 2021 07:38 )    Color: Yellow / Appearance: Clear / S.005 / pH: x  Gluc: x / Ketone: Negative  / Bili: Negative / Urobili: Negative mg/dL   Blood: x / Protein: Negative mg/dL / Nitrite: Negative   Leuk Esterase: Negative / RBC: 0-2 /HPF / WBC 0-2   Sq Epi: x / Non Sq Epi: x / Bacteria: x        RADIOLOGY & ADDITIONAL STUDIES:        Neurology Progress Note:      Mental Status: awaek alert open eyes tries to follow scommands       Cranial Nerves: defrd      Motor:   spastic quadroparesis         Sensory: intact      Cerebellar:defrd       Gait:defrd      Assesment/Plan:  s/p viral encephalaithy  zoster  on iv acyclovir quadroparesis s/p anoxic encephalaothy  no seizure no fever

## 2021-07-16 NOTE — PROGRESS NOTE ADULT - ASSESSMENT
71 yo M with a PMH of Anoxic encephalopathy - minimally verbal and bedbound at baseline, prostate cancer, HTN, bowel resection & ICD who presented w/ R ear discharge. Pt's wife also reported that he was talking less and had worsening PO intake.      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   - viral swab (+) for varicella, suggesting possibility of viral meningoencephalitis  - Blood Cx NGTD  - ID following & changed acyclovir to Valtrex - now switched back to Acyclovir because patient is not taking food & medicine by mouth consistently - as per ID, last dose will be on 7/18  - Neuro consulted (Pema)  - LP confirms CSF PCR positive for Varicella zoster virus    Dysphagia  - at baseline, pt unable to feed self, but normally able to swallow  - po intake slowly improving  - continue IVF due to the low intake and the MALVIN    MALVIN  - Nephro consulted (Miguel Angel) -- feels this is likely Acyclovir side effect  - Cr improving w/ IVF (NS @ 125/hr)  - renal US showed no hydronephrosis      Acute otitis externa of right ear - doing much better  - ID following (Dawn) - Herpes Zoster Oticus  - Unable to get ENT consult at this hospital   - ID following & changed acyclovir to Valtrex - now switched back to Acyclovir because patient is not taking food & medicine by mouth  - ID stopped IV Cipro & cortisporin     Dyspnea  - may be due to sepsis and encephalopathy  - c/w O2 as needed  - Pulm consulted - Juan F  - no DVT on US  - no pneumonia on plain CT of chest     Prostate cancer  - leuprolide when tolerating PO    Hx of Diverticulosis w/ significant diverticular bleed in 2018, requiring 4 transfusions    - patient had another GI bleed in 2019, but refused EGD/colonoscopy, so source unknown  - family agreed to SQ heparin and watch H/H     Essential hypertension  - c/w hydralazine PRN     Functional quadriplegia  - history of anoxic encephalopathy - per wife, occurred 2008 due to fall   - c/w supportive care    Prophylaxis:  DVT: Heparin  GI: Pepcid   69 yo M with a PMH of Anoxic encephalopathy - minimally verbal and bedbound at baseline, prostate cancer, HTN, bowel resection & ICD who presented w/ R ear discharge. Pt's wife also reported that he was talking less and had worsening PO intake.      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   - viral swab (+) for varicella, suggesting possibility of viral meningoencephalitis  - Blood Cx NGTD  - ID following & changed acyclovir to Valtrex - now switched back to Acyclovir because patient is not taking food & medicine by mouth consistently - as per ID, last dose will be on 7/18  - Neuro consulted (Pema)  - LP confirms CSF PCR positive for Varicella zoster virus    Dysphagia  - at baseline, pt unable to feed self, but normally able to swallow  - po intake slowly improving  - continue IVF due to the low intake and the MALVIN    MALVIN  - Nephro consulted (Miguel Angel) -- feels this is likely Acyclovir side effect  - Cr improving w/ IVF (NS @ 125/hr)  - renal US showed no hydronephrosis      Acute otitis externa of right ear - doing much better  - ID following (Dawn) - Herpes Zoster Oticus  - Unable to get ENT consult at this hospital   - ID following & changed acyclovir to Valtrex - now switched back to Acyclovir because patient is not taking food & medicine by mouth  - ID stopped IV Cipro & cortisporin     Dyspnea  - may be due to sepsis and encephalopathy  - c/w O2 as needed  - Pulm consulted - Juan F  - no DVT on US  - no pneumonia on plain CT of chest     Prostate cancer  - leuprolide when tolerating PO    Hx of Diverticulosis w/ significant diverticular bleed in 2018, requiring 4 transfusions    - patient had another GI bleed in 2019, but refused EGD/colonoscopy, so source unknown  - family agreed to SQ heparin and watch H/H     Essential hypertension  - c/w hydralazine PRN     Functional quadriplegia  - history of anoxic encephalopathy - per wife, occurred 2008 due to fall   - c/w supportive care    Prophylaxis:  DVT: Heparin  GI: Pepcid

## 2021-07-16 NOTE — PROGRESS NOTE ADULT - ASSESSMENT
ADONIS WELSH 70 M 7/7/2021 1949 DR JAXON BROWN      REVIEW OF SYMPTOMS      Able to give (reliable) ROS  NO     PHYSICAL EXAM    HEENT Unremarkable  atraumatic   RESP Fair air entry EXP prolonged    Harsh breath sound Resp distres mild   CARDIAC S1 S2 No S3     NO JVD    ABDOMEN SOFT BS PRESENT NOT DISTENDED No hepatosplenomegaly   PEDAL EDEMA present No calf tenderness  NO rash       ADVANCED DIRECTIVES.                            COVID STATUS.                                       scv2 7/7/2021 (-)       CC 7/7/2021 ADMISSN .   70 m HO brain injury 2008 previously able to eat po 3 w ago  7/7/2021 FEVER     PRESENTING PROBLEMS 7/7/2021 .   OTITIS EXTERNA R EAR   FEVER   HYPONATREMIA Na 7/7/2021 Na 129  DYSPNEA  FUNCTIONAL QUADRIPLEGIA     PMH.  BASELINE  VERBAL BEDBOUND   SBR   HO TRACH SP DECANNULATION   ANOXIC ENCEPHALOPATHY Brain injury 2008   HO SCD  HO AICD 2009   HO CYROABLATION L RENAL MASS   PROSTATE CA        GLOBAL AND BEST PRACTICE ISSUES                     ALLERGY.    PNCL LISINOPRIL  WEIGHT.          7/7/2021 90        BMI                7/7/2021 33        .                          ADVANCED DIRECTIVE.                               HEAD OF BED ELEVATION. Yes  DVT PROPHYLAXIS. hpsc (7/8)   APA.                   GONZALEZ PROPHYLAXIS.                                                                     DYSPHAGIA RECOMM.7/12/2021 puree honey (7/12/2021)    DIET.    NPO (77) -> puree honey (7/12/2021)   INFECTION PROPHYLAXIS.  chlorhexidine (7/9)    ABIO.  acyclovi 600.2 (7/14/2021)     PATIENT DATA                ABG.     7/10/2021 .32 749/36/111              OXYGENATION.    7/13/2021 3l 100%   7/10/2021 ra 97%   7/9/2021 3l 96%      7/7-7/8/2021 2l 97%   - ra 97%          VITALS/IO/VENT/DRIPS.     7/16/2021 afeb 110 145/72     ASSESSMENT/RECOMMENDATIONS.    HERPES OTITIS EXTRENAL   VZV  ENCEPHALITIS   7/7 herpes vzv pcr (+) EAR  7/9 CSF vzv pcr (+)   7/8/2021 acyclovir 900.3 started 7/8/2021 Dr Ponce    More awake on 7/10/2021   chnged to valacyclovir 7/13/2021   WHEEZE   DUNEB.4 P 97/7)    cxr  7/12/2021 no consoldation   7/11 spouse feels he is congested (7/11/20210   Cont bd   DYSPHAGIA DIET STARTED 7/12/2021   On iv fl  Pt used to eat po June 2021 as per spouse (7/11/2021)   7/11/2021 speech eval ordered to start po diet  7/12/2021 Dys diet stared   MALVIN  Cr 7/12-7/13-7/-7/16  Cr .5-1.7-1.8-1.6 - 1.3   U Eos 7/15/2021 (-)   us renal 7/16/2021 No hydro   ns 125 (7/14)   Monitor     CURRENT ISSUES  VZV ENCEPHALITIS ON ACYCLOVIR   DYSPHAGIA   MALVIN     TIME SPENT   Over 25 minutes aggregate care time spent on encounter; activities included   direct patient care, counseling and/or coordinating care reviewing notes, lab data/ imaging , discussion with multidisciplinary team/ patient  /family and explaining in detail risks, benefits, alternatives  of the recommendations     ADONIS WELSH 70 M 7/7/2021 1949 DR JAXON BROWN

## 2021-07-16 NOTE — PROGRESS NOTE ADULT - SUBJECTIVE AND OBJECTIVE BOX
ANITHA ADONIS    LVS 1E 180 I    Patient is a 70y old  Male who presents with a chief complaint of sepsis, ear infection (15 Jul 2021 20:53)       Allergies    lisinopril (Unknown)  penicillins (Unknown)    Intolerances        HPI:  Patient is a 70M with a PMH of Anoxic encephalopathy, prostate cancer, HTN, bowel resection who presents to the ED for R ear discharge.  Patient minimally verbal and bedbound at baseline, unable to provide history.  Wife at the bedside to provide history.  Patient noted to have R ear discharge for two days along with temperatures at home.  Wife states that she has been giving him tylenol at home without resolution of the fever.  Per wife, patient was talking much less and was not accepting PO intake so she presents to the ED for evaluation.  Denies history of ear infections.  Patient also noted to have sporadic dyspnea over the same time span.  No other complaints.  Current SpO2 on 2L via NC is 92%.  Vitals stable, labs benign.  Will admit to med surg.     (2021 05:04)      PAST MEDICAL & SURGICAL HISTORY:  Hypertension    Prostate cancer  radiation therapy    Brain injury with coma  x 2 weeks in     Anoxic encephalopathy    Leg pain, right    Gait abnormality    Sudden cardiac death    Status post cryoablation  of left renal mass    H/O prostate cancer  resection and radiation therapy    H/O tracheostomy    S/P ICD (internal cardiac defibrillator) procedure  implanted on 2009        FAMILY HISTORY:  FH: HTN (hypertension)          MEDICATIONS   acetaminophen  Suppository .. 650 milliGRAM(s) Rectal every 6 hours PRN  acyclovir IVPB 600 milliGRAM(s) IV Intermittent every 12 hours  albuterol/ipratropium for Nebulization 3 milliLiter(s) Nebulizer every 6 hours PRN  chlorhexidine 2% Cloths 1 Application(s) Topical daily  famotidine Injectable 20 milliGRAM(s) IV Push two times a day  heparin   Injectable 5000 Unit(s) SubCutaneous every 12 hours  hydrALAZINE Injectable 5 milliGRAM(s) IV Push every 6 hours PRN  lidocaine 1% Injectable 10 milliLiter(s) Local Injection once  nystatin Powder 1 Application(s) Topical two times a day  sodium chloride 0.9% lock flush 10 milliLiter(s) IV Push every 1 hour PRN  sodium chloride 0.9%. 1000 milliLiter(s) IV Continuous <Continuous>      Vital Signs Last 24 Hrs  T(C): 36.9 (2021 06:04), Max: 37.1 (15 Jul 2021 11:42)  T(F): 98.5 (2021 06:04), Max: 98.7 (15 Jul 2021 11:42)  HR: 110 (2021 06:04) (79 - 110)  BP: 145/72 (2021 06:04) (145/72 - 159/76)  BP(mean): --  RR: 18 (2021 06:04) (18 - 18)  SpO2: 96% (2021 06:04) (94% - 96%)      07-15-21 @ 07:01  -  21 @ 07:00  --------------------------------------------------------  IN: 0 mL / OUT: 1800 mL / NET: -1800 mL            LABS:                        11.3   4.59  )-----------( 306      ( 2021 07:21 )             32.9     07-16    142  |  110<H>  |  6<L>  ----------------------------<  98  3.7   |  24  |  1.39<H>    Ca    7.4<L>      2021 07:21  Phos  2.9     07-16  Mg     1.9     07-16        Urinalysis Basic - ( 15 Jul 2021 07:38 )    Color: Yellow / Appearance: Clear / S.005 / pH: x  Gluc: x / Ketone: Negative  / Bili: Negative / Urobili: Negative mg/dL   Blood: x / Protein: Negative mg/dL / Nitrite: Negative   Leuk Esterase: Negative / RBC: 0-2 /HPF / WBC 0-2   Sq Epi: x / Non Sq Epi: x / Bacteria: x            WBC:  WBC Count: 4.59 K/uL ( @ 07:21)  WBC Count: 4.58 K/uL (07-15 @ 07:36)  WBC Count: 4.84 K/uL ( @ 06:25)  WBC Count: 6.11 K/uL ( @ 07:55)      MICROBIOLOGY:  RECENT CULTURES:  07-10 .Smear Other XXXX   polymorphonuclear leukocytes seen  No organisms seen  by cytocentrifuge XXXX    07-10 .CSF CSF XXXX   polymorphonuclear leukocytes seen  No organisms seen  by cytocentrifuge   No growth                    Sodium:  Sodium, Serum: 142 mmol/L ( @ 07:21)  Sodium, Serum: 141 mmol/L (07-15 @ 07:36)  Sodium, Serum: 141 mmol/L ( @ 06:25)  Sodium, Serum: 137 mmol/L ( @ 07:55)      1.39 mg/dL  @ 07:21  1.63 mg/dL 07-15 @ 07:36  1.81 mg/dL  @ 06:25  1.70 mg/dL  @ 07:55      Hemoglobin:  Hemoglobin: 11.3 g/dL ( @ 07:21)  Hemoglobin: 11.0 g/dL (07-15 @ 07:36)  Hemoglobin: 10.7 g/dL ( @ 06:25)  Hemoglobin: 10.8 g/dL ( @ 07:55)      Platelets: Platelet Count - Automated: 306 K/uL ( @ 07:21)  Platelet Count - Automated: 274 K/uL (07-15 @ 07:36)  Platelet Count - Automated: 242 K/uL ( @ 06:25)  Platelet Count - Automated: 235 K/uL ( @ 07:55)          Urinalysis Basic - ( 15 Jul 2021 07:38 )    Color: Yellow / Appearance: Clear / S.005 / pH: x  Gluc: x / Ketone: Negative  / Bili: Negative / Urobili: Negative mg/dL   Blood: x / Protein: Negative mg/dL / Nitrite: Negative   Leuk Esterase: Negative / RBC: 0-2 /HPF / WBC 0-2   Sq Epi: x / Non Sq Epi: x / Bacteria: x        RADIOLOGY & ADDITIONAL STUDIES:

## 2021-07-16 NOTE — PHYSICAL THERAPY INITIAL EVALUATION ADULT - PRECAUTIONS/LIMITATIONS, REHAB EVAL
nasal cannual, airborne/contact precautions/fall precautions/isolation precautions/oxygen therapy device and L/min

## 2021-07-16 NOTE — PROGRESS NOTE ADULT - SUBJECTIVE AND OBJECTIVE BOX
[facility]PHR[facility], [facility]  NEPHROLOGY AND HYPERTENSION  [address]  [address]  [place] [address]  [phone]    MD SANDIE AREVALO MD ANDREY GONCHARUK, MD MADHU KORRAPATI, MD YELENA ROSENBERG, MD BINNY KOSHY, MD CHRISTOPHER CAPUTO, MD EDWARD BOVER, MD          Patient events noted    MEDICATIONS  (STANDING):  acyclovir IVPB 600 milliGRAM(s) IV Intermittent every 12 hours  chlorhexidine 2% Cloths 1 Application(s) Topical daily  famotidine Injectable 20 milliGRAM(s) IV Push two times a day  heparin   Injectable 5000 Unit(s) SubCutaneous every 12 hours  lidocaine 1% Injectable 10 milliLiter(s) Local Injection once  nystatin Powder 1 Application(s) Topical two times a day  sodium chloride 0.9%. 1000 milliLiter(s) (125 mL/Hr) IV Continuous <Continuous>    MEDICATIONS  (PRN):  acetaminophen  Suppository .. 650 milliGRAM(s) Rectal every 6 hours PRN Temp greater or equal to 38C (100.4F), Mild Pain (1 - 3)  albuterol/ipratropium for Nebulization 3 milliLiter(s) Nebulizer every 6 hours PRN wheeze  hydrALAZINE Injectable 5 milliGRAM(s) IV Push every 6 hours PRN SBP > 160  sodium chloride 0.9% lock flush 10 milliLiter(s) IV Push every 1 hour PRN Pre/post blood products, medications, blood draw, and to maintain line patency      07-15-21 @ 07:01  -  07-16-21 @ 07:00  --------------------------------------------------------  IN: 0 mL / OUT: 1800 mL / NET: -1800 mL    07-16-21 @ 07:01  -  07-16-21 @ 16:28  --------------------------------------------------------  IN: 0 mL / OUT: 1200 mL / NET: -1200 mL      PHYSICAL EXAM:      T(C): 36.9 (07-16-21 @ 06:04), Max: 37 (07-16-21 @ 00:41)  HR: 110 (07-16-21 @ 06:04) (79 - 110)  BP: 145/72 (07-16-21 @ 06:04) (145/72 - 156/71)  RR: 18 (07-16-21 @ 06:04) (18 - 18)  SpO2: 96% (07-16-21 @ 06:04) (94% - 96%)  Wt(kg): --  Lungs clear  Heart S1S2  Abd soft NT ND  Extremities:   1-2 edema                                    11.3   4.59  )-----------( 306      ( 16 Jul 2021 07:21 )             32.9     07-16    142  |  110<H>  |  6<L>  ----------------------------<  98  3.7   |  24  |  1.39<H>    Ca    7.4<L>      16 Jul 2021 07:21  Phos  2.9     07-16  Mg     1.9     07-16          Creatinine Trend: 1.39<--, 1.63<--, 1.81<--, 1.70<--, 0.58<--, 0.61<--        Assessment   MALVIN; suspected acyclovir related obstruction      Plan:  Continue IVF  IV lasix pending UO trend       Tommie Michelle MD

## 2021-07-16 NOTE — PHYSICAL THERAPY INITIAL EVALUATION ADULT - ASR EQUIP NEEDS DISCH PT EVAL
Patient's wife reports that patient's reclining wheelchair has broken brakes. Patient requires mechanical lift and would benefit from pressure relieving mattress.

## 2021-07-17 LAB
ALBUMIN SERPL ELPH-MCNC: 2.5 G/DL — LOW (ref 3.3–5)
ALP SERPL-CCNC: 61 U/L — SIGNIFICANT CHANGE UP (ref 40–120)
ALT FLD-CCNC: 32 U/L — SIGNIFICANT CHANGE UP (ref 12–78)
ANION GAP SERPL CALC-SCNC: 10 MMOL/L — SIGNIFICANT CHANGE UP (ref 5–17)
AST SERPL-CCNC: 29 U/L — SIGNIFICANT CHANGE UP (ref 15–37)
BILIRUB SERPL-MCNC: 0.5 MG/DL — SIGNIFICANT CHANGE UP (ref 0.2–1.2)
BUN SERPL-MCNC: 7 MG/DL — SIGNIFICANT CHANGE UP (ref 7–23)
CALCIUM SERPL-MCNC: 7.1 MG/DL — LOW (ref 8.5–10.1)
CHLORIDE SERPL-SCNC: 109 MMOL/L — HIGH (ref 96–108)
CO2 SERPL-SCNC: 24 MMOL/L — SIGNIFICANT CHANGE UP (ref 22–31)
CREAT SERPL-MCNC: 1.15 MG/DL — SIGNIFICANT CHANGE UP (ref 0.5–1.3)
GLUCOSE SERPL-MCNC: 115 MG/DL — HIGH (ref 70–99)
MAGNESIUM SERPL-MCNC: 1.7 MG/DL — SIGNIFICANT CHANGE UP (ref 1.6–2.6)
PHOSPHATE SERPL-MCNC: 2.3 MG/DL — LOW (ref 2.5–4.5)
POTASSIUM SERPL-MCNC: 3 MMOL/L — LOW (ref 3.5–5.3)
POTASSIUM SERPL-SCNC: 3 MMOL/L — LOW (ref 3.5–5.3)
PROT SERPL-MCNC: 6.6 GM/DL — SIGNIFICANT CHANGE UP (ref 6–8.3)
SODIUM SERPL-SCNC: 143 MMOL/L — SIGNIFICANT CHANGE UP (ref 135–145)

## 2021-07-17 PROCEDURE — 99233 SBSQ HOSP IP/OBS HIGH 50: CPT

## 2021-07-17 PROCEDURE — 99232 SBSQ HOSP IP/OBS MODERATE 35: CPT

## 2021-07-17 RX ORDER — CALCIUM CARBONATE 500(1250)
3 TABLET ORAL
Refills: 0 | Status: DISCONTINUED | OUTPATIENT
Start: 2021-07-17 | End: 2021-07-19

## 2021-07-17 RX ORDER — DEXTROSE MONOHYDRATE, SODIUM CHLORIDE, AND POTASSIUM CHLORIDE 50; .745; 4.5 G/1000ML; G/1000ML; G/1000ML
1000 INJECTION, SOLUTION INTRAVENOUS
Refills: 0 | Status: DISCONTINUED | OUTPATIENT
Start: 2021-07-17 | End: 2021-07-19

## 2021-07-17 RX ORDER — CARVEDILOL PHOSPHATE 80 MG/1
3.12 CAPSULE, EXTENDED RELEASE ORAL EVERY 12 HOURS
Refills: 0 | Status: DISCONTINUED | OUTPATIENT
Start: 2021-07-17 | End: 2021-07-19

## 2021-07-17 RX ORDER — SODIUM CHLORIDE 9 MG/ML
1000 INJECTION, SOLUTION INTRAVENOUS
Refills: 0 | Status: DISCONTINUED | OUTPATIENT
Start: 2021-07-17 | End: 2021-07-17

## 2021-07-17 RX ORDER — CALCITRIOL 0.5 UG/1
0.25 CAPSULE ORAL DAILY
Refills: 0 | Status: DISCONTINUED | OUTPATIENT
Start: 2021-07-17 | End: 2021-07-19

## 2021-07-17 RX ORDER — POTASSIUM PHOSPHATE, MONOBASIC POTASSIUM PHOSPHATE, DIBASIC 236; 224 MG/ML; MG/ML
15 INJECTION, SOLUTION INTRAVENOUS ONCE
Refills: 0 | Status: COMPLETED | OUTPATIENT
Start: 2021-07-17 | End: 2021-07-17

## 2021-07-17 RX ADMIN — NYSTATIN CREAM 1 APPLICATION(S): 100000 CREAM TOPICAL at 17:45

## 2021-07-17 RX ADMIN — CHLORHEXIDINE GLUCONATE 1 APPLICATION(S): 213 SOLUTION TOPICAL at 11:49

## 2021-07-17 RX ADMIN — Medication 262 MILLIGRAM(S): at 05:50

## 2021-07-17 RX ADMIN — HEPARIN SODIUM 5000 UNIT(S): 5000 INJECTION INTRAVENOUS; SUBCUTANEOUS at 05:49

## 2021-07-17 RX ADMIN — POTASSIUM PHOSPHATE, MONOBASIC POTASSIUM PHOSPHATE, DIBASIC 62.5 MILLIMOLE(S): 236; 224 INJECTION, SOLUTION INTRAVENOUS at 11:30

## 2021-07-17 RX ADMIN — NYSTATIN CREAM 1 APPLICATION(S): 100000 CREAM TOPICAL at 05:49

## 2021-07-17 RX ADMIN — FAMOTIDINE 20 MILLIGRAM(S): 10 INJECTION INTRAVENOUS at 17:46

## 2021-07-17 RX ADMIN — HEPARIN SODIUM 5000 UNIT(S): 5000 INJECTION INTRAVENOUS; SUBCUTANEOUS at 17:46

## 2021-07-17 RX ADMIN — SODIUM CHLORIDE 125 MILLILITER(S): 9 INJECTION INTRAMUSCULAR; INTRAVENOUS; SUBCUTANEOUS at 01:00

## 2021-07-17 RX ADMIN — FAMOTIDINE 20 MILLIGRAM(S): 10 INJECTION INTRAVENOUS at 05:49

## 2021-07-17 RX ADMIN — CARVEDILOL PHOSPHATE 3.12 MILLIGRAM(S): 80 CAPSULE, EXTENDED RELEASE ORAL at 17:46

## 2021-07-17 RX ADMIN — DEXTROSE MONOHYDRATE, SODIUM CHLORIDE, AND POTASSIUM CHLORIDE 100 MILLILITER(S): 50; .745; 4.5 INJECTION, SOLUTION INTRAVENOUS at 21:16

## 2021-07-17 RX ADMIN — Medication 262 MILLIGRAM(S): at 17:45

## 2021-07-17 NOTE — PROGRESS NOTE ADULT - ASSESSMENT
ADONIS WELSH 70 M 7/7/2021 1949 DR JAXON BROWN      REVIEW OF SYMPTOMS      Able to give (reliable) ROS  NO     PHYSICAL EXAM    HEENT Unremarkable  atraumatic   RESP Fair air entry EXP prolonged    Harsh breath sound Resp distres mild   CARDIAC S1 S2 No S3     NO JVD    ABDOMEN SOFT BS PRESENT NOT DISTENDED No hepatosplenomegaly   PEDAL EDEMA present No calf tenderness  NO rash     ADVANCED DIRECTIVES.                            COVID STATUS.                                       scv2 7/7/2021 (-)       CC 7/7/2021 ADMISSN .   70 m HO brain injury 2008 previously able to eat po 3 w ago  7/7/2021 FEVER     PRESENTING PROBLEMS 7/7/2021 .   OTITIS EXTERNA R EAR   FEVER   HYPONATREMIA Na 7/7/2021 Na 129  DYSPNEA  FUNCTIONAL QUADRIPLEGIA     PMH.  BASELINE  VERBAL BEDBOUND   SBR   HO TRACH SP DECANNULATION   ANOXIC ENCEPHALOPATHY Brain injury 2008   HO SCD  HO AICD 2009   HO CYROABLATION L RENAL MASS   PROSTATE CA     GLOBAL AND BEST PRACTICE ISSUES                     ALLERGY.    PNCL LISINOPRIL  WEIGHT.          7/7/2021 90        BMI                7/7/2021 33        .                          ADVANCED DIRECTIVE.                               HEAD OF BED ELEVATION. Yes  DVT PROPHYLAXIS. hpsc (7/8)   APA.                   GONZALEZ PROPHYLAXIS.                                                                     DYSPHAGIA RECOMM.7/12/2021 puree honey (7/12/2021)    DIET.    NPO (77) -> puree honey (7/12/2021)   INFECTION PROPHYLAXIS.  chlorhexidine (7/9)    ABIO.  acyclovi 600.2 (7/14/2021)     PATIENT DATA                ABG.     7/10/2021 .32 749/36/111              OXYGENATION.    7/13/2021 3l 100%   7/10/2021 ra 97%   7/9/2021 3l 96%      7/7-7/8/2021 2l 97%   - ra 97%          VITALS/IO/VENT/DRIPS.     7/17/2021 99f 98 150/80     ASSESSMENT/RECOMMENDATIONS.    HERPES OTITIS EXTRENAL   VZV  ENCEPHALITIS   7/7 herpes vzv pcr (+) EAR  7/9 CSF vzv pcr (+)   7/8/2021 acyclovir 900.3 started 7/8/2021 Dr Ponce    More awake on 7/10/2021   chnged to valacyclovir 7/13/2021   WHEEZE   DUNEB.4 P 97/7)    cxr  7/12/2021 no consoldation   7/11 spouse feels he is congested (7/11/20210   Cont bd   DYSPHAGIA DIET STARTED 7/12/2021   On iv fl  Pt used to eat po June 2021 as per spouse (7/11/2021)   7/11/2021 speech eval ordered to start po diet  7/12/2021 Dys diet stared   MALVIN  Cr 7/12-7/13-7/-7/16  Cr .5-1.7-1.8-1.6 - 1.3   U Eos 7/15/2021 (-)   us renal 7/16/2021 No hydro   ns 125 (7/14)   Monitor     CURRENT ISSUES  VZV ENCEPHALITIS ON ACYCLOVIR   DYSPHAGIA   MALVIN     TIME SPENT   Over 25 minutes aggregate care time spent on encounter; activities included   direct patient care, counseling and/or coordinating care reviewing notes, lab data/ imaging , discussion with multidisciplinary team/ patient  /family and explaining in detail risks, benefits, alternatives  of the recommendations     ADONIS WELSH 70 M 7/7/2021 1949 DR JAXON BROWN

## 2021-07-17 NOTE — PROGRESS NOTE ADULT - ASSESSMENT
71 yo M with a PMH of Anoxic encephalopathy - minimally verbal and bedbound at baseline, prostate cancer, HTN, bowel resection & ICD who presented w/ R ear discharge. Pt's wife also reported that he was talking less and had worsening PO intake.      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 Seymour (+) for varicella, suggesting viral meningoencephalitis  - Neuro consulted (Pema)  - LP confirms CSF PCR positive for Varicella zoster virus  - Started on IV Acyclovir; changed acyclovir to Valtrex briefly; switched back due to unreliable po intake; per per ID, last dose will be on 7/18    Dysphagia  - at baseline, pt unable to feed self, but normally able to swallow  - po intake slowly improving  - continue IVF due to the low intake and the MALVIN    MALVIN  - Nephro consulted (Miguel Angel) -- feels this is likely Acyclovir side effect  - Cr improving (and now normal) w/ IVF (NS @ 125/hr --> NS + 20K @ 100/hr)  - renal US showed no hydronephrosis    Hypokalemia/Hypophosphatemia -- likely from all the IVF  - Added 20 K to IVF; gave one-time infusion of KPO4  - recheck in AM      Acute otitis externa of right ear - doing much better  - ID consulted (Dawn) - Herpes Zoster Oticus  - ID now signed off.  - ID following & changed acyclovir to Valtrex - but switch switched back due to inability to take po meds reliably  - ID stopped IV Cipro & cortisporin     Dyspnea  - may be due to sepsis and encephalopathy  - c/w O2 as needed  - Pulm consulted - Juan F  - no DVT on US  - no pneumonia on plain CT of chest     Prostate cancer  - leuprolide when tolerating PO    Hx of Diverticulosis w/ significant diverticular bleed in 2018, requiring 4 transfusions    - patient had another GI bleed in 2019, but refused EGD/colonoscopy, so source unknown  - family agreed to SQ heparin and watch H/H     Essential hypertension  - c/w hydralazine PRN     Functional quadriplegia  - history of anoxic encephalopathy - per wife, occurred 2008 due to fall   - c/w supportive care    Prophylaxis:  DVT: Heparin  GI: Pepcid    Disp: Home when medically stable; need to (1) complete the Acyclovir, (2) taper off IVF with Cr remaining stable, (3) get po intake up to ~75% of normal or better    Perhaps by 7/20 - 7/22? 69 yo M with a PMH of Anoxic encephalopathy - minimally verbal and bedbound at baseline, prostate cancer, HTN, bowel resection & ICD who presented w/ R ear discharge. Pt's wife also reported that he was talking less and had worsening PO intake.      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 Seymour (+) for varicella, suggesting viral meningoencephalitis  - Neuro consulted (Pema)  - LP confirms CSF PCR positive for Varicella zoster virus  - Started on IV Acyclovir; changed acyclovir to Valtrex briefly; switched back due to unreliable po intake; per per ID, last dose will be on 7/18    Dysphagia  - at baseline, pt unable to feed self, but normally able to swallow  - po intake slowly improving  - continue IVF due to the low intake and the MALVIN    MALVIN  - Nephro consulted (Miguel Angel) -- feels this is likely Acyclovir side effect  - Cr improving (and now normal) w/ IVF (NS @ 125/hr --> NS + 20K @ 100/hr)  - renal US showed no hydronephrosis    Hypokalemia/Hypophosphatemia -- likely from all the IVF  - Added 20 K to IVF; gave one-time infusion of KPO4  - recheck in AM      Acute otitis externa of right ear - doing much better  - ID consulted (Dawn) - Herpes Zoster Oticus  - ID now signed off.  - ID following & changed acyclovir to Valtrex - but switch switched back due to inability to take po meds reliably  - ID stopped IV Cipro & cortisporin     Dyspnea  - may be due to sepsis and encephalopathy  - c/w O2 as needed  - Pulm consulted - Juan F  - no DVT on US  - no pneumonia on plain CT of chest     Prostate cancer  - leuprolide when tolerating PO    Hx of Diverticulosis w/ significant diverticular bleed in 2018, requiring 4 transfusions    - patient had another GI bleed in 2019, but refused EGD/colonoscopy, so source unknown  - family agreed to SQ heparin and watch H/H     Essential hypertension  - c/w hydralazine PRN     Functional quadriplegia  - history of anoxic encephalopathy - per wife, occurred 2008 due to fall   - c/w supportive care    Prophylaxis:  DVT: Heparin  GI: Pepcid    Disp: Home when medically stable; need to (1) complete the Acyclovir, (2) taper off IVF with Cr remaining stable, (3) get po intake up to ~75% of normal or better    Perhaps by 7/20 - 7/22?    Discussed w/ his usual home-care aide at bedside 7/16;  Discussed with wife Remedios at bedside 7/17

## 2021-07-17 NOTE — PROGRESS NOTE ADULT - SUBJECTIVE AND OBJECTIVE BOX
[facility] NEPHROLOGY SERVICES, [facility]  NEPHROLOGY AND HYPERTENSION  [address]  [address]  [place] [address]  [phone]    MD SANDIE AREVALO MD ANDREY GONCHARUK, MD MADHU KORRAPATI, MD YELENA ROSENBERG, MD BINNY KOSHY, MD CHRISTOPHER CAPUTO, MD GAGE SONG MD          Patient events noted  No distress      MEDICATIONS  (STANDING):  acyclovir IVPB 600 milliGRAM(s) IV Intermittent every 12 hours  carvedilol 3.125 milliGRAM(s) Oral every 12 hours  chlorhexidine 2% Cloths 1 Application(s) Topical daily  famotidine Injectable 20 milliGRAM(s) IV Push two times a day  heparin   Injectable 5000 Unit(s) SubCutaneous every 12 hours  lidocaine 1% Injectable 10 milliLiter(s) Local Injection once  nystatin Powder 1 Application(s) Topical two times a day  sodium chloride 0.9% 1000 milliLiter(s) (100 mL/Hr) IV Continuous <Continuous>    MEDICATIONS  (PRN):  acetaminophen  Suppository .. 650 milliGRAM(s) Rectal every 6 hours PRN Temp greater or equal to 38C (100.4F), Mild Pain (1 - 3)  albuterol/ipratropium for Nebulization 3 milliLiter(s) Nebulizer every 6 hours PRN wheeze  hydrALAZINE Injectable 5 milliGRAM(s) IV Push every 6 hours PRN SBP > 160  sodium chloride 0.9% lock flush 10 milliLiter(s) IV Push every 1 hour PRN Pre/post blood products, medications, blood draw, and to maintain line patency      07-16-21 @ 07:01 - 07-17-21 @ 07:00  --------------------------------------------------------  IN: 3600 mL / OUT: 2000 mL / NET: 1600 mL    07-17-21 @ 07:01  -  07-17-21 @ 19:37  --------------------------------------------------------  IN: 0 mL / OUT: 600 mL / NET: -600 mL      PHYSICAL EXAM:      T(C): 37.3 (07-17-21 @ 18:11), Max: 37.3 (07-17-21 @ 05:43)  HR: 94 (07-17-21 @ 18:11) (89 - 98)  BP: 162/80 (07-17-21 @ 18:11) (149/74 - 162/80)  RR: 19 (07-17-21 @ 18:11) (18 - 19)  SpO2: 98% (07-17-21 @ 18:11) (97% - 100%)  Wt(kg): --  Lungs clear  Heart S1S2  Abd soft NT ND  Extremities:   tr edema                                    11.3   4.59  )-----------( 306      ( 16 Jul 2021 07:21 )             32.9     07-17    143  |  109<H>  |  7   ----------------------------<  115<H>  3.0<L>   |  24  |  1.15    Ca    7.1<L> corrected 8.3     17 Jul 2021 08:01  Phos  2.3     07-17  Mg     1.7     07-17    TPro  6.6  /  Alb  2.5<L>  /  TBili  0.5  /  DBili  x   /  AST  29  /  ALT  32  /  AlkPhos  61  07-17      LIVER FUNCTIONS - ( 17 Jul 2021 08:01 )  Alb: 2.5 g/dL / Pro: 6.6 gm/dL / ALK PHOS: 61 U/L / ALT: 32 U/L / AST: 29 U/L / GGT: x           Creatinine Trend: 1.15<--, 1.39<--, 1.63<--, 1.81<--, 1.70<--, 0.58<--      Assessment   MALVIN suspected acyclovir intratubular obstruction    Plan:  Continue IVF  Replete K, phos  PO Ca with Vit D    Tommie Michelle MD

## 2021-07-17 NOTE — PROGRESS NOTE ADULT - SUBJECTIVE AND OBJECTIVE BOX
Patient is a 70y old  Male who presents with a chief complaint of sepsis, ear infection (17 Jul 2021 09:19)      INTERVAL HPI/OVERNIGHT EVENTS:    Doing reasonably well with meals (with considerable assistance). Per wife, he's "about 50% of the way back to his normal". Still drowsy at times,  awake and alert at times.     REVIEW OF SYSTEMS:   Unable to get ROS    MEDICATIONS  (STANDING):  acyclovir IVPB 600 milliGRAM(s) IV Intermittent every 12 hours  carvedilol 3.125 milliGRAM(s) Oral every 12 hours  chlorhexidine 2% Cloths 1 Application(s) Topical daily  famotidine Injectable 20 milliGRAM(s) IV Push two times a day  heparin   Injectable 5000 Unit(s) SubCutaneous every 12 hours  lidocaine 1% Injectable 10 milliLiter(s) Local Injection once  nystatin Powder 1 Application(s) Topical two times a day  sodium chloride 0.9% 1000 milliLiter(s) (100 mL/Hr) IV Continuous <Continuous>    MEDICATIONS  (PRN):  acetaminophen  Suppository .. 650 milliGRAM(s) Rectal every 6 hours PRN Temp greater or equal to 38C (100.4F), Mild Pain (1 - 3)  albuterol/ipratropium for Nebulization 3 milliLiter(s) Nebulizer every 6 hours PRN wheeze  hydrALAZINE Injectable 5 milliGRAM(s) IV Push every 6 hours PRN SBP > 160  sodium chloride 0.9% lock flush 10 milliLiter(s) IV Push every 1 hour PRN Pre/post blood products, medications, blood draw, and to maintain line patency      Allergies    lisinopril (Unknown)  penicillins (Unknown)    Intolerances        Vital Signs Last 24 Hrs  T(C): 37.2 (17 Jul 2021 11:03), Max: 37.3 (17 Jul 2021 05:43)  T(F): 98.9 (17 Jul 2021 11:03), Max: 99.2 (17 Jul 2021 05:43)  HR: 98 (17 Jul 2021 11:03) (89 - 98)  BP: 152/85 (17 Jul 2021 11:03) (149/74 - 157/81)  BP(mean): --  RR: 19 (17 Jul 2021 11:03) (18 - 19)  SpO2: 98% (17 Jul 2021 11:03) (97% - 100%)    PHYSICAL EXAM:  GENERAL: NAD; a little more drowsy this AM, but later in day more interactive  HEAD:  Atraumatic, Normocephalic; R ear appears normal now  EYES: EOMI, PERRLA, conjunctiva and sclera clear  ENT: O/P Clear  NECK: Supple, No JVD  NERVOUS SYSTEM:  No focal deficits, but difficult to assess  CHEST/LUNG: Clear to percussion bilaterally; No rales, rhonchi, wheezing  HEART: Regular rate and rhythm; No murmurs, rubs, or gallops  ABDOMEN: Soft, Nontender, Nondistended; Bowel sounds present  EXTREMITIES:  2+ Peripheral Pulses, No clubbing, cyanosis, some edema  SKIN: No rashes or lesions    LABS:                        11.3   4.59  )-----------( 306      ( 16 Jul 2021 07:21 )             32.9     07-17    143  |  109<H>  |  7   ----------------------------<  115<H>  3.0<L>   |  24  |  1.15    Ca    7.1<L>      17 Jul 2021 08:01  Phos  2.3     07-17  Mg     1.7     07-17    TPro  6.6  /  Alb  2.5<L>  /  TBili  0.5  /  DBili  x   /  AST  29  /  ALT  32  /  AlkPhos  61  07-17        CAPILLARY BLOOD GLUCOSE          RADIOLOGY & ADDITIONAL TESTS:    Imaging Personally Reviewed:  [ ] YES  [ ] NO    Consultant(s) Notes Reviewed:  [ ] YES  [ ] NO    Care Discussed with Consultants/Other Providers [ ] YES  [ ] NO

## 2021-07-18 LAB
ANION GAP SERPL CALC-SCNC: 8 MMOL/L — SIGNIFICANT CHANGE UP (ref 5–17)
BUN SERPL-MCNC: 7 MG/DL — SIGNIFICANT CHANGE UP (ref 7–23)
CALCIUM SERPL-MCNC: 6.9 MG/DL — LOW (ref 8.5–10.1)
CHLORIDE SERPL-SCNC: 106 MMOL/L — SIGNIFICANT CHANGE UP (ref 96–108)
CO2 SERPL-SCNC: 25 MMOL/L — SIGNIFICANT CHANGE UP (ref 22–31)
CREAT SERPL-MCNC: 1.12 MG/DL — SIGNIFICANT CHANGE UP (ref 0.5–1.3)
GLUCOSE SERPL-MCNC: 117 MG/DL — HIGH (ref 70–99)
HCT VFR BLD CALC: 33.9 % — LOW (ref 39–50)
HGB BLD-MCNC: 11.7 G/DL — LOW (ref 13–17)
MAGNESIUM SERPL-MCNC: 1.2 MG/DL — LOW (ref 1.6–2.6)
MCHC RBC-ENTMCNC: 25.9 PG — LOW (ref 27–34)
MCHC RBC-ENTMCNC: 34.5 GM/DL — SIGNIFICANT CHANGE UP (ref 32–36)
MCV RBC AUTO: 75 FL — LOW (ref 80–100)
NRBC # BLD: 0 /100 WBCS — SIGNIFICANT CHANGE UP (ref 0–0)
PHOSPHATE SERPL-MCNC: 2.5 MG/DL — SIGNIFICANT CHANGE UP (ref 2.5–4.5)
PLATELET # BLD AUTO: 309 K/UL — SIGNIFICANT CHANGE UP (ref 150–400)
POTASSIUM SERPL-MCNC: 3.3 MMOL/L — LOW (ref 3.5–5.3)
POTASSIUM SERPL-SCNC: 3.3 MMOL/L — LOW (ref 3.5–5.3)
RBC # BLD: 4.52 M/UL — SIGNIFICANT CHANGE UP (ref 4.2–5.8)
RBC # FLD: 14.9 % — HIGH (ref 10.3–14.5)
SODIUM SERPL-SCNC: 139 MMOL/L — SIGNIFICANT CHANGE UP (ref 135–145)
WBC # BLD: 5.73 K/UL — SIGNIFICANT CHANGE UP (ref 3.8–10.5)
WBC # FLD AUTO: 5.73 K/UL — SIGNIFICANT CHANGE UP (ref 3.8–10.5)

## 2021-07-18 PROCEDURE — 99233 SBSQ HOSP IP/OBS HIGH 50: CPT

## 2021-07-18 PROCEDURE — 99232 SBSQ HOSP IP/OBS MODERATE 35: CPT

## 2021-07-18 RX ORDER — MAGNESIUM SULFATE 500 MG/ML
1 VIAL (ML) INJECTION ONCE
Refills: 0 | Status: COMPLETED | OUTPATIENT
Start: 2021-07-18 | End: 2021-07-18

## 2021-07-18 RX ORDER — ACYCLOVIR SODIUM 500 MG
600 VIAL (EA) INTRAVENOUS ONCE
Refills: 0 | Status: COMPLETED | OUTPATIENT
Start: 2021-07-18 | End: 2021-07-18

## 2021-07-18 RX ADMIN — FAMOTIDINE 20 MILLIGRAM(S): 10 INJECTION INTRAVENOUS at 05:56

## 2021-07-18 RX ADMIN — Medication 3 TABLET(S): at 17:51

## 2021-07-18 RX ADMIN — CALCITRIOL 0.25 MICROGRAM(S): 0.5 CAPSULE ORAL at 12:14

## 2021-07-18 RX ADMIN — CARVEDILOL PHOSPHATE 3.12 MILLIGRAM(S): 80 CAPSULE, EXTENDED RELEASE ORAL at 05:55

## 2021-07-18 RX ADMIN — Medication 262 MILLIGRAM(S): at 05:56

## 2021-07-18 RX ADMIN — HEPARIN SODIUM 5000 UNIT(S): 5000 INJECTION INTRAVENOUS; SUBCUTANEOUS at 17:51

## 2021-07-18 RX ADMIN — CARVEDILOL PHOSPHATE 3.12 MILLIGRAM(S): 80 CAPSULE, EXTENDED RELEASE ORAL at 17:51

## 2021-07-18 RX ADMIN — FAMOTIDINE 20 MILLIGRAM(S): 10 INJECTION INTRAVENOUS at 17:51

## 2021-07-18 RX ADMIN — Medication 100 GRAM(S): at 12:15

## 2021-07-18 RX ADMIN — NYSTATIN CREAM 1 APPLICATION(S): 100000 CREAM TOPICAL at 17:51

## 2021-07-18 RX ADMIN — NYSTATIN CREAM 1 APPLICATION(S): 100000 CREAM TOPICAL at 05:56

## 2021-07-18 RX ADMIN — HEPARIN SODIUM 5000 UNIT(S): 5000 INJECTION INTRAVENOUS; SUBCUTANEOUS at 05:55

## 2021-07-18 RX ADMIN — DEXTROSE MONOHYDRATE, SODIUM CHLORIDE, AND POTASSIUM CHLORIDE 100 MILLILITER(S): 50; .745; 4.5 INJECTION, SOLUTION INTRAVENOUS at 06:02

## 2021-07-18 RX ADMIN — Medication 262 MILLIGRAM(S): at 18:01

## 2021-07-18 RX ADMIN — CHLORHEXIDINE GLUCONATE 1 APPLICATION(S): 213 SOLUTION TOPICAL at 12:14

## 2021-07-18 NOTE — PROGRESS NOTE ADULT - SUBJECTIVE AND OBJECTIVE BOX
ANITHA ADONIS    S 1E 180 I    Patient is a 70y old  Male who presents with a chief complaint of sepsis, ear infection (17 Jul 2021 19:37)       Allergies    lisinopril (Unknown)  penicillins (Unknown)    Intolerances        HPI:  Patient is a 70M with a PMH of Anoxic encephalopathy, prostate cancer, HTN, bowel resection who presents to the ED for R ear discharge.  Patient minimally verbal and bedbound at baseline, unable to provide history.  Wife at the bedside to provide history.  Patient noted to have R ear discharge for two days along with temperatures at home.  Wife states that she has been giving him tylenol at home without resolution of the fever.  Per wife, patient was talking much less and was not accepting PO intake so she presents to the ED for evaluation.  Denies history of ear infections.  Patient also noted to have sporadic dyspnea over the same time span.  No other complaints.  Current SpO2 on 2L via NC is 92%.  Vitals stable, labs benign.  Will admit to med surg.     (07 Jul 2021 05:04)      PAST MEDICAL & SURGICAL HISTORY:  Hypertension    Prostate cancer  radiation therapy    Brain injury with coma  x 2 weeks in 2008    Anoxic encephalopathy    Leg pain, right    Gait abnormality    Sudden cardiac death    Status post cryoablation  of left renal mass    H/O prostate cancer  resection and radiation therapy    H/O tracheostomy    S/P ICD (internal cardiac defibrillator) procedure  implanted on 1/6/2009        FAMILY HISTORY:  FH: HTN (hypertension)          MEDICATIONS   acetaminophen  Suppository .. 650 milliGRAM(s) Rectal every 6 hours PRN  acyclovir IVPB 600 milliGRAM(s) IV Intermittent every 12 hours  albuterol/ipratropium for Nebulization 3 milliLiter(s) Nebulizer every 6 hours PRN  calcitriol   Capsule 0.25 MICROGram(s) Oral daily  calcium carbonate    500 mG (Tums) Chewable 3 Tablet(s) Chew two times a day  carvedilol 3.125 milliGRAM(s) Oral every 12 hours  chlorhexidine 2% Cloths 1 Application(s) Topical daily  dextrose 5% + sodium chloride 0.45% with potassium chloride 20 mEq/L 1000 milliLiter(s) IV Continuous <Continuous>  famotidine Injectable 20 milliGRAM(s) IV Push two times a day  heparin   Injectable 5000 Unit(s) SubCutaneous every 12 hours  hydrALAZINE Injectable 5 milliGRAM(s) IV Push every 6 hours PRN  lidocaine 1% Injectable 10 milliLiter(s) Local Injection once  nystatin Powder 1 Application(s) Topical two times a day  sodium chloride 0.9% lock flush 10 milliLiter(s) IV Push every 1 hour PRN      Vital Signs Last 24 Hrs  T(C): 37.2 (18 Jul 2021 06:18), Max: 37.3 (17 Jul 2021 18:11)  T(F): 99 (18 Jul 2021 06:18), Max: 99.2 (17 Jul 2021 18:11)  HR: 73 (18 Jul 2021 06:18) (73 - 98)  BP: 171/82 (18 Jul 2021 06:18) (149/83 - 171/82)  BP(mean): --  RR: 18 (18 Jul 2021 06:18) (18 - 19)  SpO2: 98% (18 Jul 2021 06:18) (98% - 100%)      07-17-21 @ 07:01  -  07-18-21 @ 07:00  --------------------------------------------------------  IN: 1570 mL / OUT: 2900 mL / NET: -1330 mL            LABS:                        11.7   5.73  )-----------( 309      ( 18 Jul 2021 06:46 )             33.9     07-18    139  |  106  |  7   ----------------------------<  117<H>  3.3<L>   |  25  |  1.12    Ca    6.9<L>      18 Jul 2021 06:46  Phos  2.5     07-18  Mg     1.2     07-18    TPro  6.6  /  Alb  2.5<L>  /  TBili  0.5  /  DBili  x   /  AST  29  /  ALT  32  /  AlkPhos  61  07-17              WBC:  WBC Count: 5.73 K/uL (07-18 @ 06:46)  WBC Count: 4.59 K/uL (07-16 @ 07:21)  WBC Count: 4.58 K/uL (07-15 @ 07:36)      MICROBIOLOGY:  RECENT CULTURES:                  Sodium:  Sodium, Serum: 139 mmol/L (07-18 @ 06:46)  Sodium, Serum: 143 mmol/L (07-17 @ 08:01)  Sodium, Serum: 142 mmol/L (07-16 @ 07:21)  Sodium, Serum: 141 mmol/L (07-15 @ 07:36)      1.12 mg/dL 07-18 @ 06:46  1.15 mg/dL 07-17 @ 08:01  1.39 mg/dL 07-16 @ 07:21  1.63 mg/dL 07-15 @ 07:36      Hemoglobin:  Hemoglobin: 11.7 g/dL (07-18 @ 06:46)  Hemoglobin: 11.3 g/dL (07-16 @ 07:21)  Hemoglobin: 11.0 g/dL (07-15 @ 07:36)      Platelets: Platelet Count - Automated: 309 K/uL (07-18 @ 06:46)  Platelet Count - Automated: 306 K/uL (07-16 @ 07:21)  Platelet Count - Automated: 274 K/uL (07-15 @ 07:36)      LIVER FUNCTIONS - ( 17 Jul 2021 08:01 )  Alb: 2.5 g/dL / Pro: 6.6 gm/dL / ALK PHOS: 61 U/L / ALT: 32 U/L / AST: 29 U/L / GGT: x                 RADIOLOGY & ADDITIONAL STUDIES:

## 2021-07-18 NOTE — PROGRESS NOTE ADULT - SUBJECTIVE AND OBJECTIVE BOX
Patient is a 70y old  Male who presents with a chief complaint of sepsis, ear infection (18 Jul 2021 09:01)      INTERVAL HPI/OVERNIGHT EVENTS:    Per wife, pt still only eating about 25% of meals. States he has been speaking occasionally; less than baseline.    REVIEW OF SYSTEMS:   Unable to get ROS    MEDICATIONS  (STANDING):  acyclovir IVPB 600 milliGRAM(s) IV Intermittent every 12 hours  calcitriol   Capsule 0.25 MICROGram(s) Oral daily  calcium carbonate    500 mG (Tums) Chewable 3 Tablet(s) Chew two times a day  carvedilol 3.125 milliGRAM(s) Oral every 12 hours  chlorhexidine 2% Cloths 1 Application(s) Topical daily  dextrose 5% + sodium chloride 0.45% with potassium chloride 20 mEq/L 1000 milliLiter(s) (100 mL/Hr) IV Continuous <Continuous>  famotidine Injectable 20 milliGRAM(s) IV Push two times a day  heparin   Injectable 5000 Unit(s) SubCutaneous every 12 hours  lidocaine 1% Injectable 10 milliLiter(s) Local Injection once  nystatin Powder 1 Application(s) Topical two times a day    MEDICATIONS  (PRN):  acetaminophen  Suppository .. 650 milliGRAM(s) Rectal every 6 hours PRN Temp greater or equal to 38C (100.4F), Mild Pain (1 - 3)  albuterol/ipratropium for Nebulization 3 milliLiter(s) Nebulizer every 6 hours PRN wheeze  hydrALAZINE Injectable 5 milliGRAM(s) IV Push every 6 hours PRN SBP > 160  sodium chloride 0.9% lock flush 10 milliLiter(s) IV Push every 1 hour PRN Pre/post blood products, medications, blood draw, and to maintain line patency      Allergies    lisinopril (Unknown)  penicillins (Unknown)    Intolerances        Vital Signs Last 24 Hrs  T(C): 37.1 (18 Jul 2021 11:44), Max: 37.3 (17 Jul 2021 18:11)  T(F): 98.8 (18 Jul 2021 11:44), Max: 99.2 (17 Jul 2021 18:11)  HR: 80 (18 Jul 2021 11:53) (73 - 94)  BP: 145/75 (18 Jul 2021 11:53) (145/75 - 171/82)  BP(mean): --  RR: 18 (18 Jul 2021 11:44) (18 - 19)  SpO2: 97% (18 Jul 2021 11:44) (97% - 100%)    PHYSICAL EXAM:  GENERAL: NAD; in bed; awake; looks to voice  HEAD:  Atraumatic, Normocephalic  EYES: EOMI, PERRLA, conjunctiva and sclera clear  ENT: O/P Clear  NECK: Supple, No JVD  NERVOUS SYSTEM:  Unable to get adequate neuro exam  CHEST/LUNG: Clear to percussion bilaterally; No rales, rhonchi, wheezing  HEART: Regular rate and rhythm; No murmurs, rubs, or gallops  ABDOMEN: Soft, Nontender, Nondistended; Bowel sounds present  EXTREMITIES:  2+ Peripheral Pulses, No clubbing, cyanosis; mild edema  SKIN: No rashes or lesions    LABS:                        11.7   5.73  )-----------( 309      ( 18 Jul 2021 06:46 )             33.9     07-18    139  |  106  |  7   ----------------------------<  117<H>  3.3<L>   |  25  |  1.12    Ca    6.9<L>      18 Jul 2021 06:46  Phos  2.5     07-18  Mg     1.2     07-18    TPro  6.6  /  Alb  2.5<L>  /  TBili  0.5  /  DBili  x   /  AST  29  /  ALT  32  /  AlkPhos  61  07-17        CAPILLARY BLOOD GLUCOSE          RADIOLOGY & ADDITIONAL TESTS:    Imaging Personally Reviewed:  [ ] YES  [ ] NO    Consultant(s) Notes Reviewed:  [ ] YES  [ ] NO    Care Discussed with Consultants/Other Providers [ ] YES  [ ] NO

## 2021-07-18 NOTE — PROGRESS NOTE ADULT - ASSESSMENT
71 yo M with a PMH of Anoxic encephalopathy - minimally verbal and bedbound at baseline, prostate cancer, HTN, bowel resection & ICD who presented w/ R ear discharge. Pt's wife also reported that he was talking less and had worsening PO intake.      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 Seymour (+) for varicella, suggesting viral meningoencephalitis  - Neuro consulted (Pema)  - LP confirms CSF PCR positive for Varicella zoster virus  - Started on IV Acyclovir; changed acyclovir to Valtrex briefly; switched back due to unreliable po intake; per per ID, last dose will be on 7/18    Dysphagia  - at baseline, pt unable to feed self, but normally able to swallow  - po intake slowly improving  - continue IVF due to the low intake and the recent MALVIN    MALVIN  - Nephro consulted (Miguel Angel) -- feels this is likely Acyclovir side effect  - Cr improving (and now normal) w/ IVF (NS @ 125/hr --> D5 NS + 20K @ 100/hr)  - renal US showed no hydronephrosis    Hypokalemia/Hypophosphatemia/Hypmag -- likely from all the IVF  - Added 20 K to IVF; gave one-time infusion of KPO4; one-time infusion of Mag  - recheck in AM      Acute otitis externa of right ear - doing much better  - ID consulted (Dawn) - Herpes Zoster Oticus  - ID now signed off.  - ID following & changed acyclovir to Valtrex - but switch switched back due to inability to take po meds reliably  - ID stopped IV Cipro & cortisporin     Dyspnea  - may be due to sepsis and encephalopathy  - c/w O2 as needed  - Pulm consulted - Juan F  - no DVT on US  - no pneumonia on plain CT of chest     Prostate cancer  - Restart leuprolide when tolerating PO    Hx of Diverticulosis w/ significant diverticular bleed in 2018, requiring 4 transfusions    - patient had another GI bleed in 2019, but refused EGD/colonoscopy, so source unknown  - family agreed to SQ heparin and watch H/H     Essential hypertension  - c/w hydralazine PRN     Functional quadriplegia  - history of anoxic encephalopathy - per wife, occurred 2008 due to fall   - c/w supportive care    Prophylaxis:  DVT: Heparin  GI: Pepcid    Disp: Home w/ home PT/OT when medically stable; need to (1) complete the Acyclovir, (2) taper off IVF with Cr remaining stable, (3) get po intake up to ~75% of normal or better    Perhaps by 7/20 - 7/22?    Discussed w/ his usual home-care aide at bedside 7/16;  Discussed with wife Remedios at bedside 7/17, 7/18    Note: Pt has home aide 7 days per week; 12 hrs per day except 6 hrs on Sundays    Wife requesting reclining wheelchair for home (case mgmt aware; DME form signed)

## 2021-07-18 NOTE — PROGRESS NOTE ADULT - ASSESSMENT
ADONIS WELSH 70 M 7/7/2021 1949 DR JAXON BROWN      REVIEW OF SYMPTOMS      Able to give (reliable) ROS  NO     PHYSICAL EXAM    HEENT Unremarkable  atraumatic   RESP Fair air entry EXP prolonged    Harsh breath sound Resp distres mild   CARDIAC S1 S2 No S3     NO JVD    ABDOMEN SOFT BS PRESENT NOT DISTENDED No hepatosplenomegaly   PEDAL EDEMA present No calf tenderness  NO rash       ADVANCED DIRECTIVES.                            COVID STATUS.                                       scv2 7/7/2021 (-)       CC 7/7/2021 ADMISSN .   70 m HO brain injury 2008 previously able to eat po 3 w ago  7/7/2021 FEVER     PRESENTING PROBLEMS 7/7/2021 .   OTITIS EXTERNA R EAR   FEVER   HYPONATREMIA Na 7/7/2021 Na 129  DYSPNEA  FUNCTIONAL QUADRIPLEGIA     PMH.  BASELINE  VERBAL BEDBOUND   SBR   HO TRACH SP DECANNULATION   ANOXIC ENCEPHALOPATHY Brain injury 2008   HO SCD  HO AICD 2009   HO CYROABLATION L RENAL MASS   PROSTATE CA        GLOBAL AND BEST PRACTICE ISSUES                     ALLERGY.    PNCL LISINOPRIL  WEIGHT.          7/7/2021 90        BMI                7/7/2021 33        .                          ADVANCED DIRECTIVE.                               HEAD OF BED ELEVATION. Yes  DVT PROPHYLAXIS. hpsc (7/8)   APA.                   GONZALEZ PROPHYLAXIS.                                                                     DYSPHAGIA RECOMM.7/12/2021 puree honey (7/12/2021)    DIET.   puree honey (7/12/2021)   INFECTION PROPHYLAXIS.  chlorhexidine (7/9)      PATIENT DATA                ABG.     7/10/2021 .32 749/36/111              OXYGENATION.    7/13/2021 3l 100%        VITALS/IO/VENT/DRIPS.     7/18/2021 99f 80 130/60     ASSESSMENT/RECOMMENDATIONS.    HERPES OTITIS EXTRENAL   VZV  ENCEPHALITIS   7/7 herpes vzv pcr (+) EAR  7/9 CSF vzv pcr (+)   On acyclovir   WHEEZE   DUNEB.4 P 97/7)   Improvd   DYSPHAGIA DIET STARTED 7/12/2021   On iv fl  7/12/2021 Dys diet stared   Intake poor   7/18/2021 dw spouse bedside   If intake poor may need peg  MALVIN  Cr 7/18/2021 1.1   D5 1/2 ns 20 kcl 100 (7/17)   Monitor     CURRENT ISSUES  VZV ENCEPHALITIS ON ACYCLOVIR   DYSPHAGIA   MALVIN     TIME SPENT   Over 25 minutes aggregate care time spent on encounter; activities included   direct patient care, counseling and/or coordinating care reviewing notes, lab data/ imaging , discussion with multidisciplinary team/ patient  /family and explaining in detail risks, benefits, alternatives  of the recommendations     ADONIS WELSH 70 M 7/7/2021 1949 DR JAXON BROWN

## 2021-07-18 NOTE — PROGRESS NOTE ADULT - ASSESSMENT
Subjective Complaints:  Historian:             Vital Signs Last 24 Hrs  T(C): 37.4 (18 Jul 2021 17:02), Max: 37.4 (18 Jul 2021 17:02)  T(F): 99.4 (18 Jul 2021 17:02), Max: 99.4 (18 Jul 2021 17:02)  HR: 91 (18 Jul 2021 17:02) (73 - 94)  BP: 132/68 (18 Jul 2021 17:02) (132/68 - 171/82)  BP(mean): --  RR: 18 (18 Jul 2021 17:02) (18 - 19)  SpO2: 100% (18 Jul 2021 17:02) (97% - 100%)    GENERAL PHYSICAL EXAM:  General:  Appears stated age, well-groomed, well-nourished, no distress  HEENT:  NC/AT, patent nares w/ pink mucosa, OP clear w/o lesions, PERRL, EOMI, conjunctivae clear, no thyromegaly, nodules, adenopathy, no JVD  Chest:  Full & symmetric excursion, no increased effort, breath sounds clear  Cardiovascular:  Regular rhythm, S1, S2, no murmur/rub/S3/S4, no carotid/femoral/abdominal bruit, radial/pedal pulses 2+, no edema  Abdomen:  Soft, non-tender, non-distended, normoactive bowel sounds, no HSM  Extremities:  Gait & station:   Digits:   Nails:   Joints, Bones, Muscles:   ROM:   Stability:  Skin:  No rash/erythema/ecchymoses/petechiae/wounds/abscess/warm/dry  Musculoskeletal:  Full ROM in all joints w/o swelling/tenderness/effusion        LABS:                        11.7   5.73  )-----------( 309      ( 18 Jul 2021 06:46 )             33.9     07-18    139  |  106  |  7   ----------------------------<  117<H>  3.3<L>   |  25  |  1.12    Ca    6.9<L>      18 Jul 2021 06:46  Phos  2.5     07-18  Mg     1.2     07-18    TPro  6.6  /  Alb  2.5<L>  /  TBili  0.5  /  DBili  x   /  AST  29  /  ALT  32  /  AlkPhos  61  07-17          RADIOLOGY & ADDITIONAL STUDIES:        Neurology Progress Note:      Mental Status: awake open eyes does not follows commands       Cranial Nerves:defrd      Motor:   spastic quadroparesis         Sensory: intact      Cerebellar: defrd      Gait: defrd      Assesment/Plan: s/p anoxic encephalothy viral encephaliatis zoster was on iv acyclovir  no seizure discuss with wife

## 2021-07-19 ENCOUNTER — TRANSCRIPTION ENCOUNTER (OUTPATIENT)
Age: 71
End: 2021-07-19

## 2021-07-19 VITALS
SYSTOLIC BLOOD PRESSURE: 149 MMHG | OXYGEN SATURATION: 97 % | DIASTOLIC BLOOD PRESSURE: 78 MMHG | RESPIRATION RATE: 17 BRPM | HEART RATE: 89 BPM

## 2021-07-19 DIAGNOSIS — E87.6 HYPOKALEMIA: ICD-10-CM

## 2021-07-19 LAB
ANION GAP SERPL CALC-SCNC: 7 MMOL/L — SIGNIFICANT CHANGE UP (ref 5–17)
BUN SERPL-MCNC: 4 MG/DL — LOW (ref 7–23)
CALCIUM SERPL-MCNC: 6.6 MG/DL — LOW (ref 8.5–10.1)
CHLORIDE SERPL-SCNC: 106 MMOL/L — SIGNIFICANT CHANGE UP (ref 96–108)
CO2 SERPL-SCNC: 26 MMOL/L — SIGNIFICANT CHANGE UP (ref 22–31)
CREAT SERPL-MCNC: 0.77 MG/DL — SIGNIFICANT CHANGE UP (ref 0.5–1.3)
GLUCOSE SERPL-MCNC: 136 MG/DL — HIGH (ref 70–99)
MAGNESIUM SERPL-MCNC: 1.4 MG/DL — LOW (ref 1.6–2.6)
PHOSPHATE SERPL-MCNC: 2.1 MG/DL — LOW (ref 2.5–4.5)
POTASSIUM SERPL-MCNC: 3.3 MMOL/L — LOW (ref 3.5–5.3)
POTASSIUM SERPL-SCNC: 3.3 MMOL/L — LOW (ref 3.5–5.3)
SODIUM SERPL-SCNC: 139 MMOL/L — SIGNIFICANT CHANGE UP (ref 135–145)

## 2021-07-19 PROCEDURE — 99232 SBSQ HOSP IP/OBS MODERATE 35: CPT

## 2021-07-19 PROCEDURE — 99238 HOSP IP/OBS DSCHRG MGMT 30/<: CPT

## 2021-07-19 RX ORDER — MAGNESIUM SULFATE 500 MG/ML
2 VIAL (ML) INJECTION ONCE
Refills: 0 | Status: DISCONTINUED | OUTPATIENT
Start: 2021-07-19 | End: 2021-07-19

## 2021-07-19 RX ORDER — HYDRALAZINE HCL 50 MG
10 TABLET ORAL ONCE
Refills: 0 | Status: COMPLETED | OUTPATIENT
Start: 2021-07-19 | End: 2021-07-19

## 2021-07-19 RX ORDER — MAGNESIUM SULFATE 500 MG/ML
2 VIAL (ML) INJECTION EVERY 6 HOURS
Refills: 0 | Status: COMPLETED | OUTPATIENT
Start: 2021-07-19 | End: 2021-07-19

## 2021-07-19 RX ORDER — POTASSIUM CHLORIDE 20 MEQ
10 PACKET (EA) ORAL
Refills: 0 | Status: COMPLETED | OUTPATIENT
Start: 2021-07-19 | End: 2021-07-19

## 2021-07-19 RX ORDER — NYSTATIN CREAM 100000 [USP'U]/G
1 CREAM TOPICAL
Qty: 1 | Refills: 0
Start: 2021-07-19

## 2021-07-19 RX ADMIN — FAMOTIDINE 20 MILLIGRAM(S): 10 INJECTION INTRAVENOUS at 06:23

## 2021-07-19 RX ADMIN — Medication 25 GRAM(S): at 17:07

## 2021-07-19 RX ADMIN — NYSTATIN CREAM 1 APPLICATION(S): 100000 CREAM TOPICAL at 17:08

## 2021-07-19 RX ADMIN — Medication 25 GRAM(S): at 12:48

## 2021-07-19 RX ADMIN — HEPARIN SODIUM 5000 UNIT(S): 5000 INJECTION INTRAVENOUS; SUBCUTANEOUS at 06:23

## 2021-07-19 RX ADMIN — Medication 100 MILLIEQUIVALENT(S): at 10:15

## 2021-07-19 RX ADMIN — FAMOTIDINE 20 MILLIGRAM(S): 10 INJECTION INTRAVENOUS at 17:07

## 2021-07-19 RX ADMIN — HEPARIN SODIUM 5000 UNIT(S): 5000 INJECTION INTRAVENOUS; SUBCUTANEOUS at 17:07

## 2021-07-19 RX ADMIN — DEXTROSE MONOHYDRATE, SODIUM CHLORIDE, AND POTASSIUM CHLORIDE 100 MILLILITER(S): 50; .745; 4.5 INJECTION, SOLUTION INTRAVENOUS at 03:45

## 2021-07-19 RX ADMIN — NYSTATIN CREAM 1 APPLICATION(S): 100000 CREAM TOPICAL at 06:23

## 2021-07-19 RX ADMIN — Medication 3 TABLET(S): at 17:07

## 2021-07-19 RX ADMIN — Medication 100 MILLIEQUIVALENT(S): at 11:36

## 2021-07-19 RX ADMIN — Medication 10 MILLIGRAM(S): at 16:36

## 2021-07-19 RX ADMIN — CARVEDILOL PHOSPHATE 3.12 MILLIGRAM(S): 80 CAPSULE, EXTENDED RELEASE ORAL at 17:07

## 2021-07-19 RX ADMIN — CHLORHEXIDINE GLUCONATE 1 APPLICATION(S): 213 SOLUTION TOPICAL at 12:10

## 2021-07-19 RX ADMIN — Medication 100 MILLIEQUIVALENT(S): at 14:06

## 2021-07-19 RX ADMIN — Medication 300 UNIT(S): at 18:19

## 2021-07-19 NOTE — PROGRESS NOTE ADULT - PROBLEM SELECTOR PROBLEM 1
Dysphagia, unspecified type
Dyspnea, unspecified type
Dyspnea, unspecified type
Dysphagia, unspecified type
Dyspnea, unspecified type
Septic encephalopathy
Dysphagia, unspecified type
Dysphagia, unspecified type
Dyspnea, unspecified type
Dysphagia, unspecified type
Herpes encephalitis
Septic encephalopathy

## 2021-07-19 NOTE — DISCHARGE NOTE PROVIDER - NSDCMRMEDTOKEN_GEN_ALL_CORE_FT
carvedilol 3.125 mg oral tablet: 1 tab(s) orally every 12 hours  Chux: Dx: Incontinence N39.41  Diapers: XL    Dx: Incontinence N39.41    1 month supply  Disposable clothes: Dx: Incontinence N39.41  Lovenox 80 mg/0.8 mL injectable solution: 80 milligram(s) injectable every 12 hours  multivitamin: 1 tab(s) orally once a day  nystatin 100,000 units/g topical powder: Apply topically to affected area 2 times a day -for wound care   Pull ups: Dx: Incontinence N39.41  Vitamin A &amp; D topical ointment: Apply topically to affected area 2 times a day   zinc oxide 40% topical ointment: 1 application topically 3 times a day, As needed, skin breakdown   carvedilol 3.125 mg oral tablet: 1 tab(s) orally every 12 hours  Chux: Dx: Incontinence N39.41  Diapers: XL    Dx: Incontinence N39.41    1 month supply  Disposable clothes: Dx: Incontinence N39.41  Ensure Can: Ensure can 1 each orally 2 times a day   Lovenox 80 mg/0.8 mL injectable solution: 80 milligram(s) injectable every 12 hours  Multiple Vitamins oral tablet: 1 tab(s) orally once a day  multivitamin: 1 tab(s) orally once a day  nystatin 100,000 units/g topical powder: Apply topically to affected area 2 times a day -for wound care   Pull ups: Dx: Incontinence N39.41  Vitamin A &amp; D topical ointment: Apply topically to affected area 2 times a day   zinc oxide 40% topical ointment: 1 application topically 3 times a day, As needed, skin breakdown

## 2021-07-19 NOTE — PROGRESS NOTE ADULT - ASSESSMENT
ADONIS WELSH 70 M 7/7/2021 1949 DR JAXON BROWN      REVIEW OF SYMPTOMS      Able to give (reliable) ROS  NO     PHYSICAL EXAM    HEENT Unremarkable  atraumatic   RESP Fair air entry EXP prolonged    Harsh breath sound Resp distres mild   CARDIAC S1 S2 No S3     NO JVD    ABDOMEN SOFT BS PRESENT NOT DISTENDED No hepatosplenomegaly   PEDAL EDEMA present No calf tenderness  NO rash     ADVANCED DIRECTIVES.                            COVID STATUS.                                       scv2 7/7/2021 (-)       CC 7/7/2021 ADMISSN .   70 m HO brain injury 2008 previously able to eat po 3 w ago  7/7/2021 FEVER     PRESENTING PROBLEMS 7/7/2021 .   OTITIS EXTERNA R EAR   FEVER   HYPONATREMIA Na 7/7/2021 Na 129  DYSPNEA  FUNCTIONAL QUADRIPLEGIA     PMH.  BASELINE  VERBAL BEDBOUND   SBR   HO TRACH SP DECANNULATION   ANOXIC ENCEPHALOPATHY Brain injury 2008   HO SCD  HO AICD 2009   HO CYROABLATION L RENAL MASS   PROSTATE CA     GLOBAL AND BEST PRACTICE ISSUES                     ALLERGY.    PNCL LISINOPRIL  WEIGHT.          7/7/2021 90        BMI                7/7/2021 33        .                          ADVANCED DIRECTIVE.                               HEAD OF BED ELEVATION. Yes  DVT PROPHYLAXIS. hpsc (7/8)   APA.                   GONZALEZ PROPHYLAXIS.                                                                     DYSPHAGIA RECOMM.7/12/2021 puree honey (7/12/2021)    DIET.   puree honey (7/12/2021)   INFECTION PROPHYLAXIS.  chlorhexidine (7/9)    ABIO.  acyclovi 600.2 (7/14-7/18/2021)     ASSESSMENT/RECOMMENDATIONS.    HERPES OTITIS EXTRENAL   VZV  ENCEPHALITIS   7/7 herpes vzv pcr (+) EAR  7/9 CSF vzv pcr (+)   completed  acyclovir   WHEEZE   DUNEB.4 P 97/7)   Improvd   DYSPHAGIA DIET STARTED 7/12/2021   On iv fl  7/12/2021 Dys diet stared   Intake poor   7/18/2021 dw spouse bedside   If intake poor may need peg  MALVIN  Cr 7/18/2021 1.1   D5 1/2 ns 20 kcl 100 (7/17)   Monitor     CURRENT ISSUES  VZV ENCEPHALITIS completed ACYCLOVIR   DYSPHAGIA   MALVIN     TIME SPENT   Over 25 minutes aggregate care time spent on encounter; activities included   direct patient care, counseling and/or coordinating care reviewing notes, lab data/ imaging , discussion with multidisciplinary team/ patient  /family and explaining in detail risks, benefits, alternatives  of the recommendations     ADONIS Barrera M 7/7/2021 1949 DR JAXON BROWN

## 2021-07-19 NOTE — PROGRESS NOTE ADULT - SUBJECTIVE AND OBJECTIVE BOX
ANITHA ADONIS    S 1E 180 I    Patient is a 70y old  Male who presents with a chief complaint of sepsis, ear infection (18 Jul 2021 17:52)       Allergies    lisinopril (Unknown)  penicillins (Unknown)    Intolerances        HPI:  Patient is a 70M with a PMH of Anoxic encephalopathy, prostate cancer, HTN, bowel resection who presents to the ED for R ear discharge.  Patient minimally verbal and bedbound at baseline, unable to provide history.  Wife at the bedside to provide history.  Patient noted to have R ear discharge for two days along with temperatures at home.  Wife states that she has been giving him tylenol at home without resolution of the fever.  Per wife, patient was talking much less and was not accepting PO intake so she presents to the ED for evaluation.  Denies history of ear infections.  Patient also noted to have sporadic dyspnea over the same time span.  No other complaints.  Current SpO2 on 2L via NC is 92%.  Vitals stable, labs benign.  Will admit to med surg.     (07 Jul 2021 05:04)      PAST MEDICAL & SURGICAL HISTORY:  Hypertension    Prostate cancer  radiation therapy    Brain injury with coma  x 2 weeks in 2008    Anoxic encephalopathy    Leg pain, right    Gait abnormality    Sudden cardiac death    Status post cryoablation  of left renal mass    H/O prostate cancer  resection and radiation therapy    H/O tracheostomy    S/P ICD (internal cardiac defibrillator) procedure  implanted on 1/6/2009        FAMILY HISTORY:  FH: HTN (hypertension)          MEDICATIONS   acetaminophen  Suppository .. 650 milliGRAM(s) Rectal every 6 hours PRN  albuterol/ipratropium for Nebulization 3 milliLiter(s) Nebulizer every 6 hours PRN  calcitriol   Capsule 0.25 MICROGram(s) Oral daily  calcium carbonate    500 mG (Tums) Chewable 3 Tablet(s) Chew two times a day  carvedilol 3.125 milliGRAM(s) Oral every 12 hours  chlorhexidine 2% Cloths 1 Application(s) Topical daily  dextrose 5% + sodium chloride 0.45% with potassium chloride 20 mEq/L 1000 milliLiter(s) IV Continuous <Continuous>  famotidine Injectable 20 milliGRAM(s) IV Push two times a day  heparin   Injectable 5000 Unit(s) SubCutaneous every 12 hours  hydrALAZINE Injectable 5 milliGRAM(s) IV Push every 6 hours PRN  lidocaine 1% Injectable 10 milliLiter(s) Local Injection once  magnesium sulfate  IVPB 2 Gram(s) IV Intermittent once  nystatin Powder 1 Application(s) Topical two times a day  potassium chloride  10 mEq/100 mL IVPB 10 milliEquivalent(s) IV Intermittent every 1 hour  sodium chloride 0.9% lock flush 10 milliLiter(s) IV Push every 1 hour PRN      Vital Signs Last 24 Hrs  T(C): 36.9 (19 Jul 2021 05:00), Max: 37.4 (18 Jul 2021 17:02)  T(F): 98.5 (19 Jul 2021 05:00), Max: 99.4 (18 Jul 2021 17:02)  HR: 91 (19 Jul 2021 05:00) (80 - 91)  BP: 148/76 (19 Jul 2021 05:00) (132/68 - 168/85)  BP(mean): --  RR: 18 (19 Jul 2021 05:00) (18 - 18)  SpO2: 92% (19 Jul 2021 05:00) (92% - 100%)      07-18-21 @ 07:01  -  07-19-21 @ 07:00  --------------------------------------------------------  IN: 180 mL / OUT: 1200 mL / NET: -1020 mL            LABS:                        11.7   5.73  )-----------( 309      ( 18 Jul 2021 06:46 )             33.9     07-19    139  |  106  |  4<L>  ----------------------------<  136<H>  3.3<L>   |  26  |  0.77    Ca    6.6<L>      19 Jul 2021 07:32  Phos  2.1     07-19  Mg     1.4     07-19                WBC:  WBC Count: 5.73 K/uL (07-18 @ 06:46)  WBC Count: 4.59 K/uL (07-16 @ 07:21)      MICROBIOLOGY:  RECENT CULTURES:                  Sodium:  Sodium, Serum: 139 mmol/L (07-19 @ 07:32)  Sodium, Serum: 139 mmol/L (07-18 @ 06:46)  Sodium, Serum: 143 mmol/L (07-17 @ 08:01)  Sodium, Serum: 142 mmol/L (07-16 @ 07:21)      0.77 mg/dL 07-19 @ 07:32  1.12 mg/dL 07-18 @ 06:46  1.15 mg/dL 07-17 @ 08:01  1.39 mg/dL 07-16 @ 07:21      Hemoglobin:  Hemoglobin: 11.7 g/dL (07-18 @ 06:46)  Hemoglobin: 11.3 g/dL (07-16 @ 07:21)      Platelets: Platelet Count - Automated: 309 K/uL (07-18 @ 06:46)  Platelet Count - Automated: 306 K/uL (07-16 @ 07:21)              RADIOLOGY & ADDITIONAL STUDIES:

## 2021-07-19 NOTE — PROGRESS NOTE ADULT - PROBLEM SELECTOR PLAN 6
Spo2 92 on 2L  Pulm consulted - Juan F  no DVT on US  no pneumonia on plain CT of chest  I suspect his sepsis is the root cause
-secondary to VZV  -treatment completed.
leuprolide when tolerating PO

## 2021-07-19 NOTE — PROGRESS NOTE ADULT - PROBLEM SELECTOR PLAN 3
as above
- at baseline, pt unable to feed self, but normally able to swallow  - po intake slowly improving
as above
leuprolide when tolerating PO
as above
Spo2 92 on 2L  Pulm consulted - Juan F  no DVT on US  no pneumonia on plain CT of chest  I suspect his sepsis and encephalopathy are the root cause

## 2021-07-19 NOTE — DISCHARGE NOTE PROVIDER - DETAILS OF MALNUTRITION DIAGNOSIS/DIAGNOSES
This patient has been assessed with a concern for Malnutrition and was treated during this hospitalization for the following Nutrition diagnosis/diagnoses:     -  07/19/2021: Severe protein-calorie malnutrition

## 2021-07-19 NOTE — PROGRESS NOTE ADULT - PROBLEM SELECTOR PLAN 2
as above
ID consulted (Dawn)  Unable to get ENT consult at this hospital; not much that ENT would be able to add for OE.  Continuing IV Cipro for now  IV Acyclovir added
as above
chronic in nature, at this point it seems that patient is back at baseline.
Continuing IV Cipro for now  ID consulted (Dawn)  Unable to get ENT consult at this hospital; not much that ENT would be able to add for OE.

## 2021-07-19 NOTE — PROGRESS NOTE ADULT - PROBLEM SELECTOR PROBLEM 2
Other infective acute otitis externa of right ear
Dyspnea, unspecified type
Dyspnea, unspecified type
Other infective acute otitis externa of right ear
Dyspnea, unspecified type
Dyspnea, unspecified type
Other infective acute otitis externa of right ear
Dyspnea, unspecified type
Other infective acute otitis externa of right ear
Other infective acute otitis externa of right ear
Anoxic encephalopathy
Other infective acute otitis externa of right ear

## 2021-07-19 NOTE — PROGRESS NOTE ADULT - PROBLEM SELECTOR PLAN 4
as above
At baseline, pt unable to feed self, but normally able to swallow  Did very poorly w/ swallow eval, due to obtundation  Will change IVF to D5LR for now  If no neuro improvement within another 2-3 days, would add NG tube and TFs
as above
had significant diverticular bleed in 2018, requiring xfusion x 2 units.  Had another GI bleed in 2019, but refused EGD/colonoscopy, so source unknown  Family very reluctant to allow anticoagulants, even at prophylactic dose.
- will give trial off O2 and evaluate if needed.  - Pulm consulted - Juan F  - no DVT on US  - no pneumonia on plain CT of chest

## 2021-07-19 NOTE — PROGRESS NOTE ADULT - ASSESSMENT
69 yo M with a PMH of Anoxic encephalopathy - minimally verbal and bedbound at baseline, prostate cancer, HTN, bowel resection & ICD who presented w/ R ear discharge. Pt's wife also reported that he was talking less and had worsening PO intake.        Patient needs alternating pressure mattress due to impaired nutritional status and incontinence of bowel and bladder. Patient has functional quadriplegia.  Due to his bed bound status also is in need of an over bed tray table. Due to his deconditioning and generalized weakness secondary to anoxic encephalopathy, functional quadriplegia, septic encephalitis patient will require not only the alternating pressure mattress and over bed tray but also a 20in recliner. This is necessary to achieve daily tasks and therapies which cannot be achieve with the use of other means. Health care proxy who is the wife is in agreement with the use of these devices at home and assistance will be provided as needed.    Note: Pt has home aide 7 days per week; 12 hrs per day except 6 hrs on Sundays.    Will likely discharge patient home today once potassium has been replaced and room air trial completed. 71 yo M with a PMH of Anoxic encephalopathy - minimally verbal and bedbound at baseline, prostate cancer, HTN, bowel resection & ICD who presented w/ R ear discharge. Pt's wife also reported that he was talking less and had worsening PO intake.        Patient needs alternating pressure mattress due to impaired nutritional status and incontinence of bowel and bladder. Patient has functional quadriplegia.  Due to his bed bound status also is in need of an over bed tray table. Due to his deconditioning and generalized weakness secondary to anoxic encephalopathy, functional quadriplegia, septic encephalitis patient will require not only the alternating pressure mattress and over bed tray but also a 20in recliner. This is necessary to achieve daily tasks and therapies which cannot be achieve with the use of other means. Health care proxy who is the wife is in agreement with the use of these devices at home and assistance will be provided as needed.    This patient has limited mobility and can not independently  make changes in body position significantly to alleviate body pressure.    Note: Pt has home aide 7 days per week; 12 hrs per day except 6 hrs on Sundays.    Will likely discharge patient home today once potassium has been replaced and room air trial completed.

## 2021-07-19 NOTE — CHART NOTE - NSCHARTNOTEFT_GEN_A_CORE
Per chart pt with PMH HTN, anoxic encephalopathy, prostate CA, bowel resection, presents with ear pain and  fever, found with septic encephalopathy and ear infection. s/p swallow evaluation 07/07 with recommendations for NPO and 07/12 with recommendations for Dysphagia 1 Pureed-Honey Consistency Fluid. Pt pending discharge home today (07/19).    Factors impacting intake: [ ] none [ ] nausea  [ ] vomiting [ ] diarrhea [ ] constipation  [x]chewing problems [x] swallowing issues  [x] other: AMS    Diet Prescription: Diet, Dysphagia 1 Pureed-Honey Consistency Fluid (07-12-21 @ 11:50)    Intake: PCA reports pt with poor PO intake. States pt bites on utensil and will not take food. States pt has family member/friend who visits and he consumes more when he helps feed the pt.     Current Weight: (07/14) 94.3kg (07/07) 92.2kg  % Weight Change: No recent weights to trend    Edema: 1+ generalized; 2+ left and right arms; 3+ left and right feet and legs per flow sheets    Physical Appearance: No signs of muscle wasting or fat loss per nutrition focused physical exam     Pertinent Medications: MEDICATIONS  (STANDING):  calcitriol   Capsule 0.25 MICROGram(s) Oral daily  calcium carbonate    500 mG (Tums) Chewable 3 Tablet(s) Chew two times a day  carvedilol 3.125 milliGRAM(s) Oral every 12 hours  chlorhexidine 2% Cloths 1 Application(s) Topical daily  famotidine Injectable 20 milliGRAM(s) IV Push two times a day  heparin   Injectable 5000 Unit(s) SubCutaneous every 12 hours  lidocaine 1% Injectable 10 milliLiter(s) Local Injection once  magnesium sulfate  IVPB 2 Gram(s) IV Intermittent every 6 hours  nystatin Powder 1 Application(s) Topical two times a day  potassium chloride  10 mEq/100 mL IVPB 10 milliEquivalent(s) IV Intermittent every 1 hour    MEDICATIONS  (PRN):  acetaminophen  Suppository .. 650 milliGRAM(s) Rectal every 6 hours PRN Temp greater or equal to 38C (100.4F), Mild Pain (1 - 3)  albuterol/ipratropium for Nebulization 3 milliLiter(s) Nebulizer every 6 hours PRN wheeze  hydrALAZINE Injectable 5 milliGRAM(s) IV Push every 6 hours PRN SBP > 160  sodium chloride 0.9% lock flush 10 milliLiter(s) IV Push every 1 hour PRN Pre/post blood products, medications, blood draw, and to maintain line patency    Pertinent Labs: 07-19 Na139 mmol/L Glu 136 mg/dL<H> K+ 3.3 mmol/L<L> Cr  0.77 mg/dL BUN 4 mg/dL<L> 07-19 Phos 2.1 mg/dL<L> 07-17 Alb 2.5 g/dL<L>      Skin: No pressure injuries per flow sheets    Estimated Needs:   [x] no change since previous assessment: 07/11  [ ] recalculated:     Previous Nutrition Diagnosis:   [x] Inadequate Oral Intake.     Etiology AMS, altered swallow.     Signs/Symptoms NPO x 4 days.     Goal/Expected Outcome pt to meet >75% nutrition needs via tolerated route. (unmet)    Nutrition Diagnosis is [x] ongoing  [ ] resolved [ ] not applicable     New Nutrition Diagnosis: [x] not applicable      Interventions:   Recommend  [x] Continue current diet as ordered  [ ] Change Diet To:  [x] Nutrition Supplement: Add Magic Cup x 2/day (provides 580 kcal, 18 g protein)   [ ] Nutrition Support  [x] Other: 1. Monitor and replete electrolytes as needed.  2. Continue to provide assistance and encouragement with PO intake     Monitoring and Evaluation:   [x] PO intake [ x ] Tolerance to diet prescription [ x ] weights [ x ] labs[ x ] follow up per protocol  [ ] other: Per chart pt with PMH HTN, anoxic encephalopathy, prostate CA, bowel resection, presents with ear pain and  fever, found with septic encephalopathy and ear infection. s/p swallow evaluation 07/07 with recommendations for NPO and 07/12 with recommendations for Dysphagia 1 Pureed-Honey Consistency Fluid. Pt pending discharge home today (07/19).    Factors impacting intake: [ ] none [ ] nausea  [ ] vomiting [ ] diarrhea [ ] constipation  [x]chewing problems [x] swallowing issues  [x] other: AMS    Diet Prescription: Diet, Dysphagia 1 Pureed-Honey Consistency Fluid (07-12-21 @ 11:50)    Intake: PCA reports pt with poor PO intake. States pt bites on utensil and will not take food. States pt has family member/friend who visits and he consumes more when he helps feed the pt.     Current Weight: (07/14) 94.3kg (07/07) 92.2kg  % Weight Change: No recent weights to trend    Edema: 1+ generalized; 2+ left and right arms; 3+ left and right feet and legs per flow sheets    Physical Appearance: No signs of muscle wasting or fat loss per nutrition focused physical exam     Pertinent Medications: MEDICATIONS  (STANDING):  calcitriol   Capsule 0.25 MICROGram(s) Oral daily  calcium carbonate    500 mG (Tums) Chewable 3 Tablet(s) Chew two times a day  carvedilol 3.125 milliGRAM(s) Oral every 12 hours  chlorhexidine 2% Cloths 1 Application(s) Topical daily  famotidine Injectable 20 milliGRAM(s) IV Push two times a day  heparin   Injectable 5000 Unit(s) SubCutaneous every 12 hours  lidocaine 1% Injectable 10 milliLiter(s) Local Injection once  magnesium sulfate  IVPB 2 Gram(s) IV Intermittent every 6 hours  nystatin Powder 1 Application(s) Topical two times a day  potassium chloride  10 mEq/100 mL IVPB 10 milliEquivalent(s) IV Intermittent every 1 hour    MEDICATIONS  (PRN):  acetaminophen  Suppository .. 650 milliGRAM(s) Rectal every 6 hours PRN Temp greater or equal to 38C (100.4F), Mild Pain (1 - 3)  albuterol/ipratropium for Nebulization 3 milliLiter(s) Nebulizer every 6 hours PRN wheeze  hydrALAZINE Injectable 5 milliGRAM(s) IV Push every 6 hours PRN SBP > 160  sodium chloride 0.9% lock flush 10 milliLiter(s) IV Push every 1 hour PRN Pre/post blood products, medications, blood draw, and to maintain line patency    Pertinent Labs: 07-19 Na139 mmol/L Glu 136 mg/dL<H> K+ 3.3 mmol/L<L> Cr  0.77 mg/dL BUN 4 mg/dL<L> 07-19 Phos 2.1 mg/dL<L> 07-17 Alb 2.5 g/dL<L>      Skin: No pressure injuries per flow sheets    Estimated Needs:   [x] no change since previous assessment: 07/11  [ ] recalculated:     Previous Nutrition Diagnosis:   [x] Inadequate Oral Intake.     Etiology AMS, altered swallow.     Signs/Symptoms NPO x 4 days.     Goal/Expected Outcome pt to meet >75% nutrition needs via tolerated route. (unmet)    Nutrition Diagnosis is [ ] ongoing  [ ] resolved [x] not applicable     New Nutrition Diagnosis: [x] Severe malnutrition in the context of acute illness     Etiology Inadequate protein-energy intake in setting of septic encephalopathy    Signs/Symptoms 3+ edema; meeting <50% estimated needs >5 days    Goal Pt to meet >75% nutrition needs via tolerated route      Interventions:   Recommend  [x] Continue current diet as ordered  [ ] Change Diet To:  [x] Nutrition Supplement: Add Magic Cup x 2/day (provides 580 kcal, 18 g protein)   [ ] Nutrition Support  [x] Other: 1. Monitor and replete electrolytes as needed.  2. Continue to provide assistance and encouragement with PO intake     Monitoring and Evaluation:   [x] PO intake [ x ] Tolerance to diet prescription [ x ] weights [ x ] labs[ x ] follow up per protocol  [ ] other:

## 2021-07-19 NOTE — PROGRESS NOTE ADULT - PROBLEM SELECTOR PROBLEM 3
Functional quadriplegia
Anoxic encephalopathy
Functional quadriplegia
Prostate cancer
Anoxic encephalopathy
Anoxic encephalopathy
Dysphagia, unspecified type
Anoxic encephalopathy
Functional quadriplegia
Anoxic encephalopathy
Anoxic encephalopathy
Functional quadriplegia
Anoxic encephalopathy
Dyspnea, unspecified type

## 2021-07-19 NOTE — PROGRESS NOTE ADULT - PROVIDER SPECIALTY LIST ADULT
Hospitalist
Hospitalist
Infectious Disease
Infectious Disease
Nephrology
Neurology
Neurology
Hospitalist
Hospitalist
Infectious Disease
Nephrology
Nephrology
Neurology
Hospitalist
Neurology
Neurology
Pulmonology
Pulmonology
Hospitalist
Hospitalist
Infectious Disease
Infectious Disease
Nephrology
Neurology
Neurology
Pulmonology
Hospitalist
Infectious Disease
Pulmonology
Hospitalist

## 2021-07-19 NOTE — PROGRESS NOTE ADULT - PROBLEM SELECTOR PLAN 5
-stable  -continue with current meds
Mild; not enough to warrant IV replacement yet, and unable to take po at present  Follow; if P < 2, then would replace IV
hydralazine ivp prn

## 2021-07-19 NOTE — PROGRESS NOTE ADULT - REASON FOR ADMISSION
sepsis, ear infection

## 2021-07-19 NOTE — DISCHARGE NOTE PROVIDER - NSDCCPCAREPLAN_GEN_ALL_CORE_FT
PRINCIPAL DISCHARGE DIAGNOSIS  Diagnosis: Septic encephalopathy  Assessment and Plan of Treatment: Septic encephalopathy      SECONDARY DISCHARGE DIAGNOSES  Diagnosis: Herpes encephalitis  Assessment and Plan of Treatment: Herpes encephalitis    Diagnosis: Other infective acute otitis externa of right ear  Assessment and Plan of Treatment: Other infective acute otitis externa of right ear    Diagnosis: Essential hypertension  Assessment and Plan of Treatment: Essential hypertension    Diagnosis: Functional quadriplegia  Assessment and Plan of Treatment: Functional quadriplegia    Diagnosis: Anoxic encephalopathy  Assessment and Plan of Treatment: Anoxic encephalopathy    Diagnosis: Prostate cancer  Assessment and Plan of Treatment: Prostate cancer

## 2021-07-19 NOTE — DISCHARGE NOTE PROVIDER - HOSPITAL COURSE
Patient is a 70y old  Male who presents with a chief complaint of sepsis, ear infection (19 Jul 2021 10:33)    HPI:  Patient is a 70M with a PMH of Anoxic encephalopathy, prostate cancer, HTN, bowel resection who presents to the ED for R ear discharge.  Patient minimally verbal and bedbound at baseline, unable to provide history.  Wife at the bedside to provide history.  Patient noted to have R ear discharge for two days along with temperatures at home.  Wife states that she has been giving him tylenol at home without resolution of the fever.  Per wife, patient was talking much less and was not accepting PO intake so she presents to the ED for evaluation.  Denies history of ear infections.  Patient also noted to have sporadic dyspnea over the same time span.  No other complaints.  Current SpO2 on 2L via NC is 92%.  Vitals stable, labs benign.  Will admit to med surg.     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  Patient did require oxygen for dyspnea during the course of stay but was successfully weaned off and now with O2 Sat at 98% RA.  Patient with decreased PO intake only consuming 25% of his meals, this is due to his general decondition and his multiple comorbidities.  At this point his electrolytes have been replaced and has been optimized at best. This patient has multiple comorbidities that complicated his stay.    See attached progress note for details.

## 2021-07-19 NOTE — DISCHARGE NOTE PROVIDER - CARE PROVIDER_API CALL
Aris Seymour (DO)  Infectious Disease; Internal Medicine  [address] [place] [address]  Phone: [phone]  Fax: ()-  Follow Up Time: 1 week

## 2021-07-19 NOTE — DISCHARGE NOTE NURSING/CASE MANAGEMENT/SOCIAL WORK - PATIENT PORTAL LINK FT
You can access the FollowMyHealth Patient Portal offered by [facility] by registering at the following website: [email or web address]. By joining [employer]’s FollowMyHealth portal, you will also be able to view your health information using other applications (apps) compatible with our system.

## 2021-07-19 NOTE — PROGRESS NOTE ADULT - NSICDXPILOT_GEN_ALL_CORE
[place]

## 2021-07-19 NOTE — PROGRESS NOTE ADULT - PROBLEM SELECTOR PLAN 7
And Hx of anoxic encephalopathy -- per wife, occurred 2008; he had a fall, with head impact, but not clear what caused the fall and weather the anoxic insult was the CAUSE of the fall, or RESULT of the fall.   supportive care
- will resume luprolide home dose
had significant diverticular bleed in 2018, requiring xfusion x 4 units.  Had another GI bleed in 2019, but refused EGD/colonoscopy, so source unknown  Family very reluctant to allow anticoagulants.  After discussing risks and benefits w/ wife, we agree to the following:    Will try SQ heparin and watch H/H    If remains stable, will consider increasing to prophylactic-dose Lovenox

## 2021-07-19 NOTE — PROGRESS NOTE ADULT - PROBLEM SELECTOR PLAN 1
as above
Change in mental status, poor swallowing.  Failed swallow eval, so will keep NPO  Initially thought to be sepsis, but...  Viral swab (+) for varicella, suggesting possibility of viral meningoencephalitis  Started on ABX as below  Starting on IV acyclovir  Blood Cx NGTD  Urine Cx pending  ID consulted - Dawn TINOCO is requesting LP
as above
Only obvious cause is the severe otitis externa  Change in mental status, poor swallowing.  Failed swallow eval, so will keep NPO  Started on ABX as below  Blood Cx's pending  Urine Cx pending  ID consulted - Chantel
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 Seymour (+) for varicella, suggesting viral meningoencephalitis  - Neuro consulted (Pema)  - LP confirms CSF PCR positive for Varicella zoster virus  - Started on IV Acyclovir; changed acyclovir to Valtrex briefly; switched back due to unreliable po intake; per per ID, last dose was yesterday 7/18

## 2021-07-19 NOTE — PROGRESS NOTE ADULT - SUBJECTIVE AND OBJECTIVE BOX
Patient is a 70y old  Male who presents with a chief complaint of ear discharge (19 Jul 2021 09:14)    HPI:  Patient is a 70M with a PMH of Anoxic encephalopathy, prostate cancer, HTN, bowel resection who presents to the ED for R ear discharge.  Patient minimally verbal and bedbound at baseline, unable to provide history.  Wife at the bedside to provide history.  Patient noted to have R ear discharge for two days along with temperatures at home.  Wife states that she has been giving him tylenol at home without resolution of the fever.  Per wife, patient was talking much less and was not accepting PO intake so she presents to the ED for evaluation.   (07 Jul 2021 05:04)    SUBJECTIVE & OBJECTIVE: Pt seen and examined at bedside myself today.  Patient has eyes open and able to track, attempts to speak but is non verbal.  Unable to do ROS.  MEDICATIONS  (STANDING):  calcitriol   Capsule 0.25 MICROGram(s) Oral daily  calcium carbonate    500 mG (Tums) Chewable 3 Tablet(s) Chew two times a day  carvedilol 3.125 milliGRAM(s) Oral every 12 hours  chlorhexidine 2% Cloths 1 Application(s) Topical daily  famotidine Injectable 20 milliGRAM(s) IV Push two times a day  heparin   Injectable 5000 Unit(s) SubCutaneous every 12 hours  lidocaine 1% Injectable 10 milliLiter(s) Local Injection once  magnesium sulfate  IVPB 2 Gram(s) IV Intermittent every 6 hours  nystatin Powder 1 Application(s) Topical two times a day  potassium chloride  10 mEq/100 mL IVPB 10 milliEquivalent(s) IV Intermittent every 1 hour    MEDICATIONS  (PRN):  acetaminophen  Suppository .. 650 milliGRAM(s) Rectal every 6 hours PRN Temp greater or equal to 38C (100.4F), Mild Pain (1 - 3)  albuterol/ipratropium for Nebulization 3 milliLiter(s) Nebulizer every 6 hours PRN wheeze  hydrALAZINE Injectable 5 milliGRAM(s) IV Push every 6 hours PRN SBP > 160  sodium chloride 0.9% lock flush 10 milliLiter(s) IV Push every 1 hour PRN Pre/post blood products, medications, blood draw, and to maintain line patency     PHYSICAL EXAM:  Vital Signs Last 24 Hrs  T(C): 36.9 (19 Jul 2021 05:00), Max: 37.4 (18 Jul 2021 17:02)  T(F): 98.5 (19 Jul 2021 05:00), Max: 99.4 (18 Jul 2021 17:02)  HR: 91 (19 Jul 2021 05:00) (80 - 91)  BP: 148/76 (19 Jul 2021 05:00) (132/68 - 168/85)  BP(mean): --  RR: 18 (19 Jul 2021 05:00) (18 - 18)  SpO2: 92% (19 Jul 2021 05:00) (92% - 100%)   Daily     Daily I&O's Detail    18 Jul 2021 07:01  -  19 Jul 2021 07:00  --------------------------------------------------------  IN:    Oral Fluid: 180 mL  Total IN: 180 mL    OUT:    Voided (mL): 1200 mL  Total OUT: 1200 mL    Total NET: -1020 mL      GENERAL: NAD, well-groomed, well-developed  HEAD:  Atraumatic, Normocephalic  EYES: EOMI, PERRLA, conjunctiva and sclera clear  ENMT: Moist mucous membranes  NECK: Supple, No JVD  NERVOUS SYSTEM:  Alert, unable to do full neurological exam.  CHEST/LUNG: Clear to auscultation bilaterally; No rales, rhonchi, wheezing, or rubs  HEART: Regular rate and rhythm; No murmurs, rubs, or gallops  ABDOMEN: Soft, Nontender, Nondistended; Bowel sounds present  EXTREMITIES:  2+ Peripheral Pulses, No cyanosis, or edema  LABS:                        11.7   5.73  )-----------( 309      ( 18 Jul 2021 06:46 )             33.9   07-19    139  |  106  |  4<L>  ----------------------------<  136<H>  3.3<L>   |  26  |  0.77    Ca    6.6<L>      19 Jul 2021 07:32  Phos  2.1     07-19  Mg     1.4     07-19

## 2021-07-19 NOTE — PROGRESS NOTE ADULT - PROBLEM SELECTOR PLAN 9
will replace with IV potassium.
And Hx of anoxic encephalopathy -- per wife, occurred 2008; he had a fall, with head impact, but not clear what caused the fall and weather the anoxic insult was the CAUSE of the fall, or RESULT of the fall.   supportive care

## 2021-07-19 NOTE — PROGRESS NOTE ADULT - PROBLEM SELECTOR PROBLEM 4
Prostate cancer
Prostate cancer
Diverticulosis
Herpes encephalitis
Prostate cancer
Prostate cancer
Herpes encephalitis
Dyspnea, unspecified type
Herpes encephalitis
Dysphagia, unspecified type

## 2021-07-22 DIAGNOSIS — E43 UNSPECIFIED SEVERE PROTEIN-CALORIE MALNUTRITION: ICD-10-CM

## 2021-07-22 DIAGNOSIS — Z88.0 ALLERGY STATUS TO PENICILLIN: ICD-10-CM

## 2021-07-22 DIAGNOSIS — C61 MALIGNANT NEOPLASM OF PROSTATE: ICD-10-CM

## 2021-07-22 DIAGNOSIS — N39.41 URGE INCONTINENCE: ICD-10-CM

## 2021-07-22 DIAGNOSIS — I10 ESSENTIAL (PRIMARY) HYPERTENSION: ICD-10-CM

## 2021-07-22 DIAGNOSIS — Z95.810 PRESENCE OF AUTOMATIC (IMPLANTABLE) CARDIAC DEFIBRILLATOR: ICD-10-CM

## 2021-07-22 DIAGNOSIS — R53.2 FUNCTIONAL QUADRIPLEGIA: ICD-10-CM

## 2021-07-22 DIAGNOSIS — Z82.49 FAMILY HISTORY OF ISCHEMIC HEART DISEASE AND OTHER DISEASES OF THE CIRCULATORY SYSTEM: ICD-10-CM

## 2021-07-22 DIAGNOSIS — E87.1 HYPO-OSMOLALITY AND HYPONATREMIA: ICD-10-CM

## 2021-07-22 DIAGNOSIS — E83.39 OTHER DISORDERS OF PHOSPHORUS METABOLISM: ICD-10-CM

## 2021-07-22 DIAGNOSIS — Z79.01 LONG TERM (CURRENT) USE OF ANTICOAGULANTS: ICD-10-CM

## 2021-07-22 DIAGNOSIS — N17.0 ACUTE KIDNEY FAILURE WITH TUBULAR NECROSIS: ICD-10-CM

## 2021-07-22 DIAGNOSIS — H60.391 OTHER INFECTIVE OTITIS EXTERNA, RIGHT EAR: ICD-10-CM

## 2021-07-22 DIAGNOSIS — R06.00 DYSPNEA, UNSPECIFIED: ICD-10-CM

## 2021-07-22 DIAGNOSIS — Z88.8 ALLERGY STATUS TO OTHER DRUGS, MEDICAMENTS AND BIOLOGICAL SUBSTANCES: ICD-10-CM

## 2021-07-22 DIAGNOSIS — Z90.49 ACQUIRED ABSENCE OF OTHER SPECIFIED PARTS OF DIGESTIVE TRACT: ICD-10-CM

## 2021-07-22 DIAGNOSIS — G91.8 OTHER HYDROCEPHALUS: ICD-10-CM

## 2021-07-22 DIAGNOSIS — E87.6 HYPOKALEMIA: ICD-10-CM

## 2021-07-22 DIAGNOSIS — H60.509 UNSPECIFIED ACUTE NONINFECTIVE OTITIS EXTERNA, UNSPECIFIED EAR: ICD-10-CM

## 2021-07-22 DIAGNOSIS — B97.89 OTHER VIRAL AGENTS AS THE CAUSE OF DISEASES CLASSIFIED ELSEWHERE: ICD-10-CM

## 2021-07-22 DIAGNOSIS — R13.10 DYSPHAGIA, UNSPECIFIED: ICD-10-CM

## 2021-07-22 DIAGNOSIS — A41.89 OTHER SPECIFIED SEPSIS: ICD-10-CM

## 2021-07-22 DIAGNOSIS — G93.41 METABOLIC ENCEPHALOPATHY: ICD-10-CM

## 2021-07-22 DIAGNOSIS — B01.11: ICD-10-CM

## 2021-07-22 DIAGNOSIS — G93.1 ANOXIC BRAIN DAMAGE, NOT ELSEWHERE CLASSIFIED: ICD-10-CM

## 2021-07-22 DIAGNOSIS — Z92.3 PERSONAL HISTORY OF IRRADIATION: ICD-10-CM

## 2021-07-22 DIAGNOSIS — K57.90 DIVERTICULOSIS OF INTESTINE, PART UNSPECIFIED, WITHOUT PERFORATION OR ABSCESS WITHOUT BLEEDING: ICD-10-CM

## 2021-07-22 DIAGNOSIS — B10.09 OTHER HUMAN HERPESVIRUS ENCEPHALITIS: ICD-10-CM

## 2021-07-22 DIAGNOSIS — Z74.01 BED CONFINEMENT STATUS: ICD-10-CM

## 2021-07-22 DIAGNOSIS — B02.21 POSTHERPETIC GENICULATE GANGLIONITIS: ICD-10-CM

## 2021-08-02 ENCOUNTER — NON-APPOINTMENT (OUTPATIENT)
Age: 71
End: 2021-08-02

## 2021-08-02 ENCOUNTER — APPOINTMENT (OUTPATIENT)
Dept: ELECTROPHYSIOLOGY | Facility: CLINIC | Age: 71
End: 2021-08-02
Payer: MEDICARE

## 2021-08-02 PROCEDURE — 93295 DEV INTERROG REMOTE 1/2/MLT: CPT

## 2021-08-02 PROCEDURE — 93296 REM INTERROG EVL PM/IDS: CPT

## 2021-08-07 LAB
CULTURE RESULTS: SIGNIFICANT CHANGE UP
SPECIMEN SOURCE: SIGNIFICANT CHANGE UP

## 2021-08-28 LAB
CULTURE RESULTS: SIGNIFICANT CHANGE UP
NIGHT BLUE STAIN TISS: SIGNIFICANT CHANGE UP
SPECIMEN SOURCE: SIGNIFICANT CHANGE UP

## 2021-09-09 ENCOUNTER — APPOINTMENT (OUTPATIENT)
Dept: INFECTIOUS DISEASE | Facility: CLINIC | Age: 71
End: 2021-09-09
Payer: MEDICARE

## 2021-09-09 VITALS
HEART RATE: 66 BPM | TEMPERATURE: 96.6 F | HEIGHT: 65 IN | OXYGEN SATURATION: 100 % | DIASTOLIC BLOOD PRESSURE: 69 MMHG | SYSTOLIC BLOOD PRESSURE: 141 MMHG

## 2021-09-09 DIAGNOSIS — Z86.61 PERSONAL HISTORY OF INFECTIONS OF THE CENTRAL NERVOUS SYSTEM: ICD-10-CM

## 2021-09-09 DIAGNOSIS — Z86.19 PERSONAL HISTORY OF OTHER INFECTIOUS AND PARASITIC DISEASES: ICD-10-CM

## 2021-09-09 PROCEDURE — 99213 OFFICE O/P EST LOW 20 MIN: CPT

## 2021-10-05 PROBLEM — Z86.61: Status: RESOLVED | Noted: 2021-10-05 | Resolved: 2021-10-05

## 2021-10-05 PROBLEM — Z86.19 HISTORY OF HERPES ZOSTER: Status: RESOLVED | Noted: 2021-10-05 | Resolved: 2021-10-05

## 2021-10-05 NOTE — PHYSICAL EXAM
[Sclera] : the sclera and conjunctiva were normal [Auscultation Breath Sounds / Voice Sounds] : lungs were clear to auscultation bilaterally [Heart Rate And Rhythm] : heart rate was normal and rhythm regular [Bowel Sounds] : normal bowel sounds [Edema] : there was no peripheral edema [Abdomen Soft] : soft [Abdomen Tenderness] : non-tender [Cervical Lymph Nodes Enlarged Posterior Bilaterally] : posterior cervical [Musculoskeletal - Swelling] : no joint swelling [Supraclavicular Lymph Nodes Enlarged Bilaterally] : supraclavicular [Neck Appearance] : the appearance of the neck was normal [] : the neck was supple [FreeTextEntry1] : follows simple commands, contracted extremities especially the left upper extremity, in a wheelchair

## 2021-10-05 NOTE — HISTORY OF PRESENT ILLNESS
[FreeTextEntry1] : 70M with a PMH of Anoxic encephalopathy, prostate cancer, HTN, \par bowel resection who was admitted at [facility] for herpes zoster oticus and herpes encephalitis\par Completed acyclovir, had MALVIN which improved, was on oxygen fro unknown etiology which also resolved\par He is doing well and wife is attending with the patient who is the primary caregiver  along with an aide.\par no complaints \par

## 2021-10-05 NOTE — ASSESSMENT
[FreeTextEntry1] : 71M with anoxic brain injury who was admitted to [facility] for herpes zoster meninigitis and completed course of acyclovir here for follow up visit.\par He is doing well with no complaints \par no residual side effects as far as we can tell but hard in this 71 year old who already has anoxic brain injury and difficult to assess his neurologic and hearing function\par Patient did not seem to have difficulty hearing during my exam and he does follow simple commands when asked\par no need for labs today and can follow up PRN in ID office

## 2021-10-27 NOTE — PATIENT PROFILE ADULT - VISION (WITH CORRECTIVE LENSES IF THE PATIENT USUALLY WEARS THEM):
Pt present to ED for ETOH. Pt c/o chest pain. Pt admits to drinking 3 12oz of beer. Partially impaired: cannot see medication labels or newsprint, but can see obstacles in path, and the surrounding layout; can count fingers at arm's length

## 2021-11-23 ENCOUNTER — NON-APPOINTMENT (OUTPATIENT)
Age: 71
End: 2021-11-23

## 2021-11-23 ENCOUNTER — APPOINTMENT (OUTPATIENT)
Dept: ELECTROPHYSIOLOGY | Facility: CLINIC | Age: 71
End: 2021-11-23
Payer: MEDICARE

## 2021-11-23 PROCEDURE — 93295 DEV INTERROG REMOTE 1/2/MLT: CPT

## 2021-11-23 PROCEDURE — 93296 REM INTERROG EVL PM/IDS: CPT

## 2022-03-01 ENCOUNTER — NON-APPOINTMENT (OUTPATIENT)
Age: 72
End: 2022-03-01

## 2022-03-01 ENCOUNTER — APPOINTMENT (OUTPATIENT)
Dept: ELECTROPHYSIOLOGY | Facility: CLINIC | Age: 72
End: 2022-03-01
Payer: MEDICARE

## 2022-03-01 PROCEDURE — 93296 REM INTERROG EVL PM/IDS: CPT

## 2022-03-01 PROCEDURE — 93295 DEV INTERROG REMOTE 1/2/MLT: CPT

## 2022-05-31 ENCOUNTER — NON-APPOINTMENT (OUTPATIENT)
Age: 72
End: 2022-05-31

## 2022-05-31 ENCOUNTER — APPOINTMENT (OUTPATIENT)
Dept: ELECTROPHYSIOLOGY | Facility: CLINIC | Age: 72
End: 2022-05-31
Payer: MEDICARE

## 2022-05-31 PROCEDURE — 93296 REM INTERROG EVL PM/IDS: CPT

## 2022-05-31 PROCEDURE — 93295 DEV INTERROG REMOTE 1/2/MLT: CPT

## 2022-07-14 NOTE — ED ADULT NURSE NOTE - BRAND OF COVID-19 VACCINATION
Moderna dose 1 and 2 FAMILY HISTORY:  Uncle  Still living? Unknown  Family history of Marfan syndrome, Age at diagnosis: Age Unknown  FH: aortic aneurysm, Age at diagnosis: Age Unknown

## 2022-08-26 NOTE — ED ADULT NURSE NOTE - CADM POA CENTRAL LINE
[FreeTextEntry1] : LILO is a 14 year old with eczematoid ears\par - Fill affected ear canal with mineral oil or baby oil while laying flat with head tilted away from the side that drops are being placed. Push tragus (pointed portion of ear) to get drops into each canal. Wait one minute. Tilt head so that the oil can drain out of the ear for 1 minute. Continue this every few days at night for routine care. \par - follow up in 4 months \par  No

## 2022-08-30 ENCOUNTER — APPOINTMENT (OUTPATIENT)
Dept: ELECTROPHYSIOLOGY | Facility: CLINIC | Age: 72
End: 2022-08-30

## 2022-08-30 ENCOUNTER — FORM ENCOUNTER (OUTPATIENT)
Age: 72
End: 2022-08-30

## 2022-09-20 NOTE — ED PROVIDER NOTE - RESPIRATORY NEGATIVE STATEMENT, MLM
Please call to follow-up with the eye doctor.  Return to the ER sooner for any vision changes, increased redness, tearing, pain, headaches, fevers, new or worsening symptoms   no chest pain, no cough, and no shortness of breath.

## 2022-10-04 ENCOUNTER — APPOINTMENT (OUTPATIENT)
Dept: ELECTROPHYSIOLOGY | Facility: CLINIC | Age: 72
End: 2022-10-04

## 2022-10-04 ENCOUNTER — NON-APPOINTMENT (OUTPATIENT)
Age: 72
End: 2022-10-04

## 2022-10-04 PROCEDURE — 93295 DEV INTERROG REMOTE 1/2/MLT: CPT

## 2022-10-04 PROCEDURE — 93296 REM INTERROG EVL PM/IDS: CPT

## 2022-10-11 ENCOUNTER — NON-APPOINTMENT (OUTPATIENT)
Age: 72
End: 2022-10-11

## 2022-10-11 ENCOUNTER — APPOINTMENT (OUTPATIENT)
Dept: ELECTROPHYSIOLOGY | Facility: CLINIC | Age: 72
End: 2022-10-11

## 2022-10-11 VITALS
HEIGHT: 65 IN | OXYGEN SATURATION: 100 % | BODY MASS INDEX: 28.29 KG/M2 | SYSTOLIC BLOOD PRESSURE: 160 MMHG | DIASTOLIC BLOOD PRESSURE: 72 MMHG | TEMPERATURE: 98.3 F | HEART RATE: 68 BPM

## 2022-10-11 VITALS — BODY MASS INDEX: 28.29 KG/M2 | WEIGHT: 170 LBS

## 2022-10-11 DIAGNOSIS — Z95.810 PRESENCE OF AUTOMATIC (IMPLANTABLE) CARDIAC DEFIBRILLATOR: ICD-10-CM

## 2022-10-11 DIAGNOSIS — G93.1 ANOXIC BRAIN DAMAGE, NOT ELSEWHERE CLASSIFIED: ICD-10-CM

## 2022-10-11 DIAGNOSIS — I47.2 VENTRICULAR TACHYCARDIA: ICD-10-CM

## 2022-10-11 PROCEDURE — 93000 ELECTROCARDIOGRAM COMPLETE: CPT

## 2022-10-11 PROCEDURE — 99213 OFFICE O/P EST LOW 20 MIN: CPT

## 2022-10-11 RX ORDER — CARVEDILOL 25 MG/1
TABLET, FILM COATED ORAL
Refills: 0 | Status: DISCONTINUED | COMMUNITY
End: 2022-10-11

## 2022-10-11 RX ORDER — CARVEDILOL 3.12 MG/1
3.12 TABLET, FILM COATED ORAL
Refills: 0 | Status: DISCONTINUED | COMMUNITY
End: 2022-10-11

## 2022-10-11 RX ORDER — ENOXAPARIN SODIUM 40 MG/.4ML
40 INJECTION SUBCUTANEOUS
Refills: 0 | Status: DISCONTINUED | COMMUNITY
End: 2022-10-11

## 2022-10-11 RX ORDER — METOPROLOL SUCCINATE 200 MG/1
200 TABLET, EXTENDED RELEASE ORAL DAILY
Qty: 30 | Refills: 2 | Status: ACTIVE | COMMUNITY
Start: 2022-10-11

## 2022-10-11 NOTE — PHYSICAL EXAM
[Well Developed] : well developed [Well Nourished] : well nourished [No Acute Distress] : no acute distress [Normal Conjunctiva] : normal conjunctiva [Normal Venous Pressure] : normal venous pressure [No Carotid Bruit] : no carotid bruit [Normal S1, S2] : normal S1, S2 [No Murmur] : no murmur [No Rub] : no rub [No Gallop] : no gallop [Clear Lung Fields] : clear lung fields [Good Air Entry] : good air entry [No Respiratory Distress] : no respiratory distress  [Soft] : abdomen soft [Non Tender] : non-tender [No Masses/organomegaly] : no masses/organomegaly [Normal Bowel Sounds] : normal bowel sounds [Normal Gait] : normal gait [No Cyanosis] : no cyanosis [No Clubbing] : no clubbing [No Varicosities] : no varicosities [No Rash] : no rash [No Skin Lesions] : no skin lesions [Alert and Oriented] : alert and oriented [Normal memory] : normal memory [de-identified] : B/L LE and UE trace edema  [de-identified] : anoxic encephalopathy

## 2022-10-11 NOTE — HISTORY OF PRESENT ILLNESS
[FreeTextEntry1] : Nigel Ayala is a 73y/o man with Hx of 73y/o man with Hx of SCD in 11/2008 c/b anoxic encephalopathy leaving him wheelchair bound, non verbal, dependent on all ADLs, s/p ICD placement, prostate CA s/p resection and XRT, renal mass cryoablation, recurrent GI bleed, DVTs (off AC given bleeds), and mild LV dysfunction who presents today for initial evaluation as ICD is nearing LISA. Accompanied by his wife. ICD in place, nearing LISA. No ICD shocks. As per wife, patient is 100% dependent for all ADLs. Awake but nonverbal. Appears in no distress.

## 2022-10-11 NOTE — DISCUSSION/SUMMARY
[EKG obtained to assist in diagnosis and management of assessed problem(s)] : EKG obtained to assist in diagnosis and management of assessed problem(s) [FreeTextEntry1] : Impression:\par \par 1. CAC: Hx of CAC with ICD in place. EKG performed today to assess for conduction disease and reveals NSR. Review of last remote report reveals ICD nearing LISA, <3 mo until LISA. No Hx of ICD shocks. Given wheelchair bound and anoxic, discussed possibility of leaving ICD in place without gen change. Risks of ICD may out weigh benefit given current condition. Last ECHO with low normal LVEF.  Wife at this time wants patient to undergo ICD generator change when the device goes to LISA which is estimated to be in 6 months from August (February 2023).  \par \par 2. NSVT: last ECHO with mild LV dysfunction. Resume metoprolol 200mg daily.

## 2022-11-29 ENCOUNTER — OUTPATIENT (OUTPATIENT)
Dept: OUTPATIENT SERVICES | Facility: HOSPITAL | Age: 72
LOS: 1 days | End: 2022-11-29

## 2022-11-29 VITALS
HEART RATE: 78 BPM | TEMPERATURE: 99 F | WEIGHT: 179.9 LBS | DIASTOLIC BLOOD PRESSURE: 67 MMHG | RESPIRATION RATE: 16 BRPM | SYSTOLIC BLOOD PRESSURE: 134 MMHG | OXYGEN SATURATION: 99 % | HEIGHT: 63 IN

## 2022-11-29 DIAGNOSIS — Z98.890 OTHER SPECIFIED POSTPROCEDURAL STATES: Chronic | ICD-10-CM

## 2022-11-29 DIAGNOSIS — I49.1 ATRIAL PREMATURE DEPOLARIZATION: ICD-10-CM

## 2022-11-29 DIAGNOSIS — Z91.89 OTHER SPECIFIED PERSONAL RISK FACTORS, NOT ELSEWHERE CLASSIFIED: ICD-10-CM

## 2022-11-29 DIAGNOSIS — Z93.0 TRACHEOSTOMY STATUS: Chronic | ICD-10-CM

## 2022-11-29 DIAGNOSIS — Z95.810 PRESENCE OF AUTOMATIC (IMPLANTABLE) CARDIAC DEFIBRILLATOR: ICD-10-CM

## 2022-11-29 DIAGNOSIS — I10 ESSENTIAL (PRIMARY) HYPERTENSION: ICD-10-CM

## 2022-11-29 DIAGNOSIS — Z98.89 OTHER SPECIFIED POSTPROCEDURAL STATES: Chronic | ICD-10-CM

## 2022-11-29 DIAGNOSIS — Z45.2 ENCOUNTER FOR ADJUSTMENT AND MANAGEMENT OF VASCULAR ACCESS DEVICE: Chronic | ICD-10-CM

## 2022-11-29 DIAGNOSIS — Z95.810 PRESENCE OF AUTOMATIC (IMPLANTABLE) CARDIAC DEFIBRILLATOR: Chronic | ICD-10-CM

## 2022-11-29 DIAGNOSIS — Z85.46 PERSONAL HISTORY OF MALIGNANT NEOPLASM OF PROSTATE: Chronic | ICD-10-CM

## 2022-11-29 LAB
ALBUMIN SERPL ELPH-MCNC: 4 G/DL — SIGNIFICANT CHANGE UP (ref 3.3–5)
ALP SERPL-CCNC: 64 U/L — SIGNIFICANT CHANGE UP (ref 40–120)
ALT FLD-CCNC: 15 U/L — SIGNIFICANT CHANGE UP (ref 4–41)
ANION GAP SERPL CALC-SCNC: 13 MMOL/L — SIGNIFICANT CHANGE UP (ref 7–14)
APTT BLD: 27.9 SEC — SIGNIFICANT CHANGE UP (ref 27–36.3)
AST SERPL-CCNC: 22 U/L — SIGNIFICANT CHANGE UP (ref 4–40)
BILIRUB SERPL-MCNC: 0.3 MG/DL — SIGNIFICANT CHANGE UP (ref 0.2–1.2)
BLD GP AB SCN SERPL QL: NEGATIVE — SIGNIFICANT CHANGE UP
BUN SERPL-MCNC: 9 MG/DL — SIGNIFICANT CHANGE UP (ref 7–23)
CALCIUM SERPL-MCNC: 9.8 MG/DL — SIGNIFICANT CHANGE UP (ref 8.4–10.5)
CHLORIDE SERPL-SCNC: 94 MMOL/L — LOW (ref 98–107)
CO2 SERPL-SCNC: 26 MMOL/L — SIGNIFICANT CHANGE UP (ref 22–31)
CREAT SERPL-MCNC: 0.68 MG/DL — SIGNIFICANT CHANGE UP (ref 0.5–1.3)
EGFR: 98 ML/MIN/1.73M2 — SIGNIFICANT CHANGE UP
GLUCOSE SERPL-MCNC: 85 MG/DL — SIGNIFICANT CHANGE UP (ref 70–99)
HCT VFR BLD CALC: 37.6 % — LOW (ref 39–50)
HGB BLD-MCNC: 13.1 G/DL — SIGNIFICANT CHANGE UP (ref 13–17)
INR BLD: 1.12 RATIO — SIGNIFICANT CHANGE UP (ref 0.88–1.16)
MCHC RBC-ENTMCNC: 27 PG — SIGNIFICANT CHANGE UP (ref 27–34)
MCHC RBC-ENTMCNC: 34.8 GM/DL — SIGNIFICANT CHANGE UP (ref 32–36)
MCV RBC AUTO: 77.5 FL — LOW (ref 80–100)
NRBC # BLD: 0 /100 WBCS — SIGNIFICANT CHANGE UP (ref 0–0)
NRBC # FLD: 0 K/UL — SIGNIFICANT CHANGE UP (ref 0–0)
PLATELET # BLD AUTO: 220 K/UL — SIGNIFICANT CHANGE UP (ref 150–400)
POTASSIUM SERPL-MCNC: 4.1 MMOL/L — SIGNIFICANT CHANGE UP (ref 3.5–5.3)
POTASSIUM SERPL-SCNC: 4.1 MMOL/L — SIGNIFICANT CHANGE UP (ref 3.5–5.3)
PROT SERPL-MCNC: 7.7 G/DL — SIGNIFICANT CHANGE UP (ref 6–8.3)
PROTHROM AB SERPL-ACNC: 13 SEC — SIGNIFICANT CHANGE UP (ref 10.5–13.4)
RBC # BLD: 4.85 M/UL — SIGNIFICANT CHANGE UP (ref 4.2–5.8)
RBC # FLD: 13.6 % — SIGNIFICANT CHANGE UP (ref 10.3–14.5)
RH IG SCN BLD-IMP: POSITIVE — SIGNIFICANT CHANGE UP
SODIUM SERPL-SCNC: 133 MMOL/L — LOW (ref 135–145)
WBC # BLD: 5.54 K/UL — SIGNIFICANT CHANGE UP (ref 3.8–10.5)
WBC # FLD AUTO: 5.54 K/UL — SIGNIFICANT CHANGE UP (ref 3.8–10.5)

## 2022-11-29 RX ORDER — APALUTAMIDE 240 MG/1
0 TABLET, FILM COATED ORAL
Qty: 0 | Refills: 0 | DISCHARGE

## 2022-11-29 RX ORDER — BUDESONIDE AND FORMOTEROL FUMARATE DIHYDRATE 160; 4.5 UG/1; UG/1
2 AEROSOL RESPIRATORY (INHALATION)
Qty: 0 | Refills: 0 | DISCHARGE

## 2022-11-29 NOTE — H&P PST ADULT - NSICDXPASTMEDICALHX_GEN_ALL_CORE_FT
PAST MEDICAL HISTORY:  Anoxic encephalopathy     Brain injury with coma x 2 weeks in 2008    Deep vein thrombosis (DVT) h/o, off AC h/o GI bleed    Gait abnormality wheelchair boung    Gastric varices     H/O cirrhosis     H/O portal hypertension     H/O: GI bleed     Hypertension     Prostate cancer s/p surgery and radiation therapy    Sudden cardiac death 2008     PAST MEDICAL HISTORY:  Anoxic encephalopathy     Brain injury with coma x 2 weeks in 2008    Deep vein thrombosis (DVT) h/o, off AC h/o GI bleed    Gait abnormality wheelchair bound    Gastric varices     H/O cirrhosis     H/O portal hypertension     H/O: GI bleed     Hypertension     Prostate cancer s/p surgery and radiation therapy    Sudden cardiac death 2008

## 2022-11-29 NOTE — H&P PST ADULT - NSICDXPASTSURGICALHX_GEN_ALL_CORE_FT
PAST SURGICAL HISTORY:  Encounter for insertion of tunneled central venous catheter (CVC) with port Right chest    H/O prostate cancer resection and radiation therapy    H/O tracheostomy h/o    History of prostate surgery     History of right inguinal hernia repair     S/P ICD (internal cardiac defibrillator) procedure implanted on 1/6/2009  [employer]   model E102  serial [id number]    Status post cryoablation of left renal mass     PAST SURGICAL HISTORY:  Encounter for insertion of tunneled central venous catheter (CVC) with port Right chest    H/O prostate cancer resection and radiation therapy    H/O tracheostomy h/o    History of right inguinal hernia repair     S/P ICD (internal cardiac defibrillator) procedure implanted on 1/6/2009  [employer]   model E102  serial [id number]    Status post cryoablation of left renal mass

## 2022-11-29 NOTE — H&P PST ADULT - REASON FOR ADMISSION
"My  is having a generator change for defibrillator" "My  is having a generator change for his defibrillator"

## 2022-11-29 NOTE — H&P PST ADULT - ENDOCRINE
[address] [address]

                             Tel: [phone]

                             Fax: [phone]



                    Lower Extremity Arterial Evaluation

____________________________________________________________________________



Name: ABDULAZIZ STOCKTON

MRN: [id number]                Age: 80 yrs

Gender: Male                   : 1937

Patient Status: Outpatient     Patient Location: 

Account #: [id number]

Study Date: 2017 05:36 PM

Accession #: [id number]

____________________________________________________________________________



Procedure: A color flow and duplex scan of the lower extremity arteries was

performed on the right with velocity and waveform anaylsis.

Reason For Study: EDWARD SANTANA





Ordering Physician: NAFISA PINTO

Performed By: Chari Ford

____________________________________________________________________________



____________________________________________________________________________





Measurements and Calculations



                                   Right         Left

  CFA 

  Hasmukh NAFISA PINTO

>

Williams, Lennox
details…

## 2022-11-29 NOTE — H&P PST ADULT - HISTORY OF PRESENT ILLNESS
73 y/o male presents to presurgical testing with diagnosis of atrial premature depolarization. Pt with s/p AICD placement in 2009, now with low battery.  71 y/o male pmhx HTN, Cirrhosis, SCD 2008 complicated by anoxic brain injury presents to presurgical testing with diagnosis of atrial premature depolarization scheduled for ICD generator change. Pt with s/p AICD placement in 2009, now with low battery.

## 2022-11-29 NOTE — H&P PST ADULT - CARDIOVASCULAR
details… AICD, and mediport/normal/regular rate and rhythm/S1 S2 present/no gallops/no rub/no murmur/pedal edema/vascular

## 2022-11-29 NOTE — H&P PST ADULT - PROBLEM SELECTOR PLAN 1
Patient tentatively scheduled for ICD generator change for 12/7/22. Pre-op instructions provided. given. Pt.'s wife verbal and written instructions with teach back on chlorhexidine shampoo and pepcid. Pt.'s wife verbalized understanding with return demonstration.    Pt instructed to obtain a COVID-19 PCR 3-5 days prior to the procedure. COVID-19 PCR order placed. Pt was provided with a list of COVID-19 PCR swabbing locations and hours of operation. Pt stated understanding.    Pt is non verbal. Wife Remedios will be present on DOS for consent. [phone]

## 2022-12-04 LAB — SARS-COV-2 RNA SPEC QL NAA+PROBE: SIGNIFICANT CHANGE UP

## 2022-12-07 ENCOUNTER — INPATIENT (INPATIENT)
Facility: HOSPITAL | Age: 72
LOS: 0 days | Discharge: HOME | End: 2022-12-08
Attending: INTERNAL MEDICINE | Admitting: INTERNAL MEDICINE

## 2022-12-07 VITALS
SYSTOLIC BLOOD PRESSURE: 144 MMHG | RESPIRATION RATE: 18 BRPM | HEART RATE: 66 BPM | DIASTOLIC BLOOD PRESSURE: 70 MMHG | TEMPERATURE: 98 F

## 2022-12-07 DIAGNOSIS — Z93.0 TRACHEOSTOMY STATUS: Chronic | ICD-10-CM

## 2022-12-07 DIAGNOSIS — Z95.810 PRESENCE OF AUTOMATIC (IMPLANTABLE) CARDIAC DEFIBRILLATOR: Chronic | ICD-10-CM

## 2022-12-07 DIAGNOSIS — Z98.890 OTHER SPECIFIED POSTPROCEDURAL STATES: Chronic | ICD-10-CM

## 2022-12-07 DIAGNOSIS — Z45.2 ENCOUNTER FOR ADJUSTMENT AND MANAGEMENT OF VASCULAR ACCESS DEVICE: Chronic | ICD-10-CM

## 2022-12-07 DIAGNOSIS — Z85.46 PERSONAL HISTORY OF MALIGNANT NEOPLASM OF PROSTATE: Chronic | ICD-10-CM

## 2022-12-07 DIAGNOSIS — I49.1 ATRIAL PREMATURE DEPOLARIZATION: ICD-10-CM

## 2022-12-07 DIAGNOSIS — Z98.89 OTHER SPECIFIED POSTPROCEDURAL STATES: Chronic | ICD-10-CM

## 2022-12-07 PROCEDURE — 93010 ELECTROCARDIOGRAM REPORT: CPT | Mod: 77

## 2022-12-07 PROCEDURE — 33262 RMVL& REPLC PULSE GEN 1 LEAD: CPT

## 2022-12-07 PROCEDURE — 93010 ELECTROCARDIOGRAM REPORT: CPT

## 2022-12-07 RX ORDER — BUDESONIDE AND FORMOTEROL FUMARATE DIHYDRATE 160; 4.5 UG/1; UG/1
2 AEROSOL RESPIRATORY (INHALATION)
Qty: 0 | Refills: 0 | DISCHARGE

## 2022-12-07 RX ORDER — ALBUTEROL 90 UG/1
2 AEROSOL, METERED ORAL
Qty: 0 | Refills: 0 | DISCHARGE

## 2022-12-07 RX ORDER — APALUTAMIDE 240 MG/1
1 TABLET, FILM COATED ORAL
Qty: 0 | Refills: 0 | DISCHARGE

## 2022-12-07 RX ORDER — VANCOMYCIN HCL 1 G
1000 VIAL (EA) INTRAVENOUS ONCE
Refills: 0 | Status: COMPLETED | OUTPATIENT
Start: 2022-12-08 | End: 2022-12-08

## 2022-12-07 RX ORDER — SODIUM CHLORIDE 9 MG/ML
3 INJECTION INTRAMUSCULAR; INTRAVENOUS; SUBCUTANEOUS EVERY 8 HOURS
Refills: 0 | Status: DISCONTINUED | OUTPATIENT
Start: 2022-12-07 | End: 2022-12-08

## 2022-12-07 RX ORDER — ASPIRIN/CALCIUM CARB/MAGNESIUM 324 MG
81 TABLET ORAL DAILY
Refills: 0 | Status: DISCONTINUED | OUTPATIENT
Start: 2022-12-07 | End: 2022-12-08

## 2022-12-07 RX ORDER — ASPIRIN/CALCIUM CARB/MAGNESIUM 324 MG
1 TABLET ORAL
Qty: 0 | Refills: 0 | DISCHARGE

## 2022-12-07 RX ORDER — APALUTAMIDE 240 MG/1
4 TABLET, FILM COATED ORAL
Qty: 0 | Refills: 0 | DISCHARGE

## 2022-12-07 RX ORDER — APALUTAMIDE 240 MG/1
240 TABLET, FILM COATED ORAL
Refills: 0 | Status: DISCONTINUED | OUTPATIENT
Start: 2022-12-07 | End: 2022-12-08

## 2022-12-07 RX ORDER — METOPROLOL TARTRATE 50 MG
200 TABLET ORAL DAILY
Refills: 0 | Status: DISCONTINUED | OUTPATIENT
Start: 2022-12-07 | End: 2022-12-08

## 2022-12-07 RX ORDER — METOPROLOL TARTRATE 50 MG
1 TABLET ORAL
Qty: 0 | Refills: 0 | DISCHARGE

## 2022-12-07 RX ORDER — BUDESONIDE AND FORMOTEROL FUMARATE DIHYDRATE 160; 4.5 UG/1; UG/1
2 AEROSOL RESPIRATORY (INHALATION)
Refills: 0 | Status: DISCONTINUED | OUTPATIENT
Start: 2022-12-07 | End: 2022-12-08

## 2022-12-07 RX ADMIN — BUDESONIDE AND FORMOTEROL FUMARATE DIHYDRATE 2 PUFF(S): 160; 4.5 AEROSOL RESPIRATORY (INHALATION) at 23:00

## 2022-12-07 RX ADMIN — SODIUM CHLORIDE 3 MILLILITER(S): 9 INJECTION INTRAMUSCULAR; INTRAVENOUS; SUBCUTANEOUS at 21:29

## 2022-12-07 NOTE — CHART NOTE - NSCHARTNOTEFT_GEN_A_CORE
71 y/o M w/ PMH of SCD in 11/2008 c/b anoxic encephalopathy leaving him wheelchair bound, non verbal, dependent on all ADLs, s/p ICD placement, prostate CA s/p resection and XRT, renal mass cryoablation, recurrent GI bleed, DVTs (off AC given bleeds), and mild LV dysfunction presents for ICD generator change. Pt's device has reached LISA.    PST H&P and labs reviewed  Pt accompanied by his Wife Remedios

## 2022-12-08 ENCOUNTER — TRANSCRIPTION ENCOUNTER (OUTPATIENT)
Age: 72
End: 2022-12-08

## 2022-12-08 VITALS
RESPIRATION RATE: 18 BRPM | SYSTOLIC BLOOD PRESSURE: 142 MMHG | TEMPERATURE: 98 F | HEART RATE: 70 BPM | DIASTOLIC BLOOD PRESSURE: 67 MMHG | OXYGEN SATURATION: 100 %

## 2022-12-08 LAB
ANION GAP SERPL CALC-SCNC: 10 MMOL/L — SIGNIFICANT CHANGE UP (ref 7–14)
ANION GAP SERPL CALC-SCNC: 11 MMOL/L — SIGNIFICANT CHANGE UP (ref 7–14)
BUN SERPL-MCNC: 9 MG/DL — SIGNIFICANT CHANGE UP (ref 7–23)
BUN SERPL-MCNC: 9 MG/DL — SIGNIFICANT CHANGE UP (ref 7–23)
CALCIUM SERPL-MCNC: 8.9 MG/DL — SIGNIFICANT CHANGE UP (ref 8.4–10.5)
CALCIUM SERPL-MCNC: 9 MG/DL — SIGNIFICANT CHANGE UP (ref 8.4–10.5)
CHLORIDE SERPL-SCNC: 94 MMOL/L — LOW (ref 98–107)
CHLORIDE SERPL-SCNC: 95 MMOL/L — LOW (ref 98–107)
CO2 SERPL-SCNC: 24 MMOL/L — SIGNIFICANT CHANGE UP (ref 22–31)
CO2 SERPL-SCNC: 24 MMOL/L — SIGNIFICANT CHANGE UP (ref 22–31)
CREAT SERPL-MCNC: 0.71 MG/DL — SIGNIFICANT CHANGE UP (ref 0.5–1.3)
CREAT SERPL-MCNC: 0.76 MG/DL — SIGNIFICANT CHANGE UP (ref 0.5–1.3)
EGFR: 95 ML/MIN/1.73M2 — SIGNIFICANT CHANGE UP
EGFR: 97 ML/MIN/1.73M2 — SIGNIFICANT CHANGE UP
GLUCOSE SERPL-MCNC: 101 MG/DL — HIGH (ref 70–99)
GLUCOSE SERPL-MCNC: 126 MG/DL — HIGH (ref 70–99)
HCT VFR BLD CALC: 35.7 % — LOW (ref 39–50)
HGB BLD-MCNC: 12.1 G/DL — LOW (ref 13–17)
MAGNESIUM SERPL-MCNC: 1.9 MG/DL — SIGNIFICANT CHANGE UP (ref 1.6–2.6)
MCHC RBC-ENTMCNC: 26.1 PG — LOW (ref 27–34)
MCHC RBC-ENTMCNC: 33.9 GM/DL — SIGNIFICANT CHANGE UP (ref 32–36)
MCV RBC AUTO: 76.9 FL — LOW (ref 80–100)
NRBC # BLD: 0 /100 WBCS — SIGNIFICANT CHANGE UP (ref 0–0)
NRBC # FLD: 0 K/UL — SIGNIFICANT CHANGE UP (ref 0–0)
PHOSPHATE SERPL-MCNC: 3.6 MG/DL — SIGNIFICANT CHANGE UP (ref 2.5–4.5)
PLATELET # BLD AUTO: 201 K/UL — SIGNIFICANT CHANGE UP (ref 150–400)
POTASSIUM SERPL-MCNC: 3.8 MMOL/L — SIGNIFICANT CHANGE UP (ref 3.5–5.3)
POTASSIUM SERPL-MCNC: 3.9 MMOL/L — SIGNIFICANT CHANGE UP (ref 3.5–5.3)
POTASSIUM SERPL-SCNC: 3.8 MMOL/L — SIGNIFICANT CHANGE UP (ref 3.5–5.3)
POTASSIUM SERPL-SCNC: 3.9 MMOL/L — SIGNIFICANT CHANGE UP (ref 3.5–5.3)
RBC # BLD: 4.64 M/UL — SIGNIFICANT CHANGE UP (ref 4.2–5.8)
RBC # FLD: 13.4 % — SIGNIFICANT CHANGE UP (ref 10.3–14.5)
SODIUM SERPL-SCNC: 128 MMOL/L — LOW (ref 135–145)
SODIUM SERPL-SCNC: 130 MMOL/L — LOW (ref 135–145)
WBC # BLD: 5.07 K/UL — SIGNIFICANT CHANGE UP (ref 3.8–10.5)
WBC # FLD AUTO: 5.07 K/UL — SIGNIFICANT CHANGE UP (ref 3.8–10.5)

## 2022-12-08 RX ORDER — ACETAMINOPHEN 500 MG
2 TABLET ORAL
Qty: 0 | Refills: 0 | DISCHARGE

## 2022-12-08 RX ADMIN — SODIUM CHLORIDE 3 MILLILITER(S): 9 INJECTION INTRAMUSCULAR; INTRAVENOUS; SUBCUTANEOUS at 11:53

## 2022-12-08 RX ADMIN — BUDESONIDE AND FORMOTEROL FUMARATE DIHYDRATE 2 PUFF(S): 160; 4.5 AEROSOL RESPIRATORY (INHALATION) at 10:21

## 2022-12-08 RX ADMIN — Medication 1 TABLET(S): at 12:06

## 2022-12-08 RX ADMIN — Medication 81 MILLIGRAM(S): at 12:07

## 2022-12-08 RX ADMIN — Medication 200 MILLIGRAM(S): at 06:57

## 2022-12-08 RX ADMIN — Medication 250 MILLIGRAM(S): at 05:09

## 2022-12-08 RX ADMIN — SODIUM CHLORIDE 3 MILLILITER(S): 9 INJECTION INTRAMUSCULAR; INTRAVENOUS; SUBCUTANEOUS at 05:35

## 2022-12-08 NOTE — DISCHARGE NOTE PROVIDER - NSDCFUSCHEDAPPT_GEN_ALL_CORE_FT
[facility] Physician [facility] [phone]  Scheduled Appointment: 12/16/2022

## 2022-12-08 NOTE — DISCHARGE NOTE PROVIDER - NSDCCPCAREPLAN_GEN_ALL_CORE_FT
PRINCIPAL DISCHARGE DIAGNOSIS  Diagnosis: Presence of automatic (implantable) cardiac defibrillator  Assessment and Plan of Treatment: You Underwent a ICD Generator Chabge   Monitor site for any bleeding, infection, redness, pain, swelling, Contact MD if any of these symptoms occur   -Follow up at [facility] on   12/16 at 11:40.         SECONDARY DISCHARGE DIAGNOSES  Diagnosis: Hyponatremia  Assessment and Plan of Treatment: Your Sodium level was low. Please Follow up with PMD for repeat Level

## 2022-12-08 NOTE — PROGRESS NOTE ADULT - NS ATTEND AMEND GEN_ALL_CORE FT
72 year old male with a pMH of HTN, prostate cancer, brain injury with anoxic encephalopathy, ICD for sudden cardiac death, GIB, gastric varices who presents for an elective ICD generator change s/p ICD generator change on 12/7.  Post device implantation teaching with instructions provided to Patient and spouse.  Patient's spouse verbalized understanding.    -Follow up on  12/16 at 11:40.    -Continue home meds, Tylenol prn  Stable for discharge home after IV Vancomycin for prophylaxis.

## 2022-12-08 NOTE — DISCHARGE NOTE NURSING/CASE MANAGEMENT/SOCIAL WORK - PATIENT PORTAL LINK FT
You can access the FollowMyHealth Patient Portal offered by [facility] by registering at the following website: [email or web address]. By joining [employer]’s FollowMyHealth portal, you will also be able to view your health information using other applications (apps) compatible with our system.

## 2022-12-08 NOTE — DISCHARGE NOTE PROVIDER - NSDCFUADDINST_GEN_ALL_CORE_FT
No strenuous activity x 3 weeks, No heavy lifting x 1 week, May shower but no bath x7 days, Monitor site for any bleeding, infection, redness, pain, swelling, Contact MD if any of these symptoms occur

## 2022-12-08 NOTE — DISCHARGE NOTE PROVIDER - NSDCMRMEDTOKEN_GEN_ALL_CORE_FT
albuterol 90 mcg/inh inhalation powder: 2 puff(s) inhaled 2 times a day  aspirin 81 mg oral tablet: 1 tab(s) orally once a day  budesonide-formoterol 80 mcg-4.5 mcg/inh inhalation aerosol: 2 puff(s) inhaled 2 times a day  Erleada 60 mg oral tablet: 4 tab(s) orally once a day (at bedtime)  Lupron Depot: every three months  Metoprolol Succinate  mg oral tablet, extended release: 1 tab(s) orally once a day  multivitamin: 1 tab(s) orally once a day   albuterol 90 mcg/inh inhalation powder: 2 puff(s) inhaled 2 times a day  aspirin 81 mg oral tablet: 1 tab(s) orally once a day  budesonide-formoterol 80 mcg-4.5 mcg/inh inhalation aerosol: 2 puff(s) inhaled 2 times a day  Erleada 60 mg oral tablet: 4 tab(s) orally once a day (at bedtime)  Lupron Depot: every three months  Metoprolol Succinate  mg oral tablet, extended release: 1 tab(s) orally once a day  multivitamin: 1 tab(s) orally once a day  Tylenol 325 mg oral tablet: 2 tab(s) orally every 6 hours, As Needed  for pain

## 2022-12-08 NOTE — DISCHARGE NOTE PROVIDER - CARE PROVIDER_API CALL
Michele Tineo (MD)  Cardiac Electrophysiology; Cardiovascular Disease; Internal Medicine  [phone] [place] [address], [id number]  [place] [address]  Phone: [phone]  Fax: [phone]  Follow Up Time:

## 2022-12-08 NOTE — CHART NOTE - NSCHARTNOTEFT_GEN_A_CORE
12-08    128<L>  |  94<L>  |  9   ----------------------------<  126<H>  3.8   |  24  |  0.71    Ca    8.9      08 Dec 2022 09:45  Phos  3.6     12-08  Mg     1.90     12-08    Hyponatremia Noted, result discussed with EP Team , OK to Proceed with discharge

## 2022-12-08 NOTE — PROGRESS NOTE ADULT - SUBJECTIVE AND OBJECTIVE BOX
Patient is a 72y old  Male who presents with a chief complaint of   Denies wound site pain.  s/p ICD generator change on 12/7  PAST MEDICAL & SURGICAL HISTORY:  Hypertension    Prostate cancer  s/p surgery and radiation therapy    Brain injury with coma  x 2 weeks in 2008    Anoxic encephalopathy    Leg pain, right    Gait abnormality  wheelchair bound    Sudden cardiac death  2008    H/O portal hypertension    H/O cirrhosis    Deep vein thrombosis (DVT)  h/o, off AC h/o GI bleed    H/O: GI bleed    Gastric varices    Status post cryoablation  of left renal mass    H/O prostate cancer  resection and radiation therapy    H/O tracheostomy  h/o    S/P ICD (internal cardiac defibrillator) procedure  implanted on 1/6/2009  [employer]   model E102  serial [id number]    History of prostate surgery    History of right inguinal hernia repair    Encounter for insertion of tunneled central venous catheter (CVC) with port  Right chest        MEDICATIONS  (STANDING):  apalutamide 240 milliGRAM(s) Oral <User Schedule>  aspirin enteric coated 81 milliGRAM(s) Oral daily  budesonide  80 MICROgram(s)/formoterol 4.5 MICROgram(s) Inhaler 2 Puff(s) Inhalation two times a day  metoprolol succinate  milliGRAM(s) Oral daily  multivitamin 1 Tablet(s) Oral daily  sodium chloride 0.9% lock flush 3 milliLiter(s) IV Push every 8 hours    MEDICATIONS  (PRN):            Vital Signs Last 24 Hrs  T(C): 36.2 (08 Dec 2022 06:00), Max: 36.5 (07 Dec 2022 23:00)  T(F): 97.1 (08 Dec 2022 06:00), Max: 97.7 (07 Dec 2022 23:00)  HR: 66 (08 Dec 2022 06:00) (66 - 66)  BP: 167/72 (08 Dec 2022 06:00) (144/70 - 167/72)  BP(mean): --  RR: 18 (08 Dec 2022 06:00) (18 - 18)  SpO2: --    Parameters below as of 08 Dec 2022 06:00  Patient On (Oxygen Delivery Method): room air                INTERPRETATION OF TELEMETRY:  SR with occasional PVC's     ECG:        LABS:                        12.1   5.07  )-----------( 201      ( 08 Dec 2022 07:10 )             35.7     12-08    130<L>  |  95<L>  |  9   ----------------------------<  101<H>  3.9   |  24  |  0.76    Ca    9.0      08 Dec 2022 07:10  Phos  3.6     12-08  Mg     1.90     12-08                BNP  RADIOLOGY & ADDITIONAL STUDIES:      PHYSICAL EXAM:    GENERAL: In no apparent distress, well nourished, and hydrated.  NECK: Supple and normal thyroid.  No JVD or carotid bruit.  Carotid pulse is 2+ bilaterally.  HEART: Regular rate and rhythm, +ectopy, No murmurs, rubs, or gallops.    Left side ACW incision site with steri strips dry intact, no active bleeding or hematoma   PULMONARY: Clear to auscultation and perfusion.  No rales, wheezing, or rhonchi bilaterally.  ABDOMEN: Soft, Nontender, Nondistended; Bowel sounds present  EXTREMITIES:  2+ Peripheral Pulses, No clubbing, cyanosis, or edema  NEUROLOGICAL: alert oriented but minimal  verbal response; +contracted extremities

## 2022-12-16 ENCOUNTER — APPOINTMENT (OUTPATIENT)
Dept: ELECTROPHYSIOLOGY | Facility: CLINIC | Age: 72
End: 2022-12-16

## 2022-12-16 VITALS — HEART RATE: 67 BPM | RESPIRATION RATE: 14 BRPM | SYSTOLIC BLOOD PRESSURE: 131 MMHG | DIASTOLIC BLOOD PRESSURE: 65 MMHG

## 2022-12-16 DIAGNOSIS — I49.01 VENTRICULAR FIBRILLATION: ICD-10-CM

## 2022-12-16 PROBLEM — Z87.19 PERSONAL HISTORY OF OTHER DISEASES OF THE DIGESTIVE SYSTEM: Chronic | Status: ACTIVE | Noted: 2022-11-29

## 2022-12-16 PROBLEM — Z86.79 PERSONAL HISTORY OF OTHER DISEASES OF THE CIRCULATORY SYSTEM: Chronic | Status: ACTIVE | Noted: 2022-11-29

## 2022-12-16 PROBLEM — I82.409 ACUTE EMBOLISM AND THROMBOSIS OF UNSPECIFIED DEEP VEINS OF UNSPECIFIED LOWER EXTREMITY: Chronic | Status: ACTIVE | Noted: 2022-11-29

## 2022-12-16 PROBLEM — I86.4 GASTRIC VARICES: Chronic | Status: ACTIVE | Noted: 2022-11-29

## 2022-12-16 PROCEDURE — 99024 POSTOP FOLLOW-UP VISIT: CPT

## 2022-12-16 RX ORDER — POLYETHYLENE GLYCOL 3350 17 G/17G
17 POWDER, FOR SOLUTION ORAL
Qty: 238 | Refills: 0 | Status: DISCONTINUED | COMMUNITY
Start: 2017-03-31 | End: 2022-12-16

## 2022-12-16 RX ORDER — APALUTAMIDE 60 MG/1
60 TABLET, FILM COATED ORAL DAILY
Refills: 0 | Status: ACTIVE | COMMUNITY

## 2022-12-26 NOTE — DISCHARGE NOTE NURSING/CASE MANAGEMENT/SOCIAL WORK - NSDCPEFALRISK_GEN_ALL_CORE
For information on Fall & Injury Prevention, visit: [email or web address] OR  [email or web address] OR  https://www.cdc.gov/steadi/patient.html
Patient's first and last name, , procedure, and correct site confirmed prior to the start of procedure.

## 2023-01-10 ENCOUNTER — APPOINTMENT (OUTPATIENT)
Dept: ELECTROPHYSIOLOGY | Facility: CLINIC | Age: 73
End: 2023-01-10

## 2023-01-31 NOTE — H&P PST ADULT - NSCAFFEAMTFREQ_GEN_ALL_CORE_SD
1-2 cups/cans per day Burow's Graft Text: The defect edges were debeveled with a #15 scalpel blade.  Given the location of the defect, shape of the defect, the proximity to free margins and the presence of a standing cone deformity a Burow's skin graft was deemed most appropriate. The standing cone was removed and this tissue was then trimmed to the shape of the primary defect. The adipose tissue was also removed until only dermis and epidermis were left.  The skin margins of the secondary defect were undermined to an appropriate distance in all directions utilizing iris scissors.  The secondary defect was closed with interrupted buried subcutaneous sutures.  The skin edges were then re-apposed with running  sutures.  The skin graft was then placed in the primary defect and oriented appropriately.

## 2023-03-15 ENCOUNTER — NON-APPOINTMENT (OUTPATIENT)
Age: 73
End: 2023-03-15

## 2023-03-15 ENCOUNTER — APPOINTMENT (OUTPATIENT)
Dept: ELECTROPHYSIOLOGY | Facility: CLINIC | Age: 73
End: 2023-03-15
Payer: MEDICARE

## 2023-03-15 PROCEDURE — 93295 DEV INTERROG REMOTE 1/2/MLT: CPT

## 2023-03-15 PROCEDURE — 93296 REM INTERROG EVL PM/IDS: CPT

## 2023-06-15 ENCOUNTER — APPOINTMENT (OUTPATIENT)
Dept: ELECTROPHYSIOLOGY | Facility: CLINIC | Age: 73
End: 2023-06-15
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
## 2023-09-05 NOTE — PROGRESS NOTE ADULT - ASSESSMENT
ADONIS WELSH 70 M 7/7/2021 1949 DR JXAON BROWN      REVIEW OF SYMPTOMS      Able to give (reliable) ROS  NO     PHYSICAL EXAM    HEENT Unremarkable  atraumatic   RESP Fair air entry EXP prolonged    Harsh breath sound Resp distres mild   CARDIAC S1 S2 No S3     NO JVD    ABDOMEN SOFT BS PRESENT NOT DISTENDED No hepatosplenomegaly   PEDAL EDEMA present No calf tenderness  NO rash       ADVANCED DIRECTIVES.                            COVID STATUS.                                       scv2 7/7/2021 (-)       CC 7/7/2021 ADMISSN .   70 m HO brain injury 2008 previously able to eat po 3 w ago  7/7/2021 FEVER     PRESENTING PROBLEMS 7/7/2021 .   OTITIS EXTERNA R EAR   FEVER   HYPONATREMIA Na 7/7/2021 Na 129  DYSPNEA  FUNCTIONAL QUADRIPLEGIA     PMH.  BASELINE  VERBAL BEDBOUND   SBR   HO TRACH SP DECANNULATION   ANOXIC ENCEPHALOPATHY Brain injury 2008   HO SCD  HO AICD 2009   HO CYROABLATION L RENAL MASS   PROSTATE CA        GLOBAL AND BEST PRACTICE ISSUES                     ALLERGY.    PNCL LISINOPRIL  WEIGHT.          7/7/2021 90        BMI                7/7/2021 33        .                          ADVANCED DIRECTIVE.                               HEAD OF BED ELEVATION. Yes  DVT PROPHYLAXIS. hpsc (7/8)   APA.                   GONZALEZ PROPHYLAXIS.                                                                     DYSPHAGIA RECOMM.7/12/2021 puree honey (7/12/2021)    DIET.    NPO (77) -> puree honey (7/12/2021)   INFECTION PROPHYLAXIS.  chlorhexidine (7/9)    ABIO.  acyclovi 600.2 (7/14/2021)     PATIENT DATA                ABG.     7/10/2021 .32 749/36/111              OXYGENATION.    7/13/2021 3l 100%   7/10/2021 ra 97%   7/9/2021 3l 96%      7/7-7/8/2021 2l 97%   - ra 97%          VITALS/IO/VENT/DRIPS.     7/15/2021 afeb 98 150/70     ASSESSMENT/RECOMMENDATIONS.    HERPES OTITIS EXTRENAL   VZV  ENCEPHALITIS   7/7 herpes vzv pcr (+) EAR  7/9 CSF vzv pcr (+)   7/8/2021 acyclovir 900.3 started 7/8/2021 Dr Ponce    More awake on 7/10/2021   chnged to valacyclovir 7/13/2021   WHEEZE   DUNEB.4 P 97/7)    cxr  7/12/2021 no consoldation   7/11 spouse feels he is congested (7/11/20210   Cont bd   DYSPHAGIA DIET STARTED 7/12/2021   On iv fl  Pt used to eat po June 2021 as per spouse (7/11/2021)   7/11/2021 speech eval ordered to start po diet  7/12/2021 Dys diet stared   MALVIN  Cr 7/12-7/13-7/14-7/15 Cr .5-1.7-1.8-1.6  Monitor     CURRENT ISSUES  VZV ENCEPHALITIS ON ACYCLOVIR   DYSPHAGIA   MALVIN 7/12-7/13 Cr .5-1.7    TIME SPENT   Over 25 minutes aggregate care time spent on encounter; activities included   direct patient care, counseling and/or coordinating care reviewing notes, lab data/ imaging , discussion with multidisciplinary team/ patient  /family and explaining in detail risks, benefits, alternatives  of the recommendations     ADONIS WELSH 70 M 7/7/2021 1949 DR JAXON BROWN      Topical Sulfur Applications Counseling: Topical Sulfur Counseling: Patient counseled that this medication may cause skin irritation or allergic reactions.  In the event of skin irritation, the patient was advised to reduce the amount of the drug applied or use it less frequently.   The patient verbalized understanding of the proper use and possible adverse effects of topical sulfur application.  All of the patient's questions and concerns were addressed.

## 2023-09-26 NOTE — PHYSICAL THERAPY INITIAL EVALUATION ADULT - LEVEL OF INDEPENDENCE, REHAB EVAL
Pt/family given discharge instructions and follow up information. Patient awake and alert, resting comfortably at time of discharge. Pt/family denies further questions. Patient discharged home by this RN.   maximum assist (25% patients effort)

## 2023-12-15 ENCOUNTER — APPOINTMENT (OUTPATIENT)
Dept: ELECTROPHYSIOLOGY | Facility: CLINIC | Age: 73
End: 2023-12-15

## 2023-12-20 NOTE — ED PROVIDER NOTE - CADM POA PRESS ULCER
Spoke with daughter. Confirmed the medication. Daughter states it is Albuterol nebulizer solution.    No

## 2024-02-04 NOTE — DISCHARGE NOTE ADULT - CARE PROVIDERS DIRECT ADDRESSES
,DirectAddress_Unknown ,DirectAddress_Unknown,DirectAddress_Unknown,DirectAddress_Unknown,[email or web address] 12-16 yrs

## 2024-03-06 NOTE — PATIENT PROFILE ADULT - HISTORY OF COVID-19 VACCINATION
Colonoscopy/22 July 2021. Unremarkable terminal ileum. 11 mm advanced tubular adenomatous polyp removed from cecum. Two advanced tubular adenomatous polyps (10-14 mm) were removed from the proximal ascending colon. Two tubular adenomatous polyps, one advanced in size (8-10 mm) were removed from the mid transverse colon. Internal hemorrhoids present. Biopsies of the ascending colon and rectum demonstrate colonic mucosa without significant histopathologic diagnosis. All remaining findings are negative or benign. Recommend repeat colonoscopy in 3 years due to advanced tubular adenomatous polyps removed on this procedure and a personal history of Crohn's. ********** EGD-colonoscopy/27 April 2011. 2 cm hiatal hernia visible in the esophagus. Moderately severe gastritis found in the stomach. A few fundal foveal polyps (measuring less than 5 mm in size) were found in the fundus. Unremarkable duodenum. Unremarkable terminal ileum. 14 mm advanced tubular adenomatous polyp removed from the ascending colon. Evidence of mild proctitis. Internal hemorrhoids present. Pathology demonstrates ileal type mucosa with lymphoid hyperplasia, negative for significant acute inflammation in the terminal ileum biopsy and large bowel mucosa with focal mild nonspecific chronic inflammation and scattered benign lymphoid aggregates. All remaining findings are negative or benign. Recommend repeat colonoscopy in 3 years due to advanced tubular adenomatous polyp removed on this procedure.
Yes

## 2024-03-11 NOTE — ED PROVIDER NOTE - NS_EDPROVIDERDISPOUSERTYPE_ED_A_ED
to improve tolerance to reaching activities.  Patient will improve R shoulder strength to >/= 4-/5 for all planes in order to improve tolerance to carrying activities.  Patient will report reduction in pain at worst to 1-2/10 in order to improve tolerance to work activities.         Next PN/ RC due 2/26/24  Auth due (visit number/ date) 60 v; 12/31/24      PLAN  - Continue Plan of Care  - Upgrade activities as tolerated    KANE SOTOMAYOR PT    3/11/2024    2:49 PM    Future Appointments   Date Time Provider Department Center   3/14/2024  4:20 PM Kane Sotomayor, KIEL [facility]   3/20/2024  4:20 PM Kane Sotomayor, PT [facility]   3/22/2024  2:20 PM Kane Sotomayor PT [facility]   3/25/2024 12:20 PM Kane Sotomayor, PT [facility]   3/27/2024 12:20 PM Kane Sotomayor, PT [facility]          Scribe Attestation (For Scribes USE Only)... Attending Attestation (For Attendings USE Only).../Scribe Attestation (For Scribes USE Only)...

## 2024-03-27 NOTE — PATIENT PROFILE ADULT - BRADEN MOISTURE
[facility]   IN-PATIENT SERVICE   [facility]    HISTORY AND PHYSICAL EXAMINATION            Date:   3/27/2024  Patient name:  Frank Lisa  Date of admission:  3/26/2024  4:01 PM  MRN:   [id number]  Account:  [id number]  YOB: 1960  PCP:    Lopez Rosario PA  Room:   [address] [place] [address]  Code Status:    Full Code    Chief Complaint:     Chief Complaint   Patient presents with    Fatigue    Dizziness    Abnormal Lab       History Obtained From:     patient    History of Present Illness:     The patient is a 63 y.o.  Non- / non  male who presents with Fatigue, Dizziness, and Abnormal Lab   and he is admitted to the hospital for the management of with multiple comorbidities including history of liver cirrhosis secondary to alcohol use and hepatitis C, history of TIPS, hepatitis C treated esophageal cancer, history of dysphagia s/p PEG follows up with oncology currently in remission , recently had EGD on 1/9/20/2024 did not show any evidence of recurrence,  Patient came today with abdominal labs  Patient had lab work ordered by GI and found to have severe hypomagnesemia and hypokalemia and was recommended to come to ER.  Patient states that he had previous electrolyte abnormalities as well and was feeling very weak and fatigued  .  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 Mc, orthopedic, every  (3) occasionally moist

## 2024-09-13 ENCOUNTER — APPOINTMENT (OUTPATIENT)
Dept: ELECTROPHYSIOLOGY | Facility: CLINIC | Age: 74
End: 2024-09-13
Payer: MEDICARE

## 2024-09-13 ENCOUNTER — NON-APPOINTMENT (OUTPATIENT)
Age: 74
End: 2024-09-13

## 2024-09-13 VITALS
DIASTOLIC BLOOD PRESSURE: 66 MMHG | SYSTOLIC BLOOD PRESSURE: 127 MMHG | HEART RATE: 63 BPM | WEIGHT: 170 LBS | OXYGEN SATURATION: 96 % | HEIGHT: 71 IN | BODY MASS INDEX: 23.8 KG/M2

## 2024-09-13 DIAGNOSIS — I49.01 VENTRICULAR FIBRILLATION: ICD-10-CM

## 2024-09-13 DIAGNOSIS — Z95.810 PRESENCE OF AUTOMATIC (IMPLANTABLE) CARDIAC DEFIBRILLATOR: ICD-10-CM

## 2024-09-13 DIAGNOSIS — I47.10 SUPRAVENTRICULAR TACHYCARDIA, UNSPECIFIED: ICD-10-CM

## 2024-09-13 PROCEDURE — G2211 COMPLEX E/M VISIT ADD ON: CPT

## 2024-09-13 PROCEDURE — 93000 ELECTROCARDIOGRAM COMPLETE: CPT

## 2024-09-13 PROCEDURE — 99214 OFFICE O/P EST MOD 30 MIN: CPT

## 2024-09-13 RX ORDER — METOPROLOL SUCCINATE 50 MG/1
50 TABLET, EXTENDED RELEASE ORAL
Qty: 90 | Refills: 3 | Status: ACTIVE | COMMUNITY
Start: 2024-09-13 | End: 1900-01-01

## 2024-09-13 NOTE — HISTORY OF PRESENT ILLNESS
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 Has been doing well. No s/s of distress. Device checked today and reveals ICD shock in 2/2024 for VF, metoprolol increased to 125 daily at that time. Now more recently in August he had brief runs of SVT. No sustained tachyarrhythmias and no noted VT/VF.

## 2024-09-13 NOTE — DISCUSSION/SUMMARY
[FreeTextEntry1] : Impression:  1. VF: s/p ICD and shock in 2/2024. EKG performed today to assess for presence of conduction disease and reveals NSR. No recurrent VF or ICD shocks since that time.     2. SV: brief runs of SVT noted. Recommend increasing metoprolol to 150 daily, can do 100 in AM and 50 in PM (used to be on 200mg daily at one point).  [EKG obtained to assist in diagnosis and management of assessed problem(s)] : EKG obtained to assist in diagnosis and management of assessed problem(s)

## 2024-09-13 NOTE — PHYSICAL EXAM
[Well Developed] : well developed [Well Nourished] : well nourished [No Acute Distress] : no acute distress [Normal Conjunctiva] : normal conjunctiva [Normal Venous Pressure] : normal venous pressure [No Carotid Bruit] : no carotid bruit [Normal S1, S2] : normal S1, S2 [No Murmur] : no murmur [No Rub] : no rub [No Gallop] : no gallop [Clear Lung Fields] : clear lung fields [Good Air Entry] : good air entry [No Respiratory Distress] : no respiratory distress  [Soft] : abdomen soft [Non Tender] : non-tender [No Masses/organomegaly] : no masses/organomegaly [Normal Bowel Sounds] : normal bowel sounds [Normal Gait] : normal gait [No Cyanosis] : no cyanosis [No Clubbing] : no clubbing [No Varicosities] : no varicosities [No Rash] : no rash [No Skin Lesions] : no skin lesions [Alert and Oriented] : alert and oriented [Normal memory] : normal memory [de-identified] : B/L LE and UE trace edema  [de-identified] : anoxic encephalopathy

## 2024-09-13 NOTE — PHYSICAL EXAM
[Well Developed] : well developed [Well Nourished] : well nourished [No Acute Distress] : no acute distress [Normal Conjunctiva] : normal conjunctiva [Normal Venous Pressure] : normal venous pressure [No Carotid Bruit] : no carotid bruit [Normal S1, S2] : normal S1, S2 [No Murmur] : no murmur [No Rub] : no rub [No Gallop] : no gallop [Clear Lung Fields] : clear lung fields [Good Air Entry] : good air entry [No Respiratory Distress] : no respiratory distress  [Soft] : abdomen soft [Non Tender] : non-tender [No Masses/organomegaly] : no masses/organomegaly [Normal Bowel Sounds] : normal bowel sounds [Normal Gait] : normal gait [No Cyanosis] : no cyanosis [No Clubbing] : no clubbing [No Varicosities] : no varicosities [No Rash] : no rash [No Skin Lesions] : no skin lesions [Alert and Oriented] : alert and oriented [Normal memory] : normal memory [de-identified] : B/L LE and UE trace edema  [de-identified] : anoxic encephalopathy

## 2024-09-13 NOTE — REASON FOR VISIT
[Pulsatile Thrill] : no pulsatile thrill [FreeTextEntry1] : ulcer noted over [JVD] : no jugular venous distention  [Ankle Swelling (On Exam)] : not present [Varicose Veins Of Lower Extremities] : not present [No Rash or Lesion] : No rash or lesion [] : not present [Skin Ulcer] : no ulcer [Alert] : alert [Calm] : calm [de-identified] : appears well  [FreeTextEntry3] : Vane Vo MD

## 2024-09-19 NOTE — PATIENT PROFILE ADULT - NSPROMEDDEVINTERROGDT_GEN_A_NUR
Head, normocephalic, atraumatic, Face, Face within normal limits, Ears, External ears within normal limits 18-Aug-2018

## 2024-10-25 NOTE — ED ADULT NURSE NOTE - NSFALLRSKOUTCOME_ED_ALL_ED
You can start using zyrtec daily to control his allergies and pataday eye drops once a day until his symptoms improve.    Please keep his penis area clean but do not pull down the skin, we do not want to cause any irritation or injury.       Please call our clinic with any concerns.   
Universal Safety Interventions

## 2024-11-11 NOTE — H&P PST ADULT - WAS YOUR LAST COVID-19 VACCINE GREATER THAN OR EQUAL TO TWO MONTHS AGO?
Called patient as transmission has not been received yet, stayed on the phone while he sent transmission, device is sending report to [employer], pending receipt.    Yes

## 2025-06-19 NOTE — PATIENT PROFILE ADULT - ANY IMPAIRED UPPER EXTREMITY FUNCTION WITHIN A WEEK PRIOR TO ADMISSION RELATED TO?
- You must understand that you have received an Urgent Care treatment only and that you may be released before all of your medical problems are known or treated.   - You, the patient, will arrange for follow up care as instructed.   - If your condition worsens or fails to improve we recommend that you receive another evaluation at the ER immediately or contact your PCP to discuss your concerns.   - You can call [phone] or [phone] to help schedule an appointment with the appropriate provider.    Drink plenty of fluids   Get lots of rest  Tylenol or ibuprofen for pain/fever  Warm salt water gargles for sore throat  Warm tea with honey and lemon for sore throat  Change your toothbrush in about 3 days to prevent the chance of reinfection  You are contagious and should stay home while sick. After 24 hours of antibiotics, you may return to work/school if you have not had a fever for 24 hours (without fever reducing medications) and otherwise feel well.   Use magic mouthwash as needed for sore throat. Swish, gargle, and spit. Do not swallow         no

## 2025-07-08 NOTE — ED ADULT NURSE NOTE - NURSING MUSC EXTREMITY NORMAL ROM
CALL WITH ANY QUESTIONS   NO MEDICATION CHANGES   FOLLOW UP IN ONE YEAR    left upper extremity/right upper extremity